# Patient Record
Sex: MALE | Race: WHITE | NOT HISPANIC OR LATINO | Employment: OTHER | URBAN - METROPOLITAN AREA
[De-identification: names, ages, dates, MRNs, and addresses within clinical notes are randomized per-mention and may not be internally consistent; named-entity substitution may affect disease eponyms.]

---

## 2017-08-01 ENCOUNTER — TRANSCRIBE ORDERS (OUTPATIENT)
Dept: ADMINISTRATIVE | Facility: HOSPITAL | Age: 62
End: 2017-08-01

## 2017-08-01 DIAGNOSIS — R31.9 HEMATURIA: ICD-10-CM

## 2017-08-01 DIAGNOSIS — N50.811 TESTICULAR PAIN, RIGHT: Primary | ICD-10-CM

## 2017-08-03 ENCOUNTER — HOSPITAL ENCOUNTER (OUTPATIENT)
Dept: RADIOLOGY | Facility: HOSPITAL | Age: 62
Discharge: HOME | End: 2017-08-03
Payer: COMMERCIAL

## 2017-08-03 DIAGNOSIS — N50.811 TESTICULAR PAIN, RIGHT: ICD-10-CM

## 2017-08-03 DIAGNOSIS — R31.9 HEMATURIA: ICD-10-CM

## 2017-08-03 PROCEDURE — 76870 US EXAM SCROTUM: CPT

## 2017-08-03 PROCEDURE — 76770 US EXAM ABDO BACK WALL COMP: CPT

## 2018-02-10 ENCOUNTER — HOSPITAL ENCOUNTER (INPATIENT)
Facility: HOSPITAL | Age: 63
LOS: 1 days | Discharge: HOME | DRG: 812 | End: 2018-02-11
Attending: EMERGENCY MEDICINE | Admitting: FAMILY MEDICINE
Payer: COMMERCIAL

## 2018-02-10 ENCOUNTER — APPOINTMENT (EMERGENCY)
Dept: RADIOLOGY | Facility: HOSPITAL | Age: 63
DRG: 812 | End: 2018-02-10
Payer: COMMERCIAL

## 2018-02-10 DIAGNOSIS — F32.A DEPRESSION: ICD-10-CM

## 2018-02-10 DIAGNOSIS — Z79.899 POLYPHARMACY: ICD-10-CM

## 2018-02-10 DIAGNOSIS — D64.9 SYMPTOMATIC ANEMIA: Primary | ICD-10-CM

## 2018-02-10 PROBLEM — J40 BRONCHITIS: Status: ACTIVE | Noted: 2018-02-10

## 2018-02-10 PROBLEM — I10 HYPERTENSION: Status: ACTIVE | Noted: 2018-02-10

## 2018-02-10 LAB
ABO GROUP BLD: NORMAL
ABO GROUP BLD: NORMAL
ALBUMIN SERPL BCP-MCNC: 3.5 G/DL (ref 3.5–5)
ALP SERPL-CCNC: 83 U/L (ref 46–116)
ALT SERPL W P-5'-P-CCNC: 18 U/L (ref 12–78)
ANION GAP SERPL CALCULATED.3IONS-SCNC: 2 MMOL/L (ref 4–13)
AST SERPL W P-5'-P-CCNC: 12 U/L (ref 5–45)
ATRIAL RATE: 73 BPM
BASOPHILS # BLD AUTO: 0 THOUSANDS/ΜL (ref 0–0.1)
BASOPHILS NFR BLD AUTO: 0 % (ref 0–1)
BILIRUB SERPL-MCNC: 0.4 MG/DL (ref 0.2–1)
BLD GP AB SCN SERPL QL: NEGATIVE
BUN SERPL-MCNC: 20 MG/DL (ref 5–25)
CALCIUM SERPL-MCNC: 8.5 MG/DL (ref 8.3–10.1)
CHLORIDE SERPL-SCNC: 102 MMOL/L (ref 100–108)
CO2 SERPL-SCNC: 35 MMOL/L (ref 21–32)
CREAT SERPL-MCNC: 0.92 MG/DL (ref 0.6–1.3)
EOSINOPHIL # BLD AUTO: 0.1 THOUSAND/ΜL (ref 0–0.61)
EOSINOPHIL NFR BLD AUTO: 1 % (ref 0–6)
ERYTHROCYTE [DISTWIDTH] IN BLOOD BY AUTOMATED COUNT: 13.4 % (ref 11.6–15.1)
GFR SERPL CREATININE-BSD FRML MDRD: 88 ML/MIN/1.73SQ M
GLUCOSE SERPL-MCNC: 118 MG/DL (ref 65–140)
HCT VFR BLD AUTO: 15.5 % (ref 42–52)
HGB BLD-MCNC: 5.3 G/DL (ref 14–18)
LYMPHOCYTES # BLD AUTO: 7.5 THOUSANDS/ΜL (ref 0.6–4.47)
LYMPHOCYTES NFR BLD AUTO: 64 % (ref 14–44)
MCH RBC QN AUTO: 30.8 PG (ref 27–31)
MCHC RBC AUTO-ENTMCNC: 34.3 G/DL (ref 31.4–37.4)
MCV RBC AUTO: 90 FL (ref 82–98)
MONOCYTES # BLD AUTO: 0.5 THOUSAND/ΜL (ref 0.17–1.22)
MONOCYTES NFR BLD AUTO: 4 % (ref 4–12)
NEUTROPHILS # BLD AUTO: 3.6 THOUSANDS/ΜL (ref 1.85–7.62)
NEUTS SEG NFR BLD AUTO: 31 % (ref 43–75)
NRBC BLD AUTO-RTO: 0 /100 WBCS
P AXIS: 57 DEGREES
PLATELET # BLD AUTO: 259 THOUSANDS/UL (ref 130–400)
PLATELET BLD QL SMEAR: ADEQUATE
PMV BLD AUTO: 6.7 FL (ref 8.9–12.7)
POLYCHROMASIA BLD QL SMEAR: PRESENT
POTASSIUM SERPL-SCNC: 4.1 MMOL/L (ref 3.5–5.3)
PR INTERVAL: 164 MS
PROT SERPL-MCNC: 6.2 G/DL (ref 6.4–8.2)
QRS AXIS: -6 DEGREES
QRSD INTERVAL: 88 MS
QT INTERVAL: 402 MS
QTC INTERVAL: 442 MS
RBC # BLD AUTO: 1.72 MILLION/UL (ref 4.7–6.1)
RH BLD: POSITIVE
RH BLD: POSITIVE
SODIUM SERPL-SCNC: 139 MMOL/L (ref 136–145)
SPECIMEN EXPIRATION DATE: NORMAL
T WAVE AXIS: 41 DEGREES
VALPROATE SERPL-MCNC: 69 UG/ML (ref 50–100)
VENTRICULAR RATE: 73 BPM
WBC # BLD AUTO: 11.7 THOUSAND/UL (ref 4.8–10.8)

## 2018-02-10 PROCEDURE — 86920 COMPATIBILITY TEST SPIN: CPT

## 2018-02-10 PROCEDURE — 30233N1 TRANSFUSION OF NONAUTOLOGOUS RED BLOOD CELLS INTO PERIPHERAL VEIN, PERCUTANEOUS APPROACH: ICD-10-PCS | Performed by: FAMILY MEDICINE

## 2018-02-10 PROCEDURE — 87081 CULTURE SCREEN ONLY: CPT | Performed by: FAMILY MEDICINE

## 2018-02-10 PROCEDURE — 85025 COMPLETE CBC W/AUTO DIFF WBC: CPT | Performed by: EMERGENCY MEDICINE

## 2018-02-10 PROCEDURE — 36415 COLL VENOUS BLD VENIPUNCTURE: CPT | Performed by: EMERGENCY MEDICINE

## 2018-02-10 PROCEDURE — P9016 RBC LEUKOCYTES REDUCED: HCPCS

## 2018-02-10 PROCEDURE — 93005 ELECTROCARDIOGRAM TRACING: CPT | Performed by: EMERGENCY MEDICINE

## 2018-02-10 PROCEDURE — 96361 HYDRATE IV INFUSION ADD-ON: CPT

## 2018-02-10 PROCEDURE — 80053 COMPREHEN METABOLIC PANEL: CPT | Performed by: EMERGENCY MEDICINE

## 2018-02-10 PROCEDURE — 82728 ASSAY OF FERRITIN: CPT | Performed by: EMERGENCY MEDICINE

## 2018-02-10 PROCEDURE — 99285 EMERGENCY DEPT VISIT HI MDM: CPT

## 2018-02-10 PROCEDURE — 80164 ASSAY DIPROPYLACETIC ACD TOT: CPT | Performed by: EMERGENCY MEDICINE

## 2018-02-10 PROCEDURE — 86900 BLOOD TYPING SEROLOGIC ABO: CPT | Performed by: EMERGENCY MEDICINE

## 2018-02-10 PROCEDURE — 86901 BLOOD TYPING SEROLOGIC RH(D): CPT | Performed by: EMERGENCY MEDICINE

## 2018-02-10 PROCEDURE — 86850 RBC ANTIBODY SCREEN: CPT | Performed by: EMERGENCY MEDICINE

## 2018-02-10 PROCEDURE — 93010 ELECTROCARDIOGRAM REPORT: CPT | Performed by: INTERNAL MEDICINE

## 2018-02-10 PROCEDURE — 83550 IRON BINDING TEST: CPT | Performed by: EMERGENCY MEDICINE

## 2018-02-10 PROCEDURE — C9113 INJ PANTOPRAZOLE SODIUM, VIA: HCPCS | Performed by: EMERGENCY MEDICINE

## 2018-02-10 PROCEDURE — 96372 THER/PROPH/DIAG INJ SC/IM: CPT

## 2018-02-10 PROCEDURE — 82272 OCCULT BLD FECES 1-3 TESTS: CPT | Performed by: FAMILY MEDICINE

## 2018-02-10 PROCEDURE — 96360 HYDRATION IV INFUSION INIT: CPT

## 2018-02-10 PROCEDURE — 83540 ASSAY OF IRON: CPT | Performed by: EMERGENCY MEDICINE

## 2018-02-10 PROCEDURE — 70450 CT HEAD/BRAIN W/O DYE: CPT

## 2018-02-10 PROCEDURE — 71045 X-RAY EXAM CHEST 1 VIEW: CPT

## 2018-02-10 RX ORDER — DIVALPROEX SODIUM 500 MG/1
1000 TABLET, DELAYED RELEASE ORAL EVERY 12 HOURS SCHEDULED
Status: DISCONTINUED | OUTPATIENT
Start: 2018-02-10 | End: 2018-02-11 | Stop reason: HOSPADM

## 2018-02-10 RX ORDER — FLUOXETINE HYDROCHLORIDE 20 MG/1
40 CAPSULE ORAL DAILY
Status: DISCONTINUED | OUTPATIENT
Start: 2018-02-10 | End: 2018-02-10

## 2018-02-10 RX ORDER — THIAMINE HYDROCHLORIDE 100 MG/ML
100 INJECTION, SOLUTION INTRAMUSCULAR; INTRAVENOUS ONCE
Status: COMPLETED | OUTPATIENT
Start: 2018-02-10 | End: 2018-02-10

## 2018-02-10 RX ORDER — RISPERIDONE 1 MG/1
2 TABLET, FILM COATED ORAL 2 TIMES DAILY
Status: DISCONTINUED | OUTPATIENT
Start: 2018-02-10 | End: 2018-02-11 | Stop reason: HOSPADM

## 2018-02-10 RX ORDER — AMOXICILLIN 875 MG/1
875 TABLET, COATED ORAL 2 TIMES DAILY
Status: DISCONTINUED | OUTPATIENT
Start: 2018-02-10 | End: 2018-02-11 | Stop reason: HOSPADM

## 2018-02-10 RX ORDER — PROPRANOLOL HYDROCHLORIDE 20 MG/1
10 TABLET ORAL 3 TIMES DAILY
Status: DISCONTINUED | OUTPATIENT
Start: 2018-02-10 | End: 2018-02-11 | Stop reason: HOSPADM

## 2018-02-10 RX ORDER — MIRTAZAPINE 15 MG/1
15 TABLET, FILM COATED ORAL
COMMUNITY
End: 2022-03-10 | Stop reason: ALTCHOICE

## 2018-02-10 RX ORDER — AMOXICILLIN 875 MG/1
875 TABLET, COATED ORAL 2 TIMES DAILY
COMMUNITY
End: 2018-02-11 | Stop reason: HOSPADM

## 2018-02-10 RX ORDER — RISPERIDONE 2 MG/1
4 TABLET, FILM COATED ORAL 2 TIMES DAILY
COMMUNITY
End: 2019-05-06 | Stop reason: ALTCHOICE

## 2018-02-10 RX ORDER — PROPRANOLOL HYDROCHLORIDE 10 MG/1
30 TABLET ORAL 3 TIMES DAILY
Status: ON HOLD | COMMUNITY
End: 2018-02-22

## 2018-02-10 RX ORDER — LISINOPRIL AND HYDROCHLOROTHIAZIDE 20; 12.5 MG/1; MG/1
1 TABLET ORAL DAILY
COMMUNITY
End: 2018-03-26 | Stop reason: CLARIF

## 2018-02-10 RX ORDER — PANTOPRAZOLE SODIUM 40 MG/1
40 INJECTION, POWDER, FOR SOLUTION INTRAVENOUS ONCE
Status: COMPLETED | OUTPATIENT
Start: 2018-02-10 | End: 2018-02-10

## 2018-02-10 RX ORDER — HYDROXYZINE PAMOATE 50 MG/1
50 CAPSULE ORAL 2 TIMES DAILY
COMMUNITY
End: 2019-05-06 | Stop reason: ALTCHOICE

## 2018-02-10 RX ORDER — BENZTROPINE MESYLATE 1 MG/1
1 TABLET ORAL 2 TIMES DAILY
COMMUNITY
End: 2022-03-10 | Stop reason: ALTCHOICE

## 2018-02-10 RX ORDER — FLUOXETINE HYDROCHLORIDE 20 MG/1
60 CAPSULE ORAL DAILY
COMMUNITY
Start: 2018-07-14 | End: 2022-03-10 | Stop reason: ALTCHOICE

## 2018-02-10 RX ORDER — MIRTAZAPINE 15 MG/1
15 TABLET, FILM COATED ORAL
Status: DISCONTINUED | OUTPATIENT
Start: 2018-02-10 | End: 2018-02-10

## 2018-02-10 RX ORDER — DIVALPROEX SODIUM 500 MG/1
500 TABLET, DELAYED RELEASE ORAL 2 TIMES DAILY
COMMUNITY
End: 2020-07-31

## 2018-02-10 RX ADMIN — DIVALPROEX SODIUM 1000 MG: 500 TABLET, DELAYED RELEASE ORAL at 21:30

## 2018-02-10 RX ADMIN — PANTOPRAZOLE SODIUM 40 MG: 40 INJECTION, POWDER, FOR SOLUTION INTRAVENOUS at 17:51

## 2018-02-10 RX ADMIN — PROPRANOLOL HYDROCHLORIDE: 20 TABLET ORAL at 21:30

## 2018-02-10 RX ADMIN — SODIUM CHLORIDE 1000 ML: 0.9 INJECTION, SOLUTION INTRAVENOUS at 12:53

## 2018-02-10 RX ADMIN — THIAMINE HYDROCHLORIDE 100 MG: 100 INJECTION, SOLUTION INTRAMUSCULAR; INTRAVENOUS at 13:02

## 2018-02-10 RX ADMIN — FLUOXETINE 40 MG: 20 CAPSULE ORAL at 17:41

## 2018-02-10 RX ADMIN — RISPERIDONE 2 MG: 1 TABLET ORAL at 19:21

## 2018-02-10 RX ADMIN — AMOXICILLIN 875 MG: 875 TABLET, FILM COATED ORAL at 17:42

## 2018-02-10 RX ADMIN — PROPRANOLOL HYDROCHLORIDE 10 MG: 20 TABLET ORAL at 17:40

## 2018-02-10 RX ADMIN — LISINOPRIL: 20 TABLET ORAL at 17:42

## 2018-02-10 NOTE — ED NOTES
Patient transported to [address] [place] [address],[facility], RN  02/10/18 8468

## 2018-02-10 NOTE — H&P
H&P Exam - [facility] Opal 60 y o  male MRN: [id number]  Unit/Bed#: TEODORO Encounter: [id number]    60year old male with a PMH of HTN and  Depression presents with   Patient will be admitted to the medical/surgical floor and will be placed on observational status under the service of Dr Barb Kurtz  Patient is expected to stay less than 2 midnights  Assessment/Plan:    Normocytic Anemia:   · Hb on admission: 5 3 / MCV on admission: 90  · Obtain Iron Studies / FOBT / Y26 / Folic Acid / Ferritin / ESR  · Tranfuse 2U PRBC  · Follow up Hb/Hct 2hrs post-transfusion  · Will monitor with daily CBC    Hx of Bronchitis:  · Chest XR on admission: Possible small infrahilar infiltrates versus bronchovascular crowding, otherwise no consolidation  · Currently completing course of Abx: Amoxicillin 875mg PO bid - day and a half left  · Continue Abx course  · Repeat CXR in AM    Leukocytosis:  · Likely 2/2 Bronchitis  · WBC on admission: 11 7  · Will monitor with daily CBC    Hx of Hematuria:  · Obtain UA    Hx of Fall:  · Likely 2/2 Medication Side Effects   · CT Head on admission: No acute intracranial abnormality  · Monitor with Telemetry  · Monitor with Neurochecks q4h    Depression:  · Stable; Continue home medications: Depakote 500mg q12h and Risperdal 2mg PO bid  · Will hold Prozac 40mg qd and Mirtazapine 15mg qd bedtime  · Consult Psychiatry    Hypertension:  · BP on admission: 122/59  · Stable; continue with home medication: Prinizide 20-12 5mg PO qd    Global:  · Regular Diet / Replete Electroylytes as Needed / Lovenox + SCD      Plan Discussed and Agreed upon with Senior Resident and Attending Physician on-call    History of Present Illness   [facility] Opal is a 60year old male with a PMH of HTN and Depression who presents to the ED with a chief complaint of feeling sick  Of note, patient is a poor historian and history was obtained from wife, Lito Queen, over the phone    As per wife, patient has been in [place] for drug and alcohol rehabilitation from November 20th to January 25th  Wife notes a past medical history of hypertension and depression  Social history includes smoking and alcohol abuse with last drink on November 19th/2017  No pertinent surgical history or known allergies or mentioned  Wife mentions that prior to leaving for [place], patient had lost his prescriptions for his psychiatric medications  States that there has been a period of time where patient has been off his medication  Wife states that since returning from [place], patient has had a hard time adjusting to new psychiatric medication  When asked about which medications were new, both patient and wife were unaware  Wife has noticed that patient has been drinking more coffee and Pepsi as of late  Has also noticed that patient has become more fidgety  Notes that patient is constantly doing things with his hands and has been hallucinating  Reports that patient has fallen on multiple occasions due to stumbling over himself  When asked about the nature of the fall , wife states that patient did not lose consciousness or experience any upper/lower extremity weakness  Wife also states that patient has recently been diagnosed with bronchitis and is currently completing a course of amoxicillin  Has a day and a half remaining  Patient does voice ongoing cough that is productive in nature with yellow green sputum production without blood  Wife states that patient's PCP is Dr Guera Clemente in [place], [place]  Patient denies any other symptoms such as headaches, visual disturbances, shortness of breath, chest pain, abdominal pain, nausea, vomiting, constipation, diarrhea, or loss of bowel or bladder function  Review of Systems   Constitutional: Negative for chills, diaphoresis and fever  Eyes: Negative for photophobia and visual disturbance  Respiratory: Positive for cough and shortness of breath  Negative for wheezing      Cardiovascular: Negative for chest pain and palpitations  Gastrointestinal: Negative for abdominal pain, constipation, diarrhea, nausea and vomiting  Genitourinary: Positive for hematuria  Negative for decreased urine volume, difficulty urinating, dysuria, flank pain and frequency  Neurological: Negative for dizziness, weakness, light-headedness and headaches  Psychiatric/Behavioral: Positive for agitation, behavioral problems, dysphoric mood, hallucinations and sleep disturbance  Negative for confusion, decreased concentration, self-injury and suicidal ideas  The patient is nervous/anxious and is hyperactive  Historical Information   Past Medical History:   Diagnosis Date    Hypertension     Psychiatric disorder      History reviewed  No pertinent surgical history  Social History   History   Alcohol Use No     Comment: last drink nov 19 2017     History   Drug Use No     History   Smoking Status    Current Every Day Smoker    Packs/day: 1 00   Smokeless Tobacco    Never Used     Family History: History reviewed  No pertinent family history  Meds/Allergies   PTA meds:   Prior to Admission Medications   Prescriptions Last Dose Informant Patient Reported? Taking?    FLUoxetine (PROzac) 20 mg capsule   Yes Yes   Sig: Take 40 mg by mouth daily   amoxicillin (AMOXIL) 875 mg tablet   Yes Yes   Sig: Take 875 mg by mouth 2 (two) times a day   benztropine (COGENTIN) 1 mg tablet   Yes Yes   Sig: Take 1 mg by mouth 2 (two) times a day   divalproex sodium (DEPAKOTE) 500 mg EC tablet   Yes Yes   Sig: Take 1,000 mg by mouth every 12 (twelve) hours   hydrOXYzine pamoate (VISTARIL) 50 mg capsule   Yes Yes   Sig: Take 50 mg by mouth 2 (two) times a day   lisinopril-hydrochlorothiazide (PRINZIDE,ZESTORETIC) 20-12 5 MG per tablet   Yes Yes   Sig: Take 1 tablet by mouth daily   mirtazapine (REMERON) 15 mg tablet   Yes Yes   Sig: Take 15 mg by mouth daily at bedtime   propranolol (INDERAL) 10 mg tablet   Yes Yes   Sig: Take 10 mg by mouth 3 (three) times a day   risperiDONE (RisperDAL) 2 mg tablet   Yes Yes   Sig: Take 2 mg by mouth 2 (two) times a day      Facility-Administered Medications: None     No Known Allergies    Objective   First Vitals:   Blood Pressure: 122/59 (02/10/18 1141)  Pulse: 77 (02/10/18 1141)  Temperature: 99 °F (37 2 °C) (02/10/18 1141)  Temp Source: Tympanic (02/10/18 1141)  Respirations: 20 (02/10/18 1141)  Weight - Scale: 113 kg (250 lb) (02/10/18 1141)  SpO2: 95 % (02/10/18 1141)    Current Vitals:   Blood Pressure: 122/59 (02/10/18 1141)  Pulse: 77 (02/10/18 1141)  Temperature: 99 °F (37 2 °C) (02/10/18 1141)  Temp Source: Tympanic (02/10/18 1141)  Respirations: 20 (02/10/18 1141)  Weight - Scale: 113 kg (250 lb) (02/10/18 1141)  SpO2: 95 % (02/10/18 1141)    No intake or output data in the 24 hours ending 02/10/18 1458    Invasive Devices     Peripheral Intravenous Line            Peripheral IV 02/10/18 Left Antecubital less than 1 day                Physical Exam   Constitutional: He is oriented to person, place, and time  He appears well-developed and well-nourished  No distress  HENT:   Head: Normocephalic and atraumatic  Eyes: Conjunctivae and EOM are normal  Pupils are equal, round, and reactive to light  Scleral icterus is present  Neck: Normal range of motion  Neck supple  No JVD present  No tracheal deviation present  No thyromegaly present  Cardiovascular: Normal rate, regular rhythm, normal heart sounds and intact distal pulses  No murmur heard  Pulmonary/Chest: Effort normal and breath sounds normal  No respiratory distress  He has no wheezes  He has no rales  He exhibits no tenderness  Abdominal: Soft  Bowel sounds are normal  He exhibits no distension and no mass  There is no tenderness  There is no rebound and no guarding  Musculoskeletal: Normal range of motion  He exhibits no edema, tenderness or deformity  Left Knee Abrasion   Lymphadenopathy:     He has no cervical adenopathy     Neurological: He is alert and oriented to person, place, and time  No cranial nerve deficit  Skin: He is not diaphoretic  Psychiatric: He has a normal mood and affect   His behavior is normal        Lab Results:  Results for orders placed or performed during the hospital encounter of 02/10/18   CBC and differential   Result Value Ref Range    WBC 11 70 (H) 4 80 - 10 80 Thousand/uL    RBC 1 72 (L) 4 70 - 6 10 Million/uL    Hemoglobin 5 3 (LL) 14 0 - 18 0 g/dL    Hematocrit 15 5 (LL) 42 0 - 52 0 %    MCV 90 82 - 98 fL    MCH 30 8 27 0 - 31 0 pg    MCHC 34 3 31 4 - 37 4 g/dL    RDW 13 4 11 6 - 15 1 %    MPV 6 7 (L) 8 9 - 12 7 fL    Platelets 806 848 - 544 Thousands/uL    nRBC 0 /100 WBCs    Neutrophils Relative 31 (L) 43 - 75 %    Lymphocytes Relative 64 (H) 14 - 44 %    Monocytes Relative 4 4 - 12 %    Eosinophils Relative 1 0 - 6 %    Basophils Relative 0 0 - 1 %    Neutrophils Absolute 3 60 1 85 - 7 62 Thousands/µL    Lymphocytes Absolute 7 50 (H) 0 60 - 4 47 Thousands/µL    Monocytes Absolute 0 50 0 17 - 1 22 Thousand/µL    Eosinophils Absolute 0 10 0 00 - 0 61 Thousand/µL    Basophils Absolute 0 00 0 00 - 0 10 Thousands/µL   Comprehensive metabolic panel   Result Value Ref Range    Sodium 139 136 - 145 mmol/L    Potassium 4 1 3 5 - 5 3 mmol/L    Chloride 102 100 - 108 mmol/L    CO2 35 (H) 21 - 32 mmol/L    Anion Gap 2 (L) 4 - 13 mmol/L    BUN 20 5 - 25 mg/dL    Creatinine 0 92 0 60 - 1 30 mg/dL    Glucose 118 65 - 140 mg/dL    Calcium 8 5 8 3 - 10 1 mg/dL    AST 12 5 - 45 U/L    ALT 18 12 - 78 U/L    Alkaline Phosphatase 83 46 - 116 U/L    Total Protein 6 2 (L) 6 4 - 8 2 g/dL    Albumin 3 5 3 5 - 5 0 g/dL    Total Bilirubin 0 40 0 20 - 1 00 mg/dL    eGFR 88 ml/min/1 73sq m   Valproic acid level, total   Result Value Ref Range    Valproic Acid, Total 69 50 - 100 ug/mL   ECG 12 lead   Result Value Ref Range    Ventricular Rate 73 BPM    Atrial Rate 73 BPM    NH Interval 164 ms    QRSD Interval 88 ms    QT Interval 402 ms    QTC Interval 442 ms    P [place] 57 degrees    QRS Axis -6 degrees    T Wave Axis 41 degrees   Type and screen   Result Value Ref Range    ABO Grouping B     Rh Factor Positive     Antibody Screen Negative     Specimen Expiration Date [id number]    Prepare RBC:Has consent been obtained? Yes, 2 Units   Result Value Ref Range    Unit Product Code D2841R60     Unit Number [id number]     Unit ABO B     Unit RH NEG     Crossmatch Compatible     Unit Dispense Status Crossmatched     Unit Product Code C1265A49     Unit Number [id number]     Unit ABO B     Unit RH NEG     Crossmatch Compatible     Unit Dispense Status Crossmatched    ABO/Rh   Result Value Ref Range    ABO Grouping B     Rh Factor Positive    Smear Review(Phlebs Do Not Order)   Result Value Ref Range    Polychromasia Present     Platelet Estimate Adequate Adequate       Imaging:  XR chest 1 view portable   Final Result   Possible small infrahilar infiltrates versus bronchovascular crowding  Otherwise no consolidation  Workstation performed: [id number]         CT head without contrast   Final Result      No acute intracranial abnormality  Workstation performed: [id number]             Code Status: Full    EKG: NSR    Counseling / Coordination of Care: Total floor / unit time spent today 30 minutes           Raman Jett MD

## 2018-02-10 NOTE — ED PROVIDER NOTES
History  Chief Complaint   Patient presents with    Multiple Falls     out of alcohol and drug rehab Jan 25th in [facility] [place], after got out did not get normal meds and he was hallucinating,  states feeling weak and shakey, L knee gives out and he falls     Patient presents for evaluation of multiple complaints  Dyspnea with exertion at times  But main complaint is tremors and falls  States he was in drug and alcohol rehab on the 25th  Sent home on new medications  Lost them on the bus home  Had new prescriptions called in but was out for 1 week  Then missed appointment with psych and PMD does not prescribe those medications  Also reports from friend that she thinks he is hallucination at times, reaching for things that arent there and staring off  History provided by:  Patient and friend   used: No        Prior to Admission Medications   Prescriptions Last Dose Informant Patient Reported? Taking?    FLUoxetine (PROzac) 20 mg capsule   Yes Yes   Sig: Take 40 mg by mouth daily   amoxicillin (AMOXIL) 875 mg tablet   Yes Yes   Sig: Take 875 mg by mouth 2 (two) times a day   benztropine (COGENTIN) 1 mg tablet   Yes Yes   Sig: Take 1 mg by mouth 2 (two) times a day   divalproex sodium (DEPAKOTE) 500 mg EC tablet   Yes Yes   Sig: Take 1,000 mg by mouth every 12 (twelve) hours   hydrOXYzine pamoate (VISTARIL) 50 mg capsule   Yes Yes   Sig: Take 50 mg by mouth 2 (two) times a day   lisinopril-hydrochlorothiazide (PRINZIDE,ZESTORETIC) 20-12 5 MG per tablet   Yes Yes   Sig: Take 1 tablet by mouth daily   mirtazapine (REMERON) 15 mg tablet   Yes Yes   Sig: Take 15 mg by mouth daily at bedtime   propranolol (INDERAL) 10 mg tablet   Yes Yes   Sig: Take 10 mg by mouth 3 (three) times a day   risperiDONE (RisperDAL) 2 mg tablet   Yes Yes   Sig: Take 2 mg by mouth 2 (two) times a day      Facility-Administered Medications: None       Past Medical History:   Diagnosis Date    Hypertension     Psychiatric disorder        History reviewed  No pertinent surgical history  History reviewed  No pertinent family history  I have reviewed and agree with the history as documented  Social History   Substance Use Topics    Smoking status: Current Every Day Smoker     Packs/day: 1 00    Smokeless tobacco: Never Used    Alcohol use No      Comment: last drink nov 19 2017        Review of Systems   Respiratory: Positive for shortness of breath  Cardiovascular: Negative for chest pain  Gastrointestinal: Negative for abdominal pain, blood in stool, nausea and vomiting  Neurological: Positive for tremors  Psychiatric/Behavioral: Positive for hallucinations  All other systems reviewed and are negative  Physical Exam  ED Triage Vitals [02/10/18 1141]   Temperature Pulse Respirations Blood Pressure SpO2   99 °F (37 2 °C) 77 20 122/59 95 %      Temp Source Heart Rate Source Patient Position - Orthostatic VS BP Location FiO2 (%)   Tympanic Monitor -- Left arm --      Pain Score       No Pain           Orthostatic Vital Signs  Vitals:    02/10/18 1141 02/10/18 1507 02/10/18 1542 02/10/18 1555   BP: 122/59 131/66 128/67 136/66   Pulse: 77 73 72 76       Physical Exam   Constitutional: He is oriented to person, place, and time  No distress  HENT:   Mouth/Throat: Oropharynx is clear and moist    Eyes: Pupils are equal, round, and reactive to light  Cardiovascular: Normal rate, regular rhythm and intact distal pulses  Pulmonary/Chest: Effort normal and breath sounds normal  No respiratory distress  Abdominal: Soft  There is no tenderness  Genitourinary: Rectal exam shows guaiac negative stool  Musculoskeletal: Normal range of motion  Neurological: He is alert and oriented to person, place, and time  He exhibits normal muscle tone  Finger to nose test within normal limits mild tremor on exam   Skin: Capillary refill takes less than 2 seconds  He is not diaphoretic     Nursing note and vitals reviewed  ED Medications  Medications   pantoprazole (PROTONIX) injection 40 mg (not administered)   amoxicillin (AMOXIL) tablet 875 mg (not administered)   FLUoxetine (PROzac) capsule 40 mg (not administered)   lisinopril-hydrochlorothiazide (PRINZIDE 20/12  5) combo dose (not administered)   mirtazapine (REMERON) tablet 15 mg (not administered)   propranolol (INDERAL) tablet 10 mg (not administered)   thiamine (VITAMIN B1) injection 100 mg (100 mg Intramuscular Given 2/10/18 1302)   sodium chloride 0 9 % bolus 1,000 mL (1,000 mL Intravenous New Bag 2/10/18 1253)       Diagnostic Studies  Results Reviewed     Procedure Component Value Units Date/Time    Iron Saturation % [[id number]] Collected:  02/10/18 1253    Lab Status: In process Specimen:  Blood from Arm, Left Updated:  02/10/18 1422    Ferritin [[id number]] Collected:  02/10/18 1253    Lab Status:   In process Specimen:  Blood from Arm, Left Updated:  02/10/18 1422    CBC and differential [[id number]]  (Abnormal) Collected:  02/10/18 1253    Lab Status:  Final result Specimen:  Blood from Arm, Left Updated:  02/10/18 1324     WBC 11 70 (H) Thousand/uL      RBC 1 72 (L) Million/uL      Hemoglobin 5 3 (LL) g/dL      Hematocrit 15 5 (LL) %      MCV 90 fL      MCH 30 8 pg      MCHC 34 3 g/dL      RDW 13 4 %      MPV 6 7 (L) fL      Platelets 360 Thousands/uL      nRBC 0 /100 WBCs      Neutrophils Relative 31 (L) %      Lymphocytes Relative 64 (H) %      Monocytes Relative 4 %      Eosinophils Relative 1 %      Basophils Relative 0 %      Neutrophils Absolute 3 60 Thousands/µL      Lymphocytes Absolute 7 50 (H) Thousands/µL      Monocytes Absolute 0 50 Thousand/µL      Eosinophils Absolute 0 10 Thousand/µL      Basophils Absolute 0 00 Thousands/µL     Comprehensive metabolic panel [[id number]]  (Abnormal) Collected:  02/10/18 1253    Lab Status:  Final result Specimen:  Blood from Arm, Left Updated:  02/10/18 1321     Sodium 139 mmol/L      Potassium 4 1 mmol/L Chloride 102 mmol/L      CO2 35 (H) mmol/L      Anion Gap 2 (L) mmol/L      BUN 20 mg/dL      Creatinine 0 92 mg/dL      Glucose 118 mg/dL      Calcium 8 5 mg/dL      AST 12 U/L      ALT 18 U/L      Alkaline Phosphatase 83 U/L      Total Protein 6 2 (L) g/dL      Albumin 3 5 g/dL      Total Bilirubin 0 40 mg/dL      eGFR 88 ml/min/1 73sq m     Narrative:         National Kidney Disease Education Program recommendations are as follows:  GFR calculation is accurate only with a steady state creatinine  Chronic Kidney disease less than 60 ml/min/1 73 sq  meters  Kidney failure less than 15 ml/min/1 73 sq  meters  Valproic acid level, total [[id number]]  (Normal) Collected:  02/10/18 1253    Lab Status:  Final result Specimen:  Blood from Arm, Left Updated:  02/10/18 1321     Valproic Acid, Total 69 ug/mL                  XR chest 1 view portable   Final Result by Moni Red MD (02/10 1341)   Possible small infrahilar infiltrates versus bronchovascular crowding  Otherwise no consolidation  Workstation performed: [id number]         CT head without contrast   Final Result by Moni Red MD (02/10 1340)      No acute intracranial abnormality           Workstation performed: [id number]                    Procedures  Procedures       Phone Contacts  ED Phone Contact    ED Course  ED Course                                MDM  Number of Diagnoses or Management Options  Symptomatic anemia:   Diagnosis management comments: Pulse ox 96% on RA indicating adequate oxygenation  CXR: NAD as read by me       Amount and/or Complexity of Data Reviewed  Clinical lab tests: ordered and reviewed  Tests in the radiology section of CPT®: ordered and reviewed  Obtain history from someone other than the patient: yes  Independent visualization of images, tracings, or specimens: yes    Patient Progress  Patient progress: stable    CritCare Time    Disposition  Final diagnoses:   Symptomatic anemia     Time reflects when diagnosis was documented in both MDM as applicable and the Disposition within this note     Time User Action Codes Description Comment    2/10/2018  2:24 PM Shawn Gil Add [D64 9] Symptomatic anemia       ED Disposition     ED Disposition Condition Comment    Admit  Case was discussed with Dr Chastity Washburn and the patient's admission status was agreed to be admission to the service of Dr Chastity Washburn  Follow-up Information    None       Current Discharge Medication List      CONTINUE these medications which have NOT CHANGED    Details   amoxicillin (AMOXIL) 875 mg tablet Take 875 mg by mouth 2 (two) times a day      benztropine (COGENTIN) 1 mg tablet Take 1 mg by mouth 2 (two) times a day      divalproex sodium (DEPAKOTE) 500 mg EC tablet Take 1,000 mg by mouth every 12 (twelve) hours      FLUoxetine (PROzac) 20 mg capsule Take 40 mg by mouth daily      hydrOXYzine pamoate (VISTARIL) 50 mg capsule Take 50 mg by mouth 2 (two) times a day      lisinopril-hydrochlorothiazide (PRINZIDE,ZESTORETIC) 20-12 5 MG per tablet Take 1 tablet by mouth daily      mirtazapine (REMERON) 15 mg tablet Take 15 mg by mouth daily at bedtime      propranolol (INDERAL) 10 mg tablet Take 10 mg by mouth 3 (three) times a day      risperiDONE (RisperDAL) 2 mg tablet Take 2 mg by mouth 2 (two) times a day           No discharge procedures on file      ED Provider  Electronically Signed by           Med Foote DO  02/10/18 1197

## 2018-02-10 NOTE — ED PROCEDURE NOTE
PROCEDURE  ECG 12 Lead Documentation  Date/Time: 2/10/2018 12:49 PM  Performed by: Mineralist  Authorized by: Mineralist     ECG reviewed by me, the ED Provider: yes    Patient location:  ED  Interpretation:     Interpretation: normal    Rate:     ECG rate:  73    ECG rate assessment: normal    Rhythm:     Rhythm: sinus rhythm    Ectopy:     Ectopy: none    QRS:     QRS axis:  Normal    QRS intervals:  Normal  Conduction:     Conduction: normal    ST segments:     ST segments:  Normal  T waves:     T waves: normal           Vicky Thurston DO  02/10/18 1249

## 2018-02-11 VITALS
RESPIRATION RATE: 18 BRPM | WEIGHT: 229.28 LBS | BODY MASS INDEX: 32.82 KG/M2 | TEMPERATURE: 98.1 F | SYSTOLIC BLOOD PRESSURE: 121 MMHG | HEIGHT: 70 IN | DIASTOLIC BLOOD PRESSURE: 59 MMHG | OXYGEN SATURATION: 97 % | HEART RATE: 60 BPM

## 2018-02-11 LAB
ABO GROUP BLD BPU: NORMAL
ABO GROUP BLD BPU: NORMAL
ANION GAP SERPL CALCULATED.3IONS-SCNC: 7 MMOL/L (ref 4–13)
BASOPHILS # BLD AUTO: 0 THOUSANDS/ΜL (ref 0–0.1)
BASOPHILS NFR BLD AUTO: 0 % (ref 0–1)
BILIRUB UR QL STRIP: NEGATIVE
BPU ID: NORMAL
BPU ID: NORMAL
BUN SERPL-MCNC: 14 MG/DL (ref 5–25)
CALCIUM SERPL-MCNC: 8.2 MG/DL (ref 8.3–10.1)
CHLORIDE SERPL-SCNC: 106 MMOL/L (ref 100–108)
CLARITY UR: CLEAR
CO2 SERPL-SCNC: 32 MMOL/L (ref 21–32)
COLOR UR: NORMAL
CREAT SERPL-MCNC: 0.69 MG/DL (ref 0.6–1.3)
CROSSMATCH: NORMAL
CROSSMATCH: NORMAL
EOSINOPHIL # BLD AUTO: 0.1 THOUSAND/ΜL (ref 0–0.61)
EOSINOPHIL NFR BLD AUTO: 1 % (ref 0–6)
ERYTHROCYTE [DISTWIDTH] IN BLOOD BY AUTOMATED COUNT: 14.8 % (ref 11.6–15.1)
ERYTHROCYTE [DISTWIDTH] IN BLOOD BY AUTOMATED COUNT: 15.1 % (ref 11.6–15.1)
ERYTHROCYTE [SEDIMENTATION RATE] IN BLOOD: 38 MM/HOUR (ref 2–10)
FERRITIN SERPL-MCNC: 917 NG/ML (ref 8–388)
FERRITIN SERPL-MCNC: 968 NG/ML (ref 8–388)
FOLATE SERPL-MCNC: 8.9 NG/ML (ref 3.1–17.5)
GFR SERPL CREATININE-BSD FRML MDRD: 101 ML/MIN/1.73SQ M
GLUCOSE SERPL-MCNC: 89 MG/DL (ref 65–140)
GLUCOSE UR STRIP-MCNC: NEGATIVE MG/DL
HCT VFR BLD AUTO: 20 % (ref 42–52)
HCT VFR BLD AUTO: 20.1 % (ref 42–52)
HCT VFR BLD AUTO: 21.9 % (ref 42–52)
HGB BLD-MCNC: 6.8 G/DL (ref 14–18)
HGB BLD-MCNC: 7.1 G/DL (ref 14–18)
HGB BLD-MCNC: 7.4 G/DL (ref 14–18)
HGB UR QL STRIP.AUTO: NEGATIVE
IRON SATN MFR SERPL: 93 %
IRON SATN MFR SERPL: 93 %
IRON SERPL-MCNC: 256 UG/DL (ref 65–175)
IRON SERPL-MCNC: 290 UG/DL (ref 65–175)
KETONES UR STRIP-MCNC: NEGATIVE MG/DL
LEUKOCYTE ESTERASE UR QL STRIP: NEGATIVE
LYMPHOCYTES # BLD AUTO: 11 THOUSANDS/ΜL (ref 0.6–4.47)
LYMPHOCYTES NFR BLD AUTO: 74 % (ref 14–44)
MAGNESIUM SERPL-MCNC: 2.2 MG/DL (ref 1.6–2.6)
MCH RBC QN AUTO: 29.3 PG (ref 27–31)
MCH RBC QN AUTO: 29.5 PG (ref 27–31)
MCHC RBC AUTO-ENTMCNC: 33.8 G/DL (ref 31.4–37.4)
MCHC RBC AUTO-ENTMCNC: 33.8 G/DL (ref 31.4–37.4)
MCV RBC AUTO: 87 FL (ref 82–98)
MCV RBC AUTO: 87 FL (ref 82–98)
MONOCYTES # BLD AUTO: 0.6 THOUSAND/ΜL (ref 0.17–1.22)
MONOCYTES NFR BLD AUTO: 4 % (ref 4–12)
NEUTROPHILS # BLD AUTO: 3.1 THOUSANDS/ΜL (ref 1.85–7.62)
NEUTS SEG NFR BLD AUTO: 21 % (ref 43–75)
NITRITE UR QL STRIP: NEGATIVE
NRBC BLD AUTO-RTO: 0 /100 WBCS
PH UR STRIP.AUTO: 7 [PH] (ref 5–9)
PHOSPHATE SERPL-MCNC: 4.5 MG/DL (ref 2.3–4.1)
PLATELET # BLD AUTO: 202 THOUSANDS/UL (ref 130–400)
PLATELET # BLD AUTO: 230 THOUSANDS/UL (ref 130–400)
PLATELET BLD QL SMEAR: ADEQUATE
PMV BLD AUTO: 6.8 FL (ref 8.9–12.7)
PMV BLD AUTO: 7.3 FL (ref 8.9–12.7)
POTASSIUM SERPL-SCNC: 4.4 MMOL/L (ref 3.5–5.3)
PROT UR STRIP-MCNC: NEGATIVE MG/DL
RBC # BLD AUTO: 2.29 MILLION/UL (ref 4.7–6.1)
RBC # BLD AUTO: 2.53 MILLION/UL (ref 4.7–6.1)
SODIUM SERPL-SCNC: 145 MMOL/L (ref 136–145)
SP GR UR STRIP.AUTO: 1.01 (ref 1–1.03)
TIBC SERPL-MCNC: 276 UG/DL (ref 250–450)
TIBC SERPL-MCNC: 312 UG/DL (ref 250–450)
UNIT DISPENSE STATUS: NORMAL
UNIT DISPENSE STATUS: NORMAL
UNIT PRODUCT CODE: NORMAL
UNIT PRODUCT CODE: NORMAL
UNIT RH: NORMAL
UNIT RH: NORMAL
UROBILINOGEN UR QL STRIP.AUTO: 1 E.U./DL
VIT B12 SERPL-MCNC: 952 PG/ML (ref 100–900)
WBC # BLD AUTO: 14.4 THOUSAND/UL (ref 4.8–10.8)
WBC # BLD AUTO: 14.9 THOUSAND/UL (ref 4.8–10.8)

## 2018-02-11 PROCEDURE — 81003 URINALYSIS AUTO W/O SCOPE: CPT | Performed by: STUDENT IN AN ORGANIZED HEALTH CARE EDUCATION/TRAINING PROGRAM

## 2018-02-11 PROCEDURE — 83735 ASSAY OF MAGNESIUM: CPT | Performed by: STUDENT IN AN ORGANIZED HEALTH CARE EDUCATION/TRAINING PROGRAM

## 2018-02-11 PROCEDURE — 83540 ASSAY OF IRON: CPT | Performed by: STUDENT IN AN ORGANIZED HEALTH CARE EDUCATION/TRAINING PROGRAM

## 2018-02-11 PROCEDURE — 82607 VITAMIN B-12: CPT | Performed by: STUDENT IN AN ORGANIZED HEALTH CARE EDUCATION/TRAINING PROGRAM

## 2018-02-11 PROCEDURE — 85027 COMPLETE CBC AUTOMATED: CPT | Performed by: STUDENT IN AN ORGANIZED HEALTH CARE EDUCATION/TRAINING PROGRAM

## 2018-02-11 PROCEDURE — 85014 HEMATOCRIT: CPT | Performed by: STUDENT IN AN ORGANIZED HEALTH CARE EDUCATION/TRAINING PROGRAM

## 2018-02-11 PROCEDURE — 82728 ASSAY OF FERRITIN: CPT | Performed by: STUDENT IN AN ORGANIZED HEALTH CARE EDUCATION/TRAINING PROGRAM

## 2018-02-11 PROCEDURE — 85025 COMPLETE CBC W/AUTO DIFF WBC: CPT | Performed by: FAMILY MEDICINE

## 2018-02-11 PROCEDURE — 85018 HEMOGLOBIN: CPT | Performed by: STUDENT IN AN ORGANIZED HEALTH CARE EDUCATION/TRAINING PROGRAM

## 2018-02-11 PROCEDURE — 85652 RBC SED RATE AUTOMATED: CPT | Performed by: STUDENT IN AN ORGANIZED HEALTH CARE EDUCATION/TRAINING PROGRAM

## 2018-02-11 PROCEDURE — 84100 ASSAY OF PHOSPHORUS: CPT | Performed by: STUDENT IN AN ORGANIZED HEALTH CARE EDUCATION/TRAINING PROGRAM

## 2018-02-11 PROCEDURE — 83550 IRON BINDING TEST: CPT | Performed by: STUDENT IN AN ORGANIZED HEALTH CARE EDUCATION/TRAINING PROGRAM

## 2018-02-11 PROCEDURE — 80048 BASIC METABOLIC PNL TOTAL CA: CPT | Performed by: STUDENT IN AN ORGANIZED HEALTH CARE EDUCATION/TRAINING PROGRAM

## 2018-02-11 PROCEDURE — 82746 ASSAY OF FOLIC ACID SERUM: CPT | Performed by: STUDENT IN AN ORGANIZED HEALTH CARE EDUCATION/TRAINING PROGRAM

## 2018-02-11 RX ORDER — FLUOXETINE HYDROCHLORIDE 20 MG/1
20 CAPSULE ORAL DAILY
Status: DISCONTINUED | OUTPATIENT
Start: 2018-02-11 | End: 2018-02-11 | Stop reason: HOSPADM

## 2018-02-11 RX ADMIN — RISPERIDONE 2 MG: 1 TABLET ORAL at 08:54

## 2018-02-11 RX ADMIN — PROPRANOLOL HYDROCHLORIDE 10 MG: 20 TABLET ORAL at 17:23

## 2018-02-11 RX ADMIN — PROPRANOLOL HYDROCHLORIDE 10 MG: 20 TABLET ORAL at 08:53

## 2018-02-11 RX ADMIN — RISPERIDONE 2 MG: 1 TABLET ORAL at 17:23

## 2018-02-11 RX ADMIN — LISINOPRIL: 20 TABLET ORAL at 08:54

## 2018-02-11 RX ADMIN — DIVALPROEX SODIUM 1000 MG: 500 TABLET, DELAYED RELEASE ORAL at 08:53

## 2018-02-11 RX ADMIN — AMOXICILLIN 875 MG: 875 TABLET, FILM COATED ORAL at 18:27

## 2018-02-11 RX ADMIN — AMOXICILLIN 875 MG: 875 TABLET, FILM COATED ORAL at 08:55

## 2018-02-11 RX ADMIN — FLUOXETINE 20 MG: 20 CAPSULE ORAL at 17:23

## 2018-02-11 NOTE — CASE MANAGEMENT
Thank you,  [address]  [facility] in the [facility] by Shiv Gibson for 2017  Network Utilization Review Department  Phone: [phone]; Fax [phone]  ATTENTION: The Network Utilization Review Department is now centralized for our 7 Facilities  Make a note that we have a new phone and fax numbers for our Department  Please call with any questions or concerns to [phone] and carefully follow the prompts so that you are directed to the right person  All voicemails are confidential  Fax any determinations, approvals, denials, and requests for initial or continue stay review clinical to [phone]  Due to HIGH CALL volume, it would be easier if you could please send faxed requests to expedite your requests and in part, help us provide discharge notifications faster  Continued Stay Review    Date: 2/11/18 Sunday ACUTE MED SURG LEVEL OF CARE    Vital Signs: /59 (BP Location: Right arm)   Pulse 60   Temp 98 1 °F (36 7 °C) (Oral)   Resp 18   Ht 5' 10" (1 778 m)   Wt 104 kg (229 lb 4 5 oz)   SpO2 97%   BMI 32 90 kg/m²       02/10 0701  02/11 0700 02/11 0701  02/11 1604  Most Recent    Temperature (°F) 97 499 7 98 1  98 1 (36 7)    Pulse 6078 60  60    Respirations 1822 18  18    Blood Pressure 105/55154/70 112/68121/59  121/59    SpO2 (%) 9598 97  97        TRANSFUSE 1 UNIT PRBCS 2/11/18  REPEAT H/H POST TX      Regular HOUSE Diet    IV ACCESS    Medications:   Scheduled Meds:   Current Facility-Administered Medications:  amoxicillin 875 mg Oral BID Chely Tran MD   divalproex sodium 1,000 mg Oral Q12H [address] Chely Tran MD   FLUoxetine 20 mg Oral Daily Mel Hernandez MD   lisinopril-hydrochlorothiazide (PRINZIDE 20/12  5) combo dose  Oral Daily Chely Tran MD   propranolol 10 mg Oral TID Chely Tran MD   risperiDONE 2 mg Oral BID Chely Tran MD       PRN Meds:     Abnormal Labs/Diagnostic Results:   Hemoglobin and hematocrit, blood [[id number]] (Abnormal) Collected: 02/11/18 0216   Lab Status: Final result Specimen: Blood from Arm, Right Updated: 02/11/18 0250    Hemoglobin 7 1 (L) 14 0 - 18 0 g/dL     Hematocrit 20 1 (L) 42 0 - 52 0 %      CBC (With Platelets) [[id number]] (Abnormal) Collected: 02/11/18 0450   Lab Status: Final result Specimen: Blood from Arm, Right Updated: 02/11/18 0720    WBC 14 40 (H) 4 80 - 10 80 Thousand/uL     RBC 2 29 (L) 4 70 - 6 10 Million/uL     Hemoglobin 6 8 (LL) 14 0 - 18 0 g/dL     Comment: Specimen was rerun for result verification        Hematocrit 20 0 (L) 42 0 - 52 0 %     MCV 87 82 - 98 fL     MCH 29 5 27 0 - 31 0 pg     MCHC 33 8 31 4 - 37 4 g/dL     RDW 14 8 11 6 - 15 1 %     Platelets 034 724 - 735 Thousands/uL     MPV 7 3 (L) 8 9 - 12 7 fL    Sedimentation rate, automated [[id number]] (Abnormal) Collected: 02/11/18 0450   Lab Status: Final result Specimen: Blood from Arm, Right Updated: 02/11/18 0725    Sed Rate 38 (H) 2 - 10 mm/hour      Occult blood 1-3, stool [[id number]] Collected: 02/10/18 2222   Lab Status: Preliminary result Specimen: Stool from Rectum Updated: 02/11/18 0134    Fecal Occult Blood Diagnostic Negative    Fecal Occult Blood Diagnostic 2 --    Fecal Occult Blood Diagnostic 3 --         Age/Sex: 60 y o  male     Assessment/Plan (per recent MD note):    Normocytic Anemia:   · Hb on admission: 5 3 / MCV on admission: 90  · Obtain Iron Studies / FOBT / K16 / Folic Acid / Ferritin / ESR  · Tranfuse 2U PRBC  ? Follow up Hb/Hct 2hrs post-transfusion  · Will monitor with daily CBC     Hx of Bronchitis:  · Chest XR on admission: Possible small infrahilar infiltrates versus bronchovascular crowding, otherwise no consolidation  · Currently completing course of Abx: Amoxicillin 875mg PO bid - day and a half left  ?  Continue Abx course  · Repeat CXR in AM     Leukocytosis:  · Likely 2/2 Bronchitis  · WBC on admission: 11 7  · Will monitor with daily CBC     Hx of Hematuria:  · Obtain UA     Hx of Fall:  · Likely 2/2 Medication Side Effects   · CT Head on admission: No acute intracranial abnormality  · Monitor with Telemetry  · Monitor with Neurochecks q4h     Depression:  · Stable; Continue home medications: Depakote 500mg q12h and Risperdal 2mg PO bid  ? Will hold Prozac 40mg qd and Mirtazapine 15mg qd bedtime  · Consult Psychiatry     Hypertension:  · BP on admission: 122/59  · Stable; continue with home medication: Prinizide 20-12 5mg PO qd     Global:  · Regular Diet / Replete Electroylytes as Needed / Lovenox + SCD           Behavioral Health  Consults Date of Service: 2/11/2018  3:20 PM     Diagnosis:  Bipolar disorder mixed type  Anxiety  History of alcohol abuse patient is sober since 3 months      Plan:  Continue Depakote 1000 mg b i d  Continue Risperdal 2 mg p  o  b i d   Prozac 20 mg daily  I will not order Cogentin Vistaril and Remeron at this time  Psychiatric follow-up with his psychiatrist after the discharge at scheduled appointment    Patient is recommended AA meetings and alcohol counseling after the discharge      Discharge Plan:   [address] F/U  WHEN MEDICALLY CLEARED    CASE MANAGEMENT FOLLOWING CLOSELY FOR ALL DISCHARGE NEEDS

## 2018-02-11 NOTE — CONSULTS
Consultation - Cecilia Breath 60 y o  male MRN: [id number]    Unit/Bed#: [address] [place] [address] Encounter: [id number]      Identifying Data:  60years old white male is admitted at [facility] on February 10, 2018 with complaining of feeling sick psychiatric consultation is asked for depression and polypharmacy patient examined spoke with the nurse history physical medications labs reviewed and noted patient is a poor historian but he was able to give out the basic information  Patient is suffering from bipolar depression hypertension history of alcohol abuse and he is a poor historian  Patient has history of fall history of hematuria leukocytosis bronchitis anemia the patient lives with the wife he has 2 daughters patient smokes cigarette and he has been sober since 3 months after he came out from the rehab in [place] he was there from November 20th 2017 to January 25, 2018 and he has been sober since then otherwise patient has an extensive history of alcohol abuse with for DUIs and 2 rehabs in the past patient has remote history of abusing heroin for short period of time but no other drugs and no IV drug abuse currently patient denies abusing drugs  Patient has 9th grade education and he worked as a  since 10 years at the same company patient has a 's license and he drives  Chief Complaints:  Feeling sick    Family History: History reviewed  No pertinent family history  Sister has depression    Legal History:  Patient denies  Mental Status Exam:  60years old white male is alert awake cooperative oriented to the place and person he is a poor historian but he was able to tell me about the basic background information  Memory intact  Affect flat withdrawn psychomotor retardation pressured speech confabulates with the many questions tangential circumstantial   Currently patient denies auditory or visual hallucinations  Patient denies suicidal or homicidal ideations  Guarded paranoid no delusion elucidated  Poor concentration  Insight and judgments are adequate  Patient complains of having a chronic anxiety insomnia his appetite is okay  History of Present Illness     HPI: Talia Diallo is a 60y o  year old male who presents with feeling sick    Consults      Historical Information   Past Psychiatric History:  Patient has a long history of bipolar disorder he has been seen a psychiatrist since long time but patient denies psychiatric admissions patient denies suicide attempts in the past patient has an extensive history of alcohol abuse until recently with a history of 4 DUIs and 2 rehabs in the past he is sober since 3 months  Patient is under psychiatric care and he goes to see a psychiatrist but could not give me the name of the psychiatrist patient is on multiple psychotropic medications he is taking Depakote 1000 mg twice a day Risperdal 2 mg p  o  b i d  Remeron 15 mg HS Prozac 40 mg daily Cogentin 1 mg p o  b i d  and Vistaril 50 mg twice a day obviously patient is on a lots of medications I will continue his Depakote Risperdal and order just the Prozac and not the other medications at this time to avoid the polypharmacy and patient agreed to follow up with his psychiatrist with this changes  Past Medical History:   Diagnosis Date    Hypertension     Psychiatric disorder      History reviewed  No pertinent surgical history  Social History   History   Alcohol Use No     Comment: last drink nov 19 2017     History   Drug Use No     History   Smoking Status    Current Every Day Smoker    Packs/day: 1 00   Smokeless Tobacco    Never Used       Meds/Allergies   current meds:   Current Facility-Administered Medications   Medication Dose Route Frequency    amoxicillin (AMOXIL) tablet 875 mg  875 mg Oral BID    divalproex sodium (DEPAKOTE) EC tablet 1,000 mg  1,000 mg Oral Q12H [address]    lisinopril-hydrochlorothiazide (PRINZIDE 20/12  5) combo dose   Oral Daily  propranolol (INDERAL) tablet 10 mg  10 mg Oral TID    risperiDONE (RisperDAL) tablet 2 mg  2 mg Oral BID    and PTA meds:    Prescriptions Prior to Admission   Medication    amoxicillin (AMOXIL) 875 mg tablet    benztropine (COGENTIN) 1 mg tablet    divalproex sodium (DEPAKOTE) 500 mg EC tablet    FLUoxetine (PROzac) 20 mg capsule    hydrOXYzine pamoate (VISTARIL) 50 mg capsule    lisinopril-hydrochlorothiazide (PRINZIDE,ZESTORETIC) 20-12 5 MG per tablet    mirtazapine (REMERON) 15 mg tablet    propranolol (INDERAL) 10 mg tablet    risperiDONE (RisperDAL) 2 mg tablet     No Known Allergies    Objective   Vitals: Blood pressure 121/59, pulse 60, temperature 98 1 °F (36 7 °C), temperature source Oral, resp  rate 18, height 5' 10" (1 778 m), weight 104 kg (229 lb 4 5 oz), SpO2 97 %        Routine Lab Results:   Admission on 02/10/2018   Component Date Value Ref Range Status    WBC 02/10/2018 11 70* 4 80 - 10 80 Thousand/uL Final    RBC 02/10/2018 1 72* 4 70 - 6 10 Million/uL Final    Hemoglobin 02/10/2018 5 3* 14 0 - 18 0 g/dL Final    Hematocrit 02/10/2018 15 5* 42 0 - 52 0 % Final    MCV 02/10/2018 90  82 - 98 fL Final    MCH 02/10/2018 30 8  27 0 - 31 0 pg Final    MCHC 02/10/2018 34 3  31 4 - 37 4 g/dL Final    RDW 02/10/2018 13 4  11 6 - 15 1 % Final    MPV 02/10/2018 6 7* 8 9 - 12 7 fL Final    Platelets 22/05/2867 259  130 - 400 Thousands/uL Final    nRBC 02/10/2018 0  /100 WBCs Final    Neutrophils Relative 02/10/2018 31* 43 - 75 % Final    Lymphocytes Relative 02/10/2018 64* 14 - 44 % Final    Monocytes Relative 02/10/2018 4  4 - 12 % Final    Eosinophils Relative 02/10/2018 1  0 - 6 % Final    Basophils Relative 02/10/2018 0  0 - 1 % Final    Neutrophils Absolute 02/10/2018 3 60  1 85 - 7 62 Thousands/µL Final    Lymphocytes Absolute 02/10/2018 7 50* 0 60 - 4 47 Thousands/µL Final    Monocytes Absolute 02/10/2018 0 50  0 17 - 1 22 Thousand/µL Final    Eosinophils Absolute 02/10/2018 0 10  0 00 - 0 61 Thousand/µL Final    Basophils Absolute 02/10/2018 0 00  0 00 - 0 10 Thousands/µL Final    Sodium 02/10/2018 139  136 - 145 mmol/L Final    Potassium 02/10/2018 4 1  3 5 - 5 3 mmol/L Final    Chloride 02/10/2018 102  100 - 108 mmol/L Final    CO2 02/10/2018 35* 21 - 32 mmol/L Final    Anion Gap 02/10/2018 2* 4 - 13 mmol/L Final    BUN 02/10/2018 20  5 - 25 mg/dL Final    Creatinine 02/10/2018 0 92  0 60 - 1 30 mg/dL Final    Glucose 02/10/2018 118  65 - 140 mg/dL Final    Calcium 02/10/2018 8 5  8 3 - 10 1 mg/dL Final    AST 02/10/2018 12  5 - 45 U/L Final    ALT 02/10/2018 18  12 - 78 U/L Final    Alkaline Phosphatase 02/10/2018 83  46 - 116 U/L Final    Total Protein 02/10/2018 6 2* 6 4 - 8 2 g/dL Final    Albumin 02/10/2018 3 5  3 5 - 5 0 g/dL Final    Total Bilirubin 02/10/2018 0 40  0 20 - 1 00 mg/dL Final    eGFR 02/10/2018 88  ml/min/1 73sq m Final    Ventricular Rate 02/10/2018 73  BPM Final    Atrial Rate 02/10/2018 73  BPM Final    AL Interval 02/10/2018 164  ms Final    QRSD Interval 02/10/2018 88  ms Final    QT Interval 02/10/2018 402  ms Final    QTC Interval 02/10/2018 442  ms Final    P Axis 02/10/2018 57  degrees Final    QRS Axis 02/10/2018 -6  degrees Final    T Wave Axis 02/10/2018 41  degrees Final    Valproic Acid, Total 02/10/2018 69  50 - 100 ug/mL Final    Polychromasia 02/10/2018 Present   Final    Platelet Estimate 34/53/8629 Adequate  Adequate Final    ABO Grouping 02/10/2018 B   Final    Rh Factor 02/10/2018 Positive   Final    Antibody Screen 02/10/2018 Negative   Final    Specimen Expiration Date 02/10/2018 36911119   Final    Unit Product Code 02/11/2018 [id number]   Final-Edited    Unit Number 02/11/2018 [id number]   Final-Edited    Unit ABO 02/11/2018 B   Final-Edited    Unit [facility] 02/11/2018 NEG   Final-Edited    Crossmatch 02/11/2018 Compatible   Final    Unit Dispense Status 02/11/2018 Presumed Trans Final-Edited    Unit Product Code 02/11/2018 [id number]   Final-Edited    Unit Number 02/11/2018 [id number]   Final-Edited    Unit ABO 02/11/2018 B   Final-Edited    Unit RH 02/11/2018 NEG   Final-Edited    Crossmatch 02/11/2018 Compatible   Final    Unit Dispense Status 02/11/2018 Presumed Trans   Final-Edited    ABO Grouping 02/10/2018 B   Final    Rh Factor 02/10/2018 Positive   Final    Iron Saturation 02/10/2018 93  % Final    TIBC 02/10/2018 312  250 - 450 ug/dL Final    Iron 02/10/2018 290* 65 - 175 ug/dL Final    Ferritin 02/10/2018 968* 8 - 388 ng/mL Final    Color, UA 02/11/2018 Light Yellow   Final    Clarity, UA 02/11/2018 Clear   Final    Specific Gravity, UA 02/11/2018 1 015  1 000 - 1 030 Final    pH, UA 02/11/2018 7 0  5 0 - 9 0 Final    Leukocytes, UA 02/11/2018 Negative  Negative Final    Nitrite, UA 02/11/2018 Negative  Negative Final    Protein, UA 02/11/2018 Negative  Negative mg/dl Final    Glucose, UA 02/11/2018 Negative  Negative mg/dl Final    Ketones, UA 02/11/2018 Negative  Negative mg/dl Final    Urobilinogen, UA 02/11/2018 1 0  0 2, 1 0 E U /dl E U /dl Final    Bilirubin, UA 02/11/2018 Negative  Negative Final    Blood, UA 02/11/2018 Negative  Negative Final    Hemoglobin 02/11/2018 7 1* 14 0 - 18 0 g/dL Final    Hematocrit 02/11/2018 20 1* 42 0 - 52 0 % Final    Fecal Occult Blood Diagnostic 02/10/2018 Negative  Negative Preliminary    Sodium 02/11/2018 145  136 - 145 mmol/L Final    Potassium 02/11/2018 4 4  3 5 - 5 3 mmol/L Final    Chloride 02/11/2018 106  100 - 108 mmol/L Final    CO2 02/11/2018 32  21 - 32 mmol/L Final    Anion Gap 02/11/2018 7  4 - 13 mmol/L Final    BUN 02/11/2018 14  5 - 25 mg/dL Final    Creatinine 02/11/2018 0 69  0 60 - 1 30 mg/dL Final    Glucose 02/11/2018 89  65 - 140 mg/dL Final    Calcium 02/11/2018 8 2* 8 3 - 10 1 mg/dL Final    eGFR 02/11/2018 101  ml/min/1 73sq m Final    Magnesium 02/11/2018 2 2  1 6 - 2 6 mg/dL Final    Phosphorus 02/11/2018 4 5* 2 3 - 4 1 mg/dL Final    WBC 02/11/2018 14 40* 4 80 - 10 80 Thousand/uL Final    RBC 02/11/2018 2 29* 4 70 - 6 10 Million/uL Final    Hemoglobin 02/11/2018 6 8* 14 0 - 18 0 g/dL Final    Hematocrit 02/11/2018 20 0* 42 0 - 52 0 % Final    MCV 02/11/2018 87  82 - 98 fL Final    MCH 02/11/2018 29 5  27 0 - 31 0 pg Final    MCHC 02/11/2018 33 8  31 4 - 37 4 g/dL Final    RDW 02/11/2018 14 8  11 6 - 15 1 % Final    Platelets 85/35/5400 202  130 - 400 Thousands/uL Final    MPV 02/11/2018 7 3* 8 9 - 12 7 fL Final    Sed Rate 02/11/2018 38* 2 - 10 mm/hour Final         Diagnosis:  Bipolar disorder mixed type  Anxiety  History of alcohol abuse patient is sober since 3 months  Plan:  Continue Depakote 1000 mg b i d  Continue Risperdal 2 mg p  o  b i d   Prozac 20 mg daily  I will not order Cogentin Vistaril and Remeron at this time  Psychiatric follow-up with his psychiatrist after the discharge at scheduled appointment  Patient is recommended AA meetings and alcohol counseling after the discharge  I will follow up        Marc Polk MD

## 2018-02-11 NOTE — SOCIAL WORK
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

## 2018-02-11 NOTE — DISCHARGE INSTRUCTIONS
Anemia   AMBULATORY CARE:   Anemia  is a low number of red blood cells or a low amount of hemoglobin in your red blood cells  Hemoglobin is a protein that helps carry oxygen throughout your body  Red blood cells use iron to create hemoglobin  Anemia may develop if your body does not have enough iron  It may also develop if your body does not make enough red blood cells or they die faster than your body can make them  Common symptoms include the following:   · Chest pain or a fast heartbeat    · Lightheadedness, dizziness, or shortness of breath    · Cold or pale skin    · Tiredness, weakness, or confusion  Call 911 or have someone call 911 for any of the following:   · You lose consciousness  · You have severe chest pain  Seek care immediately if:   · You have dark or bloody bowel movements  Contact your healthcare provider if:   · Your symptoms are worse, even after treatment  · You have questions or concerns about your condition or care  Treatment for anemia  may include any of the following:  · Iron or folic acid supplements  help increase your red blood cell and hemoglobin levels  · Vitamin B12 injections  may help boost your red blood cell level and decrease your symptoms  Ask your healthcare provider how to inject B12  Prevent anemia:  Eat healthy foods rich in iron and vitamin C  Nuts, meat, dark leafy green vegetables, and beans are high in iron and protein  Vitamin C helps your body absorb iron  Foods rich in vitamin C include oranges and other citrus fruits  Ask your healthcare provider for a list of other foods that are high in iron or vitamin C  Ask if you need to be on a special diet  Follow up with your healthcare provider as directed:  Write down your questions so you remember to ask them during your visits  © 2017 [facility] Information is for End User's use only and may not be sold, redistributed or otherwise used for commercial purposes   All illustrations and images included in CareNotes® are the copyrighted property of [employer] M [employer]  or Shiv Gibson  The above information is an  only  It is not intended as medical advice for individual conditions or treatments  Talk to your doctor, nurse or pharmacist before following any medical regimen to see if it is safe and effective for you

## 2018-02-11 NOTE — CASE MANAGEMENT
Thank you,  [address] [facility] in the [facility] by [address] for 2017  Network Utilization Review Department  Phone: [phone]; Fax [phone]  ATTENTION: The Network Utilization Review Department is now centralized for our 7 Facilities  Make a note that we have a new phone and fax numbers for our Department  Please call with any questions or concerns to [phone] and carefully follow the prompts so that you are directed to the right person  All voicemails are confidential  Fax any determinations, approvals, denials, and requests for initial or continue stay review clinical to [phone]  Due to HIGH CALL volume, it would be easier if you could please send faxed requests to expedite your requests and in part, help us provide discharge notifications faster  Initial Clinical Review FOR 2/10/18 Saturday - ACUTE MED BLADE LEVEL OF CARE    Admission: Date/Time/Statement: 2/10/18 AT 1425     Orders Placed This Encounter   Procedures    Inpatient Admission (expected length of stay for this patient is greater than two midnights)     Standing Status:   Standing     Number of Occurrences:   1     Order Specific Question:   Admitting Physician     Answer:   Edwardo Stewart     Order Specific Question:   Level of Care     Answer:   Med Surg [16]     Order Specific Question:   Estimated length of stay     Answer:   More than 2 Midnights     Order Specific Question:   Certification     Answer:   I certify that inpatient services are medically necessary for this patient for a duration of greater than two midnights  See H&P and MD Progress Notes for additional information about the patient's course of treatment       ED: Date/Time/Mode of Arrival:   ED Arrival Information     Expected Arrival Acuity Means of Arrival Escorted By Service Admission Type    - 2/10/2018 11:03 Urgent Walk-In Spouse General Medicine Urgent    Arrival Complaint    FALLING/DROPPING THINGS/SHAKY        Chief Complaint:   Chief Complaint   Patient presents with    Multiple Falls     out of alcohol and drug rehab Jan 25th in [place], after got out did not get normal meds and he was hallucinating,  states feeling weak and shakey, L knee gives out and he falls     History of Illness:  Melani Wilson is a 60year old male with a PMH of HTN and Depression who presents to the ED with a chief complaint of feeling sick  Of note, patient is a poor historian and history was obtained from wife, Katya Erickson, over the phone  As per wife, patient has been in [place] for drug and alcohol rehabilitation from November 20th to January 25th  Wife notes a past medical history of hypertension and depression  Social history includes smoking and alcohol abuse with last drink on November 19th/2017  No pertinent surgical history or known allergies or mentioned  Wife mentions that prior to leaving for [place], patient had lost his prescriptions for his psychiatric medications  States that there has been a period of time where patient has been off his medication  Wife states that since returning from [place], patient has had a hard time adjusting to new psychiatric medication  When asked about which medications were new, both patient and wife were unaware  Wife has noticed that patient has been drinking more coffee and Pepsi as of late  Has also noticed that patient has become more fidgety  Notes that patient is constantly doing things with his hands and has been hallucinating  Reports that patient has fallen on multiple occasions due to stumbling over himself  When asked about the nature of the fall , wife states that patient did not lose consciousness or experience any upper/lower extremity weakness  Wife also states that patient has recently been diagnosed with bronchitis and is currently completing a course of amoxicillin  Has a day and a half remaining    Patient does voice ongoing cough that is productive in nature with yellow green sputum production without blood  Wife states that patient's PCP is Dr Cheri Rocha in [place], [place]  Patient denies any other symptoms such as headaches, visual disturbances, shortness of breath, chest pain, abdominal pain, nausea, vomiting, constipation, diarrhea, or loss of bowel or bladder function      Review of Systems   Constitutional: Negative for chills, diaphoresis and fever  Respiratory: Positive for cough and shortness of breath  Negative for wheezing  Cardiovascular: Negative for chest pain and palpitations  Gastrointestinal: Negative for abdominal pain, constipation, diarrhea, nausea and vomiting  Genitourinary: Positive for hematuria  Negative for decreased urine volume, difficulty urinating, dysuria, flank pain and frequency  Neurological: Negative for dizziness, weakness, light-headedness and headaches  Psychiatric/Behavioral: Positive for agitation, behavioral problems, dysphoric mood, hallucinations and sleep disturbance  Negative for confusion, decreased concentration, self-injury and suicidal ideas  The patient is nervous/anxious and is hyperactive  Physical Exam:  Eyes: Conjunctivae and EOM are normal  Pupils are equal, round, and reactive to light  Scleral icterus is present  Cardiovascular: Normal rate, regular rhythm, normal heart sounds and intact distal pulses  No murmur heard  Pulmonary/Chest: Effort normal and breath sounds normal  No respiratory distress  He has no wheezes  He has no rales  He exhibits no tenderness  Abdominal: Soft  Bowel sounds are normal  He exhibits no distension and no mass  There is no tenderness  There is no rebound and no guarding  Musculoskeletal: Normal range of motion  He exhibits no edema, tenderness or deformity     Left Knee Abrasion         ED Vital Signs:   ED Triage Vitals   Temperature Pulse Respirations Blood Pressure SpO2   02/10/18 1141 02/10/18 1141 02/10/18 1141 02/10/18 1141 02/10/18 1141   99 °F (37 2 °C) 77 20 122/59 95 %      Temp Source Heart Rate Source Patient Position - Orthostatic VS BP Location FiO2 (%)   02/10/18 1141 02/10/18 1141 02/11/18 0700 02/10/18 1141 --   Tympanic Monitor Lying Left arm       Pain Score       02/10/18 1141       No Pain        Wt Readings from Last 1 Encounters:   02/10/18 104 kg (229 lb 4 5 oz)      02/10 0701  02/11 0700 02/11 0701 02/11 1556  Most Recent    Temperature (°F) 97 499 7 98 1  98 1 (36 7)    Pulse 6078 60  60    Respirations 1822 18  18    Blood Pressure 105/55154/70 112/68121/59  121/59    SpO2 (%) 9598 97  97          LABS/Diagnostic Test Results:   CBC and differential   Result Value Ref Range     WBC 11 70 (H) 4 80 - 10 80 Thousand/uL     RBC 1 72 (L) 4 70 - 6 10 Million/uL     Hemoglobin 5 3 (LL) 14 0 - 18 0 g/dL     Hematocrit 15 5 (LL) 42 0 - 52 0 %     MCV 90 82 - 98 fL     MCH 30 8 27 0 - 31 0 pg     MCHC 34 3 31 4 - 37 4 g/dL     RDW 13 4 11 6 - 15 1 %     MPV 6 7 (L) 8 9 - 12 7 fL     Platelets 729 894 - 743 Thousands/uL     nRBC 0 /100 WBCs     Neutrophils Relative 31 (L) 43 - 75 %     Lymphocytes Relative 64 (H) 14 - 44 %     Monocytes Relative 4 4 - 12 %     Eosinophils Relative 1 0 - 6 %     Basophils Relative 0 0 - 1 %     Neutrophils Absolute 3 60 1 85 - 7 62 Thousands/µL     Lymphocytes Absolute 7 50 (H) 0 60 - 4 47 Thousands/µL     Monocytes Absolute 0 50 0 17 - 1 22 Thousand/µL     Eosinophils Absolute 0 10 0 00 - 0 61 Thousand/µL     Basophils Absolute 0 00 0 00 - 0 10 Thousands/µL   Comprehensive metabolic panel   Result Value Ref Range     Sodium 139 136 - 145 mmol/L     Potassium 4 1 3 5 - 5 3 mmol/L     Chloride 102 100 - 108 mmol/L     CO2 35 (H) 21 - 32 mmol/L     Anion Gap 2 (L) 4 - 13 mmol/L     BUN 20 5 - 25 mg/dL     Creatinine 0 92 0 60 - 1 30 mg/dL     Glucose 118 65 - 140 mg/dL     Calcium 8 5 8 3 - 10 1 mg/dL     AST 12 5 - 45 U/L     ALT 18 12 - 78 U/L     Alkaline Phosphatase 83 46 - 116 U/L     Total Protein 6 2 (L) 6 4 - 8 2 g/dL     Albumin 3 5 3 5 - 5 0 g/dL     Total Bilirubin 0 40 0 20 - 1 00 mg/dL     eGFR 88 ml/min/1 73sq m   Valproic acid level, total   Result Value Ref Range     Valproic Acid, Total 69 50 - 100 ug/mL     ECG 12 lead   Result Value Ref Range     Ventricular Rate 73 BPM     Atrial Rate 73 BPM     RI Interval 164 ms     QRSD Interval 88 ms     QT Interval 402 ms     QTC Interval 442 ms     P [place] 57 degrees     QRS Axis -6 degrees     T Wave Axis 41 degrees       CT HEAD -  No acute intracranial abnormality            CHEST X RAY -  Possible small infrahilar infiltrates versus bronchovascular crowding  Otherwise no consolidation      ED Treatment:   Medication Administration from 02/10/2018 1103 to 02/10/2018 4189       Date/Time Order Dose Route Action Action by Comments     02/10/2018 1302 thiamine (VITAMIN B1) injection 100 mg 100 mg Intramuscular Given Emmy Zabala RN      02/10/2018 1253 sodium chloride 0 9 % bolus 1,000 mL 1,000 mL Intravenous New Bag Emmy Zabala RN           Past Medical/Surgical History:   Past Medical History:   Diagnosis Date    Hypertension     Psychiatric disorder        Admitting Diagnosis: Shakiness [R25 1]  Symptomatic anemia [D64 9]    Age/Sex: 60 y o  male    Assessment/Plan:  Normocytic Anemia:   · Hb on admission: 5 3 / MCV on admission: 90  · Obtain Iron Studies / FOBT / D05 / Folic Acid / Ferritin / ESR  · Tranfuse 2U PRBC  ? Follow up Hb/Hct 2hrs post-transfusion  · Will monitor with daily CBC     Hx of Bronchitis:  · Chest XR on admission: Possible small infrahilar infiltrates versus bronchovascular crowding, otherwise no consolidation  · Currently completing course of Abx: Amoxicillin 875mg PO bid - day and a half left  ?  Continue Abx course  · Repeat CXR in AM     Leukocytosis:  · Likely 2/2 Bronchitis  · WBC on admission: 11 7  · Will monitor with daily CBC     Hx of Hematuria:  · Obtain UA     Hx of Fall:  · Likely 2/2 Medication Side Effects · CT Head on admission: No acute intracranial abnormality  · Monitor with Telemetry  · Monitor with Neurochecks q4h     Depression:  · Stable; Continue home medications: Depakote 500mg q12h and Risperdal 2mg PO bid  ? Will hold Prozac 40mg qd and Mirtazapine 15mg qd bedtime  · Consult Psychiatry     Hypertension:  · BP on admission: 122/59  · Stable; continue with home medication: Prinizide 20-12 5mg PO qd     Global:  · Regular Diet /   · Replete Electroylytes as Needed / Lovenox + SCD        Admission Orders:  2/10/18 AT 1425   ADMIT INPATIENT  TELEMETRY  VS Q3HRS    Up + OOB as Tolerated  SCD    TRANSFUSE 2 UNITS PRBCS  REPEAT H/H POST TX + CBC IN AM    Regular HOUSE Diet    IV ACCESS    Scheduled Meds:   Current Facility-Administered Medications:  amoxicillin 875 mg Oral BID [place] Ortega MD   divalproex sodium 1,000 mg Oral Q12H [address] [place] Ortega MD   FLUoxetine 20 mg Oral Daily Ken Barrera MD   lisinopril-hydrochlorothiazide (PRINZIDE 20/12  5) combo dose  Oral Daily [place] Ortega MD   propranolol 10 mg Oral TID [place] Ortega MD   risperiDONE 2 mg Oral BID [place] Ortega MD       PRN Meds:     CONSULT PSYCH

## 2018-02-11 NOTE — DISCHARGE INSTR - AVS FIRST PAGE
Patient was admitted to [facility] on 2/10/18 to 2/11/18  Given patient's current state of health, please excuse him from work until cleared by Dr Silvestre Lino or prior oncologist     Thank you  Please direct questions to patient and his providers
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
## 2018-02-11 NOTE — PLAN OF CARE
Problem: DISCHARGE PLANNING - CARE MANAGEMENT  Goal: Discharge to post-acute care or home with appropriate resources  INTERVENTIONS:  - Conduct assessment to determine patient/family and health care team treatment goals, and need for post-acute services based on payer coverage, community resources, and patient preferences, and barriers to discharge  - Address psychosocial, clinical, and financial barriers to discharge as identified in assessment in conjunction with the patient/family and health care team  - Arrange appropriate level of post-acute services according to patient's   needs and preference and payer coverage in collaboration with the physician and health care team  - Communicate with and update the patient/family, physician, and health care team regarding progress on the discharge plan  - Arrange appropriate transportation to post-acute venues  [place]     Outcome: Progressing

## 2018-02-12 LAB
HEMOCCULT STL QL: NEGATIVE
MRSA NOSE QL CULT: NORMAL

## 2018-02-12 NOTE — NURSING NOTE
Pt  Given and explained follow up care  IV removed  Pt  Left in wheelchair, with wife and belongings at side

## 2018-02-13 NOTE — CASE MANAGEMENT
Sherif Barajas RN Registered Nurse Addendum Case Management Date of Service: 2/11/2018  3:49 PM      Initial Clinical Review FOR 2/10/18 Saturday - ACUTE MED BLADE LEVEL OF CARE     Admission: Date/Time/Statement: 2/10/18 AT 1425            Orders Placed This Encounter   Procedures    Inpatient Admission (expected length of stay for this patient is greater than two midnights)       Standing Status:   Standing       Number of Occurrences:   1       Order Specific Question:   Admitting Physician       Answer:   Stefanie Covert       Order Specific Question:   Level of Care       Answer:   Med Surg [16]       Order Specific Question:   Estimated length of stay       Answer:   More than 2 Midnights       Order Specific Question:   Certification       Answer:   I certify that inpatient services are medically necessary for this patient for a duration of greater than two midnights  See H&P and MD Progress Notes for additional information about the patient's course of treatment       ED: Date/Time/Mode of Arrival:             ED Arrival Information      Expected Arrival Acuity Means of Arrival Escorted By Service Admission Type     - 2/10/2018 11:03 Urgent Walk-In Spouse General Medicine Urgent     Arrival Complaint     Diamond Santos          Chief Complaint:        Chief Complaint   Patient presents with    Multiple Falls       out of alcohol and drug rehab Jan 25th in [place], after got out did not get normal meds and he was hallucinating,  states feeling weak and shakey, L knee gives out and he falls      History of Illness:  Curtis Pablo is a 60year old male with a PMH of HTN and Depression who presents to the ED with a chief complaint of feeling sick   Of note, patient is a poor historian and history was obtained from wife, [facility], over the phone  Mirna Castillo per wife, patient has been in [place] for drug and alcohol rehabilitation from November 20th to January 25th   Wife notes a past medical history of hypertension and depression   Social history includes smoking and alcohol abuse with last drink on November 19th/2017   No pertinent surgical history or known allergies or mentioned  Wife mentions that prior to leaving for [place], patient had lost his prescriptions for his psychiatric medications   States that there has been a period of time where patient has been off his medication  Wife states that since returning from [place], patient has had a hard time adjusting to new psychiatric medication   When asked about which medications were new, both patient and wife were unaware   Wife has noticed that patient has been drinking more coffee and Pepsi as of late  Janusz Dina also noticed that patient has become more fidgety   Notes that patient is constantly doing things with his hands and has been hallucinating   Reports that patient has fallen on multiple occasions due to stumbling over himself   When asked about the nature of the fall , wife states that patient did not lose consciousness or experience any upper/lower extremity weakness   Wife also states that patient has recently been diagnosed with bronchitis and is currently completing a course of amoxicillin  Has a day and a half remaining   Patient does voice ongoing cough that is productive in nature with yellow green sputum production without blood   Wife states that patient's PCP is Dr Cyndy Hayes in [place], [place]  Patient denies any other symptoms such as headaches, visual disturbances, shortness of breath, chest pain, abdominal pain, nausea, vomiting, constipation, diarrhea, or loss of bowel or bladder function      Review of Systems   Constitutional: Negative for chills, diaphoresis and fever  Respiratory: Positive for cough and shortness of breath  Negative for wheezing     Cardiovascular: Negative for chest pain and palpitations  Gastrointestinal: Negative for abdominal pain, constipation, diarrhea, nausea and vomiting     Genitourinary: Positive for hematuria  Negative for decreased urine volume, difficulty urinating, dysuria, flank pain and frequency  Neurological: Negative for dizziness, weakness, light-headedness and headaches  Psychiatric/Behavioral: Positive for agitation, behavioral problems, dysphoric mood, hallucinations and sleep disturbance  Negative for confusion, decreased concentration, self-injury and suicidal ideas  The patient is nervous/anxious and is hyperactive      Physical Exam:  Eyes: Conjunctivae and EOM are normal  Pupils are equal, round, and reactive to light  Scleral icterus is present  Cardiovascular: Normal rate, regular rhythm, normal heart sounds and intact distal pulses     No murmur heard  Pulmonary/Chest: Effort normal and breath sounds normal  No respiratory distress  He has no wheezes  He has no rales  He exhibits no tenderness  Abdominal: Soft  Bowel sounds are normal  He exhibits no distension and no mass  There is no tenderness  There is no rebound and no guarding  Musculoskeletal: Normal range of motion  He exhibits no edema, tenderness or deformity     Left Knee Abrasion          ED Vital Signs:            ED Triage Vitals   Temperature Pulse Respirations Blood Pressure SpO2   02/10/18 1141 02/10/18 1141 02/10/18 1141 02/10/18 1141 02/10/18 1141   99 °F (37 2 °C) 77 20 122/59 95 %       Temp Source Heart Rate Source Patient Position - Orthostatic VS BP Location FiO2 (%)   02/10/18 1141 02/10/18 1141 02/11/18 0700 02/10/18 1141 --   Tympanic Monitor Lying Left arm         Pain Score           02/10/18 1141           No Pain                Wt Readings from Last 1 Encounters:   02/10/18 104 kg (229 lb 4 5 oz)        02/10 0701  02/11 0700 02/11 0701  02/11 1556   Most Recent     Temperature (°F) 97 499 7 98 1   98 1 (36 7)     Pulse 6078 60   60     Respirations 1822 18   18     Blood Pressure 105/55154/70 112/68121/59   121/59     SpO2 (%) 9598 97   97              LABS/Diagnostic Test Results:         CBC and differential   Result Value Ref Range     WBC 11 70 (H) 4 80 - 10 80 Thousand/uL     RBC 1 72 (L) 4 70 - 6 10 Million/uL     Hemoglobin 5 3 (LL) 14 0 - 18 0 g/dL     Hematocrit 15 5 (LL) 42 0 - 52 0 %     MCV 90 82 - 98 fL     MCH 30 8 27 0 - 31 0 pg     MCHC 34 3 31 4 - 37 4 g/dL     RDW 13 4 11 6 - 15 1 %     MPV 6 7 (L) 8 9 - 12 7 fL     Platelets 355 197 - 705 Thousands/uL     nRBC 0 /100 WBCs     Neutrophils Relative 31 (L) 43 - 75 %     Lymphocytes Relative 64 (H) 14 - 44 %     Monocytes Relative 4 4 - 12 %     Eosinophils Relative 1 0 - 6 %     Basophils Relative 0 0 - 1 %     Neutrophils Absolute 3 60 1 85 - 7 62 Thousands/µL     Lymphocytes Absolute 7 50 (H) 0 60 - 4 47 Thousands/µL     Monocytes Absolute 0 50 0 17 - 1 22 Thousand/µL     Eosinophils Absolute 0 10 0 00 - 0 61 Thousand/µL     Basophils Absolute 0 00 0 00 - 0 10 Thousands/µL   Comprehensive metabolic panel   Result Value Ref Range     Sodium 139 136 - 145 mmol/L     Potassium 4 1 3 5 - 5 3 mmol/L     Chloride 102 100 - 108 mmol/L     CO2 35 (H) 21 - 32 mmol/L     Anion Gap 2 (L) 4 - 13 mmol/L     BUN 20 5 - 25 mg/dL     Creatinine 0 92 0 60 - 1 30 mg/dL     Glucose 118 65 - 140 mg/dL     Calcium 8 5 8 3 - 10 1 mg/dL     AST 12 5 - 45 U/L     ALT 18 12 - 78 U/L     Alkaline Phosphatase 83 46 - 116 U/L     Total Protein 6 2 (L) 6 4 - 8 2 g/dL     Albumin 3 5 3 5 - 5 0 g/dL     Total Bilirubin 0 40 0 20 - 1 00 mg/dL     eGFR 88 ml/min/1 73sq m   Valproic acid level, total   Result Value Ref Range     Valproic Acid, Total 69 50 - 100 ug/mL            ECG 12 lead   Result Value Ref Range     Ventricular Rate 73 BPM     Atrial Rate 73 BPM     OR Interval 164 ms     QRSD Interval 88 ms     QT Interval 402 ms     QTC Interval 442 ms     P [place] 57 degrees     QRS Axis -6 degrees     T Wave Axis 41 degrees         CT HEAD -  No acute intracranial abnormality              CHEST X RAY -  Possible small infrahilar infiltrates versus bronchovascular crowding   Otherwise no consolidation        ED Treatment:              Medication Administration from 02/10/2018 1103 to 02/10/2018 1452        Date/Time Order Dose Route Action Action by Comments       02/10/2018 1302 thiamine (VITAMIN B1) injection 100 mg 100 mg Intramuscular Given Anayeli Packer RN         02/10/2018 1253 sodium chloride 0 9 % bolus 1,000 mL 1,000 mL Intravenous New Bag Anayeli Packer RN               Past Medical/Surgical History:        Past Medical History:   Diagnosis Date    Hypertension      Psychiatric disorder           Admitting Diagnosis: Shakiness [R25 1]  Symptomatic anemia [D64 9]     Age/Sex: 60 y o  male     Assessment/Plan:  Normocytic Anemia:   · Hb on admission: 5 3 / MCV on admission: 90  · Obtain Iron Studies / FOBT / T31 / Folic Acid / Ferritin / ESR  · Tranfuse 2U PRBC  ? Follow up Hb/Hct 2hrs post-transfusion  · Will monitor with daily CBC     Hx of Bronchitis:  · Chest XR on admission: Possible small infrahilar infiltrates versus bronchovascular crowding, otherwise no consolidation  · Currently completing course of Abx: Amoxicillin 875mg PO bid - day and a half left  ? Continue Abx course  · Repeat CXR in AM     Leukocytosis:  · Likely 2/2 Bronchitis  · WBC on admission: 11 7  · Will monitor with daily CBC     Hx of Hematuria:  · Obtain UA     Hx of Fall:  · Likely 2/2 Medication Side Effects   · CT Head on admission: No acute intracranial abnormality  · Monitor with Telemetry  · Monitor with Neurochecks q4h     Depression:  · Stable; Continue home medications: Depakote 500mg q12h and Risperdal 2mg PO bid  ?  Will hold Prozac 40mg qd and Mirtazapine 15mg qd bedtime  · Consult Psychiatry     Hypertension:  · BP on admission: 122/59  · Stable; continue with home medication: Prinizide 20-12 5mg PO qd     Global:  · Regular Diet /   · Replete Electroylytes as Needed / Lovenox + SCD           Admission Orders:  2/10/18 AT 1425   ADMIT INPATIENT  TELEMETRY  VS Q3HRS     Up + OOB as Tolerated  SCD     TRANSFUSE 2 UNITS PRBCS  REPEAT H/H POST TX + CBC IN AM     Regular HOUSE Diet     IV ACCESS     Scheduled Meds:   Current Facility-Administered Medications:  amoxicillin 875 mg Oral BID Catrachito Kumar MD   divalproex sodium 1,000 mg Oral Q12H [facility] [facility], MD   FLUoxetine 20 mg Oral Daily Sky Mullins MD   lisinopril-hydrochlorothiazide (PRINZIDE 20/12  5) combo dose   Oral Daily Catrachito Kumar MD   propranolol 10 mg Oral TID Catrachito Kumar MD   risperiDONE 2 mg Oral BID Catrachito Kumar MD         PRN Meds:    CONSULT PSYCH        Natalie Lewis RN Registered Nurse Signed   Case Management Date of Service: 2/11/2018  4:00 PM      Continued Stay Review     Date: 2/11/18 Sunday ACUTE MED SURG LEVEL OF CARE     Vital Signs: /59 (BP Location: Right arm)   Pulse 60   Temp 98 1 °F (36 7 °C) (Oral)   Resp 18   Ht 5' 10" (1 778 m)   Wt 104 kg (229 lb 4 5 oz)   SpO2 97%   BMI 32 90 kg/m²         02/10 0701  02/11 0700 02/11 0701  02/11 1604   Most Recent     Temperature (°F) 97 499 7 98 1   98 1 (36 7)     Pulse 6078 60   60     Respirations 1822 18   18     Blood Pressure 105/55154/70 112/68121/59   121/59     SpO2 (%) 9598 97   97           TRANSFUSE 1 UNIT PRBCS 2/11/18  REPEAT H/H POST TX        Regular HOUSE Diet     IV ACCESS     Medications:   Scheduled Meds:   Current Facility-Administered Medications:  amoxicillin 875 mg Oral BID Catrachito Kumar MD   divalproex sodium 1,000 mg Oral Q12H [facility] Catrachito Kumar MD   FLUoxetine 20 mg Oral Daily Sky Mullins MD   lisinopril-hydrochlorothiazide (PRINZIDE 20/12  5) combo dose   Oral Daily Catrachito Kumar MD   propranolol 10 mg Oral TID Catrachito Kumar MD   risperiDONE 2 mg Oral BID Catrachito Kumar MD         PRN Meds:      Abnormal Labs/Diagnostic Results:          Hemoglobin and hematocrit, blood [[id number]] (Abnormal) Collected: 02/11/18 0216   Lab Status: Final result Specimen: Blood from Arm, Right Updated: 02/11/18 0250     Hemoglobin 7 1 (L) 14 0 - 18 0 g/dL       Hematocrit 20 1 (L) 42 0 - 52 0 %               CBC (With Platelets) [[id number]] (Abnormal) Collected: 02/11/18 0450   Lab Status: Final result Specimen: Blood from Arm, Right Updated: 02/11/18 0720     WBC 14 40 (H) 4 80 - 10 80 Thousand/uL       RBC 2 29 (L) 4 70 - 6 10 Million/uL       Hemoglobin 6 8 (LL) 14 0 - 18 0 g/dL       Comment: Specimen was rerun for result verification         Hematocrit 20 0 (L) 42 0 - 52 0 %       MCV 87 82 - 98 fL       MCH 29 5 27 0 - 31 0 pg       MCHC 33 8 31 4 - 37 4 g/dL       RDW 14 8 11 6 - 15 1 %       Platelets 520 286 - 324 Thousands/uL       MPV 7 3 (L) 8 9 - 12 7 fL     Sedimentation rate, automated [[id number]] (Abnormal) Collected: 02/11/18 0450   Lab Status: Final result Specimen: Blood from Arm, Right Updated: 02/11/18 0725     Sed Rate 38 (H) 2 - 10 mm/hour              Occult blood 1-3, stool [[id number]] Collected: 02/10/18 2222   Lab Status: Preliminary result Specimen: Stool from Rectum Updated: 02/11/18 0134     Fecal Occult Blood Diagnostic Negative     Fecal Occult Blood Diagnostic 2 --     Fecal Occult Blood Diagnostic 3 --            Age/Sex: 60 y o  male      Assessment/Plan (per recent MD note):    Normocytic Anemia:   · Hb on admission: 5 3 / MCV on admission: 90  · Obtain Iron Studies / FOBT / V88 / Folic Acid / Ferritin / ESR  · Tranfuse 2U PRBC  ? Follow up Hb/Hct 2hrs post-transfusion  · Will monitor with daily CBC     Hx of Bronchitis:  · Chest XR on admission: Possible small infrahilar infiltrates versus bronchovascular crowding, otherwise no consolidation  · Currently completing course of Abx: Amoxicillin 875mg PO bid - day and a half left  ?  Continue Abx course  · Repeat CXR in AM     Leukocytosis:  · Likely 2/2 Bronchitis  · WBC on admission: 11 7  · Will monitor with daily CBC     Hx of Hematuria:  · Obtain UA     Hx of Fall:  · Likely 2/2 Medication Side Effects   · CT Head on admission: No acute intracranial abnormality  · Monitor with Telemetry  · Monitor with Neurochecks q4h     Depression:  · Stable; Continue home medications: Depakote 500mg q12h and Risperdal 2mg PO bid  ? Will hold Prozac 40mg qd and Mirtazapine 15mg qd bedtime  · Consult Psychiatry     Hypertension:  · BP on admission: 122/59  · Stable; continue with home medication: Prinizide 20-12 5mg PO qd     Global:  · Regular Diet / Replete Electroylytes as Needed / Lovenox + SCD             Behavioral Health   Consults Date of Service: 2/11/2018  3:20 PM      Diagnosis:  Bipolar disorder mixed type  Anxiety  History of alcohol abuse patient is sober since 3 months      Plan:  Continue Depakote 1000 mg b i d  Continue Risperdal 2 mg p  o  b i d   Prozac 20 mg daily  I will not order Cogentin Vistaril and Remeron at this time  Psychiatric follow-up with his psychiatrist after the discharge at scheduled appointment  Patient is recommended AA meetings and alcohol counseling after the discharge        Discharge Plan:   [address] F/U  WHEN MEDICALLY CLEARED     CASE MANAGEMENT FOLLOWING CLOSELY FOR ALL DISCHARGE NEEDS      Notification of Discharge  This is a Notification of Discharge from our facility [address]  Please be advised that this patient has been discharge from our facility  Below you will find the admission and discharge date and time including the patients disposition  PRESENTATION DATE: 2/10/2018 12:14 PM  IP ADMISSION DATE: 2/10/18 1425  DISCHARGE DATE: 2/11/2018  7:30 PM  DISPOSITION: Home/Self Care    [facility] in the [employer] by Sh[facility] for 2017  Network Utilization Review Department  Phone: [phone]; Fax [phone]  ATTENTION: The Network Utilization Review Department is now centralized for our 7 Facilities  Make a note that we have a new phone and fax numbers for our Department   Please call with any questions or concerns to [phone] and carefully follow the prompts so that you are directed to the right person  All voicemails are confidential  Fax any determinations, approvals, denials, and requests for initial or continue stay review clinical to [phone]  Due to HIGH CALL volume, it would be easier if you could please send faxed requests to expedite your requests and in part, help us provide discharge notifications faster

## 2018-02-14 ENCOUNTER — OFFICE VISIT (OUTPATIENT)
Dept: HEMATOLOGY ONCOLOGY | Facility: CLINIC | Age: 63
End: 2018-02-14
Payer: COMMERCIAL

## 2018-02-14 ENCOUNTER — HOSPITAL ENCOUNTER (OUTPATIENT)
Dept: INFUSION CENTER | Facility: HOSPITAL | Age: 63
Discharge: HOME | End: 2018-02-14
Payer: COMMERCIAL

## 2018-02-14 VITALS
RESPIRATION RATE: 18 BRPM | TEMPERATURE: 98.8 F | SYSTOLIC BLOOD PRESSURE: 117 MMHG | DIASTOLIC BLOOD PRESSURE: 58 MMHG | HEART RATE: 81 BPM

## 2018-02-14 VITALS
HEART RATE: 99 BPM | BODY MASS INDEX: 30.94 KG/M2 | OXYGEN SATURATION: 98 % | TEMPERATURE: 99 F | HEIGHT: 71 IN | WEIGHT: 221 LBS | SYSTOLIC BLOOD PRESSURE: 124 MMHG | RESPIRATION RATE: 17 BRPM | DIASTOLIC BLOOD PRESSURE: 66 MMHG

## 2018-02-14 DIAGNOSIS — D72.820 LYMPHOCYTOSIS: Primary | ICD-10-CM

## 2018-02-14 DIAGNOSIS — E83.19 IRON OVERLOAD: ICD-10-CM

## 2018-02-14 DIAGNOSIS — D64.9 NORMOCYTIC ANEMIA, NOT DUE TO BLOOD LOSS: ICD-10-CM

## 2018-02-14 LAB
ABO GROUP BLD: NORMAL
BLD GP AB SCN SERPL QL: NEGATIVE
RH BLD: POSITIVE
SPECIMEN EXPIRATION DATE: NORMAL

## 2018-02-14 PROCEDURE — 86900 BLOOD TYPING SEROLOGIC ABO: CPT | Performed by: SPECIALIST

## 2018-02-14 PROCEDURE — 99205 OFFICE O/P NEW HI 60 MIN: CPT | Performed by: PHYSICIAN ASSISTANT

## 2018-02-14 PROCEDURE — 36430 TRANSFUSION BLD/BLD COMPNT: CPT

## 2018-02-14 PROCEDURE — 86901 BLOOD TYPING SEROLOGIC RH(D): CPT | Performed by: SPECIALIST

## 2018-02-14 PROCEDURE — 86920 COMPATIBILITY TEST SPIN: CPT

## 2018-02-14 PROCEDURE — 86850 RBC ANTIBODY SCREEN: CPT | Performed by: SPECIALIST

## 2018-02-14 PROCEDURE — P9016 RBC LEUKOCYTES REDUCED: HCPCS

## 2018-02-14 RX ORDER — DIPHENHYDRAMINE HCL 25 MG
25 TABLET ORAL ONCE
Status: DISCONTINUED | OUTPATIENT
Start: 2018-02-14 | End: 2018-02-17 | Stop reason: HOSPADM

## 2018-02-14 RX ORDER — DIPHENHYDRAMINE HCL 25 MG
25 TABLET ORAL ONCE
Status: COMPLETED | OUTPATIENT
Start: 2018-02-14 | End: 2018-02-14

## 2018-02-14 RX ORDER — SODIUM CHLORIDE 9 MG/ML
20 INJECTION, SOLUTION INTRAVENOUS ONCE
Status: COMPLETED | OUTPATIENT
Start: 2018-02-14 | End: 2018-02-14

## 2018-02-14 RX ORDER — ACETAMINOPHEN 325 MG/1
650 TABLET ORAL ONCE
Status: COMPLETED | OUTPATIENT
Start: 2018-02-14 | End: 2018-02-14

## 2018-02-14 RX ADMIN — DIPHENHYDRAMINE HYDROCHLORIDE 25 MG: 25 TABLET ORAL at 13:47

## 2018-02-14 RX ADMIN — SODIUM CHLORIDE 20 ML/HR: 0.9 INJECTION, SOLUTION INTRAVENOUS at 14:10

## 2018-02-14 RX ADMIN — ACETAMINOPHEN 650 MG: 325 TABLET, FILM COATED ORAL at 14:02

## 2018-02-14 RX ADMIN — DIPHENHYDRAMINE HYDROCHLORIDE 25 MG: 25 TABLET ORAL at 14:05

## 2018-02-14 NOTE — LETTER
February 14, 2018     [address] [facility] [place]/ [address] [address]    Patient: Norma Pradhan   YOB: 1955   Date of Visit: 2/14/2018       Dear Dr Jacque Johnson: Thank you for referring Norma Pradhan to me for evaluation  Below are my notes for this consultation  If you have questions, please do not hesitate to call me  I look forward to following your patient along with you  Sincerely,        Juan Agee PA-C        CC: No Recipients  Juan Agee PA-C  2/14/2018 12:24 PM  Sign at close encounter  [address] [address]  Hematology Ambulatory Consult  Norma Pradhan, 1955, [id number]  2/14/2018    Assessment/Plan: This is a 53-year-old male who is a poor historian  Issues:    1  Symptomatic anemia,   Hemoglobin of 7 4 grams/deciliter -   Anemia may be related to lymphocytosis, see below  Patient had a recent stool sample that was negative for blood as well as a urinalysis that was negative for hematuria  Anemia can be a side effect of psychiatric medications however,  I am more suspicious of a primary bone marrow disorder, anemia related to chronic Leukemia condition  As the patient is symptomatic, I will have him transfused either today with 1 unit of packed red blood cells  2   Lymphocytosis-    Per chart review, patient likely has been diagnosed with CLL  Patient had blood work completed in rehab in [place]  Records will need to be obtained  I will have Hematology office and [place] follow up with record retrieval     3   Iron overload,  with family history of liver disease  The patient denies history of   Blood transfusions,  However on additional chart review patient was transfused less than 1 week ago with 2 units of packed red blood cells  It is unclear if the patient was transfused an outside institution prior to iron studies on admission     I am checking the patient has HFE mutation,  Until further historical documents were available for review  Labs and imaging for follow up:  Orders Placed This Encounter   Procedures    Leukemia/Lymphoma flow cytometry     Standing Status:   Future     Standing Expiration Date:   2/14/2019    Hemochromatosis mutation     Note  This lab test requires pre-authorization by some Insurance carriers  Please check before ordering this test      Standing Status:   Future     Standing Expiration Date:   2/14/2019    Type and screen     Order Specific Question:   Has the patient been transfused in the last 3 months? Answer:   No     Order Specific Question:   Medical or Pre-op     Answer:   Medical     The patient is scheduled for follow-up in approximately one weeks with Dr Christine Klein  Blood transfusion will be arranged for either today or tomorrow  At [address] [place] [address]  I reviewed with the patient that he is to follow up with [facility] at [place] [address]  Outpatient lab  Patient voiced agreement and understanding to the above  Patient knows to call the Hematology/Oncology office with any questions and concerns regarding the above  Carefully review your medication list in verify the list is accurate and up-to-date  Please call the hematologic/oncology office if there medications missing from the less, medications on the list that your not currently taking or if there is a dosage or instruction that is different from higher taking medication  Total time of being counter was 60 minutes  The patient's current treatment goals are   Repletion of packed red blood cells, diagnosis of lymphocytosis and anemia  Barriers to care include the following: Patient is a very poor historian, slow response time to questioning      The patient is able to self care     -------------------------------------------------------------------------------------------------------  Chief complaint:   Chief Complaint Patient presents with    Consult     symptomatic anemia     Referring provider:  Jaun Crawford    History of present illness: This is a 59 yo male  With psychiatric condition,  Depression and hypertension who presents to the outpatient Hematology Clinic for evaluation of symptomatic anemia  The patient was a poor historian  Patient states CBC was completed on routine blood work secondary to psychiatric medication issues  Patient states he has been feeling short of breath with walking and moving  Patient states this is relatively new and is not occur before  Patient also states that he has never had a blood transfusion and his past     Patient notes needing vitamin for anemia in his past but is unclear on details  Patient also reported a colonoscopy approximately 1 5 years ago  Patient states he had this completed in [place]  Patient does not recall follow-up schedules or if colonoscopy  Found any significant polyps, ECT  Additionally the patient was noted to have gross hematuria a earlier this year  Patient followed up with in the urologist who reportedly did a cystoscopy that did not demonstrate any significant findings  Patient notes he 1 on antibiotic and this seemed to clear up the gross hematuria  On chart review, the patient was admitted at [address] [place] [address] from 2/10/18 through 2/11/18  Patient had significant anemia and received 2 blood transfusions  Patient did not want to stay and was discharged   With hematologic follow-up  Chart review also demonstrates that the patient's wife reported the patient had leukemia diagnosed while in [place] during rehab for  Alcohol and drug abuse  (Patient denied drug abuse in his past )          Patient did present me with a business card of a urologist- Law Jordan MD office number [phone] fax [phone] [address]       Review of Systems   Constitutional: Positive for fever ( patient had several fevers approximately 2 years ago but states these are all related to acute illnesses  Patient denies current fever  )  Negative for activity change, appetite change, fatigue and unexpected weight change  Respiratory: Negative for cough and choking  Cardiovascular: Negative for chest pain, palpitations and leg swelling  Gastrointestinal: Negative for abdominal distention and blood in stool  Genitourinary: Negative for difficulty urinating and hematuria  Musculoskeletal: Negative for arthralgias and myalgias  Skin: Negative for pallor and rash  Hematological: Negative for adenopathy  Does not bruise/bleed easily  Psychiatric/Behavioral:        Patient reports recent  Psychiatric medication changes     Patient Active Problem List   Diagnosis    Symptomatic anemia    Hypertension    Depression    Polypharmacy    Bronchitis     Past Medical History:   Diagnosis Date    Anemia     History of alcohol abuse     Hypertension     Hypertension     Psychiatric disorder      History reviewed  No pertinent surgical history      Family History   Problem Relation Age of Onset    Coronary artery disease Mother     No Known Problems Father     Hepatitis Sister     Cancer Brother     Prostate cancer Brother     Early death Brother      Social History     Social History    Marital status: /Civil Union     Spouse name: N/A    Number of children: N/A    Years of education: N/A     Social History Main Topics    Smoking status: Current Every Day Smoker     Packs/day: 1 00     Years: 40 00    Smokeless tobacco: Current User    Alcohol use No      Comment: last drink nov 19 2017    Drug use: No    Sexual activity: Not Currently     Other Topics Concern    None     Social History Narrative    None       Current Outpatient Prescriptions:     benztropine (COGENTIN) 1 mg tablet, Take 1 mg by mouth 2 (two) times a day, Disp: , Rfl:     divalproex sodium (DEPAKOTE) 500 mg EC tablet, Take 1,000 mg by mouth every 12 (twelve) hours, Disp: , Rfl:     FLUoxetine (PROzac) 20 mg capsule, Take 40 mg by mouth daily, Disp: , Rfl:     hydrOXYzine pamoate (VISTARIL) 50 mg capsule, Take 50 mg by mouth 2 (two) times a day, Disp: , Rfl:     lisinopril-hydrochlorothiazide (PRINZIDE,ZESTORETIC) 20-12 5 MG per tablet, Take 1 tablet by mouth daily, Disp: , Rfl:     mirtazapine (REMERON) 15 mg tablet, Take 15 mg by mouth daily at bedtime, Disp: , Rfl:     propranolol (INDERAL) 10 mg tablet, Take 10 mg by mouth 3 (three) times a day, Disp: , Rfl:     risperiDONE (RisperDAL) 2 mg tablet, Take 2 mg by mouth 2 (two) times a day, Disp: , Rfl:     No Known Allergies    Objective:  /66 (BP Location: Left arm, Cuff Size: Large)   Pulse 99   Temp 99 °F (37 2 °C) (Tympanic)   Resp 17   Ht 5' 10 5" (1 791 m)   Wt 100 kg (221 lb)   SpO2 98%   BMI 31 26 kg/m²    Physical Exam   Constitutional: He appears well-developed and well-nourished  No distress  HENT:   Head: Normocephalic and atraumatic  Mouth/Throat: No oropharyngeal exudate  Eyes: Pupils are equal, round, and reactive to light  Neck: Normal range of motion  Cardiovascular: Normal rate and regular rhythm  No murmur heard  Pulmonary/Chest: Effort normal and breath sounds normal    Abdominal: He exhibits no distension  There is no tenderness  No hepatosplenomegaly   Musculoskeletal: He exhibits no edema  Lymphadenopathy:     He has no cervical adenopathy  Neurological: He is alert  Skin: No rash noted  No pallor  Psychiatric: His speech is delayed  He is withdrawn  Cognition and memory are impaired  He exhibits abnormal recent memory and abnormal remote memory       Result Review  Labs:  Component      Latest Ref Rng & Units 2/10/2018 2/11/2018 2/11/2018 2/11/2018            2:16 AM  4:50 AM  5:03 PM   WBC      4 80 - 10 80 Thousand/uL 11 70 (H)  14 40 (H) 14 90 (H)   RBC      4 70 - 6 10 Million/uL 1 72 (L)  2 29 (L) 2 53 (L)   Hemoglobin 14 0 - 18 0 g/dL 5 3 (LL) 7 1 (L) 6 8 (LL) 7 4 (L)   Hematocrit      42 0 - 52 0 % 15 5 (LL) 20 1 (L) 20 0 (L) 21 9 (L)   MCV      82 - 98 fL 90  87 87   MCH      27 0 - 31 0 pg 30 8  29 5 29 3   MCHC      31 4 - 37 4 g/dL 34 3  33 8 33 8   RDW      11 6 - 15 1 % 13 4  14 8 15 1   MPV      8 9 - 12 7 fL 6 7 (L)  7 3 (L) 6 8 (L)   Platelets      201 - 400 Thousands/uL 259  202 230   nRBC      /100 WBCs 0   0   Neutrophils Relative      43 - 75 % 31 (L)   21 (L)   Lymphocytes Relative      14 - 44 % 64 (H)   74 (H)   Monocytes Relative      4 - 12 % 4   4   Eosinophils Relative      0 - 6 % 1   1   Basophils Relative      0 - 1 % 0   0   Neutrophils Absolute      1 85 - 7 62 Thousands/µL 3 60   3 10   Lymphocytes Absolute      0 60 - 4 47 Thousands/µL 7 50 (H)   11 00 (H)   Monocytes Absolute      0 17 - 1 22 Thousand/µL 0 50   0 60   Eosinophils Absolute      0 00 - 0 61 Thousand/µL 0 10   0 10   Basophils Absolute      0 00 - 0 10 Thousands/µL 0 00   0 00     Imaging:     CT of brain completed on 2/10/18 demonstrates No acute intracranial abnormality      Ultrasound of kidney and bladder completed on 8/3/17 demonstrates single small cortical cyst in each kidney  Otherwise normal renal sonogram     Please note: This report has been generated by a voice recognition software system  Therefore there may be syntax, spelling, and/or grammatical errors  Please call if you have any questions

## 2018-02-14 NOTE — PROGRESS NOTES
[address] [facility] [address]  Hematology Ambulatory Consult  Kenji Gonzalez, 1955, [id number]  2/14/2018    Assessment/Plan: This is a 67-year-old male who is a poor historian  Issues:    1  Symptomatic anemia,   Hemoglobin of 7 4 grams/deciliter -   Anemia may be related to lymphocytosis, see below  Patient had a recent stool sample that was negative for blood as well as a urinalysis that was negative for hematuria  Anemia can be a side effect of psychiatric medications however,  I am more suspicious of a primary bone marrow disorder, anemia related to chronic Leukemia condition  As the patient is symptomatic, I will have him transfused either today with 1 unit of packed red blood cells  2   Lymphocytosis-    Per chart review, patient likely has been diagnosed with CLL  Patient had blood work completed in rehab in [place]  Records will need to be obtained  I will have Hematology office and [place] follow up with record retrieval     3   Iron overload,  with family history of liver disease  The patient denies history of   Blood transfusions,  However on additional chart review patient was transfused less than 1 week ago with 2 units of packed red blood cells  It is unclear if the patient was transfused an outside institution prior to iron studies on admission  I am checking the patient has HFE mutation,  Until further historical documents were available for review  Labs and imaging for follow up:  Orders Placed This Encounter   Procedures    Leukemia/Lymphoma flow cytometry     Standing Status:   Future     Standing Expiration Date:   2/14/2019    Hemochromatosis mutation     Note - This lab test requires pre-authorization by some Insurance carriers   Please check before ordering this test      Standing Status:   Future     Standing Expiration Date:   2/14/2019    Type and screen Order Specific Question:   Has the patient been transfused in the last 3 months? Answer:   No     Order Specific Question:   Medical or Pre-op     Answer:   Medical     The patient is scheduled for follow-up in approximately one weeks with Dr Jaylyn Abebe  Blood transfusion will be arranged for either today or tomorrow  At [address] [place] [address]  I reviewed with the patient that he is to follow up with [facility] at [address] [facility] lab  Patient voiced agreement and understanding to the above  Patient knows to call the Hematology/Oncology office with any questions and concerns regarding the above  Carefully review your medication list in verify the list is accurate and up-to-date  Please call the hematologic/oncology office if there medications missing from the less, medications on the list that your not currently taking or if there is a dosage or instruction that is different from higher taking medication  Total time of being counter was 60 minutes  The patient's current treatment goals are   Repletion of packed red blood cells, diagnosis of lymphocytosis and anemia  Barriers to care include the following: Patient is a very poor historian, slow response time to questioning  The patient is able to self care     -------------------------------------------------------------------------------------------------------  Chief complaint:   Chief Complaint   Patient presents with    Consult     symptomatic anemia     Referring provider:  Rey    History of present illness: This is a 59 yo male  With psychiatric condition,  Depression and hypertension who presents to the outpatient Hematology Clinic for evaluation of symptomatic anemia  The patient was a poor historian  Patient states CBC was completed on routine blood work secondary to psychiatric medication issues  Patient states he has been feeling short of breath with walking and moving     Patient states this is relatively new and is not occur before  Patient also states that he has never had a blood transfusion and his past     Patient notes needing vitamin for anemia in his past but is unclear on details  Patient also reported a colonoscopy approximately 1 5 years ago  Patient states he had this completed in [place]  Patient does not recall follow-up schedules or if colonoscopy  Found any significant polyps, ECT  Additionally the patient was noted to have gross hematuria a earlier this year  Patient followed up with in the urologist who reportedly did a cystoscopy that did not demonstrate any significant findings  Patient notes he 1 on antibiotic and this seemed to clear up the gross hematuria  On chart review, the patient was admitted at [address] [place] [address] from 2/10/18 through 2/11/18  Patient had significant anemia and received 2 blood transfusions  Patient did not want to stay and was discharged   With hematologic follow-up  Chart review also demonstrates that the patient's wife reported the patient had leukemia diagnosed while in [place] during rehab for  Alcohol and drug abuse  (Patient denied drug abuse in his past )          Patient did present me with a business card of a urologist- Octavio Gupta MD office number [phone] fax [phone] [address]  Review of Systems   Constitutional: Positive for fever ( patient had several fevers approximately 2 years ago but states these are all related to acute illnesses  Patient denies current fever  )  Negative for activity change, appetite change, fatigue and unexpected weight change  Respiratory: Negative for cough and choking  Cardiovascular: Negative for chest pain, palpitations and leg swelling  Gastrointestinal: Negative for abdominal distention and blood in stool  Genitourinary: Negative for difficulty urinating and hematuria  Musculoskeletal: Negative for arthralgias and myalgias     Skin: Negative for pallor and rash  Hematological: Negative for adenopathy  Does not bruise/bleed easily  Psychiatric/Behavioral:        Patient reports recent  Psychiatric medication changes     Patient Active Problem List   Diagnosis    Symptomatic anemia    Hypertension    Depression    Polypharmacy    Bronchitis     Past Medical History:   Diagnosis Date    Anemia     History of alcohol abuse     Hypertension     Hypertension     Psychiatric disorder      History reviewed  No pertinent surgical history      Family History   Problem Relation Age of Onset    Coronary artery disease Mother     No Known Problems Father     Hepatitis Sister     Cancer Brother     Prostate cancer Brother     Early death Brother      Social History     Social History    Marital status: /Civil Union     Spouse name: N/A    Number of children: N/A    Years of education: N/A     Social History Main Topics    Smoking status: Current Every Day Smoker     Packs/day: 1 00     Years: 40 00    Smokeless tobacco: Current User    Alcohol use No      Comment: last drink nov 19 2017    Drug use: No    Sexual activity: Not Currently     Other Topics Concern    None     Social History Narrative    None       Current Outpatient Prescriptions:     benztropine (COGENTIN) 1 mg tablet, Take 1 mg by mouth 2 (two) times a day, Disp: , Rfl:     divalproex sodium (DEPAKOTE) 500 mg EC tablet, Take 1,000 mg by mouth every 12 (twelve) hours, Disp: , Rfl:     FLUoxetine (PROzac) 20 mg capsule, Take 40 mg by mouth daily, Disp: , Rfl:     hydrOXYzine pamoate (VISTARIL) 50 mg capsule, Take 50 mg by mouth 2 (two) times a day, Disp: , Rfl:     lisinopril-hydrochlorothiazide (PRINZIDE,ZESTORETIC) 20-12 5 MG per tablet, Take 1 tablet by mouth daily, Disp: , Rfl:     mirtazapine (REMERON) 15 mg tablet, Take 15 mg by mouth daily at bedtime, Disp: , Rfl:     propranolol (INDERAL) 10 mg tablet, Take 10 mg by mouth 3 (three) times a day, Disp: , Rfl:    risperiDONE (RisperDAL) 2 mg tablet, Take 2 mg by mouth 2 (two) times a day, Disp: , Rfl:     No Known Allergies    Objective:  /66 (BP Location: Left arm, Cuff Size: Large)   Pulse 99   Temp 99 °F (37 2 °C) (Tympanic)   Resp 17   Ht 5' 10 5" (1 791 m)   Wt 100 kg (221 lb)   SpO2 98%   BMI 31 26 kg/m²   Physical Exam   Constitutional: He appears well-developed and well-nourished  No distress  HENT:   Head: Normocephalic and atraumatic  Mouth/Throat: No oropharyngeal exudate  Eyes: Pupils are equal, round, and reactive to light  Neck: Normal range of motion  Cardiovascular: Normal rate and regular rhythm  No murmur heard  Pulmonary/Chest: Effort normal and breath sounds normal    Abdominal: He exhibits no distension  There is no tenderness  No hepatosplenomegaly   Musculoskeletal: He exhibits no edema  Lymphadenopathy:     He has no cervical adenopathy  Neurological: He is alert  Skin: No rash noted  No pallor  Psychiatric: His speech is delayed  He is withdrawn  Cognition and memory are impaired  He exhibits abnormal recent memory and abnormal remote memory       Result Review  Labs:  Component      Latest Ref Rng & Units 2/10/2018 2/11/2018 2/11/2018 2/11/2018            2:16 AM  4:50 AM  5:03 PM   WBC      4 80 - 10 80 Thousand/uL 11 70 (H)  14 40 (H) 14 90 (H)   RBC      4 70 - 6 10 Million/uL 1 72 (L)  2 29 (L) 2 53 (L)   Hemoglobin      14 0 - 18 0 g/dL 5 3 (LL) 7 1 (L) 6 8 (LL) 7 4 (L)   Hematocrit      42 0 - 52 0 % 15 5 (LL) 20 1 (L) 20 0 (L) 21 9 (L)   MCV      82 - 98 fL 90  87 87   MCH      27 0 - 31 0 pg 30 8  29 5 29 3   MCHC      31 4 - 37 4 g/dL 34 3  33 8 33 8   RDW      11 6 - 15 1 % 13 4  14 8 15 1   MPV      8 9 - 12 7 fL 6 7 (L)  7 3 (L) 6 8 (L)   Platelets      327 - 400 Thousands/uL 259  202 230   nRBC      /100 WBCs 0   0   Neutrophils Relative      43 - 75 % 31 (L)   21 (L)   Lymphocytes Relative      14 - 44 % 64 (H)   74 (H)   Monocytes Relative      4 - 12 % 4   4   Eosinophils Relative      0 - 6 % 1   1   Basophils Relative      0 - 1 % 0   0   Neutrophils Absolute      1 85 - 7 62 Thousands/µL 3 60   3 10   Lymphocytes Absolute      0 60 - 4 47 Thousands/µL 7 50 (H)   11 00 (H)   Monocytes Absolute      0 17 - 1 22 Thousand/µL 0 50   0 60   Eosinophils Absolute      0 00 - 0 61 Thousand/µL 0 10   0 10   Basophils Absolute      0 00 - 0 10 Thousands/µL 0 00   0 00     Imaging:     CT of brain completed on 2/10/18 demonstrates No acute intracranial abnormality      Ultrasound of kidney and bladder completed on 8/3/17 demonstrates single small cortical cyst in each kidney  Otherwise normal renal sonogram     Please note: This report has been generated by a voice recognition software system  Therefore there may be syntax, spelling, and/or grammatical errors  Please call if you have any questions

## 2018-02-15 LAB
ABO GROUP BLD BPU: NORMAL
BPU ID: NORMAL
CROSSMATCH: NORMAL
UNIT DISPENSE STATUS: NORMAL
UNIT PRODUCT CODE: NORMAL
UNIT RH: NORMAL

## 2018-02-19 ENCOUNTER — HOSPITAL ENCOUNTER (INPATIENT)
Facility: HOSPITAL | Age: 63
LOS: 3 days | Discharge: HOME | DRG: 841 | End: 2018-02-22
Attending: EMERGENCY MEDICINE | Admitting: FAMILY MEDICINE
Payer: COMMERCIAL

## 2018-02-19 DIAGNOSIS — R53.1 WEAKNESS: ICD-10-CM

## 2018-02-19 DIAGNOSIS — F32.A DEPRESSION, UNSPECIFIED DEPRESSION TYPE: ICD-10-CM

## 2018-02-19 DIAGNOSIS — D64.9 SYMPTOMATIC ANEMIA: Primary | ICD-10-CM

## 2018-02-19 DIAGNOSIS — I10 HYPERTENSION, UNSPECIFIED TYPE: ICD-10-CM

## 2018-02-19 DIAGNOSIS — Z79.899 POLYPHARMACY: ICD-10-CM

## 2018-02-19 LAB
ABO GROUP BLD: NORMAL
ALBUMIN SERPL BCP-MCNC: 2.8 G/DL (ref 3.5–5)
ALP SERPL-CCNC: 74 U/L (ref 46–116)
ALT SERPL W P-5'-P-CCNC: 18 U/L (ref 12–78)
AMPHETAMINES SERPL QL SCN: NEGATIVE
ANION GAP SERPL CALCULATED.3IONS-SCNC: -1 MMOL/L (ref 4–13)
AST SERPL W P-5'-P-CCNC: 13 U/L (ref 5–45)
BARBITURATES UR QL: NEGATIVE
BASOPHILS # BLD AUTO: 0.3 THOUSANDS/ΜL (ref 0–0.1)
BASOPHILS NFR BLD AUTO: 3 % (ref 0–1)
BENZODIAZ UR QL: NEGATIVE
BILIRUB SERPL-MCNC: 0.2 MG/DL (ref 0.2–1)
BILIRUB UR QL STRIP: NEGATIVE
BLD GP AB SCN SERPL QL: NEGATIVE
BUN SERPL-MCNC: 19 MG/DL (ref 5–25)
CALCIUM SERPL-MCNC: 8.2 MG/DL (ref 8.3–10.1)
CHLORIDE SERPL-SCNC: 102 MMOL/L (ref 100–108)
CLARITY UR: CLEAR
CO2 SERPL-SCNC: 36 MMOL/L (ref 21–32)
COCAINE UR QL: NEGATIVE
COLOR UR: NORMAL
CREAT SERPL-MCNC: 0.64 MG/DL (ref 0.6–1.3)
EOSINOPHIL # BLD AUTO: 0.1 THOUSAND/ΜL (ref 0–0.61)
EOSINOPHIL NFR BLD AUTO: 1 % (ref 0–6)
ERYTHROCYTE [DISTWIDTH] IN BLOOD BY AUTOMATED COUNT: 13.3 % (ref 11.6–15.1)
ETHANOL SERPL-MCNC: <3 MG/DL (ref 0–3)
GFR SERPL CREATININE-BSD FRML MDRD: 104 ML/MIN/1.73SQ M
GLUCOSE SERPL-MCNC: 106 MG/DL (ref 65–140)
GLUCOSE UR STRIP-MCNC: NEGATIVE MG/DL
HCT VFR BLD AUTO: 17.9 % (ref 42–52)
HGB BLD-MCNC: 6 G/DL (ref 14–18)
HGB UR QL STRIP.AUTO: NEGATIVE
KETONES UR STRIP-MCNC: NEGATIVE MG/DL
LEUKOCYTE ESTERASE UR QL STRIP: NEGATIVE
LYMPHOCYTES # BLD AUTO: 7.8 THOUSANDS/ΜL (ref 0.6–4.47)
LYMPHOCYTES NFR BLD AUTO: 63 % (ref 14–44)
MCH RBC QN AUTO: 28.6 PG (ref 27–31)
MCHC RBC AUTO-ENTMCNC: 33.6 G/DL (ref 31.4–37.4)
MCV RBC AUTO: 85 FL (ref 82–98)
METHADONE UR QL: NEGATIVE
MONOCYTES # BLD AUTO: 0.9 THOUSAND/ΜL (ref 0.17–1.22)
MONOCYTES NFR BLD AUTO: 7 % (ref 4–12)
NEUTROPHILS # BLD AUTO: 3.2 THOUSANDS/ΜL (ref 1.85–7.62)
NEUTS SEG NFR BLD AUTO: 26 % (ref 43–75)
NITRITE UR QL STRIP: NEGATIVE
OPIATES UR QL SCN: NEGATIVE
PCP UR QL: NEGATIVE
PH UR STRIP.AUTO: 6.5 [PH] (ref 5–9)
PLATELET # BLD AUTO: 196 THOUSANDS/UL (ref 130–400)
PLATELET BLD QL SMEAR: ADEQUATE
PMV BLD AUTO: 7.6 FL (ref 8.9–12.7)
POTASSIUM SERPL-SCNC: 3.8 MMOL/L (ref 3.5–5.3)
PROT SERPL-MCNC: 5.1 G/DL (ref 6.4–8.2)
PROT UR STRIP-MCNC: NEGATIVE MG/DL
RBC # BLD AUTO: 2.1 MILLION/UL (ref 4.7–6.1)
RH BLD: POSITIVE
ROULEAUX BLD QL SMEAR: PRESENT
SODIUM SERPL-SCNC: 137 MMOL/L (ref 136–145)
SP GR UR STRIP.AUTO: <=1.005 (ref 1–1.03)
SPECIMEN EXPIRATION DATE: NORMAL
THC UR QL: NEGATIVE
UROBILINOGEN UR QL STRIP.AUTO: 0.2 E.U./DL
VALPROATE SERPL-MCNC: 9 UG/ML (ref 50–100)
WBC # BLD AUTO: 12.3 THOUSAND/UL (ref 4.8–10.8)

## 2018-02-19 PROCEDURE — 81003 URINALYSIS AUTO W/O SCOPE: CPT | Performed by: STUDENT IN AN ORGANIZED HEALTH CARE EDUCATION/TRAINING PROGRAM

## 2018-02-19 PROCEDURE — 80307 DRUG TEST PRSMV CHEM ANLYZR: CPT | Performed by: EMERGENCY MEDICINE

## 2018-02-19 PROCEDURE — 99285 EMERGENCY DEPT VISIT HI MDM: CPT

## 2018-02-19 PROCEDURE — 80053 COMPREHEN METABOLIC PANEL: CPT | Performed by: EMERGENCY MEDICINE

## 2018-02-19 PROCEDURE — 87086 URINE CULTURE/COLONY COUNT: CPT | Performed by: STUDENT IN AN ORGANIZED HEALTH CARE EDUCATION/TRAINING PROGRAM

## 2018-02-19 PROCEDURE — 30233N1 TRANSFUSION OF NONAUTOLOGOUS RED BLOOD CELLS INTO PERIPHERAL VEIN, PERCUTANEOUS APPROACH: ICD-10-PCS | Performed by: FAMILY MEDICINE

## 2018-02-19 PROCEDURE — P9016 RBC LEUKOCYTES REDUCED: HCPCS

## 2018-02-19 PROCEDURE — 80320 DRUG SCREEN QUANTALCOHOLS: CPT | Performed by: EMERGENCY MEDICINE

## 2018-02-19 PROCEDURE — 86920 COMPATIBILITY TEST SPIN: CPT

## 2018-02-19 PROCEDURE — 93005 ELECTROCARDIOGRAM TRACING: CPT | Performed by: EMERGENCY MEDICINE

## 2018-02-19 PROCEDURE — 85025 COMPLETE CBC W/AUTO DIFF WBC: CPT | Performed by: EMERGENCY MEDICINE

## 2018-02-19 PROCEDURE — 86901 BLOOD TYPING SEROLOGIC RH(D): CPT | Performed by: EMERGENCY MEDICINE

## 2018-02-19 PROCEDURE — 36415 COLL VENOUS BLD VENIPUNCTURE: CPT | Performed by: EMERGENCY MEDICINE

## 2018-02-19 PROCEDURE — 86850 RBC ANTIBODY SCREEN: CPT | Performed by: EMERGENCY MEDICINE

## 2018-02-19 PROCEDURE — 86900 BLOOD TYPING SEROLOGIC ABO: CPT | Performed by: EMERGENCY MEDICINE

## 2018-02-19 PROCEDURE — 80164 ASSAY DIPROPYLACETIC ACD TOT: CPT | Performed by: EMERGENCY MEDICINE

## 2018-02-19 RX ORDER — MIRTAZAPINE 15 MG/1
15 TABLET, FILM COATED ORAL
Status: DISCONTINUED | OUTPATIENT
Start: 2018-02-19 | End: 2018-02-22 | Stop reason: HOSPADM

## 2018-02-19 RX ORDER — BENZTROPINE MESYLATE 1 MG/1
1 TABLET ORAL 2 TIMES DAILY
Status: DISCONTINUED | OUTPATIENT
Start: 2018-02-19 | End: 2018-02-22 | Stop reason: HOSPADM

## 2018-02-19 RX ORDER — RISPERIDONE 1 MG/1
2 TABLET, FILM COATED ORAL 2 TIMES DAILY
Status: DISCONTINUED | OUTPATIENT
Start: 2018-02-19 | End: 2018-02-22 | Stop reason: HOSPADM

## 2018-02-19 RX ORDER — NICOTINE 21 MG/24HR
1 PATCH, TRANSDERMAL 24 HOURS TRANSDERMAL DAILY
Status: DISCONTINUED | OUTPATIENT
Start: 2018-02-20 | End: 2018-02-22 | Stop reason: HOSPADM

## 2018-02-19 RX ORDER — PROPRANOLOL HYDROCHLORIDE 20 MG/1
30 TABLET ORAL 3 TIMES DAILY
Status: DISCONTINUED | OUTPATIENT
Start: 2018-02-19 | End: 2018-02-22 | Stop reason: HOSPADM

## 2018-02-19 RX ORDER — DIVALPROEX SODIUM 500 MG/1
1000 TABLET, DELAYED RELEASE ORAL EVERY 12 HOURS SCHEDULED
Status: DISCONTINUED | OUTPATIENT
Start: 2018-02-19 | End: 2018-02-22 | Stop reason: HOSPADM

## 2018-02-19 RX ORDER — HYDROXYZINE HYDROCHLORIDE 25 MG/1
50 TABLET, FILM COATED ORAL 2 TIMES DAILY
Status: DISCONTINUED | OUTPATIENT
Start: 2018-02-19 | End: 2018-02-22 | Stop reason: HOSPADM

## 2018-02-19 RX ORDER — ACETAMINOPHEN 325 MG/1
650 TABLET ORAL EVERY 6 HOURS PRN
Status: DISCONTINUED | OUTPATIENT
Start: 2018-02-19 | End: 2018-02-22 | Stop reason: HOSPADM

## 2018-02-19 RX ORDER — FLUOXETINE HYDROCHLORIDE 20 MG/1
40 CAPSULE ORAL DAILY
Status: DISCONTINUED | OUTPATIENT
Start: 2018-02-20 | End: 2018-02-22 | Stop reason: HOSPADM

## 2018-02-19 RX ADMIN — MIRTAZAPINE 15 MG: 15 TABLET, FILM COATED ORAL at 21:18

## 2018-02-19 RX ADMIN — HYDROXYZINE HYDROCHLORIDE 50 MG: 25 TABLET, FILM COATED ORAL at 21:21

## 2018-02-19 RX ADMIN — DIVALPROEX SODIUM 1000 MG: 500 TABLET, DELAYED RELEASE ORAL at 21:18

## 2018-02-19 RX ADMIN — BENZTROPINE MESYLATE 1 MG: 1 TABLET ORAL at 21:21

## 2018-02-19 RX ADMIN — PROPRANOLOL HYDROCHLORIDE 30 MG: 20 TABLET ORAL at 21:17

## 2018-02-19 RX ADMIN — RISPERIDONE 2 MG: 1 TABLET ORAL at 20:08

## 2018-02-19 NOTE — H&P
H&P Exam - Paty Vasquez 60 y o  male MRN: [id number]    Unit/Bed#: [address] [place] [address] Encounter: [id number]    Assessment/Plan:    59 y/o male with PMH HTN, Depression, Hx of alcohol abuse and anemia is admitted for to the hospital for symptomatic anemia under the supervision of Dr Climmie Lombard and is expected to say >2 midnights  Pt is currently full code  Pt is expected to go home after discharge  Plan discussed and agreed upon with Dr Climmie Lombard  Symptomatic anemia likely 2/2 lymphocytosis  - Pt had an appointment with Heme/Onc on 2/14:  recent stool sample and UA were negative for blood   Anemia can be a side effect of psychiatric medications  However, there is high suspicious of a primary bone marrow disorder, anemia related to chronic Leukemia condition  Patient likely has been diagnosed with CLL at rehab in [place]  Will need to obtain records     - Pt received a transfusion of 1 Unit RBCs on 2/14 after previous discharge   - Labs from 2/11: Vit B-12 952  Folate 8 9   Iron Sat 93%   TIBC 276   Iron 256   Ferritin 917  - Vitals: 98 4F     84 HR   20 RR    118/56    97%   - Hg 6 0   Hct 17 9   MCV 84  - Valproic Acid level 9  - Ethanol lvl negative   - Type and Screen ordered   - Consent obtains for 2 Unit RBCs transfusion    - Repeat H/H 4 hours after transfusion   - FOBT ordered   - Ua/ Urine cx ordered   - Will monitor daily CBC   - Consult to Dr Julio C Campos       Leukocytosis on presentation   - WBC 12 30 POA   - On recent admission 2/10 Chest XR: Possible small infrahilar infiltrates vs bronchovascular crowding    - Will order repeat Chest XR today    - Will monitor daily CBC    Hx of Hematuria  - Obtain UA/Urine cx    Hypertension  - /56 on presentation  - Continue with Prinzidine 20-12 5mg PO qd    Depression/Psychotic Disorder   - Will continue all home medication for now   - Psych consult placed     Tobacco Abuse  - Nicotine patch 21mg/24h ordered  - Smoking cessation information provided     Global  - Regular diet   - Replete electrolytes as needed  - SCDs       History of Present Illness   61 y/o male with PMH HTN, Depression, Hx of alcohol abuse and anemia presents with an altered mental status  Pt's wife was at bedside, stated that he was acting differently and not being himself  Pt has been feeling weakness and fatigue for a few weeks now  He was admitted on 2/10 with a hemoglobin of 5 0, treated with blood transfusions and discharged  Pt followed up at heme/onc office where he received another transfusion of blood and had a full workup done  Pt states that he is not losing any blood at this time  Denies any blood in stool or urine  States that his stool is regular in color  Pt denies any nausea, vomiting, abdominal pain, or urinary changes  Review of Systems   Constitutional: Positive for fatigue  Negative for chills, diaphoresis and fever  HENT: Negative for congestion, ear discharge, ear pain, facial swelling, hearing loss, nosebleeds, postnasal drip, rhinorrhea, sneezing, sore throat and tinnitus  Respiratory: Negative for apnea, cough, choking, chest tightness, shortness of breath and wheezing  Cardiovascular: Negative for chest pain, palpitations and leg swelling  Gastrointestinal: Negative for abdominal pain, blood in stool, constipation, diarrhea, nausea and vomiting  Endocrine: Negative for polydipsia, polyphagia and polyuria  Genitourinary: Negative for dysuria, enuresis, frequency, hematuria and urgency  Musculoskeletal: Negative for arthralgias, back pain, gait problem, joint swelling and neck stiffness  Skin: Positive for pallor  Negative for color change, rash and wound  Neurological: Positive for light-headedness  Negative for dizziness, tremors, seizures, weakness, numbness and headaches  Psychiatric/Behavioral: Negative for agitation, behavioral problems, confusion, decreased concentration and self-injury  The patient is not nervous/anxious          Historical Information   Past Medical History:   Diagnosis Date    Anemia     History of alcohol abuse     Hypertension     Hypertension     Psychiatric disorder      History reviewed  No pertinent surgical history  Social History   History   Alcohol Use No     Comment: last drink nov 19 2017     History   Drug Use No     History   Smoking Status    Current Every Day Smoker    Packs/day: 1 00    Years: 40 00   Smokeless Tobacco    Never Used     Family History: non-contributory    Meds/Allergies   all medications and allergies reviewed  No Known Allergies    Objective   First Vitals:   Blood Pressure: 118/56 (02/19/18 1411)  Pulse: 84 (02/19/18 1411)  Temperature: 98 4 °F (36 9 °C) (02/19/18 1411)  Temp Source: Tympanic (02/19/18 1411)  Respirations: 20 (02/19/18 1411)  Height: 5' 11" (180 3 cm) (02/19/18 1739)  Weight - Scale: 102 kg (225 lb) (02/19/18 1411)  SpO2: 97 % (02/19/18 1411)    Current Vitals:   Blood Pressure: 119/65 (02/19/18 1739)  Pulse: 71 (02/19/18 1739)  Temperature: 98 3 °F (36 8 °C) (02/19/18 1739)  Temp Source: Oral (02/19/18 1739)  Respirations: 21 (02/19/18 1739)  Height: 5' 11" (180 3 cm) (02/19/18 1739)  Weight - Scale: 107 kg (236 lb 15 9 oz) (02/19/18 1739)  SpO2: 96 % (02/19/18 1739)    No intake or output data in the 24 hours ending 02/19/18 2027    Invasive Devices     Peripheral Intravenous Line            Peripheral IV 02/19/18 Left Antecubital less than 1 day                Physical Exam   Constitutional: He appears well-developed  No distress  HENT:   Head: Normocephalic and atraumatic  Right Ear: External ear normal    Left Ear: External ear normal    Nose: Nose normal    Mouth/Throat: Oropharynx is clear and moist  No oropharyngeal exudate  Eyes: Conjunctivae and EOM are normal  Pupils are equal, round, and reactive to light  Right eye exhibits no discharge  Left eye exhibits no discharge  No scleral icterus  Neck: Normal range of motion  Neck supple  No JVD present   No tracheal deviation present  No thyromegaly present  Pulmonary/Chest: No stridor  Lymphadenopathy:     He has no cervical adenopathy  Skin: He is not diaphoretic  Nursing note and vitals reviewed        Lab Results:   Results for orders placed or performed during the hospital encounter of 02/19/18   Valproic acid level, total   Result Value Ref Range    Valproic Acid, Total 9 (L) 50 - 100 ug/mL   Ethanol   Result Value Ref Range    Ethanol Lvl <3 0 - 3 mg/dL   CBC and differential   Result Value Ref Range    WBC 12 30 (H) 4 80 - 10 80 Thousand/uL    RBC 2 10 (L) 4 70 - 6 10 Million/uL    Hemoglobin 6 0 (LL) 14 0 - 18 0 g/dL    Hematocrit 17 9 (L) 42 0 - 52 0 %    MCV 85 82 - 98 fL    MCH 28 6 27 0 - 31 0 pg    MCHC 33 6 31 4 - 37 4 g/dL    RDW 13 3 11 6 - 15 1 %    MPV 7 6 (L) 8 9 - 12 7 fL    Platelets 910 090 - 715 Thousands/uL    Neutrophils Relative 26 (L) 43 - 75 %    Lymphocytes Relative 63 (H) 14 - 44 %    Monocytes Relative 7 4 - 12 %    Eosinophils Relative 1 0 - 6 %    Basophils Relative 3 (H) 0 - 1 %    Neutrophils Absolute 3 20 1 85 - 7 62 Thousands/µL    Lymphocytes Absolute 7 80 (H) 0 60 - 4 47 Thousands/µL    Monocytes Absolute 0 90 0 17 - 1 22 Thousand/µL    Eosinophils Absolute 0 10 0 00 - 0 61 Thousand/µL    Basophils Absolute 0 30 (H) 0 00 - 0 10 Thousands/µL   Comprehensive metabolic panel   Result Value Ref Range    Sodium 137 136 - 145 mmol/L    Potassium 3 8 3 5 - 5 3 mmol/L    Chloride 102 100 - 108 mmol/L    CO2 36 (H) 21 - 32 mmol/L    Anion Gap -1 (L) 4 - 13 mmol/L    BUN 19 5 - 25 mg/dL    Creatinine 0 64 0 60 - 1 30 mg/dL    Glucose 106 65 - 140 mg/dL    Calcium 8 2 (L) 8 3 - 10 1 mg/dL    AST 13 5 - 45 U/L    ALT 18 12 - 78 U/L    Alkaline Phosphatase 74 46 - 116 U/L    Total Protein 5 1 (L) 6 4 - 8 2 g/dL    Albumin 2 8 (L) 3 5 - 5 0 g/dL    Total Bilirubin 0 20 0 20 - 1 00 mg/dL    eGFR 104 ml/min/1 73sq m   Type and screen   Result Value Ref Range    ABO Grouping B     Rh Factor Positive     Antibody Screen Negative     Specimen Expiration Date [id number]    Smear Review(Phlebs Do Not Order)   Result Value Ref Range    Rouleaux Present     Platelet Estimate Adequate Adequate     Imaging:   XR chest pa & lateral    (Results Pending)     EKG, Pathology, and Other Studies:    Code Status: FULL Code     Counseling / Coordination of Care: Total floor / unit time spent today 40 minutes  and Greater than 50% of total time was spent with the patient and / or family counseling and / or coordination of care  A description of the counseling / coordination of care: Nelson Finn

## 2018-02-19 NOTE — ED PROVIDER NOTES
History  Chief Complaint   Patient presents with    Altered Mental Status     states feeling strange from his meds  feels like he is in another demension  wife states when they talk he is talking about something completely different  here recently for similar prob and was very anemic     Hx DEPRESSION, ADDICTION, ETOH, RECENT ADMISSION FOR ANEMIA  PT CAME FROM REHAB IN [place] 3 WKS AGO, C/O WEAK, ALTERED,   PT ON MANY PSYCH MEDS  EXAM: SLEEPY, NO FOCAL NEURO DEFICIT          Altered Mental Status   Presenting symptoms: confusion and lethargy    Severity:  Moderate  Duration:  3 weeks  Timing:  Constant  Progression:  Worsening  Chronicity:  New      Prior to Admission Medications   Prescriptions Last Dose Informant Patient Reported? Taking? FLUoxetine (PROzac) 20 mg capsule  Self Yes No   Sig: Take 40 mg by mouth daily   benztropine (COGENTIN) 1 mg tablet  Self Yes No   Sig: Take 1 mg by mouth 2 (two) times a day   divalproex sodium (DEPAKOTE) 500 mg EC tablet  Self Yes No   Sig: Take 1,000 mg by mouth every 12 (twelve) hours   hydrOXYzine pamoate (VISTARIL) 50 mg capsule  Self Yes No   Sig: Take 50 mg by mouth 2 (two) times a day   lisinopril-hydrochlorothiazide (PRINZIDE,ZESTORETIC) 20-12 5 MG per tablet  Self Yes No   Sig: Take 1 tablet by mouth daily   mirtazapine (REMERON) 15 mg tablet  Self Yes No   Sig: Take 15 mg by mouth daily at bedtime   propranolol (INDERAL) 10 mg tablet  Self Yes No   Sig: Take 30 mg by mouth 3 (three) times a day     risperiDONE (RisperDAL) 2 mg tablet  Self Yes No   Sig: Take 4 mg by mouth 2 (two) times a day        Facility-Administered Medications: None       Past Medical History:   Diagnosis Date    Anemia     History of alcohol abuse     Hypertension     Hypertension     Psychiatric disorder        History reviewed  No pertinent surgical history      Family History   Problem Relation Age of Onset    Coronary artery disease Mother     No Known Problems Father    Natalya Araujo Hepatitis Sister     Cancer Brother     Prostate cancer Brother     Early death Brother      I have reviewed and agree with the history as documented  Social History   Substance Use Topics    Smoking status: Current Every Day Smoker     Packs/day: 1 00     Years: 40 00    Smokeless tobacco: Never Used    Alcohol use No      Comment: last drink nov 19 2017        Review of Systems   Constitutional: Positive for activity change and fatigue  Psychiatric/Behavioral: Positive for confusion  All other systems reviewed and are negative  Physical Exam  ED Triage Vitals [02/19/18 1411]   Temperature Pulse Respirations Blood Pressure SpO2   98 4 °F (36 9 °C) 84 20 118/56 97 %      Temp Source Heart Rate Source Patient Position - Orthostatic VS BP Location FiO2 (%)   Tympanic Monitor Sitting Right arm --      Pain Score       No Pain           Orthostatic Vital Signs  Vitals:    02/19/18 1411   BP: 118/56   Pulse: 84   Patient Position - Orthostatic VS: Sitting       Physical Exam   Constitutional: He is oriented to person, place, and time  He appears well-developed and well-nourished  No distress  HENT:   Head: Normocephalic and atraumatic  Eyes: Conjunctivae and EOM are normal  Pupils are equal, round, and reactive to light  Neck: Normal range of motion  Cardiovascular: Normal rate and regular rhythm  Pulmonary/Chest: Effort normal and breath sounds normal    Abdominal: Soft  Musculoskeletal: Normal range of motion  Neurological: He is oriented to person, place, and time  No cranial nerve deficit  He exhibits normal muscle tone  Coordination normal    SLEEPY   Skin: Skin is warm and dry  No rash noted  He is not diaphoretic  Nursing note and vitals reviewed        ED Medications  Medications - No data to display    Diagnostic Studies  Results Reviewed     Procedure Component Value Units Date/Time    Ethanol [[id number]]  (Normal) Collected:  02/19/18 1532    Lab Status:  Final result Specimen: Blood from Arm, Left Updated:  02/19/18 1616     Ethanol Lvl <3 mg/dL     Valproic acid level, total [[id number]]  (Abnormal) Collected:  02/19/18 1532    Lab Status:  Final result Specimen:  Blood from Arm, Left Updated:  02/19/18 1613     Valproic Acid, Total 9 (L) ug/mL     Comprehensive metabolic panel [[id number]]  (Abnormal) Collected:  02/19/18 1532    Lab Status:  Final result Specimen:  Blood from Arm, Left Updated:  02/19/18 1613     Sodium 137 mmol/L      Potassium 3 8 mmol/L      Chloride 102 mmol/L      CO2 36 (H) mmol/L      Anion Gap -1 (L) mmol/L      BUN 19 mg/dL      Creatinine 0 64 mg/dL      Glucose 106 mg/dL      Calcium 8 2 (L) mg/dL      AST 13 U/L      ALT 18 U/L      Alkaline Phosphatase 74 U/L      Total Protein 5 1 (L) g/dL      Albumin 2 8 (L) g/dL      Total Bilirubin 0 20 mg/dL      eGFR 104 ml/min/1 73sq m     Narrative:         National Kidney Disease Education Program recommendations are as follows:  GFR calculation is accurate only with a steady state creatinine  Chronic Kidney disease less than 60 ml/min/1 73 sq  meters  Kidney failure less than 15 ml/min/1 73 sq  meters      CBC and differential [[id number]]  (Abnormal) Collected:  02/19/18 1532    Lab Status:  Final result Specimen:  Blood from Arm, Left Updated:  02/19/18 1603     WBC 12 30 (H) Thousand/uL      RBC 2 10 (L) Million/uL      Hemoglobin 6 0 (LL) g/dL      Hematocrit 17 9 (L) %      MCV 85 fL      MCH 28 6 pg      MCHC 33 6 g/dL      RDW 13 3 %      MPV 7 6 (L) fL      Platelets 160 Thousands/uL      Neutrophils Relative 26 (L) %      Lymphocytes Relative 63 (H) %      Monocytes Relative 7 %      Eosinophils Relative 1 %      Basophils Relative 3 (H) %      Neutrophils Absolute 3 20 Thousands/µL      Lymphocytes Absolute 7 80 (H) Thousands/µL      Monocytes Absolute 0 90 Thousand/µL      Eosinophils Absolute 0 10 Thousand/µL      Basophils Absolute 0 30 (H) Thousands/µL     Rapid drug screen, urine [[id number]]     Lab Status:  No result Specimen:  Urine                  No orders to display              Procedures  Procedures       Phone Contacts  ED Phone Contact    ED Course  ED Course                                MDM  CritCare Time    Disposition  Final diagnoses:   Symptomatic anemia   Polypharmacy   Weakness     Time reflects when diagnosis was documented in both MDM as applicable and the Disposition within this note     Time User Action Codes Description Comment    2/19/2018  4:21 PM Barsoom, Jeremy Add [D64 9] Symptomatic anemia     2/19/2018  4:21 PM Barsoom, Jeremy Add [Z79 [id number]] Polypharmacy     2/19/2018  4:21 PM Barsoom, Jeremy Add [R53 1] Weakness       ED Disposition     ED Disposition Condition Comment    Admit  Case was discussed with FP and the patient's admission status was agreed to be Admission Status: inpatient status to the service of Dr Jing Forrest   Follow-up Information    None       Patient's Medications   Discharge Prescriptions    No medications on file     No discharge procedures on file      ED Provider  Electronically Signed by           Дмитрий Patel MD  02/19/18 4027

## 2018-02-20 ENCOUNTER — APPOINTMENT (INPATIENT)
Dept: RADIOLOGY | Facility: HOSPITAL | Age: 63
DRG: 841 | End: 2018-02-20
Payer: COMMERCIAL

## 2018-02-20 ENCOUNTER — TELEPHONE (OUTPATIENT)
Dept: FAMILY MEDICINE CLINIC | Facility: CLINIC | Age: 63
End: 2018-02-20

## 2018-02-20 LAB
ABO GROUP BLD BPU: NORMAL
ABO GROUP BLD BPU: NORMAL
ANION GAP SERPL CALCULATED.3IONS-SCNC: 4 MMOL/L (ref 4–13)
BPU ID: NORMAL
BPU ID: NORMAL
BUN SERPL-MCNC: 15 MG/DL (ref 5–25)
CALCIUM SERPL-MCNC: 8.2 MG/DL (ref 8.3–10.1)
CHLORIDE SERPL-SCNC: 107 MMOL/L (ref 100–108)
CO2 SERPL-SCNC: 33 MMOL/L (ref 21–32)
CREAT SERPL-MCNC: 0.61 MG/DL (ref 0.6–1.3)
CROSSMATCH: NORMAL
CROSSMATCH: NORMAL
ERYTHROCYTE [DISTWIDTH] IN BLOOD BY AUTOMATED COUNT: 14.3 % (ref 11.6–15.1)
GFR SERPL CREATININE-BSD FRML MDRD: 106 ML/MIN/1.73SQ M
GLUCOSE SERPL-MCNC: 88 MG/DL (ref 65–140)
HCT VFR BLD AUTO: 24.3 % (ref 42–52)
HCT VFR BLD AUTO: 24.7 % (ref 42–52)
HGB BLD-MCNC: 8.1 G/DL (ref 14–18)
HGB BLD-MCNC: 8.3 G/DL (ref 14–18)
MAGNESIUM SERPL-MCNC: 1.9 MG/DL (ref 1.6–2.6)
MCH RBC QN AUTO: 29.1 PG (ref 27–31)
MCHC RBC AUTO-ENTMCNC: 33.6 G/DL (ref 31.4–37.4)
MCV RBC AUTO: 87 FL (ref 82–98)
PHOSPHATE SERPL-MCNC: 4.4 MG/DL (ref 2.3–4.1)
PLATELET # BLD AUTO: 161 THOUSANDS/UL (ref 130–400)
PMV BLD AUTO: 7.6 FL (ref 8.9–12.7)
POTASSIUM SERPL-SCNC: 4.4 MMOL/L (ref 3.5–5.3)
RBC # BLD AUTO: 2.8 MILLION/UL (ref 4.7–6.1)
SODIUM SERPL-SCNC: 144 MMOL/L (ref 136–145)
UNIT DISPENSE STATUS: NORMAL
UNIT DISPENSE STATUS: NORMAL
UNIT PRODUCT CODE: NORMAL
UNIT PRODUCT CODE: NORMAL
UNIT RH: NORMAL
UNIT RH: NORMAL
WBC # BLD AUTO: 15.1 THOUSAND/UL (ref 4.8–10.8)

## 2018-02-20 PROCEDURE — 85018 HEMOGLOBIN: CPT | Performed by: FAMILY MEDICINE

## 2018-02-20 PROCEDURE — 85014 HEMATOCRIT: CPT | Performed by: FAMILY MEDICINE

## 2018-02-20 PROCEDURE — 85027 COMPLETE CBC AUTOMATED: CPT | Performed by: STUDENT IN AN ORGANIZED HEALTH CARE EDUCATION/TRAINING PROGRAM

## 2018-02-20 PROCEDURE — 80048 BASIC METABOLIC PNL TOTAL CA: CPT | Performed by: STUDENT IN AN ORGANIZED HEALTH CARE EDUCATION/TRAINING PROGRAM

## 2018-02-20 PROCEDURE — 83735 ASSAY OF MAGNESIUM: CPT | Performed by: STUDENT IN AN ORGANIZED HEALTH CARE EDUCATION/TRAINING PROGRAM

## 2018-02-20 PROCEDURE — 71046 X-RAY EXAM CHEST 2 VIEWS: CPT

## 2018-02-20 PROCEDURE — 84100 ASSAY OF PHOSPHORUS: CPT | Performed by: STUDENT IN AN ORGANIZED HEALTH CARE EDUCATION/TRAINING PROGRAM

## 2018-02-20 PROCEDURE — 99255 IP/OBS CONSLTJ NEW/EST HI 80: CPT | Performed by: INTERNAL MEDICINE

## 2018-02-20 PROCEDURE — P9016 RBC LEUKOCYTES REDUCED: HCPCS

## 2018-02-20 RX ORDER — LORAZEPAM 0.5 MG/1
0.5 TABLET ORAL EVERY 8 HOURS PRN
Status: DISCONTINUED | OUTPATIENT
Start: 2018-02-20 | End: 2018-02-22 | Stop reason: HOSPADM

## 2018-02-20 RX ADMIN — DIVALPROEX SODIUM 1000 MG: 500 TABLET, DELAYED RELEASE ORAL at 08:07

## 2018-02-20 RX ADMIN — PROPRANOLOL HYDROCHLORIDE 30 MG: 20 TABLET ORAL at 21:45

## 2018-02-20 RX ADMIN — LORAZEPAM 0.5 MG: 0.5 TABLET ORAL at 17:43

## 2018-02-20 RX ADMIN — NICOTINE 1 PATCH: 21 PATCH, EXTENDED RELEASE TRANSDERMAL at 08:13

## 2018-02-20 RX ADMIN — DIVALPROEX SODIUM 1000 MG: 500 TABLET, DELAYED RELEASE ORAL at 21:45

## 2018-02-20 RX ADMIN — HYDROXYZINE HYDROCHLORIDE 50 MG: 25 TABLET, FILM COATED ORAL at 17:43

## 2018-02-20 RX ADMIN — FLUOXETINE 40 MG: 20 CAPSULE ORAL at 08:13

## 2018-02-20 RX ADMIN — BENZTROPINE MESYLATE 1 MG: 1 TABLET ORAL at 17:43

## 2018-02-20 RX ADMIN — BENZTROPINE MESYLATE 1 MG: 1 TABLET ORAL at 08:08

## 2018-02-20 RX ADMIN — PROPRANOLOL HYDROCHLORIDE 30 MG: 20 TABLET ORAL at 15:32

## 2018-02-20 RX ADMIN — MIRTAZAPINE 15 MG: 15 TABLET, FILM COATED ORAL at 21:45

## 2018-02-20 RX ADMIN — HYDROXYZINE HYDROCHLORIDE 50 MG: 25 TABLET, FILM COATED ORAL at 08:08

## 2018-02-20 RX ADMIN — RISPERIDONE 2 MG: 1 TABLET ORAL at 08:08

## 2018-02-20 RX ADMIN — RISPERIDONE 2 MG: 1 TABLET ORAL at 17:43

## 2018-02-20 NOTE — CONSULTS
Consultation - [place]ung[place] Deanna 60 y o  male MRN: [id number]    Unit/Bed#: [address] [place] [address] Encounter: [id number]      Identifying Data:  60years old white male is admitted at [facility] on February 19, 2018 with complaining of change of mental status  Psychiatric consultation is asked for bipolar disorder  Patient examined spoke with the nurse history physical medications labs reviewed and noted discussed with the resident physician's  Patient's alcohol level was less than 3 and drug screen was negative patient denies abusing alcohol or drugs  Patient is a poor historian but he was able to give out basic information patient lives with the wife he has 2 daughters patient smokes cigarette denies abusing alcohol or drugs he reports that he used to drink alcohol but he stopped drinking 6 months ago he has a history of 1 dui and 1 rehab in the past he also had tried pot many years ago but no other drugs no IV drug abuse in the past   Patient has 9th grade education and he has been working as a   Patient is suffering from bipolar disorder hypertension tobacco abuse anemia history of alcohol abuse hypertension patient is on psychotropic medications at home and he reports that he takes his medications regularly  Patient lives with the wife and she is supportive to him  Chief Complaints:  Change of mental status  Family History: Mother and sister have a depression  Family History   Problem Relation Age of Onset    Coronary artery disease Mother     No Known Problems Father     Hepatitis Sister     Cancer Brother     Prostate cancer Brother     Early death Brother        Legal History:  Patient denies      Mental Status Exam:  60years old white male is alert awake oriented x3 cooperative communicates slowly poor historian but he was able to tell me that he is taking depression medications from a psychiatrist could not tell me the name of the psychiatrist forgetful poor sleep appetite okay  Guarded paranoid but no delusion elucidated  Currently patient denies auditory or visual hallucinations  Patient denies suicidal or homicidal ideations  Patient is not lethargic patient is not confuse patient is not agitated  Insight and judgments are adequate  History of Present Illness     HPI: Andrea Joseph is a 60y o  year old male who presents with change of mental status    Consults      Historical Information   Past Psychiatric History:  Patient has a long psych history and he has been under psychiatric care currently patient goes to see a psychiatrist and taking his medications reportedly patient is taking Depakote 1000 mg twice a day Remeron 15 mg HS Risperdal 2 mg b i d  and Cogentin 1 mg b i d  patient denies psychiatric admissions in the past patient denies suicide attempts in the past patient has history of alcohol abuse with 1 DUIs and 1 rehab in the past but he reports that he stop drinking alcohol 6 months ago  Patient has remote history of trying pot many years ago but no other drugs no IV drug abuse and currently patient denies abusing any drugs  Patient is under psychiatric care with his psychiatrist at this time  Past Medical History:   Diagnosis Date    Anemia     History of alcohol abuse     Hypertension     Hypertension     Psychiatric disorder      History reviewed  No pertinent surgical history    Social History   History   Alcohol Use No     Comment: last drink nov 19 2017     History   Drug Use No     History   Smoking Status    Current Every Day Smoker    Packs/day: 1 00    Years: 40 00   Smokeless Tobacco    Never Used       Meds/Allergies   current meds:   Current Facility-Administered Medications   Medication Dose Route Frequency    acetaminophen (TYLENOL) tablet 650 mg  650 mg Oral Q6H PRN    benztropine (COGENTIN) tablet 1 mg  1 mg Oral BID    divalproex sodium (DEPAKOTE) EC tablet 1,000 mg  1,000 mg Oral Q12H [address]    FLUoxetine (PROzac) capsule 40 mg  40 mg Oral Daily    hydrOXYzine HCL (ATARAX) tablet 50 mg  50 mg Oral BID    lisinopril-hydrochlorothiazide (PRINZIDE 20/12  5) combo dose   Oral Daily    mirtazapine (REMERON) tablet 15 mg  15 mg Oral HS    nicotine (NICODERM CQ) 21 mg/24 hr TD 24 hr patch 1 patch  1 patch Transdermal Daily    propranolol (INDERAL) tablet 30 mg  30 mg Oral TID    risperiDONE (RisperDAL) tablet 2 mg  2 mg Oral BID    and PTA meds:    Prescriptions Prior to Admission   Medication    benztropine (COGENTIN) 1 mg tablet    divalproex sodium (DEPAKOTE) 500 mg EC tablet    FLUoxetine (PROzac) 20 mg capsule    hydrOXYzine pamoate (VISTARIL) 50 mg capsule    lisinopril-hydrochlorothiazide (PRINZIDE,ZESTORETIC) 20-12 5 MG per tablet    mirtazapine (REMERON) 15 mg tablet    propranolol (INDERAL) 10 mg tablet    risperiDONE (RisperDAL) 2 mg tablet     No Known Allergies    Objective   Vitals: Blood pressure 109/58, pulse 57, temperature 97 5 °F (36 4 °C), temperature source Oral, resp  rate 18, height 5' 11" (1 803 m), weight 107 kg (236 lb 15 9 oz), SpO2 97 %        Routine Lab Results:   Admission on 02/19/2018   Component Date Value Ref Range Status    Valproic Acid, Total 02/19/2018 9* 50 - 100 ug/mL Final    Ethanol Lvl 02/19/2018 <3  0 - 3 mg/dL Final    WBC 02/19/2018 12 30* 4 80 - 10 80 Thousand/uL Final    RBC 02/19/2018 2 10* 4 70 - 6 10 Million/uL Final    Hemoglobin 02/19/2018 6 0* 14 0 - 18 0 g/dL Final    Hematocrit 02/19/2018 17 9* 42 0 - 52 0 % Final    MCV 02/19/2018 85  82 - 98 fL Final    MCH 02/19/2018 28 6  27 0 - 31 0 pg Final    MCHC 02/19/2018 33 6  31 4 - 37 4 g/dL Final    RDW 02/19/2018 13 3  11 6 - 15 1 % Final    MPV 02/19/2018 7 6* 8 9 - 12 7 fL Final    Platelets 24/42/5787 196  130 - 400 Thousands/uL Final    Neutrophils Relative 02/19/2018 26* 43 - 75 % Final    Lymphocytes Relative 02/19/2018 63* 14 - 44 % Final    Monocytes Relative 02/19/2018 7  4 - 12 % Final    Eosinophils Relative 02/19/2018 1  0 - 6 % Final    Basophils Relative 02/19/2018 3* 0 - 1 % Final    Neutrophils Absolute 02/19/2018 3 20  1 85 - 7 62 Thousands/µL Final    Lymphocytes Absolute 02/19/2018 7 80* 0 60 - 4 47 Thousands/µL Final    Monocytes Absolute 02/19/2018 0 90  0 17 - 1 22 Thousand/µL Final    Eosinophils Absolute 02/19/2018 0 10  0 00 - 0 61 Thousand/µL Final    Basophils Absolute 02/19/2018 0 30* 0 00 - 0 10 Thousands/µL Final    Sodium 02/19/2018 137  136 - 145 mmol/L Final    Potassium 02/19/2018 3 8  3 5 - 5 3 mmol/L Final    Chloride 02/19/2018 102  100 - 108 mmol/L Final    CO2 02/19/2018 36* 21 - 32 mmol/L Final    Anion Gap 02/19/2018 -1* 4 - 13 mmol/L Final    BUN 02/19/2018 19  5 - 25 mg/dL Final    Creatinine 02/19/2018 0 64  0 60 - 1 30 mg/dL Final    Glucose 02/19/2018 106  65 - 140 mg/dL Final    Calcium 02/19/2018 8 2* 8 3 - 10 1 mg/dL Final    AST 02/19/2018 13  5 - 45 U/L Final    ALT 02/19/2018 18  12 - 78 U/L Final    Alkaline Phosphatase 02/19/2018 74  46 - 116 U/L Final    Total Protein 02/19/2018 5 1* 6 4 - 8 2 g/dL Final    Albumin 02/19/2018 2 8* 3 5 - 5 0 g/dL Final    Total Bilirubin 02/19/2018 0 20  0 20 - 1 00 mg/dL Final    eGFR 02/19/2018 104  ml/min/1 73sq m Final    Amph/Meth UR 02/19/2018 Negative  Negative Final    Barbiturate Ur 02/19/2018 Negative  Negative Final    Benzodiazepine Urine 02/19/2018 Negative  Negative Final    Cocaine Urine 02/19/2018 Negative  Negative Final    Methadone Urine 02/19/2018 Negative  Negative Final    Opiate Urine 02/19/2018 Negative  Negative Final    PCP Ur 02/19/2018 Negative  Negative Final    THC Urine 02/19/2018 Negative  Negative Final    Rouleaux 02/19/2018 Present   Final    Platelet Estimate 08/02/9331 Adequate  Adequate Final    ABO Grouping 02/19/2018 B   Final    Rh Factor 02/19/2018 Positive   Final    Antibody Screen 02/19/2018 Negative   Final    Specimen Expiration Date 02/19/2018 [id number]   Final    Color, UA 02/19/2018 Light Yellow   Final    Clarity, UA 02/19/2018 Clear   Final    Specific Gravity, UA 02/19/2018 <=1 005  1 000 - 1 030 Final    pH, UA 02/19/2018 6 5  5 0 - 9 0 Final    Leukocytes, UA 02/19/2018 Negative  Negative Final    Nitrite, UA 02/19/2018 Negative  Negative Final    Protein, UA 02/19/2018 Negative  Negative mg/dl Final    Glucose, UA 02/19/2018 Negative  Negative mg/dl Final    Ketones, UA 02/19/2018 Negative  Negative mg/dl Final    Urobilinogen, UA 02/19/2018 0 2  0 2, 1 0 E U /dl E U /dl Final    Bilirubin, UA 02/19/2018 Negative  Negative Final    Blood, UA 02/19/2018 Negative  Negative Final    Unit Product Code 02/20/2018 [id number]   Final-Edited    Unit Number 02/20/2018 [id number]   Final-Edited    Unit ABO 02/20/2018 B   Final-Edited    Unit [facility] 02/20/2018 NEG   Final-Edited    Crossmatch 02/20/2018 Compatible   Final    Unit Dispense Status 02/20/2018 Presumed Trans   Final-Edited    Unit Product Code 02/20/2018 [id number]   Final-Edited    Unit Number 02/20/2018 [id number]   Final-Edited    Unit ABO 02/20/2018 B   Final-Edited    Unit RH 02/20/2018 NEG   Final-Edited    Crossmatch 02/20/2018 Compatible   Final    Unit Dispense Status 02/20/2018 Presumed Trans   Final-Edited    Phosphorus 02/20/2018 4 4* 2 3 - 4 1 mg/dL Final    WBC 02/20/2018 15 10* 4 80 - 10 80 Thousand/uL Final    RBC 02/20/2018 2 80* 4 70 - 6 10 Million/uL Final    Hemoglobin 02/20/2018 8 1* 14 0 - 18 0 g/dL Final    Hematocrit 02/20/2018 24 3* 42 0 - 52 0 % Final    MCV 02/20/2018 87  82 - 98 fL Final    MCH 02/20/2018 29 1  27 0 - 31 0 pg Final    MCHC 02/20/2018 33 6  31 4 - 37 4 g/dL Final    RDW 02/20/2018 14 3  11 6 - 15 1 % Final    Platelets 28/76/7227 161  130 - 400 Thousands/uL Final    MPV 02/20/2018 7 6* 8 9 - 12 7 fL Final    Magnesium 02/20/2018 1 9  1 6 - 2 6 mg/dL Final    Sodium 02/20/2018 144  136 - 145 mmol/L Final  Potassium 02/20/2018 4 4  3 5 - 5 3 mmol/L Final    Chloride 02/20/2018 107  100 - 108 mmol/L Final    CO2 02/20/2018 33* 21 - 32 mmol/L Final    Anion Gap 02/20/2018 4  4 - 13 mmol/L Final    BUN 02/20/2018 15  5 - 25 mg/dL Final    Creatinine 02/20/2018 0 61  0 60 - 1 30 mg/dL Final    Glucose 02/20/2018 88  65 - 140 mg/dL Final    Calcium 02/20/2018 8 2* 8 3 - 10 1 mg/dL Final    eGFR 02/20/2018 106  ml/min/1 73sq m Final    Hemoglobin 02/20/2018 8 3* 14 0 - 18 0 g/dL Final    Hematocrit 02/20/2018 24 7* 42 0 - 52 0 % Final         Diagnosis:  Bipolar disorder mixed type  Anxiety  Insomnia  History of alcohol abuse  Plan:  Continue Risperdal 2 mg p o  b i d   Continue Depakote 1000 mg p o  b i d  [place] Continue Cogentin 1 mg p o  b i d  Continue Remeron 15 mg HS  Ativan 0 5 mg p o  q 8 hours p r n  for anxiety  Psychotherapy  Psychiatric follow-up with his psychiatrist after the discharge  I will follow up during the hospital stay      Libby Reyes MD

## 2018-02-20 NOTE — CASE MANAGEMENT
Continued Stay Review    Date: 2/20/18    Vital Signs: /58   Pulse 57   Temp 97 5 °F (36 4 °C) (Oral)   Resp 18   Ht 5' 11" (1 803 m)   Wt 107 kg (236 lb 15 9 oz)   SpO2 97%   BMI 33 05 kg/m²     Medications:   Scheduled Meds:   Current Facility-Administered Medications:  acetaminophen 650 mg Oral Q6H PRN Sydney Valadez, DO   benztropine 1 mg Oral BID Sydney Valadez, DO   divalproex sodium 1,000 mg Oral Q12H [facility] Sydneyangela Valadez, DO   FLUoxetine 40 mg Oral Daily Sydney Valadez, DO   hydrOXYzine HCL 50 mg Oral BID Sydney Valadez, DO   lisinopril-hydrochlorothiazide (PRINZIDE 20/12  5) combo dose  Oral Daily Sydney Valadez, DO   LORazepam 0 5 mg Oral Q8H PRN Sandy Easley MD   mirtazapine 15 mg Oral HS Sydney Valadez, DO   nicotine 1 patch Transdermal Daily Sydney Valadez, DO   propranolol 30 mg Oral TID Sydney Valadez, DO   risperiDONE 2 mg Oral BID Sydney Valadez, DO     Continuous Infusions:    PRN Meds:   acetaminophen    LORazepam    Abnormal Labs/Diagnostic Results:   PHOS 4 4 WBC 15 10 HGB 8 1 Co2 33 CA 8 2   CXR=Unchanged nonspecific right base opacity which may represent atelectasis or pneumonia in the appropriate clinical setting  Follow-up chest radiograph is recommended in approximately 2 months to ensure complete resolution  Age/Sex: 60 y o  male     Assessment/Plan:   Symptomatic anemia likely 2/2 lymphocytosis  - Pt had an appointment with Heme/Onc on 2/14:  recent stool sample and UA were negative for blood   Anemia can be a side effect of psychiatric medications  However, there is high suspicious of a primary bone marrow disorder, anemia related to chronic Leukemia condition  Patient likely has been diagnosed with CLL at rehab in [place]  Will need to obtain records     - Pt received a transfusion of 1 Unit RBCs on 2/14 after previous discharge   - s/p 2 Unit RBCs transfusion  - Labs from 2/11: Vit B-12 952  Folate 8 9   Iron Sat 93%   TIBC 276   Iron 256   Ferritin 917  - Vitals: 97 5F     29 HR   18 RR    109/58    97%   - Hg 8 3   Hct 24 7   MCV 87  - Valproic Acid level 9  - Ethanol lvl negative   - Type and Screen completed     - FOBT negative   - UA negative;   - Will monitor daily CBC   - Consult to Dr Debra Shah    Leukocytosis on presentation   - WBC 15 10 POA   - On recent admission 2/10 Chest XR: Possible small infrahilar infiltrates vs bronchovascular crowding    - Repeat Chest XR 2/20: Unchanged nonspecific right base opacity which may represent atelectasis or pneumonia in the appropriate clinical setting     - Will monitor daily CBC  Hx of Hematuria  - Obtain UA negative   - Urine cx pending   Hypertension  - /58 on presentation  - Continue with Prinzidine 20-12 5mg PO qd  Depression/Psychotic Disorder   - Will continue all home medication for now   - Psych consult placed   Tobacco Abuse  - Nicotine patch 21mg/24h ordered  - Smoking cessation information provided   Global  - Regular diet   - Replete electrolytes as needed  - SCDs     Discharge Plan: TBD  Thank you,  [facility] in the [facility] by Shiv Gibson for 2017  Network Utilization Review Department  Phone: [phone]; Fax [phone]  ATTENTION: The Network Utilization Review Department is now centralized for our 7 Facilities  Make a note that we have a new phone and fax numbers for our Department  Please call with any questions or concerns to [phone] and carefully follow the prompts so that you are directed to the right person  All voicemails are confidential  Fax any determinations, approvals, denials, and requests for initial or continue stay review clinical to [phone]  Due to HIGH CALL volume, it would be easier if you could please send faxed requests to expedite your requests and in part, help us provide discharge notifications faster

## 2018-02-20 NOTE — CASE MANAGEMENT
Initial Clinical Review    Admission: Date/Time/Statement: 2/19/18 @ 1622     Orders Placed This Encounter   Procedures    Inpatient Admission (expected length of stay for this patient is greater than two midnights)     Standing Status:   Standing     Number of Occurrences:   1     Order Specific Question:   Admitting Physician     Answer:   Zev Barlow     Order Specific Question:   Level of Care     Answer:   Med Surg [16]     Order Specific Question:   Estimated length of stay     Answer:   Not Applicable   ED: Date/Time/Mode of Arrival:   ED Arrival Information     Expected Arrival Acuity Means of Arrival Escorted By Service Admission Type    - 2/19/2018 14:01 [facility]gent [facility] Medicine Emergency    Arrival Complaint    Altered Mental Status      Chief Complaint:   Chief Complaint   Patient presents with    Altered Mental Status     states feeling strange from his meds  feels like he is in another demension  wife states when they talk he is talking about something completely different  here recently for similar prob and was very anemic   History of Illness:   Hx DEPRESSION, ADDICTION, ETOH, RECENT ADMISSION FOR ANEMIA  PT CAME FROM REHAB IN [place] 3 WKS AGO, C/O WEAK, ALTERED,   [facility]  ED Vital Signs:   ED Triage Vitals [02/19/18 1411]   Temperature Pulse Respirations Blood Pressure SpO2   98 4 °F (36 9 °C) 84 20 118/56 97 %      Temp Source Heart Rate Source Patient Position - Orthostatic VS BP Location FiO2 (%)   Tympanic Monitor Sitting Right arm --      Pain Score       No Pain        Wt Readings from Last 1 Encounters:   02/19/18 107 kg (236 lb 15 9 oz)   Vital Signs (abnormal):    T 97  HR 57, 57, 57  Abnormal Labs/Diagnostic Test Results:   VALPROIC ACID 9  WBC 12 30 HGB 6 Co2 36 ANION GAP -1 CA 8 2 TPROT 5 1 ALB 2 8  ED Treatment:   Medication Administration from 02/19/2018 1401 to 02/19/2018 1743     None      Past Medical/Surgical History: Active Ambulatory Problems     Diagnosis Date Noted    Symptomatic anemia 02/10/2018    Hypertension 02/10/2018    Depression 02/10/2018    Polypharmacy 02/10/2018    Bronchitis 02/10/2018     Resolved Ambulatory Problems     Diagnosis Date Noted    No Resolved Ambulatory Problems     Past Medical History:   Diagnosis Date    Anemia     History of alcohol abuse     Hypertension     Hypertension     Psychiatric disorder    Admitting Diagnosis: Altered mental status [R41 82]  Weakness [R53 1]  Polypharmacy [Z79 899]  Symptomatic anemia [D64 9]  Depression, unspecified depression type [F32 9]  Age/Sex: 60 y o  male  Assessment/Plan:   Symptomatic anemia likely 2/2 lymphocytosis  - Pt had an appointment with Heme/Onc on 2/14:  recent stool sample and UA were negative for blood   Anemia can be a side effect of psychiatric medications  However, there is high suspicious of a primary bone marrow disorder, anemia related to chronic Leukemia condition  Patient likely has been diagnosed with CLL at rehab in [place]  Will need to obtain records     - Pt received a transfusion of 1 Unit RBCs on 2/14 after previous discharge   - Labs from 2/11: Vit B-12 952  Folate 8 9   Iron Sat 93%   TIBC 276   Iron 256   Ferritin 917  - Vitals: 98 4F     84 HR   20 RR    118/56    97%   - Hg 6 0   Hct 17 9   MCV 84  - Valproic Acid level 9  - Ethanol lvl negative   - Type and Screen ordered   - Consent obtains for 2 Unit RBCs transfusion    - Repeat H/H 4 hours after transfusion   - FOBT ordered   - Ua/ Urine cx ordered   - Will monitor daily CBC   - Consult to Dr Sukhi Shannon    Leukocytosis on presentation   - WBC 12 30 POA   - On recent admission 2/10 Chest XR: Possible small infrahilar infiltrates vs bronchovascular crowding    - Will order repeat Chest XR today    - Will monitor daily CBC  Hx of Hematuria  - Obtain UA/Urine cx  Hypertension  - /56 on presentation  - Continue with Prinzidine 20-12 5mg PO qd  Depression/Psychotic Disorder   - Will continue all home medication for now   - Psych consult placed   Tobacco Abuse  - Nicotine patch 21mg/24h ordered  - Smoking cessation information provided   Global  - Regular diet   - Replete electrolytes as needed  - SCDs   Admission Orders:  MED SURG  TRANSFUSE 2UPRBC'S  CONSULT PSYCHE  CONSULT ONCOLOGY  VENODYNES  Scheduled Meds:   Current Facility-Administered Medications:  acetaminophen 650 mg Oral Q6H PRN Sydney Dekhovich, DO   benztropine 1 mg Oral BID Sydney Adelineh, DO   divalproex sodium 1,000 mg Oral Q12H [address] Sydney Demalovich, DO   FLUoxetine 40 mg Oral Daily Sydney Adelineh, DO   hydrOXYzine HCL 50 mg Oral BID Sydney Adelineh, DO   lisinopril-hydrochlorothiazide (PRINZIDE 20/12  5) combo dose  Oral Daily Sydney Adelineh, DO   mirtazapine 15 mg Oral HS Sydney Allyson, DO   nicotine 1 patch Transdermal Daily Sydneyangela Valadez, DO   propranolol 30 mg Oral TID Sydney Adelineh, DO   risperiDONE 2 mg Oral BID Sydney Adelineh, DO     Continuous Infusions:    PRN Meds:   acetaminophen  Thank you,  [address] [facility] in the [facility] by Shiv Gibson for 2017  Network Utilization Review Department  Phone: [phone]; Fax [phone]  ATTENTION: The Network Utilization Review Department is now centralized for our 7 Facilities  Make a note that we have a new phone and fax numbers for our Department  Please call with any questions or concerns to [phone] and carefully follow the prompts so that you are directed to the right person  All voicemails are confidential  Fax any determinations, approvals, denials, and requests for initial or continue stay review clinical to [phone]  Due to HIGH CALL volume, it would be easier if you could please send faxed requests to expedite your requests and in part, help us provide discharge notifications faster

## 2018-02-20 NOTE — PROGRESS NOTES
[facility] Progress Note - Roland Brady 60 y o  male MRN: [id number]    Unit/Bed#: 3 [place] 328-01 Encounter: [id number]      Assessment/Plan:    Symptomatic anemia likely 2/2 lymphocytosis  - Pt had an appointment with Heme/Onc on 2/14:  recent stool sample and UA were negative for blood   Anemia can be a side effect of psychiatric medications  However, there is high suspicious of a primary bone marrow disorder, anemia related to chronic Leukemia condition  Patient likely has been diagnosed with CLL at rehab in [place]  Will need to obtain records  - Pt received a transfusion of 1 Unit RBCs on 2/14 after previous discharge   - s/p 2 Unit RBCs transfusion  - Labs from 2/11: Vit B-12 952  Folate 8 9   Iron Sat 93%   TIBC 276   Iron 256   Ferritin 917  - Vitals: 97 5F     57 HR   18 RR    109/58    97%   - Hg 8 3   Hct 24 7   MCV 87  - Valproic Acid level 9  - Ethanol lvl negative   - Type and Screen completed     - FOBT negative   - UA negative;   - Will monitor daily CBC   - Consult to Dr Maine Vazquez       Leukocytosis on presentation   - WBC 15 10 POA   - On recent admission 2/10 Chest XR: Possible small infrahilar infiltrates vs bronchovascular crowding    - Repeat Chest XR 2/20: Unchanged nonspecific right base opacity which may represent atelectasis or pneumonia in the appropriate clinical setting     - Will monitor daily CBC     Hx of Hematuria  - Obtain UA negative   - Urine cx pending      Hypertension  - /58 on presentation  - Continue with Prinzidine 20-12 5mg PO qd     Depression/Psychotic Disorder   - Will continue all home medication for now   - Psych consult placed      Tobacco Abuse  - Nicotine patch 21mg/24h ordered  - Smoking cessation information provided      Global  - Regular diet   - Replete electrolytes as needed  - SCDs       Subjective:   Pt seen and examined at bedside  Pt had no events overnight  Pt is feeling this morning after 2 units blood transfusion overnight   Pt is more alert this morning  Denies fever, chills, chest pain, shortness of breath, abdominal pain or urinary changes  Objective:     Vitals: Blood pressure 110/65, pulse 57, temperature (!) 97 °F (36 1 °C), temperature source Tympanic, resp  rate 16, height 5' 11" (1 803 m), weight 107 kg (236 lb 15 9 oz), SpO2 96 %  ,Body mass index is 33 05 kg/m²    Wt Readings from Last 3 Encounters:   02/19/18 107 kg (236 lb 15 9 oz)   02/14/18 100 kg (221 lb)   02/10/18 104 kg (229 lb 4 5 oz)       Intake/Output Summary (Last 24 hours) at 02/20/18 0615  Last data filed at 02/20/18 0513   Gross per 24 hour   Intake              741 ml   Output              700 ml   Net               41 ml       Physical Exam: General appearance: alert and in no acute distress  Lungs: clear to auscultation bilaterally  Heart: regular rate and rhythm, S1, S2 normal, no murmur, click, rub or gallop  Abdomen: soft, non-tender; bowel sounds normal; no masses,  no organomegaly  Extremities: extremities normal, warm and well-perfused; no cyanosis, clubbing, or edema     Recent Results (from the past 24 hour(s))   Valproic acid level, total    Collection Time: 02/19/18  3:32 PM   Result Value Ref Range    Valproic Acid, Total 9 (L) 50 - 100 ug/mL   Ethanol    Collection Time: 02/19/18  3:32 PM   Result Value Ref Range    Ethanol Lvl <3 0 - 3 mg/dL   CBC and differential    Collection Time: 02/19/18  3:32 PM   Result Value Ref Range    WBC 12 30 (H) 4 80 - 10 80 Thousand/uL    RBC 2 10 (L) 4 70 - 6 10 Million/uL    Hemoglobin 6 0 (LL) 14 0 - 18 0 g/dL    Hematocrit 17 9 (L) 42 0 - 52 0 %    MCV 85 82 - 98 fL    MCH 28 6 27 0 - 31 0 pg    MCHC 33 6 31 4 - 37 4 g/dL    RDW 13 3 11 6 - 15 1 %    MPV 7 6 (L) 8 9 - 12 7 fL    Platelets 542 301 - 255 Thousands/uL    Neutrophils Relative 26 (L) 43 - 75 %    Lymphocytes Relative 63 (H) 14 - 44 %    Monocytes Relative 7 4 - 12 %    Eosinophils Relative 1 0 - 6 %    Basophils Relative 3 (H) 0 - 1 %    Neutrophils Absolute 3 20 1 85 - 7 62 Thousands/µL    Lymphocytes Absolute 7 80 (H) 0 60 - 4 47 Thousands/µL    Monocytes Absolute 0 90 0 17 - 1 22 Thousand/µL    Eosinophils Absolute 0 10 0 00 - 0 61 Thousand/µL    Basophils Absolute 0 30 (H) 0 00 - 0 10 Thousands/µL   Comprehensive metabolic panel    Collection Time: 02/19/18  3:32 PM   Result Value Ref Range    Sodium 137 136 - 145 mmol/L    Potassium 3 8 3 5 - 5 3 mmol/L    Chloride 102 100 - 108 mmol/L    CO2 36 (H) 21 - 32 mmol/L    Anion Gap -1 (L) 4 - 13 mmol/L    BUN 19 5 - 25 mg/dL    Creatinine 0 64 0 60 - 1 30 mg/dL    Glucose 106 65 - 140 mg/dL    Calcium 8 2 (L) 8 3 - 10 1 mg/dL    AST 13 5 - 45 U/L    ALT 18 12 - 78 U/L    Alkaline Phosphatase 74 46 - 116 U/L    Total Protein 5 1 (L) 6 4 - 8 2 g/dL    Albumin 2 8 (L) 3 5 - 5 0 g/dL    Total Bilirubin 0 20 0 20 - 1 00 mg/dL    eGFR 104 ml/min/1 73sq m   Smear Review(Phlebs Do Not Order)    Collection Time: 02/19/18  3:32 PM   Result Value Ref Range    Rouleaux Present     Platelet Estimate Adequate Adequate   Type and screen    Collection Time: 02/19/18  4:40 PM   Result Value Ref Range    ABO Grouping B     Rh Factor Positive     Antibody Screen Negative     Specimen Expiration Date [id number]    Rapid drug screen, urine    Collection Time: 02/19/18  8:53 PM   Result Value Ref Range    Amph/Meth UR Negative Negative    Barbiturate Ur Negative Negative    Benzodiazepine Urine Negative Negative    Cocaine Urine Negative Negative    Methadone Urine Negative Negative    Opiate Urine Negative Negative    PCP Ur Negative Negative    THC Urine Negative Negative   UA w Reflex to Microscopic w Reflex to Culture    Collection Time: 02/19/18  8:53 PM   Result Value Ref Range    Color, UA Light Yellow     Clarity, UA Clear     Specific Gravity, UA <=1 005 1 000 - 1 030    pH, UA 6 5 5 0 - 9 0    Leukocytes, UA Negative Negative    Nitrite, UA Negative Negative    Protein, UA Negative Negative mg/dl    Glucose, UA Negative Negative mg/dl    Ketones, UA Negative Negative mg/dl    Urobilinogen, UA 0 2 0 2, 1 0 E U /dl E U /dl    Bilirubin, UA Negative Negative    Blood, UA Negative Negative   Prepare RBC:Has consent been obtained? Yes, 2 Units    Collection Time: 02/20/18  6:15 AM   Result Value Ref Range    Unit Product Code X4225Q02     Unit Number [id number]     Unit ABO B     Unit [facility] NEG     Crossmatch Compatible     Unit Dispense Status Presumed Trans     Unit Product Code A5205H22     Unit Number [id number]     Unit ABO B     Unit RH NEG     Crossmatch Compatible     Unit Dispense Status Presumed Trans        Current Facility-Administered Medications   Medication Dose Route Frequency Provider Last Rate Last Dose    acetaminophen (TYLENOL) tablet 650 mg  650 mg Oral Q6H PRN Sydney Valadez DO        benztropine (COGENTIN) tablet 1 mg  1 mg Oral BID Sydney Valadez DO   1 mg at 02/19/18 2121    divalproex sodium (DEPAKOTE) EC tablet 1,000 mg  1,000 mg Oral Q12H [address] Sydney Valadez DO   1,000 mg at 02/19/18 2118    FLUoxetine (PROzac) capsule 40 mg  40 mg Oral Daily Sydney Valadez DO        hydrOXYzine HCL (ATARAX) tablet 50 mg  50 mg Oral BID Sydney Valadez DO   50 mg at 02/19/18 2121    lisinopril-hydrochlorothiazide (PRINZIDE 20/12  5) combo dose   Oral Daily Sdyney Valadez DO        mirtazapine (REMERON) tablet 15 mg  15 mg Oral HS Sydney Valadez DO   15 mg at 02/19/18 2118    nicotine (NICODERM CQ) 21 mg/24 hr TD 24 hr patch 1 patch  1 patch Transdermal Daily Sydney Valadez DO        propranolol (INDERAL) tablet 30 mg  30 mg Oral TID Sydney Valadez DO   30 mg at 02/19/18 2117    risperiDONE (RisperDAL) tablet 2 mg  2 mg Oral BID Sydney Valadez DO   2 mg at 02/19/18 2008       Invasive Devices     Peripheral Intravenous Line            Peripheral IV 02/19/18 Left Antecubital less than 1 day                Lab, Imaging and other studies: I have personally reviewed pertinent reports      VTE Pharmacologic Prophylaxis: Sequential compression device (Venodyne)   VTE Mechanical Prophylaxis: sequential compression device    Sydney Valadez DO

## 2018-02-20 NOTE — CONSULTS
Hematology Oncology Consult Report    Shruti Belcher, 1955, [id number]  3 [place] 328/3 [place] 328-*    Impression and plan:    3 69-year-old male with anemia  Etiology at this time is not clear  The significant drops in the hemoglobin levels are more commonly seen in patients with bleeding issues  That being said, patient gives no history of bleeding and workup did not demonstrate any source (for the bleeding)  It is difficult to believe but possible that a primary bone marrow disorder is the sole cause for the anemia  The white count is not terribly high; the platelet count is within normal limits  Continue to monitor the hemoglobin level for the time being  2  Lymphocytosis; WBC is slightly elevated  Reportedly, patient has been diagnosed with a primary bone marrow disorder previously (? [place]?)  More information is needed - if not, the hematology workup will have to be repeated here  Once again, the patient with CLL with a slightly elevated white count and a normal platelet count would not have such severe anemia without other signs or symptoms  Clinically there is no signs for hemolysis  To be sure, haptoglobin and LDH levels have been requested  3  Depression  Patient has been seen by psychiatry  4  History of hematuria - presently there is no clear signs of hematuria  Patient may need  evaluation possibly as an outpatient  The above was discussed with the patient; all questions were answered  Will follow with you  Please do not hesitate to contact the Hematology service if you have any questions or concerns  Thank you for this consult   ___________________________________________________________________________________________________________________________________________________________________________________    Chief complaint/reason for admission: Altered mental status    History of present illness: This is a 69-year-old male admitted with the above   Patient has a history of alcohol abuse, hypertension and depression  Patient had apparently been acting differently at home, complaining of progressive weakness and fatigue for a few weeks  Patient was recently admitted to the hospital with a hemoglobin of 5 0 g/deciliter, received blood transfusion was discharged (etiology/source for the anemia was not clear)  Patient was seen once at the hematology office in [facility] - the plan was for the patient to continue his follow-ups in [place]  Mr Everton Minaya was referred to the [place] [address] hematology office but had not yet been seen by me  Prior workup also demonstrated an elevated white count and there was concern for primary bone marrow disorder  Presently Mr Everton Minaya states feeling +/-, still with fatigue but better than before  Patient denies any GI,  or bleeding  Appetite is okay, weight has been stable  Activities have been decreased because of the fatigue  No pain control issues  No fevers, chills or sweats  Patient denies prior issues with anemia or other CBC abnormalities  Past medical history:   Past Medical History:   Diagnosis Date    Anemia     History of alcohol abuse     Hypertension     Hypertension     Psychiatric disorder      Past surgical history: History reviewed  No pertinent surgical history      Allergies: No Known Allergies    Home medications:   Prescriptions Prior to Admission   Medication    benztropine (COGENTIN) 1 mg tablet    divalproex sodium (DEPAKOTE) 500 mg EC tablet    FLUoxetine (PROzac) 20 mg capsule    hydrOXYzine pamoate (VISTARIL) 50 mg capsule    lisinopril-hydrochlorothiazide (PRINZIDE,ZESTORETIC) 20-12 5 MG per tablet    mirtazapine (REMERON) 15 mg tablet    propranolol (INDERAL) 10 mg tablet    risperiDONE (RisperDAL) 2 mg tablet     Hospital medications:   Current Facility-Administered Medications:     acetaminophen (TYLENOL) tablet 650 mg, 650 mg, Oral, Q6H PRN, Sydney Valadez DO    benztropine (COGENTIN) tablet 1 mg, 1 mg, Oral, BID, Sydney Valadez, DO, 1 mg at 02/20/18 0808    divalproex sodium (DEPAKOTE) EC tablet 1,000 mg, 1,000 mg, Oral, Q12H [address], Sydney Valadez, DO, 1,000 mg at 02/20/18 0807    FLUoxetine (PROzac) capsule 40 mg, 40 mg, Oral, Daily, Sydney Valadez DO, 40 mg at 02/20/18 0813    hydrOXYzine HCL (ATARAX) tablet 50 mg, 50 mg, Oral, BID, Sydney Valadez DO, 50 mg at 02/20/18 0808    lisinopril-hydrochlorothiazide (PRINZIDE 20/12 5) combo dose, , Oral, Daily, Sydney Valadez DO, Stopped at 02/20/18 0813    mirtazapine (REMERON) tablet 15 mg, 15 mg, Oral, HS, Sydney Valadez DO, 15 mg at 02/19/18 2118    nicotine (NICODERM CQ) 21 mg/24 hr TD 24 hr patch 1 patch, 1 patch, Transdermal, Daily, Sydney Valadez DO, 1 patch at 02/20/18 0813    propranolol (INDERAL) tablet 30 mg, 30 mg, Oral, TID, Sydney Valadez DO, Stopped at 02/20/18 0807    risperiDONE (RisperDAL) tablet 2 mg, 2 mg, Oral, BID, Sydney Valadez DO, 2 mg at 02/20/18 0808    Social history: no alcohol or drug abuse, one pack a day for a proximally 40 years, disabled,     Family history:   Family History   Problem Relation Age of Onset    Coronary artery disease Mother     No Known Problems Father     Hepatitis Sister     Cancer Brother     Prostate cancer Brother     Early death Brother      Review of systems: deferred    Physical exam  Vitals:    02/20/18 0807   BP: 109/58   Pulse:    Resp:    Temp:    SpO2:    Constitutional:    Eyes:  PERRL, conjunctiva pale pink, anicteric   HENT:  Atraumatic, external ears normal, nose normal,   Oropharynx: moist, no pharyngeal exudates, no thrush, pink  Neck: No adenopathy, good range of motion  Respiratory: distant breath sounds bilaterally, scattered bilateral rhonchi  Cardiovascular:  Normal rate, normal rhythm, no murmurs, no gallops, no rubs   GI:  Soft, obese, nontender, no rigidity or rebound, cannot palpate liver or spleen  :  No costovertebral angle tenderness, normal reproductive organs, no discharge  Musculoskeletal: no pain or tenderness with palpation of joints, muscles or bones  Integument: slightly pale, warm, relatively good turgor, scattered ecchymoses, no petechiae  Lymphatic:  No lymphadenopathy in the neck, supraclavicular region, infraclavicular region, axilla and groin bilaterally  Extremities:  1+ bilateral lower extremity edema, no cords, pulses are 1+  Neurologic:  Alert & oriented x 3, CN 2-12 normal, normal motor function, normal sensory function, no focal deficits noted  Psychiatric:  Speech and behavior appropriate, response time appropriate  Rectal: Deferred    Laboratory test results    Results for Abdullahi Maharaj (MRN [id number]) as of 2/20/2018 13:04   Ref   Range 2/11/2018 04:50 2/11/2018 17:03 2/19/2018 15:32 2/20/2018 06:39 2/20/2018 09:34   WBC Latest Ref Range: 4 80 - 10 80 Thousand/uL 14 40 (H) 14 90 (H) 12 30 (H) 15 10 (H)    RBC Latest Ref Range: 4 70 - 6 10 Million/uL 2 29 (L) 2 53 (L) 2 10 (L) 2 80 (L)    Hemoglobin Latest Ref Range: 14 0 - 18 0 g/dL 6 8 (LL) 7 4 (L) 6 0 (LL) 8 1 (L) 8 3 (L)   Hematocrit Latest Ref Range: 42 0 - 52 0 % 20 0 (L) 21 9 (L) 17 9 (L) 24 3 (L) 24 7 (L)   MCV Latest Ref Range: 82 - 98 fL 87 87 85 87    MCH Latest Ref Range: 27 0 - 31 0 pg 29 5 29 3 28 6 29 1    MCHC Latest Ref Range: 31 4 - 37 4 g/dL 33 8 33 8 33 6 33 6    RDW Latest Ref Range: 11 6 - 15 1 % 14 8 15 1 13 3 14 3    Platelets Latest Ref Range: 130 - 400 Thousands/uL 202 230 196 161    MPV Latest Ref Range: 8 9 - 12 7 fL 7 3 (L) 6 8 (L) 7 6 (L) 7 6 (L)    Platelet Estimate Latest Ref Range: Adequate   Adequate Adequate     nRBC Latest Units: /100 WBCs  0      Neutrophils Relative Latest Ref Range: 43 - 75 %  21 (L) 26 (L)     Lymphocytes Relative Latest Ref Range: 14 - 44 %  74 (H) 63 (H)     Monocytes Relative Latest Ref Range: 4 - 12 %  4 7     Eosinophils Relative Latest Ref Range: 0 - 6 %  1 1     Basophils Relative Latest Ref Range: 0 - 1 %  0 3 (H)     Neutrophils Absolute Latest Ref Range: 1 85 - 7 62 Thousands/µL  3 10 3 20       Results for Nicole Schulte (MRN [id number]) as of 2/20/2018 16:41   Ref  Range 2/19/2018 15:32   Sodium Latest Ref Range: 136 - 145 mmol/L 137   Potassium Latest Ref Range: 3 5 - 5 3 mmol/L 3 8   Chloride Latest Ref Range: 100 - 108 mmol/L 102   CO2 Latest Ref Range: 21 - 32 mmol/L 36 (H)   Anion Gap Latest Ref Range: 4 - 13 mmol/L -1 (L)   BUN Latest Ref Range: 5 - 25 mg/dL 19   Creatinine Latest Ref Range: 0 60 - 1 30 mg/dL 0 64   Glucose Latest Ref Range: 65 - 140 mg/dL 106   Calcium Latest Ref Range: 8 3 - 10 1 mg/dL 8 2 (L)   AST Latest Ref Range: 5 - 45 U/L 13   ALT Latest Ref Range: 12 - 78 U/L 18   Alkaline Phosphatase Latest Ref Range: 46 - 116 U/L 74   Total Protein Latest Ref Range: 6 4 - 8 2 g/dL 5 1 (L)   Albumin Latest Ref Range: 3 5 - 5 0 g/dL 2 8 (L)   Total Bilirubin Latest Ref Range: 0 20 - 1 00 mg/dL 0 20   eGFR Latest Units: ml/min/1 73sq m 104     Results for Nicole Schulte (MRN [id number]) as of 2/20/2018 16:41   Ref  Range 2/11/2018 04:50   Iron Latest Ref Range: 65 - 175 ug/dL 256 (H)   Ferritin Latest Ref Range: 8 - 388 ng/mL 917 (H)   Iron Saturation Latest Units: % 93   TIBC Latest Ref Range: 250 - 450 ug/dL 276   Folate Latest Ref Range: 3 1 - 17 5 ng/mL 8 9   Vitamin B-12 Latest Ref Range: 100 - 900 pg/mL 952 (H)     Radiology reports    2/10/18 CAT scan of the head without contrast    No acute intracranial abnormality

## 2018-02-21 LAB
ANION GAP SERPL CALCULATED.3IONS-SCNC: 5 MMOL/L (ref 4–13)
ATRIAL RATE: 71 BPM
BACTERIA UR CULT: NORMAL
BASOPHILS # BLD AUTO: 0 THOUSANDS/ΜL (ref 0–0.1)
BASOPHILS NFR BLD AUTO: 0 % (ref 0–1)
BUN SERPL-MCNC: 12 MG/DL (ref 5–25)
CALCIUM SERPL-MCNC: 8.6 MG/DL (ref 8.3–10.1)
CHLORIDE SERPL-SCNC: 104 MMOL/L (ref 100–108)
CO2 SERPL-SCNC: 32 MMOL/L (ref 21–32)
CREAT SERPL-MCNC: 0.75 MG/DL (ref 0.6–1.3)
EOSINOPHIL # BLD AUTO: 0.2 THOUSAND/ΜL (ref 0–0.61)
EOSINOPHIL NFR BLD AUTO: 1 % (ref 0–6)
ERYTHROCYTE [DISTWIDTH] IN BLOOD BY AUTOMATED COUNT: 14.4 % (ref 11.6–15.1)
GFR SERPL CREATININE-BSD FRML MDRD: 98 ML/MIN/1.73SQ M
GLUCOSE SERPL-MCNC: 93 MG/DL (ref 65–140)
HCT VFR BLD AUTO: 25 % (ref 42–52)
HGB BLD-MCNC: 8.5 G/DL (ref 14–18)
LDH SERPL-CCNC: 147 U/L (ref 81–234)
LYMPHOCYTES # BLD AUTO: 10.1 THOUSANDS/ΜL (ref 0.6–4.47)
LYMPHOCYTES NFR BLD AUTO: 71 % (ref 14–44)
MAGNESIUM SERPL-MCNC: 2.2 MG/DL (ref 1.6–2.6)
MCH RBC QN AUTO: 29.3 PG (ref 27–31)
MCHC RBC AUTO-ENTMCNC: 33.8 G/DL (ref 31.4–37.4)
MCV RBC AUTO: 87 FL (ref 82–98)
MONOCYTES # BLD AUTO: 0.6 THOUSAND/ΜL (ref 0.17–1.22)
MONOCYTES NFR BLD AUTO: 4 % (ref 4–12)
NEUTROPHILS # BLD AUTO: 3.4 THOUSANDS/ΜL (ref 1.85–7.62)
NEUTS SEG NFR BLD AUTO: 24 % (ref 43–75)
NRBC BLD AUTO-RTO: 0 /100 WBCS
P AXIS: 48 DEGREES
PHOSPHATE SERPL-MCNC: 4.7 MG/DL (ref 2.3–4.1)
PLATELET # BLD AUTO: 172 THOUSANDS/UL (ref 130–400)
PLATELET BLD QL SMEAR: ADEQUATE
PMV BLD AUTO: 7.4 FL (ref 8.9–12.7)
POTASSIUM SERPL-SCNC: 4.7 MMOL/L (ref 3.5–5.3)
PR INTERVAL: 170 MS
QRS AXIS: -13 DEGREES
QRSD INTERVAL: 84 MS
QT INTERVAL: 408 MS
QTC INTERVAL: 443 MS
RBC # BLD AUTO: 2.89 MILLION/UL (ref 4.7–6.1)
SODIUM SERPL-SCNC: 141 MMOL/L (ref 136–145)
T WAVE AXIS: 24 DEGREES
VENTRICULAR RATE: 71 BPM
WBC # BLD AUTO: 14.2 THOUSAND/UL (ref 4.8–10.8)

## 2018-02-21 PROCEDURE — 85025 COMPLETE CBC W/AUTO DIFF WBC: CPT | Performed by: STUDENT IN AN ORGANIZED HEALTH CARE EDUCATION/TRAINING PROGRAM

## 2018-02-21 PROCEDURE — 93010 ELECTROCARDIOGRAM REPORT: CPT | Performed by: INTERNAL MEDICINE

## 2018-02-21 PROCEDURE — 80048 BASIC METABOLIC PNL TOTAL CA: CPT | Performed by: STUDENT IN AN ORGANIZED HEALTH CARE EDUCATION/TRAINING PROGRAM

## 2018-02-21 PROCEDURE — 83735 ASSAY OF MAGNESIUM: CPT | Performed by: STUDENT IN AN ORGANIZED HEALTH CARE EDUCATION/TRAINING PROGRAM

## 2018-02-21 PROCEDURE — 83615 LACTATE (LD) (LDH) ENZYME: CPT | Performed by: STUDENT IN AN ORGANIZED HEALTH CARE EDUCATION/TRAINING PROGRAM

## 2018-02-21 PROCEDURE — 84100 ASSAY OF PHOSPHORUS: CPT | Performed by: STUDENT IN AN ORGANIZED HEALTH CARE EDUCATION/TRAINING PROGRAM

## 2018-02-21 PROCEDURE — 99233 SBSQ HOSP IP/OBS HIGH 50: CPT | Performed by: INTERNAL MEDICINE

## 2018-02-21 PROCEDURE — 83010 ASSAY OF HAPTOGLOBIN QUANT: CPT | Performed by: STUDENT IN AN ORGANIZED HEALTH CARE EDUCATION/TRAINING PROGRAM

## 2018-02-21 RX ADMIN — FLUOXETINE 40 MG: 20 CAPSULE ORAL at 08:08

## 2018-02-21 RX ADMIN — LISINOPRIL: 20 TABLET ORAL at 08:08

## 2018-02-21 RX ADMIN — BENZTROPINE MESYLATE 1 MG: 1 TABLET ORAL at 18:07

## 2018-02-21 RX ADMIN — DIVALPROEX SODIUM 1000 MG: 500 TABLET, DELAYED RELEASE ORAL at 22:19

## 2018-02-21 RX ADMIN — HYDROXYZINE HYDROCHLORIDE 50 MG: 25 TABLET, FILM COATED ORAL at 08:10

## 2018-02-21 RX ADMIN — PROPRANOLOL HYDROCHLORIDE 30 MG: 20 TABLET ORAL at 18:07

## 2018-02-21 RX ADMIN — PROPRANOLOL HYDROCHLORIDE 30 MG: 20 TABLET ORAL at 08:08

## 2018-02-21 RX ADMIN — HYDROXYZINE HYDROCHLORIDE 50 MG: 25 TABLET, FILM COATED ORAL at 18:07

## 2018-02-21 RX ADMIN — RISPERIDONE 2 MG: 1 TABLET ORAL at 18:06

## 2018-02-21 RX ADMIN — PROPRANOLOL HYDROCHLORIDE 30 MG: 20 TABLET ORAL at 22:19

## 2018-02-21 RX ADMIN — RISPERIDONE 2 MG: 1 TABLET ORAL at 08:08

## 2018-02-21 RX ADMIN — BENZTROPINE MESYLATE 1 MG: 1 TABLET ORAL at 08:14

## 2018-02-21 RX ADMIN — MIRTAZAPINE 15 MG: 15 TABLET, FILM COATED ORAL at 22:19

## 2018-02-21 RX ADMIN — NICOTINE 1 PATCH: 21 PATCH, EXTENDED RELEASE TRANSDERMAL at 08:11

## 2018-02-21 RX ADMIN — DIVALPROEX SODIUM 1000 MG: 500 TABLET, DELAYED RELEASE ORAL at 08:08

## 2018-02-21 NOTE — PROGRESS NOTES
Hematology - Oncology Progress Note    Eyad Yee, 1955, [id number]  3 [place] [address] [facility]      Impression and plan:    3 23-year-old male with anemia  Etiology at this time is not clear  The significant drops in the hemoglobin levels are more commonly seen in patients with bleeding issues  That being said, patient gives no history of bleeding and workup did not demonstrate any source (for the bleeding)  It is difficult to believe but possible that a primary bone marrow disorder is the sole cause for the anemia  The white count is not terribly high; the platelet count is within normal limits  Continue to monitor the hemoglobin level for the time being      2  Lymphocytosis; WBC is slightly elevated but about the same as before  Reportedly, patient has been diagnosed with a primary bone marrow disorder previously (? [place]?)  More information is needed  If the information could not be obtained from the prior institution, a repeat hematology workup will have to be repeated here  Once again, the patient with CLL with a slightly elevated white count and a normal platelet count would not have such severe anemia without other signs or symptoms  Clinically there is no signs for hemolysis  To be sure, haptoglobin and LDH levels have been requested - results are pending      3  Depression  Patient has been seen by psychiatry      4  History of hematuria - presently there is no clear signs of hematuria  Patient may need  evaluation possibly as an outpatient     ___________________________________________________________________________________________________________________________________________________________________________________     Chief complaint/reason for admission: Altered mental status     History of present illness: This is a 23-year-old male admitted with the above  Patient has a history of alcohol abuse, hypertension and depression   Patient had apparently been acting differently at home, complaining of progressive weakness and fatigue for a few weeks  Patient was recently admitted to the hospital with a hemoglobin of 5 0 g/deciliter, received blood transfusion was discharged (etiology/source for the anemia was not clear)  Patient was seen once at the hematology office in [address] [place] [address] - the plan was for the patient to continue his follow-ups in [place]  Mr Armida Quiñones was referred to the [facility] hematology office but had not yet been seen by me  Prior workup also demonstrated an elevated white count and there was concern for primary bone marrow disorder      Presently Mr Armida Quiñones states feeling okay, about the same as yesterday  Patient denies any GI or  issues  Fatigue is minimal  Appetite is good  Activities are limited but no different than before  Patient wishes to be discharged  Hospital medications list:    Current Facility-Administered Medications:  acetaminophen 650 mg Oral Q6H PRN Sydney Valadez, DO   benztropine 1 mg Oral BID Sydney Valadez, DO   divalproex sodium 1,000 mg Oral Q12H [facility] Sydney Valadez, DO   FLUoxetine 40 mg Oral Daily Sydney Valadez, DO   hydrOXYzine HCL 50 mg Oral BID Sydney Valadez, DO   lisinopril-hydrochlorothiazide (PRINZIDE 20/12  5) combo dose  Oral Daily Sydney Valadez, DO   LORazepam 0 5 mg Oral Q8H PRN Meche Rahman MD   mirtazapine 15 mg Oral HS Sydney Valadez, DO   nicotine 1 patch Transdermal Daily Sydney Valadez, DO   propranolol 30 mg Oral TID Sydney Valadez, DO   risperiDONE 2 mg Oral BID Sydney Valadez, DO     Physical exam  /68 (BP Location: Left arm)   Pulse 57   Temp 97 6 °F (36 4 °C) (Tympanic)   Resp 18   Ht 5' 11" (1 803 m)   Wt 107 kg (236 lb 15 9 oz)   SpO2 96%   BMI 33 05 kg/m²   Constitutional: Well formed, well-nourished in no apparent distress  Eyes: PERRL, conjunctiva pale pink, anicteric    HENT: Atraumatic, external ears normal, nose normal,    oropharynx moist, no pharyngeal exudates, no thrush, pink  Neck: Good range of motion, no adenopathy  Respiratory: scattered bilateral rhonchi  Cardiovascular: Normal rate, normal rhythm, no murmurs, no gallops, no rubs    GI: Soft, nondistended, normal bowel sounds, nontender, no organomegaly, no mass, no rebound, no guarding    : No costovertebral angle tenderness, normal reproductive organs, no discharge  Musculoskeletal: No pain or tenderness with palpation of joints, muscles or bones  Integument: slightly pale, warm, scattered ecchymoses, no active bleeding  Lymphatic: No adenopathy in the neck, supra-clavicular region, axilla and groin bilaterally  Extremities: + bilateral lower extremity edema, no cords, pulses are 1+  Neurologic: Alert & oriented x 3, CN 2-12 normal, normal motor function, normal sensory function, + tremors  Psychiatric: awake, responsive, pleasant, response time to normal limits  Rectal: Deferred    Laboratory test results    Results for Jose Brewer (MRN [id number]) as of 2/21/2018 12:56   Ref   Range 2/20/2018 06:39 2/20/2018 09:34 2/21/2018 06:08   WBC Latest Ref Range: 4 80 - 10 80 Thousand/uL 15 10 (H)  14 20 (H)   RBC Latest Ref Range: 4 70 - 6 10 Million/uL 2 80 (L)  2 89 (L)   Hemoglobin Latest Ref Range: 14 0 - 18 0 g/dL 8 1 (L) 8 3 (L) 8 5 (L)   Hematocrit Latest Ref Range: 42 0 - 52 0 % 24 3 (L) 24 7 (L) 25 0 (L)   MCV Latest Ref Range: 82 - 98 fL 87  87   MCH Latest Ref Range: 27 0 - 31 0 pg 29 1  29 3   MCHC Latest Ref Range: 31 4 - 37 4 g/dL 33 6  33 8   RDW Latest Ref Range: 11 6 - 15 1 % 14 3  14 4   Platelets Latest Ref Range: 130 - 400 Thousands/uL 161  172   MPV Latest Ref Range: 8 9 - 12 7 fL 7 6 (L)  7 4 (L)   Platelet Estimate Latest Ref Range: Adequate    Adequate   nRBC Latest Units: /100 WBCs   0   Neutrophils Relative Latest Ref Range: 43 - 75 %   24 (L)   Lymphocytes Relative Latest Ref Range: 14 - 44 %   71 (H)   Monocytes Relative Latest Ref Range: 4 - 12 %   4   Eosinophils Relative Latest Ref Range: 0 - 6 %   1   Basophils Relative Latest Ref Range: 0 - 1 %   0     Results for Teagan Curran (MRN [id number]) as of 2/21/2018 12:56   Ref   Range 2/11/2018 04:50   Iron Latest Ref Range: 65 - 175 ug/dL 256 (H)   Ferritin Latest Ref Range: 8 - 388 ng/mL 917 (H)   Iron Saturation Latest Units: % 93   TIBC Latest Ref Range: 250 - 450 ug/dL 276   Folate Latest Ref Range: 3 1 - 17 5 ng/mL 8 9   Vitamin B-12 Latest Ref Range: 100 - 900 pg/mL 952 (H)

## 2018-02-21 NOTE — SOCIAL WORK
DASH discussion completed  Discussed goals of making sure pt's  needs are met upon discharge, pt's preferences are taken into account, pt understands health condition, medications and symptoms to watch for after returning home and pt is aware of any follow up appointments recommended by hospital physician  PT LIVES INDEPENDENTLY WITH [facility], NO DME OR HHC NEEDS, PT USES [facility] FOR RX NEEDS, DCP IS TO HOME WHEN MEDICALLY STABLE

## 2018-02-21 NOTE — CASE MANAGEMENT
Continued Stay Review    Date: 2/21/18    Vital Signs: /61 (BP Location: Left arm)   Pulse 58   Temp 98 9 °F (37 2 °C) (Tympanic)   Resp 18   Ht 5' 11" (1 803 m)   Wt 107 kg (236 lb 15 9 oz)   SpO2 97%   BMI 33 05 kg/m²       OBTAIN MEDICAL RECORDS  LACTATE DEHYDROGENASE HAPTOGLOBIN  BMP CBC MG PHOS   Medications:   Scheduled Meds:   Current Facility-Administered Medications:  acetaminophen 650 mg Oral Q6H PRN Sydney Valadez, DO   benztropine 1 mg Oral BID Sydneyangela Valadez, DO   divalproex sodium 1,000 mg Oral Q12H [address] Sydney Adelineh, DO   FLUoxetine 40 mg Oral Daily Sydney Valadez, DO   hydrOXYzine HCL 50 mg Oral BID Sydney Valadez, DO   lisinopril-hydrochlorothiazide (PRINZIDE 20/12  5) combo dose  Oral Daily Sydney Valadez, DO   LORazepam 0 5 mg Oral Q8H PRN Saba Villar MD   mirtazapine 15 mg Oral HS Sydney Valadez, DO   nicotine 1 patch Transdermal Daily Sydney Valadez, DO   propranolol 30 mg Oral TID Sydney Valadez, DO   risperiDONE 2 mg Oral BID Sydney Valadez, DO     Continuous Infusions:    PRN Meds:   acetaminophen    LORazepam    Abnormal Labs/Diagnostic Results: PHOS 4 7 WBC 14 20 H/H 8 5/25     Age/Sex: 60 y o  male     ATTENDING  Impression and plan:  3 27-year-old male with anemia  Etiology at this time is not clear  The significant drops in the hemoglobin levels are more commonly seen in patients with bleeding issues  That being said, patient gives no history of bleeding and workup did not demonstrate any source (for the bleeding)  It is difficult to believe but possible that a primary bone marrow disorder is the sole cause for the anemia  The white count is not terribly high; the platelet count is within normal limits  Continue to monitor the hemoglobin level for the time being  2  Lymphocytosis; WBC is slightly elevated but about the same as before   Reportedly, patient has been diagnosed with a primary bone marrow disorder previously (? [place]?)  More information is needed  If the information could not be obtained from the prior institution, a repeat hematology workup will have to be repeated here  Once again, the patient with CLL with a slightly elevated white count and a normal platelet count would not have such severe anemia without other signs or symptoms  Clinically there is no signs for hemolysis  To be sure, haptoglobin and LDH levels have been requested - results are pending  3  Depression  Patient has been seen by psychiatry  4  History of hematuria - presently there is no clear signs of hematuria  Patient may need  evaluation possibly as an outpatient      HEMATOLOGY  Impression and plan:  3 76-year-old male with anemia  Etiology at this time is not clear  The significant drops in the hemoglobin levels are more commonly seen in patients with bleeding issues  That being said, patient gives no history of bleeding and workup did not demonstrate any source (for the bleeding)  It is difficult to believe but possible that a primary bone marrow disorder is the sole cause for the anemia  The white count is not terribly high; the platelet count is within normal limits  Continue to monitor the hemoglobin level for the time being  2  Lymphocytosis; WBC is slightly elevated but about the same as before  Reportedly, patient has been diagnosed with a primary bone marrow disorder previously (? [place]?)  More information is needed  If the information could not be obtained from the prior institution, a repeat hematology workup will have to be repeated here  Once again, the patient with CLL with a slightly elevated white count and a normal platelet count would not have such severe anemia without other signs or symptoms  Clinically there is no signs for hemolysis  To be sure, haptoglobin and LDH levels have been requested - results are pending  3  Depression  Patient has been seen by psychiatry    4  History of hematuria - presently there is no clear signs of hematuria  Patient may need  evaluation possibly as an outpatient      PSYCHIATRY  Patient examined spoke with the nurse  Patient is alert awake cooperative he looks better today he is not confused but he is poor historian patient reports that he used to see a lady psychiatrist for couple of years he does not remember her name and recently he saw the new psychiatrist who took over on the practice he does not know his name also patient is also could not tell me the names and the dosage of his psychotropic medications but he admits being depressed patient is taking his medications Depakote Risperdal Remeron Prozac and Ativan p r n  Roxanna Vargas Patient is suffering from bipolar disorder  Nurse reports no behavior problems she reports that he is taking his medications patient offers no new complaints he is not in distress he is able to tell me his age and he knows that he is at the hospital   No evidence of hallucinations not agitated not suicidal   Taking his medications  Therapy done with good response  Patient will follow up with his psychiatrist after the discharge  Continue same treatment at this time and follow up

## 2018-02-21 NOTE — PROGRESS NOTES
Patient examined spoke with the nurse  Patient is alert awake cooperative he looks better today he is not confused but he is poor historian patient reports that he used to see a lady psychiatrist for couple of years he does not remember her name and recently he saw the new psychiatrist who took over on the practice he does not know his name also patient is also could not tell me the names and the dosage of his psychotropic medications but he admits being depressed patient is taking his medications Depakote Risperdal Remeron Prozac and Ativan p r n  Grenola Organ Patient is suffering from bipolar disorder  Nurse reports no behavior problems she reports that he is taking his medications patient offers no new complaints he is not in distress he is able to tell me his age and he knows that he is at the hospital   No evidence of hallucinations not agitated not suicidal   Taking his medications  Therapy done with good response  Patient will follow up with his psychiatrist after the discharge  Continue same treatment at this time and follow up

## 2018-02-21 NOTE — PROGRESS NOTES
[facility] [address] [facility] Practice Progress Note - Josy Torres 60 y o  male MRN: [id number]    Unit/Bed#: [address] [place] [address] Encounter: [id number]      Assessment/Plan:    Symptomatic anemia likely 2/2 lymphocytosis  - Pt had an appointment with Heme/Onc on 2/14:  recent stool sample and UA were negative for blood   Anemia can be a side effect of psychiatric medications  However, there is high suspicious of a primary bone marrow disorder, anemia related to chronic Leukemia condition  Patient likely has been diagnosed with CLL at rehab in [place]  Will need to obtain records     - Pt received a transfusion of 1 Unit RBCs on 2/14 after previous discharge   - s/p 2 Unit RBCs transfusion  - Labs from 2/11: Vit B-12 952  Folate 8 9   Iron Sat 93%   TIBC 276   Iron 256   Ferritin 917  - Vitals: 98 9F     58 HR   18 RR    104/61    97%   - Hg 8 5   Hct 25 0   MCV 87  - Valproic Acid level 9  - Ethanol lvl negative   - Type and Screen completed     - FOBT negative   - UA negative;  - Will monitor daily CBC   - Haptoglobin and LDH ordered   - Consult to Dr Rafael Morrison placed, recommendations greatly appreciated        Leukocytosis on presentation   - WBC 14 20 this AM    - On recent admission 2/10 Chest XR: Possible small infrahilar infiltrates vs bronchovascular crowding    - Repeat Chest XR 2/20: Unchanged nonspecific right base opacity which may represent atelectasis or pneumonia in the appropriate clinical setting     - Will monitor daily CBC     Hx of Hematuria  - UA negative   - Urine cx negative       Hypertension  - /61 on presentation  - Continue with Prinzidine 20-12 5mg PO qd     Depression/Psychotic Disorder   - Will continue Depakote, Risperdal, Remeron, Prozac and Ativan PRN     - Psych consult placed- recommendations greatly appreciated      Tobacco Abuse  - Continue with Nicotine patch 21mg/24h   - Smoking cessation information provided      Global  - Regular diet   - Replete electrolytes as needed  - SCDs Subjective:   Pt seen and examined at bedside  No acute events overnight  Pt denies fever, chills, chest pain, shortness of breath, and abdominal pain  Objective:     Vitals: Blood pressure 105/54, pulse 56, temperature (!) 97 °F (36 1 °C), temperature source Tympanic, resp  rate 18, height 5' 11" (1 803 m), weight 107 kg (236 lb 15 9 oz), SpO2 96 %  ,Body mass index is 33 05 kg/m²    Wt Readings from Last 3 Encounters:   02/19/18 107 kg (236 lb 15 9 oz)   02/14/18 100 kg (221 lb)   02/10/18 104 kg (229 lb 4 5 oz)       Intake/Output Summary (Last 24 hours) at 02/21/18 4715  Last data filed at 02/20/18 1001   Gross per 24 hour   Intake              360 ml   Output              400 ml   Net              -40 ml       Physical Exam: General appearance: alert and oriented, in no acute distress  Lungs: clear to auscultation bilaterally  Heart: regular rate and rhythm, S1, S2 normal, no murmur, click, rub or gallop  Abdomen: soft, non-tender; bowel sounds normal; no masses,  no organomegaly  Extremities: extremities normal, warm and well-perfused; no cyanosis, clubbing, or edema     Recent Results (from the past 24 hour(s))   Phosphorus    Collection Time: 02/20/18  6:39 AM   Result Value Ref Range    Phosphorus 4 4 (H) 2 3 - 4 1 mg/dL   CBC (With Platelets)    Collection Time: 02/20/18  6:39 AM   Result Value Ref Range    WBC 15 10 (H) 4 80 - 10 80 Thousand/uL    RBC 2 80 (L) 4 70 - 6 10 Million/uL    Hemoglobin 8 1 (L) 14 0 - 18 0 g/dL    Hematocrit 24 3 (L) 42 0 - 52 0 %    MCV 87 82 - 98 fL    MCH 29 1 27 0 - 31 0 pg    MCHC 33 6 31 4 - 37 4 g/dL    RDW 14 3 11 6 - 15 1 %    Platelets 312 613 - 596 Thousands/uL    MPV 7 6 (L) 8 9 - 12 7 fL   Magnesium    Collection Time: 02/20/18  6:39 AM   Result Value Ref Range    Magnesium 1 9 1 6 - 2 6 mg/dL   Basic metabolic panel    Collection Time: 02/20/18  6:39 AM   Result Value Ref Range    Sodium 144 136 - 145 mmol/L    Potassium 4 4 3 5 - 5 3 mmol/L    Chloride 107 100 - 108 mmol/L    CO2 33 (H) 21 - 32 mmol/L    Anion Gap 4 4 - 13 mmol/L    BUN 15 5 - 25 mg/dL    Creatinine 0 61 0 60 - 1 30 mg/dL    Glucose 88 65 - 140 mg/dL    Calcium 8 2 (L) 8 3 - 10 1 mg/dL    eGFR 106 ml/min/1 73sq m   Hemoglobin and hematocrit, blood    Collection Time: 02/20/18  9:34 AM   Result Value Ref Range    Hemoglobin 8 3 (L) 14 0 - 18 0 g/dL    Hematocrit 24 7 (L) 42 0 - 52 0 %       Current Facility-Administered Medications   Medication Dose Route Frequency Provider Last Rate Last Dose    acetaminophen (TYLENOL) tablet 650 mg  650 mg Oral Q6H PRN Sydney Valadez DO        benztropine (COGENTIN) tablet 1 mg  1 mg Oral BID Sydney Valadez DO   1 mg at 02/20/18 1743    divalproex sodium (DEPAKOTE) EC tablet 1,000 mg  1,000 mg Oral Q12H [facility] Sydney Valadez DO   1,000 mg at 02/20/18 2145    FLUoxetine (PROzac) capsule 40 mg  40 mg Oral Daily Sydney Valadez DO   40 mg at 02/20/18 0813    hydrOXYzine HCL (ATARAX) tablet 50 mg  50 mg Oral BID Sydney Valadez DO   50 mg at 02/20/18 1743    lisinopril-hydrochlorothiazide (PRINZIDE 20/12  5) combo dose   Oral Daily Sydney Valadez DO   Stopped at 02/20/18 0813    LORazepam (ATIVAN) tablet 0 5 mg  0 5 mg Oral Q8H PRN Sandy Easley MD   0 5 mg at 02/20/18 1743    mirtazapine (REMERON) tablet 15 mg  15 mg Oral HS Sydney Valadez DO   15 mg at 02/20/18 2145    nicotine (NICODERM CQ) 21 mg/24 hr TD 24 hr patch 1 patch  1 patch Transdermal Daily Sydney Valadez DO   1 patch at 02/20/18 0813    propranolol (INDERAL) tablet 30 mg  30 mg Oral TID Sydney Valadez DO   30 mg at 02/20/18 2145    risperiDONE (RisperDAL) tablet 2 mg  2 mg Oral BID Sydney Valadez DO   2 mg at 02/20/18 1740       Invasive Devices     Peripheral Intravenous Line            Peripheral IV 02/19/18 Left Antecubital 1 day                Lab, Imaging and other studies: I have personally reviewed pertinent reports      VTE Pharmacologic Prophylaxis: Sequential compression device (Venodyne)   VTE Mechanical Prophylaxis: sequential compression device    Sydney Valadez DO

## 2018-02-22 VITALS
RESPIRATION RATE: 18 BRPM | HEART RATE: 55 BPM | WEIGHT: 236.99 LBS | BODY MASS INDEX: 33.18 KG/M2 | OXYGEN SATURATION: 96 % | DIASTOLIC BLOOD PRESSURE: 64 MMHG | SYSTOLIC BLOOD PRESSURE: 119 MMHG | HEIGHT: 71 IN | TEMPERATURE: 98.3 F

## 2018-02-22 LAB
ANION GAP SERPL CALCULATED.3IONS-SCNC: 6 MMOL/L (ref 4–13)
BUN SERPL-MCNC: 10 MG/DL (ref 5–25)
CALCIUM SERPL-MCNC: 8.4 MG/DL (ref 8.3–10.1)
CHLORIDE SERPL-SCNC: 108 MMOL/L (ref 100–108)
CO2 SERPL-SCNC: 32 MMOL/L (ref 21–32)
CREAT SERPL-MCNC: 0.75 MG/DL (ref 0.6–1.3)
ERYTHROCYTE [DISTWIDTH] IN BLOOD BY AUTOMATED COUNT: 14.2 % (ref 11.6–15.1)
GFR SERPL CREATININE-BSD FRML MDRD: 98 ML/MIN/1.73SQ M
GLUCOSE SERPL-MCNC: 93 MG/DL (ref 65–140)
HCT VFR BLD AUTO: 24.6 % (ref 42–52)
HGB BLD-MCNC: 8.3 G/DL (ref 14–18)
MAGNESIUM SERPL-MCNC: 2.2 MG/DL (ref 1.6–2.6)
MCH RBC QN AUTO: 29.2 PG (ref 27–31)
MCHC RBC AUTO-ENTMCNC: 33.8 G/DL (ref 31.4–37.4)
MCV RBC AUTO: 86 FL (ref 82–98)
PHOSPHATE SERPL-MCNC: 4.6 MG/DL (ref 2.3–4.1)
PLATELET # BLD AUTO: 160 THOUSANDS/UL (ref 130–400)
PMV BLD AUTO: 7.3 FL (ref 8.9–12.7)
POTASSIUM SERPL-SCNC: 4.4 MMOL/L (ref 3.5–5.3)
RBC # BLD AUTO: 2.85 MILLION/UL (ref 4.7–6.1)
SODIUM SERPL-SCNC: 146 MMOL/L (ref 136–145)
WBC # BLD AUTO: 12.1 THOUSAND/UL (ref 4.8–10.8)

## 2018-02-22 PROCEDURE — 99233 SBSQ HOSP IP/OBS HIGH 50: CPT | Performed by: INTERNAL MEDICINE

## 2018-02-22 PROCEDURE — 85027 COMPLETE CBC AUTOMATED: CPT | Performed by: STUDENT IN AN ORGANIZED HEALTH CARE EDUCATION/TRAINING PROGRAM

## 2018-02-22 PROCEDURE — 84100 ASSAY OF PHOSPHORUS: CPT | Performed by: STUDENT IN AN ORGANIZED HEALTH CARE EDUCATION/TRAINING PROGRAM

## 2018-02-22 PROCEDURE — 80048 BASIC METABOLIC PNL TOTAL CA: CPT | Performed by: STUDENT IN AN ORGANIZED HEALTH CARE EDUCATION/TRAINING PROGRAM

## 2018-02-22 PROCEDURE — 83735 ASSAY OF MAGNESIUM: CPT | Performed by: STUDENT IN AN ORGANIZED HEALTH CARE EDUCATION/TRAINING PROGRAM

## 2018-02-22 RX ORDER — PROPRANOLOL HYDROCHLORIDE 10 MG/1
10 TABLET ORAL 3 TIMES DAILY
Qty: 30 TABLET | Refills: 0
Start: 2018-02-22 | End: 2018-03-26

## 2018-02-22 RX ORDER — MIRTAZAPINE 15 MG/1
15 TABLET, FILM COATED ORAL
Qty: 3 TABLET | Refills: 0
Start: 2018-02-22 | End: 2018-03-26

## 2018-02-22 RX ADMIN — PROPRANOLOL HYDROCHLORIDE 30 MG: 20 TABLET ORAL at 09:11

## 2018-02-22 RX ADMIN — LISINOPRIL: 20 TABLET ORAL at 09:12

## 2018-02-22 RX ADMIN — HYDROXYZINE HYDROCHLORIDE 50 MG: 25 TABLET, FILM COATED ORAL at 09:12

## 2018-02-22 RX ADMIN — NICOTINE 1 PATCH: 21 PATCH, EXTENDED RELEASE TRANSDERMAL at 09:13

## 2018-02-22 RX ADMIN — RISPERIDONE 2 MG: 1 TABLET ORAL at 09:12

## 2018-02-22 RX ADMIN — DIVALPROEX SODIUM 1000 MG: 500 TABLET, DELAYED RELEASE ORAL at 09:12

## 2018-02-22 RX ADMIN — BENZTROPINE MESYLATE 1 MG: 1 TABLET ORAL at 09:12

## 2018-02-22 RX ADMIN — FLUOXETINE 40 MG: 20 CAPSULE ORAL at 09:12

## 2018-02-22 NOTE — PROGRESS NOTES
[facility] Practice Progress Note - Bertha Adams 60 y o  male MRN: [id number]    Unit/Bed#: [address] [place] [address] Encounter: [id number]      Assessment/Plan:      Symptomatic anemia likely 2/2 lymphocytosis  - Pt had an appointment with Heme/Onc on 2/14:  recent stool sample and UA were negative for blood   Anemia can be a side effect of psychiatric medications  However, there is high suspicious of a primary bone marrow disorder, anemia related to chronic Leukemia condition  Patient likely has been diagnosed with CLL at rehab in [place]  Will need to obtain records     - Pt received a transfusion of 1 Unit RBCs on 2/14 after previous discharge   - s/p 2 Unit RBCs transfusion  - Labs from 2/11: Vit B-12 952  Folate 8 9   Iron Sat 93%   TIBC 276   Iron 256   Ferritin 917   - Vitals: 98 3F     55 HR   18 RR    119/64    96%   - Hg 8 3   Hct 24 6  MCV 86  - Valproic Acid level 9  - Ethanol lvl negative   - Type and Screen completed     - FOBT negative   - UA negative  - Will monitor daily CBC   - Haptoglobin pending   -   - Consult to Dr Ángel Rapp placed, recommendations greatly appreciated        Leukocytosis on presentation   - WBC 12 10 this AM    - On recent admission 2/10 Chest XR: Possible small infrahilar infiltrates vs bronchovascular crowding    - Repeat Chest XR 2/20: Unchanged nonspecific right base opacity which may represent atelectasis or pneumonia in the appropriate clinical setting     - Will monitor daily CBC     Hx of Hematuria  - UA negative   - Urine cx negative       Hypertension  - /64 on presentation  - Continue with Prinzidine 20-12 5mg PO qd     Depression/Psychotic Disorder   - Will continue Depakote 1g Q12H, Risperdal 2mg BID, Remeron 15 PO Daily, [id number] mg daily, Atarax 20mg BID scheduled;  Ativan 0 5mg Q8H PRN   - Continue Congentin 1mg PO BID     - Psych consult placed- recommendations greatly appreciated      Tobacco Abuse  - Continue with Nicotine patch 21mg/24h   - Smoking cessation information provided      Global  - Regular diet   - Replete electrolytes as needed  - SCDs       Subjective:   Patient seen and examined at bedside  No overnight  Patient states that he is feeling better today and denies any fatigue or weakness  Patient denies fever, chills, chest pain, short of breath, abdominal pain or urinary changes  Objective:     Vitals: Blood pressure 119/64, pulse 55, temperature 98 3 °F (36 8 °C), temperature source Oral, resp  rate 18, height 5' 11" (1 803 m), weight 107 kg (236 lb 15 9 oz), SpO2 96 %  ,Body mass index is 33 05 kg/m²    Wt Readings from Last 3 Encounters:   02/19/18 107 kg (236 lb 15 9 oz)   02/14/18 100 kg (221 lb)   02/10/18 104 kg (229 lb 4 5 oz)       Intake/Output Summary (Last 24 hours) at 02/22/18 1228  Last data filed at 02/22/18 0900   Gross per 24 hour   Intake              720 ml   Output              400 ml   Net              320 ml       Physical Exam: General appearance: alert and oriented, in no acute distress  Lungs: clear to auscultation bilaterally  Heart: regular rate and rhythm, S1, S2 normal, no murmur, click, rub or gallop  Abdomen: soft, non-tender; bowel sounds normal; no masses,  no organomegaly  Extremities: extremities normal, warm and well-perfused; no cyanosis, clubbing, or edema     Recent Results (from the past 24 hour(s))   Lactate dehydrogenase    Collection Time: 02/21/18  7:17 PM   Result Value Ref Range     81 - 234 U/L   Basic metabolic panel    Collection Time: 02/22/18  6:32 AM   Result Value Ref Range    Sodium 146 (H) 136 - 145 mmol/L    Potassium 4 4 3 5 - 5 3 mmol/L    Chloride 108 100 - 108 mmol/L    CO2 32 21 - 32 mmol/L    Anion Gap 6 4 - 13 mmol/L    BUN 10 5 - 25 mg/dL    Creatinine 0 75 0 60 - 1 30 mg/dL    Glucose 93 65 - 140 mg/dL    Calcium 8 4 8 3 - 10 1 mg/dL    eGFR 98 ml/min/1 73sq m   CBC    Collection Time: 02/22/18  6:32 AM   Result Value Ref Range    WBC 12 10 (H) 4 80 - 10 80 Thousand/uL RBC 2 85 (L) 4 70 - 6 10 Million/uL    Hemoglobin 8 3 (L) 14 0 - 18 0 g/dL    Hematocrit 24 6 (L) 42 0 - 52 0 %    MCV 86 82 - 98 fL    MCH 29 2 27 0 - 31 0 pg    MCHC 33 8 31 4 - 37 4 g/dL    RDW 14 2 11 6 - 15 1 %    Platelets 757 007 - 348 Thousands/uL    MPV 7 3 (L) 8 9 - 12 7 fL   Magnesium    Collection Time: 02/22/18  6:32 AM   Result Value Ref Range    Magnesium 2 2 1 6 - 2 6 mg/dL   Phosphorus    Collection Time: 02/22/18  6:32 AM   Result Value Ref Range    Phosphorus 4 6 (H) 2 3 - 4 1 mg/dL       Current Facility-Administered Medications   Medication Dose Route Frequency Provider Last Rate Last Dose    acetaminophen (TYLENOL) tablet 650 mg  650 mg Oral Q6H PRN Sydney Valadez DO        benztropine (COGENTIN) tablet 1 mg  1 mg Oral BID Sydney Valadez DO   1 mg at 02/22/18 0912    divalproex sodium (DEPAKOTE) EC tablet 1,000 mg  1,000 mg Oral Q12H [address] Sydney Valadez, DO   1,000 mg at 02/22/18 0912    FLUoxetine (PROzac) capsule 40 mg  40 mg Oral Daily Sydney Valadez DO   40 mg at 02/22/18 0912    hydrOXYzine HCL (ATARAX) tablet 50 mg  50 mg Oral BID Sydney Valadez DO   50 mg at 02/22/18 0912    lisinopril-hydrochlorothiazide (PRINZIDE 20/12  5) combo dose   Oral Daily Sydney Valadez DO        LORazepam (ATIVAN) tablet 0 5 mg  0 5 mg Oral Q8H PRN Angie Gatica MD   0 5 mg at 02/20/18 1743    mirtazapine (REMERON) tablet 15 mg  15 mg Oral HS Sydney Valadez DO   15 mg at 02/21/18 2219    nicotine (NICODERM CQ) 21 mg/24 hr TD 24 hr patch 1 patch  1 patch Transdermal Daily Sydney Valadez DO   1 patch at 02/22/18 0913    propranolol (INDERAL) tablet 30 mg  30 mg Oral TID Sydney Valadez, DO   30 mg at 02/22/18 4801    risperiDONE (RisperDAL) tablet 2 mg  2 mg Oral BID Sydney Valadez, DO   2 mg at 02/22/18 0912       Invasive Devices     Peripheral Intravenous Line            Peripheral IV 02/19/18 Left Antecubital 2 days                Lab, Imaging and other studies: I have personally reviewed pertinent reports      VTE Pharmacologic Prophylaxis: Sequential compression device (Venodyne)   VTE Mechanical Prophylaxis: sequential compression device    Sydney Valadez DO

## 2018-02-22 NOTE — PROGRESS NOTES
Patient examined spoke with the wife  Patient looks better and he feels better patient is able to smile and communicate his feelings well wife reports that patient drinks lot of coffee and soda at home and then he gets sick he does not listen to her  Patient is advised not to help too much caffeine with his medications because it does not help him and make him feel sick  Patient understood and I strongly advised him to limit his caffeine intake  Wife is concerned about it stating that she tells him but he does not listen  Patient is stable with bipolar disorder he goes to see his psychiatrist and taking his psychotropic medications  Currently patient is pleasant offers no new complaints  Patient denies auditory or visual hallucinations  Patient denies suicidal or homicidal ideations  No side effects of his medications noted  Nurse reports no behavior problems  I will continue his medications the same and patient will follow up with his psychiatrist after the discharge at scheduled appointment

## 2018-02-22 NOTE — PLAN OF CARE
DISCHARGE PLANNING     Discharge to home or other facility with appropriate resources Completed        DISCHARGE PLANNING - CARE MANAGEMENT     Discharge to post-acute care or home with appropriate resources Completed        INFECTION - ADULT     Absence or prevention of progression during hospitalization Completed        Knowledge Deficit     Patient/family/caregiver demonstrates understanding of disease process, treatment plan, medications, and discharge instructions Completed        PAIN - ADULT     Verbalizes/displays adequate comfort level or baseline comfort level Completed        Potential for Falls     Patient will remain free of falls Completed        SAFETY ADULT     Patient will remain free of falls Completed     Maintain or return to baseline ADL function Completed     Maintain or return mobility status to optimal level Completed

## 2018-02-22 NOTE — CASE MANAGEMENT
Notification of Discharge  This is a Notification of Discharge from our facility [address]  Please be advised that this patient has been discharge from our facility  Below you will find the admission and discharge date and time including the patients disposition  PRESENTATION DATE: 2/19/2018  2:09 PM  IP ADMISSION DATE: 2/19/18 1622  DISCHARGE DATE: No discharge date for patient encounter  DISPOSITION: Home/Self Care    [address] [facility] in the [facility] Sh[facility] for 2017  Network Utilization Review Department  Phone: [phone]; Fax [phone]  ATTENTION: The Network Utilization Review Department is now centralized for our 7 Facilities  Make a note that we have a new phone and fax numbers for our Department  Please call with any questions or concerns to [phone] and carefully follow the prompts so that you are directed to the right person  All voicemails are confidential  Fax any determinations, approvals, denials, and requests for initial or continue stay review clinical to [phone]  Due to HIGH CALL volume, it would be easier if you could please send faxed requests to expedite your requests and in part, help us provide discharge notifications faster

## 2018-02-22 NOTE — PROGRESS NOTES
Hematology - Oncology Progress Note    Bertha Adams, 1955, [id number]  [address] [place] [address]/3 [place] [address]-*      Impression and plan:    3 65-year-old male with anemia  Etiology at this time is not clear  The significant drops in the hemoglobin levels are more commonly seen in patients with bleeding issues  That being said, patient gives no history of bleeding and workup did not demonstrate any source (for the bleeding)  It is difficult to believe but possible that a primary bone marrow disorder is the sole cause for the anemia  The white count is not terribly high; the platelet count is within normal limits  Continue to monitor the hemoglobin level for the time being  Transfusion is not indicated with a hemoglobin level of 8 3 g/deciliter in a patient without significant pulmonary or cardiac issues       2  Lymphocytosis; WBC is slightly elevated but about the same as before  Reportedly, patient has been diagnosed with a primary bone marrow disorder previously  Respectfully, it does not seem possible that prior information will be, available  It may be best to reorder the hematology workup here/[facility]  This will include workup for the lymphocytosis and anemia  This can be done as an inpatient/outpatient      3  Depression  Patient has been seen by psychiatry      4  History of hematuria - presently there is no clear signs of hematuria  Patient may need  evaluation possibly as an outpatient  5  Discharge disposition  From a hematology standpoint, patient can be discharged  CBC will be closely monitored and the workup as discussed above will be ordered  ___________________________________________________________________________________________________________________________________________________________________________________     Chief complaint/reason for admission: Altered mental status     History of present illness: This is a 65-year-old male admitted with the above   Patient has a history of alcohol abuse, hypertension and depression  Patient had apparently been acting differently at home, complaining of progressive weakness and fatigue for a few weeks  Patient was recently admitted to the hospital with a hemoglobin of 5 0 g/deciliter, received blood transfusion was discharged (etiology/source for the anemia was not clear)  Patient was seen once at the hematology office in [facility] - the plan was for the patient to continue his follow-ups in [place]  Mr Tracey Gomez was referred to the [address] [place] [address] hematology office but had not yet been seen by me  Prior workup also demonstrated an elevated white count and there was concern for primary bone marrow disorder      Presently Mr Tracey Gomez states feeling okay, about the same as before  No GI or  bleeding  Appetite is good, no nausea vomiting  No pain control issues  Fatigue is the same as before  Activities are limited but no different than before  Hospital medications list:    Current Facility-Administered Medications:  acetaminophen 650 mg Oral Q6H PRN Sydney Valadez, DO   benztropine 1 mg Oral BID Sydney Valadez, DO   divalproex sodium 1,000 mg Oral Q12H [address] Sydneyangela Valadez, DO   FLUoxetine 40 mg Oral Daily Sydney Valadez, DO   hydrOXYzine HCL 50 mg Oral BID Sydney Valadez, DO   lisinopril-hydrochlorothiazide (PRINZIDE 20/12  5) combo dose  Oral Daily Sydney Valadez, DO   LORazepam 0 5 mg Oral Q8H PRN Alex Siu MD   mirtazapine 15 mg Oral HS Sydney Valadez, DO   nicotine 1 patch Transdermal Daily Sydney Valadez, DO   propranolol 30 mg Oral TID Sydney Valadez, DO   risperiDONE 2 mg Oral BID Gokul Aviles DO     Physical exam  /64 (BP Location: Right arm)   Pulse 55   Temp 98 3 °F (36 8 °C) (Oral)   Resp 18   Ht 5' 11" (1 803 m)   Wt 107 kg (236 lb 15 9 oz)   SpO2 96%   BMI 33 05 kg/m²   Constitutional: Well formed, well-nourished in no apparent distress  Eyes: PERRL, conjunctiva pale pink, anicteric    HENT: Atraumatic, external ears normal, nose normal,    oropharynx moist, no pharyngeal exudates, no thrush, pink  Neck: Good range of motion, no adenopathy  Respiratory: scattered bilateral rhonchi  Cardiovascular: Normal rate, normal rhythm, no murmurs, no gallops, no rubs    GI: Soft, nondistended, normal bowel sounds, nontender, no organomegaly, no mass, no rebound, no guarding    : No costovertebral angle tenderness, normal reproductive organs, no discharge  Musculoskeletal: No pain or tenderness with palpation of joints, muscles or bones  Integument: slightly pale, warm, scattered ecchymoses, no active bleeding  Lymphatic: No adenopathy in the neck, supra-clavicular region, axilla and groin bilaterally  Extremities: + bilateral lower extremity edema, no cords, pulses are 1+  Neurologic: Alert & oriented x 3, CN 2-12 normal, normal motor function, normal sensory function, + tremors  Psychiatric: awake, responsive, pleasant, response time to normal limits  Rectal: Deferred    Laboratory test results    Results for Teagan Curran (MRN [id number]) as of 2/22/2018 08:28   Ref   Range 2/20/2018 09:34 2/21/2018 06:08 2/22/2018 06:32   WBC Latest Ref Range: 4 80 - 10 80 Thousand/uL  14 20 (H) 12 10 (H)   RBC Latest Ref Range: 4 70 - 6 10 Million/uL  2 89 (L) 2 85 (L)   Hemoglobin Latest Ref Range: 14 0 - 18 0 g/dL 8 3 (L) 8 5 (L) 8 3 (L)   Hematocrit Latest Ref Range: 42 0 - 52 0 % 24 7 (L) 25 0 (L) 24 6 (L)   MCV Latest Ref Range: 82 - 98 fL  87 86   MCH Latest Ref Range: 27 0 - 31 0 pg  29 3 29 2   MCHC Latest Ref Range: 31 4 - 37 4 g/dL  33 8 33 8   RDW Latest Ref Range: 11 6 - 15 1 %  14 4 14 2   Platelets Latest Ref Range: 130 - 400 Thousands/uL  172 160   MPV Latest Ref Range: 8 9 - 12 7 fL  7 4 (L) 7 3 (L)   Platelet Estimate Latest Ref Range: Adequate   Adequate    nRBC Latest Units: /100 WBCs  0    Neutrophils Relative Latest Ref Range: 43 - 75 %  24 (L)    Lymphocytes Relative Latest Ref Range: 14 - 44 %  71 (H)    Monocytes Relative Latest Ref Range: 4 - 12 %  4    Eosinophils Relative Latest Ref Range: 0 - 6 %  1    Basophils Relative Latest Ref Range: 0 - 1 %  0        Results for Windy Marin (MRN [id number]) as of 2/21/2018 12:56   Ref   Range 2/11/2018 04:50   Iron Latest Ref Range: 65 - 175 ug/dL 256 (H)   Ferritin Latest Ref Range: 8 - 388 ng/mL 917 (H)   Iron Saturation Latest Units: % 93   TIBC Latest Ref Range: 250 - 450 ug/dL 276   Folate Latest Ref Range: 3 1 - 17 5 ng/mL 8 9   Vitamin B-12 Latest Ref Range: 100 - 900 pg/mL 952 (H)

## 2018-02-22 NOTE — DISCHARGE SUMMARY
[facility] Discharge Summary - Medical Talia Diallo 60 y o  male MRN: [id number]    [place] [address]  Room / Bed: [address] [place] [address]/3 [place] Encounter: [id number]    BRIEF OVERVIEW  Admitting Provider: Bennie Guerrero MD  Discharge Provider: Bennie Guerrero MD    Discharge To: Home    Outpatient Follow-Up: Follow up at [facility] within next 2 weeks  Will Follow up outpatient with Dr Bryce Lemos  Things to address at first follow up visit: Anemia   Labs and results pending at discharge: Haptoglobin  Follow up CBC  Admission Date: 2/19/2018     Discharge Date: 2/22/2018  3:21 PM    Primary Discharge Diagnosis  Principal Problem:    Symptomatic anemia  Active Problems:    Hypertension    Depression    Polypharmacy  Resolved Problems:    * No resolved hospital problems   *        Consulting Providers   Heme/Onc- Dr Bryce Lemos  Psychiatry- Dr Karon Gr    Procedures Performed/Pertinent Test results  Results for orders placed or performed during the hospital encounter of 02/19/18   Urine culture   Result Value Ref Range    Urine Culture No Growth <1000 cfu/mL    Valproic acid level, total   Result Value Ref Range    Valproic Acid, Total 9 (L) 50 - 100 ug/mL   Ethanol   Result Value Ref Range    Ethanol Lvl <3 0 - 3 mg/dL   CBC and differential   Result Value Ref Range    WBC 12 30 (H) 4 80 - 10 80 Thousand/uL    RBC 2 10 (L) 4 70 - 6 10 Million/uL    Hemoglobin 6 0 (LL) 14 0 - 18 0 g/dL    Hematocrit 17 9 (L) 42 0 - 52 0 %    MCV 85 82 - 98 fL    MCH 28 6 27 0 - 31 0 pg    MCHC 33 6 31 4 - 37 4 g/dL    RDW 13 3 11 6 - 15 1 %    MPV 7 6 (L) 8 9 - 12 7 fL    Platelets 724 142 - 461 Thousands/uL    Neutrophils Relative 26 (L) 43 - 75 %    Lymphocytes Relative 63 (H) 14 - 44 %    Monocytes Relative 7 4 - 12 %    Eosinophils Relative 1 0 - 6 %    Basophils Relative 3 (H) 0 - 1 %    Neutrophils Absolute 3 20 1 85 - 7 62 Thousands/µL    Lymphocytes Absolute 7 80 (H) 0 60 - 4 47 Thousands/µL    Monocytes Absolute 0 90 0 17 - 1 22 Thousand/µL    Eosinophils Absolute 0 10 0 00 - 0 61 Thousand/µL    Basophils Absolute 0 30 (H) 0 00 - 0 10 Thousands/µL   Comprehensive metabolic panel   Result Value Ref Range    Sodium 137 136 - 145 mmol/L    Potassium 3 8 3 5 - 5 3 mmol/L    Chloride 102 100 - 108 mmol/L    CO2 36 (H) 21 - 32 mmol/L    Anion Gap -1 (L) 4 - 13 mmol/L    BUN 19 5 - 25 mg/dL    Creatinine 0 64 0 60 - 1 30 mg/dL    Glucose 106 65 - 140 mg/dL    Calcium 8 2 (L) 8 3 - 10 1 mg/dL    AST 13 5 - 45 U/L    ALT 18 12 - 78 U/L    Alkaline Phosphatase 74 46 - 116 U/L    Total Protein 5 1 (L) 6 4 - 8 2 g/dL    Albumin 2 8 (L) 3 5 - 5 0 g/dL    Total Bilirubin 0 20 0 20 - 1 00 mg/dL    eGFR 104 ml/min/1 73sq m   Rapid drug screen, urine   Result Value Ref Range    Amph/Meth UR Negative Negative    Barbiturate Ur Negative Negative    Benzodiazepine Urine Negative Negative    Cocaine Urine Negative Negative    Methadone Urine Negative Negative    Opiate Urine Negative Negative    PCP Ur Negative Negative    THC Urine Negative Negative   UA w Reflex to Microscopic w Reflex to Culture   Result Value Ref Range    Color, UA Light Yellow     Clarity, UA Clear     Specific Gravity, UA <=1 005 1 000 - 1 030    pH, UA 6 5 5 0 - 9 0    Leukocytes, UA Negative Negative    Nitrite, UA Negative Negative    Protein, UA Negative Negative mg/dl    Glucose, UA Negative Negative mg/dl    Ketones, UA Negative Negative mg/dl    Urobilinogen, UA 0 2 0 2, 1 0 E U /dl E U /dl    Bilirubin, UA Negative Negative    Blood, UA Negative Negative   Phosphorus   Result Value Ref Range    Phosphorus 4 4 (H) 2 3 - 4 1 mg/dL   CBC (With Platelets)   Result Value Ref Range    WBC 15 10 (H) 4 80 - 10 80 Thousand/uL    RBC 2 80 (L) 4 70 - 6 10 Million/uL    Hemoglobin 8 1 (L) 14 0 - 18 0 g/dL    Hematocrit 24 3 (L) 42 0 - 52 0 %    MCV 87 82 - 98 fL    MCH 29 1 27 0 - 31 0 pg    MCHC 33 6 31 4 - 37 4 g/dL    RDW 14 3 11 6 - 15 1 %    Platelets 439 827 - 766 Thousands/uL    MPV 7 6 (L) 8 9 - 12 7 fL   Magnesium   Result Value Ref Range    Magnesium 1 9 1 6 - 2 6 mg/dL   Basic metabolic panel   Result Value Ref Range    Sodium 144 136 - 145 mmol/L    Potassium 4 4 3 5 - 5 3 mmol/L    Chloride 107 100 - 108 mmol/L    CO2 33 (H) 21 - 32 mmol/L    Anion Gap 4 4 - 13 mmol/L    BUN 15 5 - 25 mg/dL    Creatinine 0 61 0 60 - 1 30 mg/dL    Glucose 88 65 - 140 mg/dL    Calcium 8 2 (L) 8 3 - 10 1 mg/dL    eGFR 106 ml/min/1 73sq m   Hemoglobin and hematocrit, blood   Result Value Ref Range    Hemoglobin 8 3 (L) 14 0 - 18 0 g/dL    Hematocrit 24 7 (L) 42 0 - 52 0 %   Basic metabolic panel   Result Value Ref Range    Sodium 141 136 - 145 mmol/L    Potassium 4 7 3 5 - 5 3 mmol/L    Chloride 104 100 - 108 mmol/L    CO2 32 21 - 32 mmol/L    Anion Gap 5 4 - 13 mmol/L    BUN 12 5 - 25 mg/dL    Creatinine 0 75 0 60 - 1 30 mg/dL    Glucose 93 65 - 140 mg/dL    Calcium 8 6 8 3 - 10 1 mg/dL    eGFR 98 ml/min/1 73sq m   CBC and differential   Result Value Ref Range    WBC 14 20 (H) 4 80 - 10 80 Thousand/uL    RBC 2 89 (L) 4 70 - 6 10 Million/uL    Hemoglobin 8 5 (L) 14 0 - 18 0 g/dL    Hematocrit 25 0 (L) 42 0 - 52 0 %    MCV 87 82 - 98 fL    MCH 29 3 27 0 - 31 0 pg    MCHC 33 8 31 4 - 37 4 g/dL    RDW 14 4 11 6 - 15 1 %    MPV 7 4 (L) 8 9 - 12 7 fL    Platelets 489 072 - 450 Thousands/uL    nRBC 0 /100 WBCs    Neutrophils Relative 24 (L) 43 - 75 %    Lymphocytes Relative 71 (H) 14 - 44 %    Monocytes Relative 4 4 - 12 %    Eosinophils Relative 1 0 - 6 %    Basophils Relative 0 0 - 1 %    Neutrophils Absolute 3 40 1 85 - 7 62 Thousands/µL    Lymphocytes Absolute 10 10 (H) 0 60 - 4 47 Thousands/µL    Monocytes Absolute 0 60 0 17 - 1 22 Thousand/µL    Eosinophils Absolute 0 20 0 00 - 0 61 Thousand/µL    Basophils Absolute 0 00 0 00 - 0 10 Thousands/µL   Phosphorus   Result Value Ref Range    Phosphorus 4 7 (H) 2 3 - 4 1 mg/dL   Magnesium   Result Value Ref Range    Magnesium 2 2 1 6 - 2 6 mg/dL   Lactate dehydrogenase   Result Value Ref Range     81 - 234 U/L   Basic metabolic panel   Result Value Ref Range    Sodium 146 (H) 136 - 145 mmol/L    Potassium 4 4 3 5 - 5 3 mmol/L    Chloride 108 100 - 108 mmol/L    CO2 32 21 - 32 mmol/L    Anion Gap 6 4 - 13 mmol/L    BUN 10 5 - 25 mg/dL    Creatinine 0 75 0 60 - 1 30 mg/dL    Glucose 93 65 - 140 mg/dL    Calcium 8 4 8 3 - 10 1 mg/dL    eGFR 98 ml/min/1 73sq m   CBC   Result Value Ref Range    WBC 12 10 (H) 4 80 - 10 80 Thousand/uL    RBC 2 85 (L) 4 70 - 6 10 Million/uL    Hemoglobin 8 3 (L) 14 0 - 18 0 g/dL    Hematocrit 24 6 (L) 42 0 - 52 0 %    MCV 86 82 - 98 fL    MCH 29 2 27 0 - 31 0 pg    MCHC 33 8 31 4 - 37 4 g/dL    RDW 14 2 11 6 - 15 1 %    Platelets 228 108 - 233 Thousands/uL    MPV 7 3 (L) 8 9 - 12 7 fL   Magnesium   Result Value Ref Range    Magnesium 2 2 1 6 - 2 6 mg/dL   Phosphorus   Result Value Ref Range    Phosphorus 4 6 (H) 2 3 - 4 1 mg/dL   ECG 12 lead   Result Value Ref Range    Ventricular Rate 71 BPM    Atrial Rate 71 BPM    LA Interval 170 ms    QRSD Interval 84 ms    QT Interval 408 ms    QTC Interval 443 ms    P [place] 48 degrees    QRS Axis -13 degrees    T Wave Axis 24 degrees   Type and screen   Result Value Ref Range    ABO Grouping B     Rh Factor Positive     Antibody Screen Negative     Specimen Expiration Date [id number]    Prepare RBC:Has consent been obtained?  Yes, 2 Units   Result Value Ref Range    Unit Product Code P0185Z31     Unit Number [id number]     Unit ABO B     Unit [facility] NEG     Crossmatch Compatible     Unit Dispense Status Presumed Trans     Unit Product Code K5101Y08     Unit Number [id number]     Unit ABO B     Unit RH NEG     Crossmatch Compatible     Unit Dispense Status Presumed Trans    Smear Review(Phlebs Do Not Order)   Result Value Ref Range    [place] Present     Platelet Estimate Adequate Adequate   Smear Review(Phlebs Do Not Order)   Result Value Ref Range    Platelet Estimate Adequate Adequate       XR chest pa & lateral   Final Result      Unchanged nonspecific right base opacity which may represent atelectasis or pneumonia in the appropriate clinical setting  Follow-up chest radiograph is recommended in approximately 2 months to ensure complete resolution  Workstation performed: [id number]               HPI  As per H&P,   59 y/o male with PMH HTN, Depression, Hx of alcohol abuse and anemia presents with an altered mental status  Pt's wife was at bedside, stated that he was acting differently and not being himself  Pt has been feeling weakness and fatigue for a few weeks now  He was admitted on 2/10 with a hemoglobin of 5 0, treated with blood transfusions and discharged  Pt followed up at heme/onc office where he received another transfusion of blood and had a full workup done  Pt states that he is not losing any blood at this time  Denies any blood in stool or urine  States that his stool is regular in color  Pt denies any nausea, vomiting, abdominal pain, or urinary changes  Hospital Course    Symptomatic anemia likely 2/2 lymphocytosis  - Pt had an appointment with Heme/Onc on 2/14:  recent stool sample and UA were negative for blood   Anemia can be a side effect of psychiatric medications  However, there is high suspicious of a primary bone marrow disorder, anemia related to chronic Leukemia condition  Patient likely has been diagnosed with CLL at rehab in [place]  Will need to obtain records  Pt received a transfusion of 1 Unit RBCs on 2/14 after previous discharge  s/p 2 Unit RBCs transfusion  Labs from 2/11: Vit B-12 952  Folate 8 9   Iron Sat 93%   TIBC 276   Iron 256   Ferritin 917  Pt afebrile during hospitalization  On day of discharge,  Hg 8 3   Hct 24 6  MCV 86 remained stable during hospitalization  Valproic Acid level 9  Ethanol lvl negative  Type and Screen completed  FOBT negative  UA negative  Daily CBC monitored    Dr Jayme Childers recommendations greatly appreciated  Haptoglobin pending at discharge  Patient will follow up with Dr Luis Enrique Nunez as outpatient to review blood work and discuss further management      Leukocytosis on presentation: WBC 12 10 this Am, decreased since admission  On recent admission 2/10 Chest XR: Possible small infrahilar infiltrates vs bronchovascular crowding  Repeat Chest XR 2/20: Unchanged nonspecific right base opacity which may represent atelectasis or pneumonia in the appropriate clinical setting  Daily CBC monitored     Hx of Hematuria: UA negative  Urine cx negative       Hypertension: /64 morning of discharge  Patient given Prinzidine 20-12 5mg PO qd during hospitalization      Depression/Psychotic Disorder: Given Depakote 1g Q12H, Risperdal 2mg BID, Remeron 15 PO Daily, Lruntd09 mg daily, Atarax 20mg BID scheduled;  Ativan 0 5mg Q8H PRN  Given Congentin 1mg PO BID  Dr Isadora Gaucher recommendations greatly appreciated      Tobacco Abuse:  Given Nicotine patch 21mg/24h   Smoking cessation information provided       Global:  Placed on regular diet, which was repleted as needed, SCDs were used for DVT prophylaxis        Physical Exam at Discharge  General appearance: alert and oriented, in no acute distress  Lungs: clear to auscultation bilaterally  Heart: regular rate and rhythm, S1, S2 normal, no murmur, click, rub or gallop  Abdomen: soft, non-tender; bowel sounds normal; no masses,  no organomegaly  Extremities: extremities normal, warm and well-perfused; no cyanosis, clubbing, or edema  Skin: Skin color, texture, turgor normal  No rashes or lesions    Medications    benztropine 1 mg tablet   Commonly known as: COGENTIN   Take 1 mg by mouth 2 (two) times a day   Refills: 0           divalproex sodium 500 mg EC tablet   Commonly known as: DEPAKOTE   Take 1,000 mg by mouth every 12 (twelve) hours   Refills: 0           FLUoxetine 20 mg capsule   Commonly known as: PROzac   Take 40 mg by mouth daily Refills: 0           hydrOXYzine pamoate 50 mg capsule   Commonly known as: VISTARIL   Take 50 mg by mouth 2 (two) times a day   Refills: 0           lisinopril-hydrochlorothiazide 20-12 5 MG per tablet   Commonly known as: PRINZIDE,ZESTORETIC   Take 1 tablet by mouth daily   Refills: 0           * mirtazapine 15 mg tablet   Commonly known as: REMERON   Take 15 mg by mouth daily at bedtime   Refills: 0   What changed: Another medication with the same name was added  Make sure you understand how and when to take each  * mirtazapine 15 mg tablet   Commonly known as: REMERON   Take 1 tablet (15 mg total) by mouth daily at bedtime   Refills: 0   What changed: You were already taking a medication with the same name, and this prescription was added  Make sure you understand how and when to take each           propranolol 10 mg tablet   Commonly known as: INDERAL   Take 1 tablet (10 mg total) by mouth 3 (three) times a day   Refills: 0   What changed: how much to take          risperiDONE 2 mg tablet   Commonly known as: RisperDAL   Take 4 mg by mouth 2 (two) times a day   Refills: 0               Allergies  No Known Allergies    Diet restrictions: none  Activity restrictions: as tolerated   Code Status: FULL CODE     Discharge Condition: stable      Discharge  Statement   I spent 30 minutes discharging the patient  This time was spent on the day of discharge  I had direct contact with the patient on the day of discharge  Additional documentation is required if more than 30 minutes were spent on discharge

## 2018-02-23 ENCOUNTER — DOCUMENTATION (OUTPATIENT)
Dept: HEMATOLOGY ONCOLOGY | Facility: MEDICAL CENTER | Age: 63
End: 2018-02-23

## 2018-02-23 ENCOUNTER — OFFICE VISIT (OUTPATIENT)
Dept: HEMATOLOGY ONCOLOGY | Facility: MEDICAL CENTER | Age: 63
End: 2018-02-23
Payer: COMMERCIAL

## 2018-02-23 VITALS
BODY MASS INDEX: 31.5 KG/M2 | TEMPERATURE: 97.2 F | WEIGHT: 225 LBS | SYSTOLIC BLOOD PRESSURE: 108 MMHG | DIASTOLIC BLOOD PRESSURE: 82 MMHG | OXYGEN SATURATION: 98 % | HEIGHT: 71 IN | HEART RATE: 60 BPM | RESPIRATION RATE: 20 BRPM

## 2018-02-23 DIAGNOSIS — D64.9 ANEMIA, UNSPECIFIED TYPE: Primary | ICD-10-CM

## 2018-02-23 LAB — HAPTOGLOB SERPL-MCNC: 253 MG/DL (ref 34–200)

## 2018-02-23 PROCEDURE — 99214 OFFICE O/P EST MOD 30 MIN: CPT | Performed by: INTERNAL MEDICINE

## 2018-02-23 NOTE — PROGRESS NOTES
Talia Diallo  1955  [facility]  [address] [place] [id number]    DISCUSSION  SUMMARY:    41-year-old male with a number of medical problems including lymphocytosis and anemia recently admitted to the hospital because of the anemia  Mr Lucila Bee was discharged yesterday  Issues:    1  Anemia  Etiology at this time is not clear  The significant drops in the hemoglobin levels are more commonly seen in patients with bleeding issues  That being said, patient gives no history of bleeding and workup did not demonstrate any source (for the bleeding)  It is difficult to believe but possible that a primary bone marrow disorder is the sole cause for the anemia  The platelet count is within normal limits  The plan is to continue to monitor the red blood cell parameters      2  History of leukocytosis with lymphocytosis  Reportedly, patient has been diagnosed with a primary bone marrow disorder previously  Respectfully, it does not seem possible that prior information will ever be available (from [place])  CBC parameters are being monitored  If/when there is evidence of CBC abnormality trends and the possibility of a primary bone marrow disorder, additional workup will be ordered      3  Depression  Patient has been seen by psychiatry - follow-ups ongoing      4  History of hematuria - presently there is no clear signs of hematuria  Patient may need  evaluation possibly as an outpatient  Patient is to call if he sees any blood in his urine      Patient is to return in 5 weeks  Patient knows to call the hematology/oncology office if there are any other questions or concerns  Carefully review your medication list and verify that the list is accurate and up-to-date   Please call the hematology/oncology office if there are medications missing from the list, medications on the list that you are not currently taking or if there is a dosage or instruction that is different from how you're taking that medication  Patient goals and areas of care: monitor CBC parameters  Patient is able to self-care   _____________________________________________________________________________________    Chief Complaint   Patient presents with    Follow-up     anemia, lymphocytosis, recent hospital patient here for follow-up     History of Present Illness:    76-year-old male recently admitted to [facility] with the above  Patient has a history of alcohol abuse, hypertension and depression  Patient had apparently been acting differently at home, complaining of progressive weakness and fatigue for a few weeks  Patient was recently admitted to the hospital with a hemoglobin of 5 0 g/deciliter, received blood transfusion was discharged (etiology/source for the anemia was not clear)  Patient was seen once at the hematology office in [facility] - the plan was for the patient to continue his follow-ups in [facility]      Mr Pilar Bailon states feeling okay, baseline  Appetite is okay, no GI or  issues  No hematuria  Pain is relatively controlled  Activities are extremely limited  Patient continues to smoke; chronic cough is the same as before  No hemoptysis  No fevers, chills or sweats  Patient denies any shortness of breath, mild dyspnea on exertion  Review of Systems   Constitutional: Positive for fatigue  HENT: Negative  Eyes: Negative  Respiratory: Negative  Cardiovascular: Negative  Gastrointestinal: Negative  Endocrine: Negative  Genitourinary: Negative  Musculoskeletal: Positive for arthralgias  Skin: Negative  Allergic/Immunologic: Negative  Neurological: Negative  Hematological: Negative  Psychiatric/Behavioral: Positive for behavioral problems  All other systems reviewed and are negative       Patient Active Problem List   Diagnosis    Symptomatic anemia    Hypertension    Depression    Polypharmacy    Bronchitis     Past Medical History:   Diagnosis Date    Anemia     History of alcohol abuse     Hypertension     Hypertension     Psychiatric disorder      No past surgical history on file  Family History   Problem Relation Age of Onset    Coronary artery disease Mother     No Known Problems Father     Hepatitis Sister     Cancer Brother     Prostate cancer Brother     Early death Brother      Social History     Social History    Marital status: /Civil Union     Spouse name: N/A    Number of children: N/A    Years of education: N/A     Occupational History    Not on file       Social History Main Topics    Smoking status: Current Every Day Smoker     Packs/day: 1 00     Years: 40 00    Smokeless tobacco: Never Used    Alcohol use No      Comment: last drink nov 19 2017    Drug use: No    Sexual activity: Not Currently     Other Topics Concern    Not on file     Social History Narrative    No narrative on file       Current Outpatient Prescriptions:     benztropine (COGENTIN) 1 mg tablet, Take 1 mg by mouth 2 (two) times a day, Disp: , Rfl:     divalproex sodium (DEPAKOTE) 500 mg EC tablet, Take 1,000 mg by mouth every 12 (twelve) hours, Disp: , Rfl:     FLUoxetine (PROzac) 20 mg capsule, Take 40 mg by mouth daily, Disp: , Rfl:     hydrOXYzine pamoate (VISTARIL) 50 mg capsule, Take 50 mg by mouth 2 (two) times a day, Disp: , Rfl:     lisinopril-hydrochlorothiazide (PRINZIDE,ZESTORETIC) 20-12 5 MG per tablet, Take 1 tablet by mouth daily, Disp: , Rfl:     mirtazapine (REMERON) 15 mg tablet, Take 15 mg by mouth daily at bedtime, Disp: , Rfl:     mirtazapine (REMERON) 15 mg tablet, Take 1 tablet (15 mg total) by mouth daily at bedtime, Disp: 3 tablet, Rfl: 0    propranolol (INDERAL) 10 mg tablet, Take 1 tablet (10 mg total) by mouth 3 (three) times a day, Disp: 30 tablet, Rfl: 0    risperiDONE (RisperDAL) 2 mg tablet, Take 4 mg by mouth 2 (two) times a day  , Disp: , Rfl:   No current facility-administered medications for this visit  No Known Allergies    Vitals:    02/23/18 1220   BP: 108/82   Pulse: 60   Resp: 20   Temp: (!) 97 2 °F (36 2 °C)   SpO2: 98%     Physical Exam   Constitutional: He is oriented to person, place, and time  He appears well-developed and well-nourished  Middle-aged male, no respiratory distress, slightly tremulous   HENT:   Head: Normocephalic and atraumatic  Right Ear: External ear normal    Left Ear: External ear normal    Nose: Nose normal    Mouth/Throat: Oropharynx is clear and moist    Eyes: Conjunctivae and EOM are normal  Pupils are equal, round, and reactive to light  Neck: Normal range of motion  Neck supple  Cardiovascular: Normal rate, regular rhythm, normal heart sounds and intact distal pulses  Pulmonary/Chest:   Lungs with fair air entry bilaterally, distant breath sounds, bilateral rhonchi, no rales   Abdominal: Soft  Bowel sounds are normal    Abdomen is soft, obese, cannot palpate liver or spleen, no rigidity or rebound   Musculoskeletal: Normal range of motion  Neurological: He is alert and oriented to person, place, and time  He has normal reflexes  Skin: Skin is warm  Psychiatric: He has a normal mood and affect   His behavior is normal  Judgment and thought content normal    Extremities: 0-1 + bilateral lower extremity edema, no cords, pulses are 1+  Lymphatics: no adenopathy in the neck, supraclavicular region, axilla and groin bilaterally    Labs:    2/22/18 WBC = 12 1 hemoglobin = 8 3 hematocrit = 24 6 MCV = 86 platelet = 795  73/69/6580 WBC = 15 1 hemoglobin = 8 1 hematocrit = 24 3 platelet = 473  29/23/7378 WBC = 14 4 hemoglobin = 6 8 hematocrit = 20 platelet = 823

## 2018-02-23 NOTE — LETTER
February 23, 2018     Patient: Norma Pradhan   YOB: 1955   Date of Visit: 2/23/2018       To Whom it May Concern:    Norma Pradhan is under my professional care  He was seen in my office on 2/23/2018  He is able to return to work on 3/23/18  If you have any questions or concerns, please don't hesitate to call           Sincerely,          Sarah Esteban MD        CC: No Recipients

## 2018-02-26 ENCOUNTER — APPOINTMENT (EMERGENCY)
Dept: RADIOLOGY | Facility: HOSPITAL | Age: 63
DRG: 917 | End: 2018-02-26
Payer: COMMERCIAL

## 2018-02-26 ENCOUNTER — HOSPITAL ENCOUNTER (INPATIENT)
Facility: HOSPITAL | Age: 63
LOS: 1 days | Discharge: HOME | DRG: 917 | End: 2018-02-27
Attending: EMERGENCY MEDICINE | Admitting: FAMILY MEDICINE
Payer: COMMERCIAL

## 2018-02-26 DIAGNOSIS — T45.0X1A DIPHENHYDRAMINE OVERDOSE: Primary | ICD-10-CM

## 2018-02-26 DIAGNOSIS — F32.A DEPRESSION, UNSPECIFIED DEPRESSION TYPE: Chronic | ICD-10-CM

## 2018-02-26 DIAGNOSIS — G93.40 ENCEPHALOPATHY: ICD-10-CM

## 2018-02-26 DIAGNOSIS — T50.904A DRUG OVERDOSE, UNDETERMINED INTENT, INITIAL ENCOUNTER: ICD-10-CM

## 2018-02-26 PROBLEM — Z79.899 POLYPHARMACY: Chronic | Status: ACTIVE | Noted: 2018-02-10

## 2018-02-26 PROBLEM — D64.9 SYMPTOMATIC ANEMIA: Chronic | Status: ACTIVE | Noted: 2018-02-10

## 2018-02-26 PROBLEM — I10 HYPERTENSION: Chronic | Status: ACTIVE | Noted: 2018-02-10

## 2018-02-26 LAB
ALBUMIN SERPL BCP-MCNC: 3.4 G/DL (ref 3.5–5)
ALP SERPL-CCNC: 72 U/L (ref 46–116)
ALT SERPL W P-5'-P-CCNC: 24 U/L (ref 12–78)
AMMONIA PLAS-SCNC: 43 UMOL/L (ref 11–35)
AMPHETAMINES SERPL QL SCN: NEGATIVE
ANION GAP SERPL CALCULATED.3IONS-SCNC: 6 MMOL/L (ref 4–13)
APAP SERPL-MCNC: <2 UG/ML (ref 10–30)
APTT PPP: 24 SECONDS (ref 24–33)
AST SERPL W P-5'-P-CCNC: 18 U/L (ref 5–45)
ATRIAL RATE: 82 BPM
BARBITURATES UR QL: NEGATIVE
BENZODIAZ UR QL: POSITIVE
BILIRUB SERPL-MCNC: 0.4 MG/DL (ref 0.2–1)
BILIRUB UR QL STRIP: NEGATIVE
BUN SERPL-MCNC: 20 MG/DL (ref 5–25)
CALCIUM SERPL-MCNC: 8.8 MG/DL (ref 8.3–10.1)
CHLORIDE SERPL-SCNC: 105 MMOL/L (ref 100–108)
CLARITY UR: CLEAR
CO2 SERPL-SCNC: 32 MMOL/L (ref 21–32)
COCAINE UR QL: NEGATIVE
COLOR UR: YELLOW
CREAT SERPL-MCNC: 0.97 MG/DL (ref 0.6–1.3)
ERYTHROCYTE [DISTWIDTH] IN BLOOD BY AUTOMATED COUNT: 13.6 % (ref 11.6–15.1)
ETHANOL SERPL-MCNC: 3 MG/DL (ref 0–3)
GFR SERPL CREATININE-BSD FRML MDRD: 83 ML/MIN/1.73SQ M
GLUCOSE SERPL-MCNC: 119 MG/DL (ref 65–140)
GLUCOSE UR STRIP-MCNC: NEGATIVE MG/DL
HCT VFR BLD AUTO: 24.1 % (ref 42–52)
HGB BLD-MCNC: 8.3 G/DL (ref 14–18)
HGB UR QL STRIP.AUTO: NEGATIVE
INR PPP: 1.19 (ref 0.86–1.16)
KETONES UR STRIP-MCNC: NEGATIVE MG/DL
LEUKOCYTE ESTERASE UR QL STRIP: NEGATIVE
LYMPHOCYTES # BLD AUTO: 0.82 THOUSAND/UL (ref 0.6–4.47)
LYMPHOCYTES # BLD AUTO: 5 %
MAGNESIUM SERPL-MCNC: 1.9 MG/DL (ref 1.6–2.6)
MCH RBC QN AUTO: 29.8 PG (ref 27–31)
MCHC RBC AUTO-ENTMCNC: 34.4 G/DL (ref 31.4–37.4)
MCV RBC AUTO: 86 FL (ref 82–98)
METHADONE UR QL: NEGATIVE
NEUTS BAND NFR BLD MANUAL: 82 % (ref 0–8)
NEUTS SEG # BLD: 15.38 THOUSAND/UL (ref 1.81–6.82)
NEUTS SEG NFR BLD AUTO: 13 %
NITRITE UR QL STRIP: NEGATIVE
OPIATES UR QL SCN: NEGATIVE
P AXIS: 53 DEGREES
PCP UR QL: NEGATIVE
PH UR STRIP.AUTO: 6.5 [PH] (ref 5–9)
PLATELET # BLD AUTO: 199 THOUSANDS/UL (ref 130–400)
PLATELET BLD QL SMEAR: ADEQUATE
PMV BLD AUTO: 7.7 FL (ref 8.9–12.7)
POTASSIUM SERPL-SCNC: 4.1 MMOL/L (ref 3.5–5.3)
PR INTERVAL: 160 MS
PROT SERPL-MCNC: 5.9 G/DL (ref 6.4–8.2)
PROT UR STRIP-MCNC: NEGATIVE MG/DL
PROTHROMBIN TIME: 12.5 SECONDS (ref 9.4–11.7)
QRS AXIS: -14 DEGREES
QRSD INTERVAL: 88 MS
QT INTERVAL: 392 MS
QTC INTERVAL: 457 MS
RBC # BLD AUTO: 2.79 MILLION/UL (ref 4.7–6.1)
SALICYLATES SERPL-MCNC: <3 MG/DL (ref 3–20)
SODIUM SERPL-SCNC: 143 MMOL/L (ref 136–145)
SP GR UR STRIP.AUTO: 1.01 (ref 1–1.03)
T WAVE AXIS: 59 DEGREES
THC UR QL: NEGATIVE
TOTAL CELLS COUNTED SPEC: 99
UROBILINOGEN UR QL STRIP.AUTO: 0.2 E.U./DL
VALPROATE SERPL-MCNC: 43 UG/ML (ref 50–100)
VENTRICULAR RATE: 82 BPM
WBC # BLD AUTO: 16.2 THOUSAND/UL (ref 4.8–10.8)

## 2018-02-26 PROCEDURE — 80320 DRUG SCREEN QUANTALCOHOLS: CPT | Performed by: EMERGENCY MEDICINE

## 2018-02-26 PROCEDURE — 93005 ELECTROCARDIOGRAM TRACING: CPT

## 2018-02-26 PROCEDURE — 85610 PROTHROMBIN TIME: CPT | Performed by: EMERGENCY MEDICINE

## 2018-02-26 PROCEDURE — 82140 ASSAY OF AMMONIA: CPT | Performed by: EMERGENCY MEDICINE

## 2018-02-26 PROCEDURE — 80053 COMPREHEN METABOLIC PANEL: CPT | Performed by: EMERGENCY MEDICINE

## 2018-02-26 PROCEDURE — 83735 ASSAY OF MAGNESIUM: CPT | Performed by: EMERGENCY MEDICINE

## 2018-02-26 PROCEDURE — 96360 HYDRATION IV INFUSION INIT: CPT

## 2018-02-26 PROCEDURE — 99223 1ST HOSP IP/OBS HIGH 75: CPT | Performed by: FAMILY MEDICINE

## 2018-02-26 PROCEDURE — 85730 THROMBOPLASTIN TIME PARTIAL: CPT | Performed by: EMERGENCY MEDICINE

## 2018-02-26 PROCEDURE — 81003 URINALYSIS AUTO W/O SCOPE: CPT | Performed by: EMERGENCY MEDICINE

## 2018-02-26 PROCEDURE — 99285 EMERGENCY DEPT VISIT HI MDM: CPT

## 2018-02-26 PROCEDURE — 80164 ASSAY DIPROPYLACETIC ACD TOT: CPT | Performed by: EMERGENCY MEDICINE

## 2018-02-26 PROCEDURE — 36415 COLL VENOUS BLD VENIPUNCTURE: CPT | Performed by: EMERGENCY MEDICINE

## 2018-02-26 PROCEDURE — 80329 ANALGESICS NON-OPIOID 1 OR 2: CPT | Performed by: EMERGENCY MEDICINE

## 2018-02-26 PROCEDURE — 71045 X-RAY EXAM CHEST 1 VIEW: CPT

## 2018-02-26 PROCEDURE — 93010 ELECTROCARDIOGRAM REPORT: CPT | Performed by: INTERNAL MEDICINE

## 2018-02-26 PROCEDURE — 96361 HYDRATE IV INFUSION ADD-ON: CPT

## 2018-02-26 PROCEDURE — 85027 COMPLETE CBC AUTOMATED: CPT | Performed by: EMERGENCY MEDICINE

## 2018-02-26 PROCEDURE — 85007 BL SMEAR W/DIFF WBC COUNT: CPT | Performed by: EMERGENCY MEDICINE

## 2018-02-26 PROCEDURE — 92610 EVALUATE SWALLOWING FUNCTION: CPT

## 2018-02-26 PROCEDURE — 80307 DRUG TEST PRSMV CHEM ANLYZR: CPT | Performed by: EMERGENCY MEDICINE

## 2018-02-26 RX ORDER — ACETAMINOPHEN 325 MG/1
650 TABLET ORAL EVERY 6 HOURS PRN
Status: DISCONTINUED | OUTPATIENT
Start: 2018-02-26 | End: 2018-02-26

## 2018-02-26 RX ORDER — LORAZEPAM 2 MG/ML
1 INJECTION INTRAMUSCULAR AS NEEDED
Status: DISCONTINUED | OUTPATIENT
Start: 2018-02-26 | End: 2018-02-27 | Stop reason: HOSPADM

## 2018-02-26 RX ORDER — DOCUSATE SODIUM 100 MG/1
100 CAPSULE, LIQUID FILLED ORAL 2 TIMES DAILY
Status: DISCONTINUED | OUTPATIENT
Start: 2018-02-26 | End: 2018-02-26

## 2018-02-26 RX ORDER — NICOTINE 21 MG/24HR
1 PATCH, TRANSDERMAL 24 HOURS TRANSDERMAL DAILY
Status: DISCONTINUED | OUTPATIENT
Start: 2018-02-26 | End: 2018-02-27 | Stop reason: HOSPADM

## 2018-02-26 RX ORDER — ONDANSETRON 2 MG/ML
4 INJECTION INTRAMUSCULAR; INTRAVENOUS EVERY 6 HOURS PRN
Status: DISCONTINUED | OUTPATIENT
Start: 2018-02-26 | End: 2018-02-27 | Stop reason: HOSPADM

## 2018-02-26 RX ORDER — CALCIUM CARBONATE 200(500)MG
1000 TABLET,CHEWABLE ORAL DAILY PRN
Status: DISCONTINUED | OUTPATIENT
Start: 2018-02-26 | End: 2018-02-26

## 2018-02-26 RX ORDER — SODIUM CHLORIDE 9 MG/ML
125 INJECTION, SOLUTION INTRAVENOUS CONTINUOUS
Status: DISCONTINUED | OUTPATIENT
Start: 2018-02-26 | End: 2018-02-27 | Stop reason: HOSPADM

## 2018-02-26 RX ADMIN — ENOXAPARIN SODIUM 40 MG: 40 INJECTION SUBCUTANEOUS at 08:25

## 2018-02-26 RX ADMIN — SODIUM CHLORIDE 1000 ML: 0.9 INJECTION, SOLUTION INTRAVENOUS at 02:11

## 2018-02-26 RX ADMIN — SODIUM CHLORIDE 125 ML/HR: 0.9 INJECTION, SOLUTION INTRAVENOUS at 08:25

## 2018-02-26 RX ADMIN — SODIUM CHLORIDE 125 ML/HR: 0.9 INJECTION, SOLUTION INTRAVENOUS at 17:40

## 2018-02-26 NOTE — ED PROVIDER NOTES
History  Chief Complaint   Patient presents with    Overdose - Accidental     drank a bottle of zyquill and some unysom because he had bronchitis and could not sleep, reports he took at 1400     Patient arrives lethargic and sleepy after taking an entire bottle of Z-quil brand of diphenhydramine and also a bottle of Unisom brand of diphenhydramine about 12 hours prior to arrival   Patient states he was not trying to commit suicide, but was unable to sleep because of bronchitis  Patient states he did not drink any alcohol as he has recently at a rehab  Patient states he did not know that this amount of medication could harm him  Patient denies any  Co-ingestion Tylenol aspirin ethanol or other substances of abuse            Prior to Admission Medications   Prescriptions Last Dose Informant Patient Reported? Taking?    FLUoxetine (PROzac) 20 mg capsule  Self Yes No   Sig: Take 40 mg by mouth daily   benztropine (COGENTIN) 1 mg tablet  Self Yes No   Sig: Take 1 mg by mouth 2 (two) times a day   divalproex sodium (DEPAKOTE) 500 mg EC tablet  Self Yes No   Sig: Take 1,000 mg by mouth every 12 (twelve) hours   hydrOXYzine pamoate (VISTARIL) 50 mg capsule  Self Yes No   Sig: Take 50 mg by mouth 2 (two) times a day   lisinopril-hydrochlorothiazide (PRINZIDE,ZESTORETIC) 20-12 5 MG per tablet  Self Yes No   Sig: Take 1 tablet by mouth daily   mirtazapine (REMERON) 15 mg tablet  Self Yes No   Sig: Take 15 mg by mouth daily at bedtime   mirtazapine (REMERON) 15 mg tablet   No No   Sig: Take 1 tablet (15 mg total) by mouth daily at bedtime   propranolol (INDERAL) 10 mg tablet   No No   Sig: Take 1 tablet (10 mg total) by mouth 3 (three) times a day   risperiDONE (RisperDAL) 2 mg tablet  Self Yes No   Sig: Take 4 mg by mouth 2 (two) times a day        Facility-Administered Medications: None       Past Medical History:   Diagnosis Date    Anemia     History of alcohol abuse     Hypertension     Hypertension     Psychiatric disorder        History reviewed  No pertinent surgical history  Family History   Problem Relation Age of Onset    Coronary artery disease Mother     No Known Problems Father     Hepatitis Sister     Cancer Brother     Prostate cancer Brother     Early death Brother      I have reviewed and agree with the history as documented  Social History   Substance Use Topics    Smoking status: Current Every Day Smoker     Packs/day: 1 00     Years: 40 00    Smokeless tobacco: Never Used    Alcohol use No      Comment: last drink nov 19 2017        Review of Systems   Constitutional: Negative for fever  Respiratory: Positive for cough  Negative for shortness of breath  Cardiovascular: Positive for leg swelling  Negative for chest pain  Gastrointestinal: Negative for abdominal pain and vomiting  Musculoskeletal: Positive for arthralgias  Neurological: Positive for weakness  Psychiatric/Behavioral: Positive for sleep disturbance  Negative for suicidal ideas  All other systems reviewed and are negative  Physical Exam  ED Triage Vitals   Temperature Pulse Respirations Blood Pressure SpO2   02/26/18 0415 02/26/18 0157 02/26/18 0157 02/26/18 0157 02/26/18 0157   98 °F (36 7 °C) 83 18 142/68 97 %      Temp Source Heart Rate Source Patient Position - Orthostatic VS BP Location FiO2 (%)   02/26/18 0415 02/26/18 0157 02/26/18 0157 02/26/18 0157 --   Tympanic Monitor Lying Left arm       Pain Score       02/26/18 0157       No Pain           Orthostatic Vital Signs  Vitals:    02/26/18 0330 02/26/18 0345 02/26/18 0400 02/26/18 0415   BP:    110/56   Pulse: 66 68 68 67   Patient Position - Orthostatic VS:    Lying       Physical Exam   Constitutional: He appears well-developed and well-nourished  HENT:   Head: Normocephalic and atraumatic  Mouth/Throat: Oropharynx is clear and moist    Eyes: Conjunctivae and EOM are normal    No nystagmus   Neck: Normal range of motion  Neck supple   No JVD present  Cardiovascular: Normal rate, regular rhythm, normal heart sounds and intact distal pulses  Pulmonary/Chest: Effort normal and breath sounds normal    Abdominal: Soft  Bowel sounds are normal    Musculoskeletal: Normal range of motion  He exhibits edema  He exhibits no tenderness  Neurological: No cranial nerve deficit or sensory deficit  He exhibits normal muscle tone  Patient is drowsy with some slurring of speech but is arousable to alertness and answers questions appropriately   Skin: Skin is warm and dry  Psychiatric: He has a normal mood and affect  His behavior is normal    Nursing note and vitals reviewed  ED Medications  Medications   sodium chloride 0 9 % bolus 1,000 mL (1,000 mL Intravenous New Bag 2/26/18 0211)       Diagnostic Studies  Results Reviewed     Procedure Component Value Units Date/Time    Ammonia [[id number]]  (Abnormal) Collected:  02/26/18 0250    Lab Status:  Final result Specimen:  Blood from Arm, Left Updated:  02/26/18 0309     Ammonia 43 (H) umol/L     CBC and differential [[id number]]  (Abnormal) Collected:  02/26/18 0211    Lab Status:  Final result Specimen:  Blood from Arm, Left Updated:  02/26/18 0254     WBC 16 20 (H) Thousand/uL      RBC 2 79 (L) Million/uL      Hemoglobin 8 3 (L) g/dL      Hematocrit 24 1 (L) %      MCV 86 fL      MCH 29 8 pg      MCHC 34 4 g/dL      RDW 13 6 %      MPV 7 7 (L) fL      Platelets 373 Thousands/uL     Protime-INR [[id number]]  (Abnormal) Collected:  02/26/18 0211    Lab Status:  Final result Specimen:  Blood from Arm, Left Updated:  02/26/18 0246     Protime 12 5 (H) seconds      INR 1 19 (H)    APTT [[id number]]  (Normal) Collected:  02/26/18 0211    Lab Status:  Final result Specimen:  Blood from Arm, Left Updated:  02/26/18 0246     PTT 24 seconds     Narrative:          Therapeutic Heparin Range = 60-90 seconds    Comprehensive metabolic panel [[id number]]  (Abnormal) Collected:  02/26/18 0211    Lab Status:  Final result Specimen:  Blood from Arm, Left Updated:  02/26/18 0246     Sodium 143 mmol/L      Potassium 4 1 mmol/L      Chloride 105 mmol/L      CO2 32 mmol/L      Anion Gap 6 mmol/L      BUN 20 mg/dL      Creatinine 0 97 mg/dL      Glucose 119 mg/dL      Calcium 8 8 mg/dL      AST 18 U/L      ALT 24 U/L      Alkaline Phosphatase 72 U/L      Total Protein 5 9 (L) g/dL      Albumin 3 4 (L) g/dL      Total Bilirubin 0 40 mg/dL      eGFR 83 ml/min/1 73sq m     Narrative:         National Kidney Disease Education Program recommendations are as follows:  GFR calculation is accurate only with a steady state creatinine  Chronic Kidney disease less than 60 ml/min/1 73 sq  meters  Kidney failure less than 15 ml/min/1 73 sq  meters      Magnesium [[id number]]  (Normal) Collected:  02/26/18 0211    Lab Status:  Final result Specimen:  Blood from Arm, Left Updated:  02/26/18 0246     Magnesium 1 9 mg/dL     Salicylate level [[id number]]  (Abnormal) Collected:  02/26/18 0211    Lab Status:  Final result Specimen:  Blood from Arm, Left Updated:  28/52/06 5662     Salicylate Lvl <3 (L) mg/dL     Acetaminophen level [[id number]]  (Abnormal) Collected:  02/26/18 0211    Lab Status:  Final result Specimen:  Blood from Arm, Left Updated:  02/26/18 0244     Acetaminophen Level <2 (L) ug/mL     Valproic acid level, total [[id number]]  (Abnormal) Collected:  02/26/18 0211    Lab Status:  Final result Specimen:  Blood from Arm, Left Updated:  02/26/18 0239     Valproic Acid, Total 43 (L) ug/mL     Ethanol [[id number]]  (Normal) Collected:  02/26/18 0211    Lab Status:  Final result Specimen:  Blood from Arm, Left Updated:  02/26/18 0234     Ethanol Lvl 3 mg/dL     Rapid drug screen, urine [[id number]]     Lab Status:  No result Specimen:  Urine     UA w Reflex to Microscopic [[id number]]     Lab Status:  No result Specimen:  Urine                  XR chest 1 view portable    (Results Pending)              Procedures  ECG 12 Lead Documentation  Date/Time: 2/26/2018 1:59 AM  Performed by: Katlin Ye  Authorized by: Katlin Ye     Indications / Diagnosis:  Overdose  ECG reviewed by me, the ED Provider: yes    Patient location:  ED  Interpretation:     Interpretation: abnormal    Rate:     ECG rate:  82    ECG rate assessment: normal    Rhythm:     Rhythm: sinus rhythm    Ectopy:     Ectopy: none    QRS:     QRS axis:  Normal    QRS intervals:  Normal  Conduction:     Conduction: normal    ST segments:     ST segments:  Normal  T waves:     T waves: normal    Other findings:     Other findings: poor R wave progression               Phone Contacts  ED Phone Contact    ED Course  ED Course                                MDM  Number of Diagnoses or Management Options  Diagnosis management comments: Wife states the patient had similar episodes in recent days, where she caught him drinking bottles of Benadryl, which she threw out  Patient spent [facility] days the court ordered alcohol rehab program     CritCare Time    Disposition  Final diagnoses:   Diphenhydramine overdose   Encephalopathy     Time reflects when diagnosis was documented in both MDM as applicable and the Disposition within this note     Time User Action Codes Description Comment    2/26/2018  4:42 AM Vasile MORRIS Add [T45 0X1A] Diphenhydramine overdose     2/26/2018  4:42 AM Vasile MORRIS Add [G93 40] Encephalopathy       ED Disposition     ED Disposition Condition Comment    Admit  Case was discussed with Dr Eller and the patient's admission status was agreed to be Admission Status: inpatient status to the service of Dr Nadeem Carmona   Follow-up Information    None       Patient's Medications   Discharge Prescriptions    No medications on file     No discharge procedures on file      ED Provider  Electronically Signed by           Sean Kumar MD  02/26/18 1346

## 2018-02-26 NOTE — SOCIAL WORK
DASH discussion completed  Discussed goals of making sure pt's needs are met upon discharge, pt's preferences are taken into account, pt understands her health condition, medications and symptoms to watch for after returning home and pt is aware of any follow up appointments recommended by hospital physician  SPOKE WITH PT AT THE BEDSIDE  PT NOTED THAT HE LIVES WITH HIS WIFE, HE HAS NO DME OR HHC  HE DOES HAVE A CPAP MACHINE AT HOME BUT CAN NOT REMEMBER WHAT COMPANY PROVIDES THE SERVICES FOR THAT    HE DOES DRIVE AND USES THE [facility] IN [place], [place]

## 2018-02-26 NOTE — ED NOTES
Patient's wife states that "he did the same thing on Friday"  States he took about 3/4 of a bottle of cold medicine and she threw it out  States that he tried to sneak another bottle in today  Wife is going to go home to get some sleep  Is aware that she will be contacted if anything pertinent happens       Ishmael Aceves RN  02/26/18 9757

## 2018-02-26 NOTE — H&P
H&P Exam - Marcel Huerta 60 y o  male MRN: [id number]    Unit/Bed#: ED 07 Encounter: [id number]      Assessment/Plan         Plan:  Encephalopathy 2/2 accidental diphenhydramine overdose  acetaminophen and salicylate levels wnl  -NPO for now  -neuro checks q2hr  -Ativan 1mg PRN seizures  -cont on telemetry  -hold all home medications for now  -elevate head of bed to 30 degrees  -suction at bedside for vomiting    Normocytic anemia  -Hg 8 3 POA, appears baseline  -FOBT  -bleeding workup from previous admission wnl  -no melena noted  -unclear diagnosis of CLL in [place], follow up previous records  -monitor CBC    Leukocytosis 2/2 infectious etiology vs primary bone marrow cancer ( CLL)  -WBC 16 6 POA, platelets 656, bandemia 82%, neutrophil absolute 15 38  -PT/INR slightly elevated  -follow up final CXR read  -Blood cx, urine cx pending  -procalcitonin level pending    History of alcohol abuse  -last stated drink "75 days ago"  -Ammonia 43 POA, Ethanol level 3  -IV thiamine and folic acid  -start multivitamin daily once atable  -cont to monitor    HTN  -/68 POA  -hold home lisinopril-hctz for now  -resume once stable    Bipolar disorder  -hold all home meds for now  -once stable, restart    Tobacco dependence  -smoking cessation provided  -nicotine 21mg patch     FEN  -IV NS @ 125cc/hr  -replete electrolytes as needed  -lovenox and SCDs for DVT prophylaxis      History of Present Illness     HPI:  Marcel Huerta is a 60 y o  male with a past medical history of hypertension, depression, alcohol use and anemia who presents with altered mental status  Unable to obtain history from patient as he was very drowsy and did not remember what happened  Per ED, 12 hours prior to admission patient drink a bottle the window and a bottle of Unisom  Patient states he was not trying to commit suicide but has a really bad cough and wanted some relief  Patient did not know that this would harm him in any way    Patient was brought in by the wife as he was saying weird things and not now is going on  In the ED, patient was given 1 L of normal saline bolus, chest x-ray was ordered  Review of Systems   Constitutional: Positive for fatigue  HENT: Negative  Eyes: Negative  Respiratory: Positive for cough  Negative for shortness of breath  Cardiovascular: Negative for chest pain and leg swelling  Gastrointestinal: Negative  Genitourinary: Negative  Musculoskeletal: Negative  Neurological: Negative  Psychiatric/Behavioral: Negative  Historical Information   Past Medical History:   Diagnosis Date    Anemia     History of alcohol abuse     Hypertension     Hypertension     Psychiatric disorder      History reviewed  No pertinent surgical history  Social History   History   Alcohol Use No     Comment: last drink nov 19 2017     History   Drug Use No     History   Smoking Status    Current Every Day Smoker    Packs/day: 1 00    Years: 40 00   Smokeless Tobacco    Never Used     Family History:   Family History   Problem Relation Age of Onset    Coronary artery disease Mother     No Known Problems Father     Hepatitis Sister     Cancer Brother     Prostate cancer Brother     Early death Brother        Meds/Allergies   PTA meds:   Prior to Admission Medications   Prescriptions Last Dose Informant Patient Reported? Taking?    FLUoxetine (PROzac) 20 mg capsule  Self Yes No   Sig: Take 40 mg by mouth daily   benztropine (COGENTIN) 1 mg tablet  Self Yes No   Sig: Take 1 mg by mouth 2 (two) times a day   divalproex sodium (DEPAKOTE) 500 mg EC tablet  Self Yes No   Sig: Take 1,000 mg by mouth every 12 (twelve) hours   hydrOXYzine pamoate (VISTARIL) 50 mg capsule  Self Yes No   Sig: Take 50 mg by mouth 2 (two) times a day   lisinopril-hydrochlorothiazide (PRINZIDE,ZESTORETIC) 20-12 5 MG per tablet  Self Yes No   Sig: Take 1 tablet by mouth daily   mirtazapine (REMERON) 15 mg tablet  Self Yes No   Sig: Take 15 mg by mouth daily at bedtime   mirtazapine (REMERON) 15 mg tablet   No No   Sig: Take 1 tablet (15 mg total) by mouth daily at bedtime   propranolol (INDERAL) 10 mg tablet   No No   Sig: Take 1 tablet (10 mg total) by mouth 3 (three) times a day   risperiDONE (RisperDAL) 2 mg tablet  Self Yes No   Sig: Take 4 mg by mouth 2 (two) times a day        Facility-Administered Medications: None     No Known Allergies    Objective   Vitals: Blood pressure 110/56, pulse 67, temperature 98 °F (36 7 °C), temperature source Tympanic, resp  rate 12, height 5' 10 5" (1 791 m), weight 113 kg (250 lb), SpO2 95 %  No intake or output data in the 24 hours ending 02/26/18 0451    Invasive Devices     Peripheral Intravenous Line            Peripheral IV 02/26/18 Left Wrist less than 1 day                Physical Exam   Constitutional: He appears well-developed and well-nourished  HENT:   Head: Normocephalic and atraumatic  Eyes: Pupils are equal, round, and reactive to light  Neck: Normal range of motion  Cardiovascular: Normal rate and regular rhythm  Pulmonary/Chest: Effort normal and breath sounds normal  He has no wheezes  Abdominal: Soft  Bowel sounds are normal  There is no tenderness  Musculoskeletal: Normal range of motion  Neurological:   Patient drowsy but arousable and answers questions appropriately  AAOx2-3   Skin: Skin is warm and dry  He is not diaphoretic  Lab Results: I have personally reviewed pertinent reports       Results for orders placed or performed during the hospital encounter of 02/26/18   CBC and differential   Result Value Ref Range    WBC 16 20 (H) 4 80 - 10 80 Thousand/uL    RBC 2 79 (L) 4 70 - 6 10 Million/uL    Hemoglobin 8 3 (L) 14 0 - 18 0 g/dL    Hematocrit 24 1 (L) 42 0 - 52 0 %    MCV 86 82 - 98 fL    MCH 29 8 27 0 - 31 0 pg    MCHC 34 4 31 4 - 37 4 g/dL    RDW 13 6 11 6 - 15 1 %    MPV 7 7 (L) 8 9 - 12 7 fL    Platelets 889 119 - 602 Thousands/uL   Protime-INR Result Value Ref Range    Protime 12 5 (H) 9 4 - 11 7 seconds    INR 1 19 (H) 0 86 - 1 16   APTT   Result Value Ref Range    PTT 24 24 - 33 seconds   Comprehensive metabolic panel   Result Value Ref Range    Sodium 143 136 - 145 mmol/L    Potassium 4 1 3 5 - 5 3 mmol/L    Chloride 105 100 - 108 mmol/L    CO2 32 21 - 32 mmol/L    Anion Gap 6 4 - 13 mmol/L    BUN 20 5 - 25 mg/dL    Creatinine 0 97 0 60 - 1 30 mg/dL    Glucose 119 65 - 140 mg/dL    Calcium 8 8 8 3 - 10 1 mg/dL    AST 18 5 - 45 U/L    ALT 24 12 - 78 U/L    Alkaline Phosphatase 72 46 - 116 U/L    Total Protein 5 9 (L) 6 4 - 8 2 g/dL    Albumin 3 4 (L) 3 5 - 5 0 g/dL    Total Bilirubin 0 40 0 20 - 1 00 mg/dL    eGFR 83 ml/min/1 73sq m   Ethanol   Result Value Ref Range    Ethanol Lvl 3 0 - 3 mg/dL   Magnesium   Result Value Ref Range    Magnesium 1 9 1 6 - 2 6 mg/dL   Acetaminophen level   Result Value Ref Range    Acetaminophen Level <2 (L) 10 - 30 ug/mL   Salicylate level   Result Value Ref Range    Salicylate Lvl <3 (L) 3 - 20 mg/dL   Valproic acid level, total   Result Value Ref Range    Valproic Acid, Total 43 (L) 50 - 100 ug/mL   Ammonia   Result Value Ref Range    Ammonia 43 (H) 11 - 35 umol/L   Manual Differential (Non Wam)   Result Value Ref Range    Segmented % 13 %    Bands % 82 (H) 0 - 8 %    Lymphocytes % 5 %    Neutrophils Absolute 15 38 (H) 1 81 - 6 82 Thousand/uL    Lymphocytes Absolute 0 82 0 60 - 4 47 Thousand/uL    Total Counted 99     Platelet Estimate Adequate Adequate     Imaging: I have personally reviewed pertinent reports  XR chest 1 view portable    (Results Pending)     EKG, Pathology, and Other Studies: I have personally reviewed pertinent reports  Code Status: Prior  Advance Directive and Living Will:      Power of :    POLST:      Counseling / Coordination of Care  Total floor / unit time spent today 30 minutes    Greater than 50% of total time was spent with the patient and / or family counseling and / or coordination of care    A description of the counseling / coordination of care:

## 2018-02-27 ENCOUNTER — APPOINTMENT (INPATIENT)
Dept: RADIOLOGY | Facility: HOSPITAL | Age: 63
DRG: 917 | End: 2018-02-27
Payer: COMMERCIAL

## 2018-02-27 VITALS
RESPIRATION RATE: 18 BRPM | SYSTOLIC BLOOD PRESSURE: 146 MMHG | OXYGEN SATURATION: 97 % | HEIGHT: 71 IN | TEMPERATURE: 98.9 F | BODY MASS INDEX: 35 KG/M2 | HEART RATE: 71 BPM | WEIGHT: 250 LBS | DIASTOLIC BLOOD PRESSURE: 72 MMHG

## 2018-02-27 LAB
AMMONIA PLAS-SCNC: 39 UMOL/L (ref 11–35)
ANION GAP SERPL CALCULATED.3IONS-SCNC: 7 MMOL/L (ref 4–13)
BUN SERPL-MCNC: 13 MG/DL (ref 5–25)
CALCIUM SERPL-MCNC: 8.1 MG/DL (ref 8.3–10.1)
CHLORIDE SERPL-SCNC: 108 MMOL/L (ref 100–108)
CO2 SERPL-SCNC: 28 MMOL/L (ref 21–32)
CREAT SERPL-MCNC: 0.75 MG/DL (ref 0.6–1.3)
ERYTHROCYTE [DISTWIDTH] IN BLOOD BY AUTOMATED COUNT: 14.2 % (ref 11.6–15.1)
GFR SERPL CREATININE-BSD FRML MDRD: 98 ML/MIN/1.73SQ M
GLUCOSE SERPL-MCNC: 239 MG/DL (ref 65–140)
GLUCOSE SERPL-MCNC: 89 MG/DL (ref 65–140)
HCT VFR BLD AUTO: 22.4 % (ref 42–52)
HGB BLD-MCNC: 7.5 G/DL (ref 14–18)
MAGNESIUM SERPL-MCNC: 2.1 MG/DL (ref 1.6–2.6)
MCH RBC QN AUTO: 29 PG (ref 27–31)
MCHC RBC AUTO-ENTMCNC: 33.6 G/DL (ref 31.4–37.4)
MCV RBC AUTO: 86 FL (ref 82–98)
PHOSPHATE SERPL-MCNC: 4.9 MG/DL (ref 2.3–4.1)
PLATELET # BLD AUTO: 145 THOUSANDS/UL (ref 130–400)
PMV BLD AUTO: 7 FL (ref 8.9–12.7)
POTASSIUM SERPL-SCNC: 4.1 MMOL/L (ref 3.5–5.3)
RBC # BLD AUTO: 2.59 MILLION/UL (ref 4.7–6.1)
SODIUM SERPL-SCNC: 143 MMOL/L (ref 136–145)
WBC # BLD AUTO: 11.4 THOUSAND/UL (ref 4.8–10.8)

## 2018-02-27 PROCEDURE — 99238 HOSP IP/OBS DSCHRG MGMT 30/<: CPT | Performed by: FAMILY MEDICINE

## 2018-02-27 PROCEDURE — 71046 X-RAY EXAM CHEST 2 VIEWS: CPT

## 2018-02-27 PROCEDURE — G8998 SWALLOW D/C STATUS: HCPCS

## 2018-02-27 PROCEDURE — 82948 REAGENT STRIP/BLOOD GLUCOSE: CPT

## 2018-02-27 PROCEDURE — 84100 ASSAY OF PHOSPHORUS: CPT | Performed by: STUDENT IN AN ORGANIZED HEALTH CARE EDUCATION/TRAINING PROGRAM

## 2018-02-27 PROCEDURE — 82140 ASSAY OF AMMONIA: CPT | Performed by: STUDENT IN AN ORGANIZED HEALTH CARE EDUCATION/TRAINING PROGRAM

## 2018-02-27 PROCEDURE — 85027 COMPLETE CBC AUTOMATED: CPT | Performed by: STUDENT IN AN ORGANIZED HEALTH CARE EDUCATION/TRAINING PROGRAM

## 2018-02-27 PROCEDURE — 92526 ORAL FUNCTION THERAPY: CPT

## 2018-02-27 PROCEDURE — 87081 CULTURE SCREEN ONLY: CPT | Performed by: STUDENT IN AN ORGANIZED HEALTH CARE EDUCATION/TRAINING PROGRAM

## 2018-02-27 PROCEDURE — 83735 ASSAY OF MAGNESIUM: CPT | Performed by: STUDENT IN AN ORGANIZED HEALTH CARE EDUCATION/TRAINING PROGRAM

## 2018-02-27 PROCEDURE — 80048 BASIC METABOLIC PNL TOTAL CA: CPT | Performed by: STUDENT IN AN ORGANIZED HEALTH CARE EDUCATION/TRAINING PROGRAM

## 2018-02-27 RX ORDER — BENZTROPINE MESYLATE 1 MG/1
1 TABLET ORAL 2 TIMES DAILY
Status: DISCONTINUED | OUTPATIENT
Start: 2018-02-27 | End: 2018-02-27 | Stop reason: HOSPADM

## 2018-02-27 RX ORDER — RISPERIDONE 1 MG/1
2 TABLET, FILM COATED ORAL 2 TIMES DAILY
Status: DISCONTINUED | OUTPATIENT
Start: 2018-02-27 | End: 2018-02-27 | Stop reason: HOSPADM

## 2018-02-27 RX ORDER — MIRTAZAPINE 15 MG/1
15 TABLET, FILM COATED ORAL
Status: DISCONTINUED | OUTPATIENT
Start: 2018-02-27 | End: 2018-02-27 | Stop reason: HOSPADM

## 2018-02-27 RX ORDER — FLUOXETINE HYDROCHLORIDE 20 MG/1
40 CAPSULE ORAL DAILY
Status: DISCONTINUED | OUTPATIENT
Start: 2018-02-27 | End: 2018-02-27 | Stop reason: HOSPADM

## 2018-02-27 RX ORDER — DIVALPROEX SODIUM 500 MG/1
1000 TABLET, DELAYED RELEASE ORAL EVERY 12 HOURS SCHEDULED
Status: DISCONTINUED | OUTPATIENT
Start: 2018-02-27 | End: 2018-02-27 | Stop reason: HOSPADM

## 2018-02-27 RX ORDER — PROPRANOLOL HYDROCHLORIDE 20 MG/1
10 TABLET ORAL 3 TIMES DAILY
Status: DISCONTINUED | OUTPATIENT
Start: 2018-02-27 | End: 2018-02-27 | Stop reason: HOSPADM

## 2018-02-27 RX ADMIN — LISINOPRIL: 20 TABLET ORAL at 12:22

## 2018-02-27 RX ADMIN — SODIUM CHLORIDE 125 ML/HR: 0.9 INJECTION, SOLUTION INTRAVENOUS at 10:33

## 2018-02-27 RX ADMIN — RISPERIDONE 2 MG: 1 TABLET ORAL at 12:22

## 2018-02-27 RX ADMIN — SODIUM CHLORIDE 125 ML/HR: 0.9 INJECTION, SOLUTION INTRAVENOUS at 01:37

## 2018-02-27 RX ADMIN — BENZTROPINE MESYLATE 1 MG: 1 TABLET ORAL at 12:23

## 2018-02-27 RX ADMIN — FLUOXETINE 40 MG: 20 CAPSULE ORAL at 12:22

## 2018-02-27 RX ADMIN — ENOXAPARIN SODIUM 40 MG: 40 INJECTION SUBCUTANEOUS at 09:30

## 2018-02-27 RX ADMIN — PROPRANOLOL HYDROCHLORIDE 10 MG: 20 TABLET ORAL at 12:23

## 2018-02-27 RX ADMIN — DIVALPROEX SODIUM 1000 MG: 500 TABLET, DELAYED RELEASE ORAL at 12:23

## 2018-02-27 NOTE — PLAN OF CARE
Problem: SLP ADULT - SWALLOWING, IMPAIRED  Goal: Initial SLP swallow eval performed  Outcome: Completed Date Met: 02/26/18

## 2018-02-27 NOTE — CONSULTS
Consultation - Norma Lorne 60 y o  male MRN: [id number]    Unit/Bed#: [address] [place] [address] Encounter: [id number]      Identifying Data:  60years old white male is admitted at [facility] on February 26, 2018 after wife brought him to the hospital patient was found drowsy because patient reported that he was having a cough and he was trying to relieve his cough so he drank a bottle of the cough sit up and units some but it was not a suicide attempt  Psychiatric consultation is asked for the evaluation  This patient is known to me from seen week ago at this hospital for the depression  Discussed with the resident physician and medical attending they reported that patient is medically stable and in their impression it was not a suicide attempt  I reviewed my consultation and his psychiatric history patient has no history of suicide attempt and patient clearly reports that he was drowsy and wife got mad at him and they were arguing she brought him to the hospital and he was drowsy but he is not suicidal and he wants to go home  This is my clinical judgment that patient has a history of depression but no history of suicide attempts in the past and he clearly denies feeling suicidal   Patient lives with the wife he has 2 daughters patient smokes cigarette but he denies abusing alcohol or drugs he does have a history of alcohol abuse but he stop drinking alcohol 6 months ago he has a history of 1 dui and 1 rehab in the past he has tried smoking pot years ago but no other drugs no history of IV drug abuse and he denies legal history in the past   Patient has 9th grade education and he has a job he works as a   Patient is suffering from bipolar disorder and he has been taking his medications patient is also suffering from hypertension tobacco abuse anemia history of alcohol abuse hypertension and currently he is on medications      Chief Complaints:  Drank the cough syrup bottle and Unisom to help with the cough patient became drowsy and he was brought to the hospital     Family History: Mother and sister have a depression  Family History   Problem Relation Age of Onset    Coronary artery disease Mother     No Known Problems Father     Hepatitis Sister     Cancer Brother     Prostate cancer Brother     Early death Brother        Legal History:  Patient denies  Mental Status Exam:  60years old white male is alert awake oriented x3 cooperative he is not lethargic communicates his feelings well and he is stable with the depression  Memory intact  Poor historian but he is able to express his feelings  Trouble in sleeping  Patient denies auditory or visual hallucinations  Patient denies suicidal or homicidal ideations  Guarded persaud labile mood swings he can be impulsive and angry  Not agitated  Poor concentration  Nurse reported no behavior problems  Insight and judgments are adequate  History of Present Illness     HPI: Robbin Blas is a 60y o  year old male who presents with drowsiness because he drank bottle of cough syrup with Unisom  Consults      Historical Information   Past Psychiatric History:  Patient has long history of bipolar disorder and he has been on psychotropic medications he is under the psychiatric care with the psychiatrist patient takes Depakote 1000 mg twice a day Remeron 15 mg HS Risperdal 2 mg p o  b i d  and Cogentin 1 mg p o  b i d   Patient had denies psychiatric admissions in the past patient denies suicide attempts in the past he has a remote history of smoking pot years ago but no history of other drug abuse no IV drug abuse he gives a history of 1 dui and 1 rehab in the past and he stop drinking alcohol 6 months ago  Patient has no legal history  He has been under psychiatric care and he has a full-time job    Patient does have a history of alcohol abuse and 1 dui 1 rehab in the past remote history of smoking pot but no drugs at present time  Patient's alcohol level was less than 3 and drug screen was positive only with benzo  Past Medical History:   Diagnosis Date    Anemia     Anxiety     Bipolar disorder ([address] )     History of alcohol abuse     Hypertension     Hypertension     Insomnia     Psychiatric disorder      History reviewed  No pertinent surgical history  Social History   History   Alcohol Use No     Comment: last drink nov 19 2017     History   Drug Use No     History   Smoking Status    Current Every Day Smoker    Packs/day: 1 00    Years: 40 00   Smokeless Tobacco    Never Used       Meds/Allergies   current meds:   Current Facility-Administered Medications   Medication Dose Route Frequency    benztropine (COGENTIN) tablet 1 mg  1 mg Oral BID    divalproex sodium (DEPAKOTE) EC tablet 1,000 mg  1,000 mg Oral Q12H [address]    enoxaparin (LOVENOX) subcutaneous injection 40 mg  40 mg Subcutaneous Daily    FLUoxetine (PROzac) capsule 40 mg  40 mg Oral Daily    lisinopril-hydrochlorothiazide (PRINZIDE 20/12  5) combo dose   Oral Daily    LORazepam (ATIVAN) 2 mg/mL injection 1 mg  1 mg Intravenous PRN    mirtazapine (REMERON) tablet 15 mg  15 mg Oral HS    nicotine (NICODERM CQ) 21 mg/24 hr TD 24 hr patch 1 patch  1 patch Transdermal Daily    ondansetron (ZOFRAN) injection 4 mg  4 mg Intravenous Q6H PRN    propranolol (INDERAL) tablet 10 mg  10 mg Oral TID    risperiDONE (RisperDAL) tablet 2 mg  2 mg Oral BID    sodium chloride 0 9 % infusion  125 mL/hr Intravenous Continuous    and PTA meds:    Prescriptions Prior to Admission   Medication    benztropine (COGENTIN) 1 mg tablet    divalproex sodium (DEPAKOTE) 500 mg EC tablet    FLUoxetine (PROzac) 20 mg capsule    hydrOXYzine pamoate (VISTARIL) 50 mg capsule    lisinopril-hydrochlorothiazide (PRINZIDE,ZESTORETIC) 20-12 5 MG per tablet    mirtazapine (REMERON) 15 mg tablet    mirtazapine (REMERON) 15 mg tablet    propranolol (INDERAL) 10 mg tablet    risperiDONE (RisperDAL) 2 mg tablet     No Known Allergies    Objective   Vitals: Blood pressure 149/74, pulse 74, temperature 98 5 °F (36 9 °C), temperature source Tympanic, resp  rate 18, height 5' 11" (1 803 m), weight 113 kg (250 lb), SpO2 97 %        Routine Lab Results:   Admission on 02/26/2018   Component Date Value Ref Range Status    WBC 02/26/2018 16 20* 4 80 - 10 80 Thousand/uL Final    RBC 02/26/2018 2 79* 4 70 - 6 10 Million/uL Final    Hemoglobin 02/26/2018 8 3* 14 0 - 18 0 g/dL Final    Hematocrit 02/26/2018 24 1* 42 0 - 52 0 % Final    MCV 02/26/2018 86  82 - 98 fL Final    MCH 02/26/2018 29 8  27 0 - 31 0 pg Final    MCHC 02/26/2018 34 4  31 4 - 37 4 g/dL Final    RDW 02/26/2018 13 6  11 6 - 15 1 % Final    MPV 02/26/2018 7 7* 8 9 - 12 7 fL Final    Platelets 72/31/6043 199  130 - 400 Thousands/uL Final    Protime 02/26/2018 12 5* 9 4 - 11 7 seconds Final    INR 02/26/2018 1 19* 0 86 - 1 16 Final    PTT 02/26/2018 24  24 - 33 seconds Final    Sodium 02/26/2018 143  136 - 145 mmol/L Final    Potassium 02/26/2018 4 1  3 5 - 5 3 mmol/L Final    Chloride 02/26/2018 105  100 - 108 mmol/L Final    CO2 02/26/2018 32  21 - 32 mmol/L Final    Anion Gap 02/26/2018 6  4 - 13 mmol/L Final    BUN 02/26/2018 20  5 - 25 mg/dL Final    Creatinine 02/26/2018 0 97  0 60 - 1 30 mg/dL Final    Glucose 02/26/2018 119  65 - 140 mg/dL Final    Calcium 02/26/2018 8 8  8 3 - 10 1 mg/dL Final    AST 02/26/2018 18  5 - 45 U/L Final    ALT 02/26/2018 24  12 - 78 U/L Final    Alkaline Phosphatase 02/26/2018 72  46 - 116 U/L Final    Total Protein 02/26/2018 5 9* 6 4 - 8 2 g/dL Final    Albumin 02/26/2018 3 4* 3 5 - 5 0 g/dL Final    Total Bilirubin 02/26/2018 0 40  0 20 - 1 00 mg/dL Final    eGFR 02/26/2018 83  ml/min/1 73sq m Final    Ethanol Lvl 02/26/2018 3  0 - 3 mg/dL Final    Amph/Meth UR 02/26/2018 Negative  Negative Final    Barbiturate Ur 02/26/2018 Negative  Negative Final    Benzodiazepine Urine 02/26/2018 Positive* Negative Final    Cocaine Urine 02/26/2018 Negative  Negative Final    Methadone Urine 02/26/2018 Negative  Negative Final    Opiate Urine 02/26/2018 Negative  Negative Final    PCP Ur 02/26/2018 Negative  Negative Final    THC Urine 02/26/2018 Negative  Negative Final    Color, UA 02/26/2018 Yellow   Final    Clarity, UA 02/26/2018 Clear   Final    Specific Gravity, UA 02/26/2018 1 010  1 000 - 1 030 Final    pH, UA 02/26/2018 6 5  5 0 - 9 0 Final    Leukocytes, UA 02/26/2018 Negative  Negative Final    Nitrite, UA 02/26/2018 Negative  Negative Final    Protein, UA 02/26/2018 Negative  Negative mg/dl Final    Glucose, UA 02/26/2018 Negative  Negative mg/dl Final    Ketones, UA 02/26/2018 Negative  Negative mg/dl Final    Urobilinogen, UA 02/26/2018 0 2  0 2, 1 0 E U /dl E U /dl Final    Bilirubin, UA 02/26/2018 Negative  Negative Final    Blood, UA 02/26/2018 Negative  Negative Final    Magnesium 02/26/2018 1 9  1 6 - 2 6 mg/dL Final    Acetaminophen Level 02/26/2018 <2* 10 - 30 ug/mL Final    Salicylate Lvl 21/19/5606 <3* 3 - 20 mg/dL Final    Valproic Acid, Total 02/26/2018 43* 50 - 100 ug/mL Final    Ammonia 02/26/2018 43* 11 - 35 umol/L Final    Segmented % 02/26/2018 13  % Final    Bands % 02/26/2018 82* 0 - 8 % Final    Lymphocytes % 02/26/2018 5  % Final    Neutrophils Absolute 02/26/2018 15 38* 1 81 - 6 82 Thousand/uL Final    Lymphocytes Absolute 02/26/2018 0 82  0 60 - 4 47 Thousand/uL Final    Total Counted 02/26/2018 99   Final    Platelet Estimate 94/61/2633 Adequate  Adequate Final    Ventricular Rate 02/26/2018 82  BPM Final    Atrial Rate 02/26/2018 82  BPM Final    WA Interval 02/26/2018 160  ms Final    QRSD Interval 02/26/2018 88  ms Final    QT Interval 02/26/2018 392  ms Final    QTC Interval 02/26/2018 457  ms Final    P Axis 02/26/2018 53  degrees Final    QRS Axis 02/26/2018 -14  degrees Final    T Wave Kimberly 02/26/2018 59  degrees Final    Magnesium 02/27/2018 2 1  1 6 - 2 6 mg/dL Final    Phosphorus 02/27/2018 4 9* 2 3 - 4 1 mg/dL Final    WBC 02/27/2018 11 40* 4 80 - 10 80 Thousand/uL Final    RBC 02/27/2018 2 59* 4 70 - 6 10 Million/uL Final    Hemoglobin 02/27/2018 7 5* 14 0 - 18 0 g/dL Final    Hematocrit 02/27/2018 22 4* 42 0 - 52 0 % Final    MCV 02/27/2018 86  82 - 98 fL Final    MCH 02/27/2018 29 0  27 0 - 31 0 pg Final    MCHC 02/27/2018 33 6  31 4 - 37 4 g/dL Final    RDW 02/27/2018 14 2  11 6 - 15 1 % Final    Platelets 14/03/6635 145  130 - 400 Thousands/uL Final    MPV 02/27/2018 7 0* 8 9 - 12 7 fL Final    Sodium 02/27/2018 143  136 - 145 mmol/L Final    Potassium 02/27/2018 4 1  3 5 - 5 3 mmol/L Final    Chloride 02/27/2018 108  100 - 108 mmol/L Final    CO2 02/27/2018 28  21 - 32 mmol/L Final    Anion Gap 02/27/2018 7  4 - 13 mmol/L Final    BUN 02/27/2018 13  5 - 25 mg/dL Final    Creatinine 02/27/2018 0 75  0 60 - 1 30 mg/dL Final    Glucose 02/27/2018 89  65 - 140 mg/dL Final    Calcium 02/27/2018 8 1* 8 3 - 10 1 mg/dL Final    eGFR 02/27/2018 98  ml/min/1 73sq m Final    Ammonia 02/27/2018 39* 11 - 35 umol/L Final    POC Glucose 02/27/2018 239* 65 - 140 mg/dl Final         Diagnosis:  But  Anxiety  Insomnia  History of alcohol abuse  History of pot abuse  Rule out marital conflicts  Plan:  No need for inpatient psychiatric care since it was not a suicide attempt  Continue his current psychotropic medications as described id  No need for suicidal precaution  Psychotherapy  Patient will follow up with his psychiatrist at scheduled appointment after the discharge  Discussed with the resident physician and medical attendings  Patient may discharge after medical clearance  Thank you very much        Sky Mullins MD

## 2018-02-27 NOTE — PLAN OF CARE
Problem: SLP ADULT - SWALLOWING, IMPAIRED  Goal: Advance to least restrictive diet without signs or symptoms of aspiration for planned discharge setting  See evaluation for individualized goals        Outcome: Progressing

## 2018-02-27 NOTE — CASE MANAGEMENT
Initial Clinical Review    Admission: Date/Time/Statement: 2/26/18 @ 0443     Orders Placed This Encounter   Procedures    Inpatient Admission (expected length of stay for this patient is greater than two midnights)     Standing Status:   Standing     Number of Occurrences:   1     Order Specific Question:   Admitting Physician     Answer:   Jignesh Kidney     Order Specific Question:   Level of Care     Answer:   Med Surg [16]     Order Specific Question:   Estimated length of stay     Answer:   More than 2 Midnights     Order Specific Question:   Certification     Answer:   I certify that inpatient services are medically necessary for this patient for a duration of greater than two midnights  See H&P and MD Progress Notes for additional information about the patient's course of treatment  ED: Date/Time/Mode of Arrival:   ED Arrival Information     Expected Arrival Acuity Means of Arrival Escorted By Service Admission Type    - 2/26/2018 01:53 [facility]  [address] Emergency    Arrival Complaint    medication overdose          Chief Complaint:   Chief Complaint   Patient presents with    Overdose - Accidental     drank a bottle of zyquill and some unysom because he had bronchitis and could not sleep, reports he took at 1400       History of Illness: Elizabeth Knight is a 60 y o  male with a past medical history of hypertension, depression, alcohol use and anemia who presents with altered mental status  Unable to obtain history from patient as he was very drowsy and did not remember what happened  Per ED, 12 hours prior to admission patient drink a bottle Z-QUIL BRAND OF DIPHENTHDRAMINE  and a bottle of Unisom  Patient states he was not trying to commit suicide but has a really bad cough and wanted some relief  Patient did not know that this would harm him in any way    Patient was brought in by the wife as he was saying weird things and not now is going on     ED Vital Signs:   ED Triage Vitals   Temperature Pulse Respirations Blood Pressure SpO2   02/26/18 0415 02/26/18 0157 02/26/18 0157 02/26/18 0157 02/26/18 0157   98 °F (36 7 °C) 83 18 142/68 97 %      Temp Source Heart Rate Source Patient Position - Orthostatic VS BP Location FiO2 (%)   02/26/18 0415 02/26/18 0157 02/26/18 0157 02/26/18 0157 --   Tympanic Monitor Lying Left arm       Pain Score       02/26/18 0157       No Pain        Wt Readings from Last 1 Encounters:   02/26/18 113 kg (250 lb)       Vital Signs (abnormal): Abnormal Labs/Diagnostic Test Results:   Ammonia 43 (H) umol/L      WBC 16 20 (H) Thousand/uL          RBC 2 79 (L) Million/uL         Hemoglobin 8 3 (L) g/dL         Hematocrit 24 1 (L) %      Protime 12 5 (H) seconds           INR 3 88 (H)     Salicylate Lvl <3 (L)     Valproic Acid, Total 43 (L) ug/mL     Acetaminophen Level <2 (L)     EKG Other findings: poor R wave progression        ED Treatment:   Medication Administration from 02/26/2018 0153 to 02/26/2018 0546       Date/Time Order Dose Route Action Action by Comments     02/26/2018 0543 sodium chloride 0 9 % bolus 1,000 mL 0 mL Intravenous Stopped Ishmael Aceves RN      02/26/2018 0211 sodium chloride 0 9 % bolus 1,000 mL 1,000 mL Intravenous Cl Garcia RN           Past Medical/Surgical History:    Active Ambulatory Problems     Diagnosis Date Noted    Symptomatic anemia 02/10/2018    Hypertension 02/10/2018    Depression 02/10/2018    Polypharmacy 02/10/2018    Bronchitis 02/10/2018     Resolved Ambulatory Problems     Diagnosis Date Noted    No Resolved Ambulatory Problems     Past Medical History:   Diagnosis Date    Anemia     Anxiety     Bipolar disorder ([address] )     History of alcohol abuse     Hypertension     Hypertension     Insomnia     Psychiatric disorder        Admitting Diagnosis: Diphenhydramine overdose [T45 0X1A]  Encephalopathy [G93 40]  Accidental overdose [T50 291A]    Age/Sex: 60 y o  male    Assessment/Plan:   The pt has an elevated WBC count with increased bands, and a source of infection is unclear right now  Blood and urine c/s has been ordered  Etiology to be determined  Hg is 8 3 and will be monitored  Wbc count and low Hg could be related to CLL diagnosis from time in [place]  The pt also has a history of alcohol abuse with an elevated ammonia level now  Aspiration pneumonia should be in the differential diagnoses      Plan:  Encephalopathy 2/2 accidental diphenhydramine overdose  acetaminophen and salicylate levels wnl  -NPO for now  -neuro checks q2hr  -Ativan 1mg PRN seizures  -cont on telemetry  -hold all home medications for now  -elevate head of bed to 30 degrees  -suction at bedside for vomiting  Normocytic anemia  -Hg 8 3 POA, appears baseline  -FOBT  -bleeding workup from previous admission wnl  -no melena noted  -unclear diagnosis of CLL in [place], follow up previous records  -monitor CBC  Leukocytosis 2/2 infectious etiology vs primary bone marrow cancer ( CLL)  -WBC 16 6 POA, platelets 115, bandemia 82%, neutrophil absolute 15 38  -PT/INR slightly elevated  -follow up final CXR read  -Blood cx, urine cx pending  -procalcitonin level pending  History of alcohol abuse  -last stated drink "75 days ago"  -Ammonia 43 POA, Ethanol level 3  -IV thiamine and folic acid  -start multivitamin daily once atable  -cont to monitor   HTN  -/68 POA  -hold home lisinopril-hctz for now  -resume once stable   Bipolar disorder  -hold all home meds for now  -once stable, restart  Tobacco dependence  -smoking cessation provided  -nicotine 21mg patch   FEN  -IV NS @ 125cc/hr  -replete electrolytes as needed  -lovenox and SCDs for DVT prophylaxis           Admission Orders:  TELE MON  NEURO CHECKS  SUCTION PRN  ELEVATE HOB  SPEECH SWALLOW EVALUTATION TX  STOOL OCCULT BLOOD   BLD CX   CXR PA LAT  Scheduled Meds:   Current Facility-Administered Medications:  enoxaparin 40 mg Subcutaneous Daily Lore Hickey MD    LORazepam 1 mg Intravenous PRN Lore Hickey MD    nicotine 1 patch Transdermal Daily Lore Hickey MD    ondansetron 4 mg Intravenous Q6H PRN Lore Hickey MD    sodium chloride 125 mL/hr Intravenous Continuous Lore Hickey MD Last Rate: 125 mL/hr (02/27/18 0137)     Continuous Infusions:   sodium chloride 125 mL/hr Last Rate: 125 mL/hr (02/27/18 0137)     PRN Meds: LORazepam    ondansetron

## 2018-02-27 NOTE — DISCHARGE SUMMARY
[facility] Discharge Summary - Medical Anne Perez 60 y o  male MRN: [id number]    [address] [facility] [address] / Bed: E[address] 53-* Encounter: [id number]    BRIEF OVERVIEW  Admitting Provider: Dayne Romo DO  Discharge Provider: Ally Ho DO     Discharge To: Home     Outpatient Follow-Up: Pt will follow up with his primary care physician in next 2 weeks  Things to address at first follow up visit:   Encephalopathy   Labs and results pending at discharge: Blood cultures, Procalcitonin     Admission Date: 2/26/2018     Discharge Date: 2/27/2018    Primary Discharge Diagnosis  Principal Problem:    Encephalopathy  Active Problems:    Symptomatic anemia    Hypertension    Depression    Polypharmacy  Resolved Problems:    * No resolved hospital problems   *      Consulting Providers   Psychiatry- Dr Rik Alexander    Procedures Performed/Pertinent Test results  Results for orders placed or performed during the hospital encounter of 02/26/18   CBC and differential   Result Value Ref Range    WBC 16 20 (H) 4 80 - 10 80 Thousand/uL    RBC 2 79 (L) 4 70 - 6 10 Million/uL    Hemoglobin 8 3 (L) 14 0 - 18 0 g/dL    Hematocrit 24 1 (L) 42 0 - 52 0 %    MCV 86 82 - 98 fL    MCH 29 8 27 0 - 31 0 pg    MCHC 34 4 31 4 - 37 4 g/dL    RDW 13 6 11 6 - 15 1 %    MPV 7 7 (L) 8 9 - 12 7 fL    Platelets 207 551 - 410 Thousands/uL   Protime-INR   Result Value Ref Range    Protime 12 5 (H) 9 4 - 11 7 seconds    INR 1 19 (H) 0 86 - 1 16   APTT   Result Value Ref Range    PTT 24 24 - 33 seconds   Comprehensive metabolic panel   Result Value Ref Range    Sodium 143 136 - 145 mmol/L    Potassium 4 1 3 5 - 5 3 mmol/L    Chloride 105 100 - 108 mmol/L    CO2 32 21 - 32 mmol/L    Anion Gap 6 4 - 13 mmol/L    BUN 20 5 - 25 mg/dL    Creatinine 0 97 0 60 - 1 30 mg/dL    Glucose 119 65 - 140 mg/dL    Calcium 8 8 8 3 - 10 1 mg/dL    AST 18 5 - 45 U/L    ALT 24 12 - 78 U/L    Alkaline Phosphatase 72 46 - 116 U/L    Total Protein 5 9 (L) 6 4 - 8 2 g/dL    Albumin 3 4 (L) 3 5 - 5 0 g/dL    Total Bilirubin 0 40 0 20 - 1 00 mg/dL    eGFR 83 ml/min/1 73sq m   Ethanol   Result Value Ref Range    Ethanol Lvl 3 0 - 3 mg/dL   Rapid drug screen, urine   Result Value Ref Range    Amph/Meth UR Negative Negative    Barbiturate Ur Negative Negative    Benzodiazepine Urine Positive (A) Negative    Cocaine Urine Negative Negative    Methadone Urine Negative Negative    Opiate Urine Negative Negative    PCP Ur Negative Negative    THC Urine Negative Negative   UA w Reflex to Microscopic   Result Value Ref Range    Color, UA Yellow     Clarity, UA Clear     Specific [place], UA 1 010 1 000 - 1 030    pH, UA 6 5 5 0 - 9 0    Leukocytes, UA Negative Negative    Nitrite, UA Negative Negative    Protein, UA Negative Negative mg/dl    Glucose, UA Negative Negative mg/dl    Ketones, UA Negative Negative mg/dl    Urobilinogen, UA 0 2 0 2, 1 0 E U /dl E U /dl    Bilirubin, UA Negative Negative    Blood, UA Negative Negative   Magnesium   Result Value Ref Range    Magnesium 1 9 1 6 - 2 6 mg/dL   Acetaminophen level   Result Value Ref Range    Acetaminophen Level <2 (L) 10 - 30 ug/mL   Salicylate level   Result Value Ref Range    Salicylate Lvl <3 (L) 3 - 20 mg/dL   Valproic acid level, total   Result Value Ref Range    Valproic Acid, Total 43 (L) 50 - 100 ug/mL   Ammonia   Result Value Ref Range    Ammonia 43 (H) 11 - 35 umol/L   Magnesium   Result Value Ref Range    Magnesium 2 1 1 6 - 2 6 mg/dL   Phosphorus   Result Value Ref Range    Phosphorus 4 9 (H) 2 3 - 4 1 mg/dL   CBC   Result Value Ref Range    WBC 11 40 (H) 4 80 - 10 80 Thousand/uL    RBC 2 59 (L) 4 70 - 6 10 Million/uL    Hemoglobin 7 5 (L) 14 0 - 18 0 g/dL    Hematocrit 22 4 (L) 42 0 - 52 0 %    MCV 86 82 - 98 fL    MCH 29 0 27 0 - 31 0 pg    MCHC 33 6 31 4 - 37 4 g/dL    RDW 14 2 11 6 - 15 1 %    Platelets 521 360 - 923 Thousands/uL MPV 7 0 (L) 8 9 - 12 7 fL   Basic metabolic panel   Result Value Ref Range    Sodium 143 136 - 145 mmol/L    Potassium 4 1 3 5 - 5 3 mmol/L    Chloride 108 100 - 108 mmol/L    CO2 28 21 - 32 mmol/L    Anion Gap 7 4 - 13 mmol/L    BUN 13 5 - 25 mg/dL    Creatinine 0 75 0 60 - 1 30 mg/dL    Glucose 89 65 - 140 mg/dL    Calcium 8 1 (L) 8 3 - 10 1 mg/dL    eGFR 98 ml/min/1 73sq m   Ammonia   Result Value Ref Range    Ammonia 39 (H) 11 - 35 umol/L   ECG 12 lead   Result Value Ref Range    Ventricular Rate 82 BPM    Atrial Rate 82 BPM    MT Interval 160 ms    QRSD Interval 88 ms    QT Interval 392 ms    QTC Interval 457 ms    P Axis 53 degrees    QRS Axis -14 degrees    T Wave Axis 59 degrees   Fingerstick Glucose (POCT)   Result Value Ref Range    POC Glucose 239 (H) 65 - 140 mg/dl   Manual Differential (Non Wam)   Result Value Ref Range    Segmented % 13 %    Bands % 82 (H) 0 - 8 %    Lymphocytes % 5 %    Neutrophils Absolute 15 38 (H) 1 81 - 6 82 Thousand/uL    Lymphocytes Absolute 0 82 0 60 - 4 47 Thousand/uL    Total Counted 99     Platelet Estimate Adequate Adequate       XR chest pa & lateral   Final Result      No acute cardiopulmonary disease  Workstation performed: [id number]         XR chest 1 view portable   Final Result      No acute cardiopulmonary disease  Workstation performed: [id number]                HPI  AS per H&P,   Norma Pradhan is a 60 y o  male with a past medical history of hypertension, depression, alcohol use and anemia who presents with altered mental status  Unable to obtain history from patient as he was very drowsy and did not remember what happened  Per ED, 12 hours prior to admission patient drink a bottle the window and a bottle of Unisom  Patient states he was not trying to commit suicide but has a really bad cough and wanted some relief  Patient did not know that this would harm him in any way    Patient was brought in by the wife as he was saying weird things and not now is going on  Hospital Course    Encephalopathy 2/2 accidental diphenhydramine overdose: Resolved prior to discharge  Pt is more alert and oriented on day of discharge  As per Dr Mirza Kwon consultation, episode was not a suicide attempt  Consult greatly recommended  Aetaminophen and salicylate levels wnl  Neuro checks performed regularly  Given Ativan 1mg PRN for seizures  Monitored on telemetry  Restarted all home medicatio  Tolerated regular diet  Head of bed was elevated to 30 degrees during the entire hospitalization       Normocytic anemia: Hg 7 5 AM on day of discharge  FOBT negative  Bleeding workup from previous admission  No melena noted  Unclear diagnosis of CLL in [place], follow up previous records  CBC monitored daily       Leukocytosis 2/2 infectious etiology vs primary bone marrow cancer ( CLL):  WBC 11 40 Plts 145 morning of discharge  CXR: No acute cardiopulmonary disease  Blood cx pending  Urine cx negative  Procalcitonin level pending at discharge       History of alcohol abuse: Last stated drink "75 days ago"  Ammonia 39, 43 POA  Ethanol level 3  Given IV thiamine and folic acid  Multivitamin to be restarted on discharge  - Continue to monitor      HTN: Latest /72 prior to discharge  Restarted on home medication lisinopril-hctz  Vitals monitored regularly  Qshift      Bipolar disorder: All home medication restarted once patient was able to swallow properly  Consult to Dr Ashley Morton greatly appreciated        Tobacco dependence: Given nicotine 21 mg/24hr patch  Information on smoking cessation provided prior to discharge       FEN: Placed on regular diet  Hydrated with IV NS @ 125cc/hr  Electrolytes repleted as needed  Lovenox and SCDs given for DVT prophylaxis     Pt will follow up with Primary care physician in next 2 weeks       Physical Exam at Discharge  General appearance: alert and oriented, in no acute distress  Lungs: clear to auscultation bilaterally  Heart: regular rate and rhythm, S1, S2 normal, no murmur, click, rub or gallop  Abdomen: soft, non-tender; bowel sounds normal; no masses,  no organomegaly  Extremities: extremities normal, warm and well-perfused; no cyanosis, clubbing, or edema    Medications   benztropine 1 mg tablet   Commonly known as: COGENTIN   Take 1 mg by mouth 2 (two) times a day   Refills: 0   Next Dose Due: 2/27/2018           divalproex sodium 500 mg EC tablet   Commonly known as: DEPAKOTE   Take 1,000 mg by mouth every 12 (twelve) hours   Refills: 0   Next Dose Due: 2/27/2018           FLUoxetine 20 mg capsule   Commonly known as: PROzac   Take 40 mg by mouth daily   Refills: 0   Next Dose Due: 2/28/2018          hydrOXYzine pamoate 50 mg capsule   Commonly known as: VISTARIL   Take 50 mg by mouth 2 (two) times a day   Refills: 0   Next Dose Due: 2/27/2018           lisinopril-hydrochlorothiazide 20-12 5 MG per tablet   Commonly known as: PRINZIDE,ZESTORETIC   Take 1 tablet by mouth daily   Refills: 0   Next Dose Due: 2/28/2018          * mirtazapine 15 mg tablet   Commonly known as: REMERON   Take 15 mg by mouth daily at bedtime   Refills: 0   Next Dose Due: 2/27/2018          * mirtazapine 15 mg tablet   Commonly known as: REMERON   Take 1 tablet (15 mg total) by mouth daily at bedtime   Refills: 0   Next Dose Due: 2/27/2018          propranolol 10 mg tablet   Commonly known as: INDERAL   Take 1 tablet (10 mg total) by mouth 3 (three) times a day   Refills: 0   Next Dose Due: 2/27/2018            risperiDONE 2 mg tablet   Commonly known as: RisperDAL   Take 4 mg by mouth 2 (two) times a day   Refills: 0   Next Dose Due: 2/27/2018               Allergies  No Known Allergies    Diet restrictions: heart healthy diet   Activity restrictions: as tolerated   Code Status: Level 1 - Full Code    Discharge Condition: stable      Discharge  Statement   I spent 30 minutes discharging the patient  This time was spent on the day of discharge   I had direct contact with the patient on the day of discharge  Additional documentation is required if more than 30 minutes were spent on discharge

## 2018-02-27 NOTE — SPEECH THERAPY NOTE
Speech Language/Pathology  Treatment of Dysphagia    S:  Patient awake and alert lying in bed  O:  Patient continues on a regular diet with thin liquid  Patient denies any difficulty swallowing current diet  Patient swallowed thin liquid with successive sips from a straw and regular solid  Oral and pharyngeal stages of the swallow WNL  No delay in the initiation of the swallow, adequate laryngeal elevation, no coughing or choking during or following the swallow, no oral or pharyngeal residual       A:  Swallow skills are safe/WNL for regular consistency solids and thin liquids  P:  1)  Continue regular diet with thin liquids  2)  Medications administered whole with thin liquid  3)  Patient may be discharged from Speech Therapy services

## 2018-02-27 NOTE — CASE MANAGEMENT
Continued Stay Review    Date: 2/27/18    Vital Signs: /74 (BP Location: Right arm)   Pulse 82   Temp 98 3 °F (36 8 °C) (Oral)   Resp 18   Ht 5' 11" (1 803 m)   Wt 113 kg (250 lb)   SpO2 97%   BMI 34 87 kg/m²       GMF  AMMONIA LEVEL  CONSULT PSYCHIATRY   START HOME MEDS  Medications:   Scheduled Meds:  Current Facility-Administered Medications:  benztropine 1 mg Oral BID Sydney Valadez, DO    divalproex sodium 1,000 mg Oral Q12H [address] Sydney Valadez, DO    enoxaparin 40 mg Subcutaneous Daily Isaias Ayala MD    FLUoxetine 40 mg Oral Daily Sydney Valadez, DO    lisinopril-hydrochlorothiazide (PRINZIDE 20/12  5) combo dose  Oral Daily Sydney Valadez, DO    LORazepam 1 mg Intravenous PRN Isaias Ayala MD    mirtazapine 15 mg Oral HS Sydney Valadez, DO    nicotine 1 patch Transdermal Daily Isaais Ayala MD    ondansetron 4 mg Intravenous Q6H PRN Isaias Ayala MD    propranolol 10 mg Oral TID Sydney Valadez, DO    risperiDONE 2 mg Oral BID Sydney Valadez, DO    sodium chloride 125 mL/hr Intravenous Continuous Isaias Ayala MD Last Rate: 125 mL/hr (02/27/18 1033)     Continuous Infusions:  sodium chloride 125 mL/hr Last Rate: 125 mL/hr (02/27/18 1033)     PRN Meds:  LORazepam    ondansetron    Abnormal Labs/Diagnostic Results: CA 8 1 PHOS 4 9 AMMONIA 39 WBC 11 40   H/H 7 5/22 4 GLUCOSE 239    Age/Sex: 60 y o  male     ATTENDING  Encephalopathy 2/2 accidental diphenhydramine overdose  - Resolving   Pt is more alert and oriented today   - Dr Ashley Morton consulted with Psychiatry   - acetaminophen and salicylate levels wnl  - Continue neuro checks q2hr  - Continue Ativan 1mg PRN seizures  - Continue to monitor on telemetry  - Restarted all home medication    - Pt tolerating regular diet   - Continue to elevate head of bed to 30 degrees  Normocytic anemia  - Hg 7 5 this AM POA, appears baseline  - FOBT pending   - Bleeding workup from previous admission wnl  - no melena noted  - unclear diagnosis of CLL in [place], follow up previous records  - monitor CBC  Leukocytosis 2/2 infectious etiology vs primary bone marrow cancer ( CLL)  - WBC 11 40 Plts 145 this Am, WBC 16 20 POA    - PT/INR slightly elevated  - CXR: No acute cardiopulmonary disease   - Blood cx pending   - Urine cx negative   - procalcitonin level pending  History of alcohol abuse  - Last stated drink "75 days ago"  - Ammonia 39, 43 POA  - Ethanol level 3  - Continue IV thiamine and folic acid  - Will restart multivitamin daily on discharge   - Continue to monitor   HTN  - /74 POA  - Restarted home medication lisinopril-hctz   - Will continue to monitor q routine shift   Bipolar disorder  - Restarted all home medication   - Dr Mindy Laws consulted   Tobacco dependence  - smoking cessation provided  - nicotine 21mg patch   FEN  - IV NS @ 125cc/hr  - replete electrolytes as needed  - lovenox and SCDs for DVT prophylaxis

## 2018-02-27 NOTE — PROGRESS NOTES
Former[facility] Progress Note - Tip Ely 60 y o  male MRN: [id number]    Unit/Bed#: [address] [place] [address] 329-02 Encounter: [id number]      Assessment/Plan:    Encephalopathy 2/2 accidental diphenhydramine overdose  - Resolving  Pt is more alert and oriented today   - Dr Charletta Schilder consulted with Psychiatry   - acetaminophen and salicylate levels wnl  - Continue neuro checks q2hr  - Continue Ativan 1mg PRN seizures  - Continue to monitor on telemetry  - Restarted all home medication    - Pt tolerating regular diet   - Continue to elevate head of bed to 30 degrees     Normocytic anemia  - Hg 7 5 this AM POA, appears baseline  - FOBT pending   - Bleeding workup from previous admission wnl  - no melena noted  - unclear diagnosis of CLL in [place], follow up previous records  - monitor CBC     Leukocytosis 2/2 infectious etiology vs primary bone marrow cancer ( CLL)  - WBC 11 40 Plts 145 this Am, WBC 16 20 POA    - PT/INR slightly elevated  - CXR: No acute cardiopulmonary disease   - Blood cx pending   - Urine cx negative   - procalcitonin level pending     History of alcohol abuse  - Last stated drink "75 days ago"  - Ammonia 39, 43 POA  - Ethanol level 3  - Continue IV thiamine and folic acid  - Will restart multivitamin daily on discharge   - Continue to monitor      HTN  - /74 POA  - Restarted home medication lisinopril-hctz   - Will continue to monitor q routine shift      Bipolar disorder  - Restarted all home medication   - Dr Charletta Schilder consulted      Tobacco dependence  - smoking cessation provided  - nicotine 21mg patch      FEN  - IV NS @ 125cc/hr  - replete electrolytes as needed  - lovenox and SCDs for DVT prophylaxis      Subjective:   Pt seen and examined at bedside  No acute events overnight  Pt is more alert and awake today  Denies fever, chills, shortness of breath, chest pain, abdominal pain or urinary changes       Objective:     Vitals: Blood pressure 149/74, pulse 74, temperature 98 5 °F (36 9 °C), temperature source Tympanic, resp  rate 18, height 5' 11" (1 803 m), weight 113 kg (250 lb), SpO2 97 %  ,Body mass index is 34 87 kg/m²    Wt Readings from Last 3 Encounters:   02/26/18 113 kg (250 lb)   02/23/18 102 kg (225 lb)   02/19/18 107 kg (236 lb 15 9 oz)       Intake/Output Summary (Last 24 hours) at 02/27/18 1243  Last data filed at 02/27/18 1033   Gross per 24 hour   Intake             3500 ml   Output                0 ml   Net             3500 ml       Physical Exam: General appearance: alert and oriented, in no acute distress  Lungs: clear to auscultation bilaterally  Heart: regular rate and rhythm, S1, S2 normal, no murmur, click, rub or gallop  Abdomen: soft, non-tender; bowel sounds normal; no masses,  no organomegaly  Extremities: extremities normal, warm and well-perfused; no cyanosis, clubbing, or edema     Recent Results (from the past 24 hour(s))   Ammonia    Collection Time: 02/27/18  7:03 AM   Result Value Ref Range    Ammonia 39 (H) 11 - 35 umol/L   Magnesium    Collection Time: 02/27/18  7:33 AM   Result Value Ref Range    Magnesium 2 1 1 6 - 2 6 mg/dL   Phosphorus    Collection Time: 02/27/18  7:33 AM   Result Value Ref Range    Phosphorus 4 9 (H) 2 3 - 4 1 mg/dL   CBC    Collection Time: 02/27/18  7:33 AM   Result Value Ref Range    WBC 11 40 (H) 4 80 - 10 80 Thousand/uL    RBC 2 59 (L) 4 70 - 6 10 Million/uL    Hemoglobin 7 5 (L) 14 0 - 18 0 g/dL    Hematocrit 22 4 (L) 42 0 - 52 0 %    MCV 86 82 - 98 fL    MCH 29 0 27 0 - 31 0 pg    MCHC 33 6 31 4 - 37 4 g/dL    RDW 14 2 11 6 - 15 1 %    Platelets 054 306 - 066 Thousands/uL    MPV 7 0 (L) 8 9 - 12 7 fL   Basic metabolic panel    Collection Time: 02/27/18  7:33 AM   Result Value Ref Range    Sodium 143 136 - 145 mmol/L    Potassium 4 1 3 5 - 5 3 mmol/L    Chloride 108 100 - 108 mmol/L    CO2 28 21 - 32 mmol/L    Anion Gap 7 4 - 13 mmol/L    BUN 13 5 - 25 mg/dL    Creatinine 0 75 0 60 - 1 30 mg/dL    Glucose 89 65 - 140 mg/dL    Calcium 8 1 (L) 8 3 - 10 1 mg/dL    eGFR 98 ml/min/1 73sq m   Fingerstick Glucose (POCT)    Collection Time: 02/27/18 10:05 AM   Result Value Ref Range    POC Glucose 239 (H) 65 - 140 mg/dl       Current Facility-Administered Medications   Medication Dose Route Frequency Provider Last Rate Last Dose    benztropine (COGENTIN) tablet 1 mg  1 mg Oral BID Sydney Valadez, DO   1 mg at 02/27/18 1223    divalproex sodium (DEPAKOTE) EC tablet 1,000 mg  1,000 mg Oral Q12H [facility] Sydney Valadez DO   1,000 mg at 02/27/18 1223    enoxaparin (LOVENOX) subcutaneous injection 40 mg  40 mg Subcutaneous Daily Valdez Hanna MD   40 mg at 02/27/18 0930    FLUoxetine (PROzac) capsule 40 mg  40 mg Oral Daily Sydney Valadez DO   40 mg at 02/27/18 1222    lisinopril-hydrochlorothiazide (PRINZIDE 20/12  5) combo dose   Oral Daily Sydney Valadez DO        LORazepam (ATIVAN) 2 mg/mL injection 1 mg  1 mg Intravenous PRN Valdez Hanna MD        mirtazapine (REMERON) tablet 15 mg  15 mg Oral HS Sydney Valadez DO        nicotine (NICODERM CQ) 21 mg/24 hr TD 24 hr patch 1 patch  1 patch Transdermal Daily Valdez Hanna MD        ondansetron [facility] injection 4 mg  4 mg Intravenous Q6H PRN Valdez Hanna MD        propranolol (INDERAL) tablet 10 mg  10 mg Oral TID Sydney Valadez DO   10 mg at 02/27/18 1223    risperiDONE (RisperDAL) tablet 2 mg  2 mg Oral BID Sydney Valadez, DO   2 mg at 02/27/18 1222    sodium chloride 0 9 % infusion  125 mL/hr Intravenous Continuous Valdez Hanna  mL/hr at 02/27/18 1033 125 mL/hr at 02/27/18 1033       Invasive Devices     Peripheral Intravenous Line            Peripheral IV 02/26/18 Left Wrist 1 day                Lab, Imaging and other studies: I have personally reviewed pertinent reports      VTE Pharmacologic Prophylaxis: Enoxaparin (Lovenox)  VTE Mechanical Prophylaxis: sequential compression device    Sydney Valadez DO

## 2018-02-27 NOTE — SPEECH THERAPY NOTE
Speech-Language Pathology Bedside Swallow Evaluation        Patient Name: Victoria MOE Date: 2/26/2018     Problem List  Patient Active Problem List   Diagnosis    Symptomatic anemia    Hypertension    Depression    Polypharmacy    Bronchitis    Encephalopathy       Past Medical History  Past Medical History:   Diagnosis Date    Anemia     Anxiety     Bipolar disorder ([address] )     History of alcohol abuse     Hypertension     Hypertension     Insomnia     Psychiatric disorder        Past Surgical History  History reviewed  No pertinent surgical history  Current Medical Status  Pt is a 60 y o  male who presented to [address] [place] [address] with a past medical history of hypertension, depression, alcohol use and anemia who presents with altered mental status  Unable to obtain history from patient as he was very drowsy and did not remember what happened  Per ED, 12 hours prior to admission patient drink a bottle the window and a bottle of [employer]isom  Patient states he was not trying to commit suicide but has a really bad cough and wanted some relief  Patient did not know that this would harm him in any way  Patient was brought in by the wife as he was saying weird things and not now is going on  Upon my arrival patient denies any dysphagia and was requesting to be discharged home  The above information was taken from MD note on 2/26  Past medical history:   Please see H&P for details      Special Studies:  CXR 2/26: No acute cardiopulmonary disease      Social/Education/Vocational Hx:  Pt lives with family      Swallow Information   Current Risks for Dysphagia & Aspiration: AMS     Current Symptoms/Concerns: none    Current Diet: NPO      Baseline Diet: regular diet and thin liquids      Baseline Assessment   Behavior/Cognition: alert and slightly lethargic    Speech/Language Status: able to participate in conversation and able to follow commands    Patient Positioning: upright in bed      Swallow Mechanism Exam   Facial: symmetrical  Labial: WFL  Lingual: WFL  Velum: symmetrical  Mandible: adequate ROM  Dentition: adequate  Vocal quality:hoarse   Volitional Cough: strong/productive   Respiratory: RA       Consistencies Assessed and Performance   Consistencies Administered: thin liquids, nectar thick, honey thick, puree, mechanical soft solids, soft solids, hard solids and mixed consistency  Specific materials administered included: applesauce, peaches ( with and without juice), adonis cracker, turkey sandwich, and rosmery doone cookie  Oral Stage:   Mastication was slightly prolonged yet adequate with the materials administered today  Bolus formation and transfer were slightly prolonged yet functional with no significant oral residue noted  No overt s/s reduced oral control  Pharyngeal Stage:   Swallowing initiation appeared prompt  Laryngeal rise was palpated and judged to be within functional limits  No coughing, throat clearing, change in vocal quality or respiratory status noted today  Esophageal Concerns: none reported      Summary   Pts oropharyngeal swallow function appears generally WFL at this time with the materials administered today      Risk for Aspiration: none    Recommendations: regular diet and thin liquids     Recommended Form of Meds: as per patient/MD     Results Reviewed with: patient and RN     Treatment Recommendations: ST to follow up 1-3x should patient remain in hospital to follow up and ensure diet tolerance     Dysphagia Goals: pt will tolerate regular textures with thin lqiuids without s/s of aspiration x3

## 2018-02-28 LAB — MRSA NOSE QL CULT: NORMAL

## 2018-02-28 NOTE — CASE MANAGEMENT
Notification of Discharge  This is a Notification of Discharge from our facility [address]  Please be advised that this patient has been discharge from our facility  Below you will find the admission and discharge date and time including the patients disposition  PRESENTATION DATE: 2/26/2018  1:55 AM  IP ADMISSION DATE: 2/26/18 0443  DISCHARGE DATE: 2/27/2018  4:28 PM  DISPOSITION: [facility] in the [facility] by [address] for 2017  Network Utilization Review Department  Phone: [phone]; Fax [phone]  ATTENTION: The Network Utilization Review Department is now centralized for our 7 Facilities  Make a note that we have a new phone and fax numbers for our Department  Please call with any questions or concerns to [phone] and carefully follow the prompts so that you are directed to the right person  All voicemails are confidential  Fax any determinations, approvals, denials, and requests for initial or continue stay review clinical to [phone]  Due to HIGH CALL volume, it would be easier if you could please send faxed requests to expedite your requests and in part, help us provide discharge notifications faster

## 2018-03-20 ENCOUNTER — TELEPHONE (OUTPATIENT)
Dept: HEMATOLOGY ONCOLOGY | Facility: MEDICAL CENTER | Age: 63
End: 2018-03-20

## 2018-03-26 ENCOUNTER — APPOINTMENT (EMERGENCY)
Dept: RADIOLOGY | Facility: HOSPITAL | Age: 63
DRG: 840 | End: 2018-03-26
Payer: COMMERCIAL

## 2018-03-26 ENCOUNTER — HOSPITAL ENCOUNTER (INPATIENT)
Facility: HOSPITAL | Age: 63
LOS: 3 days | Discharge: HOME | DRG: 840 | End: 2018-03-30
Attending: EMERGENCY MEDICINE | Admitting: FAMILY MEDICINE
Payer: COMMERCIAL

## 2018-03-26 DIAGNOSIS — I21.4 NSTEMI (NON-ST ELEVATED MYOCARDIAL INFARCTION) (HCC): ICD-10-CM

## 2018-03-26 DIAGNOSIS — C95.90 LEUKEMIA (HCC): ICD-10-CM

## 2018-03-26 DIAGNOSIS — D64.9 ANEMIA, UNSPECIFIED TYPE: ICD-10-CM

## 2018-03-26 DIAGNOSIS — K29.90 GASTRITIS AND DUODENITIS: ICD-10-CM

## 2018-03-26 DIAGNOSIS — D64.9 SYMPTOMATIC ANEMIA: Chronic | ICD-10-CM

## 2018-03-26 DIAGNOSIS — I10 ESSENTIAL HYPERTENSION: Chronic | ICD-10-CM

## 2018-03-26 DIAGNOSIS — I25.10 CAD (CORONARY ARTERY DISEASE): ICD-10-CM

## 2018-03-26 DIAGNOSIS — D64.9 ANEMIA: Primary | ICD-10-CM

## 2018-03-26 DIAGNOSIS — R77.8 ELEVATED TROPONIN: ICD-10-CM

## 2018-03-26 PROBLEM — E87.2 LACTIC ACIDOSIS: Status: ACTIVE | Noted: 2018-03-26

## 2018-03-26 PROBLEM — F99 PSYCHIATRIC DISORDER: Status: ACTIVE | Noted: 2018-03-26

## 2018-03-26 PROBLEM — R53.1 WEAKNESS: Status: ACTIVE | Noted: 2018-03-26

## 2018-03-26 PROBLEM — D72.829 LEUKOCYTOSIS: Status: ACTIVE | Noted: 2018-03-26

## 2018-03-26 PROBLEM — E87.20 LACTIC ACIDOSIS: Status: ACTIVE | Noted: 2018-03-26

## 2018-03-26 PROBLEM — R79.89 ELEVATED TROPONIN: Status: ACTIVE | Noted: 2018-03-26

## 2018-03-26 PROBLEM — F31.9 BIPOLAR 1 DISORDER (HCC): Status: ACTIVE | Noted: 2018-03-26

## 2018-03-26 LAB
ABO GROUP BLD: NORMAL
ALBUMIN SERPL BCP-MCNC: 3.4 G/DL (ref 3.5–5)
ALP SERPL-CCNC: 90 U/L (ref 46–116)
ALT SERPL W P-5'-P-CCNC: 26 U/L (ref 12–78)
AMMONIA PLAS-SCNC: 37 UMOL/L (ref 11–35)
AMPHETAMINES SERPL QL SCN: NEGATIVE
ANION GAP SERPL CALCULATED.3IONS-SCNC: 10 MMOL/L (ref 4–13)
APTT PPP: 22 SECONDS (ref 23–35)
AST SERPL W P-5'-P-CCNC: 13 U/L (ref 5–45)
ATRIAL RATE: 96 BPM
BACTERIA UR QL AUTO: ABNORMAL /HPF
BARBITURATES UR QL: NEGATIVE
BASOPHILS # BLD AUTO: 0 THOUSANDS/ΜL (ref 0–0.1)
BASOPHILS NFR BLD AUTO: 0 % (ref 0–1)
BENZODIAZ UR QL: POSITIVE
BILIRUB DIRECT SERPL-MCNC: 0.1 MG/DL (ref 0–0.2)
BILIRUB SERPL-MCNC: 0.4 MG/DL (ref 0.2–1)
BILIRUB UR QL STRIP: NEGATIVE
BLD GP AB SCN SERPL QL: NEGATIVE
BUN SERPL-MCNC: 26 MG/DL (ref 5–25)
CALCIUM SERPL-MCNC: 8.8 MG/DL (ref 8.3–10.1)
CHLORIDE SERPL-SCNC: 101 MMOL/L (ref 100–108)
CK SERPL-CCNC: 81 U/L (ref 39–308)
CLARITY UR: ABNORMAL
CO2 SERPL-SCNC: 29 MMOL/L (ref 21–32)
COCAINE UR QL: NEGATIVE
COLOR UR: ABNORMAL
CREAT SERPL-MCNC: 1.25 MG/DL (ref 0.6–1.3)
EOSINOPHIL # BLD AUTO: 0.1 THOUSAND/ΜL (ref 0–0.61)
EOSINOPHIL NFR BLD AUTO: 0 % (ref 0–6)
ERYTHROCYTE [DISTWIDTH] IN BLOOD BY AUTOMATED COUNT: 15 % (ref 11.6–15.1)
ETHANOL SERPL-MCNC: <3 MG/DL (ref 0–3)
GFR SERPL CREATININE-BSD FRML MDRD: 61 ML/MIN/1.73SQ M
GLUCOSE SERPL-MCNC: 131 MG/DL (ref 65–140)
GLUCOSE UR STRIP-MCNC: NEGATIVE MG/DL
HCT VFR BLD AUTO: 13.3 % (ref 42–52)
HCT VFR BLD AUTO: 18.8 % (ref 42–52)
HGB BLD-MCNC: 4.5 G/DL (ref 14–18)
HGB BLD-MCNC: 6.2 G/DL (ref 14–18)
HGB UR QL STRIP.AUTO: NEGATIVE
HYALINE CASTS #/AREA URNS LPF: ABNORMAL /LPF
KETONES UR STRIP-MCNC: NEGATIVE MG/DL
LACTATE SERPL-SCNC: 0.9 MMOL/L (ref 0.5–2)
LACTATE SERPL-SCNC: 3.5 MMOL/L (ref 0.5–2)
LEUKOCYTE ESTERASE UR QL STRIP: ABNORMAL
LIPASE SERPL-CCNC: 92 U/L (ref 73–393)
LYMPHOCYTES # BLD AUTO: 14.6 THOUSANDS/ΜL (ref 0.6–4.47)
LYMPHOCYTES NFR BLD AUTO: 67 % (ref 14–44)
MCH RBC QN AUTO: 28.8 PG (ref 27–31)
MCHC RBC AUTO-ENTMCNC: 34.1 G/DL (ref 31.4–37.4)
MCV RBC AUTO: 84 FL (ref 82–98)
METHADONE UR QL: NEGATIVE
MONOCYTES # BLD AUTO: 1.1 THOUSAND/ΜL (ref 0.17–1.22)
MONOCYTES NFR BLD AUTO: 5 % (ref 4–12)
NEUTROPHILS # BLD AUTO: 5.9 THOUSANDS/ΜL (ref 1.85–7.62)
NEUTS SEG NFR BLD AUTO: 27 % (ref 43–75)
NITRITE UR QL STRIP: NEGATIVE
NON-SQ EPI CELLS URNS QL MICRO: ABNORMAL /HPF
NRBC BLD AUTO-RTO: 0 /100 WBCS
NT-PROBNP SERPL-MCNC: 318 PG/ML
OPIATES UR QL SCN: NEGATIVE
P AXIS: 67 DEGREES
PCP UR QL: NEGATIVE
PH UR STRIP.AUTO: 6.5 [PH] (ref 5–9)
PLATELET # BLD AUTO: 192 THOUSANDS/UL (ref 130–400)
PLATELET BLD QL SMEAR: ADEQUATE
PMV BLD AUTO: 7.2 FL (ref 8.9–12.7)
POTASSIUM SERPL-SCNC: 4.2 MMOL/L (ref 3.5–5.3)
PR INTERVAL: 156 MS
PROT SERPL-MCNC: 6.3 G/DL (ref 6.4–8.2)
PROT UR STRIP-MCNC: NEGATIVE MG/DL
QRS AXIS: -8 DEGREES
QRSD INTERVAL: 86 MS
QT INTERVAL: 356 MS
QTC INTERVAL: 449 MS
RBC # BLD AUTO: 1.57 MILLION/UL (ref 4.7–6.1)
RBC #/AREA URNS AUTO: ABNORMAL /HPF
RH BLD: POSITIVE
SODIUM SERPL-SCNC: 140 MMOL/L (ref 136–145)
SP GR UR STRIP.AUTO: 1.01 (ref 1–1.03)
SPECIMEN EXPIRATION DATE: NORMAL
T WAVE AXIS: 91 DEGREES
THC UR QL: NEGATIVE
TROPONIN I SERPL-MCNC: 0.03 NG/ML
TROPONIN I SERPL-MCNC: 0.08 NG/ML
TROPONIN I SERPL-MCNC: 0.12 NG/ML
TSH SERPL DL<=0.05 MIU/L-ACNC: 1.67 UIU/ML (ref 0.36–3.74)
UROBILINOGEN UR QL STRIP.AUTO: 0.2 E.U./DL
VENTRICULAR RATE: 96 BPM
WBC # BLD AUTO: 21.7 THOUSAND/UL (ref 4.8–10.8)
WBC #/AREA URNS AUTO: ABNORMAL /HPF

## 2018-03-26 PROCEDURE — 87081 CULTURE SCREEN ONLY: CPT | Performed by: FAMILY MEDICINE

## 2018-03-26 PROCEDURE — 82550 ASSAY OF CK (CPK): CPT | Performed by: EMERGENCY MEDICINE

## 2018-03-26 PROCEDURE — 86901 BLOOD TYPING SEROLOGIC RH(D): CPT | Performed by: EMERGENCY MEDICINE

## 2018-03-26 PROCEDURE — 81001 URINALYSIS AUTO W/SCOPE: CPT | Performed by: EMERGENCY MEDICINE

## 2018-03-26 PROCEDURE — 82140 ASSAY OF AMMONIA: CPT | Performed by: EMERGENCY MEDICINE

## 2018-03-26 PROCEDURE — 85018 HEMOGLOBIN: CPT | Performed by: FAMILY MEDICINE

## 2018-03-26 PROCEDURE — 87086 URINE CULTURE/COLONY COUNT: CPT | Performed by: EMERGENCY MEDICINE

## 2018-03-26 PROCEDURE — 93010 ELECTROCARDIOGRAM REPORT: CPT | Performed by: INTERNAL MEDICINE

## 2018-03-26 PROCEDURE — 87186 SC STD MICRODIL/AGAR DIL: CPT | Performed by: EMERGENCY MEDICINE

## 2018-03-26 PROCEDURE — P9021 RED BLOOD CELLS UNIT: HCPCS

## 2018-03-26 PROCEDURE — 85730 THROMBOPLASTIN TIME PARTIAL: CPT | Performed by: FAMILY MEDICINE

## 2018-03-26 PROCEDURE — 96360 HYDRATION IV INFUSION INIT: CPT

## 2018-03-26 PROCEDURE — 87077 CULTURE AEROBIC IDENTIFY: CPT | Performed by: EMERGENCY MEDICINE

## 2018-03-26 PROCEDURE — 99285 EMERGENCY DEPT VISIT HI MDM: CPT

## 2018-03-26 PROCEDURE — 80307 DRUG TEST PRSMV CHEM ANLYZR: CPT | Performed by: EMERGENCY MEDICINE

## 2018-03-26 PROCEDURE — 86850 RBC ANTIBODY SCREEN: CPT | Performed by: EMERGENCY MEDICINE

## 2018-03-26 PROCEDURE — 80320 DRUG SCREEN QUANTALCOHOLS: CPT | Performed by: EMERGENCY MEDICINE

## 2018-03-26 PROCEDURE — 36415 COLL VENOUS BLD VENIPUNCTURE: CPT | Performed by: EMERGENCY MEDICINE

## 2018-03-26 PROCEDURE — P9016 RBC LEUKOCYTES REDUCED: HCPCS

## 2018-03-26 PROCEDURE — 85025 COMPLETE CBC W/AUTO DIFF WBC: CPT | Performed by: EMERGENCY MEDICINE

## 2018-03-26 PROCEDURE — 99222 1ST HOSP IP/OBS MODERATE 55: CPT | Performed by: FAMILY MEDICINE

## 2018-03-26 PROCEDURE — 83690 ASSAY OF LIPASE: CPT | Performed by: EMERGENCY MEDICINE

## 2018-03-26 PROCEDURE — 83605 ASSAY OF LACTIC ACID: CPT | Performed by: EMERGENCY MEDICINE

## 2018-03-26 PROCEDURE — 83880 ASSAY OF NATRIURETIC PEPTIDE: CPT | Performed by: EMERGENCY MEDICINE

## 2018-03-26 PROCEDURE — 93005 ELECTROCARDIOGRAM TRACING: CPT

## 2018-03-26 PROCEDURE — 86900 BLOOD TYPING SEROLOGIC ABO: CPT | Performed by: EMERGENCY MEDICINE

## 2018-03-26 PROCEDURE — 85014 HEMATOCRIT: CPT | Performed by: FAMILY MEDICINE

## 2018-03-26 PROCEDURE — 84484 ASSAY OF TROPONIN QUANT: CPT | Performed by: FAMILY MEDICINE

## 2018-03-26 PROCEDURE — 84443 ASSAY THYROID STIM HORMONE: CPT | Performed by: FAMILY MEDICINE

## 2018-03-26 PROCEDURE — 85610 PROTHROMBIN TIME: CPT | Performed by: FAMILY MEDICINE

## 2018-03-26 PROCEDURE — 86920 COMPATIBILITY TEST SPIN: CPT

## 2018-03-26 PROCEDURE — 80076 HEPATIC FUNCTION PANEL: CPT | Performed by: EMERGENCY MEDICINE

## 2018-03-26 PROCEDURE — 83605 ASSAY OF LACTIC ACID: CPT | Performed by: FAMILY MEDICINE

## 2018-03-26 PROCEDURE — 85730 THROMBOPLASTIN TIME PARTIAL: CPT | Performed by: EMERGENCY MEDICINE

## 2018-03-26 PROCEDURE — 80048 BASIC METABOLIC PNL TOTAL CA: CPT | Performed by: EMERGENCY MEDICINE

## 2018-03-26 PROCEDURE — 71045 X-RAY EXAM CHEST 1 VIEW: CPT

## 2018-03-26 PROCEDURE — 84484 ASSAY OF TROPONIN QUANT: CPT | Performed by: EMERGENCY MEDICINE

## 2018-03-26 PROCEDURE — 30233N1 TRANSFUSION OF NONAUTOLOGOUS RED BLOOD CELLS INTO PERIPHERAL VEIN, PERCUTANEOUS APPROACH: ICD-10-PCS | Performed by: EMERGENCY MEDICINE

## 2018-03-26 RX ORDER — FLUOXETINE HYDROCHLORIDE 20 MG/1
40 CAPSULE ORAL DAILY
Status: DISCONTINUED | OUTPATIENT
Start: 2018-03-27 | End: 2018-03-30 | Stop reason: HOSPADM

## 2018-03-26 RX ORDER — HYDROCHLOROTHIAZIDE 12.5 MG/1
12.5 TABLET ORAL DAILY
Status: DISCONTINUED | OUTPATIENT
Start: 2018-03-27 | End: 2018-03-30 | Stop reason: HOSPADM

## 2018-03-26 RX ORDER — LISINOPRIL 40 MG/1
20 TABLET ORAL DAILY
COMMUNITY
End: 2019-01-28 | Stop reason: SDUPTHER

## 2018-03-26 RX ORDER — HYDROCHLOROTHIAZIDE 12.5 MG/1
12.5 CAPSULE, GELATIN COATED ORAL DAILY
COMMUNITY
End: 2018-06-07 | Stop reason: SDUPTHER

## 2018-03-26 RX ORDER — HYDROXYZINE HYDROCHLORIDE 25 MG/1
50 TABLET, FILM COATED ORAL
Status: DISCONTINUED | OUTPATIENT
Start: 2018-03-26 | End: 2018-03-30 | Stop reason: HOSPADM

## 2018-03-26 RX ORDER — DIVALPROEX SODIUM 500 MG/1
500 TABLET, DELAYED RELEASE ORAL EVERY 12 HOURS SCHEDULED
Status: DISCONTINUED | OUTPATIENT
Start: 2018-03-26 | End: 2018-03-30 | Stop reason: HOSPADM

## 2018-03-26 RX ORDER — ASPIRIN 81 MG/1
324 TABLET, CHEWABLE ORAL ONCE
Status: COMPLETED | OUTPATIENT
Start: 2018-03-26 | End: 2018-03-26

## 2018-03-26 RX ORDER — NICOTINE 21 MG/24HR
1 PATCH, TRANSDERMAL 24 HOURS TRANSDERMAL DAILY
Status: DISCONTINUED | OUTPATIENT
Start: 2018-03-26 | End: 2018-03-30 | Stop reason: HOSPADM

## 2018-03-26 RX ORDER — DIPHENHYDRAMINE HYDROCHLORIDE 50 MG/ML
25 INJECTION INTRAMUSCULAR; INTRAVENOUS EVERY 6 HOURS PRN
Status: DISCONTINUED | OUTPATIENT
Start: 2018-03-26 | End: 2018-03-30 | Stop reason: HOSPADM

## 2018-03-26 RX ORDER — ASPIRIN 81 MG/1
324 TABLET, CHEWABLE ORAL DAILY
Status: DISCONTINUED | OUTPATIENT
Start: 2018-03-26 | End: 2018-03-26

## 2018-03-26 RX ORDER — ONDANSETRON 2 MG/ML
4 INJECTION INTRAMUSCULAR; INTRAVENOUS EVERY 6 HOURS PRN
Status: DISCONTINUED | OUTPATIENT
Start: 2018-03-26 | End: 2018-03-30 | Stop reason: HOSPADM

## 2018-03-26 RX ORDER — LISINOPRIL 20 MG/1
40 TABLET ORAL DAILY
Status: DISCONTINUED | OUTPATIENT
Start: 2018-03-27 | End: 2018-03-30 | Stop reason: HOSPADM

## 2018-03-26 RX ORDER — ACETAMINOPHEN 325 MG/1
650 TABLET ORAL EVERY 6 HOURS PRN
Status: DISCONTINUED | OUTPATIENT
Start: 2018-03-26 | End: 2018-03-30 | Stop reason: HOSPADM

## 2018-03-26 RX ORDER — MIRTAZAPINE 15 MG/1
15 TABLET, FILM COATED ORAL
Status: DISCONTINUED | OUTPATIENT
Start: 2018-03-26 | End: 2018-03-30 | Stop reason: HOSPADM

## 2018-03-26 RX ORDER — RISPERIDONE 1 MG/1
2 TABLET, FILM COATED ORAL
Status: DISCONTINUED | OUTPATIENT
Start: 2018-03-26 | End: 2018-03-30 | Stop reason: HOSPADM

## 2018-03-26 RX ORDER — ASPIRIN 81 MG/1
81 TABLET, CHEWABLE ORAL DAILY
Status: DISCONTINUED | OUTPATIENT
Start: 2018-03-27 | End: 2018-03-30 | Stop reason: HOSPADM

## 2018-03-26 RX ORDER — BENZTROPINE MESYLATE 1 MG/1
1 TABLET ORAL
Status: DISCONTINUED | OUTPATIENT
Start: 2018-03-26 | End: 2018-03-30 | Stop reason: HOSPADM

## 2018-03-26 RX ADMIN — ASPIRIN 81 MG 324 MG: 81 TABLET ORAL at 23:26

## 2018-03-26 RX ADMIN — SODIUM CHLORIDE 1000 ML: 0.9 INJECTION, SOLUTION INTRAVENOUS at 11:48

## 2018-03-26 RX ADMIN — RISPERIDONE 2 MG: 1 TABLET ORAL at 23:23

## 2018-03-26 RX ADMIN — BENZTROPINE MESYLATE 1 MG: 1 TABLET ORAL at 23:22

## 2018-03-26 RX ADMIN — METOPROLOL TARTRATE 12.5 MG: 25 TABLET ORAL at 23:25

## 2018-03-26 RX ADMIN — MIRTAZAPINE 15 MG: 15 TABLET, FILM COATED ORAL at 23:23

## 2018-03-26 RX ADMIN — DIVALPROEX SODIUM 500 MG: 500 TABLET, DELAYED RELEASE ORAL at 23:22

## 2018-03-26 RX ADMIN — HYDROXYZINE HYDROCHLORIDE 50 MG: 25 TABLET, FILM COATED ORAL at 23:23

## 2018-03-26 NOTE — PROGRESS NOTES
Pt  Arrived to unit, blood infusing at 175ml/hr through an IV in the right AC #20  Blood consent verified  Patient has no telemetry monitor on, none available for this unit at this time  Nursing supervisor [facility] and charge nurse Tyron Nair made aware of no tele monitor available at this time  MD for a different patient paged to see if another patient's telemetry can be discontinued and utilized for Mr Jonna Garcia  Admission assessment initiated

## 2018-03-26 NOTE — ED PROVIDER NOTES
History  Chief Complaint   Patient presents with    Weakness - Generalized     Patient very pale  Wife angry and gives hx  States patient has a hx of low blood count  States he is very weak , LOPES and frequent falls and near falls  Patient is seen by Dr Crisostomo Felt  Patient has been ill for several weeks and refusing to come to ED  Was seen recently by his psych, patient had recent admission to rehab for subutex and vodka   Shortness of Breath    Fall     HPI  60 y  o  male with a past medical history of hypertension, depression, alcohol use and anemia, polysubstance use, anemia due to chronic leulemia, transfusions in past, followed by hem/onc, psych, depression, hematuria,    Pt bib ems, per wife forrest has hx of anemia, and that he is weak, having shorntess of breath and freuquent falls   Per wife, pt had a transfusion in February  Pt fell today, mechanical falls, no trauma, no LOC, no seizure aciivyt, no bowel, or bladder in contincence  No blodd in stool  No hematuria  No chest pain, no dizziness precipitating the fall  pwer patient he has LOPES  Wife brought pt in "becaus his color is off"'   Per wife, pt is "messed up in the head from all his drugs"   Wife is angry that "he hasn't been worked up for his anemia", pt has been followed by heme/onc dr Marquita Ryan:    03/26/18 1145   BP: 112/56   Pulse: 88   Resp: 14   Temp:    SpO2: 97%       Vitals:    03/26/18 1134   BP: 112/56   Pulse: 93   Resp: 18   Temp: 99 3 °F (37 4 °C)   SpO2: 99%       Prior to Admission Medications   Prescriptions Last Dose Informant Patient Reported? Taking?    FLUoxetine (PROzac) 20 mg capsule  Self Yes No   Sig: Take 40 mg by mouth daily   benztropine (COGENTIN) 1 mg tablet  Self Yes No   Sig: Take 1 mg by mouth 2 (two) times a day   divalproex sodium (DEPAKOTE) 500 mg EC tablet  Self Yes No   Sig: Take 1,000 mg by mouth every 12 (twelve) hours   hydrOXYzine pamoate (VISTARIL) 50 mg capsule  Self Yes No   Sig: Take 50 mg by mouth 2 (two) times a day   lisinopril-hydrochlorothiazide (PRINZIDE,ZESTORETIC) 20-12 5 MG per tablet  Self Yes No   Sig: Take 1 tablet by mouth daily   mirtazapine (REMERON) 15 mg tablet  Self Yes No   Sig: Take 15 mg by mouth daily at bedtime   mirtazapine (REMERON) 15 mg tablet   No No   Sig: Take 1 tablet (15 mg total) by mouth daily at bedtime   propranolol (INDERAL) 10 mg tablet   No No   Sig: Take 1 tablet (10 mg total) by mouth 3 (three) times a day   risperiDONE (RisperDAL) 2 mg tablet  Self Yes No   Sig: Take 4 mg by mouth 2 (two) times a day        Facility-Administered Medications: None       Past Medical History:   Diagnosis Date    Anemia     Anxiety     Bipolar disorder ([facility] [address] )     History of alcohol abuse     Hypertension     Hypertension     Insomnia     Psychiatric disorder        No past surgical history on file  Family History   Problem Relation Age of Onset    Coronary artery disease Mother     No Known Problems Father     Hepatitis Sister     Cancer Brother     Prostate cancer Brother     Early death Brother      I have reviewed and agree with the history as documented  Social History   Substance Use Topics    Smoking status: Current Every Day Smoker     Packs/day: 1 00     Years: 40 00    Smokeless tobacco: Never Used    Alcohol use No      Comment: last drink nov 19 2017        Review of Systems   Constitutional: Negative  HENT: Negative  Eyes: Negative  Respiratory: Positive for shortness of breath  Cardiovascular: Negative  Gastrointestinal: Negative  Endocrine: Negative  Genitourinary: Negative  Musculoskeletal: Negative  Skin: Negative  Allergic/Immunologic: Negative  Neurological: Negative  Hematological: Negative  Psychiatric/Behavioral: Negative  All other systems reviewed and are negative      Rectal-trace positive brown stool   Physical Exam  ED Triage Vitals   Temp Pulse Resp BP SpO2   -- -- -- -- --      Temp src Heart Rate Source Patient Position - Orthostatic VS BP Location FiO2 (%)   -- -- -- -- --      Pain Score       --           Orthostatic Vital Signs  There were no vitals filed for this visit  Physical Exam   Constitutional: He is oriented to person, place, and time  He appears well-developed and well-nourished  Pale skin    HENT:   Head: Normocephalic and atraumatic  Right Ear: External ear normal    Left Ear: External ear normal    Nose: Nose normal    Mouth/Throat: No oropharyngeal exudate  Eyes: Conjunctivae and EOM are normal  Pupils are equal, round, and reactive to light  Neck: Normal range of motion  Neck supple  Cardiovascular: Normal rate, regular rhythm, normal heart sounds and intact distal pulses  Pulmonary/Chest: Effort normal and breath sounds normal    Abdominal: Soft  Bowel sounds are normal    Musculoskeletal: Normal range of motion  Neurological: He is alert and oriented to person, place, and time  He has normal reflexes  Skin: Skin is warm and dry  Psychiatric: He has a normal mood and affect  His behavior is normal  Judgment and thought content normal    Nursing note and vitals reviewed    no evidence trauma     ED Medications  Medications - No data to display    Diagnostic Studies  Results Reviewed     None                 No orders to display              Procedures  Procedures       Phone Contacts  ED Phone Contact    ED Course  ED Course         A/P 59 yo male with hx of anemia, CLL , psych , htn, etoh use - r/o anemia, r/o demand ischemia  -labs  -ekg   -cxr  -type and screen   -ammonia   -cardiac monitor              EKG no evidence of ST elevation MI   12:07 PM  Notified by lab hgb 4 5, Hct 13  Transfusion 2 UPRBC   MICU notified for admission   12:11 PM  MICU aware of admission   12:29 PM  Pt being evaluated by MICU team               SARAH Hameed Time    Disposition  Final diagnoses:   None     ED Disposition     None      Follow-up Information    None       Patient's Medications   Discharge Prescriptions    No medications on file     No discharge procedures on file      ED Provider  Electronically Signed by           Shae Hare MD  03/26/18 1201       Shae Hare MD  03/26/18 1203       Shae Hare MD  03/26/18 7315       Shae Hare MD  03/26/18 8957       Shae Hare MD  03/26/18 1223       Shae Hare MD  03/26/18 9595

## 2018-03-26 NOTE — H&P
H&P Exam - Josue Fletcher 60 y o  male MRN: [id number]    Unit/Bed#: [address] [place] [address]02 Encounter: [id number]      Assessment/Plan     Assessment:  62-y/o male, PMHx leukemia (patient reported), anemia, hypertension, hyperlipidemia, bipolar disorder, h/o polysubstance abuse s/p hospitalization in February with blood transfusion  Has h/o chronic anemia with leukocytosis  Presents to ED with hemoglobin of 4 5 and hematocrit of 13 3  Leukocytosis of 21 7 with 0% bandemia  Chest x-ray shows no acute cardiopulmonary disease  UA negative for infection and blood  Ammonia 37  Patient is hemodynamically stable, awake alert and oriented x3  Patient will be admitted to the [facility] under the service of Dr Deni Haji for observation status <2MN  Patient is full code     Plan:  Acute on Chronic symptomatic normocytic anemia:  No acute blood loss, hemoglobin 4 5 on presentation  Likely 2/2 to blood/bone marrow dysplasia  - will transfuse 2 units of PRBC with follow-up H/H   2 units PRBC on hold  - Benadryl 25 mg IV q 6 hours for allergic reaction  - FOBT x3, H/H starting 2 hours post transfusion then q 12 hours   - continue telemetry   - consider iron studies when patient is stable s/p transfusion  - hematology-oncology consult    H/o leukemia:  Patient presents with leukocytosis and severe anemia  - hematology oncology consulted recommendations appreciated    Elevated troponin:  0 03 on admission  CK 81, pro-  Likely NSTEMI type 2 in light of acute on chronic blood loss  - will trend troponin x3 to be drawn s/p 2 units PRBC  - repeat EKG in the a m   - echocardiogram    Hyperglycemia:  239 on admission  Patient does not have PMHx of diabetes mellitus  - consider hemoglobin A1c when stable  - continue daily glucose checks with CMP/BMP    RA: creatine 1 25 on admission, BUN 26   will trend CMP daily    Lactic acidosis: 3 5 on admission  SIR 1/4, with leukocytosis 21 7   Less likely infectious at this time likely secondary to acute on chronic anemia with decreased preload/cardiac output  - will repeat in evening s/p blood transfusion    - continue to trend white blood cell count and temperature curve    Hypertension:   -antihypertensive therapy held tonight as blood pressure was within normal limits and being actively transfused 2 units PRBC  - will restart lisinopril 40 mg and hydrochlorothiazide 12 5 mg tomorrow if blood pressure begins to rise    Hyperlipidemia:    - not currently on medication  will order lipid panel when patient stable  Depression/Bipolar disorder/unspecified psychiatric disorder:    - Will restart Risperdal 2 mg q h s , mirtazapine 15 mg q h s , hydroxyzine 50 mg q h s , Depakote 500 mg q 12 hours, benztropine 1 mg q h s   - consider blood levels when stable s/p transfusion     Tobacco abuse:  Smokes 1-2 packs per day  -smoking cessation counseling  -negative CQ 21 mg patch    Obesity  - nutrition counseling     FEN & DVT prophylaxis  - cardiac diet  - 2 units PRBC being transferred, 2 units on hold  - replete electrolytes as necessary with goal K > 4, phos > 3, Mg > 2  - pharmacologic DVT prophylaxis held 2/2 to acute anemia, SCDs ordered  - patient is full code        History of Present Illness    Patient and Wife are poor historians due to psychiatric disorder  HPI:  Roxanna Lowe is a 60 y o  male, PMHx leukemia (patient report), anemia, hypertension, hyperlipidemia, bipolar disorder, h/o polysubstance abuse, s/p hospitalization in February with blood transfusion  who presents with acute weakness and shortness of breath  Patient and patient's wife at bedside report that over the last week the patient was having frequent falls from standing position  He states that if he going up a couple of steps or walks a short distance he would be acutely short of breath  He states that his knees will go weak and he will fall  Patient reports that he fell today from a standing position    Patient and wife report no trauma or loss of consciousness as a result of the fall  They report no seizure activity,  Bowel,  or bladder incontinence  Patient follows up with Dr Nicolette Jauregui Hematology-Oncology  Patient has a reported h/o leukemia diagnosed by a doctor in [place] per medical records  Patient and wife today say that this diagnosis was made by Dr Celestina Patel in [place], [place] in May 2017  The patient and his wife state that the doctor told them that he was stage 0 requiring no workup and no medical treatment  This conflicts with earlier reports of being diagnosed by a physician in [place]  Patient reports that he smokes 1-2 packs per day of tobacco   He denies alcohol or drug abuse at this time  He has been in rehab as recently as February per his wife  Patient and wife deny hematuria, melena, blood per rectum hematemesis, easy bruising, adenopathy  Patient reports family h/o prostate cancer in his brother  He has no family h/o blood borne cancers    Review of Systems   Constitutional: Positive for fatigue  Negative for chills, diaphoresis and fever  HENT: Negative  Eyes: Negative  Respiratory: Positive for shortness of breath  Negative for apnea, cough, choking, chest tightness, wheezing and stridor  Cardiovascular: Negative for chest pain, palpitations and leg swelling  Gastrointestinal: Negative for abdominal distention, abdominal pain, anal bleeding, blood in stool, constipation, diarrhea, nausea, rectal pain and vomiting  Endocrine: Negative  Genitourinary: Negative  Musculoskeletal: Negative  Allergic/Immunologic: Negative  Neurological: Positive for dizziness, syncope, weakness and light-headedness  Hematological: Negative  Psychiatric/Behavioral: Negative          Historical Information   Past Medical History:   Diagnosis Date    Anemia     Anxiety     [facility]ipolar disorder ([facility] )     Cancer ([facility] )     History of alcohol abuse     Hypertension     Hypertension     Insomnia     [facility]     Opiate abuse, episodic     Psychiatric disorder      History reviewed  No pertinent surgical history  Social History   History   Alcohol Use No     Comment: last drink nov 19 2017     History   Drug Use No     Comment: hx of heroin and subutex abuse     History   Smoking Status    Current Every Day Smoker    Packs/day: 1 50    Years: 40 00   Smokeless Tobacco    Never Used     Family History:   Family History   Problem Relation Age of Onset    Coronary artery disease Mother     No Known Problems Father     Hepatitis Sister     Cancer Brother     Prostate cancer Brother     Early death Brother        Meds/Allergies   PTA meds:   Prior to Admission Medications   Prescriptions Last Dose Informant Patient Reported? Taking? FLUoxetine (PROzac) 20 mg capsule 3/26/2018 at Unknown time Self Yes Yes   Sig: Take 40 mg by mouth daily   benztropine (COGENTIN) 1 mg tablet 3/25/2018 at Unknown time Spouse/Significant Other Yes Yes   Sig: Take 1 mg by mouth Medrol Dose Pack scheduling ONLY     divalproex sodium (DEPAKOTE) 500 mg EC tablet 3/25/2018 at Unknown time Self Yes Yes   Sig: Take 500 mg by mouth every 12 (twelve) hours     hydrOXYzine pamoate (VISTARIL) 50 mg capsule 3/26/2018 at Unknown time Self Yes Yes   Sig: Take 50 mg by mouth 2 (two) times a day   hydrochlorothiazide (MICROZIDE) 12 5 mg capsule   Yes Yes   Sig: Take 12 5 mg by mouth daily   lisinopril (ZESTRIL) 40 mg tablet   Yes Yes   Sig: Take 40 mg by mouth daily   mirtazapine (REMERON) 15 mg tablet 3/25/2018 at Unknown time Self Yes Yes   Sig: Take 15 mg by mouth daily at bedtime   risperiDONE (RisperDAL) 2 mg tablet 3/25/2018 at Unknown time Self Yes Yes   Sig: Take 4 mg by mouth 2 (two) times a day        Facility-Administered Medications: None     No Known Allergies    Objective   Vitals: Blood pressure 130/60, pulse 84, temperature 99 1 °F (37 3 °C), temperature source Oral, resp   rate 18, height 5' 11" (1 803 m), weight 106 kg (234 lb 5 6 oz), SpO2 95 %  Intake/Output Summary (Last 24 hours) at 03/26/18 1710  Last data filed at 03/26/18 1545   Gross per 24 hour   Intake             2350 ml   Output              700 ml   Net             1650 ml       Invasive Devices     Peripheral Intravenous Line            Peripheral IV 03/26/18 Right Antecubital less than 1 day                Physical Exam   Constitutional: He is oriented to person, place, and time  He appears well-developed and well-nourished  He is active  No distress  HENT:   Head: Normocephalic and atraumatic  Right Ear: External ear normal    Left Ear: External ear normal    Nose: Nose normal    Mouth/Throat: Oropharynx is clear and moist  No oropharyngeal exudate  Eyes: Conjunctivae are normal  Pupils are equal, round, and reactive to light  No scleral icterus  Neck: Normal range of motion  Neck supple  No tracheal deviation present  No thyromegaly present  Cardiovascular: Normal rate, regular rhythm and intact distal pulses  Exam reveals no gallop and no friction rub  Murmur (Pan- Systolic) heard  Pulmonary/Chest: Effort normal and breath sounds normal  No stridor  No respiratory distress  He has no wheezes  He has no rales  Abdominal: Soft  Bowel sounds are normal  He exhibits no distension  There is no tenderness  There is no rebound and no guarding  Obese   Musculoskeletal: Normal range of motion  He exhibits no edema, tenderness or deformity  Lymphadenopathy:     He has no cervical adenopathy  Neurological: He is alert and oriented to person, place, and time  No cranial nerve deficit  Skin: Skin is warm and dry  No rash noted  No erythema  There is pallor  Lab Results: I have personally reviewed pertinent reports       Results for orders placed or performed during the hospital encounter of 03/26/18   CBC and differential   Result Value Ref Range    WBC 21 70 (H) 4 80 - 10 80 Thousand/uL    RBC 1 57 (L) 4 70 - 6 10 Million/uL    Hemoglobin 4 5 (LL) 14 0 - 18 0 g/dL    Hematocrit 13 3 (LL) 42 0 - 52 0 %    MCV 84 82 - 98 fL    MCH 28 8 27 0 - 31 0 pg    MCHC 34 1 31 4 - 37 4 g/dL    RDW 15 0 11 6 - 15 1 %    MPV 7 2 (L) 8 9 - 12 7 fL    Platelets 246 197 - 906 Thousands/uL    nRBC 0 /100 WBCs    Neutrophils Relative 27 (L) 43 - 75 %    Lymphocytes Relative 67 (H) 14 - 44 %    Monocytes Relative 5 4 - 12 %    Eosinophils Relative 0 0 - 6 %    Basophils Relative 0 0 - 1 %    Neutrophils Absolute 5 90 1 85 - 7 62 Thousands/µL    Lymphocytes Absolute 14 60 (H) 0 60 - 4 47 Thousands/µL    Monocytes Absolute 1 10 0 17 - 1 22 Thousand/µL    Eosinophils Absolute 0 10 0 00 - 0 61 Thousand/µL    Basophils Absolute 0 00 0 00 - 0 10 Thousands/µL   Basic metabolic panel   Result Value Ref Range    Sodium 140 136 - 145 mmol/L    Potassium 4 2 3 5 - 5 3 mmol/L    Chloride 101 100 - 108 mmol/L    CO2 29 21 - 32 mmol/L    Anion Gap 10 4 - 13 mmol/L    BUN 26 (H) 5 - 25 mg/dL    Creatinine 1 25 0 60 - 1 30 mg/dL    Glucose 131 65 - 140 mg/dL    Calcium 8 8 8 3 - 10 1 mg/dL    eGFR 61 ml/min/1 73sq m   UA w Reflex to Microscopic w Reflex to Culture   Result Value Ref Range    Color, UA Light Yellow     Clarity, UA Slightly Cloudy     Specific [place], UA 1 010 1 000 - 1 030    pH, UA 6 5 5 0 - 9 0    Leukocytes, UA Small (A) Negative    Nitrite, UA Negative Negative    Protein, UA Negative Negative mg/dl    Glucose, UA Negative Negative mg/dl    Ketones, UA Negative Negative mg/dl    Urobilinogen, UA 0 2 0 2, 1 0 E U /dl E U /dl    Bilirubin, UA Negative Negative    Blood, UA Negative Negative   APTT   Result Value Ref Range    PTT 22 (L) 23 - 35 seconds   CK (with reflex to MB)   Result Value Ref Range    Total CK 81 39 - 308 U/L   Troponin I   Result Value Ref Range    Troponin I 0 03 <=0 04 ng/mL   Hepatic function panel   Result Value Ref Range    Total Bilirubin 0 40 0 20 - 1 00 mg/dL    Bilirubin, Direct 0 10 0 00 - 0 20 mg/dL Alkaline Phosphatase 90 46 - 116 U/L    AST 13 5 - 45 U/L    ALT 26 12 - 78 U/L    Total Protein 6 3 (L) 6 4 - 8 2 g/dL    Albumin 3 4 (L) 3 5 - 5 0 g/dL   Lipase   Result Value Ref Range    Lipase 92 73 - 393 u/L   BNP   Result Value Ref Range    NT-proBNP 318 (H) <125 pg/mL   Lactic acid, plasma   Result Value Ref Range    LACTIC ACID 3 5 (HH) 0 5 - 2 0 mmol/L   Ethanol   Result Value Ref Range    Ethanol Lvl <3 0 - 3 mg/dL   Ammonia   Result Value Ref Range    Ammonia 37 (H) 11 - 35 umol/L   Urine Microscopic   Result Value Ref Range    RBC, UA 0-1 (A) None Seen, 0-5 /hpf    WBC, UA 10-20 (A) None Seen, 0-5, 5-55, 5-65 /hpf    Epithelial Cells Occasional None Seen, Occasional /hpf    Bacteria, UA Innumerable (A) None Seen, Occasional /hpf    Hyaline Casts, UA 1-2 (A) (none) /lpf   Rapid drug screen, urine   Result Value Ref Range    Amph/Meth UR Negative Negative    Barbiturate Ur Negative Negative    Benzodiazepine Urine Positive (A) Negative    Cocaine Urine Negative Negative    Methadone Urine Negative Negative    Opiate Urine Negative Negative    PCP Ur Negative Negative    THC Urine Negative Negative   Type and screen   Result Value Ref Range    ABO Grouping B     Rh Factor Positive     Antibody Screen Negative     Specimen Expiration Date [id number]    Prepare RBC:Has consent been obtained? Yes; Where is the Surgery Scheduled?  [facility], 3 Units   Result Value Ref Range    Unit Product Code T6844V48     Unit Number [id number]     Unit ABO O     Unit [facility] POS     Crossmatch Compatible     Unit Dispense Status Issued     Unit Product Code Q7247Y10     Unit Number [id number]     Unit ABO O     Unit [facility] POS     Crossmatch Compatible     Unit Dispense Status Crossmatched     Unit Product Code X0815E66     Unit Number [id number]     Unit ABO B     Unit RH NEG     Crossmatch Compatible     Unit Dispense Status Issued    Smear Review(Phlebs Do Not Order)   Result Value Ref Range    Platelet Estimate Adequate Adequate       Imaging: I have personally reviewed pertinent reports  Xr Chest Portable    Result Date: 3/26/2018  Impression: No acute cardiopulmonary disease  Workstation performed: [id number]     EKG, Pathology, and Other Studies: I have personally reviewed pertinent reports  Code Status: Level 1 - Full Code  Advance Directive and Living Will:      Power of :    POLST:      Counseling / Coordination of Care  Total floor / unit time spent today 30 minutes  Greater than 50% of total time was spent with the patient and / or family counseling and / or coordination of care  A description of the counseling / coordination of care:  See above assessment/plan an attending attestation

## 2018-03-27 ENCOUNTER — APPOINTMENT (INPATIENT)
Dept: RADIOLOGY | Facility: HOSPITAL | Age: 63
DRG: 840 | End: 2018-03-27
Payer: COMMERCIAL

## 2018-03-27 ENCOUNTER — APPOINTMENT (OUTPATIENT)
Dept: NON INVASIVE DIAGNOSTICS | Facility: HOSPITAL | Age: 63
DRG: 840 | End: 2018-03-27
Payer: COMMERCIAL

## 2018-03-27 LAB
ABO GROUP BLD BPU: NORMAL
ALBUMIN SERPL BCP-MCNC: 3.2 G/DL (ref 3.5–5)
ALP SERPL-CCNC: 66 U/L (ref 46–116)
ALT SERPL W P-5'-P-CCNC: 14 U/L (ref 12–78)
ANION GAP SERPL CALCULATED.3IONS-SCNC: 7 MMOL/L (ref 4–13)
ANISOCYTOSIS BLD QL SMEAR: PRESENT
APTT PPP: 24 SECONDS (ref 23–35)
AST SERPL W P-5'-P-CCNC: 12 U/L (ref 5–45)
BILIRUB SERPL-MCNC: 0.6 MG/DL (ref 0.2–1)
BPU ID: NORMAL
BUN SERPL-MCNC: 22 MG/DL (ref 5–25)
CALCIUM SERPL-MCNC: 8.6 MG/DL (ref 8.3–10.1)
CHLORIDE SERPL-SCNC: 105 MMOL/L (ref 100–108)
CHOLEST SERPL-MCNC: 114 MG/DL (ref 50–200)
CO2 SERPL-SCNC: 29 MMOL/L (ref 21–32)
CREAT SERPL-MCNC: 0.81 MG/DL (ref 0.6–1.3)
CROSSMATCH: NORMAL
ERYTHROCYTE [DISTWIDTH] IN BLOOD BY AUTOMATED COUNT: 14.8 % (ref 11.6–15.1)
EST. AVERAGE GLUCOSE BLD GHB EST-MCNC: 114 MG/DL
FERRITIN SERPL-MCNC: 823 NG/ML (ref 8–388)
GFR SERPL CREATININE-BSD FRML MDRD: 95 ML/MIN/1.73SQ M
GLUCOSE SERPL-MCNC: 97 MG/DL (ref 65–140)
HBA1C MFR BLD: 5.6 % (ref 4.2–6.3)
HCT VFR BLD AUTO: 20.7 % (ref 42–52)
HCT VFR BLD AUTO: 20.7 % (ref 42–52)
HCT VFR BLD AUTO: 21.7 % (ref 42–52)
HCT VFR BLD AUTO: 22.5 % (ref 42–52)
HDLC SERPL-MCNC: 36 MG/DL (ref 40–60)
HGB BLD-MCNC: 6.9 G/DL (ref 14–18)
HGB BLD-MCNC: 7 G/DL (ref 14–18)
HGB BLD-MCNC: 7.2 G/DL (ref 14–18)
HGB BLD-MCNC: 7.6 G/DL (ref 14–18)
INR PPP: 1.18 (ref 0.86–1.16)
IRON SATN MFR SERPL: 93 %
IRON SERPL-MCNC: 294 UG/DL (ref 65–175)
LDLC SERPL CALC-MCNC: 53 MG/DL (ref 0–100)
MAGNESIUM SERPL-MCNC: 2.1 MG/DL (ref 1.6–2.6)
MCH RBC QN AUTO: 29.2 PG (ref 27–31)
MCHC RBC AUTO-ENTMCNC: 33.2 G/DL (ref 31.4–37.4)
MCV RBC AUTO: 88 FL (ref 82–98)
PHOSPHATE SERPL-MCNC: 4.5 MG/DL (ref 2.3–4.1)
PLATELET # BLD AUTO: 155 THOUSANDS/UL (ref 130–400)
PLATELET BLD QL SMEAR: ADEQUATE
PMV BLD AUTO: 7.1 FL (ref 8.9–12.7)
POTASSIUM SERPL-SCNC: 4.4 MMOL/L (ref 3.5–5.3)
PROT SERPL-MCNC: 5.9 G/DL (ref 6.4–8.2)
PROTHROMBIN TIME: 12.4 SECONDS (ref 9.4–11.7)
RBC # BLD AUTO: 2.47 MILLION/UL (ref 4.7–6.1)
RETICS # AUTO: 1000 10*3/UL (ref 14356–105094)
RETICS # CALC: <0.18 % (ref 0.37–1.87)
SODIUM SERPL-SCNC: 141 MMOL/L (ref 136–145)
TIBC SERPL-MCNC: 316 UG/DL (ref 250–450)
TRIGL SERPL-MCNC: 124 MG/DL
TROPONIN I SERPL-MCNC: 0.05 NG/ML
TROPONIN I SERPL-MCNC: 0.1 NG/ML
UNIT DISPENSE STATUS: NORMAL
UNIT PRODUCT CODE: NORMAL
UNIT RH: NORMAL
WBC # BLD AUTO: 20.6 THOUSAND/UL (ref 4.8–10.8)

## 2018-03-27 PROCEDURE — 99222 1ST HOSP IP/OBS MODERATE 55: CPT | Performed by: INTERNAL MEDICINE

## 2018-03-27 PROCEDURE — G8978 MOBILITY CURRENT STATUS: HCPCS

## 2018-03-27 PROCEDURE — 83550 IRON BINDING TEST: CPT | Performed by: FAMILY MEDICINE

## 2018-03-27 PROCEDURE — 85045 AUTOMATED RETICULOCYTE COUNT: CPT | Performed by: FAMILY MEDICINE

## 2018-03-27 PROCEDURE — 85014 HEMATOCRIT: CPT | Performed by: FAMILY MEDICINE

## 2018-03-27 PROCEDURE — G8979 MOBILITY GOAL STATUS: HCPCS

## 2018-03-27 PROCEDURE — 85018 HEMOGLOBIN: CPT | Performed by: FAMILY MEDICINE

## 2018-03-27 PROCEDURE — 93005 ELECTROCARDIOGRAM TRACING: CPT | Performed by: FAMILY MEDICINE

## 2018-03-27 PROCEDURE — 84484 ASSAY OF TROPONIN QUANT: CPT | Performed by: FAMILY MEDICINE

## 2018-03-27 PROCEDURE — 93306 TTE W/DOPPLER COMPLETE: CPT | Performed by: INTERNAL MEDICINE

## 2018-03-27 PROCEDURE — 71260 CT THORAX DX C+: CPT

## 2018-03-27 PROCEDURE — 85027 COMPLETE CBC AUTOMATED: CPT | Performed by: FAMILY MEDICINE

## 2018-03-27 PROCEDURE — 83735 ASSAY OF MAGNESIUM: CPT | Performed by: FAMILY MEDICINE

## 2018-03-27 PROCEDURE — 80053 COMPREHEN METABOLIC PANEL: CPT | Performed by: FAMILY MEDICINE

## 2018-03-27 PROCEDURE — 99223 1ST HOSP IP/OBS HIGH 75: CPT | Performed by: INTERNAL MEDICINE

## 2018-03-27 PROCEDURE — 83036 HEMOGLOBIN GLYCOSYLATED A1C: CPT | Performed by: FAMILY MEDICINE

## 2018-03-27 PROCEDURE — 84100 ASSAY OF PHOSPHORUS: CPT | Performed by: FAMILY MEDICINE

## 2018-03-27 PROCEDURE — 97162 PT EVAL MOD COMPLEX 30 MIN: CPT

## 2018-03-27 PROCEDURE — 83540 ASSAY OF IRON: CPT | Performed by: FAMILY MEDICINE

## 2018-03-27 PROCEDURE — 93306 TTE W/DOPPLER COMPLETE: CPT

## 2018-03-27 PROCEDURE — 82728 ASSAY OF FERRITIN: CPT | Performed by: FAMILY MEDICINE

## 2018-03-27 PROCEDURE — 99232 SBSQ HOSP IP/OBS MODERATE 35: CPT | Performed by: FAMILY MEDICINE

## 2018-03-27 PROCEDURE — 74177 CT ABD & PELVIS W/CONTRAST: CPT

## 2018-03-27 PROCEDURE — 80061 LIPID PANEL: CPT | Performed by: FAMILY MEDICINE

## 2018-03-27 RX ADMIN — FLUOXETINE 40 MG: 20 CAPSULE ORAL at 10:15

## 2018-03-27 RX ADMIN — METOPROLOL TARTRATE 12.5 MG: 25 TABLET ORAL at 10:16

## 2018-03-27 RX ADMIN — BENZTROPINE MESYLATE 1 MG: 1 TABLET ORAL at 21:11

## 2018-03-27 RX ADMIN — DIVALPROEX SODIUM 500 MG: 500 TABLET, DELAYED RELEASE ORAL at 10:15

## 2018-03-27 RX ADMIN — HYDROXYZINE HYDROCHLORIDE 50 MG: 25 TABLET, FILM COATED ORAL at 21:11

## 2018-03-27 RX ADMIN — IOHEXOL 100 ML: 350 INJECTION, SOLUTION INTRAVENOUS at 18:07

## 2018-03-27 RX ADMIN — LISINOPRIL 40 MG: 20 TABLET ORAL at 10:15

## 2018-03-27 RX ADMIN — HYDROCHLOROTHIAZIDE 12.5 MG: 12.5 TABLET ORAL at 10:15

## 2018-03-27 RX ADMIN — ASPIRIN 81 MG 81 MG: 81 TABLET ORAL at 10:16

## 2018-03-27 RX ADMIN — DIVALPROEX SODIUM 500 MG: 500 TABLET, DELAYED RELEASE ORAL at 21:11

## 2018-03-27 RX ADMIN — RISPERIDONE 2 MG: 1 TABLET ORAL at 21:11

## 2018-03-27 RX ADMIN — MIRTAZAPINE 15 MG: 15 TABLET, FILM COATED ORAL at 21:11

## 2018-03-27 RX ADMIN — IOHEXOL 50 ML: 240 INJECTION, SOLUTION INTRATHECAL; INTRAVASCULAR; INTRAVENOUS; ORAL at 16:30

## 2018-03-27 RX ADMIN — METOPROLOL TARTRATE 12.5 MG: 25 TABLET ORAL at 21:11

## 2018-03-27 NOTE — CONSULTS
Consultation - Cardiology   Georgi Lewis 60 y o  male MRN: [id number]  : 1955  Unit/Bed#: [address] [place] [address] Encounter: [id number]      Assessment & Plan   1  Severe symptomatic anemia with hemoglobin dropping to 4 5  2  Non ST elevation MI most likely type 2 due to severe anemia certainly need to rule out underlying obstructive coronary artery disease as patient has multiple cardiac risk factors  Will be scheduled for Lexiscan stress test   Keep hemoglobin above 7 5  3  Hyperglycemia  Check HbA1c  If patient diagnosed to have diabetes mellitus need to be on statin therapy  4  Hypertension seems to be well controlled with lisinopril and hydrochlorothiazide  5  Questionable history of leukemia follow-up Hematology  6  History of dyslipidemia check fasting lipids and consider statin therapy  7  Tobacco abuse  Advised to quit smoking  8  History of psych problems management as per them      Summary of Recommendations:        Monitor on tele  Monitor hemoglobin closely keep hemoglobin above 7 5  Not a good candidate for antithrombotic therapy and patient denies any symptoms of chest pain  Continue aspirin therapy if okay with Hematology  Check fasting lipids and consider statin therapy  Echo Doppler reviewed patient has normal LV systolic function  Lexiscan stress test in the morning  Discussed with medical team    Physician Requesting Consult: Leonel Russo MD    Reason for Consult / Principal Problem:  Mildly elevated troponin    Inpatient consult to Cardiology  Consult performed by: Tonie Skaggs ordered by: Bertha Man          HPI: Georgi Lewis is a 60y o  year old male who presents with very low hemoglobin and has indeterminate troponin    Patient has past medical history significant for anemia, hypertension, hyperlipidemia, bipolar disorder, history of polysubstance abuse, recent hospitalization in February for anemia and requiring blood transfusion who came with weakness and shortness of breath  Patient was found to have hemoglobin of 4 5 and required blood transfusion  Patient is very poor historian but he is telling me he had a GI workup done which shows he has no significant Gastroenterology issues  He has seen Hematology Oncology in [facility] and has reported to have leukemia diagnosed in [facility]   He smokes about 1/2 pack and has been smoking for 40 years  He denies any current drug abuse but he still smokes  Denies any history of hematuria, hematemesis or black colored stools  No fever no chills no nausea no vomiting  No history of premature coronary artery disease  He does not know his father his mother  of noncardiac related problems  Review of Systems   Constitutional: Positive for fatigue  Poor historian   Respiratory: Positive for shortness of breath  Neurological: Positive for weakness  Psychiatric/Behavioral: The patient is nervous/anxious  All other systems reviewed and are negative  Historical Information   Past Medical History:   Diagnosis Date    Anemia     Anxiety     Bipolar disorder ([facility]     Cancer ([facility] )     History of alcohol abuse     Hypertension     Hypertension     Insomnia     Leukemia ([facility] )     Opiate abuse, episodic     Psychiatric disorder      History reviewed  No pertinent surgical history    History   Alcohol Use No     Comment: last drink 2017     History   Drug Use No     Comment: hx of heroin and subutex abuse     History   Smoking Status    Current Every Day Smoker    Packs/day: 2 00    Years: 40 00   Smokeless Tobacco    Never Used     Family History:   Family History   Problem Relation Age of Onset    Coronary artery disease Mother     No Known Problems Father     Hepatitis Sister     Cancer Brother     Prostate cancer Brother     Early death Brother        Meds/Allergies    PTA meds:    Prescriptions Prior to Admission   Medication    benztropine (COGENTIN) 1 mg tablet    divalproex sodium (DEPAKOTE) 500 mg EC tablet    FLUoxetine (PROzac) 20 mg capsule    hydrochlorothiazide (MICROZIDE) 12 5 mg capsule    hydrOXYzine pamoate (VISTARIL) 50 mg capsule    lisinopril (ZESTRIL) 40 mg tablet    mirtazapine (REMERON) 15 mg tablet    risperiDONE (RisperDAL) 2 mg tablet      No Known Allergies    Current Facility-Administered Medications:     acetaminophen (TYLENOL) tablet 650 mg, 650 mg, Oral, Q6H PRN, Warden Kyle MD    aspirin chewable tablet 81 mg, 81 mg, Oral, Daily, Kishor Juarez MD, 81 mg at 03/27/18 1016    benztropine (COGENTIN) tablet 1 mg, 1 mg, Oral, Once HS, Warden Kyle MD, 1 mg at 03/26/18 2322    diphenhydrAMINE (BENADRYL) injection 25 mg, 25 mg, Intravenous, Q6H PRN, Warden Kyle MD    divalproex sodium (DEPAKOTE) EC tablet 500 mg, 500 mg, Oral, Q12H [address], Warden Kyle MD, 500 mg at 03/27/18 1015    FLUoxetine (PROzac) capsule 40 mg, 40 mg, Oral, Daily, Warden Kyle MD, 40 mg at 03/27/18 1015    hydrochlorothiazide (HYDRODIURIL) tablet 12 5 mg, 12 5 mg, Oral, Daily, Warden Kyle MD, 12 5 mg at 03/27/18 1015    hydrOXYzine HCL (ATARAX) tablet 50 mg, 50 mg, Oral, HS, Warden Kyle MD, 50 mg at 03/26/18 2323    lisinopril (ZESTRIL) tablet 40 mg, 40 mg, Oral, Daily, Warden Kyle MD, 40 mg at 03/27/18 1015    metoprolol tartrate (LOPRESSOR) partial tablet 12 5 mg, 12 5 mg, Oral, Q12H [address], Kishor Juarez MD, 12 5 mg at 03/27/18 1016    mirtazapine (REMERON) tablet 15 mg, 15 mg, Oral, HS, Warden Kyle MD, 15 mg at 03/26/18 2323    nicotine (NICODERM CQ) 21 mg/24 hr TD 24 hr patch 1 patch, 1 patch, Transdermal, Daily, Warden Kyle MD    ondansetron [facility]) injection 4 mg, 4 mg, Intravenous, Q6H PRN, Warden Kyle MD    risperiDONE (RisperDAL) tablet 2 mg, 2 mg, Oral, HS, Warden Kyle MD, 2 mg at 03/26/18 2323      Objective:   Vitals: Blood pressure 111/58, pulse 60, temperature 98 9 °F (37 2 °C), temperature source Oral, resp  rate 19, height 5' 11" (1 803 m), weight 106 kg (232 lb 12 9 oz), SpO2 98 %  Body mass index is 32 47 kg/m²  BP Readings from Last 3 Encounters:   03/27/18 111/58   02/27/18 146/72   02/23/18 108/82     Orthostatic Blood Pressures    Flowsheet Row Most Recent Value   Blood Pressure  111/58 filed at 03/27/2018 1337   Patient Position - Orthostatic VS  Lying filed at 03/27/2018 1337          Intake/Output Summary (Last 24 hours) at 03/27/18 1528  Last data filed at 03/27/18 1001   Gross per 24 hour   Intake             2250 ml   Output              400 ml   Net             1850 ml       Invasive Devices     Peripheral Intravenous Line            Peripheral IV 03/26/18 Right Antecubital 1 day                  Physical Exam:   Physical Exam    Neurologic:  Alert & oriented x 3, no new focal deficits, Not in any acute distress,  Constitutional:  Well developed, well nourished, non-toxic appearance, poor oral dental hygiene   Eyes:  Pupil equal and reacting to light, conjunctiva normal   HENT:  Atraumatic, oropharynx moist, Neck- normal range of motion, no tenderness, supple   Respiratory:  Bilateral air entry, mostly clear to auscultation  Cardiovascular: S1-S2 regular with a 2/6 ejection systolic murmur and S4 is present  GI:  Soft, nondistended, normal bowel sounds, nontender, no hepatosplenomegaly appreciated  Musculoskeletal:  No edema, no tenderness, no deformities  Skin:  Well hydrated, no rash   Lymphatic:  No lymphadenopathy noted   Extremities:  no edema and distal pulses are present    Labs:   Troponins:   Results from last 7 days  Lab Units 03/27/18  0643 03/26/18  2341 03/26/18 2045 03/26/18  1145   CK TOTAL U/L  --   --   --   --  81   TROPONIN I ng/mL 0 05* 0 10* 0 12*  < > 0 03   < > = values in this interval not displayed      CBC with diff:     Results from last 7 days  Lab Units 03/27/18  0643 03/27/18  0455 03/26/18 2045 03/26/18  1145   WBC Thousand/uL  --  20 60* --  21 70*   HEMOGLOBIN g/dL 7 6* 7 2* 6 2* 4 5*   HEMATOCRIT % 22 5* 21 7* 18 8* 13 3*   MCV fL  --  88  --  84   PLATELETS Thousands/uL  --  155  --  192   MCH pg  --  29 2  --  28 8   MCHC g/dL  --  33 2  --  34 1   RDW %  --  14 8  --  15 0   MPV fL  --  7 1*  --  7 2*   NRBC AUTO /100 WBCs  --   --   --  0       CMP:     Results from last 7 days  Lab Units 03/27/18  0455 03/26/18  1145   SODIUM mmol/L 141 140   POTASSIUM mmol/L 4 4 4 2   CHLORIDE mmol/L 105 101   CO2 mmol/L 29 29   ANION GAP mmol/L 7 10   BUN mg/dL 22 26*   CREATININE mg/dL 0 81 1 25   GLUCOSE RANDOM mg/dL 97 131   CALCIUM mg/dL 8 6 8 8   AST U/L 12 13   ALT U/L 14 26   ALK PHOS U/L 66 90   TOTAL PROTEIN g/dL 5 9* 6 3*   BILIRUBIN TOTAL mg/dL 0 60 0 40   EGFR ml/min/1 73sq m 95 61       Magnesium:     Results from last 7 days  Lab Units 03/27/18  0455   MAGNESIUM mg/dL 2 1     Coags:     Results from last 7 days  Lab Units 03/26/18  2341 03/26/18  1145   PTT seconds 24 22*   INR  1 18*  --      TSH:      Results from last 7 days  Lab Units 03/26/18  1715   TSH 3RD GENERATON uIU/mL 1 666     NT-proBNP:   Recent Labs      03/26/18   1145   NTBNP  318*        Imaging & Testing   Cardiac testing:     Echo Doppler: LEFT VENTRICLE:  Study done on 3 /58/3326  Systolic function was normal  Ejection fraction was estimated in the range of 55 % to 60 % to be 55 %  There were no regional wall motion abnormalities  Wall thickness was mildly increased  There was mild concentric hypertrophy  Doppler parameters were consistent with abnormal left ventricular relaxation (grade 1 diastolic dysfunction)      MITRAL VALVE:  There was mild regurgitation      AORTIC VALVE:  There was mild regurgitation      TRICUSPID VALVE:  There was mild to moderate regurgitation  Estimated peak PA pressure was 45 mmHg      IVC, HEPATIC VEINS:  The respirophasic change in diameter was less than 50%  Imaging: I have personally reviewed pertinent reports      Xr Chest Portable    Result Date: 3/26/2018  Narrative: CHEST INDICATION:   weakness  Shortness of breath  COMPARISON:  02/27/2018 EXAM PERFORMED/VIEWS:  XR CHEST PORTABLE FINDINGS: Cardiomediastinal silhouette appears unremarkable  The lungs are clear  No pneumothorax or pleural effusion  Osseous structures appear within normal limits for patient age  Impression: No acute cardiopulmonary disease  Workstation performed: [id number]     Xr Chest 1 View Portable    Result Date: 2/26/2018  Narrative: CHEST INDICATION: overdose COMPARISON:  02/20/2018 EXAM PERFORMED/VIEWS:  XR CHEST PORTABLE FINDINGS: Cardiomediastinal silhouette appears unremarkable  The lungs are clear  No pneumothorax or pleural effusion  Osseous structures appear within normal limits for patient age  Impression: No acute cardiopulmonary disease  Workstation performed: [id number]     Xr Chest Pa & Lateral    Result Date: 2/27/2018  Narrative: CHEST INDICATION: concern for aspiration pneumonia COMPARISON:  2/26/2018  EXAM PERFORMED/VIEWS:  XR CHEST PA & LATERAL FINDINGS: Cardiomediastinal silhouette appears unremarkable  The lungs are clear  No pneumothorax or pleural effusion  Osseous structures appear within normal limits for patient age  Impression: No acute cardiopulmonary disease  Workstation performed: [id number]     EKG/ Monitor: Personally reviewed  Twelve lead EKG shows normal sinus rhythm heart rate 62 beats per minute  No significant ST changes  Code Status: Level 1 - Full Code  Advance Directive and Living Will:      POLST:      Dr Rosibel Davis MD [facility]      "This note has been constructed using a voice recognition system  Therefore there may be syntax, spelling, and/or grammatical errors   Please call if you have any questions  "

## 2018-03-27 NOTE — PROGRESS NOTES
[facility] Practice Progress Note - Ciro Lopez 60 y o  male MRN: [id number]    Unit/Bed#: [address] [place] [address] Encounter: [id number]      Assessment/Plan:  Acute on Chronic symptomatic normocytic anemia:   s/p 3 units packed red blood cell transfusion  Hemoglobin/hematocrit rebounded to 7 2/21 7 1 unit of PRBC on hold  Patient is asymptomatic at this time   - H&H q 12 hours  - Benadryl 25 mg IV q 6 hours for allergic reaction  - continue telemetry   - iron studies ordered, retic count ordered  - hematology-oncology consult pending     H/o leukemia:  Patient presents with leukocytosis and severe anemia  - hematology oncology consulted recommendations appreciated     Elevated troponin:  0 03 on admission  CK 81, pro-  Likely NSTEMI type 2 in light of acute on chronic blood loss  Troponin trended up to 0 12 and trended down to 0 10 patient reports no chest pain  - echocardiogram report pending     Hyperglycemia:  239 on admission  Patient does not have PMHx of diabetes mellitus  - hemoglobin A1c ordered  - continue daily glucose checks with CMP/BMP     RA: creatine 1 25 on admission,  Resolved BUN 26, creatinine 0 81, BUN 22   will trend CMP/bmp daily     Lactic acidosis:  Resolved     Hypertension:   - continue lisinopril 40 mg and hydrochlorothiazide 12 5 mg     Hyperlipidemia:    - not currently on medication    will order lipid panel when patient stable      Depression/Bipolar disorder/unspecified psychiatric disorder:    - Will restart Risperdal 2 mg q h s , mirtazapine 15 mg q h s , hydroxyzine 50 mg q h s , Depakote 500 mg q 12 hours, benztropine 1 mg q h s   - consider blood levels when stable s/p transfusion      Tobacco abuse:  Smokes 1-2 packs per day  -smoking cessation counseling  -negative CQ 21 mg patch     Obesity  - nutrition counseling      FEN & DVT prophylaxis  - cardiac diet  - s/p 3 units PRBC, 1 unit PRBC on hold  - replete electrolytes as necessary with goal K > 4, phos > 3, Mg > 2  - pharmacologic DVT prophylaxis held 2/2 to acute anemia, SCDs ordered  - patient is full code    Subjective:   Patient is doing well this a m  [place]angela  Patient has no complaints  Patient denies lightheadedness, dizziness, headache, chest pain, palpitations, shortness of breath, cough, abdominal pain, dysuria, bowel bowel changes, pain in extremities  Patient states that he wants to know when he can go home  He states that he is doing better  Objective:     Vitals: Blood pressure 142/81, pulse 68, temperature 97 9 °F (36 6 °C), temperature source Oral, resp  rate 18, height 5' 11" (1 803 m), weight 106 kg (232 lb 12 9 oz), SpO2 98 %  ,Body mass index is 32 47 kg/m²  Wt Readings from Last 3 Encounters:   03/27/18 106 kg (232 lb 12 9 oz)   02/26/18 113 kg (250 lb)   02/23/18 102 kg (225 lb)       Intake/Output Summary (Last 24 hours) at 03/27/18 0748  Last data filed at 03/27/18 0427   Gross per 24 hour   Intake             3890 ml   Output             1100 ml   Net             2790 ml       Physical Exam:   General: Pt is AAO x 3, not in any acute distress  Cardio: RRR, S1 and S2 +, no murmurs/rubs/gallops/clicks  Resp: CTA b/l, no wheezes/rales/rhonchi  Abdomen: obese, soft, NT/ND, BS+  Extremities: no cyanosis or edema  PP 2+ b/l         Recent Results (from the past 24 hour(s))   ECG 12 lead    Collection Time: 03/26/18 11:32 AM   Result Value Ref Range    Ventricular Rate 96 BPM    Atrial Rate 96 BPM    MA Interval 156 ms    QRSD Interval 86 ms    QT Interval 356 ms    QTC Interval 449 ms    P [place] 67 degrees    QRS Axis -8 degrees    T Wave Axis 91 degrees   CBC and differential    Collection Time: 03/26/18 11:45 AM   Result Value Ref Range    WBC 21 70 (H) 4 80 - 10 80 Thousand/uL    RBC 1 57 (L) 4 70 - 6 10 Million/uL    Hemoglobin 4 5 (LL) 14 0 - 18 0 g/dL    Hematocrit 13 3 (LL) 42 0 - 52 0 %    MCV 84 82 - 98 fL    MCH 28 8 27 0 - 31 0 pg    MCHC 34 1 31 4 - 37 4 g/dL    RDW 15 0 11 6 - 15 1 %    MPV 7 2 (L) 8 9 - 12 7 fL    Platelets 281 365 - 462 Thousands/uL    nRBC 0 /100 WBCs    Neutrophils Relative 27 (L) 43 - 75 %    Lymphocytes Relative 67 (H) 14 - 44 %    Monocytes Relative 5 4 - 12 %    Eosinophils Relative 0 0 - 6 %    Basophils Relative 0 0 - 1 %    Neutrophils Absolute 5 90 1 85 - 7 62 Thousands/µL    Lymphocytes Absolute 14 60 (H) 0 60 - 4 47 Thousands/µL    Monocytes Absolute 1 10 0 17 - 1 22 Thousand/µL    Eosinophils Absolute 0 10 0 00 - 0 61 Thousand/µL    Basophils Absolute 0 00 0 00 - 0 10 Thousands/µL   Basic metabolic panel    Collection Time: 03/26/18 11:45 AM   Result Value Ref Range    Sodium 140 136 - 145 mmol/L    Potassium 4 2 3 5 - 5 3 mmol/L    Chloride 101 100 - 108 mmol/L    CO2 29 21 - 32 mmol/L    Anion Gap 10 4 - 13 mmol/L    BUN 26 (H) 5 - 25 mg/dL    Creatinine 1 25 0 60 - 1 30 mg/dL    Glucose 131 65 - 140 mg/dL    Calcium 8 8 8 3 - 10 1 mg/dL    eGFR 61 ml/min/1 73sq m   APTT    Collection Time: 03/26/18 11:45 AM   Result Value Ref Range    PTT 22 (L) 23 - 35 seconds   CK (with reflex to MB)    Collection Time: 03/26/18 11:45 AM   Result Value Ref Range    Total CK 81 39 - 308 U/L   Troponin I    Collection Time: 03/26/18 11:45 AM   Result Value Ref Range    Troponin I 0 03 <=0 04 ng/mL   Hepatic function panel    Collection Time: 03/26/18 11:45 AM   Result Value Ref Range    Total Bilirubin 0 40 0 20 - 1 00 mg/dL    Bilirubin, Direct 0 10 0 00 - 0 20 mg/dL    Alkaline Phosphatase 90 46 - 116 U/L    AST 13 5 - 45 U/L    ALT 26 12 - 78 U/L    Total Protein 6 3 (L) 6 4 - 8 2 g/dL    Albumin 3 4 (L) 3 5 - 5 0 g/dL   Lipase    Collection Time: 03/26/18 11:45 AM   Result Value Ref Range    Lipase 92 73 - 393 u/L   BNP    Collection Time: 03/26/18 11:45 AM   Result Value Ref Range    NT-proBNP 318 (H) <125 pg/mL   Lactic acid, plasma    Collection Time: 03/26/18 11:45 AM   Result Value Ref Range    LACTIC ACID 3 5 (HH) 0 5 - 2 0 mmol/L   Type and screen    Collection Time: 03/26/18 11:45 AM   Result Value Ref Range    ABO Grouping B     Rh Factor Positive     Antibody Screen Negative     Specimen Expiration Date [id number]    Smear Review(Phlebs Do Not Order)    Collection Time: 03/26/18 11:45 AM   Result Value Ref Range    Platelet Estimate Adequate Adequate   Ethanol    Collection Time: 03/26/18 12:13 PM   Result Value Ref Range    Ethanol Lvl <3 0 - 3 mg/dL   Ammonia    Collection Time: 03/26/18 12:13 PM   Result Value Ref Range    Ammonia 37 (H) 11 - 35 umol/L   UA w Reflex to Microscopic w Reflex to Culture    Collection Time: 03/26/18  1:40 PM   Result Value Ref Range    Color, UA Light Yellow     Clarity, UA Slightly Cloudy     Specific [place], UA 1 010 1 000 - 1 030    pH, UA 6 5 5 0 - 9 0    Leukocytes, UA Small (A) Negative    Nitrite, UA Negative Negative    Protein, UA Negative Negative mg/dl    Glucose, UA Negative Negative mg/dl    Ketones, UA Negative Negative mg/dl    Urobilinogen, UA 0 2 0 2, 1 0 E U /dl E U /dl    Bilirubin, UA Negative Negative    Blood, UA Negative Negative   Urine Microscopic    Collection Time: 03/26/18  1:40 PM   Result Value Ref Range    RBC, UA 0-1 (A) None Seen, 0-5 /hpf    WBC, UA 10-20 (A) None Seen, 0-5, 5-55, 5-65 /hpf    Epithelial Cells Occasional None Seen, Occasional /hpf    Bacteria, UA Innumerable (A) None Seen, Occasional /hpf    Hyaline Casts, UA 1-2 (A) (none) /lpf   Rapid drug screen, urine    Collection Time: 03/26/18  1:40 PM   Result Value Ref Range    Amph/Meth UR Negative Negative    Barbiturate Ur Negative Negative    Benzodiazepine Urine Positive (A) Negative    Cocaine Urine Negative Negative    Methadone Urine Negative Negative    Opiate Urine Negative Negative    PCP Ur Negative Negative    THC Urine Negative Negative   TSH, 3rd generation    Collection Time: 03/26/18  5:15 PM   Result Value Ref Range    TSH 3RD GENERATON 1 666 0 358 - 3 740 uIU/mL   Troponin I    Collection Time: 03/26/18  5:27 PM   Result Value Ref Range Troponin I 0 08 (H) <=0 04 ng/mL   Hemoglobin and hematocrit, blood    Collection Time: 03/26/18  8:45 PM   Result Value Ref Range    Hemoglobin 6 2 (LL) 14 0 - 18 0 g/dL    Hematocrit 18 8 (L) 42 0 - 52 0 %   Troponin I    Collection Time: 03/26/18  8:45 PM   Result Value Ref Range    Troponin I 0 12 (H) <=0 04 ng/mL   Lactic acid, plasma    Collection Time: 03/26/18  8:45 PM   Result Value Ref Range    LACTIC ACID 0 9 0 5 - 2 0 mmol/L   Protime-INR    Collection Time: 03/26/18 11:41 PM   Result Value Ref Range    Protime 12 4 (H) 9 4 - 11 7 seconds    INR 1 18 (H) 0 86 - 1 16   APTT    Collection Time: 03/26/18 11:41 PM   Result Value Ref Range    PTT 24 23 - 35 seconds   Troponin I    Collection Time: 03/26/18 11:41 PM   Result Value Ref Range    Troponin I 0 10 (H) <=0 04 ng/mL   Comprehensive metabolic panel    Collection Time: 03/27/18  4:55 AM   Result Value Ref Range    Sodium 141 136 - 145 mmol/L    Potassium 4 4 3 5 - 5 3 mmol/L    Chloride 105 100 - 108 mmol/L    CO2 29 21 - 32 mmol/L    Anion Gap 7 4 - 13 mmol/L    BUN 22 5 - 25 mg/dL    Creatinine 0 81 0 60 - 1 30 mg/dL    Glucose 97 65 - 140 mg/dL    Calcium 8 6 8 3 - 10 1 mg/dL    AST 12 5 - 45 U/L    ALT 14 12 - 78 U/L    Alkaline Phosphatase 66 46 - 116 U/L    Total Protein 5 9 (L) 6 4 - 8 2 g/dL    Albumin 3 2 (L) 3 5 - 5 0 g/dL    Total Bilirubin 0 60 0 20 - 1 00 mg/dL    eGFR 95 ml/min/1 73sq m   Magnesium    Collection Time: 03/27/18  4:55 AM   Result Value Ref Range    Magnesium 2 1 1 6 - 2 6 mg/dL   Phosphorus    Collection Time: 03/27/18  4:55 AM   Result Value Ref Range    Phosphorus 4 5 (H) 2 3 - 4 1 mg/dL   CBC (With Platelets)    Collection Time: 03/27/18  4:55 AM   Result Value Ref Range    WBC 20 60 (H) 4 80 - 10 80 Thousand/uL    RBC 2 47 (L) 4 70 - 6 10 Million/uL    Hemoglobin 7 2 (L) 14 0 - 18 0 g/dL    Hematocrit 21 7 (L) 42 0 - 52 0 %    MCV 88 82 - 98 fL    MCH 29 2 27 0 - 31 0 pg    MCHC 33 2 31 4 - 37 4 g/dL    RDW 14 8 11 6 - 15 1 %    Platelets 762 563 - 560 Thousands/uL    MPV 7 1 (L) 8 9 - 12 7 fL   Smear Review(Phlebs Do Not Order)    Collection Time: 03/27/18  4:55 AM   Result Value Ref Range    Anisocytosis Present     Platelet Estimate Adequate Adequate   Prepare RBC:Has consent been obtained? Yes; Where is the Surgery Scheduled?  [employer], 3 Units    Collection Time: 03/27/18  6:15 AM   Result Value Ref Range    Unit Product Code H5824W94     Unit Number [id number]     Unit ABO O     Unit [facility] POS     Crossmatch Compatible     Unit Dispense Status Presumed Trans     Unit Product Code A5784L46     Unit Number [id number]     Unit ABO O     Unit [facility] POS     Crossmatch Compatible     Unit Dispense Status Presumed Trans     Unit Product Code R6513N68     Unit Number [id number]     Unit ABO B     Unit RH NEG     Crossmatch Compatible     Unit Dispense Status Presumed Trans    Troponin I    Collection Time: 03/27/18  6:43 AM   Result Value Ref Range    Troponin I 0 05 (H) <=0 04 ng/mL   Hemoglobin and hematocrit, blood    Collection Time: 03/27/18  6:43 AM   Result Value Ref Range    Hemoglobin 7 6 (L) 14 0 - 18 0 g/dL    Hematocrit 22 5 (L) 42 0 - 52 0 %       Current Facility-Administered Medications   Medication Dose Route Frequency Provider Last Rate Last Dose    acetaminophen (TYLENOL) tablet 650 mg  650 mg Oral Q6H PRN Priscila Stanton MD        aspirin chewable tablet 81 mg  81 mg Oral Daily Tacos Romo MD        benztropine (COGENTIN) tablet 1 mg  1 mg Oral Once HS Priscila Stanton MD   1 mg at 03/26/18 2322    diphenhydrAMINE (BENADRYL) injection 25 mg  25 mg Intravenous Q6H PRN Priscila Stanton MD        divalproex sodium (DEPAKOTE) EC tablet 500 mg  500 mg Oral Q12H [facility] Priscila Stanton MD   500 mg at 03/26/18 2322    FLUoxetine (PROzac) capsule 40 mg  40 mg Oral Daily Priscila Stanton MD        hydrochlorothiazide (HYDRODIURIL) tablet 12 5 mg  12 5 mg Oral Daily MD Kiran Pinzon hydrOXYzine HCL (ATARAX) tablet 50 mg  50 mg Oral HS Mara Jackson MD   50 mg at 03/26/18 2323    lisinopril (ZESTRIL) tablet 40 mg  40 mg Oral Daily Mara Jackson MD        metoprolol tartrate (LOPRESSOR) partial tablet 12 5 mg  12 5 mg Oral Q12H [facility] Kishor Juarez MD   12 5 mg at 03/26/18 2325    mirtazapine (REMERON) tablet 15 mg  15 mg Oral HS Mara Jackson MD   15 mg at 03/26/18 2323    nicotine (NICODERM CQ) 21 mg/24 hr TD 24 hr patch 1 patch  1 patch Transdermal Daily Mara Jackson MD        ondansetron [facility]) injection 4 mg  4 mg Intravenous Q6H PRN Mara Jackson MD        risperiDONE (RisperDAL) tablet 2 mg  2 mg Oral HS Mara Jackson MD   2 mg at 03/26/18 2323       Invasive Devices     Peripheral Intravenous Line            Peripheral IV 03/26/18 Right Antecubital less than 1 day                Lab, Imaging and other studies: I have personally reviewed pertinent reports      VTE Pharmacologic Prophylaxis: Reason for no pharmacologic prophylaxis acute anemia   VTE Mechanical Prophylaxis: sequential compression device    Mara Jackson MD

## 2018-03-27 NOTE — PHYSICAL THERAPY NOTE
PT EVALUATION     03/27/18 1042   Pain Assessment   Pain Assessment No/denies pain   Home Living   Type of Home House  (2 PERLITA)   Home Layout Two level;Bed/bath upstairs;1/2 bath on main level   Home Equipment (No DME per pt)   Prior Function   Level of [place] Independent with ADLs and functional mobility  (Pt amb w/out AD PTA)   Lives With Spouse   Receives Help From Family   ADL Assistance Independent   IADLs Independent   Falls in the last 6 months 1 to 4   Comments Pt drives;works   Restrictions/Precautions   Other Precautions Fall Risk   General   Additional Pertinent History Pt adm with weakness and SOB, recent history of falls and near falls, and fall on day of admission  Family/Caregiver Present No   Cognition   Overall Cognitive Status WFL   Arousal/Participation Cooperative  (flat affect)   Orientation Level Oriented X4   Following Commands Follows all commands and directions without difficulty   RLE Assessment   RLE Assessment WFL  (4/5)   LLE Assessment   LLE Assessment WFL  (4/5)   Bed Mobility   Supine to Sit 7  Independent   Sit to Supine 7  Independent   Transfers   Sit to Stand 7  Independent   Stand to Sit 7  Independent   Toilet transfer 7  Independent   Additional items (pt independent with hygiene after amb)   Ambulation/Elevation   Gait pattern Short stride;R Foot drag;L Foot drag  (generalized unsteadiness;increases with increased distance)   Gait Assistance (S/stand by assist)   Additional items Verbal cues; Tactile cues   Assistive Device None   Distance 200 feet   Balance   Static Sitting Good   Static Standing Good   Dynamic Standing Fair +   Ambulatory Fair +   Activity Tolerance   Activity Tolerance Patient limited by fatigue   Assessment   Prognosis Good   Problem List Decreased strength;Decreased range of motion;Decreased endurance; Impaired balance;Decreased mobility; Decreased coordination;Decreased safety awareness   Assessment Patient seen for Physical Therapy evaluation  Patient admitted with Symptomatic anemia  Comorbidities affecting patient's physical performance include: HTN, HLD, polypharmacy, encephalopathy, weakness, bipolar, leukemia, leukocytosis, anemia  Personal factors affecting patient at time of initial evaluation include: lives in two story house, stairs to enter home and depression  Prior to admission, patient was independent with functional mobility without assistive device, independent with ADLS, independent with IADLS, ambulating household distance and ambulating community distances  Please find objective findings from Physical Therapy assessment regarding body systems outlined above with impairments and limitations including weakness, decreased ROM, impaired balance, decreased endurance, impaired coordination, gait deviations, decreased activity tolerance, decreased functional mobility tolerance, decreased safety awareness and fall risk  The Barthel Index was used as a functional outcome tool presenting with a score of 75 today indicating moderate limitations of functional mobility and ADLS  Patient's clinical presentation is currently evolving as seen in patient's presentation of vital sign response, increased fall risk, new onset of impairment of functional mobility, decreased endurance and new onset of weakness  Pt would benefit from continued Physical Therapy treatment to address deficits as defined above and maximize level of functional mobility  As demonstrated by objective findings, the assigned level of complexity for this evaluation is moderate     Goals   Patient Goals Home   STG Expiration Date (1-7 days)   Short Term Goal #1 amb without AD x 200 feet - S/I; balance p F+/G   Short Term Goal #2 up/down steps - S   LTG Expiration Date (8-14 days)   Long Term Goal #1 Independent all functional mobility, home/community, levels/unlevels, balance - G, strength LEs - 4+/5   Plan   Treatment/Interventions ADL retraining;Functional transfer training;LE strengthening/ROM; Elevations; Therapeutic exercise; Endurance training;Patient/family training;Gait training   PT Frequency (3-5x/wk)   Recommendation   Recommendation Home with family support  (F/U PT pending progress;[facility]?)   Additional Comments Pt is independent with ADLs, no skilled OT needs at this time  Pt will cont to benefit from skilled PT services     Barthel Index   Feeding 10   Bathing 0   Grooming Score 5   Dressing Score 5   Bladder Score 10   Bowels Score 10   Toilet Use Score 10   Transfers (Bed/Chair) Score 10   Mobility (Level Surface) Score 10   Stairs Score 5   Barthel Index Score [address] [place], [place] [id number]

## 2018-03-27 NOTE — CONSULTS
Hematology Oncology Consult Report    Betzy Kirby, 1955, [id number]  [address] [place] [address]/[address] [place] [address]-*    Impression and plan:    1  Anemia, leukocytosis, lymphocytosis  Etiology at this time is not clear  The significant drops in the hemoglobin levels are more commonly seen in patients with bleeding issues  That being said, patient gives no history of bleeding and workup did not demonstrate any source (for the bleeding)  It is difficult to believe but possible that a primary bone marrow disorder is the sole cause for the anemia  The platelet count is within normal limits  The white count is elevated with lymphocytosis - possibly patient has CLL (or another primary lymphoproliferative disorder)  At this moment, with all of the patient's other medical issues, I would forego the preliminary peripheral blood for flow cytometry and have the patient undergo a bone marrow biopsy  It is difficult to believe that this patient has such a packed/malignant marrow with a normal platelet count and the relatively mildly increased white count  I will set up the bone marrow biopsy with IR while patient is here after speaking with the patient tomorrow  The most recent hemoglobin level is okay/acceptable  Transfuse blood if the hemoglobin level approaches 7 0 g/dL      2  Abdominal tenderness in the face of severe recurrent anemia without clear etiology  Besides the workup above, I agree with the CT scan of the chest, abdomen and pelvis (looking for adenopathy, enlarged spleen etc)      3  Depression, bipolar disorder  Patient has been seen by Psychiatry in the past       4  History of hematuria - presently there is no clear signs of hematuria  Patient may need  evaluation possibly as an outpatient  The above was discussed with Mr Valentino Payment; patient had no questions  Please do not hesitate to contact the Hematology service if you have any questions or concerns    Thank you for this consult     _________________________________________________________________________________________________________________________________________    Chief complaint/reason for admission:  Weakness, shortness of breath, falls at home    History of present illness: This is a 57-year-old male admitted with the above  Mr Leyla Brown has been admitted to the hospital past with anemia  Etiology is not clear  Patient reportedly was diagnosed with leukemia in [place] - this information has never been available  Patient states feeling poorly, still with fatigue (but better with transfusions)  Patient complains of vague abdominal pains but no nausea vomiting  No recent fevers, chills or sweats  Activities are extremely limited  With all the issues and all of the unanswered questions, patient wishes to be discharged back home  Past medical history:   Past Medical History:   Diagnosis Date    Anemia     Anxiety     [facility]     Cancer ([facility]     History of alcohol abuse     Hypertension     Hypertension     Insomnia     Leukemia ([facility] )     Opiate abuse, episodic     Psychiatric disorder      Past surgical history: History reviewed  No pertinent surgical history      Allergies: No Known Allergies    Home medications:   Prescriptions Prior to Admission   Medication    benztropine (COGENTIN) 1 mg tablet    divalproex sodium (DEPAKOTE) 500 mg EC tablet    FLUoxetine (PROzac) 20 mg capsule    hydrochlorothiazide (MICROZIDE) 12 5 mg capsule    hydrOXYzine pamoate (VISTARIL) 50 mg capsule    lisinopril (ZESTRIL) 40 mg tablet    mirtazapine (REMERON) 15 mg tablet    risperiDONE (RisperDAL) 2 mg tablet     Hospital medications:   Current Facility-Administered Medications:     acetaminophen (TYLENOL) tablet 650 mg, 650 mg, Oral, Q6H PRN, Rocky Samayoa MD    aspirin chewable tablet 81 mg, 81 mg, Oral, Daily, Kishor Juarez MD, 81 mg at 03/27/18 1016    benztropine (COGENTIN) tablet 1 mg, 1 mg, Oral, Once HS, Shikha Hay MD, 1 mg at 03/26/18 2322    diphenhydrAMINE (BENADRYL) injection 25 mg, 25 mg, Intravenous, Q6H PRN, Shikha Hay MD    divalproex sodium (DEPAKOTE) EC tablet 500 mg, 500 mg, Oral, Q12H [address], Shikha Hay MD, 500 mg at 03/27/18 1015    FLUoxetine (PROzac) capsule 40 mg, 40 mg, Oral, Daily, Shikha Hay MD, 40 mg at 03/27/18 1015    hydrochlorothiazide (HYDRODIURIL) tablet 12 5 mg, 12 5 mg, Oral, Daily, Shikha Hay MD, 12 5 mg at 03/27/18 1015    hydrOXYzine HCL (ATARAX) tablet 50 mg, 50 mg, Oral, HS, Shikha Hay MD, 50 mg at 03/26/18 2323    lisinopril (ZESTRIL) tablet 40 mg, 40 mg, Oral, Daily, Shikha Hay MD, 40 mg at 03/27/18 1015    metoprolol tartrate (LOPRESSOR) partial tablet 12 5 mg, 12 5 mg, Oral, Q12H [address], Tita Sepulveda MD, 12 5 mg at 03/27/18 1016    mirtazapine (REMERON) tablet 15 mg, 15 mg, Oral, HS, Shikha Hay MD, 15 mg at 03/26/18 2323    nicotine (NICODERM CQ) 21 mg/24 hr TD 24 hr patch 1 patch, 1 patch, Transdermal, Daily, Shikha Hay MD    ondansetron [facility]) injection 4 mg, 4 mg, Intravenous, Q6H PRN, Shikha Hay MD    risperiDONE (RisperDAL) tablet 2 mg, 2 mg, Oral, HS, Shikha Hay MD, 2 mg at 03/26/18 2323    Social history: 1 5 packs per day, discontinued alcohol abuse in November 2017, prior history of heroin use no known toxic exposure, , disabled    Family history:   Family History   Problem Relation Age of Onset    Coronary artery disease Mother     No Known Problems Father     Hepatitis Sister     Cancer Brother     Prostate cancer Brother     Early death Brother      Review of systems: Deferred    Physical exam  Vitals:    03/27/18 1337   BP: 111/58   Pulse: 60   Resp: 19   Temp: 98 9 °F (37 2 °C)   SpO2: 98%   Constitutional:  Middle-aged male, no respiratory distress  Eyes:  PERRL, conjunctiva pale, anicteric   HENT:  Atraumatic, external ears normal, nose normal, Oropharynx: moist, no pharyngeal exudates, no thrush, pink  Neck: No adenopathy, good range of motion  Respiratory: scattered bilateral rhonchi  Cardiovascular:  Normal rate, normal rhythm, no murmurs, no gallops, no rubs   GI:  Soft, obese, slightly distended, mildly diffuse in all quadrants, no rigidity rebound, cannot palpate liver or spleen  :  No costovertebral angle tenderness, normal reproductive organs, no discharge  Musculoskeletal:  No pain or tenderness with palpation of joints, muscles or bones  Integument:  Pale, sour color, scattered ecchymoses, no petechiae, no active bleeding  Lymphatic:  No lymphadenopathy in the neck, supraclavicular region, infraclavicular region, axilla and groin bilaterally  Extremities:  1+ bilateral lower extremity edema, no cords, pulses are decreased  Neurologic:  Patient is responsive to simple questions, motor and sensory grossly equal bilaterally  Psychiatric:  Speech is clear, response time is slightly delayed, some answers are not appropriate/do not match the question  Rectal: Deferred    Laboratory test results    Results for Tye Lucas (MRN [id number]) as of 3/27/2018 18:14   Ref   Range 2/27/2018 07:33 3/26/2018 11:45 3/26/2018 20:45 3/27/2018 04:55 3/27/2018 06:43   WBC Latest Ref Range: 4 80 - 10 80 Thousand/uL 11 40 (H) 21 70 (H)  20 60 (H)    RBC Latest Ref Range: 4 70 - 6 10 Million/uL 2 59 (L) 1 57 (L)  2 47 (L)    Hemoglobin Latest Ref Range: 14 0 - 18 0 g/dL 7 5 (L) 4 5 (LL) 6 2 (LL) 7 2 (L) 7 6 (L)   Hematocrit Latest Ref Range: 42 0 - 52 0 % 22 4 (L) 13 3 (LL) 18 8 (L) 21 7 (L) 22 5 (L)   MCV Latest Ref Range: 82 - 98 fL 86 84  88    MCH Latest Ref Range: 27 0 - 31 0 pg 29 0 28 8  29 2    MCHC Latest Ref Range: 31 4 - 37 4 g/dL 33 6 34 1  33 2    RDW Latest Ref Range: 11 6 - 15 1 % 14 2 15 0  14 8    Platelets Latest Ref Range: 130 - 400 Thousands/uL 145 192  155    MPV Latest Ref Range: 8 9 - 12 7 fL 7 0 (L) 7 2 (L)  7 1 (L)    Platelet Estimate Latest Ref Range: Adequate   Adequate  Adequate    nRBC Latest Units: /100 WBCs  0      Neutrophils Relative Latest Ref Range: 43 - 75 %  27 (L)      Lymphocytes Relative Latest Ref Range: 14 - 44 %  67 (H)      Monocytes Relative Latest Ref Range: 4 - 12 %  5      Eosinophils Relative Latest Ref Range: 0 - 6 %  0      Basophils Relative Latest Ref Range: 0 - 1 %  0      Neutrophils Absolute Latest Ref Range: 1 85 - 7 62 Thousands/µL  5 90      Lymphocytes Absolute Latest Ref Range: 0 60 - 4 47 Thousands/µL  14 60 (H)      Monocytes Absolute Latest Ref Range: 0 17 - 1 22 Thousand/µL  1 10      Eosinophils Absolute Latest Ref Range: 0 00 - 0 61 Thousand/µL  0 10      Basophils Absolute Latest Ref Range: 0 00 - 0 10 Thousands/µL  0 00

## 2018-03-27 NOTE — SOCIAL WORK
Met with pt  Resides with wife Christiano Alexandra and their dogs  Uses no dme  No hhc  Resides in multi-level home  Has been independent, drives, and employed as a   Explained role of cm, no d/c needs  CM reviewed d/c planning process including the following: identifying help at home, patient preference for d/c planning needs, and availability of the treatment team to discuss questions or concerns patient and/or family may have regarding understanding of medications and recognizing signs and symptoms once discharged  CM also encouraged patient to follow up with all recommended appointments after discharge  Patient advised of importance for patient and family to participate in managing patient's medical well being

## 2018-03-27 NOTE — CASE MANAGEMENT
Initial Clinical Review    PATIENT WAS OBSERVATION STATUS 3/26/17 AND CONVERTED TO INPATIENT ADMISSION 3/27/18 FOR CONTINUE TREATMENT OF SYMPTOMATIC ANEMIA WITH NSTEMI AND NEEDING HEMATOLOGY WORKUP        Inpatient Admission (Order [id number])   Admission   Date: 3/27/2018 Department: [place] [address] Released By/Authorizing: May Sadler MD (auto-released)   Order Information     Order Name   INPATIENT ADMISSION [263]     The link below is only for use if the above order is AMB REFERRAL TO [address]   Face to Face Certification Statement (for AMB REFERRAL TO [address] Only)   Order History   Inpatient   Date/Time Action Taken User Additional Information   03/27/18 1544 Release May Sadler MD (auto-released) From Order: [id number]   03/27/18 1544 Complete May Sadler MD    Order Details     Frequency Duration Priority Order Class   Once 1  occurrence Routine Hospital Performed   Order Questions     Question Answer Comment   Admitting Physician Darren White    Level of Care Med Surg    Estimated length of stay More than 2 Midnights    Certification I certify that inpatient services are medically necessary for this patient for a duration of greater than two midnights  See H&P and MD Progress Notes for additional information about the patient's course of treatment  ED: Date/Time/Mode of Arrival:   ED Arrival Information     Expected Arrival Acuity Means of Arrival Escorted By Service Admission Type    - 3/26/2018 11:10 Emergent Wheelchair Spouse General Medicine Emergency    Arrival Complaint    FALL          Chief Complaint:   Chief Complaint   Patient presents with    Weakness - Generalized     Patient very pale  Wife angry and gives hx  States patient has a hx of low blood count  States he is very weak , LOPES and frequent falls and near falls  Patient is seen by Dr Andrade Corral  Patient has been ill for several weeks and refusing to come to ED   Was seen recently by his psych, patient had recent admission to rehab for subutex and vodka   Shortness of Breath    Fall       History of Illness: Patient and Wife are poor historians due to psychiatric disorder  HPI:  Jason Gutiérrez is a 60 y o  male, PMHx leukemia (patient report), anemia, hypertension, hyperlipidemia, bipolar disorder, h/o polysubstance abuse, s/p hospitalization in February with blood transfusion  who presents with acute weakness and shortness of breath  Patient and patient's wife at bedside report that over the last week the patient was having frequent falls from standing position  He states that if he going up a couple of steps or walks a short distance he would be acutely short of breath  He states that his knees will go weak and he will fall  Patient reports that he fell today from a standing position  Patient and wife report no trauma or loss of consciousness as a result of the fall  They report no seizure activity,  Bowel,  or bladder incontinence  Patient follows up with Dr Vale Donald Hematology-Oncology  Patient has a reported h/o leukemia diagnosed by a doctor in [place] per medical records  Patient and wife today say that this diagnosis was made by Dr Tim Oates in [place], [place] in May 2017  The patient and his wife state that the doctor told them that he was stage 0 requiring no workup and no medical treatment  This conflicts with earlier reports of being diagnosed by a physician in [place]  Patient reports that he smokes 1-2 packs per day of tobacco   He denies alcohol or drug abuse at this time  He has been in rehab as recently as February per his wife  Patient and wife deny hematuria, melena, blood per rectum hematemesis, easy bruising, adenopathy  Patient reports family h/o prostate cancer in his brother    He has no family h/o blood borne cancers    ED Vital Signs:   ED Triage Vitals [03/26/18 1134]   Temperature Pulse Respirations Blood Pressure SpO2   99 3 °F (37 4 °C) 93 18 112/56 99 %      Temp Source Heart Rate Source Patient Position - Orthostatic VS BP Location FiO2 (%)   Tympanic Monitor Lying Left arm --      Pain Score       No Pain        Wt Readings from Last 1 Encounters:   03/27/18 106 kg (232 lb 12 9 oz)         Abnormal Labs/Diagnostic Test Results: TOTAL PROTEIN 5 9 ALB 3 2 PHOS 4 5 TROPONIN 0 08 0 12 0 10 0 05 WBC 20 60 , H/H 4 5>6 2>7 6/22 5  UA SMALL LEUK WBC 10-20 INUME BACTERIA POSITIVE THC  CXR  No acute cardiopulmonary disease  ED Treatment:   Medication Administration from 03/26/2018 1110 to 03/26/2018 1448       Date/Time Order Dose Route Action Action by Comments     03/26/2018 1255 sodium chloride 0 9 % bolus 1,000 mL 0 mL Intravenous Stopped Shama Oliver RN      03/26/2018 1148 sodium chloride 0 9 % bolus 1,000 mL 1,000 mL Intravenous New Bag Fatemeh Connolly RN           Past Medical/Surgical History: Active Ambulatory Problems     Diagnosis Date Noted    Symptomatic anemia 02/10/2018    Hypertension 02/10/2018    Depression 02/10/2018    Polypharmacy 02/10/2018    Bronchitis 02/10/2018    Encephalopathy 02/26/2018     Resolved Ambulatory Problems     Diagnosis Date Noted    No Resolved Ambulatory Problems     Past Medical History:   Diagnosis Date    Anemia     Anxiety     [facility] disorder ([facility] )     Cancer ([place] [address] )     History of alcohol abuse     Hypertension     Hypertension     Insomnia     Leukemia ([facility] )     Opiate abuse, episodic     Psychiatric disorder        Admitting Diagnosis: Leukemia ([facility] ) [C95 90]  Weakness [R53 1]  Anemia [D64 9]  Symptomatic anemia [D64 9]    Age/Sex: 60 y o  male    Assessment/Plan:   Assessment:  62-y/o male, PMHx leukemia (patient reported), anemia, hypertension, hyperlipidemia, bipolar disorder, h/o polysubstance abuse s/p hospitalization in February with blood transfusion  Has h/o chronic anemia with leukocytosis  Presents to ED with hemoglobin of 4 5 and hematocrit of 13 3  Leukocytosis of 21 7 with 0% bandemia  Chest x-ray shows no acute cardiopulmonary disease  UA negative for infection and blood  Ammonia 37  Patient is hemodynamically stable, awake alert and oriented x3  Patient [facility] to the [facility] under the service of Dr Sonia Cervantes for observation status <2MN  Patient is full code   Plan:  Acute on Chronic symptomatic normocytic anemia:  No acute blood loss, hemoglobin 4 5 on presentation  Likely 2/2 to blood/bone marrow dysplasia  - will transfuse 2 units of PRBC with follow-up H/H   2 units PRBC on hold  - Benadryl 25 mg IV q 6 hours for allergic reaction  - FOBT x3, H/H starting 2 hours post transfusion then q 12 hours   - continue telemetry   - consider iron studies when patient is stable s/p transfusion  - hematology-oncology consult  H/o leukemia:  Patient presents with leukocytosis and severe anemia  - hematology oncology consulted recommendations appreciated  Elevated troponin:  0 03 on admission  CK 81, pro-  Likely NSTEMI type 2 in light of acute on chronic blood loss  - will trend troponin x3 to be drawn s/p 2 units PRBC  - repeat EKG in the a m   - echocardiogram  Hyperglycemia:  239 on admission  Patient does not have PMHx of diabetes mellitus  - consider hemoglobin A1c when stable  - continue daily glucose checks with CMP/BMP  RA: creatine 1 25 on admission, BUN 26   will trend CMP daily  Lactic acidosis: 3 5 on admission  SIR 1/4, with leukocytosis 21 7  Less likely infectious at this time likely secondary to acute on chronic anemia with decreased preload/cardiac output     - will repeat in evening s/p blood transfusion    - continue to trend white blood cell count and temperature curve  Hypertension:   -antihypertensive therapy held tonight as blood pressure was within normal limits and being actively transfused 2 units PRBC  - will restart lisinopril 40 mg and hydrochlorothiazide 12 5 mg tomorrow if blood pressure begins to rise  Hyperlipidemia:    - not currently on medication  will order lipid panel when patient stable  Depression/Bipolar disorder/unspecified psychiatric disorder:    - Will restart Risperdal 2 mg q h s , mirtazapine 15 mg q h s , hydroxyzine 50 mg q h s , Depakote 500 mg q 12 hours, benztropine 1 mg q h s   - consider blood levels when stable s/p transfusion   Tobacco abuse:  Smokes 1-2 packs per day  -smoking cessation counseling  -negative CQ 21 mg patch  Obesity  - nutrition counseling   FEN & DVT prophylaxis  - cardiac diet  - 2 units PRBC being transferred, 2 units on hold  - replete electrolytes as necessary with goal K > 4, phos > 3, Mg > 2  - pharmacologic DVT prophylaxis held 2/2 to acute anemia, SCDs ordered  - patient is full code       3/37/18    LABS:  WC 20 60 HGB 7 6>6 9 /20 7 IRON 294 FERRITIN 823 ,RETIC CT <0 18 RETIC CT ABS 1,000  ECHO EF 55%  CT ABDOMEN PELVIS  Mild mediastinal adenopathy   Mild retroperitoneal celiac axis and mesenteric adenopathy with a dominant left retroperitoneal lymph node measuring 25 x 19 mm    Splenomegaly with length of 17 cm      CARDIOLOGY CONSULT     Assessment & Plan   1  Severe symptomatic anemia with hemoglobin dropping to 4 5  2  Non ST elevation MI most likely type 2 due to severe anemia certainly need to rule out underlying obstructive coronary artery disease as patient has multiple cardiac risk factors  Will be scheduled for Lexiscan stress test   Keep hemoglobin above 7 5  3  Hyperglycemia  Check HbA1c  If patient diagnosed to have diabetes mellitus need to be on statin therapy  4  Hypertension seems to be well controlled with lisinopril and hydrochlorothiazide  5  Questionable history of leukemia follow-up Hematology  6  History of dyslipidemia check fasting lipids and consider statin therapy  7  Tobacco abuse  Advised to quit smoking  8    History of psych problems management as per them  Summary of Recommendations:        Monitor on tele  Monitor hemoglobin closely keep hemoglobin above 7 5  Not a good candidate for antithrombotic therapy and patient denies any symptoms of chest pain  Continue aspirin therapy if okay with Hematology  Check fasting lipids and consider statin therapy  Echo Doppler reviewed patient has normal LV systolic function  Lexiscan stress test in the morning  Discussed with medical team     HEMATOLOGY CONSULT  Impression and plan:     1  Anemia, leukocytosis, lymphocytosis  Etiology at this time is not clear  The significant drops in the hemoglobin levels are more commonly seen in patients with bleeding issues  That being said, patient gives no history of bleeding and workup did not demonstrate any source (for the bleeding)  It is difficult to believe but possible that a primary bone marrow disorder is the sole cause for the anemia  The platelet count is within normal limits  The white count is elevated with lymphocytosis - possibly patient has CLL (or another primary lymphoproliferative disorder)  At this moment, with all of the patient's other medical issues, I would forego the preliminary peripheral blood for flow cytometry and have the patient undergo a bone marrow biopsy  It is difficult to believe that this patient has such a packed/malignant marrow with a normal platelet count and the relatively mildly increased white count  I will set up the bone marrow biopsy with IR while patient is here after speaking with the patient tomorrow  The most recent hemoglobin level is okay/acceptable  Transfuse blood if the hemoglobin level approaches 7 0 g/dL  2  Abdominal tenderness in the face of severe recurrent anemia without clear etiology  Besides the workup above, I agree with the CT scan of the chest, abdomen and pelvis (looking for adenopathy, enlarged spleen etc)  3  Depression, bipolar disorder  Patient has been seen by Psychiatry in the past    4  History of hematuria - presently there is no clear signs of hematuria   Patient may need  evaluation possibly as an outpatient       Admission Orders:  INPATIENT ADMISSION  TELE MON  CONSULT CARDIO  CONSULT ONCOLOGY  PTOT  TYPE AND STREEN   TRANSFUSED 2U PRBC YESTERDAY  TRANSFUSE U PRBC 3/27/18  STOOL OCCULT BLOOD X 3  H/H Q12H  ECHO  DAILY WEIGHT I/O  RETIC CT IRON PANEL LIPID PANEL  HGBA1C IRON SAT  CT ABDOMEN PELVIS  CMP MG CBC DIFF PHOS   Scheduled Meds:   Current Facility-Administered Medications:  acetaminophen 650 mg Oral Q6H PRN Ash Monreal MD   aspirin 81 mg Oral Daily Zhane Walters MD   benztropine 1 mg Oral Once HS Ash Monreal MD   diphenhydrAMINE 25 mg Intravenous Q6H PRN Ash Monreal MD   divalproex sodium 500 mg Oral Q12H [facility] Ash Monreal MD   FLUoxetine 40 mg Oral Daily Ash Monreal MD   hydrochlorothiazide 12 5 mg Oral Daily sAh Monreal MD   hydrOXYzine HCL 50 mg Oral HS Ash Monreal MD   lisinopril 40 mg Oral Daily Ash Monreal MD   metoprolol tartrate 12 5 mg Oral Q12H [facility] Zhane Walters MD   mirtazapine 15 mg Oral HS Ash Monreal MD   nicotine 1 patch Transdermal Daily Ash Monreal MD   ondansetron 4 mg Intravenous Q6H PRN Ash Monreal MD   risperiDONE 2 mg Oral HS Ash Monreal MD     Continuous Infusions:    PRN Meds:   acetaminophen    diphenhydrAMINE    ondansetron

## 2018-03-27 NOTE — PLAN OF CARE
CARDIOVASCULAR - ADULT     Maintains optimal cardiac output and hemodynamic stability Progressing     Absence of cardiac dysrhythmias or at baseline rhythm Progressing        GASTROINTESTINAL - ADULT     Maintains or returns to baseline bowel function Progressing        HEMATOLOGIC - ADULT     Maintains hematologic stability Progressing        MUSCULOSKELETAL - ADULT     Maintain or return mobility to safest level of function Progressing        Potential for Falls     Patient will remain free of falls Progressing        SKIN/TISSUE INTEGRITY - ADULT     Skin integrity remains intact Progressing

## 2018-03-28 ENCOUNTER — APPOINTMENT (INPATIENT)
Dept: NON INVASIVE DIAGNOSTICS | Facility: HOSPITAL | Age: 63
DRG: 840 | End: 2018-03-28
Payer: COMMERCIAL

## 2018-03-28 ENCOUNTER — APPOINTMENT (INPATIENT)
Dept: RADIOLOGY | Facility: HOSPITAL | Age: 63
DRG: 840 | End: 2018-03-28
Payer: COMMERCIAL

## 2018-03-28 LAB
ALBUMIN SERPL BCP-MCNC: 3.2 G/DL (ref 3.5–5)
ALP SERPL-CCNC: 65 U/L (ref 46–116)
ALT SERPL W P-5'-P-CCNC: 18 U/L (ref 12–78)
ANION GAP SERPL CALCULATED.3IONS-SCNC: 7 MMOL/L (ref 4–13)
ANISOCYTOSIS BLD QL SMEAR: PRESENT
AST SERPL W P-5'-P-CCNC: 12 U/L (ref 5–45)
BACTERIA UR CULT: ABNORMAL
BASOPHILS # BLD AUTO: 0 THOUSANDS/ΜL (ref 0–0.1)
BASOPHILS NFR BLD AUTO: 0 % (ref 0–1)
BILIRUB SERPL-MCNC: 0.6 MG/DL (ref 0.2–1)
BUN SERPL-MCNC: 16 MG/DL (ref 5–25)
CALCIUM SERPL-MCNC: 8.8 MG/DL (ref 8.3–10.1)
CHLORIDE SERPL-SCNC: 105 MMOL/L (ref 100–108)
CO2 SERPL-SCNC: 29 MMOL/L (ref 21–32)
CREAT SERPL-MCNC: 0.82 MG/DL (ref 0.6–1.3)
EOSINOPHIL # BLD AUTO: 0.1 THOUSAND/ΜL (ref 0–0.61)
EOSINOPHIL NFR BLD AUTO: 1 % (ref 0–6)
ERYTHROCYTE [DISTWIDTH] IN BLOOD BY AUTOMATED COUNT: 15.5 % (ref 11.6–15.1)
GFR SERPL CREATININE-BSD FRML MDRD: 94 ML/MIN/1.73SQ M
GLUCOSE SERPL-MCNC: 94 MG/DL (ref 65–140)
HCT VFR BLD AUTO: 21.5 % (ref 42–52)
HCT VFR BLD AUTO: 25.5 % (ref 42–52)
HGB BLD-MCNC: 7.1 G/DL (ref 14–18)
HGB BLD-MCNC: 8.5 G/DL (ref 14–18)
LYMPHOCYTES # BLD AUTO: 10.1 THOUSANDS/ΜL (ref 0.6–4.47)
LYMPHOCYTES NFR BLD AUTO: 74 % (ref 14–44)
MAGNESIUM SERPL-MCNC: 2.2 MG/DL (ref 1.6–2.6)
MCH RBC QN AUTO: 28.9 PG (ref 27–31)
MCHC RBC AUTO-ENTMCNC: 33.2 G/DL (ref 31.4–37.4)
MCV RBC AUTO: 87 FL (ref 82–98)
MONOCYTES # BLD AUTO: 0.7 THOUSAND/ΜL (ref 0.17–1.22)
MONOCYTES NFR BLD AUTO: 5 % (ref 4–12)
MRSA NOSE QL CULT: NORMAL
NEUTROPHILS # BLD AUTO: 2.7 THOUSANDS/ΜL (ref 1.85–7.62)
NEUTS SEG NFR BLD AUTO: 20 % (ref 43–75)
NRBC BLD AUTO-RTO: 0 /100 WBCS
PHOSPHATE SERPL-MCNC: 4.5 MG/DL (ref 2.3–4.1)
PLATELET # BLD AUTO: 148 THOUSANDS/UL (ref 130–400)
PLATELET BLD QL SMEAR: ADEQUATE
PMV BLD AUTO: 7 FL (ref 8.9–12.7)
POIKILOCYTOSIS BLD QL SMEAR: PRESENT
POTASSIUM SERPL-SCNC: 4.3 MMOL/L (ref 3.5–5.3)
PROT SERPL-MCNC: 5.9 G/DL (ref 6.4–8.2)
RBC # BLD AUTO: 2.47 MILLION/UL (ref 4.7–6.1)
SODIUM SERPL-SCNC: 141 MMOL/L (ref 136–145)
WBC # BLD AUTO: 13.6 THOUSAND/UL (ref 4.8–10.8)

## 2018-03-28 PROCEDURE — 88333 PATH CONSLTJ SURG CYTO XM 1: CPT | Performed by: PATHOLOGY

## 2018-03-28 PROCEDURE — 88342 IMHCHEM/IMCYTCHM 1ST ANTB: CPT | Performed by: PATHOLOGY

## 2018-03-28 PROCEDURE — 82272 OCCULT BLD FECES 1-3 TESTS: CPT | Performed by: FAMILY MEDICINE

## 2018-03-28 PROCEDURE — 99233 SBSQ HOSP IP/OBS HIGH 50: CPT | Performed by: INTERNAL MEDICINE

## 2018-03-28 PROCEDURE — 80053 COMPREHEN METABOLIC PANEL: CPT | Performed by: FAMILY MEDICINE

## 2018-03-28 PROCEDURE — 88360 TUMOR IMMUNOHISTOCHEM/MANUAL: CPT | Performed by: PATHOLOGY

## 2018-03-28 PROCEDURE — 85097 BONE MARROW INTERPRETATION: CPT | Performed by: PATHOLOGY

## 2018-03-28 PROCEDURE — 38222 DX BONE MARROW BX & ASPIR: CPT

## 2018-03-28 PROCEDURE — 88341 IMHCHEM/IMCYTCHM EA ADD ANTB: CPT | Performed by: PATHOLOGY

## 2018-03-28 PROCEDURE — 78452 HT MUSCLE IMAGE SPECT MULT: CPT

## 2018-03-28 PROCEDURE — 88184 FLOWCYTOMETRY/ TC 1 MARKER: CPT | Performed by: RADIOLOGY

## 2018-03-28 PROCEDURE — 88365 INSITU HYBRIDIZATION (FISH): CPT | Performed by: PATHOLOGY

## 2018-03-28 PROCEDURE — 77012 CT SCAN FOR NEEDLE BIOPSY: CPT

## 2018-03-28 PROCEDURE — 88313 SPECIAL STAINS GROUP 2: CPT | Performed by: PATHOLOGY

## 2018-03-28 PROCEDURE — 99153 MOD SED SAME PHYS/QHP EA: CPT

## 2018-03-28 PROCEDURE — 88305 TISSUE EXAM BY PATHOLOGIST: CPT | Performed by: PATHOLOGY

## 2018-03-28 PROCEDURE — 85018 HEMOGLOBIN: CPT | Performed by: STUDENT IN AN ORGANIZED HEALTH CARE EDUCATION/TRAINING PROGRAM

## 2018-03-28 PROCEDURE — 84100 ASSAY OF PHOSPHORUS: CPT | Performed by: FAMILY MEDICINE

## 2018-03-28 PROCEDURE — 07DR3ZX EXTRACTION OF ILIAC BONE MARROW, PERCUTANEOUS APPROACH, DIAGNOSTIC: ICD-10-PCS | Performed by: RADIOLOGY

## 2018-03-28 PROCEDURE — 80074 ACUTE HEPATITIS PANEL: CPT | Performed by: FAMILY MEDICINE

## 2018-03-28 PROCEDURE — P9021 RED BLOOD CELLS UNIT: HCPCS

## 2018-03-28 PROCEDURE — 88311 DECALCIFY TISSUE: CPT | Performed by: PATHOLOGY

## 2018-03-28 PROCEDURE — 88237 TISSUE CULTURE BONE MARROW: CPT | Performed by: RADIOLOGY

## 2018-03-28 PROCEDURE — 88185 FLOWCYTOMETRY/TC ADD-ON: CPT

## 2018-03-28 PROCEDURE — 88262 CHROMOSOME ANALYSIS 15-20: CPT | Performed by: RADIOLOGY

## 2018-03-28 PROCEDURE — 85025 COMPLETE CBC W/AUTO DIFF WBC: CPT | Performed by: FAMILY MEDICINE

## 2018-03-28 PROCEDURE — 99232 SBSQ HOSP IP/OBS MODERATE 35: CPT | Performed by: FAMILY MEDICINE

## 2018-03-28 PROCEDURE — 88189 FLOWCYTOMETRY/READ 16 & >: CPT | Performed by: PATHOLOGY

## 2018-03-28 PROCEDURE — 99152 MOD SED SAME PHYS/QHP 5/>YRS: CPT

## 2018-03-28 PROCEDURE — A9502 TC99M TETROFOSMIN: HCPCS

## 2018-03-28 PROCEDURE — 83735 ASSAY OF MAGNESIUM: CPT | Performed by: FAMILY MEDICINE

## 2018-03-28 PROCEDURE — 85014 HEMATOCRIT: CPT | Performed by: STUDENT IN AN ORGANIZED HEALTH CARE EDUCATION/TRAINING PROGRAM

## 2018-03-28 PROCEDURE — 93017 CV STRESS TEST TRACING ONLY: CPT

## 2018-03-28 RX ORDER — MIDAZOLAM HYDROCHLORIDE 1 MG/ML
INJECTION INTRAMUSCULAR; INTRAVENOUS CODE/TRAUMA/SEDATION MEDICATION
Status: COMPLETED | OUTPATIENT
Start: 2018-03-28 | End: 2018-03-28

## 2018-03-28 RX ORDER — LIDOCAINE HYDROCHLORIDE 10 MG/ML
INJECTION, SOLUTION INFILTRATION; PERINEURAL CODE/TRAUMA/SEDATION MEDICATION
Status: COMPLETED | OUTPATIENT
Start: 2018-03-28 | End: 2018-03-28

## 2018-03-28 RX ORDER — FENTANYL CITRATE 50 UG/ML
INJECTION, SOLUTION INTRAMUSCULAR; INTRAVENOUS CODE/TRAUMA/SEDATION MEDICATION
Status: COMPLETED | OUTPATIENT
Start: 2018-03-28 | End: 2018-03-28

## 2018-03-28 RX ADMIN — DIVALPROEX SODIUM 500 MG: 500 TABLET, DELAYED RELEASE ORAL at 08:29

## 2018-03-28 RX ADMIN — FENTANYL CITRATE 50 MCG: 50 INJECTION, SOLUTION INTRAMUSCULAR; INTRAVENOUS at 13:16

## 2018-03-28 RX ADMIN — LIDOCAINE HYDROCHLORIDE 10 ML: 10 INJECTION, SOLUTION INFILTRATION; PERINEURAL at 13:12

## 2018-03-28 RX ADMIN — BENZTROPINE MESYLATE 1 MG: 1 TABLET ORAL at 21:27

## 2018-03-28 RX ADMIN — MIDAZOLAM HYDROCHLORIDE 1 MG: 1 INJECTION, SOLUTION INTRAMUSCULAR; INTRAVENOUS at 13:08

## 2018-03-28 RX ADMIN — LISINOPRIL 40 MG: 20 TABLET ORAL at 08:29

## 2018-03-28 RX ADMIN — DIVALPROEX SODIUM 500 MG: 500 TABLET, DELAYED RELEASE ORAL at 21:28

## 2018-03-28 RX ADMIN — HYDROCHLOROTHIAZIDE 12.5 MG: 12.5 TABLET ORAL at 08:28

## 2018-03-28 RX ADMIN — METOPROLOL TARTRATE 12.5 MG: 25 TABLET ORAL at 21:27

## 2018-03-28 RX ADMIN — MIRTAZAPINE 15 MG: 15 TABLET, FILM COATED ORAL at 21:28

## 2018-03-28 RX ADMIN — ASPIRIN 81 MG 81 MG: 81 TABLET ORAL at 08:28

## 2018-03-28 RX ADMIN — RISPERIDONE 2 MG: 1 TABLET ORAL at 21:28

## 2018-03-28 RX ADMIN — METOPROLOL TARTRATE 12.5 MG: 25 TABLET ORAL at 08:28

## 2018-03-28 RX ADMIN — HYDROXYZINE HYDROCHLORIDE 50 MG: 25 TABLET, FILM COATED ORAL at 21:28

## 2018-03-28 RX ADMIN — FENTANYL CITRATE 50 MCG: 50 INJECTION, SOLUTION INTRAMUSCULAR; INTRAVENOUS at 13:08

## 2018-03-28 RX ADMIN — REGADENOSON 0.4 MG: 0.08 INJECTION, SOLUTION INTRAVENOUS at 10:01

## 2018-03-28 RX ADMIN — FLUOXETINE 40 MG: 20 CAPSULE ORAL at 08:28

## 2018-03-28 NOTE — PROGRESS NOTES
60 M, anemia, leucocytosis and lymphocytosis  H&P reviewed, labs checked  Stable for bone marrow biopsy

## 2018-03-28 NOTE — PROGRESS NOTES
[address] [place] [facility] 64 [address] 81 [facility] Practice Progress Note - Roxanna Flavors 60 y o  male MRN: [id number]    Unit/Bed#: 3 [place] 308-02 Encounter: [id number]      Assessment/Plan:    Acute on chronic symptomatic normocytic anemia: s/p 3U PRBC, Hb 7 1 this am, Retic 1k, ferritin 823, Iron 294, Iron Sat 93%  Give 1 unit PRBCs, with H&H 2 hours after  Hematology/oncology consulted - recommend above 1 unit, and a bone marrow biopsy  Monitor vitals, continue telemetry    H/O leukemia:  WBC 13 6, Hb 7 1, HCT 21 5,   Hematology/oncology consultation - bone marrow biopsy today to be performed by IR    Elevated troponin:  POA 0 03-> 0 12-> 0 05, , CK 81, likely NSTEMI type 2, no CP  Stress test to be done today  Cardiology following    Hyperglycemia:  POA Glu 239, A1c 5 6, this a m  94 - resolved    RA:  Resolved BUN 16, Cr 0 82    Lactic acidosis:  Resolved    HTN:  1 hypertensive episode overnight 176/64, currently 112/64  Continue lisinopril 40 mg and HCTZ 12 5 mg    HLD:  TChol 114, , HDL 36, LDL 53 - no medication indicated    Depression, bipolar disorder, & unspecified psychiatric disorder:  Continue risperidone oral, mirtazapine, hydroxyzine, Depakote, & benztropine    Nicotine dependence:  Continue Nicoderm 21 mg patch,  on smoking cessation    Obesity:  Nutrition counseling    FEN & DVT PPX:  NPO for stress test, resume diet after, replete electrolytes as indicated, no IVF  SCDs, holding anticoagulation due to severe anemia    Subjective:   Patient seen and examined at bedside this morning  Patient has no complaints and denies any source of pain  Patient denies chest pain, shortness of breath, exhaustion, nausea, vomiting, diarrhea, or constipation  Objective:     Vitals: Blood pressure 108/66, pulse (!) 54, temperature 97 8 °F (36 6 °C), temperature source Tympanic, resp  rate 18, height 5' 11" (1 803 m), weight 106 kg (234 lb 2 1 oz), SpO2 97 %  ,Body mass index is 32 65 kg/m²    Wt Readings from Last 3 Encounters:   03/28/18 106 kg (234 lb 2 1 oz)   02/26/18 113 kg (250 lb)   02/23/18 102 kg (225 lb)       Intake/Output Summary (Last 24 hours) at 03/28/18 0799  Last data filed at 03/28/18 0556   Gross per 24 hour   Intake              360 ml   Output             2835 ml   Net            -2475 ml     Physical Exam: General appearance: cooperative and no distress  Head: Normocephalic, without obvious abnormality, atraumatic  Lungs: clear to auscultation bilaterally and Without wheezing or crackles  Heart: regular rate and rhythm, S1, S2 normal, no murmur, click, rub or gallop  Abdomen: soft, non-tender; bowel sounds normal; no masses,  no organomegaly  Extremities: extremities normal, warm and well-perfused; no cyanosis, clubbing, or edema  Skin: Skin color, texture, turgor normal  No rashes or lesions     Recent Results (from the past 24 hour(s))   Lipid panel    Collection Time: 03/27/18  3:26 PM   Result Value Ref Range    Cholesterol 114 50 - 200 mg/dL    Triglycerides 124 <=150 mg/dL    HDL, Direct 36 (L) 40 - 60 mg/dL    LDL Calculated 53 0 - 100 mg/dL   Retic Count    Collection Time: 03/27/18  3:26 PM   Result Value Ref Range    Retic Ct Abs 1,000 (L) 14,356 - 105,094    Retic Ct Pct <0 18 (L) 0 37 - 1 87 %   Hemoglobin A1C w/ EAG Estimation    Collection Time: 03/27/18  3:26 PM   Result Value Ref Range    Hemoglobin A1C 5 6 4 2 - 6 3 %     mg/dl   Iron Saturation %    Collection Time: 03/27/18  3:26 PM   Result Value Ref Range    Iron Saturation 93 %    TIBC 316 250 - 450 ug/dL    Iron 294 (H) 65 - 175 ug/dL   Ferritin    Collection Time: 03/27/18  3:26 PM   Result Value Ref Range    Ferritin 823 (H) 8 - 388 ng/mL   Hemoglobin and hematocrit, blood    Collection Time: 03/27/18  7:33 PM   Result Value Ref Range    Hemoglobin 6 9 (LL) 14 0 - 18 0 g/dL    Hematocrit 20 7 (L) 42 0 - 52 0 %   Hemoglobin and hematocrit, blood    Collection Time: 03/27/18  9:40 PM   Result Value Ref Range    Hemoglobin 7 0 (L) 14 0 - 18 0 g/dL    Hematocrit 20 7 (L) 42 0 - 52 0 %   CBC and differential    Collection Time: 03/28/18  4:45 AM   Result Value Ref Range    WBC 13 60 (H) 4 80 - 10 80 Thousand/uL    RBC 2 47 (L) 4 70 - 6 10 Million/uL    Hemoglobin 7 1 (L) 14 0 - 18 0 g/dL    Hematocrit 21 5 (L) 42 0 - 52 0 %    MCV 87 82 - 98 fL    MCH 28 9 27 0 - 31 0 pg    MCHC 33 2 31 4 - 37 4 g/dL    RDW 15 5 (H) 11 6 - 15 1 %    MPV 7 0 (L) 8 9 - 12 7 fL    Platelets 637 141 - 061 Thousands/uL    nRBC 0 /100 WBCs    Neutrophils Relative 20 (L) 43 - 75 %    Lymphocytes Relative 74 (H) 14 - 44 %    Monocytes Relative 5 4 - 12 %    Eosinophils Relative 1 0 - 6 %    Basophils Relative 0 0 - 1 %    Neutrophils Absolute 2 70 1 85 - 7 62 Thousands/µL    Lymphocytes Absolute 10 10 (H) 0 60 - 4 47 Thousands/µL    Monocytes Absolute 0 70 0 17 - 1 22 Thousand/µL    Eosinophils Absolute 0 10 0 00 - 0 61 Thousand/µL    Basophils Absolute 0 00 0 00 - 0 10 Thousands/µL   Magnesium    Collection Time: 03/28/18  4:45 AM   Result Value Ref Range    Magnesium 2 2 1 6 - 2 6 mg/dL   Comprehensive metabolic panel    Collection Time: 03/28/18  4:45 AM   Result Value Ref Range    Sodium 141 136 - 145 mmol/L    Potassium 4 3 3 5 - 5 3 mmol/L    Chloride 105 100 - 108 mmol/L    CO2 29 21 - 32 mmol/L    Anion Gap 7 4 - 13 mmol/L    BUN 16 5 - 25 mg/dL    Creatinine 0 82 0 60 - 1 30 mg/dL    Glucose 94 65 - 140 mg/dL    Calcium 8 8 8 3 - 10 1 mg/dL    AST 12 5 - 45 U/L    ALT 18 12 - 78 U/L    Alkaline Phosphatase 65 46 - 116 U/L    Total Protein 5 9 (L) 6 4 - 8 2 g/dL    Albumin 3 2 (L) 3 5 - 5 0 g/dL    Total Bilirubin 0 60 0 20 - 1 00 mg/dL    eGFR 94 ml/min/1 73sq m   Phosphorus    Collection Time: 03/28/18  4:45 AM   Result Value Ref Range    Phosphorus 4 5 (H) 2 3 - 4 1 mg/dL   Smear Review(Phlebs Do Not Order)    Collection Time: 03/28/18  4:45 AM   Result Value Ref Range    Anisocytosis Present     Poikilocytes Present     Platelet Estimate Adequate Adequate       Current Facility-Administered Medications   Medication Dose Route Frequency Provider Last Rate Last Dose    acetaminophen (TYLENOL) tablet 650 mg  650 mg Oral Q6H PRN Damaris Cheatham MD        aspirin chewable tablet 81 mg  81 mg Oral Daily Kishor Juarez MD   81 mg at 03/27/18 1016    benztropine (COGENTIN) tablet 1 mg  1 mg Oral Once HS Damaris Cheatham MD   1 mg at 03/27/18 2111    diphenhydrAMINE (BENADRYL) injection 25 mg  25 mg Intravenous Q6H PRN Damaris Cheatham MD        divalproex sodium (DEPAKOTE) EC tablet 500 mg  500 mg Oral Q12H Marcos Garcia MD   500 mg at 03/27/18 2111    FLUoxetine (PROzac) capsule 40 mg  40 mg Oral Daily Damaris Cheatham MD   40 mg at 03/27/18 1015    hydrochlorothiazide (HYDRODIURIL) tablet 12 5 mg  12 5 mg Oral Daily Damaris Cheatham MD   12 5 mg at 03/27/18 1015    hydrOXYzine HCL (ATARAX) tablet 50 mg  50 mg Oral HS Damaris Cheatham MD   50 mg at 03/27/18 2111    lisinopril (ZESTRIL) tablet 40 mg  40 mg Oral Daily Damaris Cheatham MD   40 mg at 03/27/18 1015    metoprolol tartrate (LOPRESSOR) partial tablet 12 5 mg  12 5 mg Oral Q12H [address] Kishor Juarez MD   12 5 mg at 03/27/18 2111    mirtazapine (REMERON) tablet 15 mg  15 mg Oral HS Damaris Cheatham MD   15 mg at 03/27/18 2111    nicotine (NICODERM CQ) 21 mg/24 hr TD 24 hr patch 1 patch  1 patch Transdermal Daily Damaris Cheatham MD        [facility]ansetron [facility]) injection 4 mg  4 mg Intravenous Q6H PRN Damaris Cheatham MD        risperiDONE (RisperDAL) tablet 2 mg  2 mg Oral HS Damaris Cheatham MD   2 mg at 03/27/18 2111       Invasive Devices     Peripheral Intravenous Line            Peripheral IV 03/26/18 Right Antecubital 1 day              Lab, Imaging and other studies: I have personally reviewed pertinent reports      VTE Pharmacologic Prophylaxis: None due to acute anemia  VTE Mechanical Prophylaxis: sequential compression device    Katerina Lynn DO

## 2018-03-28 NOTE — CASE MANAGEMENT
Continued Stay Review    Date: 3/28/18    Vital Signs: /64 (BP Location: Left arm)   Pulse 55   Temp 98 7 °F (37 1 °C) (Tympanic)   Resp 18   Ht 5' 11" (1 803 m)   Wt 106 kg (234 lb 2 1 oz)   SpO2 98%   BMI 32 65 kg/m²       TELE MON  NPO  STRESS TEST  CT BONE MARROW BIOPSY AND ASPIRATION  Medications:   Scheduled Meds:   Current Facility-Administered Medications:  acetaminophen 650 mg Oral Q6H PRN Rocky Samayoa MD   aspirin 81 mg Oral Daily Darci aBig MD   benztropine 1 mg Oral Once HS Rocky Samayoa MD   diphenhydrAMINE 25 mg Intravenous Q6H PRN Rocky Samayoa MD   divalproex sodium 500 mg Oral Q12H [address] Rocky Samayoa MD   FLUoxetine 40 mg Oral Daily Rocky Samayoa MD   hydrochlorothiazide 12 5 mg Oral Daily Rocky Samayoa MD   hydrOXYzine HCL 50 mg Oral HS Rocky Samayoa MD   lisinopril 40 mg Oral Daily Rocky Samayoa MD   metoprolol tartrate 12 5 mg Oral Q12H [address] Darci Baig MD   mirtazapine 15 mg Oral HS Rocky Samayoa MD   nicotine 1 patch Transdermal Daily Rocky Samayoa MD   ondansetron 4 mg Intravenous Q6H PRN Rocky Samayoa MD   regadenoson 0 4 mg Intravenous Once Lamin Man MD   risperiDONE 2 mg Oral HS Rocky Samayoa MD     Continuous Infusions:    PRN Meds:   acetaminophen    diphenhydrAMINE    ondansetron    Abnormal Labs/Diagnostic Results: ALB 3 2 , WBC 13 60 H/H 7 0>7 1/21 5      Age/Sex: 60 y o  male     ATTENDING  Assessment/Plan:  Acute on chronic symptomatic normocytic anemia: s/p 3U PRBC, Hb 7 1 this am, Retic 1k, ferritin 823, Iron 294, Iron Sat 93%  Give 1 unit PRBCs, with H&H 2 hours after  Hematology/oncology consulted - recommend above 1 unit, and a bone marrow biopsy  Monitor vitals, continue telemetry  H/O leukemia:  WBC 13 6, Hb 7 1, HCT 21 5,   Hematology/oncology consultation - bone marrow biopsy today to be performed by IR  Elevated troponin:  POA 0 03-> 0 12-> 0 05, , CK 81, likely NSTEMI type 2, no CP  Stress test to be done today  Cardiology following  Hyperglycemia:  POA Glu 239, A1c 5 6, this a m  94 - resolved  RA:  Resolved BUN 16, Cr 0 82  Lactic acidosis:  Resolved  HTN:  1 hypertensive episode overnight 176/64, currently 112/64  Continue lisinopril 40 mg and HCTZ 12 5 mg  HLD:  TChol 114, , HDL 36, LDL 53 - no medication indicated  Depression, bipolar disorder, & unspecified psychiatric disorder:  Continue risperidone oral, mirtazapine, hydroxyzine, Depakote, & benztropine  Nicotine dependence:  Continue Nicoderm 21 mg patch,  on smoking cessation  Obesity:  Nutrition counseling  FEN & DVT PPX:  NPO for stress test, resume diet after, replete electrolytes as indicated, no IVF  SCDs, holding anticoagulation due to severe anemia    ONCOLOGY  1  Anemia, leukocytosis, lymphocytosis  Etiology at this time is not clear  The significant drops in the hemoglobin levels are more commonly seen in patients with bleeding issues  That being said, patient gives no history of bleeding and workup did not demonstrate any source (for the bleeding)  It is difficult to believe but possible that a primary bone marrow disorder is the sole cause for the anemia  The platelet count is within normal limits  The white count is elevated with lymphocytosis - possibly patient has CLL (or another primary lymphoproliferative disorder)  I rediscussed this with the patient this morning  Getting information from [place] has become next to impossible  We discussed options  Mr Gudelia Guzman agrees to the bone marrow biopsy  This is being scheduled for today  Results will dictate treatment options - full results could take up to 2 weeks (but this does not mean patient needs to stay as an inpatient waiting for the results)  2  Anemia  Hemoglobin level has dropped again  Patient is to receive 1 unit today  Patient can be set up for routine outpatient hemoglobin checks with transfusions as needed    Respectfully, the problem in the past has been patient's noncompliance with follow-up  BONE MARROW BIOPSY:  Procedure Note  Post-op Diagnosis:   1  Anemia    2  Symptomatic anemia    3  Leukemia (HCC)    4  Elevated troponin    5  NSTEMI (non-ST elevated myocardial infarction) ([facility] [address] )    6  Anemia, unspecified type    Findings: Left posterior superior iliac crest amenable     Specimens: 7 mL bone marrow; 2 cm bone core  CARDIOLOGY  Patient seen and evaluated  Complaining about mild abdominal pain  Had a bone marrow biopsy done today  Hemoglobin staying stable around 7 1  Echo shows normal LV systolic function  Nuclear stress test prelim shows no ischemia has fixed apical defect with low normal EF around 50%     Assessment and Plan:   1   Severe symptomatic anemia with hemoglobin dropping to 4 5  Hemoglobin 7 1 after blood transfusion  2   Non ST elevation MI most likely type 2 due to severe anemia  Nuclear stress test shows no evidence of any significant ischemia low normal LV systolic function around 31%  Full report to follow  3   Hyperglycemia   Check HbA1c   If patient diagnosed to have diabetes mellitus need to be on statin therapy  4   Hypertension seems to be well controlled with lisinopril and hydrochlorothiazide  Monitor electrolytes  5   Questionable history of leukemia follow-up Hematology    Had bone marrow biopsy done today  6   History of dyslipidemia check fasting lipids and consider statin therapy  7   Tobacco abuse   Advised to quit smoking  8   History of psych problems management as per medical team  Plan as above

## 2018-03-28 NOTE — PROGRESS NOTES
Pt off unit since 0900 on 3/28/18  Blood ordered for patient  Spoke to IR about blood to be hung, was asked to not hang blood product before patient scheduled for a bone marrow biopsy  MD Odalis Peters notified  She advised it was ok to not hang the blood if the patient is back before going to biopsy because it is not emergent  I will hang the blood when patient returns from biopsy

## 2018-03-28 NOTE — PLAN OF CARE
CARDIOVASCULAR - ADULT     Maintains optimal cardiac output and hemodynamic stability Progressing     Absence of cardiac dysrhythmias or at baseline rhythm Progressing        DISCHARGE PLANNING - CARE MANAGEMENT     Discharge to post-acute care or home with appropriate resources Progressing        GASTROINTESTINAL - ADULT     Maintains or returns to baseline bowel function Progressing        HEMATOLOGIC - ADULT     Maintains hematologic stability Progressing        MUSCULOSKELETAL - ADULT     Maintain or return mobility to safest level of function Progressing        Potential for Falls     Patient will remain free of falls Progressing        SKIN/TISSUE INTEGRITY - ADULT     Skin integrity remains intact Progressing

## 2018-03-28 NOTE — SOCIAL WORK
CM RECEIVED CALL FROM PT WIFE, PT IS LOOSING HIS INSURANCE AT THE END OF THE MONTH D/T LOSS OF EMPLOYMENT, WIFE CONCERNED OVER COVERAGE  REFERRED HER TO PT FINANCIAL SERVICES TO DISCUSS MEDICAID AND/OR THE [id number] QUALIFICATIONS  PROVIDED NAME AND CONTACT INFORMATION, WIFE MAY STOP IN TO THE OFFICE THIS AFTERNOON

## 2018-03-28 NOTE — PROGRESS NOTES
Progress Note - Cardiology   Evelin Granados 60 y o  male MRN: [id number]  : 1955  Unit/Bed#: [address] [place] [address] Encounter: [id number]    Assessment and Plan:   1  Severe symptomatic anemia with hemoglobin dropping to 4 5  Hemoglobin 7 1 after blood transfusion  2  Non ST elevation MI most likely type 2 due to severe anemia  Nuclear stress test shows no evidence of any significant ischemia low normal LV systolic function around 17%  Full report to follow  3  Hyperglycemia  Check HbA1c  If patient diagnosed to have diabetes mellitus need to be on statin therapy  4  Hypertension seems to be well controlled with lisinopril and hydrochlorothiazide  Monitor electrolytes  5  Questionable history of leukemia follow-up Hematology  Had bone marrow biopsy done today  6  History of dyslipidemia check fasting lipids and consider statin therapy  7  Tobacco abuse  Advised to quit smoking  8  History of psych problems management as per medical team  Plan as above    Subjective / Objective:   Patient seen and evaluated  Complaining about mild abdominal pain  Had a bone marrow biopsy done today  Hemoglobin staying stable around 7 1  Echo shows normal LV systolic function  Nuclear stress test prelim shows no ischemia has fixed apical defect with low normal EF around 50%  Vitals: Blood pressure 116/60, pulse 61, temperature 97 7 °F (36 5 °C), temperature source Tympanic, resp  rate 18, height 5' 11" (1 803 m), weight 106 kg (234 lb 2 1 oz), SpO2 98 %  Vitals:    18 0600 18 0600   Weight: 106 kg (232 lb 12 9 oz) 106 kg (234 lb 2 1 oz)     Body mass index is 32 65 kg/m²    BP Readings from Last 3 Encounters:   18 116/60   18 146/72   18 108/82     Orthostatic Blood Pressures    Flowsheet Row Most Recent Value   Blood Pressure  116/60 filed at 2018 1420   Patient Position - Orthostatic VS  Lying filed at 2018 0715          Invasive Devices     Peripheral Intravenous Line            Peripheral IV 03/26/18 Right Antecubital 2 days                  Intake/Output Summary (Last 24 hours) at 03/28/18 1504  Last data filed at 03/28/18 0556   Gross per 24 hour   Intake                0 ml   Output             2835 ml   Net            -2835 ml         Physical Exam:   Physical Exam    Neurologic:  Alert & oriented x 3,  no focal deficits noted   Constitutional:  Well developed, well nourished,  With no acute distress  Eyes:  PERRL, conjunctiva normal   HENT:  Atraumatic, external ears normal, nose normal,   NECK: Normal range of motion, no tenderness, neck is supple , No JVP  Respiratory:  Bilateral air entry mostly clear to auscultation  Cardiovascular: S1-S2 regular with a 2/6 ejection systolic murmur  S4 is heard  GI:  Soft, nondistended, normal bowel sounds, nontender, no hepatosplenomegaly appreciated  Musculoskeletal:  No tenderness, no deformities      Extremities:  No edema and distal pulses are present      Medications/ Allergies:     Current Facility-Administered Medications:  acetaminophen 650 mg Oral Q6H PRN Jolene Matos MD   aspirin 81 mg Oral Daily Elbert Rodriguez MD   benztropine 1 mg Oral Once HS Jolene Matos MD   diphenhydrAMINE 25 mg Intravenous Q6H PRN Jolene Matos MD   divalproex sodium 500 mg Oral Q12H [facility] Jolene Matos MD   FLUoxetine 40 mg Oral Daily Jolene [place], MD   hydrochlorothiazide 12 5 mg Oral Daily Jolene Matos MD   hydrOXYzine HCL 50 mg Oral HS Jolene Matos MD   lisinopril 40 mg Oral Daily Jolene Matos MD   metoprolol tartrate 12 5 mg Oral Q12H [facility] Elbert Rodriguez MD   mirtazapine 15 mg Oral HS Jolene Matos MD   nicotine 1 patch Transdermal Daily Jolene Matos MD   ondansetron 4 mg Intravenous Q6H PRN Jolene Matos MD   risperiDONE 2 mg Oral HS Jolene Matos MD       acetaminophen 650 mg Q6H PRN   diphenhydrAMINE 25 mg Q6H PRN   ondansetron 4 mg Q6H PRN     No Known Allergies      Labs: Troponins:  Results from last 7 days  Lab Units 03/27/18  0643 03/26/18  2341 03/26/18 2045 03/26/18  1145   CK TOTAL U/L  --   --   --   --  81   TROPONIN I ng/mL 0 05* 0 10* 0 12*  < > 0 03   < > = values in this interval not displayed      CBC with diff:  Results from last 7 days  Lab Units 03/28/18 0445 03/27/18  2140 03/27/18  1933 03/27/18  0643 03/27/18  0455 03/26/18 2045 03/26/18  1145   WBC Thousand/uL 13 60*  --   --   --  20 60*  --  21 70*   HEMOGLOBIN g/dL 7 1* 7 0* 6 9* 7 6* 7 2* 6 2* 4 5*   HEMATOCRIT % 21 5* 20 7* 20 7* 22 5* 21 7* 18 8* 13 3*   MCV fL 87  --   --   --  88  --  84   PLATELETS Thousands/uL 148  --   --   --  155  --  192   MCH pg 28 9  --   --   --  29 2  --  28 8   MCHC g/dL 33 2  --   --   --  33 2  --  34 1   RDW % 15 5*  --   --   --  14 8  --  15 0   MPV fL 7 0*  --   --   --  7 1*  --  7 2*   NRBC AUTO /100 WBCs 0  --   --   --   --   --  0       CMP:  Results from last 7 days  Lab Units 03/28/18 0445 03/27/18 0455 03/26/18  1145   SODIUM mmol/L 141 141 140   POTASSIUM mmol/L 4 3 4 4 4 2   CHLORIDE mmol/L 105 105 101   CO2 mmol/L 29 29 29   ANION GAP mmol/L 7 7 10   BUN mg/dL 16 22 26*   CREATININE mg/dL 0 82 0 81 1 25   GLUCOSE RANDOM mg/dL 94 97 131   CALCIUM mg/dL 8 8 8 6 8 8   AST U/L 12 12 13   ALT U/L 18 14 26   ALK PHOS U/L 65 66 90   TOTAL PROTEIN g/dL 5 9* 5 9* 6 3*   BILIRUBIN TOTAL mg/dL 0 60 0 60 0 40   EGFR ml/min/1 73sq m 94 95 61       Magnesium:  Results from last 7 days  Lab Units 03/28/18 0445 03/27/18  0455   MAGNESIUM mg/dL 2 2 2 1     Coags:  Results from last 7 days  Lab Units 03/26/18  2341 03/26/18  1145   PTT seconds 24 22*   INR  1 18*  --      TSH:  Results from last 7 days  Lab Units 03/26/18  1715   TSH 3RD GENERATON uIU/mL 1 666     Lipid Profile:  Results from last 7 days  Lab Units 03/27/18  1526   CHOLESTEROL mg/dL 114   TRIGLYCERIDES mg/dL 124   HDL mg/dL 36*   LDL CALC mg/dL 53     Hgb A1c:  Results from last 7 days  Lab Units 03/27/18  1526   HEMOGLOBIN A1C % 5 6     NT-proBNP:   Recent Labs      03/26/18   1145   NTBNP  318*        Imaging & Testing   I have personally reviewed pertinent reports  Xr Chest Portable    Result Date: 3/26/2018  Narrative: CHEST INDICATION:   weakness  Shortness of breath  COMPARISON:  02/27/2018 EXAM PERFORMED/VIEWS:  XR CHEST PORTABLE FINDINGS: Cardiomediastinal silhouette appears unremarkable  The lungs are clear  No pneumothorax or pleural effusion  Osseous structures appear within normal limits for patient age  Impression: No acute cardiopulmonary disease  Workstation performed: [id number]     Xr Chest Pa & Lateral    Result Date: 2/27/2018  Narrative: CHEST INDICATION: concern for aspiration pneumonia COMPARISON:  2/26/2018  EXAM PERFORMED/VIEWS:  XR CHEST PA & LATERAL FINDINGS: Cardiomediastinal silhouette appears unremarkable  The lungs are clear  No pneumothorax or pleural effusion  Osseous structures appear within normal limits for patient age  Impression: No acute cardiopulmonary disease  Workstation performed: [id number]     Ct Chest Abdomen Pelvis W Contrast    Result Date: 3/27/2018  Narrative: CT CHEST, ABDOMEN AND PELVIS WITH IV CONTRAST INDICATION:   symptomatic anemia,  evaluate for malignancy  "Severe symptomatic anemia with hemoglobin dropping to 4 5""Questionable history of leukemia follow-up Hematology" COMPARISON: None  TECHNIQUE: CT examination of the chest, abdomen and pelvis was performed  Axial, sagittal, and coronal 2D reformatted images were created from the source data and submitted for interpretation  Radiation dose length product (DLP) for this visit:  1177 13 mGy-cm   This examination, like all CT scans performed in the [facility], was performed utilizing techniques to minimize radiation dose exposure, including the use of iterative reconstruction and automated exposure control   IV Contrast:  100 mL of iohexol (OMNIPAQUE)   350 Multi-dose Enteric Contrast: Enteric contrast was administered  FINDINGS: CHEST LUNGS:  There is a mild bibasilar pulmonary scarring and hypoventilatory change  No pulmonary mass  No endotracheal or endobronchial lesion  PLEURA:  Unremarkable  HEART/GREAT VESSELS:  Unremarkable for patient's age  MEDIASTINUM AND JOSE DANIEL:  Numerous small mediastinal lymph nodes may prominent measuring 1 cm in size but are increased in quantity in keeping with a mild mediastinal adenopathy  CHEST WALL AND LOWER NECK:   Unremarkable  ABDOMEN LIVER/BILIARY TREE:  Unremarkable  GALLBLADDER:  No calcified gallstones  No pericholecystic inflammatory change  SPLEEN:  Splenomegaly with length of approximately 17 cm  PANCREAS:  Unremarkable  ADRENAL GLANDS:  Unremarkable  KIDNEYS/URETERS:  Unremarkable  No hydronephrosis  STOMACH AND BOWEL:  Prominent colonic stool without bowel obstruction or other acute bowel findings  APPENDIX:  Noninflamed  ABDOMINOPELVIC CAVITY:  No ascites or free air  Mild diffuse mesenteric, celiac axis and retroperitoneal adenopathy  A dominant left retroperitoneal lymph node measures 25 x 19 mm  Most of the lymph nodes are predominantly subcentimeter in size  VESSELS:  Unremarkable for patient's age  PELVIS REPRODUCTIVE ORGANS:  Unremarkable for patient's age  URINARY BLADDER:  Unremarkable  ABDOMINAL WALL/INGUINAL REGIONS:  Unremarkable  OSSEOUS STRUCTURES:  No acute fracture or destructive osseous lesion  Impression: Mild mediastinal adenopathy  Mild retroperitoneal celiac axis and mesenteric adenopathy with a dominant left retroperitoneal lymph node measuring 25 x 19 mm  Splenomegaly with length of 17 cm  The study was marked in [facility] for immediate notification  Workstation performed: [id number]        EKG / Monitor: Personally reviewed  Admission EKG shows normal sinus rhythm no significant ST changes  Heart rate was around 60 beats per minute      Cardiac testing:   Results for orders placed during the hospital encounter of 18   Echo complete with contrast if indicated    Narrative [address]  [address] [facility] [phone]    Transthoracic Echocardiogram  2D, M-mode, Doppler, and Color Doppler    Study date:  27-Mar-2018    Patient: Fracisco Christine  MR number: [id number]  Account number: [de-identified]  : 1955  Age: 60 years  Gender: Male  Status: Routine  Location: Bedside  Height: 71 in  Weight: 231 4 lb  BP: 142/ 81 mmHg    Indications: Syncope    Diagnoses: R55  - Syncope and collapse    Sonographer:  Matthew Bolanos, MORGAN, RVS  Primary Physician:  Dorian Rodriguez:  [facility]  Interpreting Physician:  Ty Beard MD    SUMMARY    LEFT VENTRICLE:  Systolic function was normal  Ejection fraction was estimated in the range of 55 % to 60 % to be 55 %  There were no regional wall motion abnormalities  Wall thickness was mildly increased  There was mild concentric hypertrophy  Doppler parameters were consistent with abnormal left ventricular relaxation (grade 1 diastolic dysfunction)  MITRAL VALVE:  There was mild regurgitation  AORTIC VALVE:  There was mild regurgitation  TRICUSPID VALVE:  There was mild to moderate regurgitation  Estimated peak PA pressure was 45 mmHg  IVC, HEPATIC VEINS:  The respirophasic change in diameter was less than 50%  HISTORY: PRIOR HISTORY: HTN, Current Smoker, Alcohol Abuse, Cancer    PROCEDURE: The procedure was performed at the bedside  This was a routine study  The transthoracic approach was used  The study included complete 2D imaging, M-mode, complete spectral Doppler, and color Doppler  The heart rate was 61 bpm,  at the start of the study  Images were obtained from the parasternal, apical, subcostal, and suprasternal notch acoustic windows  Image quality was adequate      LEFT VENTRICLE: Size was normal  Systolic function was normal  Ejection fraction was estimated in the range of 55 % to 60 % to be 55 %  There were no regional wall motion abnormalities  Wall thickness was mildly increased  There was mild  concentric hypertrophy  DOPPLER: Doppler parameters were consistent with abnormal left ventricular relaxation (grade 1 diastolic dysfunction)  RIGHT VENTRICLE: The size was normal  Systolic function was normal     LEFT ATRIUM: Size was at the upper limits of normal     RIGHT ATRIUM: Size was at the upper limits of normal     MITRAL VALVE: Valve structure was normal  There was normal leaflet separation  DOPPLER: The transmitral velocity was within the normal range  There was no evidence for stenosis  There was mild regurgitation  AORTIC VALVE: The valve was trileaflet  Leaflets exhibited mildly increased thickness and mild calcification  DOPPLER: There was no evidence for stenosis  There was mild regurgitation  TRICUSPID VALVE: DOPPLER: There was mild to moderate regurgitation  Estimated peak PA pressure was 45 mmHg  PULMONIC VALVE: DOPPLER: There was no significant regurgitation  PERICARDIUM: There was no thickening or calcification  There was no pericardial effusion  AORTA: The root exhibited normal size  SYSTEMIC VEINS: IVC: The inferior vena cava was normal in size  The respirophasic change in diameter was less than 50%  SYSTEM MEASUREMENT TABLES    2D mode  AoR Diam 2D: 3 4 cm  LA Diam (2D): 3 8 cm  LA/Ao (2D): 1 12  FS (2D Teich): 30 5 %  IVSd (2D): 1 27 cm  LVDEV: 118 cm³  LVESV: 49 8 cm³  LVIDd(2D): 4 99 cm  LVISd (2D): 3 47 cm  LVPWd (2D): 1 21 cm  SV (Teich): 68 2 cm³    Apical four chamber  LVEF A4C: 56 %    Unspecified Scan Mode  LVOT Vmax: 1140 mm/s  LVOT Vmax; Mean: 1140 mm/s  Peak Grad ; Mean: 5 mm[Hg]  MV Peak A Arnulfo: 944 mm/s  MV Peak E Arnulfo   Mean: 975 mm/s  RA Area: 18 9 cm squared  RA Volume: 53 5 cm³  TAPSE: 2 8 cm    [employer] Accredited Echocardiography Laboratory    Prepared and electronically signed by    Bella Rosenthal MD  Signed 27-Mar-2018 13:21:17         Dr Ines Davis MD [facility]      "This note has been constructed using a voice recognition system  Therefore there may be syntax, spelling, and/or grammatical errors   Please call if you have any questions  "

## 2018-03-28 NOTE — BRIEF OP NOTE (RAD/CATH)
BONE MARROW BIOPSY:  Procedure Note    PATIENT NAME: Ev Subramanian  : 1955  MRN: [id number]     Pre-op Diagnosis:   1  Anemia    2  Symptomatic anemia    3  Leukemia (HCC)    4  Elevated troponin    5  NSTEMI (non-ST elevated myocardial infarction) ([facility] )    6  Anemia, unspecified type      Post-op Diagnosis:   1  Anemia    2  Symptomatic anemia    3  Leukemia (HCC)    4  Elevated troponin    5  NSTEMI (non-ST elevated myocardial infarction) ([facility] )    6  Anemia, unspecified type        Surgeon:   Segun Spence MD  Assistants:     No qualified resident was available, Resident is only observing    Estimated Blood Loss: None  Findings: Left posterior superior iliac crest amenable  Specimens: 7 mL bone marrow; 2 cm bone core  Complications:  None      Anesthesia: Conscious sedation and Andrew Worrell MD     Date: 3/28/2018  Time: 2:00 PM

## 2018-03-28 NOTE — PROGRESS NOTES
Hematology Oncology Progress Note    Naida Amin, 1955, [id number]  [address] [address]/[address] [address]-*    Impression and plan:    1  Anemia, leukocytosis, lymphocytosis  Etiology at this time is not clear  The significant drops in the hemoglobin levels are more commonly seen in patients with bleeding issues  That being said, patient gives no history of bleeding and workup did not demonstrate any source (for the bleeding)  It is difficult to believe but possible that a primary bone marrow disorder is the sole cause for the anemia  The platelet count is within normal limits  The white count is elevated with lymphocytosis - possibly patient has CLL (or another primary lymphoproliferative disorder)  I rediscussed this with the patient this morning  Getting information from [place] has become next to impossible  We discussed options  Mr Corby Cortez agrees to the bone marrow biopsy  This is being scheduled for today  Results will dictate treatment options - full results could take up to 2 weeks (but this does not mean patient needs to stay as an inpatient waiting for the results)  2  Anemia  Hemoglobin level has dropped again  Patient is to receive 1 unit today  Patient can be set up for routine outpatient hemoglobin checks with transfusions as needed  Respectfully, the problem in the past has been patient's noncompliance with follow-up      3  Abdominal tenderness in the face of severe recurrent anemia without clear etiology  Besides the workup above, I agree with the CT scan of the chest, abdomen and pelvis (looking for adenopathy, enlarged spleen etc)      4  Depression, bipolar disorder  Patient has been seen by Psychiatry in the past       5  History of hematuria - presently there is no clear signs of hematuria  Patient may need  evaluation possibly as an outpatient  _________________________________________________________________________________________________________________________________________    Chief complaint/reason for admission:  Weakness, shortness of breath, falls at home    History of present illness:   65-year-old male admitted with the above  Mr Jose F Bell has been admitted to the hospital past with anemia  Etiology is not clear  Patient reportedly was diagnosed with leukemia in [place] - this information has never been available  Patient states feeling poorly +/-, slightly better than yesterday  Patient states that the abdominal pain is less/better  Appetite is okay  No nausea vomiting  As before, patient denies any GI or  bleeding  Activities are extremely limited      Hospital medications:   Current Facility-Administered Medications:     acetaminophen (TYLENOL) tablet 650 mg, 650 mg, Oral, Q6H PRN, Damaris Cheatham MD    aspirin chewable tablet 81 mg, 81 mg, Oral, Daily, Kishor Juarez MD, 81 mg at 03/28/18 7604    benztropine (COGENTIN) tablet 1 mg, 1 mg, Oral, Once HS, Damaris Cheatham MD, 1 mg at 03/27/18 2111    diphenhydrAMINE (BENADRYL) injection 25 mg, 25 mg, Intravenous, Q6H PRN, Damaris Cheatham MD    divalproex sodium (DEPAKOTE) EC tablet 500 mg, 500 mg, Oral, Q12H [address], Damaris Cheatham MD, 500 mg at 03/28/18 2909    FLUoxetine (PROzac) capsule 40 mg, 40 mg, Oral, Daily, Damaris Cheatham MD, 40 mg at 03/28/18 1585    hydrochlorothiazide (HYDRODIURIL) tablet 12 5 mg, 12 5 mg, Oral, Daily, Damaris Cheatham MD, 12 5 mg at 03/28/18 0781    hydrOXYzine HCL (ATARAX) tablet 50 mg, 50 mg, Oral, HS, Damaris Cheatham MD, 50 mg at 03/27/18 2111    lisinopril (ZESTRIL) tablet 40 mg, 40 mg, Oral, Daily, Damaris Cheatham MD, 40 mg at 03/28/18 0829    metoprolol tartrate (LOPRESSOR) partial tablet 12 5 mg, 12 5 mg, Oral, Q12H [address], Kishor Juarez MD, 12 5 mg at 03/28/18 0828    mirtazapine (REMERON) tablet 15 mg, 15 mg, Oral, HS, Warden Kyle MD, 15 mg at 03/27/18 2111    nicotine (NICODERM CQ) 21 mg/24 hr TD 24 hr patch 1 patch, 1 patch, Transdermal, Daily, Warden Kyle MD    ondansetron [facility] injection 4 mg, 4 mg, Intravenous, Q6H PRN, Warden Kyle MD    risperiDONE (RisperDAL) tablet 2 mg, 2 mg, Oral, HS, Warden Kyle MD, 2 mg at 03/27/18 2111    Physical exam  Vitals:    03/28/18 0715   BP: 112/64   Pulse: 55   Resp: 18   Temp: 98 7 °F (37 1 °C)   SpO2: 98%   Constitutional:  Middle-aged male, no respiratory distress  Eyes:  PERRL, conjunctiva pale, anicteric   HENT:  Atraumatic, external ears normal, nose normal,   Oropharynx: moist, no pharyngeal exudates, no thrush, pink  Neck: No adenopathy, good range of motion  Respiratory: scattered bilateral rhonchi  Cardiovascular:  Normal rate, normal rhythm, no murmurs, no gallops, no rubs   GI:  Soft, obese, slightly distended, mildly diffuse tenderness in all quadrants, no rigidity rebound, cannot palpate liver or spleen  :  No costovertebral angle tenderness, normal reproductive organs, no discharge  Musculoskeletal:  No pain or tenderness with palpation of joints, muscles or bones  Integument:  Pale, sour color, scattered ecchymoses, no petechiae, no active bleeding  Lymphatic:  No lymphadenopathy in the neck, supraclavicular region, infraclavicular region, axilla and groin bilaterally  Extremities:  1+ bilateral lower extremity edema, no cords, pulses are decreased  Neurologic:  Patient is responsive to simple questions, motor and sensory grossly equal bilaterally  Psychiatric:  Speech is clear, response time is slightly delayed, some answers are not appropriate/do not match the question  Rectal: Deferred    Laboratory test results    Results for Lesli Wilson (MRN [id number]) as of 3/28/2018 09:35   Ref   Range 3/27/2018 19:33 3/27/2018 21:40 3/28/2018 04:45   WBC Latest Ref Range: 4 80 - 10 80 Thousand/uL   13 60 (H)   RBC Latest Ref Range: 4 70 - 6 10 Million/uL   2 47 (L)   Hemoglobin Latest Ref Range: 14 0 - 18 0 g/dL 6 9 (LL) 7 0 (L) 7 1 (L)   Hematocrit Latest Ref Range: 42 0 - 52 0 % 20 7 (L) 20 7 (L) 21 5 (L)   MCV Latest Ref Range: 82 - 98 fL   87   MCH Latest Ref Range: 27 0 - 31 0 pg   28 9   MCHC Latest Ref Range: 31 4 - 37 4 g/dL   33 2   RDW Latest Ref Range: 11 6 - 15 1 %   15 5 (H)   Platelets Latest Ref Range: 130 - 400 Thousands/uL   148   MPV Latest Ref Range: 8 9 - 12 7 fL   7 0 (L)   Platelet Estimate Latest Ref Range: Adequate    Adequate   nRBC Latest Units: /100 WBCs   0   Neutrophils Relative Latest Ref Range: 43 - 75 %   20 (L)   Lymphocytes Relative Latest Ref Range: 14 - 44 %   74 (H)   Monocytes Relative Latest Ref Range: 4 - 12 %   5   Eosinophils Relative Latest Ref Range: 0 - 6 %   1   Basophils Relative Latest Ref Range: 0 - 1 %   0

## 2018-03-29 PROBLEM — D64.9 ANEMIA: Status: ACTIVE | Noted: 2018-03-26

## 2018-03-29 LAB
ABO GROUP BLD BPU: NORMAL
ANION GAP SERPL CALCULATED.3IONS-SCNC: 9 MMOL/L (ref 4–13)
ATRIAL RATE: 62 BPM
BASOPHILS # BLD AUTO: 0 THOUSANDS/ΜL (ref 0–0.1)
BASOPHILS NFR BLD AUTO: 0 % (ref 0–1)
BPU ID: NORMAL
BUN SERPL-MCNC: 22 MG/DL (ref 5–25)
CALCIUM SERPL-MCNC: 8.5 MG/DL (ref 8.3–10.1)
CHLORIDE SERPL-SCNC: 105 MMOL/L (ref 100–108)
CO2 SERPL-SCNC: 25 MMOL/L (ref 21–32)
CREAT SERPL-MCNC: 0.8 MG/DL (ref 0.6–1.3)
CROSSMATCH: NORMAL
EOSINOPHIL # BLD AUTO: 0.1 THOUSAND/ΜL (ref 0–0.61)
EOSINOPHIL NFR BLD AUTO: 1 % (ref 0–6)
ERYTHROCYTE [DISTWIDTH] IN BLOOD BY AUTOMATED COUNT: 15 % (ref 11.6–15.1)
GFR SERPL CREATININE-BSD FRML MDRD: 95 ML/MIN/1.73SQ M
GLUCOSE SERPL-MCNC: 102 MG/DL (ref 65–140)
HAV IGM SER QL: NORMAL
HBV CORE IGM SER QL: NORMAL
HBV SURFACE AG SER QL: NORMAL
HCT VFR BLD AUTO: 25.2 % (ref 42–52)
HCV AB SER QL: NORMAL
HGB BLD-MCNC: 8.2 G/DL (ref 14–18)
LYMPHOCYTES # BLD AUTO: 12.3 THOUSANDS/ΜL (ref 0.6–4.47)
LYMPHOCYTES NFR BLD AUTO: 75 % (ref 14–44)
MAGNESIUM SERPL-MCNC: 2.2 MG/DL (ref 1.6–2.6)
MCH RBC QN AUTO: 28.6 PG (ref 27–31)
MCHC RBC AUTO-ENTMCNC: 32.7 G/DL (ref 31.4–37.4)
MCV RBC AUTO: 87 FL (ref 82–98)
MONOCYTES # BLD AUTO: 0.9 THOUSAND/ΜL (ref 0.17–1.22)
MONOCYTES NFR BLD AUTO: 5 % (ref 4–12)
NEUTROPHILS # BLD AUTO: 3.1 THOUSANDS/ΜL (ref 1.85–7.62)
NEUTS SEG NFR BLD AUTO: 19 % (ref 43–75)
NRBC BLD AUTO-RTO: 0 /100 WBCS
P AXIS: 36 DEGREES
PHOSPHATE SERPL-MCNC: 4.8 MG/DL (ref 2.3–4.1)
PLATELET # BLD AUTO: 137 THOUSANDS/UL (ref 130–400)
PLATELET BLD QL SMEAR: ADEQUATE
PMV BLD AUTO: 6.9 FL (ref 8.9–12.7)
POTASSIUM SERPL-SCNC: 4.2 MMOL/L (ref 3.5–5.3)
PR INTERVAL: 158 MS
QRS AXIS: -13 DEGREES
QRSD INTERVAL: 82 MS
QT INTERVAL: 430 MS
QTC INTERVAL: 436 MS
RBC # BLD AUTO: 2.88 MILLION/UL (ref 4.7–6.1)
SODIUM SERPL-SCNC: 139 MMOL/L (ref 136–145)
T WAVE AXIS: 36 DEGREES
UNIT DISPENSE STATUS: NORMAL
UNIT PRODUCT CODE: NORMAL
UNIT RH: NORMAL
VENTRICULAR RATE: 62 BPM
WBC # BLD AUTO: 16.4 THOUSAND/UL (ref 4.8–10.8)

## 2018-03-29 PROCEDURE — 80048 BASIC METABOLIC PNL TOTAL CA: CPT | Performed by: STUDENT IN AN ORGANIZED HEALTH CARE EDUCATION/TRAINING PROGRAM

## 2018-03-29 PROCEDURE — 97110 THERAPEUTIC EXERCISES: CPT

## 2018-03-29 PROCEDURE — 83735 ASSAY OF MAGNESIUM: CPT | Performed by: STUDENT IN AN ORGANIZED HEALTH CARE EDUCATION/TRAINING PROGRAM

## 2018-03-29 PROCEDURE — 84100 ASSAY OF PHOSPHORUS: CPT | Performed by: STUDENT IN AN ORGANIZED HEALTH CARE EDUCATION/TRAINING PROGRAM

## 2018-03-29 PROCEDURE — 85025 COMPLETE CBC W/AUTO DIFF WBC: CPT | Performed by: STUDENT IN AN ORGANIZED HEALTH CARE EDUCATION/TRAINING PROGRAM

## 2018-03-29 PROCEDURE — 99232 SBSQ HOSP IP/OBS MODERATE 35: CPT | Performed by: INTERNAL MEDICINE

## 2018-03-29 PROCEDURE — 93010 ELECTROCARDIOGRAM REPORT: CPT | Performed by: INTERNAL MEDICINE

## 2018-03-29 PROCEDURE — 99254 IP/OBS CNSLTJ NEW/EST MOD 60: CPT | Performed by: PHYSICIAN ASSISTANT

## 2018-03-29 PROCEDURE — 99233 SBSQ HOSP IP/OBS HIGH 50: CPT | Performed by: INTERNAL MEDICINE

## 2018-03-29 PROCEDURE — 99232 SBSQ HOSP IP/OBS MODERATE 35: CPT | Performed by: FAMILY MEDICINE

## 2018-03-29 RX ORDER — POLYETHYLENE GLYCOL 3350 17 G/17G
238 POWDER, FOR SOLUTION ORAL ONCE
Status: COMPLETED | OUTPATIENT
Start: 2018-03-29 | End: 2018-03-29

## 2018-03-29 RX ORDER — MAGNESIUM CARB/ALUMINUM HYDROX 105-160MG
296 TABLET,CHEWABLE ORAL ONCE
Status: COMPLETED | OUTPATIENT
Start: 2018-03-29 | End: 2018-03-29

## 2018-03-29 RX ADMIN — HYDROXYZINE HYDROCHLORIDE 50 MG: 25 TABLET, FILM COATED ORAL at 21:10

## 2018-03-29 RX ADMIN — METOPROLOL TARTRATE 12.5 MG: 25 TABLET ORAL at 21:10

## 2018-03-29 RX ADMIN — BENZTROPINE MESYLATE 1 MG: 1 TABLET ORAL at 21:10

## 2018-03-29 RX ADMIN — BISACODYL 10 MG: 5 TABLET, COATED ORAL at 13:50

## 2018-03-29 RX ADMIN — MAGESIUM CITRATE 296 ML: 1.75 LIQUID ORAL at 18:29

## 2018-03-29 RX ADMIN — POLYETHYLENE GLYCOL 3350 238 G: 17 POWDER, FOR SOLUTION ORAL at 13:55

## 2018-03-29 RX ADMIN — DIVALPROEX SODIUM 500 MG: 500 TABLET, DELAYED RELEASE ORAL at 10:15

## 2018-03-29 RX ADMIN — METOPROLOL TARTRATE 12.5 MG: 25 TABLET ORAL at 10:15

## 2018-03-29 RX ADMIN — RISPERIDONE 2 MG: 1 TABLET ORAL at 21:29

## 2018-03-29 RX ADMIN — MIRTAZAPINE 15 MG: 15 TABLET, FILM COATED ORAL at 21:10

## 2018-03-29 RX ADMIN — LISINOPRIL 40 MG: 20 TABLET ORAL at 10:14

## 2018-03-29 RX ADMIN — HYDROCHLOROTHIAZIDE 12.5 MG: 12.5 TABLET ORAL at 10:15

## 2018-03-29 RX ADMIN — ASPIRIN 81 MG 81 MG: 81 TABLET ORAL at 10:14

## 2018-03-29 RX ADMIN — DIVALPROEX SODIUM 500 MG: 500 TABLET, DELAYED RELEASE ORAL at 21:10

## 2018-03-29 RX ADMIN — FLUOXETINE 40 MG: 20 CAPSULE ORAL at 10:15

## 2018-03-29 NOTE — CONSULTS
Consultation - [address] [facility]  Patricia Barksdale 60 y o  male MRN: [id number]  Unit/Bed#: 3 [place] [address]02 Encounter: [id number]        Inpatient consult to gastroenterology  Consult performed by: Radha Knight ordered by: Biju Morton          Reason for Consult / Principal Problem: severe anemia    ASSESSMENT and PLAN:    Principal Problem:    Symptomatic anemia  Active Problems:    Hypertension    Depression    Polypharmacy    Weakness    Leukemia (HCC)    Lactic acidosis    Leukocytosis    Elevated troponin    Bipolar 1 disorder ([facility] [address] )    Psychiatric disorder    #1  Severe anemia of unclear etiology: patient noted to have hgb of 5 3 in February and now 4 5 this admission  Currently 8 2 after 4 units since admission  Hem onc believes it is difficult to believe that extreme drop in Hgb is unlikely secondary to just a hematological disorder and is concerned for possible blood loss  CT scan negative for any intra-abdominal bleeding  Possible secondary to a chronic GI blood loss  Must also rule out GI malignancy as patient has celiac, retroperitoneal, and mesenteric lymph node enlargement with no hx of EGD or colonoscopy in the past  Patient known to have poor outpatient follow up    -Continue to monitor hgb and transfuse as necessary  -hematological work up ongoing s/p bone marrow bx  -Plan for EGD and colonoscopy tomorrow to rule out chronic GI blood loss and GI malignancy    -Clear liquids, NPO after midnight  -Dulcolax 10 mg  -Miralax prep  -Mag citrate tonight as patient had solid foods for breakfast    -------------------------------------------------------------------------------------------------------------------    HPI: This is a 60year old male with a hx of opioid abuse, questionable leukemia dx at a hospital in [place], HTN, hx alcohol abuse, bipolar disease who presented to the ED 3 days ago due to weakness and shortness of breath  He was noted to have a hgb of 4 5 at that time  He was admitted in February with a similar issue of anemia  He is reported to have been dx with leukemia from a doctor in [place] and currently follows with Dr Javed Thomas  Now it is reported that he was dx in [address] [place] [address] which conflicts earlier reports  Anyhow, the patient has never had a GI work up for his anemia  He denies any nausea, vomiting, melena, bright red blood in the stool, abdominal pain or diarrhea  He does report occasional constipation at home  He denies heart burn or dysphagia    Since admission he has undergone 4 blood transfusions, a bone marrow biopsy, and a stress test  We are awaiting results of bone marrow biopsy  Despite transfusions, he continues to drop his hgb  He does not have any intra-abdominal bleeding per his CT scan  He does have splenomegaly, mediastinal/retroperitoneal/celiac/mesenteric lymphadenopathy on CT scan  He denies any significant weight loss  He denies any family or personal history of stomach or colon cancers  REVIEW OF SYSTEMS:    CONSTITUTIONAL: Denies any fever, chills, or rigors  Good appetite, and no recent weight loss  HEENT: No earache or tinnitus  Denies hearing loss or visual disturbances  CARDIOVASCULAR: No chest pain or palpitations  RESPIRATORY: Denies any cough, hemoptysis, shortness of breath or dyspnea on exertion  GASTROINTESTINAL: As noted in the History of Present Illness  GENITOURINARY: No problems with urination  Denies any hematuria or dysuria  NEUROLOGIC: No dizziness or vertigo, denies headaches  MUSCULOSKELETAL: Denies any muscle or joint pain  SKIN: Denies skin rashes or itching  ENDOCRINE: Denies excessive thirst  Denies intolerance to heat or cold  PSYCHOSOCIAL: Denies depression or anxiety  Denies any recent memory loss         Historical Information   Past Medical History:   Diagnosis Date    Anemia     Anxiety     [facility] disorder ([facility] )     Cancer ([facility] )     History of alcohol abuse     Hypertension     Hypertension     Insomnia     Leukemia (HCC)     Opiate abuse, episodic     Psychiatric disorder      History reviewed  No pertinent surgical history    Social History   History   Alcohol Use No     Comment: last drink nov 19 2017     History   Drug Use No     Comment: hx of heroin and subutex abuse     History   Smoking Status    Current Every Day Smoker    Packs/day: 2 00    Years: 40 00   Smokeless Tobacco    Never Used     Family History   Problem Relation Age of Onset    Coronary artery disease Mother     No Known Problems Father     Hepatitis Sister     Cancer Brother     Prostate cancer Brother     Early death Brother        Meds/Allergies     Prescriptions Prior to Admission   Medication    benztropine (COGENTIN) 1 mg tablet    divalproex sodium (DEPAKOTE) 500 mg EC tablet    FLUoxetine (PROzac) 20 mg capsule    hydrochlorothiazide (MICROZIDE) 12 5 mg capsule    hydrOXYzine pamoate (VISTARIL) 50 mg capsule    lisinopril (ZESTRIL) 40 mg tablet    mirtazapine (REMERON) 15 mg tablet    risperiDONE (RisperDAL) 2 mg tablet     Current Facility-Administered Medications   Medication Dose Route Frequency    acetaminophen (TYLENOL) tablet 650 mg  650 mg Oral Q6H PRN    aspirin chewable tablet 81 mg  81 mg Oral Daily    benztropine (COGENTIN) tablet 1 mg  1 mg Oral Once HS    bisacodyl (DULCOLAX) EC tablet 10 mg  10 mg Oral Once    diphenhydrAMINE (BENADRYL) injection 25 mg  25 mg Intravenous Q6H PRN    divalproex sodium (DEPAKOTE) EC tablet 500 mg  500 mg Oral Q12H GLENIS    FLUoxetine (PROzac) capsule 40 mg  40 mg Oral Daily    hydrochlorothiazide (HYDRODIURIL) tablet 12 5 mg  12 5 mg Oral Daily    hydrOXYzine HCL (ATARAX) tablet 50 mg  50 mg Oral HS    lisinopril (ZESTRIL) tablet 40 mg  40 mg Oral Daily    magnesium citrate (CITROMA) oral solution 296 mL  296 mL Oral Once    metoprolol tartrate (LOPRESSOR) partial tablet 12 5 mg  12 5 mg Oral Q12H [facility]    mirtazapine (REMERON) tablet 15 mg  15 mg Oral HS    nicotine (NICODERM CQ) 21 mg/24 hr TD 24 hr patch 1 patch  1 patch Transdermal Daily    ondansetron (ZOFRAN) injection 4 mg  4 mg Intravenous Q6H PRN    polyethylene glycol (GLYCOLAX) bowel prep 238 g  238 g Oral Once    risperiDONE (RisperDAL) tablet 2 mg  2 mg Oral HS       No Known Allergies        Objective     Blood pressure 115/72, pulse 58, temperature 98 4 °F (36 9 °C), temperature source Tympanic, resp  rate 18, height 5' 11" (1 803 m), weight 107 kg (236 lb 4 8 oz), SpO2 96 %  Intake/Output Summary (Last 24 hours) at 03/29/18 1244  Last data filed at 03/28/18 2000   Gross per 24 hour   Intake           354 17 ml   Output              200 ml   Net           154 17 ml         PHYSICAL EXAM:      General Appearance:   Alert, cooperative, no distress, appears stated age    HEENT:   Normocephalic, atraumatic, anicteric, no oropharyngeal thrush present      Neck:  Supple, symmetrical, trachea midline, no adenopathy;    thyroid: no enlargement/tenderness/nodules; no carotid  bruit or JVD    Lungs:   Expiratory wheezing bilaterally; no rales, rhonchi; respirations unlabored    Heart[de-identified]   S1 and S2 normal; regular rate and rhythm; no murmur, rub, or gallop     Abdomen:   Soft, non-tender, non-distended; normal bowel sounds; no masses, no organomegaly    Genitalia:   Deferred    Rectal:   Deferred    Extremities:  No cyanosis, clubbing or edema    Pulses:  2+ and symmetric all extremities    Skin:  Skin color, texture, turgor normal, no rashes or lesions    Lymph nodes:  No palpable cervical, axillary or inguinal lymphadenopathy        Lab Results:     Results from last 7 days  Lab Units 03/29/18  0531   WBC Thousand/uL 16 40*   HEMOGLOBIN g/dL 8 2*   HEMATOCRIT % 25 2*   PLATELETS Thousands/uL 137   NEUTROS PCT % 19*   LYMPHS PCT % 75*   MONOS PCT % 5   EOS PCT % 1       Results from last 7 days  Lab Units 03/29/18  0531 03/28/18  0445   SODIUM mmol/L 139 141   POTASSIUM mmol/L 4 2 4 3   CHLORIDE mmol/L 105 105   CO2 mmol/L 25 29   BUN mg/dL 22 16   CREATININE mg/dL 0 80 0 82   CALCIUM mg/dL 8 5 8 8   TOTAL PROTEIN g/dL  --  5 9*   BILIRUBIN TOTAL mg/dL  --  0 60   ALK PHOS U/L  --  65   ALT U/L  --  18   AST U/L  --  12   GLUCOSE RANDOM mg/dL 102 94       Results from last 7 days  Lab Units 03/26/18  2341   INR  1 18*       Results from last 7 days  Lab Units 03/26/18  1145   LIPASE u/L 92       Imaging Studies: I have personally reviewed pertinent imaging studies  Xr Chest Portable    Result Date: 3/26/2018  Impression: No acute cardiopulmonary disease  Workstation performed: [id number]     Ct Chest Abdomen Pelvis W Contrast    Result Date: 3/27/2018  Impression: Mild mediastinal adenopathy  Mild retroperitoneal celiac axis and mesenteric adenopathy with a dominant left retroperitoneal lymph node measuring 25 x 19 mm  Splenomegaly with length of 17 cm  The study was marked in [facility] for immediate notification  Workstation performed: [id number]     Ct Bone Marrow Biopsy And Aspiration    Result Date: 3/28/2018  Impression: Impression: Computed tomography guided bone marrow aspiration and bone marrow biopsy of the left posterior superior iliac crest was performed with no immediate postprocedure complication  Workstation performed: [id number]           Patient was seen and examined by Dr George Felt  All ponce medical decisions were made by Dr George Felt  Thank you for allowing us to participate in the care of this present patient  We will follow-up with you closely

## 2018-03-29 NOTE — PROGRESS NOTES
[address] [facility] [address] [facility] Progress Note - Vern Santos 60 y o  male MRN: [id number]    Unit/Bed#: 3 [place]-02 Encounter: [id number]    Assessment/Plan:  Acute on chronic symptomatic normocytic anemia: s/p 4U PRBCs, Hb 8 2 this am  Hematology/oncology - Still no source of acute blood loss  Bone marrow results pending  Consult GI to evaluate for possible blood loss source     H/O leukemia:  WBC  16 4, Hb 8 2, Plt 137  Bone marrow results pending  Hematology/oncology following     Elevated troponin:  Stress test yesterday normal,  Will follow up as an outpatient     Hyperglycemia:  Resolved, POA Glu 239, A1c 5 6, this a m  102     RA:  Resolved BUN 22, Cr 0 80     Lactic acidosis:  Resolved     HTN:  115/72  On lisinopril 40 mg and HCTZ 12 5 mg     HLD:  TChol 114, , HDL 36, LDL 53 - no medication needed     Depression, bipolar disorder, & unspecified psychiatric disorder:  Continue risperidone, mirtazapine, hydroxyzine, Depakote, & benztropine     Nicotine dependence: Continue Nicoderm 21 mg patch,  on smoking cessation     Obesity: Dietary counseling     FEN & DVT PPX:  Cardiac diet, replete electrolytes as indicated, no IVF  SCDs, holding anticoagulation due to thrombocytopenia    Subjective:   Patient seen and examined at bedside this morning  Patient was resting comfortably in bed  Patient tolerated stress test, bone marrow biopsy, and 1 unit of PRBCs well yesterday  Denies having any pain at this time  Wants to know when he will go home  Objective:     Vitals: Blood pressure 115/72, pulse 58, temperature 98 4 °F (36 9 °C), temperature source Tympanic, resp  rate 18, height 5' 11" (1 803 m), weight 107 kg (236 lb 4 8 oz), SpO2 96 %  ,Body mass index is 32 96 kg/m²    Wt Readings from Last 3 Encounters:   03/29/18 107 kg (236 lb 4 8 oz)   02/26/18 113 kg (250 lb)   02/23/18 102 kg (225 lb)       Intake/Output Summary (Last 24 hours) at 03/29/18 1028  Last data filed at 03/28/18 2000   Gross per 24 hour   Intake           354 17 ml   Output              200 ml   Net           154 17 ml       Physical Exam: General appearance: alert and oriented, in no acute distress and cooperative  Head: Normocephalic, without obvious abnormality, atraumatic  Lungs: clear to auscultation bilaterally  Heart: regular rate and rhythm, S1, S2 normal, no murmur, click, rub or gallop  Abdomen: soft, non-tender; bowel sounds normal; no masses,  no organomegaly  Extremities: extremities normal, warm and well-perfused; no cyanosis, clubbing, or edema     Recent Results (from the past 24 hour(s))   Occult blood 1-3, stool    Collection Time: 03/28/18 12:52 PM   Result Value Ref Range    Fecal Occult Blood Diagnostic Negative Negative    Fecal Occult Blood Diagnostic 2  Negative    Fecal Occult Blood Diagnostic 3  Negative   Hemoglobin and hematocrit, blood    Collection Time: 03/28/18  8:42 PM   Result Value Ref Range    Hemoglobin 8 5 (L) 14 0 - 18 0 g/dL    Hematocrit 25 5 (L) 42 0 - 52 0 %   CBC and differential    Collection Time: 03/29/18  5:31 AM   Result Value Ref Range    WBC 16 40 (H) 4 80 - 10 80 Thousand/uL    RBC 2 88 (L) 4 70 - 6 10 Million/uL    Hemoglobin 8 2 (L) 14 0 - 18 0 g/dL    Hematocrit 25 2 (L) 42 0 - 52 0 %    MCV 87 82 - 98 fL    MCH 28 6 27 0 - 31 0 pg    MCHC 32 7 31 4 - 37 4 g/dL    RDW 15 0 11 6 - 15 1 %    MPV 6 9 (L) 8 9 - 12 7 fL    Platelets 635 305 - 232 Thousands/uL    nRBC 0 /100 WBCs    Neutrophils Relative 19 (L) 43 - 75 %    Lymphocytes Relative 75 (H) 14 - 44 %    Monocytes Relative 5 4 - 12 %    Eosinophils Relative 1 0 - 6 %    Basophils Relative 0 0 - 1 %    Neutrophils Absolute 3 10 1 85 - 7 62 Thousands/µL    Lymphocytes Absolute 12 30 (H) 0 60 - 4 47 Thousands/µL    Monocytes Absolute 0 90 0 17 - 1 22 Thousand/µL    Eosinophils Absolute 0 10 0 00 - 0 61 Thousand/µL    Basophils Absolute 0 00 0 00 - 0 10 Thousands/µL   Basic metabolic panel    Collection Time: 03/29/18  5:31 AM   Result Value Ref Range    Sodium 139 136 - 145 mmol/L    Potassium 4 2 3 5 - 5 3 mmol/L    Chloride 105 100 - 108 mmol/L    CO2 25 21 - 32 mmol/L    Anion Gap 9 4 - 13 mmol/L    BUN 22 5 - 25 mg/dL    Creatinine 0 80 0 60 - 1 30 mg/dL    Glucose 102 65 - 140 mg/dL    Calcium 8 5 8 3 - 10 1 mg/dL    eGFR 95 ml/min/1 73sq m   Phosphorus    Collection Time: 03/29/18  5:31 AM   Result Value Ref Range    Phosphorus 4 8 (H) 2 3 - 4 1 mg/dL   Magnesium    Collection Time: 03/29/18  5:31 AM   Result Value Ref Range    Magnesium 2 2 1 6 - 2 6 mg/dL   Smear Review(Phlebs Do Not Order)    Collection Time: 03/29/18  5:31 AM   Result Value Ref Range    Platelet Estimate Adequate Adequate   Prepare RBC:Has consent been obtained?  Yes, 1 Units    Collection Time: 03/29/18  6:15 AM   Result Value Ref Range    Unit Product Code A6098O36     Unit Number [id number]     Unit ABO B     Unit RH NEG     Crossmatch Compatible     Unit Dispense Status Presumed Trans        Current Facility-Administered Medications   Medication Dose Route Frequency Provider Last Rate Last Dose    acetaminophen (TYLENOL) tablet 650 mg  650 mg Oral Q6H PRN Catalina Lundborg, MD        aspirin chewable tablet 81 mg  81 mg Oral Daily Kishor Juarez MD   81 mg at 03/29/18 1014    benztropine (COGENTIN) tablet 1 mg  1 mg Oral Once HS Catalina Lundborg, MD   1 mg at 03/28/18 2127    diphenhydrAMINE (BENADRYL) injection 25 mg  25 mg Intravenous Q6H PRN Catalina Lundborg, MD        divalproex sodium (DEPAKOTE) EC tablet 500 mg  500 mg Oral Q12H [address] Catalina Lundborg, MD   500 mg at 03/29/18 1015    FLUoxetine (PROzac) capsule 40 mg  40 mg Oral Daily Catalina Lundborg, MD   40 mg at 03/29/18 1015    hydrochlorothiazide (HYDRODIURIL) tablet 12 5 mg  12 5 mg Oral Daily Catalina Lundborg, MD   12 5 mg at 03/29/18 1015    hydrOXYzine HCL (ATARAX) tablet 50 mg  50 mg Oral HS Catalina Lundborg, MD   50 mg at 03/28/18 2128    lisinopril (ZESTRIL) tablet 40 mg  40 mg Oral Daily Theo Pacheco MD   40 mg at 03/29/18 1014    metoprolol tartrate (LOPRESSOR) partial tablet 12 5 mg  12 5 mg Oral Q12H [address] Kishor Juarez MD   12 5 mg at 03/29/18 1015    mirtazapine (REMERON) tablet 15 mg  15 mg Oral HS Theo Pacheco MD   15 mg at 03/28/18 2128    nicotine (NICODERM CQ) 21 mg/24 hr TD 24 hr patch 1 patch  1 patch Transdermal Daily Theo Pacheco MD        ondansetron [facility]) injection 4 mg  4 mg Intravenous Q6H PRN Theo Pacheco MD        risperiDONE (RisperDAL) tablet 2 mg  2 mg Oral HS Theo Pacheco MD   2 mg at 03/28/18 2128       Invasive Devices     Peripheral Intravenous Line            Peripheral IV 03/26/18 Right Antecubital 2 days              Lab, Imaging and other studies: I have personally reviewed pertinent reports      VTE Pharmacologic Prophylaxis: None due to anemia  VTE Mechanical Prophylaxis: sequential compression device    Domitila Jordan DO

## 2018-03-29 NOTE — PROGRESS NOTES
Progress Note - Cardiology   Cherelle Sessions 60 y o  male MRN: [id number]  : 1955  Unit/Bed#: [place] [address] Encounter: [id number]    Assessment and Plan:   1  Severe symptomatic anemia with hemoglobin dropping to 4 5  Workup in progress  2   Non ST elevation MI most likely type 2 due to severe anemia  Nuclear stress test shows no ischemia  Echo shows normal LV systolic function  3    hyperglycemia  But HbA1c 5 6   4   Hypertension seems to be well controlled with lisinopril and hydrochlorothiazide  Monitor electrolytes  5  Questionable history of leukemia follow-up Hematology  Had bone marrow biopsy done today  6  History of dyslipidemia check fasting lipids and consider statin therapy with cholesterol 114 and HDL 36   7   Tobacco abuse  Advised to quit smoking  8  History of psych problems management as per medical team  Continue current Rx  Will follow up p r n  as needed  Patient can follow up in our office in few weeks  Subjective / Objective:   Patient's hemoglobin staying stable  It has no scheduled to have EGD and colonoscopy  Denies any chest pain  Patient's echo and stress test reviewed with him  Wife was present bedside no other significant complaint  Vitals: Blood pressure 116/66, pulse 57, temperature (!) 96 9 °F (36 1 °C), temperature source Tympanic, resp  rate 18, height 5' 11" (1 803 m), weight 107 kg (236 lb 4 8 oz), SpO2 96 %  Vitals:    18 0600 18 0531   Weight: 106 kg (234 lb 2 1 oz) 107 kg (236 lb 4 8 oz)     Body mass index is 32 96 kg/m²    BP Readings from Last 3 Encounters:   18 116/66   18 146/72   18 108/82     Orthostatic Blood Pressures    Flowsheet Row Most Recent Value   Blood Pressure  116/66 filed at 2018 1349   Patient Position - Orthostatic VS  Lying filed at 2018 1349          Invasive Devices     Peripheral Intravenous Line            Peripheral IV 18 Right Antecubital 3 days Intake/Output Summary (Last 24 hours) at 03/29/18 1642  Last data filed at 03/29/18 1000   Gross per 24 hour   Intake           354 17 ml   Output              400 ml   Net           -45 83 ml         Physical Exam:   Physical Exam   Constitutional: He is oriented to person, place, and time  He appears well-developed and well-nourished  No distress  HENT:   Head: Normocephalic and atraumatic  Eyes: Pupils are equal, round, and reactive to light  Neck: Neck supple  No JVD present  No tracheal deviation present  No thyromegaly present  Cardiovascular: Normal rate, regular rhythm, S1 normal, S2 normal and intact distal pulses  Exam reveals no gallop, no S3, no S4, no distant heart sounds and no friction rub  Murmur heard  Systolic (ejection) murmur is present with a grade of 2/6   Pulmonary/Chest: Effort normal and breath sounds normal  No respiratory distress  He has no wheezes  He has no rales  He exhibits no tenderness  Abdominal: Soft  Bowel sounds are normal  He exhibits no distension  There is no tenderness  Musculoskeletal: He exhibits no edema or deformity  Neurological: He is alert and oriented to person, place, and time  Skin: Skin is warm and dry  No rash noted  He is not diaphoretic  No pallor  Psychiatric: He has a normal mood and affect   His behavior is normal            Medications/ Allergies:       Current Facility-Administered Medications:  acetaminophen 650 mg Oral Q6H PRN Greg Goins MD   aspirin 81 mg Oral Daily Elicia Shannon MD   benztropine 1 mg Oral Once HS Greg Goins MD   diphenhydrAMINE 25 mg Intravenous Q6H PRN Greg Goins MD   divalproex sodium 500 mg Oral Q12H [address] Greg Goins MD   FLUoxetine 40 mg Oral Daily Greg Goins MD   hydrochlorothiazide 12 5 mg Oral Daily Greg Goins MD   hydrOXYzine HCL 50 mg Oral HS Greg Goins MD   lisinopril 40 mg Oral Daily Greg Goins MD   magnesium citrate 296 mL Oral Once Manuel Mathew PA-C   metoprolol tartrate 12 5 mg Oral Q12H [facility] Migdalia Lopez MD   mirtazapine 15 mg Oral HS Mara Jackson MD   nicotine 1 patch Transdermal Daily Mara Jackson MD   ondansetron 4 mg Intravenous Q6H PRN Mara Jackson MD   risperiDONE 2 mg Oral HS Mara Jackson MD       acetaminophen 650 mg Q6H PRN   diphenhydrAMINE 25 mg Q6H PRN   ondansetron 4 mg Q6H PRN     No Known Allergies      Labs:   Troponins:  Results from last 7 days  Lab Units 03/27/18  0643 03/26/18  2341 03/26/18  2045  03/26/18  1145   CK TOTAL U/L  --   --   --   --  81   TROPONIN I ng/mL 0 05* 0 10* 0 12*  < > 0 03   < > = values in this interval not displayed  CBC with diff:  Results from last 7 days  Lab Units 03/29/18  0531 03/28/18  2042 03/28/18  0445 03/27/18  2140 03/27/18  1933 03/27/18  0643 03/27/18  0455  03/26/18  1145   WBC Thousand/uL 16 40*  --  13 60*  --   --   --  20 60*  --  21 70*   HEMOGLOBIN g/dL 8 2* 8 5* 7 1* 7 0* 6 9* 7 6* 7 2*  < > 4 5*   HEMATOCRIT % 25 2* 25 5* 21 5* 20 7* 20 7* 22 5* 21 7*  < > 13 3*   MCV fL 87  --  87  --   --   --  88  --  84   PLATELETS Thousands/uL 137  --  148  --   --   --  155  --  192   MCH pg 28 6  --  28 9  --   --   --  29 2  --  28 8   MCHC g/dL 32 7  --  33 2  --   --   --  33 2  --  34 1   RDW % 15 0  --  15 5*  --   --   --  14 8  --  15 0   MPV fL 6 9*  --  7 0*  --   --   --  7 1*  --  7 2*   NRBC AUTO /100 WBCs 0  --  0  --   --   --   --   --  0   < > = values in this interval not displayed      CMP:    Results from last 7 days  Lab Units 03/29/18  0531 03/28/18  0445 03/27/18  0455 03/26/18  1145   SODIUM mmol/L 139 141 141 140   POTASSIUM mmol/L 4 2 4 3 4 4 4 2   CHLORIDE mmol/L 105 105 105 101   CO2 mmol/L 25 29 29 29   ANION GAP mmol/L 9 7 7 10   BUN mg/dL 22 16 22 26*   CREATININE mg/dL 0 80 0 82 0 81 1 25   GLUCOSE RANDOM mg/dL 102 94 97 131   CALCIUM mg/dL 8 5 8 8 8 6 8 8   AST U/L  --  12 12 13   ALT U/L  --  18 14 26   ALK PHOS U/L  --  65 66 90   TOTAL PROTEIN g/dL  --  5 9* 5 9* 6 3*   BILIRUBIN TOTAL mg/dL  --  0 60 0 60 0 40   EGFR ml/min/1 73sq m 95 94 95 61       Magnesium:    Results from last 7 days  Lab Units 03/29/18  0531 03/28/18  0445 03/27/18  0455   MAGNESIUM mg/dL 2 2 2 2 2 1     Coags:    Results from last 7 days  Lab Units 03/26/18  2341 03/26/18  1145   PTT seconds 24 22*   INR  1 18*  --      TSH:    Results from last 7 days  Lab Units 03/26/18  1715   TSH 3RD GENERATON uIU/mL 1 666     Lipid Profile:    Results from last 7 days  Lab Units 03/27/18  1526   CHOLESTEROL mg/dL 114   TRIGLYCERIDES mg/dL 124   HDL mg/dL 36*   LDL CALC mg/dL 53     Hgb A1c:    Results from last 7 days  Lab Units 03/27/18  1526   HEMOGLOBIN A1C % 5 6     NT-proBNP:   No results for input(s): NTBNP in the last 72 hours  Imaging & Testing   I have personally reviewed pertinent reports  Xr Chest Portable    Result Date: 3/26/2018  Narrative: CHEST INDICATION:   weakness  Shortness of breath  COMPARISON:  02/27/2018 EXAM PERFORMED/VIEWS:  XR CHEST PORTABLE FINDINGS: Cardiomediastinal silhouette appears unremarkable  The lungs are clear  No pneumothorax or pleural effusion  Osseous structures appear within normal limits for patient age  Impression: No acute cardiopulmonary disease  Workstation performed: [id number]     Xr Chest Pa & Lateral    Result Date: 2/27/2018  Narrative: CHEST INDICATION: concern for aspiration pneumonia COMPARISON:  2/26/2018  EXAM PERFORMED/VIEWS:  XR CHEST PA & LATERAL FINDINGS: Cardiomediastinal silhouette appears unremarkable  The lungs are clear  No pneumothorax or pleural effusion  Osseous structures appear within normal limits for patient age  Impression: No acute cardiopulmonary disease  Workstation performed: [id number]     Ct Chest Abdomen Pelvis W Contrast    Result Date: 3/27/2018  Narrative: CT CHEST, ABDOMEN AND PELVIS WITH IV CONTRAST INDICATION:   symptomatic anemia,  evaluate for malignancy    "Severe symptomatic anemia with hemoglobin dropping to 4 5""Questionable history of leukemia follow-up Hematology" COMPARISON: None  TECHNIQUE: CT examination of the chest, abdomen and pelvis was performed  Axial, sagittal, and coronal 2D reformatted images were created from the source data and submitted for interpretation  Radiation dose length product (DLP) for this visit:  1177 13 mGy-cm   This examination, like all CT scans performed in the [facility], was performed utilizing techniques to minimize radiation dose exposure, including the use of iterative reconstruction and automated exposure control  IV Contrast:  100 mL of iohexol (OMNIPAQUE)   350 Multi-dose Enteric Contrast: Enteric contrast was administered  FINDINGS: CHEST LUNGS:  There is a mild bibasilar pulmonary scarring and hypoventilatory change  No pulmonary mass  No endotracheal or endobronchial lesion  PLEURA:  Unremarkable  HEART/GREAT VESSELS:  Unremarkable for patient's age  MEDIASTINUM AND JOSE DANIEL:  Numerous small mediastinal lymph nodes may prominent measuring 1 cm in size but are increased in quantity in keeping with a mild mediastinal adenopathy  CHEST WALL AND LOWER NECK:   Unremarkable  ABDOMEN LIVER/BILIARY TREE:  Unremarkable  GALLBLADDER:  No calcified gallstones  No pericholecystic inflammatory change  SPLEEN:  Splenomegaly with length of approximately 17 cm  PANCREAS:  Unremarkable  ADRENAL GLANDS:  Unremarkable  KIDNEYS/URETERS:  Unremarkable  No hydronephrosis  STOMACH AND BOWEL:  Prominent colonic stool without bowel obstruction or other acute bowel findings  APPENDIX:  Noninflamed  ABDOMINOPELVIC CAVITY:  No ascites or free air  Mild diffuse mesenteric, celiac axis and retroperitoneal adenopathy  A dominant left retroperitoneal lymph node measures 25 x 19 mm  Most of the lymph nodes are predominantly subcentimeter in size  VESSELS:  Unremarkable for patient's age   PELVIS REPRODUCTIVE ORGANS:  Unremarkable for patient's age  URINARY BLADDER:  Unremarkable  ABDOMINAL WALL/INGUINAL REGIONS:  Unremarkable  OSSEOUS STRUCTURES:  No acute fracture or destructive osseous lesion  Impression: Mild mediastinal adenopathy  Mild retroperitoneal celiac axis and mesenteric adenopathy with a dominant left retroperitoneal lymph node measuring 25 x 19 mm  Splenomegaly with length of 17 cm  The study was marked in [facility] for immediate notification  Workstation performed: [id number]        EKG / Monitor: Personally reviewed  Admission EKG shows normal sinus rhythm no significant ST changes  Heart rate was around 60 beats per minute  Cardiac testing:   Results for orders placed during the hospital encounter of 18   Echo complete with contrast if indicated    Narrative Kristy [address]  [address] [facility] [address]  [phone]    Transthoracic Echocardiogram  2D, M-mode, Doppler, and Color Doppler    Study date:  27-Mar-2018    Patient: Cody Copeland  MR number: [id number]  Account number: [de-identified]  : 1955  Age: 60 years  Gender: Male  Status: Routine  Location: Bedside  Height: 71 in  Weight: 231 4 lb  BP: 142/ 81 mmHg    Indications: Syncope    Diagnoses: R55  - Syncope and collapse    Sonographer:  MORGAN Pepe, RVS  Primary Physician:  Huyen Keen:  Radha Conde [facility]  Interpreting Physician:  Che Jain MD    SUMMARY    LEFT VENTRICLE:  Systolic function was normal  Ejection fraction was estimated in the range of 55 % to 60 % to be 55 %  There were no regional wall motion abnormalities  Wall thickness was mildly increased  There was mild concentric hypertrophy  Doppler parameters were consistent with abnormal left ventricular relaxation (grade 1 diastolic dysfunction)  MITRAL VALVE:  There was mild regurgitation  AORTIC VALVE:  There was mild regurgitation  TRICUSPID VALVE:  There was mild to moderate regurgitation    Estimated peak PA pressure was 45 mmHg  IVC, HEPATIC VEINS:  The respirophasic change in diameter was less than 50%  HISTORY: PRIOR HISTORY: HTN, Current Smoker, Alcohol Abuse, Cancer    PROCEDURE: The procedure was performed at the bedside  This was a routine study  The transthoracic approach was used  The study included complete 2D imaging, M-mode, complete spectral Doppler, and color Doppler  The heart rate was 61 bpm,  at the start of the study  Images were obtained from the parasternal, apical, subcostal, and suprasternal notch acoustic windows  Image quality was adequate  LEFT VENTRICLE: Size was normal  Systolic function was normal  Ejection fraction was estimated in the range of 55 % to 60 % to be 55 %  There were no regional wall motion abnormalities  Wall thickness was mildly increased  There was mild  concentric hypertrophy  DOPPLER: Doppler parameters were consistent with abnormal left ventricular relaxation (grade 1 diastolic dysfunction)  RIGHT VENTRICLE: The size was normal  Systolic function was normal     LEFT ATRIUM: Size was at the upper limits of normal     RIGHT ATRIUM: Size was at the upper limits of normal     MITRAL VALVE: Valve structure was normal  There was normal leaflet separation  DOPPLER: The transmitral velocity was within the normal range  There was no evidence for stenosis  There was mild regurgitation  AORTIC VALVE: The valve was trileaflet  Leaflets exhibited mildly increased thickness and mild calcification  DOPPLER: There was no evidence for stenosis  There was mild regurgitation  TRICUSPID VALVE: DOPPLER: There was mild to moderate regurgitation  Estimated peak PA pressure was 45 mmHg  PULMONIC VALVE: DOPPLER: There was no significant regurgitation  PERICARDIUM: There was no thickening or calcification  There was no pericardial effusion  AORTA: The root exhibited normal size  SYSTEMIC VEINS: IVC: The inferior vena cava was normal in size   The respirophasic change in diameter was less than 50%  SYSTEM MEASUREMENT TABLES    2D mode  AoR Diam 2D: 3 4 cm  LA Diam (2D): 3 8 cm  LA/Ao (2D): 1 12  FS (2D Teich): 30 5 %  IVSd (2D): 1 27 cm  LVDEV: 118 cm³  LVESV: 49 8 cm³  LVIDd(2D): 4 99 cm  LVISd (2D): 3 47 cm  LVPWd (2D): 1 21 cm  SV (Teich): 68 2 cm³    Apical four chamber  LVEF A4C: 56 %    Unspecified Scan Mode  LVOT Vmax: 1140 mm/s  LVOT Vmax; Mean: 1140 mm/s  Peak Grad ; Mean: 5 mm[Hg]  MV Peak A Arnulfo: 944 mm/s  MV Peak E Arnulfo  Mean: 975 mm/s  RA Area: 18 9 cm squared  RA Volume: 53 5 cm³  TAPSE: 2 8 cm    [employer] Accredited Echocardiography Laboratory    Prepared and electronically signed by    Ajit Carey MD  Signed 27-Mar-2018 13:21:17         Dr Ajit Carey MD [facility]      "This note has been constructed using a voice recognition system  Therefore there may be syntax, spelling, and/or grammatical errors   Please call if you have any questions  "

## 2018-03-29 NOTE — CASE MANAGEMENT
Continued Stay Review    Date: 3/29/18    Vital Signs: /66 (BP Location: Left arm)   Pulse 57   Temp (!) 96 9 °F (36 1 °C) (Tympanic)   Resp 18   Ht 5' 11" (1 803 m)   Wt 107 kg (236 lb 4 8 oz)   SpO2 96%   BMI 32 96 kg/m²     CONSULT GI   CBC DIFF  BMP PHOS MG   DULCOLAX  GLYCOLAX 238 G  Medications:   Scheduled Meds:   Current Facility-Administered Medications:  acetaminophen 650 mg Oral Q6H PRN Naomi Sneed MD   aspirin 81 mg Oral Daily Tammy Mustafa MD   benztropine 1 mg Oral Once HS Naomi Sneed MD   diphenhydrAMINE 25 mg Intravenous Q6H PRN Naomi Sneed MD   divalproex sodium 500 mg Oral Q12H Didi Hatfield MD   FLUoxetine 40 mg Oral Daily Naomi Sneed MD   hydrochlorothiazide 12 5 mg Oral Daily Naomi Sneed MD   hydrOXYzine HCL 50 mg Oral HS Naomi Sneed MD   lisinopril 40 mg Oral Daily Naomi Sneed MD   magnesium citrate 296 mL Oral Once Marck Tiwari PA-C   metoprolol tartrate 12 5 mg Oral Q12H [facility] Tammy Mustafa MD   mirtazapine 15 mg Oral HS Naomi Sneed MD   nicotine 1 patch Transdermal Daily Naomi Sneed MD   ondansetron 4 mg Intravenous Q6H PRN Naomi Sneed MD   risperiDONE 2 mg Oral HS Naomi Sneed MD     Continuous Infusions:    PRN Meds:   acetaminophen    diphenhydrAMINE    ondansetron    Abnormal Labs/Diagnostic Results: PHOS 4 8, WBC 16 40 H/H 8 5/25 5>8 2/25 2      Age/Sex: 60 y o  male     ATTENDING   Assessment/Plan:  Acute on chronic symptomatic normocytic anemia: s/p 4U PRBCs, Hb 8 2 this am  Hematology/oncology - Still no source of acute blood loss  Bone marrow results pending  Consult GI to evaluate for possible blood loss source  H/O leukemia:  WBC  16 4, Hb 8 2, Plt 137  Bone marrow results pending  Hematology/oncology following   Elevated troponin:  Stress test yesterday normal,  Will follow up as an outpatient  Hyperglycemia:  Resolved, POA Glu 239, A1c 5 6, this a m   102   HTN:  115/72  On lisinopril 40 mg and HCTZ 12 5 mg  Depression, bipolar disorder, & unspecified psychiatric disorder:  Continue risperidone, mirtazapine, hydroxyzine, Depakote, & benztropine  Nicotine dependence: Continue Nicoderm 21 mg patch,  on smoking cessation   FEN & DVT PPX:  Cardiac diet, replete electrolytes as indicated, no IVF  SCDs, holding anticoagulation due to thrombocytopenia    GI CONSULT  #1  Severe anemia of unclear etiology: patient noted to have hgb of 5 3 in February and now 4 5 this admission  Currently 8 2 after 4 units since admission  Hem onc believes it is difficult to believe that extreme drop in Hgb is unlikely secondary to just a hematological disorder and is concerned for possible blood loss  CT scan negative for any intra-abdominal bleeding  Possible secondary to a chronic GI blood loss   Must also rule out GI malignancy as patient has celiac, retroperitoneal, and mesenteric lymph node enlargement with no hx of EGD or colonoscopy in the past  Patient known to have poor outpatient follow up    -Continue to monitor hgb and transfuse as necessary  -hematological work up ongoing s/p bone marrow bx  -Plan for EGD and colonoscopy tomorrow to rule out chronic GI blood loss and GI malignancy    -Clear liquids, NPO after midnight  -Dulcolax 10 mg  -Miralax prep  -Mag citrate tonight as patient had solid foods for breakfast

## 2018-03-29 NOTE — PLAN OF CARE
Problem: PHYSICAL THERAPY ADULT  Goal: Performs mobility at highest level of function for planned discharge setting  See evaluation for individualized goals  Outcome: Progressing  Prognosis: Good  Problem List: Decreased strength, Decreased range of motion, Decreased endurance, Impaired balance, Decreased mobility, Decreased coordination, Decreased safety awareness  Assessment: Pt is independent with all functional mobility and amb on levels and unlevels  Pt is no longer need of skilled PT services as pt is independent  Pt is safe for dc home with family support  Recommendation: Home with family support          See flowsheet documentation for full assessment

## 2018-03-29 NOTE — PROGRESS NOTES
Hematology Oncology Progress Note    Betzy Kirby, 1955, [id number]  [address] [address]/[address] [address]-*    Impression and plan:    1  Anemia, leukocytosis, lymphocytosis  Bone marrow biopsy/aspirate was performed yesterday  I received a call from Dr Jameel Kelly, pathology later on today  Morphology and flow cytometry are consistent with CLL; cytogenetics is still pending  The marrow was packed with CLL cells  This explains the anemia without a bleeding source  Patient has very little hematopoietic RBC precursors in his marrow and is not making any replacement red blood cells (because of the overwhelming leukemia cells)  The Hematology office has already started to work on treatment options  This can be treated orally (expensive pills -I believe patient does not have insurance, I am concerned that the patient will not be compliant) and can be treated intravenously (higher potential for side effects and toxicities verses actually seeing that the patient receives the proper treatment)  Best case scenario is that patient will begin treatment (outpatient) next week  Whichever treatment is used, patient will likely require to be seen by Hematology on a weekly basis at least initially  As discussed above, the medications are extremely powerfull and have many potential side effects and toxicities  Patient will not be treated if he is not extremely compliant      3  Abdominal tenderness without clear etiology  CT scan did not demonstrate any specific abnormality other than adenopathy and splenomegaly (all consistent with CLL)       4  Depression, bipolar disorder  Patient has been seen by Psychiatry in the past - also being followed by social service     5  History of hematuria - presently there is no clear signs of hematuria  Patient may need  evaluation possibly as an outpatient  _________________________________________________________________________________________________________________________________________    Chief complaint/reason for admission:  Weakness, shortness of breath, falls at home    History of present illness:   79-year-old male admitted with the above  Mr Karen Hope has been admitted to the hospital past with anemia  Etiology is not clear  Patient reportedly was diagnosed with leukemia in [place] - this information has never been available  Patient states feeling OK, about the same as yesterday  Still with abdominal tenderness but appetite is okay  No bleeding  Fatigue is the same as before, activities are limited      Hospital medications:   Current Facility-Administered Medications:     acetaminophen (TYLENOL) tablet 650 mg, 650 mg, Oral, Q6H PRN, Shikha Hay MD    aspirin chewable tablet 81 mg, 81 mg, Oral, Daily, Kishor Juarez MD, 81 mg at 03/29/18 1014    benztropine (COGENTIN) tablet 1 mg, 1 mg, Oral, Once HS, Shikha Hay MD, 1 mg at 03/28/18 2127    diphenhydrAMINE (BENADRYL) injection 25 mg, 25 mg, Intravenous, Q6H PRN, Shikha Hay MD    divalproex sodium (DEPAKOTE) EC tablet 500 mg, 500 mg, Oral, Q12H [facility], Shikha Hay MD, 500 mg at 03/29/18 1015    FLUoxetine (PROzac) capsule 40 mg, 40 mg, Oral, Daily, Shikha Hay MD, 40 mg at 03/29/18 1015    hydrochlorothiazide (HYDRODIURIL) tablet 12 5 mg, 12 5 mg, Oral, Daily, Shikha Hay MD, 12 5 mg at 03/29/18 1015    hydrOXYzine HCL (ATARAX) tablet 50 mg, 50 mg, Oral, HS, Shikha Hay MD, 50 mg at 03/28/18 2128    lisinopril (ZESTRIL) tablet 40 mg, 40 mg, Oral, Daily, Shikha Hay MD, 40 mg at 03/29/18 1014    metoprolol tartrate (LOPRESSOR) partial tablet 12 5 mg, 12 5 mg, Oral, Q12H [facility], Kishor Juarez MD, 12 5 mg at 03/29/18 1015    mirtazapine (REMERON) tablet 15 mg, 15 mg, Oral, HS, Shikha Hay MD, 15 mg at 03/28/18 2128    nicotine (Bronwyn Sallies CQ) 21 mg/24 hr TD 24 hr patch 1 patch, 1 patch, Transdermal, Daily, Jolene Matos MD    ondansetron [facility]) injection 4 mg, 4 mg, Intravenous, Q6H PRN, Jolene Matos MD    risperiDONE (RisperDAL) tablet 2 mg, 2 mg, Oral, HS, Jolene Matos MD, 2 mg at 03/28/18 2128    Physical exam  Vitals:    03/29/18 0700   BP: 115/72   Pulse: 58   Resp: 18   Temp: 98 4 °F (36 9 °C)   SpO2: 96%   Constitutional:  Middle-aged male, no respiratory distress  Eyes:  PERRL, conjunctiva pale, anicteric   HENT:  Atraumatic, external ears normal, nose normal,   Oropharynx: moist, no pharyngeal exudates, no thrush, pink  Neck: No adenopathy, good range of motion  Respiratory: scattered bilateral rhonchi  Cardiovascular:  Normal rate, normal rhythm, no murmurs, no gallops, no rubs   GI:  Soft, obese, slightly distended, mildly diffuse tenderness in all quadrants, no rigidity rebound, cannot palpate liver or spleen  :  No costovertebral angle tenderness, normal reproductive organs, no discharge  Musculoskeletal:  No pain or tenderness with palpation of joints, muscles or bones  Integument:  Pale, sour color, scattered ecchymoses, no petechiae, no active bleeding  Lymphatic:  No lymphadenopathy in the neck, supraclavicular region, infraclavicular region, axilla and groin bilaterally  Extremities:  1+ bilateral lower extremity edema, no cords, pulses are decreased  Neurologic:  Patient is responsive to simple questions, motor and sensory grossly equal bilaterally  Psychiatric:  Speech is clear, response time is slightly delayed, some answers are not appropriate/do not match the question  Rectal: Deferred    Laboratory test results    Results for Leopold Mclean (MRN [id number]) as of 3/29/2018 11:28   Ref   Range 3/28/2018 04:45 3/28/2018 20:42 3/29/2018 05:31   WBC Latest Ref Range: 4 80 - 10 80 Thousand/uL 13 60 (H)  16 40 (H)   RBC Latest Ref Range: 4 70 - 6 10 Million/uL 2 47 (L)  2 88 (L)   Hemoglobin Latest Ref Range: 14 0 - 18 0 g/dL 7 1 (L) 8 5 (L) 8 2 (L)   Hematocrit Latest Ref Range: 42 0 - 52 0 % 21 5 (L) 25 5 (L) 25 2 (L)   MCV Latest Ref Range: 82 - 98 fL 87  87   MCH Latest Ref Range: 27 0 - 31 0 pg 28 9  28 6   MCHC Latest Ref Range: 31 4 - 37 4 g/dL 33 2  32 7   RDW Latest Ref Range: 11 6 - 15 1 % 15 5 (H)  15 0   Platelets Latest Ref Range: 130 - 400 Thousands/uL 148  137   MPV Latest Ref Range: 8 9 - 12 7 fL 7 0 (L)  6 9 (L)   Platelet Estimate Latest Ref Range: Adequate  Adequate  Adequate   nRBC Latest Units: /100 WBCs 0  0   Neutrophils Relative Latest Ref Range: 43 - 75 % 20 (L)  19 (L)   Lymphocytes Relative Latest Ref Range: 14 - 44 % 74 (H)  75 (H)   Monocytes Relative Latest Ref Range: 4 - 12 % 5  5   Eosinophils Relative Latest Ref Range: 0 - 6 % 1  1       Imaging    03/27/2018 CT scan of the chest and abdomen/pelvis    Mild mediastinal adenopathy  Mild retroperitoneal celiac axis and mesenteric adenopathy with a dominant left retroperitoneal lymph node measuring 25 x 19 mm  Splenomegaly with length of 17 cm

## 2018-03-29 NOTE — PLAN OF CARE
Problem: PHYSICAL THERAPY ADULT  Goal: Performs mobility at highest level of function for planned discharge setting  See evaluation for individualized goals  Outcome: Adequate for Discharge  Prognosis: Good  Problem List: Decreased strength, Decreased range of motion, Decreased endurance, Impaired balance, Decreased mobility, Decreased coordination, Decreased safety awareness  Assessment: Pt is independent with all functional mobility and amb on levels and unlevels  Pt is no longer need of skilled PT services as pt is independent  Pt is safe for dc home with family support  Recommendation: Home with family support          See flowsheet documentation for full assessment

## 2018-03-29 NOTE — PHYSICAL THERAPY NOTE
PT TREATMENT     03/29/18 1545   Pain Assessment   Pain Assessment No/denies pain   General   Chart Reviewed Yes   Family/Caregiver Present No   Subjective   Subjective "good"   Bed Mobility   Supine to Sit 7  Independent   Sit to Supine 7  Independent   Transfers   Sit to Stand 7  Independent   Stand to Sit 7  Independent   Ambulation/Elevation   Gait pattern Short stride   Gait Assistance 7  Independent   Assistive Device None   Distance 500 feet   Stair Management Assistance 7  Independent   Stair Management Technique Alternating pattern  (one to no rails)   Number of Stairs 3  (x 2)   Balance   Static Sitting Good   Static Standing Good   Dynamic Standing Good   Ambulatory Good   Activity Tolerance   Activity Tolerance Patient tolerated treatment well   Assessment   Assessment Pt is independent with all functional mobility and amb on levels and unlevels  Pt is no longer need of skilled PT services as pt is independent  Pt is safe for dc home with family support  Plan   Treatment/Interventions ADL retraining;Functional transfer training;LE strengthening/ROM; Elevations; Therapeutic exercise; Endurance training;Patient/family training;Gait training   PT Frequency (3-5x/wk)   Recommendation   Recommendation Home with family support     David Santoyo [phone]

## 2018-03-30 ENCOUNTER — ANESTHESIA EVENT (INPATIENT)
Dept: GASTROENTEROLOGY | Facility: AMBULARY SURGERY CENTER | Age: 63
DRG: 840 | End: 2018-03-30
Payer: COMMERCIAL

## 2018-03-30 VITALS
TEMPERATURE: 99 F | HEART RATE: 58 BPM | BODY MASS INDEX: 33.18 KG/M2 | DIASTOLIC BLOOD PRESSURE: 61 MMHG | OXYGEN SATURATION: 99 % | WEIGHT: 236.99 LBS | HEIGHT: 71 IN | SYSTOLIC BLOOD PRESSURE: 118 MMHG | RESPIRATION RATE: 18 BRPM

## 2018-03-30 PROBLEM — D64.9 SYMPTOMATIC ANEMIA: Chronic | Status: RESOLVED | Noted: 2018-02-10 | Resolved: 2018-03-30

## 2018-03-30 LAB
ALBUMIN SERPL BCP-MCNC: 3.6 G/DL (ref 3.5–5)
ALP SERPL-CCNC: 80 U/L (ref 46–116)
ALT SERPL W P-5'-P-CCNC: 24 U/L (ref 12–78)
ANION GAP SERPL CALCULATED.3IONS-SCNC: 7 MMOL/L (ref 4–13)
AST SERPL W P-5'-P-CCNC: 15 U/L (ref 5–45)
BASOPHILS # BLD AUTO: 0 THOUSANDS/ΜL (ref 0–0.1)
BASOPHILS NFR BLD AUTO: 0 % (ref 0–1)
BILIRUB SERPL-MCNC: 0.5 MG/DL (ref 0.2–1)
BUN SERPL-MCNC: 16 MG/DL (ref 5–25)
CALCIUM SERPL-MCNC: 8.8 MG/DL (ref 8.3–10.1)
CHEST PAIN STATEMENT: NORMAL
CHLORIDE SERPL-SCNC: 102 MMOL/L (ref 100–108)
CO2 SERPL-SCNC: 32 MMOL/L (ref 21–32)
CREAT SERPL-MCNC: 0.81 MG/DL (ref 0.6–1.3)
EOSINOPHIL # BLD AUTO: 0 THOUSAND/ΜL (ref 0–0.61)
EOSINOPHIL NFR BLD AUTO: 0 % (ref 0–6)
ERYTHROCYTE [DISTWIDTH] IN BLOOD BY AUTOMATED COUNT: 15.1 % (ref 11.6–15.1)
GFR SERPL CREATININE-BSD FRML MDRD: 95 ML/MIN/1.73SQ M
GLUCOSE SERPL-MCNC: 107 MG/DL (ref 65–140)
HCT VFR BLD AUTO: 26 % (ref 42–52)
HGB BLD-MCNC: 8.8 G/DL (ref 14–18)
LYMPHOCYTES # BLD AUTO: 9.8 THOUSANDS/ΜL (ref 0.6–4.47)
LYMPHOCYTES NFR BLD AUTO: 73 % (ref 14–44)
MAGNESIUM SERPL-MCNC: 2.8 MG/DL (ref 1.6–2.6)
MAX DIASTOLIC BP: 61 MMHG
MAX HEART RATE: 67 BPM
MAX PREDICTED HEART RATE: 157 BPM
MAX. SYSTOLIC BP: 114 MMHG
MCH RBC QN AUTO: 29 PG (ref 27–31)
MCHC RBC AUTO-ENTMCNC: 34 G/DL (ref 31.4–37.4)
MCV RBC AUTO: 85 FL (ref 82–98)
MONOCYTES # BLD AUTO: 0.6 THOUSAND/ΜL (ref 0.17–1.22)
MONOCYTES NFR BLD AUTO: 5 % (ref 4–12)
NEUTROPHILS # BLD AUTO: 2.9 THOUSANDS/ΜL (ref 1.85–7.62)
NEUTS SEG NFR BLD AUTO: 22 % (ref 43–75)
NRBC BLD AUTO-RTO: 0 /100 WBCS
PHOSPHATE SERPL-MCNC: 4.3 MG/DL (ref 2.3–4.1)
PLATELET # BLD AUTO: 145 THOUSANDS/UL (ref 130–400)
PLATELET BLD QL SMEAR: ADEQUATE
PMV BLD AUTO: 6.8 FL (ref 8.9–12.7)
POTASSIUM SERPL-SCNC: 4.4 MMOL/L (ref 3.5–5.3)
PROT SERPL-MCNC: 6.4 G/DL (ref 6.4–8.2)
PROTOCOL NAME: NORMAL
RBC # BLD AUTO: 3.04 MILLION/UL (ref 4.7–6.1)
REASON FOR TERMINATION: NORMAL
SODIUM SERPL-SCNC: 141 MMOL/L (ref 136–145)
TARGET HR FORMULA: NORMAL
TEST INDICATION: NORMAL
TIME IN EXERCISE PHASE: NORMAL
WBC # BLD AUTO: 13.5 THOUSAND/UL (ref 4.8–10.8)

## 2018-03-30 PROCEDURE — 84100 ASSAY OF PHOSPHORUS: CPT | Performed by: FAMILY MEDICINE

## 2018-03-30 PROCEDURE — 0DB98ZX EXCISION OF DUODENUM, VIA NATURAL OR ARTIFICIAL OPENING ENDOSCOPIC, DIAGNOSTIC: ICD-10-PCS | Performed by: INTERNAL MEDICINE

## 2018-03-30 PROCEDURE — 83735 ASSAY OF MAGNESIUM: CPT | Performed by: FAMILY MEDICINE

## 2018-03-30 PROCEDURE — 80053 COMPREHEN METABOLIC PANEL: CPT | Performed by: FAMILY MEDICINE

## 2018-03-30 PROCEDURE — 43239 EGD BIOPSY SINGLE/MULTIPLE: CPT | Performed by: INTERNAL MEDICINE

## 2018-03-30 PROCEDURE — 45378 DIAGNOSTIC COLONOSCOPY: CPT | Performed by: INTERNAL MEDICINE

## 2018-03-30 PROCEDURE — 0DB68ZX EXCISION OF STOMACH, VIA NATURAL OR ARTIFICIAL OPENING ENDOSCOPIC, DIAGNOSTIC: ICD-10-PCS | Performed by: INTERNAL MEDICINE

## 2018-03-30 PROCEDURE — 88305 TISSUE EXAM BY PATHOLOGIST: CPT | Performed by: PATHOLOGY

## 2018-03-30 PROCEDURE — 99239 HOSP IP/OBS DSCHRG MGMT >30: CPT | Performed by: FAMILY MEDICINE

## 2018-03-30 PROCEDURE — 85025 COMPLETE CBC W/AUTO DIFF WBC: CPT | Performed by: FAMILY MEDICINE

## 2018-03-30 PROCEDURE — 0DJD8ZZ INSPECTION OF LOWER INTESTINAL TRACT, VIA NATURAL OR ARTIFICIAL OPENING ENDOSCOPIC: ICD-10-PCS | Performed by: INTERNAL MEDICINE

## 2018-03-30 RX ORDER — PROPOFOL 10 MG/ML
INJECTION, EMULSION INTRAVENOUS CONTINUOUS PRN
Status: DISCONTINUED | OUTPATIENT
Start: 2018-03-30 | End: 2018-03-30 | Stop reason: SURG

## 2018-03-30 RX ORDER — PANTOPRAZOLE SODIUM 40 MG/1
40 TABLET, DELAYED RELEASE ORAL DAILY
Qty: 30 TABLET | Refills: 5 | Status: SHIPPED | OUTPATIENT
Start: 2018-03-30 | End: 2018-09-07 | Stop reason: SDUPTHER

## 2018-03-30 RX ORDER — FENTANYL CITRATE 50 UG/ML
INJECTION, SOLUTION INTRAMUSCULAR; INTRAVENOUS AS NEEDED
Status: DISCONTINUED | OUTPATIENT
Start: 2018-03-30 | End: 2018-03-30 | Stop reason: SURG

## 2018-03-30 RX ORDER — EPHEDRINE SULFATE 50 MG/ML
INJECTION, SOLUTION INTRAVENOUS AS NEEDED
Status: DISCONTINUED | OUTPATIENT
Start: 2018-03-30 | End: 2018-03-30 | Stop reason: SURG

## 2018-03-30 RX ORDER — LIDOCAINE HYDROCHLORIDE 10 MG/ML
INJECTION, SOLUTION INFILTRATION; PERINEURAL AS NEEDED
Status: DISCONTINUED | OUTPATIENT
Start: 2018-03-30 | End: 2018-03-30 | Stop reason: SURG

## 2018-03-30 RX ORDER — ASPIRIN 81 MG/1
81 TABLET, CHEWABLE ORAL DAILY
Qty: 30 TABLET | Refills: 11 | Status: SHIPPED | OUTPATIENT
Start: 2018-03-31 | End: 2018-06-04

## 2018-03-30 RX ORDER — PROPOFOL 10 MG/ML
INJECTION, EMULSION INTRAVENOUS AS NEEDED
Status: DISCONTINUED | OUTPATIENT
Start: 2018-03-30 | End: 2018-03-30 | Stop reason: SURG

## 2018-03-30 RX ADMIN — DIVALPROEX SODIUM 500 MG: 500 TABLET, DELAYED RELEASE ORAL at 15:40

## 2018-03-30 RX ADMIN — LIDOCAINE HYDROCHLORIDE 80 MG: 10 INJECTION, SOLUTION INFILTRATION; PERINEURAL at 14:39

## 2018-03-30 RX ADMIN — EPHEDRINE SULFATE 5 MG: 50 INJECTION, SOLUTION INTRAMUSCULAR; INTRAVENOUS; SUBCUTANEOUS at 14:51

## 2018-03-30 RX ADMIN — FLUOXETINE 40 MG: 20 CAPSULE ORAL at 15:40

## 2018-03-30 RX ADMIN — PROPOFOL 30 MG: 10 INJECTION, EMULSION INTRAVENOUS at 14:40

## 2018-03-30 RX ADMIN — ASPIRIN 81 MG 81 MG: 81 TABLET ORAL at 15:39

## 2018-03-30 RX ADMIN — EPHEDRINE SULFATE 5 MG: 50 INJECTION, SOLUTION INTRAMUSCULAR; INTRAVENOUS; SUBCUTANEOUS at 14:55

## 2018-03-30 RX ADMIN — PROPOFOL 50 MG: 10 INJECTION, EMULSION INTRAVENOUS at 14:39

## 2018-03-30 RX ADMIN — PROPOFOL 100 MCG/KG/MIN: 10 INJECTION, EMULSION INTRAVENOUS at 14:40

## 2018-03-30 RX ADMIN — FENTANYL CITRATE 25 MCG: 50 INJECTION, SOLUTION INTRAMUSCULAR; INTRAVENOUS at 14:39

## 2018-03-30 NOTE — OP NOTE
COLONOSCOPY    PROCEDURE: Colonoscopy    INDICATIONS: Anemia, Iron Deficiency    POST-OP DIAGNOSIS: See the impression below    SEDATION: Monitored anesthesia care, check anesthesia records    PHYSICAL EXAM:    /67   Pulse 59   Temp 99 °F (37 2 °C) (Tympanic)   Resp 18   Ht 5' 11" (1 803 m)   Wt 107 kg (236 lb 15 9 oz)   SpO2 99%   BMI 33 05 kg/m²   Body mass index is 33 05 kg/m²  General: NAD  Heart: S1 & S2 normal, RRR  Lungs: CTA, No rales or rhonchi  Abdomen: Soft, nontender, nondistended, good bowel sounds    CONSENT:  Informed consent was obtained for the procedure, including sedation after explaining the risks and benefits of the procedure  Risks including but not limited to bleeding, perforation, infection, aspiration were discussed in detail  Also explained about less than 100%$ sensitivity with the exam and other alternatives  PREPARATION:   EKG tracing, pulse oximetry, blood pressure were monitored throughout the procedure  Patient was identified by myself both verbally and by visual inspection of ID band  DESCRIPTION:   Patient was placed in the left lateral decubitus position and was sedated with the above medication  Digital rectal examination was performed  The colonoscope was introduced in to the anal canal and advanced up to cecum, which was identified by the appendiceal orifice and IC valve  A careful inspection was made as the colonoscope was withdrawn, including a retroflexed view of the rectum; findings and interventions are described below  Appropriate photodocumentation was obtained  The quality of the colonic preparation was poor      FINDINGS:    Normal colonoscopy to the cecum the severely limited visualization of the descending and sigmoid colon due to very poor prep with solid stool  No obvious masses were noted         IMPRESSIONS:      No obvious mass lesions or bleeding, thick solid stool seen in the descending sigmoid colon which could not exclude underlying lesion    RECOMMENDATIONS:    Return to floor  Treat underlying CLL per Heme-Onc  Outpatient colonoscopy with a 2 day bowel prep in the next 3 to 6 months    COMPLICATIONS:  None; patient tolerated the procedure well      DISPOSITION: PACU           CONDITION: Stable

## 2018-03-30 NOTE — DISCHARGE SUMMARY
[facility] Discharge Summary - Medical Edger Hams 60 y o  male MRN: [id number]    [place] [address]  Room / Bed: [address] Encounter: [id number]    BRIEF OVERVIEW  Admitting Provider: Pillo Deras MD  Discharge Provider: Pillo Deras MD     Discharge To: Home/self-care    Outpatient Follow-Up:  [facility], Hematology Oncology, Gastroenterology  Things to address at first follow up visit:   1  Pending lab results  2  CBC with platelets (lab requisition provided to patient upon discharge) 3 times weekly  Need for blood transfusion? 3  Hematology oncology follow-up (ambulatory referral provided to patient upon discharge) for treatment evaluation of CLL/SLL  4  Compliance with medical therapy  5  Gastroenterology for repeat colonoscopy if necessary? Labs and results pending at discharge:   6  Bone marrow biopsy pathology  7  Leukemia/lymphoma flow cytometry  8  Chromosome analysis, leukemia/lymphoma    Admission Date: 3/26/2018     Discharge Date: 3/30/2018  7:55 PM    Primary Discharge Diagnosis  Principal Problem (Resolved): Symptomatic anemia  Active Problems:    Hypertension    Depression    Polypharmacy    Weakness    Leukemia (HCC)    Lactic acidosis    Leukocytosis    Elevated troponin    Bipolar 1 disorder ([facility] [address] )    Psychiatric disorder    Anemia    Consulting Providers   Hematology Oncology:  Dr Payam Lerma  Gastroenterology:  Dr Curtiss Frankel    Procedures Performed/Pertinent Test results    Bone marrow biopsy tissue exam preliminary: (please see full report)  Overall, the findings are consistent with bone marrow involvement by chronic lymphocytic leukemia/small lymphocytic lymphoma  The lymphoma almost completely replaces bone marrow cellularity with suppression of normal hematopoietic elements with secondary neutropenia and anemia   No distinct features of a large cell population or abnormal immunophenotype are identified in the sampled material  Correlation with clinical impression is recommended    EGD:  Gastritis biopsied, duodenitis biopsied, normal esophagus  Colonoscopy:  No obvious mass lesions or bleeding, thick solid stool seen in the descending sigmoid colon which could not exclude underlying lesion  Recommend repeat colonoscopy with 2 day bowel prep in 3-6 months  Nuclear stress test:  Shows no ischemia  LV systolic function around 52%  Impression Equivocal study after pharmacologic vasodilation  There was fixed inferior-apical defect seen in rest and stress images, improves with prone images  No clear ischemia seen, can not rule out apical infract  EF around 50%  Left  ventricular systolic function was Low normal     Echocardiogram:  EF 94-46%, grade 1 diastolic dysfunction  PA pressure 45 mmHg  Hemoglobin on admission:  4 5  Hemoglobin on discharge:  8 2    Xr Chest Portable    Result Date: 3/26/2018  Impression: No acute cardiopulmonary disease  Workstation performed: [id number]     Ct Chest Abdomen Pelvis W Contrast    Result Date: 3/27/2018  Impression: Mild mediastinal adenopathy  Mild retroperitoneal celiac axis and mesenteric adenopathy with a dominant left retroperitoneal lymph node measuring 25 x 19 mm  Splenomegaly with length of 17 cm  The study was marked in [facility] for immediate notification  Workstation performed: [id number]     Ct Bone Marrow Biopsy And Aspiration    Result Date: 3/28/2018  Impression: Impression: Computed tomography guided bone marrow aspiration and bone marrow biopsy of the left posterior superior iliac crest was performed with no immediate postprocedure complication  Workstation performed: [id number]     Iron panel   Ref   Range 3/27/2018 15:26   Iron Latest Ref Range: 65 - 175 ug/dL 294 (H)   Ferritin Latest Ref Range: 8 - 388 ng/mL 823 (H)   Iron Saturation Latest Units: % 93   TIBC Latest Ref Range: 250 - 450 ug/dL 316     Hemoglobin A1c: 5  6    HPI  Per admitting team   60 y o  male, PMHx leukemia (patient report), anemia, hypertension, hyperlipidemia, bipolar disorder, h/o polysubstance abuse, s/p hospitalization in February with blood transfusion  who presents with acute weakness and shortness of breath  Patient and patient's wife at bedside report that over the last week the patient was having frequent falls from standing position  He states that if he going up a couple of steps or walks a short distance he would be acutely short of breath  He states that his knees will go weak and he will fall  Patient reports that he fell today from a standing position  Patient and wife report no trauma or loss of consciousness as a result of the fall  They report no seizure activity,  Bowel,  or bladder incontinence  Patient follows up with Dr Laina Leblanc Hematology-Oncology  Patient has a reported h/o leukemia diagnosed by a doctor in [place] per medical records  Patient and wife today say that this diagnosis was made by Dr Marcial Perez in [place], [place] in May 2017  The patient and his wife state that the doctor told them that he was stage 0 requiring no workup and no medical treatment  This conflicts with earlier reports of being diagnosed by a physician in [place]  Patient reports that he smokes 1-2 packs per day of tobacco   He denies alcohol or drug abuse at this time  He has been in rehab as recently as February per his wife  Patient and wife deny hematuria, melena, blood per rectum hematemesis, easy bruising, adenopathy  Patient reports family h/o prostate cancer in his brother  He has no family h/o blood borne cancers    Hospital Course  · Symptomatic anemia with leukocytosis, lymphocytosis  Patient reported h/o leukemia diagnosis  Patient was symptomatic with generalized weakness and shortness of breath  Hematology oncology was consulted  FOB T x3 was negative  Patient was placed on telemetry   Patient transfused 4 units PRBC  CT chest abdomen pelvis did not show any obvious signs of bleeding (results above)  EGD/colonoscopy did not show any signs of bleeding  Hemoglobin remained stable s/p transfusion On day of discharge patient was asymptomatic and ambulating without assistance  Patient was discharged with lab for requisition for CBC platelet to be performed 3 times weekly  Lab work to be reviewed and determined if patient needs outpatient blood transfusion  · H/o leukemia:  Presented with severe anemia  CT abdomen showed adenopathy and enlarged spleen consistent with CLL  Bone marrow biopsy was performed with tissue exam resulting in preliminary diagnosis of CLL/SLL  Patient to follow up with Hematology Oncology as outpatient for evaluation of treatment options  Cytometry and chromosomal analysis of bone marrow biopsy pending on discharge  · NSTEMI type 2 likely 2/2 to severe anemia  Troponins were trended and flattened  Echocardiogram was ordered  Nuclear stress test showed no ischemia  Patient was discharged on aspirin 81 mg, and metoprolol 25 mg b i d   Patient to follow up with Cardiology for routine evaluation when indicated  · On day of discharge patient has chronic conditions were stable  Patient was ambulating without assistance, tolerating room air, tolerating p o  Intake, lab work was stable, he was without symptoms  Patient acknowledged understanding of the above diagnosis and treatment plan  Patient acknowledged understanding of adequate follow-up with providers listed  Patient was strongly advised to follow up with psychiatrist for adequate support for psychiatric conditions including bipolar disorder, depression, anxiety, insomnia and resulting polypharmacy      Physical Exam at Discharge  See day of discharge progress note    Medications   Your Medications   Your Medications        aspirin 81 mg chewable tablet   Chew 1 tablet (81 mg total) daily   Refills: 11     benztropine 1 mg tablet Commonly known as: COGENTIN   Take 1 mg by mouth Medrol Dose Pack scheduling ONLY   Refills: 0     divalproex sodium 500 mg EC tablet   Commonly known as: DEPAKOTE   Take 500 mg by mouth every 12 (twelve) hours   Refills: 0     FLUoxetine 20 mg capsule   Commonly known as: PROzac   Take 40 mg by mouth daily   Refills: 0     hydrochlorothiazide 12 5 mg capsule   Commonly known as: MICROZIDE   Take 12 5 mg by mouth daily   Refills: 0     hydrOXYzine pamoate 50 mg capsule   Commonly known as: VISTARIL   Take 50 mg by mouth 2 (two) times a day   Refills: 0     lisinopril 40 mg tablet   Commonly known as: ZESTRIL   Take 40 mg by mouth daily   Refills: 0     metoprolol tartrate 25 mg tablet   Commonly known as: LOPRESSOR   Take 0 5 tablets (12 5 mg total) by mouth every 12 (twelve) hours   Refills: 5     mirtazapine 15 mg tablet   Commonly known as: REMERON   Take 15 mg by mouth daily at bedtime   Refills: 0     pantoprazole 40 mg tablet   Commonly known as: PROTONIX   Take 1 tablet (40 mg total) by mouth daily   Refills: 5     risperiDONE 2 mg tablet   Commonly known as: RisperDAL   Take 4 mg by mouth 2 (two) times a day   Refills: 0          Allergies  No Known Allergies    Diet restrictions: Cardiac step 1  Activity restrictions: none  Code Status:  Full code    Discharge Condition: good, able to ambulate per self, tolerating p o  intake  Discharge  Statement   I spent 30 minutes discharging the patient  This time was spent on the day of discharge  I had direct contact with the patient on the day of discharge  Additional documentation is required if more than 30 minutes were spent on discharge

## 2018-03-30 NOTE — ANESTHESIA PREPROCEDURE EVALUATION
Review of Systems/Medical History          Cardiovascular  Hypertension , Past MI 0-3 months, No angina ,   Comment: Recent NSTEMI    Echo 3/2018 SUMMARY     LEFT VENTRICLE:  Systolic function was normal  Ejection fraction was estimated in the range of 55 % to 60 % to be 55 %  There were no regional wall motion abnormalities  Wall thickness was mildly increased  There was mild concentric hypertrophy  Doppler parameters were consistent with abnormal left ventricular relaxation (grade 1 diastolic dysfunction)      MITRAL VALVE:  There was mild regurgitation      AORTIC VALVE:  There was mild regurgitation      TRICUSPID VALVE:  There was mild to moderate regurgitation  Estimated peak PA pressure was 45 mmHg      IVC, HEPATIC VEINS:  The respirophasic change in diameter was less than 50%      HISTORY: PRIOR HISTORY: HTN, Current Smoker, Alcohol Abuse, Cancer   ,  Pulmonary  Smoker cigarette smoker  , Tobacco cessation counseling given Cumulative Pack Years: 80, No shortness of breath, No recent URI ,        GI/Hepatic            Endo/Other     GYN       Hematology  Anemia ,     Musculoskeletal       Neurology   Psychology   Psychiatric history, Anxiety, Depression ,              Physical Exam    Airway    Mallampati score: IV  TM Distance: >3 FB  Neck ROM: full     Dental       Cardiovascular  Murmur,     Pulmonary  Decreased breath sounds,     Other Findings        Anesthesia Plan  ASA Score- 4 Emergent    Anesthesia Type- IV sedation with anesthesia with ASA Monitors  Additional Monitors:   Airway Plan:         Plan Factors-    Induction- intravenous  Postoperative Plan-     Informed Consent- Anesthetic plan and risks discussed with patient          Medical History     History Comments   Hypertension    Psychiatric disorder    Hypertension    Anemia    History of alcohol abuse    Anxiety    Insomnia    Bipolar disorder ([facility] [address] )    Opiate abuse, episodic    Cancer (HCC)    Leukemia ([facility] [address] )    Pertinent Negatives Comments   No pertinent negative medical history recorded   Surgical History     No surgical history recorded

## 2018-03-30 NOTE — NURSING NOTE
Patient discharged via wheelchair accompanied by wife and PCA  IV DC and intact  Discharge instructions given  No further questions at this time  Smoker  Education given  Not willing to quit at this time

## 2018-03-30 NOTE — CASE MANAGEMENT
Continued Stay Review    Date: 3/30/18    Vital Signs: /61 (BP Location: Left arm)   Pulse 55   Temp 97 5 °F (36 4 °C) (Tympanic)   Resp 16   Ht 5' 11" (1 803 m)   Wt 107 kg (236 lb 15 9 oz)   SpO2 98%   BMI 33 05 kg/m²       NPO  MG PHOS CMP CBC DIFF  EGD COLONOSCOPY  Medications:   Scheduled Meds:   Current Facility-Administered Medications:  acetaminophen 650 mg Oral Q6H PRN Lourdes Clark MD   aspirin 81 mg Oral Daily Dewayne Bellamy MD   benztropine 1 mg Oral Once HS Lourdes Clark MD   diphenhydrAMINE 25 mg Intravenous Q6H PRN Lourdes Clark MD   divalproex sodium 500 mg Oral Q12H [facility] Lourdes Clark MD   FLUoxetine 40 mg Oral Daily Lourdes Clark MD   hydrochlorothiazide 12 5 mg Oral Daily Lourdes Clark MD   hydrOXYzine HCL 50 mg Oral HS Lourdes Clark MD   lisinopril 40 mg Oral Daily Lourdes Clark MD   metoprolol tartrate 12 5 mg Oral Q12H [facility] Dewayne Bellamy MD   mirtazapine 15 mg Oral HS Lourdes Clark MD   nicotine 1 patch Transdermal Daily Lourdes Clark MD   ondansetron 4 mg Intravenous Q6H PRN Lourdes Clark MD   risperiDONE 2 mg Oral HS Lourdes Clark MD     Continuous Infusions:    PRN Meds:   acetaminophen    diphenhydrAMINE    ondansetron    Abnormal Labs/Diagnostic Results: PHOS 4 3 MG 2 8 ,WBC 13 50 H/H 8 8/26    Age/Sex: 60 y o  male     ATTENDING  Assessment/Plan:  Acute on chronic normocytic anemia, leukocytosis, lymphocytosis: s/p 4U PRBCs, Hb this am pending  Hematology/oncology - morphologies & flow cytometry consistent with CLL, marrow packed with CLL cells  GI - consulted EGD & colonoscopy today   Pathology consistent with CLL: Marrow packed with CLL cells  Hematology/Oncology following - Dr Payam Lerma recommends outpatient treatment options through his office  Discussion with patient and wife today about outcome of results  Cytogenetics still pending  Elevated troponin:  Stress test- no ischemia, echo- normal LV systolic function, outpatient follow-up  HTN:  106/61 - continue lisinopril 40 mg and HCTZ 12 5 mg  HLD:  Lipid panel this admission normal - no medication indicated  Depression, bipolar disorder, & unspecified psychiatric disorder:  Continue risperidone, mirtazapine, hydroxyzine, Depakote, & benztropine  Nicotine dependence: Continue Nicoderm 21 mg patch,  on smoking cessation  Obesity:  Outpatient dietary counseling  FEN & DVT PPX:  NPO for EGD and colonoscopy, replete electrolytes as indicated, no IVF  SCDs, no anticoagulation given anemia       GI EGD COLONOSCOPY  COLONOSCOPY  Normal colonoscopy to the cecum the severely limited visualization of the descending and sigmoid colon due to very poor prep with solid stool  No obvious masses were noted  No obvious mass lesions or bleeding, thick solid stool seen in the descending sigmoid colon which could not exclude underlying lesion   Return to floor  Treat underlying CLL per Heme-Onc  Outpatient colonoscopy with a 2 day bowel prep in the next 3 to 6 months   EGD  #1  Esophagus and GEJ- normal esophagus and GE junction  #2  Stomach- mild gastritis biopsied for H pylori  Normal retroflexion   #3  Duodenum- mild duodenitis in the bulb  Normal 2nd portion, biopsies taken  Duodenal biopsies taken  Duodenitis in the bulb  Gastritis biopsied  Normal retroflexion  Normal esophagus  Return floor  Follow up biopsies  Resume regular diet    Thank you,  [address] [facility] in the [employer] by Shiv Gibson for 2017  Network Utilization Review Department  Phone: [phone]; Fax [phone]  ATTENTION: The Network Utilization Review Department is now centralized for our 7 Facilities  Make a note that we have a new phone and fax numbers for our Department  Please call with any questions or concerns to [phone] and carefully follow the prompts so that you are directed to the right person   All voicemails are confidential  Fax any determinations, approvals, denials, and requests for initial or continue stay review clinical to [phone]  Due to HIGH CALL volume, it would be easier if you could please send faxed requests to expedite your requests and in part, help us provide discharge notifications faster

## 2018-03-30 NOTE — OP NOTE
ESOPHAGOGASTRODUODENOSCOPY    PROCEDURE: EGD/ Biopsy    INDICATIONS: Iron Deficiency Anaemia    POST-OP DIAGNOSIS: See the impression below    SEDATION: Monitored anesthesia care, check anesthesia records    PHYSICAL EXAM:    Vitals:    03/30/18 1337   BP: 113/67   Pulse: 59   Resp: 18   Temp: 99 °F (37 2 °C)   SpO2: 99%    Body mass index is 33 05 kg/m²  General: NAD  Heart: S1 & S2 normal, RRR  Lungs: CTA, No rales or rhonchi  Abdomen: Soft, nontender, nondistended, good bowel sounds    CONSENT:  Informed consent was obtained for the procedure, including sedation after explaining the risks and benefits of the procedure  Risks including but not limited to bleeding, perforation, infection, aspiration were discussed in detail  Also explained about less than 100% sensitivity with the exam and other alternatives  PREPARATION:   EKG tracing, pulse oximetry, blood pressure were monitored throughout the procedure  Patient was identified by myself both verbally and by visual inspection of ID band  DESCRIPTION:   Patient was placed in the left lateral decubitus position and was sedated with the above medication  The gastroscope was introduced in to the oropharynx and the esophagus was intubated under direct visualization  Scope was passed down the esophagus up to 2nd part of the duodenum  A careful inspection was made as the gastroscope was withdrawn, including a retroflexed view of the stomach; findings and interventions are described below  FINDINGS:    #1  Esophagus and GEJ- normal esophagus and GE junction    #2  Stomach- mild gastritis biopsied for H pylori  Normal retroflexion    #3   Duodenum- mild duodenitis in the bulb  Normal 2nd portion, biopsies taken         IMPRESSIONS:      Duodenal biopsies taken  Duodenitis in the bulb  Gastritis biopsied  Normal retroflexion  Normal esophagus    RECOMMENDATIONS:     Return floor  Follow up biopsies  Resume regular diet    COMPLICATIONS:  None; patient tolerated the procedure well            DISPOSITION: PACU           CONDITION: Stable

## 2018-03-30 NOTE — PROGRESS NOTES
[facility] [address] [facility] Practice Progress Note - Felicia Mooney 60 y o  male MRN: [id number]    Unit/Bed#: 3 [place] 308-02 Encounter: [id number]      Assessment/Plan:    Acute on chronic normocytic anemia, leukocytosis, lymphocytosis: s/p 4U PRBCs, Hb this am pending  Hematology/oncology - morphologies & flow cytometry consistent with CLL, marrow packed with CLL cells  GI - consulted EGD & colonoscopy today      Pathology consistent with CLL: Marrow packed with CLL cells  Hematology/Oncology following - Dr Eileen Sims recommends outpatient treatment options through his office  Discussion with patient and wife today about outcome of results  Cytogenetics still pending     Elevated troponin:  Stress test- no ischemia, echo- normal LV systolic function, outpatient follow-up     Hyperglycemia:  Resolved,  A1c 5 6, AM glucose pending     RA:  Resolved BUN & creatinine normalized     Lactic acidosis:  Resolved     HTN:  106/61 - continue lisinopril 40 mg and HCTZ 12 5 mg     HLD:  Lipid panel this admission normal - no medication indicated     Depression, bipolar disorder, & unspecified psychiatric disorder:  Continue risperidone, mirtazapine, hydroxyzine, Depakote, & benztropine     Nicotine dependence: Continue Nicoderm 21 mg patch,  on smoking cessation     Obesity:  Outpatient dietary counseling     FEN & DVT PPX:  NPO for EGD and colonoscopy, replete electrolytes as indicated, no IVF  SCDs, no anticoagulation given anemia    Subjective:   Patient seen and examined at bedside this morning  Patient asleep when I entered the room  Patient denies any pain, chest pain, shortness of breath, nausea, or vomiting  Patient completed bowel prep and will have EGD and colonoscopy today  Patient denies having any questions  Objective:     Vitals: Blood pressure 103/64, pulse (!) 53, temperature (!) 96 7 °F (35 9 °C), temperature source Tympanic, resp   rate 18, height 5' 11" (1 803 m), weight 107 kg (236 lb 4 8 oz), SpO2 97 % ,Body mass index is 32 96 kg/m²  Wt Readings from Last 3 Encounters:   03/29/18 107 kg (236 lb 4 8 oz)   02/26/18 113 kg (250 lb)   02/23/18 102 kg (225 lb)       Intake/Output Summary (Last 24 hours) at 03/30/18 0706  Last data filed at 03/29/18 2111   Gross per 24 hour   Intake                0 ml   Output              400 ml   Net             -400 ml     Physical Exam: Head: Normocephalic, without obvious abnormality, atraumatic  Lungs: clear to auscultation bilaterally  Heart: regular rate and rhythm, S1, S2 normal, no murmur, click, rub or gallop  Abdomen: soft, non-tender; bowel sounds normal; no masses,  no organomegaly  Extremities: extremities normal, warm and well-perfused; no cyanosis, clubbing, or edema     No results found for this or any previous visit (from the past 24 hour(s))      Current Facility-Administered Medications   Medication Dose Route Frequency Provider Last Rate Last Dose    acetaminophen (TYLENOL) tablet 650 mg  650 mg Oral Q6H PRN Damaris Cheatham MD        aspirin chewable tablet 81 mg  81 mg Oral Daily Kishor Juarez MD   81 mg at 03/29/18 1014    benztropine (COGENTIN) tablet 1 mg  1 mg Oral Once HS Damaris Cheatham MD   1 mg at 03/29/18 2110    diphenhydrAMINE (BENADRYL) injection 25 mg  25 mg Intravenous Q6H PRN Damaris Cheatham MD        divalproex sodium (DEPAKOTE) EC tablet 500 mg  500 mg Oral Q12H Marcos Garcia MD   500 mg at 03/29/18 2110    FLUoxetine (PROzac) capsule 40 mg  40 mg Oral Daily Damaris Cheatham MD   40 mg at 03/29/18 1015    hydrochlorothiazide (HYDRODIURIL) tablet 12 5 mg  12 5 mg Oral Daily Damaris Cheatham MD   12 5 mg at 03/29/18 1015    hydrOXYzine HCL (ATARAX) tablet 50 mg  50 mg Oral HS Damaris Cheatham MD   50 mg at 03/29/18 2110    lisinopril (ZESTRIL) tablet 40 mg  40 mg Oral Daily Damaris Cheatham MD   40 mg at 03/29/18 1014    metoprolol tartrate (LOPRESSOR) partial tablet 12 5 mg  12 5 mg Oral Q12H [address] Joseph Mathew MD 12 5 mg at 03/29/18 2110    mirtazapine (REMERON) tablet 15 mg  15 mg Oral HS Ash Monreal MD   15 mg at 03/29/18 2110    nicotine (NICODERM CQ) 21 mg/24 hr TD 24 hr patch 1 patch  1 patch Transdermal Daily Ash Monreal MD        ondansetron [facility]) injection 4 mg  4 mg Intravenous Q6H PRN Ash Monreal MD        risperiDONE (RisperDAL) tablet 2 mg  2 mg Oral HS Ash Monreal MD   2 mg at 03/29/18 2129       Invasive Devices     Peripheral Intravenous Line            Peripheral IV 03/26/18 Right Antecubital 3 days              Lab, Imaging and other studies: I have personally reviewed pertinent reports  VTE Pharmacologic Prophylaxis:  None due to severe anemia  VTE Mechanical Prophylaxis: sequential compression device    Asmita Thompson DO     SENIOR RESIDENT NOTE: I have personally seen and examined the patient, and agree with the resident's assessment  Morphology flow cytometry of bone marrow aspirate/biopsy consistent with CLL  Cytogenetics still pending  Awaiting hemoglobin/hematocrit this a m  Junie Sole Patient scheduled for colonoscopy/EGD  If hemoglobin remains stable and patient asymptomatic s/p procedure will consider discharge to home this afternoon  Patient has h/o noncompliance  will have discussion that compliance is the utmost importance especially in light of patient's diagnosis and need to follow up with Hematology-oncology for close evaluation and treatment      Ash Monreal MD

## 2018-03-31 LAB — HEMOCCULT STL QL: NEGATIVE

## 2018-04-02 ENCOUNTER — TRANSCRIBE ORDERS (OUTPATIENT)
Dept: ADMINISTRATIVE | Facility: HOSPITAL | Age: 63
End: 2018-04-02

## 2018-04-02 ENCOUNTER — APPOINTMENT (OUTPATIENT)
Dept: LAB | Facility: HOSPITAL | Age: 63
End: 2018-04-02
Payer: COMMERCIAL

## 2018-04-02 DIAGNOSIS — D64.9 SYMPTOMATIC ANEMIA: Chronic | ICD-10-CM

## 2018-04-02 LAB
ERYTHROCYTE [DISTWIDTH] IN BLOOD BY AUTOMATED COUNT: 14.3 % (ref 11.6–15.1)
HCT VFR BLD AUTO: 23.8 % (ref 42–52)
HGB BLD-MCNC: 7.9 G/DL (ref 14–18)
MCH RBC QN AUTO: 29.1 PG (ref 27–31)
MCHC RBC AUTO-ENTMCNC: 33.3 G/DL (ref 31.4–37.4)
MCV RBC AUTO: 88 FL (ref 82–98)
PLATELET # BLD AUTO: 146 THOUSANDS/UL (ref 130–400)
PMV BLD AUTO: 7.4 FL (ref 8.9–12.7)
RBC # BLD AUTO: 2.72 MILLION/UL (ref 4.7–6.1)
WBC # BLD AUTO: 19.5 THOUSAND/UL (ref 4.8–10.8)

## 2018-04-02 PROCEDURE — 85027 COMPLETE CBC AUTOMATED: CPT

## 2018-04-02 PROCEDURE — 36415 COLL VENOUS BLD VENIPUNCTURE: CPT

## 2018-04-03 NOTE — CASE MANAGEMENT
Notification of Discharge  This is a Notification of Discharge from our facility [address]  Please be advised that this patient has been discharge from our facility  Below you will find the admission and discharge date and time including the patients disposition  PRESENTATION DATE: 3/26/2018 11:28 AM  IP ADMISSION DATE: 3/27/18 1544  DISCHARGE DATE: 3/30/2018  7:55 PM  DISPOSITION: [facility] in the [facility] [address] by Shiv Gibson for 2017  Network Utilization Review Department  Phone: [phone]; Fax [phone]  ATTENTION: The Network Utilization Review Department is now centralized for our 7 Facilities  Make a note that we have a new phone and fax numbers for our Department  Please call with any questions or concerns to [phone] and carefully follow the prompts so that you are directed to the right person  All voicemails are confidential  Fax any determinations, approvals, denials, and requests for initial or continue stay review clinical to [phone]  Due to HIGH CALL volume, it would be easier if you could please send faxed requests to expedite your requests and in part, help us provide discharge notifications faster

## 2018-04-04 ENCOUNTER — APPOINTMENT (OUTPATIENT)
Dept: LAB | Facility: HOSPITAL | Age: 63
End: 2018-04-04
Payer: COMMERCIAL

## 2018-04-04 DIAGNOSIS — D64.9 SYMPTOMATIC ANEMIA: ICD-10-CM

## 2018-04-04 LAB
ERYTHROCYTE [DISTWIDTH] IN BLOOD BY AUTOMATED COUNT: 15.1 % (ref 11.6–15.1)
HCT VFR BLD AUTO: 24 % (ref 42–52)
HGB BLD-MCNC: 8 G/DL (ref 14–18)
MCH RBC QN AUTO: 28.8 PG (ref 27–31)
MCHC RBC AUTO-ENTMCNC: 33.2 G/DL (ref 31.4–37.4)
MCV RBC AUTO: 87 FL (ref 82–98)
PLATELET # BLD AUTO: 142 THOUSANDS/UL (ref 130–400)
PLATELET BLD QL SMEAR: ADEQUATE
PMV BLD AUTO: 7.2 FL (ref 8.9–12.7)
RBC # BLD AUTO: 2.77 MILLION/UL (ref 4.7–6.1)
WBC # BLD AUTO: 24.4 THOUSAND/UL (ref 4.8–10.8)

## 2018-04-04 PROCEDURE — 85027 COMPLETE CBC AUTOMATED: CPT

## 2018-04-04 PROCEDURE — 36415 COLL VENOUS BLD VENIPUNCTURE: CPT

## 2018-04-06 ENCOUNTER — APPOINTMENT (OUTPATIENT)
Dept: LAB | Facility: HOSPITAL | Age: 63
End: 2018-04-06
Payer: COMMERCIAL

## 2018-04-06 ENCOUNTER — TELEPHONE (OUTPATIENT)
Dept: FAMILY MEDICINE CLINIC | Facility: CLINIC | Age: 63
End: 2018-04-06

## 2018-04-06 DIAGNOSIS — D64.9 SYMPTOMATIC ANEMIA: ICD-10-CM

## 2018-04-06 LAB
ERYTHROCYTE [DISTWIDTH] IN BLOOD BY AUTOMATED COUNT: 14.9 % (ref 11.6–15.1)
HCT VFR BLD AUTO: 21.7 % (ref 42–52)
HGB BLD-MCNC: 7.2 G/DL (ref 14–18)
MCH RBC QN AUTO: 28.8 PG (ref 27–31)
MCHC RBC AUTO-ENTMCNC: 33 G/DL (ref 31.4–37.4)
MCV RBC AUTO: 87 FL (ref 82–98)
PLATELET # BLD AUTO: 139 THOUSANDS/UL (ref 130–400)
PLATELET BLD QL SMEAR: ADEQUATE
PMV BLD AUTO: 7.3 FL (ref 8.9–12.7)
RBC # BLD AUTO: 2.48 MILLION/UL (ref 4.7–6.1)
WBC # BLD AUTO: 26.3 THOUSAND/UL (ref 4.8–10.8)

## 2018-04-06 PROCEDURE — 36415 COLL VENOUS BLD VENIPUNCTURE: CPT

## 2018-04-06 PROCEDURE — 85027 COMPLETE CBC AUTOMATED: CPT

## 2018-04-06 NOTE — TELEPHONE ENCOUNTER
Called patient today to discuss CBC result  Hemoglobin 7 2 hematocrit 21 7  This is 8/24  Platelets low normal at 139  White blood cell count up to 26 3 this is increased from 20/4 0 4 on 4/4/18  Patient has chronic anemia 2/2 to recent diagnosis of CLL  Patient has been followed by Hematology Oncology  Patient states that at rest he is not symptomatic with lightheadedness, dizziness, shortness of breath, weakness, tachycardia  Patient gets short of breath and lightheaded with movement such as going up a flight of stairs  This is patient's baseline  Advised patient that if he becomes symptomatic above baseline he is immediately to go to the ED for evaluation of anemia and possible transfusion of blood products  Patient will get repeat CBC with platelet on Monday 0/1/20

## 2018-04-09 ENCOUNTER — TRANSCRIBE ORDERS (OUTPATIENT)
Dept: ADMINISTRATIVE | Facility: HOSPITAL | Age: 63
End: 2018-04-09

## 2018-04-09 ENCOUNTER — APPOINTMENT (OUTPATIENT)
Dept: LAB | Facility: HOSPITAL | Age: 63
End: 2018-04-09
Payer: COMMERCIAL

## 2018-04-09 DIAGNOSIS — D64.9 ANEMIA, UNSPECIFIED TYPE: ICD-10-CM

## 2018-04-09 DIAGNOSIS — D64.9 ANEMIA, UNSPECIFIED TYPE: Primary | ICD-10-CM

## 2018-04-09 LAB
ANISOCYTOSIS BLD QL SMEAR: PRESENT
ERYTHROCYTE [DISTWIDTH] IN BLOOD BY AUTOMATED COUNT: 15.2 % (ref 11.6–15.1)
HCT VFR BLD AUTO: 21.1 % (ref 42–52)
HGB BLD-MCNC: 7 G/DL (ref 14–18)
HYPERCHROMIA BLD QL SMEAR: PRESENT
MCH RBC QN AUTO: 28.6 PG (ref 27–31)
MCHC RBC AUTO-ENTMCNC: 32.9 G/DL (ref 31.4–37.4)
MCV RBC AUTO: 87 FL (ref 82–98)
PLATELET # BLD AUTO: 137 THOUSANDS/UL (ref 130–400)
PLATELET BLD QL SMEAR: ABNORMAL
PMV BLD AUTO: 7.3 FL (ref 8.9–12.7)
RBC # BLD AUTO: 2.44 MILLION/UL (ref 4.7–6.1)
WBC # BLD AUTO: 22.7 THOUSAND/UL (ref 4.8–10.8)

## 2018-04-09 PROCEDURE — 36415 COLL VENOUS BLD VENIPUNCTURE: CPT

## 2018-04-09 PROCEDURE — 85027 COMPLETE CBC AUTOMATED: CPT

## 2018-04-10 ENCOUNTER — HOSPITAL ENCOUNTER (OUTPATIENT)
Dept: INFUSION CENTER | Facility: HOSPITAL | Age: 63
Discharge: HOME | End: 2018-04-10
Payer: COMMERCIAL

## 2018-04-10 ENCOUNTER — OFFICE VISIT (OUTPATIENT)
Dept: HEMATOLOGY ONCOLOGY | Facility: MEDICAL CENTER | Age: 63
End: 2018-04-10
Payer: COMMERCIAL

## 2018-04-10 VITALS
HEART RATE: 63 BPM | SYSTOLIC BLOOD PRESSURE: 127 MMHG | OXYGEN SATURATION: 97 % | TEMPERATURE: 97.1 F | DIASTOLIC BLOOD PRESSURE: 71 MMHG | RESPIRATION RATE: 16 BRPM

## 2018-04-10 VITALS
HEART RATE: 79 BPM | DIASTOLIC BLOOD PRESSURE: 74 MMHG | BODY MASS INDEX: 34.16 KG/M2 | TEMPERATURE: 97.6 F | WEIGHT: 244 LBS | HEIGHT: 71 IN | RESPIRATION RATE: 16 BRPM | SYSTOLIC BLOOD PRESSURE: 122 MMHG

## 2018-04-10 DIAGNOSIS — C95.90 LEUKEMIA NOT HAVING ACHIEVED REMISSION, UNSPECIFIED LEUKEMIA TYPE (HCC): Primary | ICD-10-CM

## 2018-04-10 DIAGNOSIS — C95.10 CHRONIC LEUKEMIA, NOT HAVING ACHIEVED REMISSION (HCC): Primary | ICD-10-CM

## 2018-04-10 PROCEDURE — P9016 RBC LEUKOCYTES REDUCED: HCPCS

## 2018-04-10 PROCEDURE — 86901 BLOOD TYPING SEROLOGIC RH(D): CPT | Performed by: INTERNAL MEDICINE

## 2018-04-10 PROCEDURE — 86850 RBC ANTIBODY SCREEN: CPT | Performed by: INTERNAL MEDICINE

## 2018-04-10 PROCEDURE — 86920 COMPATIBILITY TEST SPIN: CPT

## 2018-04-10 PROCEDURE — 86900 BLOOD TYPING SEROLOGIC ABO: CPT | Performed by: INTERNAL MEDICINE

## 2018-04-10 PROCEDURE — 99214 OFFICE O/P EST MOD 30 MIN: CPT | Performed by: INTERNAL MEDICINE

## 2018-04-10 PROCEDURE — 36430 TRANSFUSION BLD/BLD COMPNT: CPT

## 2018-04-10 RX ORDER — SODIUM CHLORIDE 9 MG/ML
20 INJECTION, SOLUTION INTRAVENOUS ONCE
Status: COMPLETED | OUTPATIENT
Start: 2018-04-10 | End: 2018-04-10

## 2018-04-10 RX ORDER — DIPHENHYDRAMINE HCL 25 MG
25 TABLET ORAL ONCE
Status: COMPLETED | OUTPATIENT
Start: 2018-04-10 | End: 2018-04-10

## 2018-04-10 RX ORDER — ACETAMINOPHEN 325 MG/1
650 TABLET ORAL ONCE
Status: COMPLETED | OUTPATIENT
Start: 2018-04-10 | End: 2018-04-10

## 2018-04-10 RX ADMIN — ACETAMINOPHEN 650 MG: 325 TABLET, FILM COATED ORAL at 09:06

## 2018-04-10 RX ADMIN — SODIUM CHLORIDE 20 ML/HR: 0.9 INJECTION, SOLUTION INTRAVENOUS at 09:49

## 2018-04-10 RX ADMIN — DIPHENHYDRAMINE HCL 25 MG: 25 TABLET ORAL at 09:06

## 2018-04-10 NOTE — PROGRESS NOTES
Haydee Ingram  1955  [address] [facility] [id number]    DISCUSSION  SUMMARY:    23-year-old male with a number of medical problems including lymphocytosis and anemia recently admitted to the hospital because of the anemia (2nd time)  Mr Chin Shirley underwent a bone marrow biopsy demonstrating a packed marrow  Pathology was consistent with CLL  Issues:    1  CLL  Patient is symptomatic and needs to be treated  We discussed options  Patient is to begin ibrutinib  Nursing staff went over the specifics  There may be an insurance issue - this is being looked into  2  Anemia  Secondary to a packed marrow  Patient is to be scheduled for 1 unit as an outpatient  Hopefully with time, this will not be an issue  Patient understands that if he undergoes his CBCs as directed, the anemia can be picked up earlier so that patient can be transfused as an outpatient      3  Psychiatric issues - specifics not presently available  My concern is compliance  I stressed to the patient and wife that he needs to take the medication as directed      4  Depression  Patient has been seen by psychiatry - follow-ups ongoing      5  History of hematuria -none recently  Patient is to continue to monitor  6   Questionable alcohol use/abuse  Patient understands that he cannot drink alcohol while on treatment  This may become a more complicated issue in the future      Patient is to return in 2 weeks  Patient knows to call the hematology/oncology office if there are any other questions or concerns  Carefully review your medication list and verify that the list is accurate and up-to-date   Please call the hematology/oncology office if there are medications missing from the list, medications on the list that you are not currently taking or if there is a dosage or instruction that is different from how you're taking that medication  Patient goals and areas of care: monitor CBC parameters, begin CLL treatment  Patient is able to self-care   _____________________________________________________________________________________    Chief Complaint   Patient presents with    Follow-up     Leukocytosis, anemia, recent bone marrow biopsy demonstrating CLL     History of Present Illness:    79-year-old male recently admitted to [facility] with severe anemia  Bone marrow biopsy demonstrated CLL  Patient is back to discuss options  Mr Corby Cortez states feeling +/-  Fatigue is about the same as before  No problems with excessive bruising or bleeding, no hematuria  No fevers, chills or sweats  Appetite is okay, weight is stable  Activities are limited but the same as before  Patient states recently losing his insurance  Review of Systems   Constitutional: Positive for fatigue  HENT: Negative  Eyes: Negative  Respiratory: Negative  Cardiovascular: Negative  Gastrointestinal: Negative  Endocrine: Negative  Genitourinary: Negative  Musculoskeletal: Positive for arthralgias  Skin: Negative  Allergic/Immunologic: Negative  Neurological: Negative  Hematological: Negative  Psychiatric/Behavioral: Positive for behavioral problems  All other systems reviewed and are negative       Patient Active Problem List   Diagnosis    Hypertension    Depression    Polypharmacy    Bronchitis    Encephalopathy    Weakness    Leukemia (HCC)    Lactic acidosis    Leukocytosis    Elevated troponin    Bipolar 1 disorder (HCC)    Psychiatric disorder    Anemia     Past Medical History:   Diagnosis Date    Anemia     Anxiety     Bipolar disorder (HCC)     [facility]     History of alcohol abuse     Hypertension     Hypertension     Insomnia     [facility]     Opiate abuse, episodic     Psychiatric disorder      Past Surgical History:   Procedure Laterality Date    EGD AND COLONOSCOPY N/A 3/30/2018 Procedure: EGD AND COLONOSCOPY;  Surgeon: Keyla Bautista MD;  Location: [facility]; Service: Gastroenterology     Family History   Problem Relation Age of Onset    Coronary artery disease Mother     No Known Problems Father     Hepatitis Sister     Cancer Brother     Prostate cancer Brother     Early death Brother      Social History     Social History    Marital status: /Civil Union     Spouse name: N/A    Number of children: N/A    Years of education: N/A     Occupational History    Not on file       Social History Main Topics    Smoking status: Current Every Day Smoker     Packs/day: 2 00     Years: 40 00    Smokeless tobacco: Never Used    Alcohol use No      Comment: last drink nov 19 2017    Drug use: No      Comment: hx of heroin and subutex abuse    Sexual activity: Not Currently     Other Topics Concern    Not on file     Social History Narrative    No narrative on file       Current Outpatient Prescriptions:     aspirin 81 mg chewable tablet, Chew 1 tablet (81 mg total) daily, Disp: 30 tablet, Rfl: 11    benztropine (COGENTIN) 1 mg tablet, Take 1 mg by mouth Medrol Dose Pack scheduling ONLY  , Disp: , Rfl:     divalproex sodium (DEPAKOTE) 500 mg EC tablet, Take 500 mg by mouth every 12 (twelve) hours  , Disp: , Rfl:     FLUoxetine (PROzac) 20 mg capsule, Take 40 mg by mouth daily, Disp: , Rfl:     hydrochlorothiazide (MICROZIDE) 12 5 mg capsule, Take 12 5 mg by mouth daily, Disp: , Rfl:     hydrOXYzine pamoate (VISTARIL) 50 mg capsule, Take 50 mg by mouth 2 (two) times a day, Disp: , Rfl:     lisinopril (ZESTRIL) 40 mg tablet, Take 40 mg by mouth daily, Disp: , Rfl:     metoprolol tartrate (LOPRESSOR) 25 mg tablet, Take 0 5 tablets (12 5 mg total) by mouth every 12 (twelve) hours, Disp: 60 tablet, Rfl: 5    mirtazapine (REMERON) 15 mg tablet, Take 15 mg by mouth daily at bedtime, Disp: , Rfl:     pantoprazole (PROTONIX) 40 mg tablet, Take 1 tablet (40 mg total) by mouth daily, Disp: 30 tablet, Rfl: 5    risperiDONE (RisperDAL) 2 mg tablet, Take 4 mg by mouth 2 (two) times a day  , Disp: , Rfl:     No Known Allergies    Vitals:    04/10/18 0730   BP: 122/74   Pulse: 79   Resp: 16   Temp: 97 6 °F (36 4 °C)     Physical Exam   Constitutional: He is oriented to person, place, and time  He appears well-developed and well-nourished  Middle-aged male, no respiratory distress, questionable AOB, +tobacco odor   HENT:   Head: Normocephalic and atraumatic  Right Ear: External ear normal    Left Ear: External ear normal    Nose: Nose normal    Mouth/Throat: Oropharynx is clear and moist    Eyes: Conjunctivae and EOM are normal  Pupils are equal, round, and reactive to light  Neck: Normal range of motion  Neck supple  Cardiovascular: Normal rate, regular rhythm, normal heart sounds and intact distal pulses  Pulmonary/Chest:   Lungs with fair air entry bilaterally, distant breath sounds, bilateral rhonchi, no rales   Abdominal: Soft  Bowel sounds are normal    Abdomen is soft, obese, cannot palpate liver or spleen, no rigidity or rebound   Musculoskeletal: Normal range of motion  Neurological: He is alert and oriented to person, place, and time  He has normal reflexes  Skin: Skin is warm  Psychiatric: He has a normal mood and affect   His behavior is normal  Judgment and thought content normal    Extremities: 0-1 + bilateral lower extremity edema, no cords, pulses are 1+  Lymphatics: no adenopathy in the neck, supraclavicular region, axilla and groin bilaterally    Labs:    04/09/2018 WBC = 22 7 hemoglobin = 7 hematocrit = 21 1 platelet = 970 (no differential)  2/22/18 WBC = 12 1 hemoglobin = 8 3 hematocrit = 24 6 MCV = 86 platelet = 264  71/20/7077 WBC = 15 1 hemoglobin = 8 1 hematocrit = 24 3 platelet = 032  22/19/1996 WBC = 14 4 hemoglobin = 6 8 hematocrit = 20 platelet = 927    Pathology    Case Report   Surgical Pathology Report                         Case: [id number]                                    Authorizing Provider: Gurdeep Esteban MD          Collected:           03/28/2018 1318               Ordering Location:     [facility] Received:            03/28/2018 1354                                      [address]                                                                       Pathologist:           Xavi Whitfield MD                                                         Specimens:   A) - Iliac Crest, Left, Bone Marrow   2 Cores                                                        B) - Iliac Crest, Left, Bone Marrow                                                                  C) - Iliac Crest, Left, Bone Marrow  2T  P  Slides, 2 Asp Stained , 8 Asp Unstained          Addendum   - Fluorescence in situ hybridization (FISH) ([facility]#[id number], evaluated by ORLIN Dorman , Ph D ) for genetic abnormalities which may be associated with myelodysplastic syndrome is as follows:        Interpretation  1  No evidence of trisomy 12 (+12)  2  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  Comment: Deletions involving 13q14 are detectable by FISH in approximately 50% of persons with CLL  Among them,  about 78% show biallelic or concomitant monoallelic and biallelic deletion  As a group and as the sole aberration, del(13q)  is associated with a more favorable outcome  3  No evidence of p53 (17p13) deletion or amplification  4  No evidence of RENAE (11q22 3) deletion       - IgVH Mutation analysis ([facility]#[id number], evaluated by Dr Yeimy Lucero, Ph D )  is as follows:   IGVH MUTATION ANALYSIS: UNMUTATED - CLL  RESULTS  Mutation Rate: 0%  IgVH Family: IGHV1-2*04     -  Cytogenetic studies as performed on the bone marrow aspirate @ [facility] (Specimen ID: [id number], evaluated by Ramila Soria, PhD, [facility] ) as follows:  Karyotype:  46,XY,add(18)(p11 3)[6]/46,XY[1], LIMITED ANALYSIS   Cytogenetics Analysis:  : Metaphases Counted          Metaphases Analyzed Metaphases Karyotyped        GTG Band level                       Culture Type(s)               7                                     7                                     4                        300-400                                       [id number]/DSP30   Interpretation: Abnormal male karyotype  This is an incomplete study and only 7 cells (as opposed to 20 cells) were available for analysis  Cytogenetic analysis  shows the presence of an abnormal clone (6 out of 7 cells) characterized by addition of material of unknown origin to  18p11  3  No other aberrations were identified, and one normal cell was found during the course of analysis  These current cytogenetic results are consistent with the presence of abnormal clonal cells  Correlation with  hematopathology and follow-up bone marrow studies are recommended to further evaluate the significance of these  findings  Microarray analysis could be considered to further characterize this rearrangement             Addendum electronically signed by Emperatriz Jefferson MD on 4/6/2018 at  4:12 PM     Addendum electronically signed by Emperatriz Jefferson MD on 4/6/2018 at  4:12 PM   Final Diagnosis   A -C  Bone marrow,  left iliac crest,  biopsy and aspirate:  -  Chronic lymphocytic leukemia/small lymphocytic lymphoma (CLL/SLL) (see note)  -  Significantly decreased trilineage myelopoiesis with normal appearing morphology and maturation without significant features of dysplasia or increased myeloblasts, secondary to the above  -  Anemia, neutropenia, and lymphocytosis, secondary to the above  -  Normal stainable storage iron  -  Mildly increased reticulin fibers without fibrosis, secondary to the above  -  Negative for collagen fibrosis, granulomata, vasculitis, necrosis           Electronically signed by Emperatriz Jefferson MD on 3/29/2018 at  2:51 P

## 2018-04-11 ENCOUNTER — APPOINTMENT (OUTPATIENT)
Dept: LAB | Facility: HOSPITAL | Age: 63
End: 2018-04-11
Payer: COMMERCIAL

## 2018-04-11 DIAGNOSIS — D64.9 ANEMIA, UNSPECIFIED TYPE: ICD-10-CM

## 2018-04-11 DIAGNOSIS — D64.9 SYMPTOMATIC ANEMIA: ICD-10-CM

## 2018-04-11 LAB
ABO GROUP BLD BPU: NORMAL
ANISOCYTOSIS BLD QL SMEAR: PRESENT
BPU ID: NORMAL
CROSSMATCH: NORMAL
ERYTHROCYTE [DISTWIDTH] IN BLOOD BY AUTOMATED COUNT: 15 % (ref 11.6–15.1)
HCT VFR BLD AUTO: 23.3 % (ref 42–52)
HGB BLD-MCNC: 7.7 G/DL (ref 14–18)
HYPERCHROMIA BLD QL SMEAR: PRESENT
MCH RBC QN AUTO: 28.6 PG (ref 27–31)
MCHC RBC AUTO-ENTMCNC: 33 G/DL (ref 31.4–37.4)
MCV RBC AUTO: 87 FL (ref 82–98)
PLATELET # BLD AUTO: 158 THOUSANDS/UL (ref 130–400)
PLATELET BLD QL SMEAR: ADEQUATE
PMV BLD AUTO: 7.5 FL (ref 8.9–12.7)
RBC # BLD AUTO: 2.68 MILLION/UL (ref 4.7–6.1)
UNIT DISPENSE STATUS: NORMAL
UNIT PRODUCT CODE: NORMAL
UNIT RH: NORMAL
WBC # BLD AUTO: 27.5 THOUSAND/UL (ref 4.8–10.8)

## 2018-04-11 PROCEDURE — 85027 COMPLETE CBC AUTOMATED: CPT

## 2018-04-11 PROCEDURE — 36415 COLL VENOUS BLD VENIPUNCTURE: CPT

## 2018-04-13 ENCOUNTER — DOCUMENTATION (OUTPATIENT)
Dept: RADIATION ONCOLOGY | Facility: HOSPITAL | Age: 63
End: 2018-04-13

## 2018-04-13 ENCOUNTER — APPOINTMENT (OUTPATIENT)
Dept: LAB | Facility: HOSPITAL | Age: 63
End: 2018-04-13
Payer: COMMERCIAL

## 2018-04-13 DIAGNOSIS — D64.9 SYMPTOMATIC ANEMIA: ICD-10-CM

## 2018-04-13 LAB
ERYTHROCYTE [DISTWIDTH] IN BLOOD BY AUTOMATED COUNT: 14.6 % (ref 11.6–15.1)
HCT VFR BLD AUTO: 22.6 % (ref 42–52)
HGB BLD-MCNC: 7.6 G/DL (ref 14–18)
MCH RBC QN AUTO: 28.6 PG (ref 27–31)
MCHC RBC AUTO-ENTMCNC: 33.4 G/DL (ref 31.4–37.4)
MCV RBC AUTO: 86 FL (ref 82–98)
PLATELET # BLD AUTO: 188 THOUSANDS/UL (ref 130–400)
PLATELET BLD QL SMEAR: ADEQUATE
PMV BLD AUTO: 7.3 FL (ref 8.9–12.7)
RBC # BLD AUTO: 2.64 MILLION/UL (ref 4.7–6.1)
WBC # BLD AUTO: 30.9 THOUSAND/UL (ref 4.8–10.8)

## 2018-04-13 PROCEDURE — 85027 COMPLETE CBC AUTOMATED: CPT

## 2018-04-13 PROCEDURE — 36415 COLL VENOUS BLD VENIPUNCTURE: CPT

## 2018-04-13 NOTE — PROGRESS NOTES
As per the request of Kia Martínez RN from Dr Ariza [place] office, I placed a pc to the pt's only contact phone number  I was unable to reach the patient so a message was left with my contact information as well as a request from this writer to contact me at their convenience

## 2018-04-16 ENCOUNTER — DOCUMENTATION (OUTPATIENT)
Dept: RADIATION ONCOLOGY | Facility: HOSPITAL | Age: 63
End: 2018-04-16

## 2018-04-16 ENCOUNTER — LAB (OUTPATIENT)
Dept: LAB | Facility: HOSPITAL | Age: 63
End: 2018-04-16
Payer: COMMERCIAL

## 2018-04-16 DIAGNOSIS — D64.9 SYMPTOMATIC ANEMIA: ICD-10-CM

## 2018-04-16 LAB
ERYTHROCYTE [DISTWIDTH] IN BLOOD BY AUTOMATED COUNT: 13.8 % (ref 11.6–15.1)
HCT VFR BLD AUTO: 21.8 % (ref 42–52)
HGB BLD-MCNC: 7.5 G/DL (ref 14–18)
MCH RBC QN AUTO: 29.1 PG (ref 27–31)
MCHC RBC AUTO-ENTMCNC: 34.3 G/DL (ref 31.4–37.4)
MCV RBC AUTO: 85 FL (ref 82–98)
PLATELET # BLD AUTO: 214 THOUSANDS/UL (ref 130–400)
PLATELET BLD QL SMEAR: ADEQUATE
PMV BLD AUTO: 7.4 FL (ref 8.9–12.7)
RBC # BLD AUTO: 2.57 MILLION/UL (ref 4.7–6.1)
SMUDGE CELLS BLD QL SMEAR: PRESENT
WBC # BLD AUTO: 31.3 THOUSAND/UL (ref 4.8–10.8)

## 2018-04-16 PROCEDURE — 85027 COMPLETE CBC AUTOMATED: CPT

## 2018-04-16 PROCEDURE — 36415 COLL VENOUS BLD VENIPUNCTURE: CPT

## 2018-04-16 NOTE — PROGRESS NOTES
Received email from Tiara Blandon RN today stating that Mrs Dwight Baugh had not heard from me  Late Entry-I did speak with her on 4/13/18 in the afternoon via pc during which she requested I deal directly with her due to the pt being "confused"  Reviewed my role with her at that time  Re today's email from [facility], another attempt was made to speak with Mrs Lara without success  A message was left for her requesting she call me back at her convenience re questions she has re the Social Security Disability process  She did not mention any questions or concerns re SSD  to me during our conversation on 4/13/18

## 2018-04-17 ENCOUNTER — DOCUMENTATION (OUTPATIENT)
Dept: INFUSION CENTER | Facility: CLINIC | Age: 63
End: 2018-04-17

## 2018-04-17 NOTE — SOCIAL WORK
MSW received call from pt's wife with regards to needing assistance on completing an application for SSDI  MSW learned from pt's wife that they recently had lost their car to [address] and their home is in [place]  Spouse explained that they have a home in the [place] that they plan on moving into when they can no longer live in their current home  Spouse also explained that they have grave medical expenses with the pt's medications  MSW asked if the pt was affiliated with a financial counselor and she reported that Sheng Julien was assissting them  MSW contacted Sheng Julien, who explained the financial situation and how is was a bit different than what the spouse shared  MSW called the spouse back and inquired further into the situation  The pt has been out of work since November of 2017 and went to a rehab program in [place] for 3 months  Upon his return, the pt was not feeling well and shortly thereafter, they learned his cancer was no longer in remission and the pt was unable to return to work  The pt was eligible for short term disability and the pt and his wife never followed through with completing the necessary paperwork  MSW  directed spouse how to complete the application for disability on the internet and spouse states she is unable to do so due to a lack of experience with the computer,  MSW printed out the application and will plan to mail to spouse and have the Doctor fill out the necessary documentation  Significant emotional support provided

## 2018-04-18 ENCOUNTER — TELEPHONE (OUTPATIENT)
Dept: HEMATOLOGY ONCOLOGY | Facility: MEDICAL CENTER | Age: 63
End: 2018-04-18

## 2018-04-18 ENCOUNTER — APPOINTMENT (OUTPATIENT)
Dept: LAB | Facility: HOSPITAL | Age: 63
End: 2018-04-18
Payer: COMMERCIAL

## 2018-04-18 ENCOUNTER — DOCUMENTATION (OUTPATIENT)
Dept: RADIATION ONCOLOGY | Facility: HOSPITAL | Age: 63
End: 2018-04-18

## 2018-04-18 DIAGNOSIS — D64.9 ANEMIA, UNSPECIFIED TYPE: ICD-10-CM

## 2018-04-18 DIAGNOSIS — D64.9 SYMPTOMATIC ANEMIA: ICD-10-CM

## 2018-04-18 LAB
ERYTHROCYTE [DISTWIDTH] IN BLOOD BY AUTOMATED COUNT: 14.4 % (ref 11.6–15.1)
HCT VFR BLD AUTO: 19.3 % (ref 42–52)
HGB BLD-MCNC: 6.4 G/DL (ref 14–18)
MCH RBC QN AUTO: 28.5 PG (ref 27–31)
MCHC RBC AUTO-ENTMCNC: 33.1 G/DL (ref 31.4–37.4)
MCV RBC AUTO: 86 FL (ref 82–98)
PLATELET # BLD AUTO: 197 THOUSANDS/UL (ref 130–400)
PLATELET BLD QL SMEAR: ADEQUATE
PMV BLD AUTO: 7.2 FL (ref 8.9–12.7)
RBC # BLD AUTO: 2.23 MILLION/UL (ref 4.7–6.1)
WBC # BLD AUTO: 31.6 THOUSAND/UL (ref 4.8–10.8)

## 2018-04-18 PROCEDURE — 85027 COMPLETE CBC AUTOMATED: CPT

## 2018-04-18 PROCEDURE — 36415 COLL VENOUS BLD VENIPUNCTURE: CPT

## 2018-04-18 NOTE — TELEPHONE ENCOUNTER
Patient has been scheduled for 2 units of PRBC tomorrow at 0800 at [facility] infusion  pts spouse is aware

## 2018-04-18 NOTE — PROGRESS NOTES
Had discussions with Dimitry MORA and patient's wife Jass Moseley re disability questions spouse had  Melani Jamie will be mailing info to her re [place] temporary disability program  Jass Moseley and I discussed the possibility of applying for SSD given patient's diagnosis of chronic leukemia  She was unaware that he could qualify for same  Reviewed the process with her and encouraged her to call the [facility] office and request the pertinent paperwork in order to complete a disability application  Answered questions of wife's re this process and offered ongoing assistance to her and the patient  She has my contact number to reach me as needed

## 2018-04-19 ENCOUNTER — HOSPITAL ENCOUNTER (OUTPATIENT)
Dept: INFUSION CENTER | Facility: HOSPITAL | Age: 63
Discharge: HOME | End: 2018-04-19
Payer: COMMERCIAL

## 2018-04-19 VITALS
OXYGEN SATURATION: 98 % | SYSTOLIC BLOOD PRESSURE: 150 MMHG | TEMPERATURE: 97.6 F | HEART RATE: 66 BPM | RESPIRATION RATE: 18 BRPM | DIASTOLIC BLOOD PRESSURE: 75 MMHG

## 2018-04-19 PROCEDURE — P9016 RBC LEUKOCYTES REDUCED: HCPCS

## 2018-04-19 PROCEDURE — 86920 COMPATIBILITY TEST SPIN: CPT

## 2018-04-19 PROCEDURE — 36430 TRANSFUSION BLD/BLD COMPNT: CPT

## 2018-04-19 PROCEDURE — 86901 BLOOD TYPING SEROLOGIC RH(D): CPT | Performed by: INTERNAL MEDICINE

## 2018-04-19 PROCEDURE — 86850 RBC ANTIBODY SCREEN: CPT | Performed by: INTERNAL MEDICINE

## 2018-04-19 PROCEDURE — 86900 BLOOD TYPING SEROLOGIC ABO: CPT | Performed by: INTERNAL MEDICINE

## 2018-04-19 RX ORDER — SODIUM CHLORIDE 9 MG/ML
20 INJECTION, SOLUTION INTRAVENOUS ONCE
Status: COMPLETED | OUTPATIENT
Start: 2018-04-19 | End: 2018-04-19

## 2018-04-19 RX ORDER — DIPHENHYDRAMINE HCL 25 MG
25 TABLET ORAL ONCE
Status: COMPLETED | OUTPATIENT
Start: 2018-04-19 | End: 2018-04-19

## 2018-04-19 RX ORDER — ACETAMINOPHEN 325 MG/1
650 TABLET ORAL ONCE
Status: COMPLETED | OUTPATIENT
Start: 2018-04-19 | End: 2018-04-19

## 2018-04-19 RX ADMIN — ACETAMINOPHEN 650 MG: 325 TABLET ORAL at 09:36

## 2018-04-19 RX ADMIN — SODIUM CHLORIDE 20 ML/HR: 0.9 INJECTION, SOLUTION INTRAVENOUS at 09:36

## 2018-04-19 RX ADMIN — DIPHENHYDRAMINE HCL 25 MG: 25 TABLET ORAL at 09:35

## 2018-04-19 NOTE — PLAN OF CARE
Problem: Potential for Falls  Goal: Patient will remain free of falls  INTERVENTIONS:  - Assess patient frequently for physical needs  -  Identify cognitive and physical deficits and behaviors that affect risk of falls    -  [place] fall precautions as indicated by assessment   - Educate patient/family on patient safety including physical limitations  - Instruct patient to call for assistance with activity based on assessment  - Modify environment to reduce risk of injury  - Consider OT/PT consult to assist with strengthening/mobility   Outcome: Progressing

## 2018-04-20 ENCOUNTER — TRANSCRIBE ORDERS (OUTPATIENT)
Dept: ADMINISTRATIVE | Facility: HOSPITAL | Age: 63
End: 2018-04-20

## 2018-04-20 ENCOUNTER — LAB (OUTPATIENT)
Dept: LAB | Facility: HOSPITAL | Age: 63
End: 2018-04-20
Payer: COMMERCIAL

## 2018-04-20 DIAGNOSIS — D64.9 ANEMIA, UNSPECIFIED TYPE: ICD-10-CM

## 2018-04-20 DIAGNOSIS — D64.9 ANEMIA, UNSPECIFIED TYPE: Primary | ICD-10-CM

## 2018-04-20 LAB
ABO GROUP BLD BPU: NORMAL
ABO GROUP BLD BPU: NORMAL
BPU ID: NORMAL
BPU ID: NORMAL
CROSSMATCH: NORMAL
CROSSMATCH: NORMAL
ERYTHROCYTE [DISTWIDTH] IN BLOOD BY AUTOMATED COUNT: 15.1 % (ref 11.6–15.1)
HCT VFR BLD AUTO: 24.3 % (ref 42–52)
HGB BLD-MCNC: 8 G/DL (ref 14–18)
MCH RBC QN AUTO: 28.5 PG (ref 27–31)
MCHC RBC AUTO-ENTMCNC: 33.1 G/DL (ref 31.4–37.4)
MCV RBC AUTO: 86 FL (ref 82–98)
PLATELET # BLD AUTO: 185 THOUSANDS/UL (ref 130–400)
PMV BLD AUTO: 7.2 FL (ref 8.9–12.7)
RBC # BLD AUTO: 2.82 MILLION/UL (ref 4.7–6.1)
SCAN RESULT: NORMAL
UNIT DISPENSE STATUS: NORMAL
UNIT DISPENSE STATUS: NORMAL
UNIT PRODUCT CODE: NORMAL
UNIT PRODUCT CODE: NORMAL
UNIT RH: NORMAL
UNIT RH: NORMAL
WBC # BLD AUTO: 38.6 THOUSAND/UL (ref 4.8–10.8)

## 2018-04-20 PROCEDURE — 36415 COLL VENOUS BLD VENIPUNCTURE: CPT

## 2018-04-20 PROCEDURE — 85027 COMPLETE CBC AUTOMATED: CPT

## 2018-04-23 ENCOUNTER — APPOINTMENT (OUTPATIENT)
Dept: LAB | Facility: HOSPITAL | Age: 63
End: 2018-04-23
Payer: COMMERCIAL

## 2018-04-23 ENCOUNTER — TELEPHONE (OUTPATIENT)
Dept: GASTROENTEROLOGY | Facility: AMBULARY SURGERY CENTER | Age: 63
End: 2018-04-23

## 2018-04-23 LAB
BASOPHILS # BLD AUTO: 0.1 THOUSANDS/ΜL (ref 0–0.1)
BASOPHILS NFR BLD AUTO: 0 % (ref 0–1)
EOSINOPHIL # BLD AUTO: 0.2 THOUSAND/ΜL (ref 0–0.61)
EOSINOPHIL NFR BLD AUTO: 1 % (ref 0–6)
ERYTHROCYTE [DISTWIDTH] IN BLOOD BY AUTOMATED COUNT: 14.9 % (ref 11.6–15.1)
HCT VFR BLD AUTO: 24.5 % (ref 42–52)
HGB BLD-MCNC: 8.1 G/DL (ref 14–18)
LYMPHOCYTES # BLD AUTO: 25.8 THOUSANDS/ΜL (ref 0.6–4.47)
LYMPHOCYTES NFR BLD AUTO: 83 % (ref 14–44)
MCH RBC QN AUTO: 28.2 PG (ref 27–31)
MCHC RBC AUTO-ENTMCNC: 33 G/DL (ref 31.4–37.4)
MCV RBC AUTO: 86 FL (ref 82–98)
MONOCYTES # BLD AUTO: 1.2 THOUSAND/ΜL (ref 0.17–1.22)
MONOCYTES NFR BLD AUTO: 4 % (ref 4–12)
NEUTROPHILS # BLD AUTO: 3.9 THOUSANDS/ΜL (ref 1.85–7.62)
NEUTS SEG NFR BLD AUTO: 13 % (ref 43–75)
NRBC BLD AUTO-RTO: 0 /100 WBCS
PLATELET # BLD AUTO: 154 THOUSANDS/UL (ref 130–400)
PLATELET BLD QL SMEAR: ADEQUATE
PMV BLD AUTO: 7.3 FL (ref 8.9–12.7)
RBC # BLD AUTO: 2.86 MILLION/UL (ref 4.7–6.1)
WBC # BLD AUTO: 31.2 THOUSAND/UL (ref 4.8–10.8)

## 2018-04-23 PROCEDURE — 36415 COLL VENOUS BLD VENIPUNCTURE: CPT | Performed by: FAMILY MEDICINE

## 2018-04-23 PROCEDURE — 85025 COMPLETE CBC W/AUTO DIFF WBC: CPT | Performed by: FAMILY MEDICINE

## 2018-04-24 ENCOUNTER — OFFICE VISIT (OUTPATIENT)
Dept: HEMATOLOGY ONCOLOGY | Facility: MEDICAL CENTER | Age: 63
End: 2018-04-24
Payer: COMMERCIAL

## 2018-04-24 VITALS
TEMPERATURE: 98 F | SYSTOLIC BLOOD PRESSURE: 118 MMHG | WEIGHT: 242.6 LBS | BODY MASS INDEX: 33.96 KG/M2 | RESPIRATION RATE: 18 BRPM | HEIGHT: 71 IN | HEART RATE: 60 BPM | DIASTOLIC BLOOD PRESSURE: 60 MMHG | OXYGEN SATURATION: 97 %

## 2018-04-24 DIAGNOSIS — C95.10 CHRONIC LEUKEMIA, NOT HAVING ACHIEVED REMISSION (HCC): Primary | ICD-10-CM

## 2018-04-24 PROCEDURE — 99215 OFFICE O/P EST HI 40 MIN: CPT | Performed by: INTERNAL MEDICINE

## 2018-04-24 NOTE — PROGRESS NOTES
Cherelle Sessions  1955  [facility]  [address] [place] [address]    DISCUSSION  SUMMARY:    27-year-old male with a number of medical problems including lymphocytosis and anemia recently admitted to the hospital because of the anemia (2nd time)  Mr Jose F Bell underwent a bone marrow biopsy demonstrating a packed marrow  Pathology was consistent with CLL  Issues:    1  CLL  Patient is symptomatic and needs to be treated  We discussed options  Patient is to begin ibrutinib  Nursing staff has gone over the specifics  There continues to be insurance concerns -this is being worked on  2  Anemia  Secondary to a packed marrow  Patient goes for routine CBCs with transfusions as needed      3  Psychiatric issues/depression  Patient needs to follow up with Psychiatry as directed      4  History of hematuria -none recently  Patient is to continue to monitor  5   Questionable alcohol use/abuse  Patient understands that he cannot drink alcohol while on treatment  This may become a more complicated issue in the future      Patient is to return in 4 weeks but this will change when the ibrutinib is available  Patient knows to call the hematology/oncology office if there are any other questions or concerns  Carefully review your medication list and verify that the list is accurate and up-to-date  Please call the hematology/oncology office if there are medications missing from the list, medications on the list that you are not currently taking or if there is a dosage or instruction that is different from how you're taking that medication      Patient goals and areas of care: monitor CBC parameters, begin CLL treatment  Patient is able to self-care   _____________________________________________________________________________________    Chief Complaint   Patient presents with    Follow-up     CLL     History of Present Illness:    79-year-old male recently admitted to [facility] with severe anemia  Bone marrow biopsy demonstrated CLL  Patient is back to discuss options  Mr Maximilian Godinez states feeling +/-  Fatigue is about the same as before  No problems with excessive bruising or bleeding, no hematuria  No fevers, chills or sweats  Appetite is okay, weight is stable  Activities are limited but the same as before  Review of Systems   Constitutional: Positive for fatigue  HENT: Negative  Eyes: Negative  Respiratory: Negative  Cardiovascular: Negative  Gastrointestinal: Negative  Endocrine: Negative  Genitourinary: Negative  Musculoskeletal: Positive for arthralgias  Skin: Negative  Allergic/Immunologic: Negative  Neurological: Negative  Hematological: Negative  Psychiatric/Behavioral: Positive for behavioral problems  All other systems reviewed and are negative  Patient Active Problem List   Diagnosis    Hypertension    Depression    Polypharmacy    Bronchitis    Encephalopathy    Weakness    Leukemia (HCC)    Lactic acidosis    Leukocytosis    Elevated troponin    Bipolar 1 disorder (HCC)    Psychiatric disorder    Anemia     Past Medical History:   Diagnosis Date    Anemia     Anxiety     Bipolar disorder (HCC)     [facility] [address]     History of alcohol abuse     Hypertension     Hypertension     Insomnia     Leukemia [facility] [address]     Opiate abuse, episodic     Psychiatric disorder      Past Surgical History:   Procedure Laterality Date    EGD AND COLONOSCOPY N/A 3/30/2018    Procedure: EGD AND COLONOSCOPY;  Surgeon: Mavis Carey MD;  Location: [facility];   Service: Gastroenterology     Family History   Problem Relation Age of Onset    Coronary artery disease Mother     No Known Problems Father     Hepatitis Sister     Cancer Brother     Prostate cancer Brother     Early death Brother      Social History     Social History    Marital status: /Civil Union Spouse name: N/A    Number of children: N/A    Years of education: N/A     Occupational History    Not on file       Social History Main Topics    Smoking status: Current Every Day Smoker     Packs/day: 2 00     Years: 40 00    Smokeless tobacco: Never Used    Alcohol use No      Comment: last drink nov 19 2017    Drug use: No      Comment: hx of heroin and subutex abuse    Sexual activity: Not Currently     Other Topics Concern    Not on file     Social History Narrative    No narrative on file       Current Outpatient Prescriptions:     aspirin 81 mg chewable tablet, Chew 1 tablet (81 mg total) daily, Disp: 30 tablet, Rfl: 11    benztropine (COGENTIN) 1 mg tablet, Take 1 mg by mouth Medrol Dose Pack scheduling ONLY  , Disp: , Rfl:     divalproex sodium (DEPAKOTE) 500 mg EC tablet, Take 500 mg by mouth every 12 (twelve) hours  , Disp: , Rfl:     FLUoxetine (PROzac) 20 mg capsule, Take 40 mg by mouth daily, Disp: , Rfl:     hydrochlorothiazide (MICROZIDE) 12 5 mg capsule, Take 12 5 mg by mouth daily, Disp: , Rfl:     hydrOXYzine pamoate (VISTARIL) 50 mg capsule, Take 50 mg by mouth 2 (two) times a day, Disp: , Rfl:     Ibrutinib 420 MG TABS, Take 420 mg by mouth daily, Disp: 30 tablet, Rfl: 3    lisinopril (ZESTRIL) 40 mg tablet, Take 40 mg by mouth daily, Disp: , Rfl:     metoprolol tartrate (LOPRESSOR) 25 mg tablet, Take 0 5 tablets (12 5 mg total) by mouth every 12 (twelve) hours, Disp: 60 tablet, Rfl: 5    mirtazapine (REMERON) 15 mg tablet, Take 15 mg by mouth daily at bedtime, Disp: , Rfl:     pantoprazole (PROTONIX) 40 mg tablet, Take 1 tablet (40 mg total) by mouth daily, Disp: 30 tablet, Rfl: 5    risperiDONE (RisperDAL) 2 mg tablet, Take 4 mg by mouth 2 (two) times a day  , Disp: , Rfl:     No Known Allergies    Vitals:    04/24/18 1014   BP: 118/60   Pulse: 60   Resp: 18   Temp: 98 °F (36 7 °C)   SpO2: 97%     Physical Exam   Constitutional: He is oriented to person, place, and time  He appears well-developed and well-nourished  Middle-aged male, no respiratory distress, questionable AOB, +tobacco odor   HENT:   Head: Normocephalic and atraumatic  Right Ear: External ear normal    Left Ear: External ear normal    Nose: Nose normal    Mouth/Throat: Oropharynx is clear and moist    Eyes: Conjunctivae and EOM are normal  Pupils are equal, round, and reactive to light  Neck: Normal range of motion  Neck supple  Cardiovascular: Normal rate, regular rhythm, normal heart sounds and intact distal pulses  Pulmonary/Chest:   Lungs with fair air entry bilaterally, distant breath sounds, bilateral rhonchi, no rales   Abdominal: Soft  Bowel sounds are normal    Abdomen is soft, obese, cannot palpate liver or spleen, no rigidity or rebound   Musculoskeletal: Normal range of motion  Neurological: He is alert and oriented to person, place, and time  He has normal reflexes  Skin: Skin is warm  Psychiatric: He has a normal mood and affect   His behavior is normal  Judgment and thought content normal    Extremities: 0-1 + bilateral lower extremity edema, no cords, pulses are 1+  Lymphatics: no adenopathy in the neck, supraclavicular region, axilla and groin bilaterally    Labs:    04/23/2018 WBC = 31 2 hemoglobin = 8 1 hematocrit = 24 5 MCV = 86 platelet = 961 lymphocytes = 83%  04/09/2018 WBC = 22 7 hemoglobin = 7 hematocrit = 21 1 platelet = 510 (no differential)  2/22/18 WBC = 12 1 hemoglobin = 8 3 hematocrit = 24 6 MCV = 86 platelet = 362  96/67/7865 WBC = 15 1 hemoglobin = 8 1 hematocrit = 24 3 platelet = 704  70/64/8075 WBC = 14 4 hemoglobin = 6 8 hematocrit = 20 platelet = 866    Pathology    Case Report   Surgical Pathology Report                         Case: [id number]                                    Authorizing Provider: Mabel Campos MD          Collected:           03/28/2018 1318               Ordering Location:     [address] [place] [address] Received:            03/28/2018 1354                                      [address] [place] [address]                                                                       Pathologist:           Yinka Kumari MD                                                         Specimens:   A) - Iliac Crest, Left, Bone Marrow   2 Cores                                                        B) - Iliac Crest, Left, Bone Marrow                                                                  C) - Iliac Crest, Left, Bone Marrow  2T  P  Slides, 2 Asp Stained , 8 Asp Unstained          Addendum   - Fluorescence in situ hybridization (FISH) ([facility]#[id number], evaluated by ORLIN Benoit , Ph D ) for genetic abnormalities which may be associated with myelodysplastic syndrome is as follows:        Interpretation  1  No evidence of trisomy 12 (+12)  2  Bi-allelic deletion of N01P203 (13q14 3) locus is detected  Comment: Deletions involving 13q14 are detectable by FISH in approximately 50% of persons with CLL  Among them,  about 61% show biallelic or concomitant monoallelic and biallelic deletion  As a group and as the sole aberration, del(13q)  is associated with a more favorable outcome  3  No evidence of p53 (17p13) deletion or amplification  4  No evidence of RENAE (11q22 3) deletion       - IgVH Mutation analysis ([facility]#[id number], evaluated by Dr Louise Cobian, Ph D )  is as follows:   IGVH MUTATION ANALYSIS: UNMUTATED - CLL  RESULTS  Mutation Rate: 0%  IgVH Family: IGHV1-2*04     -  Cytogenetic studies as performed on the bone marrow aspirate @ [facility] (Specimen ID: [id number], evaluated by Ariel Issa, PhD, [facility], [facility] ) as follows:  Karyotype:  46,XY,add(18)(p11 3)[6]/46,XY[1], LIMITED ANALYSIS   Cytogenetics Analysis:  : Metaphases Counted          Metaphases Analyzed            Metaphases Karyotyped        GTG Band level                       Culture Type(s)               7                                     7                                     4                        300-400 25qcJX1/DSP30   Interpretation: Abnormal male karyotype  This is an incomplete study and only 7 cells (as opposed to 20 cells) were available for analysis  Cytogenetic analysis  shows the presence of an abnormal clone (6 out of 7 cells) characterized by addition of material of unknown origin to  18p11  3  No other aberrations were identified, and one normal cell was found during the course of analysis  These current cytogenetic results are consistent with the presence of abnormal clonal cells  Correlation with  hematopathology and follow-up bone marrow studies are recommended to further evaluate the significance of these  findings  Microarray analysis could be considered to further characterize this rearrangement             Addendum electronically signed by Reddy Collier MD on 4/6/2018 at  4:12 PM     Addendum electronically signed by Reddy Collier MD on 4/6/2018 at  4:12 PM   Final Diagnosis   A -C  Bone marrow,  left iliac crest,  biopsy and aspirate:  -  Chronic lymphocytic leukemia/small lymphocytic lymphoma (CLL/SLL) (see note)  -  Significantly decreased trilineage myelopoiesis with normal appearing morphology and maturation without significant features of dysplasia or increased myeloblasts, secondary to the above  -  Anemia, neutropenia, and lymphocytosis, secondary to the above  -  Normal stainable storage iron  -  Mildly increased reticulin fibers without fibrosis, secondary to the above  -  Negative for collagen fibrosis, granulomata, vasculitis, necrosis           Electronically signed by Reddy Collier MD on 3/29/2018 at  2:51 P

## 2018-04-25 ENCOUNTER — TRANSCRIBE ORDERS (OUTPATIENT)
Dept: ADMINISTRATIVE | Facility: HOSPITAL | Age: 63
End: 2018-04-25

## 2018-04-25 ENCOUNTER — TELEPHONE (OUTPATIENT)
Dept: HEMATOLOGY ONCOLOGY | Facility: MEDICAL CENTER | Age: 63
End: 2018-04-25

## 2018-04-25 ENCOUNTER — APPOINTMENT (OUTPATIENT)
Dept: LAB | Facility: HOSPITAL | Age: 63
End: 2018-04-25
Payer: COMMERCIAL

## 2018-04-25 DIAGNOSIS — D64.9 ANEMIA, UNSPECIFIED TYPE: Primary | ICD-10-CM

## 2018-04-25 DIAGNOSIS — D64.9 ANEMIA, UNSPECIFIED TYPE: ICD-10-CM

## 2018-04-25 LAB
ERYTHROCYTE [DISTWIDTH] IN BLOOD BY AUTOMATED COUNT: 14.8 % (ref 11.6–15.1)
HCT VFR BLD AUTO: 23.9 % (ref 42–52)
HGB BLD-MCNC: 7.8 G/DL (ref 14–18)
MCH RBC QN AUTO: 28.2 PG (ref 27–31)
MCHC RBC AUTO-ENTMCNC: 32.5 G/DL (ref 31.4–37.4)
MCV RBC AUTO: 87 FL (ref 82–98)
PLATELET # BLD AUTO: 139 THOUSANDS/UL (ref 130–400)
PLATELET BLD QL SMEAR: ADEQUATE
PMV BLD AUTO: 7.5 FL (ref 8.9–12.7)
RBC # BLD AUTO: 2.76 MILLION/UL (ref 4.7–6.1)
SMUDGE CELLS BLD QL SMEAR: PRESENT
WBC # BLD AUTO: 41 THOUSAND/UL (ref 4.8–10.8)

## 2018-04-25 PROCEDURE — 36415 COLL VENOUS BLD VENIPUNCTURE: CPT

## 2018-04-25 PROCEDURE — 85027 COMPLETE CBC AUTOMATED: CPT

## 2018-04-27 ENCOUNTER — APPOINTMENT (OUTPATIENT)
Dept: LAB | Facility: HOSPITAL | Age: 63
End: 2018-04-27
Payer: COMMERCIAL

## 2018-04-27 ENCOUNTER — TELEPHONE (OUTPATIENT)
Dept: HEMATOLOGY ONCOLOGY | Facility: MEDICAL CENTER | Age: 63
End: 2018-04-27

## 2018-04-27 DIAGNOSIS — D64.9 ANEMIA, UNSPECIFIED TYPE: ICD-10-CM

## 2018-04-27 LAB
ERYTHROCYTE [DISTWIDTH] IN BLOOD BY AUTOMATED COUNT: 14.9 % (ref 11.6–15.1)
HCT VFR BLD AUTO: 22.4 % (ref 42–52)
HGB BLD-MCNC: 7.5 G/DL (ref 14–18)
MCH RBC QN AUTO: 28.4 PG (ref 27–31)
MCHC RBC AUTO-ENTMCNC: 33.5 G/DL (ref 31.4–37.4)
MCV RBC AUTO: 85 FL (ref 82–98)
PLATELET # BLD AUTO: 110 THOUSANDS/UL (ref 130–400)
PLATELET BLD QL SMEAR: ABNORMAL
PMV BLD AUTO: 7.4 FL (ref 8.9–12.7)
RBC # BLD AUTO: 2.64 MILLION/UL (ref 4.7–6.1)
WBC # BLD AUTO: 35.5 THOUSAND/UL (ref 4.8–10.8)

## 2018-04-27 PROCEDURE — 36415 COLL VENOUS BLD VENIPUNCTURE: CPT

## 2018-04-27 PROCEDURE — 85027 COMPLETE CBC AUTOMATED: CPT

## 2018-04-29 LAB — SCAN RESULT: NORMAL

## 2018-04-30 ENCOUNTER — APPOINTMENT (OUTPATIENT)
Dept: LAB | Facility: HOSPITAL | Age: 63
End: 2018-04-30
Payer: COMMERCIAL

## 2018-04-30 LAB
BASOPHILS # BLD AUTO: 0.1 THOUSANDS/ΜL (ref 0–0.1)
BASOPHILS NFR BLD AUTO: 0 % (ref 0–1)
EOSINOPHIL # BLD AUTO: 0.2 THOUSAND/ΜL (ref 0–0.61)
EOSINOPHIL NFR BLD AUTO: 1 % (ref 0–6)
ERYTHROCYTE [DISTWIDTH] IN BLOOD BY AUTOMATED COUNT: 14.7 % (ref 11.6–15.1)
HCT VFR BLD AUTO: 19.4 % (ref 42–52)
HGB BLD-MCNC: 6.5 G/DL (ref 14–18)
LYMPHOCYTES # BLD AUTO: 25.7 THOUSANDS/ΜL (ref 0.6–4.47)
LYMPHOCYTES NFR BLD AUTO: 80 % (ref 14–44)
MCH RBC QN AUTO: 28.5 PG (ref 27–31)
MCHC RBC AUTO-ENTMCNC: 33.7 G/DL (ref 31.4–37.4)
MCV RBC AUTO: 85 FL (ref 82–98)
MONOCYTES # BLD AUTO: 1.9 THOUSAND/ΜL (ref 0.17–1.22)
MONOCYTES NFR BLD AUTO: 6 % (ref 4–12)
NEUTROPHILS # BLD AUTO: 4.4 THOUSANDS/ΜL (ref 1.85–7.62)
NEUTS SEG NFR BLD AUTO: 14 % (ref 43–75)
NRBC BLD AUTO-RTO: 0 /100 WBCS
PLATELET # BLD AUTO: 93 THOUSANDS/UL (ref 130–400)
PLATELET BLD QL SMEAR: ABNORMAL
PMV BLD AUTO: 7.4 FL (ref 8.9–12.7)
RBC # BLD AUTO: 2.29 MILLION/UL (ref 4.7–6.1)
WBC # BLD AUTO: 32.3 THOUSAND/UL (ref 4.8–10.8)

## 2018-04-30 PROCEDURE — 36415 COLL VENOUS BLD VENIPUNCTURE: CPT | Performed by: FAMILY MEDICINE

## 2018-04-30 PROCEDURE — 85025 COMPLETE CBC W/AUTO DIFF WBC: CPT | Performed by: FAMILY MEDICINE

## 2018-05-01 ENCOUNTER — HOSPITAL ENCOUNTER (OUTPATIENT)
Dept: INFUSION CENTER | Facility: HOSPITAL | Age: 63
Discharge: HOME | End: 2018-05-01
Payer: COMMERCIAL

## 2018-05-01 VITALS
HEART RATE: 60 BPM | TEMPERATURE: 97.7 F | OXYGEN SATURATION: 99 % | RESPIRATION RATE: 18 BRPM | SYSTOLIC BLOOD PRESSURE: 174 MMHG | DIASTOLIC BLOOD PRESSURE: 77 MMHG

## 2018-05-01 PROCEDURE — 36430 TRANSFUSION BLD/BLD COMPNT: CPT

## 2018-05-01 PROCEDURE — P9021 RED BLOOD CELLS UNIT: HCPCS

## 2018-05-01 PROCEDURE — 86901 BLOOD TYPING SEROLOGIC RH(D): CPT | Performed by: INTERNAL MEDICINE

## 2018-05-01 PROCEDURE — 86920 COMPATIBILITY TEST SPIN: CPT

## 2018-05-01 PROCEDURE — 86850 RBC ANTIBODY SCREEN: CPT | Performed by: INTERNAL MEDICINE

## 2018-05-01 PROCEDURE — 86900 BLOOD TYPING SEROLOGIC ABO: CPT | Performed by: INTERNAL MEDICINE

## 2018-05-01 RX ORDER — SODIUM CHLORIDE 9 MG/ML
20 INJECTION, SOLUTION INTRAVENOUS ONCE
Status: COMPLETED | OUTPATIENT
Start: 2018-05-01 | End: 2018-05-01

## 2018-05-01 RX ORDER — DIPHENHYDRAMINE HCL 25 MG
25 TABLET ORAL ONCE
Status: COMPLETED | OUTPATIENT
Start: 2018-05-01 | End: 2018-05-01

## 2018-05-01 RX ORDER — ACETAMINOPHEN 325 MG/1
650 TABLET ORAL ONCE
Status: COMPLETED | OUTPATIENT
Start: 2018-05-01 | End: 2018-05-01

## 2018-05-01 RX ADMIN — DIPHENHYDRAMINE HCL 25 MG: 25 TABLET ORAL at 08:55

## 2018-05-01 RX ADMIN — SODIUM CHLORIDE 20 ML/HR: 0.9 INJECTION, SOLUTION INTRAVENOUS at 09:30

## 2018-05-01 RX ADMIN — ACETAMINOPHEN 650 MG: 325 TABLET, FILM COATED ORAL at 08:56

## 2018-05-02 ENCOUNTER — TRANSCRIBE ORDERS (OUTPATIENT)
Dept: ADMINISTRATIVE | Facility: HOSPITAL | Age: 63
End: 2018-05-02

## 2018-05-02 ENCOUNTER — LAB (OUTPATIENT)
Dept: LAB | Facility: HOSPITAL | Age: 63
End: 2018-05-02
Payer: COMMERCIAL

## 2018-05-02 DIAGNOSIS — D64.9 ANEMIA, UNSPECIFIED TYPE: ICD-10-CM

## 2018-05-02 DIAGNOSIS — D64.9 ANEMIA, UNSPECIFIED TYPE: Primary | ICD-10-CM

## 2018-05-02 LAB
ABO GROUP BLD BPU: NORMAL
ABO GROUP BLD BPU: NORMAL
BPU ID: NORMAL
BPU ID: NORMAL
CROSSMATCH: NORMAL
CROSSMATCH: NORMAL
ERYTHROCYTE [DISTWIDTH] IN BLOOD BY AUTOMATED COUNT: 15 % (ref 11.6–15.1)
HCT VFR BLD AUTO: 23.4 % (ref 42–52)
HGB BLD-MCNC: 7.8 G/DL (ref 14–18)
MCH RBC QN AUTO: 28.7 PG (ref 27–31)
MCHC RBC AUTO-ENTMCNC: 33.3 G/DL (ref 31.4–37.4)
MCV RBC AUTO: 86 FL (ref 82–98)
PLATELET # BLD AUTO: 89 THOUSANDS/UL (ref 130–400)
PLATELET BLD QL SMEAR: ABNORMAL
PMV BLD AUTO: 7.2 FL (ref 8.9–12.7)
RBC # BLD AUTO: 2.72 MILLION/UL (ref 4.7–6.1)
UNIT DISPENSE STATUS: NORMAL
UNIT DISPENSE STATUS: NORMAL
UNIT PRODUCT CODE: NORMAL
UNIT PRODUCT CODE: NORMAL
UNIT RH: NORMAL
UNIT RH: NORMAL
WBC # BLD AUTO: 26.1 THOUSAND/UL (ref 4.8–10.8)

## 2018-05-02 PROCEDURE — 85027 COMPLETE CBC AUTOMATED: CPT

## 2018-05-02 PROCEDURE — 36415 COLL VENOUS BLD VENIPUNCTURE: CPT

## 2018-05-04 ENCOUNTER — APPOINTMENT (OUTPATIENT)
Dept: LAB | Facility: HOSPITAL | Age: 63
End: 2018-05-04
Payer: COMMERCIAL

## 2018-05-04 DIAGNOSIS — D64.9 ANEMIA, UNSPECIFIED TYPE: ICD-10-CM

## 2018-05-04 LAB
ERYTHROCYTE [DISTWIDTH] IN BLOOD BY AUTOMATED COUNT: 15.1 % (ref 11.6–15.1)
HCT VFR BLD AUTO: 23.8 % (ref 42–52)
HGB BLD-MCNC: 7.9 G/DL (ref 14–18)
MCH RBC QN AUTO: 28.7 PG (ref 27–31)
MCHC RBC AUTO-ENTMCNC: 33.3 G/DL (ref 31.4–37.4)
MCV RBC AUTO: 86 FL (ref 82–98)
PLATELET # BLD AUTO: 99 THOUSANDS/UL (ref 130–400)
PLATELET BLD QL SMEAR: ABNORMAL
PMV BLD AUTO: 7.4 FL (ref 8.9–12.7)
RBC # BLD AUTO: 2.76 MILLION/UL (ref 4.7–6.1)
WBC # BLD AUTO: 47.7 THOUSAND/UL (ref 4.8–10.8)

## 2018-05-04 PROCEDURE — 36415 COLL VENOUS BLD VENIPUNCTURE: CPT

## 2018-05-04 PROCEDURE — 85027 COMPLETE CBC AUTOMATED: CPT

## 2018-05-07 ENCOUNTER — TELEPHONE (OUTPATIENT)
Dept: HEMATOLOGY ONCOLOGY | Facility: MEDICAL CENTER | Age: 63
End: 2018-05-07

## 2018-05-07 ENCOUNTER — APPOINTMENT (OUTPATIENT)
Dept: LAB | Facility: HOSPITAL | Age: 63
End: 2018-05-07
Payer: COMMERCIAL

## 2018-05-07 LAB
ERYTHROCYTE [DISTWIDTH] IN BLOOD BY AUTOMATED COUNT: 15.1 % (ref 11.6–15.1)
HCT VFR BLD AUTO: 23.3 % (ref 42–52)
HGB BLD-MCNC: 7.5 G/DL (ref 14–18)
LYMPHOCYTES # BLD AUTO: 48.77 THOUSAND/UL (ref 0.6–4.47)
LYMPHOCYTES # BLD AUTO: 96 %
MCH RBC QN AUTO: 28.2 PG (ref 27–31)
MCHC RBC AUTO-ENTMCNC: 32.1 G/DL (ref 31.4–37.4)
MCV RBC AUTO: 88 FL (ref 82–98)
NEUTS BAND NFR BLD MANUAL: 0 % (ref 0–8)
NEUTS SEG # BLD: 2.03 THOUSAND/UL (ref 1.81–6.82)
NEUTS SEG NFR BLD AUTO: 4 %
NRBC BLD AUTO-RTO: 0 /100 WBCS
PLATELET # BLD AUTO: 105 THOUSANDS/UL (ref 130–400)
PLATELET BLD QL SMEAR: ABNORMAL
PMV BLD AUTO: 7.9 FL (ref 8.9–12.7)
RBC # BLD AUTO: 2.65 MILLION/UL (ref 4.7–6.1)
TOTAL CELLS COUNTED SPEC: 100
WBC # BLD AUTO: 50.8 THOUSAND/UL (ref 4.8–10.8)

## 2018-05-07 PROCEDURE — 85007 BL SMEAR W/DIFF WBC COUNT: CPT | Performed by: FAMILY MEDICINE

## 2018-05-07 PROCEDURE — 36415 COLL VENOUS BLD VENIPUNCTURE: CPT | Performed by: FAMILY MEDICINE

## 2018-05-07 PROCEDURE — 85027 COMPLETE CBC AUTOMATED: CPT | Performed by: FAMILY MEDICINE

## 2018-05-08 ENCOUNTER — TELEPHONE (OUTPATIENT)
Dept: HEMATOLOGY ONCOLOGY | Facility: MEDICAL CENTER | Age: 63
End: 2018-05-08

## 2018-05-08 NOTE — TELEPHONE ENCOUNTER
Spoke with patients wife, pt started taking Imbruvica on 5/2/18  As per dr Cornelia Coffman pt does not need to stop this medication prior to dental work  Wife expressed verbal understanding  Dentist appt tomorrow morning at 0800

## 2018-05-10 ENCOUNTER — TELEPHONE (OUTPATIENT)
Dept: HEMATOLOGY ONCOLOGY | Facility: MEDICAL CENTER | Age: 63
End: 2018-05-10

## 2018-05-10 ENCOUNTER — APPOINTMENT (OUTPATIENT)
Dept: LAB | Facility: HOSPITAL | Age: 63
End: 2018-05-10
Payer: COMMERCIAL

## 2018-05-10 DIAGNOSIS — D64.9 ANEMIA, UNSPECIFIED TYPE: ICD-10-CM

## 2018-05-10 DIAGNOSIS — D64.9 ANEMIA, UNSPECIFIED TYPE: Primary | ICD-10-CM

## 2018-05-10 LAB
EOSINOPHIL # BLD AUTO: 0.51 THOUSAND/UL (ref 0–0.61)
EOSINOPHIL NFR BLD MANUAL: 1 % (ref 0–6)
ERYTHROCYTE [DISTWIDTH] IN BLOOD BY AUTOMATED COUNT: 14 % (ref 11.6–15.1)
HCT VFR BLD AUTO: 22.3 % (ref 42–52)
HGB BLD-MCNC: 7.7 G/DL (ref 14–18)
LYMPHOCYTES # BLD AUTO: 47.06 THOUSAND/UL (ref 0.6–4.47)
LYMPHOCYTES # BLD AUTO: 93 %
MCH RBC QN AUTO: 29.4 PG (ref 27–31)
MCHC RBC AUTO-ENTMCNC: 34.5 G/DL (ref 31.4–37.4)
MCV RBC AUTO: 85 FL (ref 82–98)
MONOCYTES # BLD AUTO: 1.01 THOUSAND/UL (ref 0–1.22)
MONOCYTES NFR BLD AUTO: 2 % (ref 4–12)
NEUTS BAND NFR BLD MANUAL: 0 % (ref 0–8)
NEUTS SEG # BLD: 2.02 THOUSAND/UL (ref 1.81–6.82)
NEUTS SEG NFR BLD AUTO: 4 %
PLATELET # BLD AUTO: 133 THOUSANDS/UL (ref 130–400)
PLATELET BLD QL SMEAR: ABNORMAL
PMV BLD AUTO: 8 FL (ref 8.9–12.7)
RBC # BLD AUTO: 2.62 MILLION/UL (ref 4.7–6.1)
TOTAL CELLS COUNTED SPEC: 100
WBC # BLD AUTO: 50.6 THOUSAND/UL (ref 4.8–10.8)

## 2018-05-10 PROCEDURE — 85027 COMPLETE CBC AUTOMATED: CPT | Performed by: FAMILY MEDICINE

## 2018-05-10 PROCEDURE — 85007 BL SMEAR W/DIFF WBC COUNT: CPT | Performed by: FAMILY MEDICINE

## 2018-05-10 PROCEDURE — 36415 COLL VENOUS BLD VENIPUNCTURE: CPT | Performed by: FAMILY MEDICINE

## 2018-05-14 ENCOUNTER — APPOINTMENT (OUTPATIENT)
Dept: LAB | Facility: HOSPITAL | Age: 63
End: 2018-05-14
Attending: INTERNAL MEDICINE
Payer: COMMERCIAL

## 2018-05-14 ENCOUNTER — TELEPHONE (OUTPATIENT)
Dept: HEMATOLOGY ONCOLOGY | Facility: MEDICAL CENTER | Age: 63
End: 2018-05-14

## 2018-05-14 ENCOUNTER — TRANSCRIBE ORDERS (OUTPATIENT)
Dept: ADMINISTRATIVE | Facility: HOSPITAL | Age: 63
End: 2018-05-14

## 2018-05-14 DIAGNOSIS — D64.9 ANEMIA, UNSPECIFIED TYPE: ICD-10-CM

## 2018-05-14 LAB
ABO GROUP BLD: NORMAL
ANISOCYTOSIS BLD QL SMEAR: PRESENT
BLD GP AB SCN SERPL QL: POSITIVE
BLOOD GROUP ANTIBODIES SERPL: NORMAL
EOSINOPHIL # BLD AUTO: 0.48 THOUSAND/UL (ref 0–0.61)
EOSINOPHIL NFR BLD MANUAL: 1 % (ref 0–6)
ERYTHROCYTE [DISTWIDTH] IN BLOOD BY AUTOMATED COUNT: 14.8 % (ref 11.6–15.1)
HCT VFR BLD AUTO: 21.1 % (ref 42–52)
HGB BLD-MCNC: 6.9 G/DL (ref 14–18)
HYPERCHROMIA BLD QL SMEAR: PRESENT
LYMPHOCYTES # BLD AUTO: 47.72 THOUSAND/UL (ref 0.6–4.47)
LYMPHOCYTES # BLD AUTO: 99 %
MCH RBC QN AUTO: 28.4 PG (ref 27–31)
MCHC RBC AUTO-ENTMCNC: 32.7 G/DL (ref 31.4–37.4)
MCV RBC AUTO: 87 FL (ref 82–98)
NEUTS BAND NFR BLD MANUAL: 0 % (ref 0–8)
NEUTS SEG # BLD: 0 THOUSAND/UL (ref 1.81–6.82)
NEUTS SEG NFR BLD AUTO: 0 %
NRBC BLD AUTO-RTO: 0 /100 WBCS
PLATELET # BLD AUTO: 140 THOUSANDS/UL (ref 130–400)
PLATELET BLD QL SMEAR: ADEQUATE
PMV BLD AUTO: 7.8 FL (ref 8.9–12.7)
RBC # BLD AUTO: 2.43 MILLION/UL (ref 4.7–6.1)
RH BLD: POSITIVE
SPECIMEN EXPIRATION DATE: NORMAL
TOTAL CELLS COUNTED SPEC: 100
WBC # BLD AUTO: 48.2 THOUSAND/UL (ref 4.8–10.8)

## 2018-05-14 PROCEDURE — 86870 RBC ANTIBODY IDENTIFICATION: CPT

## 2018-05-14 PROCEDURE — 85027 COMPLETE CBC AUTOMATED: CPT

## 2018-05-14 PROCEDURE — 86850 RBC ANTIBODY SCREEN: CPT | Performed by: INTERNAL MEDICINE

## 2018-05-14 PROCEDURE — 86922 COMPATIBILITY TEST ANTIGLOB: CPT

## 2018-05-14 PROCEDURE — 86901 BLOOD TYPING SEROLOGIC RH(D): CPT | Performed by: INTERNAL MEDICINE

## 2018-05-14 PROCEDURE — 86900 BLOOD TYPING SEROLOGIC ABO: CPT | Performed by: INTERNAL MEDICINE

## 2018-05-14 PROCEDURE — 36415 COLL VENOUS BLD VENIPUNCTURE: CPT

## 2018-05-14 PROCEDURE — 85007 BL SMEAR W/DIFF WBC COUNT: CPT

## 2018-05-14 PROCEDURE — 86921 COMPATIBILITY TEST INCUBATE: CPT

## 2018-05-14 NOTE — TELEPHONE ENCOUNTER
Spoke with Dr Baker Rounds  Set patient up for a unit of PRBCs for tomorrow (5/15/18) at 10am  Patient's wife aware

## 2018-05-15 ENCOUNTER — RADIATION ONCOLOGY FOLLOW-UP (OUTPATIENT)
Dept: HEMATOLOGY ONCOLOGY | Facility: MEDICAL CENTER | Age: 63
End: 2018-05-15
Payer: COMMERCIAL

## 2018-05-15 ENCOUNTER — HOSPITAL ENCOUNTER (OUTPATIENT)
Dept: INFUSION CENTER | Facility: HOSPITAL | Age: 63
Discharge: HOME | End: 2018-05-15
Payer: COMMERCIAL

## 2018-05-15 VITALS
RESPIRATION RATE: 16 BRPM | OXYGEN SATURATION: 96 % | SYSTOLIC BLOOD PRESSURE: 112 MMHG | DIASTOLIC BLOOD PRESSURE: 58 MMHG | HEART RATE: 55 BPM | TEMPERATURE: 98.3 F

## 2018-05-15 DIAGNOSIS — C95.10 CHRONIC LEUKEMIA, NOT HAVING ACHIEVED REMISSION (HCC): Primary | ICD-10-CM

## 2018-05-15 PROCEDURE — 36430 TRANSFUSION BLD/BLD COMPNT: CPT

## 2018-05-15 PROCEDURE — 99213 OFFICE O/P EST LOW 20 MIN: CPT | Performed by: INTERNAL MEDICINE

## 2018-05-15 PROCEDURE — P9016 RBC LEUKOCYTES REDUCED: HCPCS

## 2018-05-15 RX ORDER — ACETAMINOPHEN 325 MG/1
650 TABLET ORAL ONCE
Status: COMPLETED | OUTPATIENT
Start: 2018-05-15 | End: 2018-05-15

## 2018-05-15 RX ORDER — DIPHENHYDRAMINE HCL 25 MG
25 TABLET ORAL ONCE
Status: COMPLETED | OUTPATIENT
Start: 2018-05-15 | End: 2018-05-15

## 2018-05-15 RX ADMIN — ACETAMINOPHEN 650 MG: 325 TABLET, FILM COATED ORAL at 10:28

## 2018-05-15 RX ADMIN — DIPHENHYDRAMINE HCL 25 MG: 25 TABLET ORAL at 10:28

## 2018-05-15 NOTE — PROGRESS NOTES
Landy Templeton  1955  [facility]  PATIENT SEEN IN THE [facility] CHEMOTHERAPY INFUSION ROOM  [facility] [address]    DISCUSSION  SUMMARY:    79-year-old male with a number of medical problems including lymphocytosis and anemia recently admitted to the hospital because of the anemia (2nd time)  Mr Rolando Reddy underwent a bone marrow biopsy demonstrating a packed marrow  Pathology was consistent with CLL  Issues:    1  CLL  Patient is symptomatic and needs to be treated  We discussed options  Patient has just begun ibrutinib  Nursing staff went over the specifics previously  So far, patient denies any side effects/issues with the new medication  2  Anemia  Secondary to a packed marrow  Patient is to receive 1 unit now  Hopefully as the ibrutinib works and clears the marrow, the red cell precursors will be able to repopulate the marrow      3  Psychiatric issues/depression  Patient needs to follow up with Psychiatry as directed      4  History of hematuria -none recently  Patient is to continue to monitor      Patient already has a follow-up appointment in the office  Patient knows to call the hematology/oncology office if there are any other questions or concerns  Carefully review your medication list and verify that the list is accurate and up-to-date  Please call the hematology/oncology office if there are medications missing from the list, medications on the list that you are not currently taking or if there is a dosage or instruction that is different from how you're taking that medication      Patient goals and areas of care: monitor CBC parameters, continue with the CLL treatment  Patient is able to self-care   _____________________________________________________________________________________    Chief Complaint   Patient presents with    Follow-up     CLL, anemia     History of Present Illness:    79-year-old male recently admitted to [facility] with severe anemia  Bone marrow biopsy demonstrated CLL  Patient is in the infusion room for blood  Mr Trenia Kehr states feeling +/-  Fatigue has been worse recently  As before, patient denies any problems with excessive bruising or bleeding  Activities are limited because of the fatigue  No fevers, chills or sweats  Appetite is okay, no GI or  issues  No pain control issues  No shortness of breath; + dyspnea on exertion  No chest pain or pressure  Review of Systems   Constitutional: Positive for fatigue  HENT: Negative  Eyes: Negative  Respiratory: Negative          +dyspnea on exertion   Cardiovascular: Negative  Gastrointestinal: Negative  Endocrine: Negative  Genitourinary: Negative  Musculoskeletal: Positive for arthralgias  Skin: Negative  Allergic/Immunologic: Negative  Neurological: Negative  Hematological: Negative  Psychiatric/Behavioral: Positive for behavioral problems  All other systems reviewed and are negative  Patient Active Problem List   Diagnosis    Hypertension    Depression    Polypharmacy    Bronchitis    Encephalopathy    Weakness    Leukemia (HCC)    Lactic acidosis    Leukocytosis    Elevated troponin    Bipolar 1 disorder (HCC)    Psychiatric disorder    Anemia     Past Medical History:   Diagnosis Date    Anemia     Anxiety     Bipolar disorder (HCC)     [facility] [address]     History of alcohol abuse     Hypertension     Hypertension     Insomnia     Leukemia ([facility] [address]     Opiate abuse, episodic     Psychiatric disorder      Past Surgical History:   Procedure Laterality Date    EGD AND COLONOSCOPY N/A 3/30/2018    Procedure: EGD AND COLONOSCOPY;  Surgeon: Eliza Soria MD;  Location: [facility];   Service: Gastroenterology     Family History   Problem Relation Age of Onset    Coronary artery disease Mother     No Known Problems Father     Hepatitis Sister     Cancer Brother     Prostate cancer Brother     Early death Brother      Social History     Social History    Marital status: /Civil Union     Spouse name: N/A    Number of children: N/A    Years of education: N/A     Occupational History    Not on file  Social History Main Topics    Smoking status: Current Every Day Smoker     Packs/day: 2 00     Years: 40 00    Smokeless tobacco: Never Used    Alcohol use No      Comment: last drink nov 19 2017    Drug use: No      Comment: hx of heroin and subutex abuse    Sexual activity: Not Currently     Other Topics Concern    Not on file     Social History Narrative    No narrative on file       Current Outpatient Prescriptions:     aspirin 81 mg chewable tablet, Chew 1 tablet (81 mg total) daily, Disp: 30 tablet, Rfl: 11    benztropine (COGENTIN) 1 mg tablet, Take 1 mg by mouth Medrol Dose Pack scheduling ONLY  , Disp: , Rfl:     divalproex sodium (DEPAKOTE) 500 mg EC tablet, Take 500 mg by mouth every 12 (twelve) hours  , Disp: , Rfl:     FLUoxetine (PROzac) 20 mg capsule, Take 40 mg by mouth daily, Disp: , Rfl:     hydrochlorothiazide (MICROZIDE) 12 5 mg capsule, Take 12 5 mg by mouth daily, Disp: , Rfl:     hydrOXYzine pamoate (VISTARIL) 50 mg capsule, Take 50 mg by mouth 2 (two) times a day, Disp: , Rfl:     Ibrutinib 420 MG TABS, Take 420 mg by mouth daily, Disp: 30 tablet, Rfl: 3    lisinopril (ZESTRIL) 40 mg tablet, Take 40 mg by mouth daily, Disp: , Rfl:     metoprolol tartrate (LOPRESSOR) 25 mg tablet, Take 0 5 tablets (12 5 mg total) by mouth every 12 (twelve) hours, Disp: 60 tablet, Rfl: 5    mirtazapine (REMERON) 15 mg tablet, Take 15 mg by mouth daily at bedtime, Disp: , Rfl:     pantoprazole (PROTONIX) 40 mg tablet, Take 1 tablet (40 mg total) by mouth daily, Disp: 30 tablet, Rfl: 5    risperiDONE (RisperDAL) 2 mg tablet, Take 4 mg by mouth 2 (two) times a day  , Disp: , Rfl:   No current facility-administered medications for this visit       No Known Allergies    Vitals: Temperature = 98 3 pulse = 55 BP = 112/58  Physical Exam   Constitutional: He is oriented to person, place, and time  He appears well-developed and well-nourished  Middle-aged male, no respiratory distress, questionable AOB, +tobacco odor   HENT:   Head: Normocephalic and atraumatic  Right Ear: External ear normal    Left Ear: External ear normal    Nose: Nose normal    Mouth/Throat: Oropharynx is clear and moist    Eyes: Conjunctivae and EOM are normal  Pupils are equal, round, and reactive to light  Neck: Normal range of motion  Neck supple  Cardiovascular: Normal rate, regular rhythm, normal heart sounds and intact distal pulses  Pulmonary/Chest:   Lungs with fair air entry bilaterally, distant breath sounds, bilateral rhonchi, no rales   Abdominal: Soft  Bowel sounds are normal    Abdomen is soft, obese, cannot palpate liver or spleen, no rigidity or rebound   Musculoskeletal: Normal range of motion  Neurological: He is alert and oriented to person, place, and time  He has normal reflexes  Skin: Skin is warm  Skin is pale, warm, slightly dry, scattered ecchymoses, no petechiae, no active bleeding   Psychiatric: He has a normal mood and affect   His behavior is normal  Judgment and thought content normal    Extremities: 01 + bilateral lower extremity edema, no cords, pulses are 1+  Lymphatics: no adenopathy in the neck, supraclavicular region, axilla and groin bilaterally    Labs:    05/14/2018 WBC = 40 2 hemoglobin = 6 9 hematocrit = 21 1 MCV = 87 platelet = 355 lymphocytes = 99%  04/23/2018 WBC = 31 2 hemoglobin = 8 1 hematocrit = 24 5 MCV = 86 platelet = 368 lymphocytes = 83%  04/09/2018 WBC = 22 7 hemoglobin = 7 hematocrit = 21 1 platelet = 319 (no differential)  2/22/18 WBC = 12 1 hemoglobin = 8 3 hematocrit = 24 6 MCV = 86 platelet = 861  25/16/8476 WBC = 15 1 hemoglobin = 8 1 hematocrit = 24 3 platelet = 644  91/82/1816 WBC = 14 4 hemoglobin = 6 8 hematocrit = 20 platelet = 202    Pathology    Case Report   Surgical Pathology Report                         Case: [id number]                                    Authorizing Provider: Zeny Membreno MD          Collected:           03/28/2018 1318               Ordering Location:     [facility] Received:            03/28/2018 1354                                      [address]                                                                       Pathologist:           Jennifer Nguyen MD                                                         Specimens:   A) - Iliac Crest, Left, Bone Marrow   2 Cores                                                        B) - Iliac Crest, Left, Bone Marrow                                                                  C) - Iliac Crest, Left, Bone Marrow  2T  P  Slides, 2 Asp Stained , 8 Asp Unstained          Addendum   - Fluorescence in situ hybridization (FISH) ([facility]#[id number], evaluated by ORLIN Massey , Ph D ) for genetic abnormalities which may be associated with myelodysplastic syndrome is as follows:        Interpretation  1  No evidence of trisomy 12 (+12)  2  Bi-allelic deletion of T25A787 (13q14 3) locus is detected  Comment: Deletions involving 13q14 are detectable by FISH in approximately 50% of persons with CLL  Among them,  about 35% show biallelic or concomitant monoallelic and biallelic deletion  As a group and as the sole aberration, del(13q)  is associated with a more favorable outcome  3  No evidence of p53 (17p13) deletion or amplification  4  No evidence of RENAE (11q22 3) deletion       - IgVH Mutation analysis ([facility]#[id number], evaluated by Dr Seamus Zavala, Ph D )  is as follows:   IGVH MUTATION ANALYSIS: UNMUTATED - CLL  RESULTS  Mutation Rate: 0%  IgVH Family: IGHV1-2*04     -  Cytogenetic studies as performed on the bone marrow aspirate @ [facility] (Specimen ID: [id number], evaluated by Feng Boyd, PhD[facility] ) as follows:  Karyotype: 46,XY,add(18)(p11 3)[6]/46,XY[1], LIMITED ANALYSIS   Cytogenetics Analysis:  : Metaphases Counted          Metaphases Analyzed            Metaphases Karyotyped        GTG Band level                       Culture Type(s)               7                                     7                                     4                        300-400                                       01mvMO2/DSP30   Interpretation: Abnormal male karyotype  This is an incomplete study and only 7 cells (as opposed to 20 cells) were available for analysis  Cytogenetic analysis  shows the presence of an abnormal clone (6 out of 7 cells) characterized by addition of material of unknown origin to  18p11  3  No other aberrations were identified, and one normal cell was found during the course of analysis  These current cytogenetic results are consistent with the presence of abnormal clonal cells  Correlation with  hematopathology and follow-up bone marrow studies are recommended to further evaluate the significance of these  findings  Microarray analysis could be considered to further characterize this rearrangement             Addendum electronically signed by Xavi Whitfield MD on 4/6/2018 at  4:12 PM     Addendum electronically signed by Xavi Whitfield MD on 4/6/2018 at  4:12 PM   Final Diagnosis   A -C  Bone marrow,  left iliac crest,  biopsy and aspirate:  -  Chronic lymphocytic leukemia/small lymphocytic lymphoma (CLL/SLL) (see note)  -  Significantly decreased trilineage myelopoiesis with normal appearing morphology and maturation without significant features of dysplasia or increased myeloblasts, secondary to the above  -  Anemia, neutropenia, and lymphocytosis, secondary to the above  -  Normal stainable storage iron  -  Mildly increased reticulin fibers without fibrosis, secondary to the above  -  Negative for collagen fibrosis, granulomata, vasculitis, necrosis           Electronically signed by Xavi Whitfield MD on 3/29/2018 at  2:51 P

## 2018-05-17 ENCOUNTER — LAB (OUTPATIENT)
Dept: LAB | Facility: HOSPITAL | Age: 63
End: 2018-05-17
Attending: INTERNAL MEDICINE
Payer: COMMERCIAL

## 2018-05-17 ENCOUNTER — TELEPHONE (OUTPATIENT)
Dept: HEMATOLOGY ONCOLOGY | Facility: MEDICAL CENTER | Age: 63
End: 2018-05-17

## 2018-05-17 ENCOUNTER — TRANSCRIBE ORDERS (OUTPATIENT)
Dept: ADMINISTRATIVE | Facility: HOSPITAL | Age: 63
End: 2018-05-17

## 2018-05-17 DIAGNOSIS — D64.9 ANEMIA, UNSPECIFIED TYPE: ICD-10-CM

## 2018-05-17 LAB
BASOPHILS # BLD MANUAL: 0 THOUSAND/UL (ref 0–0.1)
BASOPHILS NFR MAR MANUAL: 0 % (ref 0–1)
EOSINOPHIL # BLD MANUAL: 0 THOUSAND/UL (ref 0–0.4)
EOSINOPHIL NFR BLD MANUAL: 0 % (ref 0–6)
ERYTHROCYTE [DISTWIDTH] IN BLOOD BY AUTOMATED COUNT: 13.8 % (ref 11.6–15.1)
HCT VFR BLD AUTO: 21.2 % (ref 36.5–49.3)
HGB BLD-MCNC: 6.6 G/DL (ref 12–17)
HYPERCHROMIA BLD QL SMEAR: PRESENT
LYMPHOCYTES # BLD AUTO: 54.73 THOUSAND/UL (ref 0.6–4.47)
LYMPHOCYTES # BLD AUTO: 97 % (ref 14–44)
MCH RBC QN AUTO: 27.6 PG (ref 26.8–34.3)
MCHC RBC AUTO-ENTMCNC: 31.1 G/DL (ref 31.4–37.4)
MCV RBC AUTO: 89 FL (ref 82–98)
MONOCYTES # BLD AUTO: 1.13 THOUSAND/UL (ref 0–1.22)
MONOCYTES NFR BLD: 2 % (ref 4–12)
NEUTROPHILS # BLD MANUAL: 0.56 THOUSAND/UL (ref 1.85–7.62)
NEUTS SEG NFR BLD AUTO: 1 % (ref 43–75)
NRBC BLD AUTO-RTO: 0 /100 WBCS
PLATELET # BLD AUTO: 101 THOUSANDS/UL (ref 149–390)
PLATELET BLD QL SMEAR: ABNORMAL
PMV BLD AUTO: 11 FL (ref 8.9–12.7)
RBC # BLD AUTO: 2.39 MILLION/UL (ref 3.88–5.62)
ROULEAUX BLD QL SMEAR: PRESENT
TOTAL CELLS COUNTED SPEC: 100
WBC # BLD AUTO: 56.42 THOUSAND/UL (ref 4.31–10.16)

## 2018-05-17 PROCEDURE — 36415 COLL VENOUS BLD VENIPUNCTURE: CPT

## 2018-05-17 PROCEDURE — 85007 BL SMEAR W/DIFF WBC COUNT: CPT

## 2018-05-17 PROCEDURE — 85027 COMPLETE CBC AUTOMATED: CPT

## 2018-05-18 ENCOUNTER — DOCUMENTATION (OUTPATIENT)
Dept: HEMATOLOGY ONCOLOGY | Facility: MEDICAL CENTER | Age: 63
End: 2018-05-18

## 2018-05-18 ENCOUNTER — HOSPITAL ENCOUNTER (EMERGENCY)
Facility: HOSPITAL | Age: 63
Discharge: HOME | End: 2018-05-18
Attending: EMERGENCY MEDICINE | Admitting: EMERGENCY MEDICINE
Payer: COMMERCIAL

## 2018-05-18 VITALS
SYSTOLIC BLOOD PRESSURE: 171 MMHG | OXYGEN SATURATION: 96 % | RESPIRATION RATE: 18 BRPM | HEART RATE: 51 BPM | DIASTOLIC BLOOD PRESSURE: 78 MMHG | TEMPERATURE: 98 F

## 2018-05-18 DIAGNOSIS — C95.90 LEUKEMIA (HCC): ICD-10-CM

## 2018-05-18 DIAGNOSIS — D64.9 ANEMIA: Primary | ICD-10-CM

## 2018-05-18 LAB
ABO GROUP BLD: NORMAL
ANION GAP SERPL CALCULATED.3IONS-SCNC: 5 MMOL/L (ref 4–13)
APTT PPP: 23 SECONDS (ref 24–36)
ATRIAL RATE: 54 BPM
BASOPHILS # BLD MANUAL: 0 THOUSAND/UL (ref 0–0.1)
BASOPHILS NFR MAR MANUAL: 0 % (ref 0–1)
BLD GP AB SCN SERPL QL: NEGATIVE
BUN SERPL-MCNC: 13 MG/DL (ref 5–25)
CALCIUM SERPL-MCNC: 8.2 MG/DL (ref 8.3–10.1)
CHLORIDE SERPL-SCNC: 104 MMOL/L (ref 100–108)
CO2 SERPL-SCNC: 31 MMOL/L (ref 21–32)
CREAT SERPL-MCNC: 0.78 MG/DL (ref 0.6–1.3)
EOSINOPHIL # BLD MANUAL: 0 THOUSAND/UL (ref 0–0.4)
EOSINOPHIL NFR BLD MANUAL: 0 % (ref 0–6)
ERYTHROCYTE [DISTWIDTH] IN BLOOD BY AUTOMATED COUNT: 14 % (ref 11.6–15.1)
GFR SERPL CREATININE-BSD FRML MDRD: 96 ML/MIN/1.73SQ M
GLUCOSE SERPL-MCNC: 132 MG/DL (ref 65–140)
HCT VFR BLD AUTO: 20.4 % (ref 36.5–49.3)
HGB BLD-MCNC: 6.3 G/DL (ref 12–17)
INR PPP: 1.15 (ref 0.86–1.16)
LYMPHOCYTES # BLD AUTO: 47.37 THOUSAND/UL (ref 0.6–4.47)
LYMPHOCYTES # BLD AUTO: 90 % (ref 14–44)
MCH RBC QN AUTO: 27.5 PG (ref 26.8–34.3)
MCHC RBC AUTO-ENTMCNC: 30.9 G/DL (ref 31.4–37.4)
MCV RBC AUTO: 89 FL (ref 82–98)
MONOCYTES # BLD AUTO: 4.74 THOUSAND/UL (ref 0–1.22)
MONOCYTES NFR BLD: 9 % (ref 4–12)
NEUTROPHILS # BLD MANUAL: 0.53 THOUSAND/UL (ref 1.85–7.62)
NEUTS SEG NFR BLD AUTO: 1 % (ref 43–75)
NRBC BLD AUTO-RTO: 0 /100 WBCS
P AXIS: 34 DEGREES
PLATELET # BLD AUTO: 87 THOUSANDS/UL (ref 149–390)
PLATELET BLD QL SMEAR: ABNORMAL
PMV BLD AUTO: 10.6 FL (ref 8.9–12.7)
POTASSIUM SERPL-SCNC: 4.2 MMOL/L (ref 3.5–5.3)
PR INTERVAL: 160 MS
PROTHROMBIN TIME: 12.1 SECONDS (ref 9.4–11.7)
QRS AXIS: -18 DEGREES
QRSD INTERVAL: 92 MS
QT INTERVAL: 456 MS
QTC INTERVAL: 432 MS
RBC # BLD AUTO: 2.29 MILLION/UL (ref 3.88–5.62)
RH BLD: POSITIVE
SMUDGE CELLS BLD QL SMEAR: PRESENT
SODIUM SERPL-SCNC: 140 MMOL/L (ref 136–145)
SPECIMEN EXPIRATION DATE: NORMAL
T WAVE AXIS: 10 DEGREES
TOTAL CELLS COUNTED SPEC: 100
VENTRICULAR RATE: 54 BPM
WBC # BLD AUTO: 52.63 THOUSAND/UL (ref 4.31–10.16)

## 2018-05-18 PROCEDURE — 85730 THROMBOPLASTIN TIME PARTIAL: CPT | Performed by: EMERGENCY MEDICINE

## 2018-05-18 PROCEDURE — 85027 COMPLETE CBC AUTOMATED: CPT | Performed by: EMERGENCY MEDICINE

## 2018-05-18 PROCEDURE — 36430 TRANSFUSION BLD/BLD COMPNT: CPT

## 2018-05-18 PROCEDURE — 86850 RBC ANTIBODY SCREEN: CPT | Performed by: EMERGENCY MEDICINE

## 2018-05-18 PROCEDURE — 36415 COLL VENOUS BLD VENIPUNCTURE: CPT | Performed by: EMERGENCY MEDICINE

## 2018-05-18 PROCEDURE — 99284 EMERGENCY DEPT VISIT MOD MDM: CPT

## 2018-05-18 PROCEDURE — 86901 BLOOD TYPING SEROLOGIC RH(D): CPT | Performed by: EMERGENCY MEDICINE

## 2018-05-18 PROCEDURE — 86921 COMPATIBILITY TEST INCUBATE: CPT

## 2018-05-18 PROCEDURE — 86922 COMPATIBILITY TEST ANTIGLOB: CPT

## 2018-05-18 PROCEDURE — 93005 ELECTROCARDIOGRAM TRACING: CPT

## 2018-05-18 PROCEDURE — P9021 RED BLOOD CELLS UNIT: HCPCS

## 2018-05-18 PROCEDURE — 85610 PROTHROMBIN TIME: CPT | Performed by: EMERGENCY MEDICINE

## 2018-05-18 PROCEDURE — 93010 ELECTROCARDIOGRAM REPORT: CPT | Performed by: INTERNAL MEDICINE

## 2018-05-18 PROCEDURE — 85007 BL SMEAR W/DIFF WBC COUNT: CPT | Performed by: EMERGENCY MEDICINE

## 2018-05-18 PROCEDURE — 86900 BLOOD TYPING SEROLOGIC ABO: CPT | Performed by: EMERGENCY MEDICINE

## 2018-05-18 PROCEDURE — 80048 BASIC METABOLIC PNL TOTAL CA: CPT | Performed by: EMERGENCY MEDICINE

## 2018-05-18 NOTE — PROGRESS NOTES
hgb from 5/17 is 6 6  Pt will require 2u PRBC  As per Dr Mendez Failing pt is to report to ED for transfusion  ED physician aware  Called pts spouse, she was reluctant to bring pt to ED at this time as she states she was too tired from working last night  I stressed the importance of the blood transfusion, she will bring pt to ED as soon as she is able

## 2018-05-18 NOTE — DISCHARGE INSTRUCTIONS
Anemia   WHAT YOU NEED TO KNOW:   Anemia is a low number of red blood cells or a low amount of hemoglobin in your red blood cells  Hemoglobin is a protein that helps carry oxygen throughout your body  Red blood cells use iron to create hemoglobin  Anemia may develop if your body does not have enough iron  It may also develop if your body does not make enough red blood cells or they die faster than your body can make them  DISCHARGE INSTRUCTIONS:   Call 911 or have someone call 911 for any of the following:   · You lose consciousness  · You have severe chest pain  Return to the emergency department if:   · You have dark or bloody bowel movements  Contact your healthcare provider if:   · Your symptoms are worse, even after treatment  · You have questions or concerns about your condition or care  Medicines:   · Iron or folic acid supplements  help increase your red blood cell and hemoglobin levels  · Vitamin B12 injections  may help boost your red blood cell level and decrease your symptoms  Ask your healthcare provider how to inject B12  · Take your medicine as directed  Contact your healthcare provider if you think your medicine is not helping or if you have side effects  Tell him of her if you are allergic to any medicine  Keep a list of the medicines, vitamins, and herbs you take  Include the amounts, and when and why you take them  Bring the list or the pill bottles to follow-up visits  Carry your medicine list with you in case of an emergency  Prevent anemia:  Eat healthy foods rich in iron and vitamin C  Nuts, meat, dark leafy green vegetables, and beans are high in iron and protein  Vitamin C helps your body absorb iron  Foods rich in vitamin C include oranges and other citrus fruits  Ask your healthcare provider for a list of other foods that are high in iron or vitamin C  Ask if you need to be on a special diet     Follow up with your healthcare provider as directed:  Write down your questions so you remember to ask them during your visits  © 2017 [employer] Information is for End User's use only and may not be sold, redistributed or otherwise used for commercial purposes  All illustrations and images included in CareNotes® are the copyrighted property of A D A M , Inc  or Shiv Gibson  The above information is an  only  It is not intended as medical advice for individual conditions or treatments  Talk to your doctor, nurse or pharmacist before following any medical regimen to see if it is safe and effective for you

## 2018-05-18 NOTE — ED PROVIDER NOTES
History  Chief Complaint   Patient presents with    Fatigue     fatigue and shortness of breath, pt has leukemia , low hgb per Dr Raleigh Olszewski office   recent blood transfusion      Patient has a history of chronic lymphocytic leukemia and has recently been started on chemotherapy  He has known anemia and has received several blood transfusions  Patient was scheduled for blood transfusion this week, was found to have a hemoglobin around 6 6 by his hematologist   Patient a 39 cannot be accommodated at the infusion center and was referred here for blood transfusion  Patient states he often feels weak and is easily winded  He has had no recent fever no chest pain  Prior to Admission Medications   Prescriptions Last Dose Informant Patient Reported? Taking?    FLUoxetine (PROzac) 20 mg capsule  Spouse/Significant Other Yes No   Sig: Take 40 mg by mouth daily   Ibrutinib 420 MG TABS  Spouse/Significant Other No No   Sig: Take 420 mg by mouth daily   aspirin 81 mg chewable tablet  Spouse/Significant Other No No   Sig: Chew 1 tablet (81 mg total) daily   benztropine (COGENTIN) 1 mg tablet  Spouse/Significant Other Yes No   Sig: Take 1 mg by mouth Medrol Dose Pack scheduling ONLY     divalproex sodium (DEPAKOTE) 500 mg EC tablet  Spouse/Significant Other Yes No   Sig: Take 500 mg by mouth every 12 (twelve) hours     hydrOXYzine pamoate (VISTARIL) 50 mg capsule  Spouse/Significant Other Yes No   Sig: Take 50 mg by mouth 2 (two) times a day   hydrochlorothiazide (MICROZIDE) 12 5 mg capsule  Spouse/Significant Other Yes No   Sig: Take 12 5 mg by mouth daily   lisinopril (ZESTRIL) 40 mg tablet  Spouse/Significant Other Yes No   Sig: Take 40 mg by mouth daily   metoprolol tartrate (LOPRESSOR) 25 mg tablet  Spouse/Significant Other No No   Sig: Take 0 5 tablets (12 5 mg total) by mouth every 12 (twelve) hours   mirtazapine (REMERON) 15 mg tablet  Spouse/Significant Other Yes No   Sig: Take 15 mg by mouth daily at bedtime   pantoprazole (PROTONIX) 40 mg tablet  Spouse/Significant Other No No   Sig: Take 1 tablet (40 mg total) by mouth daily   risperiDONE (RisperDAL) 2 mg tablet  Spouse/Significant Other Yes No   Sig: Take 4 mg by mouth 2 (two) times a day        Facility-Administered Medications: None       Past Medical History:   Diagnosis Date    Anemia     Anxiety     [facility] disorder ([facility] )     Cancer ([facility] )     History of alcohol abuse     Hypertension     Hypertension     Insomnia     Leukemia ([facility] )     Opiate abuse, episodic     Psychiatric disorder        Past Surgical History:   Procedure Laterality Date    EGD AND COLONOSCOPY N/A 3/30/2018    Procedure: EGD AND COLONOSCOPY;  Surgeon: Palmira Molina MD;  Location: [facility]; Service: Gastroenterology       Family History   Problem Relation Age of Onset    Coronary artery disease Mother     No Known Problems Father     Hepatitis Sister     Cancer Brother     Prostate cancer Brother     Early death Brother      I have reviewed and agree with the history as documented  Social History   Substance Use Topics    Smoking status: Current Every Day Smoker     Packs/day: 2 00     Years: 40 00    Smokeless tobacco: Never Used    Alcohol use No      Comment: last drink nov 19 2017        Review of Systems   Constitutional: Positive for fatigue  Negative for chills and fever  HENT: Negative for congestion and sore throat  Respiratory: Negative for shortness of breath  Cardiovascular: Negative for chest pain, palpitations and leg swelling  Gastrointestinal: Negative for abdominal pain and vomiting  Genitourinary: Negative for dysuria and hematuria  Musculoskeletal: Negative for back pain  Skin: Negative for rash and wound  Neurological: Positive for weakness  Negative for syncope  Hematological: Bruises/bleeds easily  All other systems reviewed and are negative        Physical Exam  Physical Exam   Constitutional: He is oriented to person, place, and time  He appears well-developed and well-nourished  HENT:   Head: Normocephalic and atraumatic  Mouth/Throat: Oropharynx is clear and moist    Eyes: Conjunctivae and EOM are normal    Neck: Normal range of motion  Neck supple  Cardiovascular: Normal rate, regular rhythm and normal heart sounds  Pulmonary/Chest: Effort normal and breath sounds normal    Abdominal: Soft  Bowel sounds are normal    Musculoskeletal: Normal range of motion  He exhibits edema  Trace nonpitting edema bilateral   Neurological: He is alert and oriented to person, place, and time  Skin: Skin is warm and dry  Psychiatric: He has a normal mood and affect  His behavior is normal    Nursing note and vitals reviewed        Vital Signs  ED Triage Vitals   Temperature Pulse Respirations Blood Pressure SpO2   05/18/18 0945 05/18/18 0945 05/18/18 0945 05/18/18 0945 05/18/18 0945   98 3 °F (36 8 °C) 56 20 128/60 98 %      Temp Source Heart Rate Source Patient Position - Orthostatic VS BP Location FiO2 (%)   05/18/18 0945 05/18/18 1246 05/18/18 0945 05/18/18 0945 --   Oral Monitor Lying Right arm       Pain Score       05/18/18 0945       No Pain           Vitals:    05/18/18 1440 05/18/18 1445 05/18/18 1452 05/18/18 1500   BP: 145/85  150/71 149/76   Pulse: 58 (!) 52 (!) 52 (!) 52   Patient Position - Orthostatic VS:           Visual Acuity      ED Medications  Medications - No data to display    Diagnostic Studies  Results Reviewed     Procedure Component Value Units Date/Time    CBC and differential [[id number]]  (Abnormal) Collected:  05/18/18 1015    Lab Status:  Final result Specimen:  Blood from Arm, Left Updated:  05/18/18 1057     WBC 52 63 (HH) Thousand/uL      RBC 2 29 (L) Million/uL      Hemoglobin 6 3 (LL) g/dL      Hematocrit 20 4 (L) %      MCV 89 fL      MCH 27 5 pg      MCHC 30 9 (L) g/dL      RDW 14 0 %      MPV 10 6 fL      Platelets 87 (L) Thousands/uL      nRBC 0 /100 WBCs     Protime-INR [[id number]] (Abnormal) Collected:  05/18/18 1015    Lab Status:  Final result Specimen:  Blood from Arm, Left Updated:  05/18/18 1052     Protime 12 1 (H) seconds      INR 1 15    APTT [[id number]]  (Abnormal) Collected:  05/18/18 1015    Lab Status:  Final result Specimen:  Blood from Arm, Left Updated:  05/18/18 1052     PTT 23 (L) seconds     Basic metabolic panel [[id number]]  (Abnormal) Collected:  05/18/18 1015    Lab Status:  Final result Specimen:  Blood from Arm, Left Updated:  05/18/18 1036     Sodium 140 mmol/L      Potassium 4 2 mmol/L      Chloride 104 mmol/L      CO2 31 mmol/L      Anion Gap 5 mmol/L      BUN 13 mg/dL      Creatinine 0 78 mg/dL      Glucose 132 mg/dL      Calcium 8 2 (L) mg/dL      eGFR 96 ml/min/1 73sq m     Narrative:         National Kidney Disease Education Program recommendations are as follows:  GFR calculation is accurate only with a steady state creatinine  Chronic Kidney disease less than 60 ml/min/1 73 sq  meters  Kidney failure less than 15 ml/min/1 73 sq  meters  No orders to display              Procedures  ECG 12 Lead Documentation  Date/Time: 5/18/2018 9:51 AM  Performed by: Allen Barlow  Authorized by: Allen Barlow     Indications / Diagnosis:  Anemia  ECG reviewed by me, the ED Provider: yes    Patient location:  ED  Interpretation:     Interpretation: non-specific    Rate:     ECG rate:  54    ECG rate assessment: bradycardic    Rhythm:     Rhythm: sinus bradycardia    Ectopy:     Ectopy: none    QRS:     QRS axis:  Normal    QRS intervals:  Normal  Conduction:     Conduction: normal    ST segments:     ST segments:  Normal  T waves:     T waves: normal             Phone Contacts  ED Phone Contact    ED Course                               MDM  Number of Diagnoses or Management Options  Diagnosis management comments: Patient would rather receive his blood transfusions in an outpatient setting as opposed to being admitted overnight    Will transfuse him in the ED    CritCare Time    Disposition  Final diagnoses:   Anemia   Leukemia ([address] )     Time reflects when diagnosis was documented in both MDM as applicable and the Disposition within this note     Time User Action Codes Description Comment    5/18/2018  4:20 PM Heriberto Claude Add [D64 9] Anemia     5/18/2018  4:20 PM Arnav MORRIS Add [C95 90] Leukemia [facility])       ED Disposition     ED Disposition Condition Comment    Discharge  [place] discharge to home/self care  Condition at discharge: Stable        Follow-up Information     Follow up With Specialties Details Why Contact Info    [facility]  Internal Medicine   [address] [place] [address]      Winston Hinton MD Hematology and Oncology, Hematology, Oncology Schedule an appointment as soon as possible for a visit in 1 week  [address] [place] [address]  [phone]            Patient's Medications   Discharge Prescriptions    No medications on file     No discharge procedures on file      ED Provider  Electronically Signed by           Jane Franco MD  05/18/18 1245

## 2018-05-19 LAB
ABO GROUP BLD BPU: NORMAL
ABO GROUP BLD BPU: NORMAL
BPU ID: NORMAL
BPU ID: NORMAL
CROSSMATCH: NORMAL
CROSSMATCH: NORMAL
UNIT DISPENSE STATUS: NORMAL
UNIT DISPENSE STATUS: NORMAL
UNIT PRODUCT CODE: NORMAL
UNIT PRODUCT CODE: NORMAL
UNIT RH: NORMAL
UNIT RH: NORMAL

## 2018-05-21 ENCOUNTER — DOCUMENTATION (OUTPATIENT)
Dept: HEMATOLOGY ONCOLOGY | Facility: MEDICAL CENTER | Age: 63
End: 2018-05-21

## 2018-05-21 ENCOUNTER — APPOINTMENT (OUTPATIENT)
Dept: LAB | Facility: HOSPITAL | Age: 63
End: 2018-05-21
Attending: INTERNAL MEDICINE
Payer: COMMERCIAL

## 2018-05-21 ENCOUNTER — TELEPHONE (OUTPATIENT)
Dept: HEMATOLOGY ONCOLOGY | Facility: MEDICAL CENTER | Age: 63
End: 2018-05-21

## 2018-05-21 DIAGNOSIS — D64.9 ANEMIA, UNSPECIFIED TYPE: ICD-10-CM

## 2018-05-21 LAB
BASOPHILS # BLD MANUAL: 0 THOUSAND/UL (ref 0–0.1)
BASOPHILS NFR MAR MANUAL: 0 % (ref 0–1)
EOSINOPHIL # BLD MANUAL: 0.54 THOUSAND/UL (ref 0–0.4)
EOSINOPHIL NFR BLD MANUAL: 1 % (ref 0–6)
ERYTHROCYTE [DISTWIDTH] IN BLOOD BY AUTOMATED COUNT: 14.1 % (ref 11.6–15.1)
HCT VFR BLD AUTO: 25 % (ref 36.5–49.3)
HGB BLD-MCNC: 7.8 G/DL (ref 12–17)
LYMPHOCYTES # BLD AUTO: 50.5 THOUSAND/UL (ref 0.6–4.47)
LYMPHOCYTES # BLD AUTO: 94 % (ref 14–44)
MCH RBC QN AUTO: 27.1 PG (ref 26.8–34.3)
MCHC RBC AUTO-ENTMCNC: 31.2 G/DL (ref 31.4–37.4)
MCV RBC AUTO: 87 FL (ref 82–98)
MONOCYTES # BLD AUTO: 1.07 THOUSAND/UL (ref 0–1.22)
MONOCYTES NFR BLD: 2 % (ref 4–12)
NEUTROPHILS # BLD MANUAL: 1.61 THOUSAND/UL (ref 1.85–7.62)
NEUTS SEG NFR BLD AUTO: 3 % (ref 43–75)
NRBC BLD AUTO-RTO: 0 /100 WBCS
PLATELET # BLD AUTO: 92 THOUSANDS/UL (ref 149–390)
PLATELET BLD QL SMEAR: ABNORMAL
PMV BLD AUTO: 11.4 FL (ref 8.9–12.7)
RBC # BLD AUTO: 2.88 MILLION/UL (ref 3.88–5.62)
TOTAL CELLS COUNTED SPEC: 100
WBC # BLD AUTO: 53.72 THOUSAND/UL (ref 4.31–10.16)

## 2018-05-21 PROCEDURE — 85027 COMPLETE CBC AUTOMATED: CPT

## 2018-05-21 PROCEDURE — 36415 COLL VENOUS BLD VENIPUNCTURE: CPT

## 2018-05-21 PROCEDURE — 85007 BL SMEAR W/DIFF WBC COUNT: CPT

## 2018-05-24 ENCOUNTER — APPOINTMENT (OUTPATIENT)
Dept: LAB | Facility: HOSPITAL | Age: 63
End: 2018-05-24
Attending: INTERNAL MEDICINE
Payer: COMMERCIAL

## 2018-05-24 ENCOUNTER — TELEPHONE (OUTPATIENT)
Dept: HEMATOLOGY ONCOLOGY | Facility: MEDICAL CENTER | Age: 63
End: 2018-05-24

## 2018-05-24 DIAGNOSIS — D64.9 ANEMIA, UNSPECIFIED TYPE: ICD-10-CM

## 2018-05-24 LAB
BASOPHILS # BLD MANUAL: 0 THOUSAND/UL (ref 0–0.1)
BASOPHILS NFR MAR MANUAL: 0 % (ref 0–1)
EOSINOPHIL # BLD MANUAL: 0 THOUSAND/UL (ref 0–0.4)
EOSINOPHIL NFR BLD MANUAL: 0 % (ref 0–6)
ERYTHROCYTE [DISTWIDTH] IN BLOOD BY AUTOMATED COUNT: 13.9 % (ref 11.6–15.1)
HCT VFR BLD AUTO: 25.2 % (ref 36.5–49.3)
HGB BLD-MCNC: 7.6 G/DL (ref 12–17)
LYMPHOCYTES # BLD AUTO: 84.32 THOUSAND/UL (ref 0.6–4.47)
LYMPHOCYTES # BLD AUTO: 91 % (ref 14–44)
MCH RBC QN AUTO: 26.4 PG (ref 26.8–34.3)
MCHC RBC AUTO-ENTMCNC: 30.2 G/DL (ref 31.4–37.4)
MCV RBC AUTO: 88 FL (ref 82–98)
MONOCYTES # BLD AUTO: 1.85 THOUSAND/UL (ref 0–1.22)
MONOCYTES NFR BLD: 2 % (ref 4–12)
NEUTROPHILS # BLD MANUAL: 6.49 THOUSAND/UL (ref 1.85–7.62)
NEUTS SEG NFR BLD AUTO: 7 % (ref 43–75)
NRBC BLD AUTO-RTO: 0 /100 WBCS
PLATELET # BLD AUTO: 126 THOUSANDS/UL (ref 149–390)
PLATELET BLD QL SMEAR: ABNORMAL
PMV BLD AUTO: 10.8 FL (ref 8.9–12.7)
RBC # BLD AUTO: 2.88 MILLION/UL (ref 3.88–5.62)
RBC MORPH BLD: NORMAL
TOTAL CELLS COUNTED SPEC: 100
WBC # BLD AUTO: 92.66 THOUSAND/UL (ref 4.31–10.16)

## 2018-05-24 PROCEDURE — 36415 COLL VENOUS BLD VENIPUNCTURE: CPT

## 2018-05-24 PROCEDURE — 85027 COMPLETE CBC AUTOMATED: CPT

## 2018-05-24 PROCEDURE — 85007 BL SMEAR W/DIFF WBC COUNT: CPT

## 2018-05-26 ENCOUNTER — APPOINTMENT (OUTPATIENT)
Dept: LAB | Facility: HOSPITAL | Age: 63
End: 2018-05-26
Attending: INTERNAL MEDICINE
Payer: COMMERCIAL

## 2018-05-26 DIAGNOSIS — D64.9 ANEMIA, UNSPECIFIED TYPE: ICD-10-CM

## 2018-05-26 LAB
BASOPHILS # BLD MANUAL: 0 THOUSAND/UL (ref 0–0.1)
BASOPHILS NFR MAR MANUAL: 0 % (ref 0–1)
EOSINOPHIL # BLD MANUAL: 0 THOUSAND/UL (ref 0–0.4)
EOSINOPHIL NFR BLD MANUAL: 0 % (ref 0–6)
ERYTHROCYTE [DISTWIDTH] IN BLOOD BY AUTOMATED COUNT: 13.9 % (ref 11.6–15.1)
HCT VFR BLD AUTO: 25.1 % (ref 36.5–49.3)
HGB BLD-MCNC: 7.5 G/DL (ref 12–17)
LYMPHOCYTES # BLD AUTO: 90.32 THOUSAND/UL (ref 0.6–4.47)
LYMPHOCYTES # BLD AUTO: 95 % (ref 14–44)
MCH RBC QN AUTO: 26.3 PG (ref 26.8–34.3)
MCHC RBC AUTO-ENTMCNC: 29.9 G/DL (ref 31.4–37.4)
MCV RBC AUTO: 88 FL (ref 82–98)
MONOCYTES # BLD AUTO: 0.95 THOUSAND/UL (ref 0–1.22)
MONOCYTES NFR BLD: 1 % (ref 4–12)
NEUTROPHILS # BLD MANUAL: 3.8 THOUSAND/UL (ref 1.85–7.62)
NEUTS SEG NFR BLD AUTO: 4 % (ref 43–75)
NRBC BLD AUTO-RTO: 0 /100 WBCS
PLATELET # BLD AUTO: 139 THOUSANDS/UL (ref 149–390)
PLATELET BLD QL SMEAR: ABNORMAL
PMV BLD AUTO: 10.7 FL (ref 8.9–12.7)
RBC # BLD AUTO: 2.85 MILLION/UL (ref 3.88–5.62)
RBC MORPH BLD: PRESENT
SMUDGE CELLS BLD QL SMEAR: PRESENT
TOTAL CELLS COUNTED SPEC: 100
WBC # BLD AUTO: 95.07 THOUSAND/UL (ref 4.31–10.16)

## 2018-05-26 PROCEDURE — 85007 BL SMEAR W/DIFF WBC COUNT: CPT

## 2018-05-26 PROCEDURE — 36415 COLL VENOUS BLD VENIPUNCTURE: CPT

## 2018-05-26 PROCEDURE — 85027 COMPLETE CBC AUTOMATED: CPT

## 2018-05-31 ENCOUNTER — TELEPHONE (OUTPATIENT)
Dept: HEMATOLOGY ONCOLOGY | Facility: MEDICAL CENTER | Age: 63
End: 2018-05-31

## 2018-05-31 ENCOUNTER — APPOINTMENT (OUTPATIENT)
Dept: LAB | Facility: HOSPITAL | Age: 63
End: 2018-05-31
Attending: INTERNAL MEDICINE
Payer: COMMERCIAL

## 2018-05-31 ENCOUNTER — TRANSCRIBE ORDERS (OUTPATIENT)
Dept: ADMINISTRATIVE | Facility: HOSPITAL | Age: 63
End: 2018-05-31

## 2018-05-31 DIAGNOSIS — D64.9 ANEMIA, UNSPECIFIED TYPE: Primary | ICD-10-CM

## 2018-05-31 DIAGNOSIS — D64.9 ANEMIA, UNSPECIFIED TYPE: ICD-10-CM

## 2018-05-31 LAB
ABO GROUP BLD: NORMAL
BASOPHILS # BLD MANUAL: 0 THOUSAND/UL (ref 0–0.1)
BASOPHILS NFR MAR MANUAL: 0 % (ref 0–1)
BLD GP AB SCN SERPL QL: NEGATIVE
EOSINOPHIL # BLD MANUAL: 0 THOUSAND/UL (ref 0–0.4)
EOSINOPHIL NFR BLD MANUAL: 0 % (ref 0–6)
ERYTHROCYTE [DISTWIDTH] IN BLOOD BY AUTOMATED COUNT: 13.9 % (ref 11.6–15.1)
HCT VFR BLD AUTO: 20.6 % (ref 36.5–49.3)
HGB BLD-MCNC: 6 G/DL (ref 12–17)
LG PLATELETS BLD QL SMEAR: PRESENT
LYMPHOCYTES # BLD AUTO: 84.28 THOUSAND/UL (ref 0.6–4.47)
LYMPHOCYTES # BLD AUTO: 93 % (ref 14–44)
MCH RBC QN AUTO: 26.2 PG (ref 26.8–34.3)
MCHC RBC AUTO-ENTMCNC: 29.1 G/DL (ref 31.4–37.4)
MCV RBC AUTO: 90 FL (ref 82–98)
MONOCYTES # BLD AUTO: 0 THOUSAND/UL (ref 0–1.22)
MONOCYTES NFR BLD: 0 % (ref 4–12)
NEUTROPHILS # BLD MANUAL: 6.34 THOUSAND/UL (ref 1.85–7.62)
NEUTS SEG NFR BLD AUTO: 7 % (ref 43–75)
NRBC BLD AUTO-RTO: 0 /100 WBCS
PLATELET # BLD AUTO: 137 THOUSANDS/UL (ref 149–390)
PLATELET BLD QL SMEAR: ABNORMAL
PMV BLD AUTO: 10.7 FL (ref 8.9–12.7)
RBC # BLD AUTO: 2.29 MILLION/UL (ref 3.88–5.62)
RBC MORPH BLD: NORMAL
RH BLD: POSITIVE
SMUDGE CELLS BLD QL SMEAR: PRESENT
SPECIMEN EXPIRATION DATE: NORMAL
TOTAL CELLS COUNTED SPEC: 100
WBC # BLD AUTO: 90.62 THOUSAND/UL (ref 4.31–10.16)

## 2018-05-31 PROCEDURE — 86850 RBC ANTIBODY SCREEN: CPT | Performed by: INTERNAL MEDICINE

## 2018-05-31 PROCEDURE — 86901 BLOOD TYPING SEROLOGIC RH(D): CPT | Performed by: INTERNAL MEDICINE

## 2018-05-31 PROCEDURE — 85027 COMPLETE CBC AUTOMATED: CPT

## 2018-05-31 PROCEDURE — 36415 COLL VENOUS BLD VENIPUNCTURE: CPT

## 2018-05-31 PROCEDURE — 86921 COMPATIBILITY TEST INCUBATE: CPT

## 2018-05-31 PROCEDURE — 86922 COMPATIBILITY TEST ANTIGLOB: CPT

## 2018-05-31 PROCEDURE — 85007 BL SMEAR W/DIFF WBC COUNT: CPT

## 2018-05-31 PROCEDURE — 86900 BLOOD TYPING SEROLOGIC ABO: CPT | Performed by: INTERNAL MEDICINE

## 2018-05-31 RX ORDER — SODIUM CHLORIDE 9 MG/ML
20 INJECTION, SOLUTION INTRAVENOUS ONCE
Status: COMPLETED | OUTPATIENT
Start: 2018-06-01 | End: 2018-06-01

## 2018-05-31 RX ORDER — ACETAMINOPHEN 325 MG/1
650 TABLET ORAL ONCE
Status: COMPLETED | OUTPATIENT
Start: 2018-06-01 | End: 2018-06-01

## 2018-05-31 RX ORDER — DIPHENHYDRAMINE HCL 25 MG
25 TABLET ORAL ONCE
Status: COMPLETED | OUTPATIENT
Start: 2018-06-01 | End: 2018-06-01

## 2018-06-01 ENCOUNTER — HOSPITAL ENCOUNTER (OUTPATIENT)
Dept: INFUSION CENTER | Facility: HOSPITAL | Age: 63
Discharge: HOME | End: 2018-06-01
Payer: COMMERCIAL

## 2018-06-01 VITALS
SYSTOLIC BLOOD PRESSURE: 157 MMHG | TEMPERATURE: 98.6 F | HEART RATE: 54 BPM | OXYGEN SATURATION: 96 % | DIASTOLIC BLOOD PRESSURE: 68 MMHG | RESPIRATION RATE: 18 BRPM

## 2018-06-01 PROCEDURE — 36430 TRANSFUSION BLD/BLD COMPNT: CPT

## 2018-06-01 PROCEDURE — P9021 RED BLOOD CELLS UNIT: HCPCS

## 2018-06-01 RX ADMIN — SODIUM CHLORIDE 20 ML/HR: 0.9 INJECTION, SOLUTION INTRAVENOUS at 08:14

## 2018-06-01 RX ADMIN — ACETAMINOPHEN 650 MG: 325 TABLET ORAL at 08:14

## 2018-06-01 RX ADMIN — DIPHENHYDRAMINE HCL 25 MG: 25 TABLET ORAL at 08:13

## 2018-06-02 ENCOUNTER — APPOINTMENT (EMERGENCY)
Dept: RADIOLOGY | Facility: HOSPITAL | Age: 63
End: 2018-06-02
Payer: COMMERCIAL

## 2018-06-02 ENCOUNTER — HOSPITAL ENCOUNTER (EMERGENCY)
Facility: HOSPITAL | Age: 63
Discharge: HOME | End: 2018-06-02
Attending: EMERGENCY MEDICINE | Admitting: EMERGENCY MEDICINE
Payer: COMMERCIAL

## 2018-06-02 VITALS
HEART RATE: 56 BPM | DIASTOLIC BLOOD PRESSURE: 66 MMHG | TEMPERATURE: 99.1 F | SYSTOLIC BLOOD PRESSURE: 142 MMHG | OXYGEN SATURATION: 95 % | RESPIRATION RATE: 18 BRPM | BODY MASS INDEX: 33.75 KG/M2 | WEIGHT: 242 LBS

## 2018-06-02 DIAGNOSIS — D63.8 ANEMIA OF CHRONIC DISEASE: ICD-10-CM

## 2018-06-02 DIAGNOSIS — C91.10 CLL (CHRONIC LYMPHOCYTIC LEUKEMIA) (HCC): ICD-10-CM

## 2018-06-02 DIAGNOSIS — R05.9 COUGH: Primary | ICD-10-CM

## 2018-06-02 LAB
ABO GROUP BLD BPU: NORMAL
ABO GROUP BLD BPU: NORMAL
ALBUMIN SERPL BCP-MCNC: 3.1 G/DL (ref 3.5–5)
ALP SERPL-CCNC: 67 U/L (ref 46–116)
ALT SERPL W P-5'-P-CCNC: 16 U/L (ref 12–78)
ANION GAP SERPL CALCULATED.3IONS-SCNC: 8 MMOL/L (ref 4–13)
ANISOCYTOSIS BLD QL SMEAR: PRESENT
AST SERPL W P-5'-P-CCNC: 9 U/L (ref 5–45)
BASOPHILS # BLD MANUAL: 0 THOUSAND/UL (ref 0–0.1)
BASOPHILS NFR MAR MANUAL: 0 % (ref 0–1)
BILIRUB SERPL-MCNC: 0.8 MG/DL (ref 0.2–1)
BILIRUB UR QL STRIP: NEGATIVE
BPU ID: NORMAL
BPU ID: NORMAL
BUN SERPL-MCNC: 9 MG/DL (ref 5–25)
CALCIUM SERPL-MCNC: 8.1 MG/DL (ref 8.3–10.1)
CHLORIDE SERPL-SCNC: 102 MMOL/L (ref 100–108)
CLARITY UR: CLEAR
CO2 SERPL-SCNC: 29 MMOL/L (ref 21–32)
COLOR UR: YELLOW
CREAT SERPL-MCNC: 0.93 MG/DL (ref 0.6–1.3)
CROSSMATCH: NORMAL
CROSSMATCH: NORMAL
EOSINOPHIL # BLD MANUAL: 0 THOUSAND/UL (ref 0–0.4)
EOSINOPHIL NFR BLD MANUAL: 0 % (ref 0–6)
ERYTHROCYTE [DISTWIDTH] IN BLOOD BY AUTOMATED COUNT: 14.1 % (ref 11.6–15.1)
GFR SERPL CREATININE-BSD FRML MDRD: 87 ML/MIN/1.73SQ M
GLUCOSE SERPL-MCNC: 136 MG/DL (ref 65–140)
GLUCOSE UR STRIP-MCNC: NEGATIVE MG/DL
HCT VFR BLD AUTO: 24.6 % (ref 36.5–49.3)
HGB BLD-MCNC: 7.7 G/DL (ref 12–17)
HGB UR QL STRIP.AUTO: NEGATIVE
HYPERCHROMIA BLD QL SMEAR: PRESENT
KETONES UR STRIP-MCNC: NEGATIVE MG/DL
LACTATE SERPL-SCNC: 1.7 MMOL/L (ref 0.5–2)
LEUKOCYTE ESTERASE UR QL STRIP: NEGATIVE
LYMPHOCYTES # BLD AUTO: 68.81 THOUSAND/UL (ref 0.6–4.47)
LYMPHOCYTES # BLD AUTO: 94 % (ref 14–44)
MCH RBC QN AUTO: 27.6 PG (ref 26.8–34.3)
MCHC RBC AUTO-ENTMCNC: 31.3 G/DL (ref 31.4–37.4)
MCV RBC AUTO: 88 FL (ref 82–98)
MICROCYTES BLD QL AUTO: PRESENT
MONOCYTES # BLD AUTO: 0 THOUSAND/UL (ref 0–1.22)
MONOCYTES NFR BLD: 0 % (ref 4–12)
NEUTROPHILS # BLD MANUAL: 4.39 THOUSAND/UL (ref 1.85–7.62)
NEUTS BAND NFR BLD MANUAL: 1 % (ref 0–8)
NEUTS SEG NFR BLD AUTO: 5 % (ref 43–75)
NITRITE UR QL STRIP: NEGATIVE
NRBC BLD AUTO-RTO: 0 /100 WBCS
PH UR STRIP.AUTO: 6.5 [PH] (ref 5–9)
PLATELET # BLD AUTO: 132 THOUSANDS/UL (ref 149–390)
PLATELET BLD QL SMEAR: ADEQUATE
PMV BLD AUTO: 10.6 FL (ref 8.9–12.7)
POTASSIUM SERPL-SCNC: 3.2 MMOL/L (ref 3.5–5.3)
PROT SERPL-MCNC: 5.9 G/DL (ref 6.4–8.2)
PROT UR STRIP-MCNC: NEGATIVE MG/DL
RBC # BLD AUTO: 2.79 MILLION/UL (ref 3.88–5.62)
RBC MORPH BLD: PRESENT
SODIUM SERPL-SCNC: 139 MMOL/L (ref 136–145)
SP GR UR STRIP.AUTO: <=1.005 (ref 1–1.03)
TOTAL CELLS COUNTED SPEC: 100
UNIT DISPENSE STATUS: NORMAL
UNIT DISPENSE STATUS: NORMAL
UNIT PRODUCT CODE: NORMAL
UNIT PRODUCT CODE: NORMAL
UNIT RH: NORMAL
UNIT RH: NORMAL
UROBILINOGEN UR QL STRIP.AUTO: 1 E.U./DL
WBC # BLD AUTO: 73.2 THOUSAND/UL (ref 4.31–10.16)

## 2018-06-02 PROCEDURE — 80053 COMPREHEN METABOLIC PANEL: CPT | Performed by: PHYSICIAN ASSISTANT

## 2018-06-02 PROCEDURE — 81003 URINALYSIS AUTO W/O SCOPE: CPT | Performed by: PHYSICIAN ASSISTANT

## 2018-06-02 PROCEDURE — 96361 HYDRATE IV INFUSION ADD-ON: CPT

## 2018-06-02 PROCEDURE — 71046 X-RAY EXAM CHEST 2 VIEWS: CPT

## 2018-06-02 PROCEDURE — 87040 BLOOD CULTURE FOR BACTERIA: CPT | Performed by: PHYSICIAN ASSISTANT

## 2018-06-02 PROCEDURE — 36415 COLL VENOUS BLD VENIPUNCTURE: CPT | Performed by: PHYSICIAN ASSISTANT

## 2018-06-02 PROCEDURE — 96360 HYDRATION IV INFUSION INIT: CPT

## 2018-06-02 PROCEDURE — 85027 COMPLETE CBC AUTOMATED: CPT | Performed by: PHYSICIAN ASSISTANT

## 2018-06-02 PROCEDURE — 87076 CULTURE ANAEROBE IDENT EACH: CPT | Performed by: PHYSICIAN ASSISTANT

## 2018-06-02 PROCEDURE — 85007 BL SMEAR W/DIFF WBC COUNT: CPT | Performed by: PHYSICIAN ASSISTANT

## 2018-06-02 PROCEDURE — 99283 EMERGENCY DEPT VISIT LOW MDM: CPT

## 2018-06-02 PROCEDURE — 87147 CULTURE TYPE IMMUNOLOGIC: CPT | Performed by: PHYSICIAN ASSISTANT

## 2018-06-02 PROCEDURE — 83605 ASSAY OF LACTIC ACID: CPT | Performed by: PHYSICIAN ASSISTANT

## 2018-06-02 RX ADMIN — SODIUM CHLORIDE 1000 ML: 0.9 INJECTION, SOLUTION INTRAVENOUS at 14:07

## 2018-06-02 NOTE — ED PROVIDER NOTES
History  Chief Complaint   Patient presents with    Fever - 9 weeks to 74 years     Pt reports fever 100 2 this morning, did take ibuprofen @11am for headache  Patient is a 60-year-old male who presents was wife for evaluation of fever  The patient reports that he was feeling warm and headache this morning  His wife took his temperature and was just a little over 100°  Patient is any history of CLL and recently was started immunotherapy for this two months ago  She was concerned about the development of fever  She reports that she was recent diagnosed bronchitis and finished a Z-Garry  She believes the patient may have same infection  He is complaining of cough however this is however chronic has patient continues to smoke  Patient also recently had a blood transfusion is a day for chronic anemia  He received 2 units without issue  Patient does follow up Dr Gallito Mai Oncology  He is in between PCPs  He plans on following up with [facility] this week  He denies any chest pain  He reports chronic shortness of breath  Denies any abdominal pain, vomiting, diarrhea, urinary complaints  Denies any back pain, neck pain or neck stiffness  Denies any headache at this time  Headache resolved with Motrin  Denies any sore throat or ear pain  He denies any significant sputum production  Denies any open wounds  Prior to Admission Medications   Prescriptions Last Dose Informant Patient Reported? Taking?    FLUoxetine (PROzac) 20 mg capsule  Spouse/Significant Other Yes No   Sig: Take 40 mg by mouth daily   Ibrutinib 420 MG TABS  Spouse/Significant Other No No   Sig: Take 420 mg by mouth daily   aspirin 81 mg chewable tablet  Spouse/Significant Other No No   Sig: Chew 1 tablet (81 mg total) daily   benztropine (COGENTIN) 1 mg tablet  Spouse/Significant Other Yes No   Sig: Take 1 mg by mouth Medrol Dose Pack scheduling ONLY     divalproex sodium (DEPAKOTE) 500 mg EC tablet Spouse/Significant Other Yes No   Sig: Take 500 mg by mouth every 12 (twelve) hours     hydrOXYzine pamoate (VISTARIL) 50 mg capsule  Spouse/Significant Other Yes No   Sig: Take 50 mg by mouth 2 (two) times a day   hydrochlorothiazide (MICROZIDE) 12 5 mg capsule  Spouse/Significant Other Yes No   Sig: Take 12 5 mg by mouth daily   lisinopril (ZESTRIL) 40 mg tablet  Spouse/Significant Other Yes No   Sig: Take 40 mg by mouth daily   metoprolol tartrate (LOPRESSOR) 25 mg tablet  Spouse/Significant Other No No   Sig: Take 0 5 tablets (12 5 mg total) by mouth every 12 (twelve) hours   mirtazapine (REMERON) 15 mg tablet  Spouse/Significant Other Yes No   Sig: Take 15 mg by mouth daily at bedtime   pantoprazole (PROTONIX) 40 mg tablet  Spouse/Significant Other No No   Sig: Take 1 tablet (40 mg total) by mouth daily   risperiDONE (RisperDAL) 2 mg tablet  Spouse/Significant Other Yes No   Sig: Take 4 mg by mouth 2 (two) times a day        Facility-Administered Medications: None       Past Medical History:   Diagnosis Date    Anemia     Anxiety     Bipolar disorder (HCC)     Cancer ([facility] )     History of alcohol abuse     Hypertension     Hypertension     Insomnia     Leukemia ([facility] )     Opiate abuse, episodic     Psychiatric disorder        Past Surgical History:   Procedure Laterality Date    EGD AND COLONOSCOPY N/A 3/30/2018    Procedure: EGD AND COLONOSCOPY;  Surgeon: Inez Mccord MD;  Location: [place] [address] GI LAB; Service: Gastroenterology       Family History   Problem Relation Age of Onset    Coronary artery disease Mother     No Known Problems Father     Hepatitis Sister     Cancer Brother     Prostate cancer Brother     Early death Brother      I have reviewed and agree with the history as documented      Social History   Substance Use Topics    Smoking status: Current Every Day Smoker     Packs/day: 1 50     Years: 40 00     Types: Cigarettes    Smokeless tobacco: Never Used    Alcohol use No Comment: last drink nov 19 2017        Review of Systems   Constitutional: Positive for fever  Negative for activity change, appetite change and fatigue  HENT: Negative for nosebleeds, sneezing, sore throat, trouble swallowing and voice change  Eyes: Negative for photophobia, pain and visual disturbance  Respiratory: Positive for cough and shortness of breath  Negative for apnea, choking and stridor  Cardiovascular: Negative for palpitations and leg swelling  Gastrointestinal: Negative for anal bleeding and constipation  Endocrine: Negative for cold intolerance, heat intolerance, polydipsia and polyphagia  Genitourinary: Negative for decreased urine volume, enuresis, frequency, genital sores and urgency  Musculoskeletal: Negative for joint swelling and myalgias  Allergic/Immunologic: Negative for environmental allergies and food allergies  Neurological: Negative for tremors, seizures, speech difficulty and weakness  Hematological: Negative for adenopathy  Psychiatric/Behavioral: Negative for behavioral problems, decreased concentration, dysphoric mood and hallucinations  Physical Exam  Physical Exam   Constitutional: He is oriented to person, place, and time  He appears well-developed and well-nourished  No distress  HENT:   Head: Normocephalic and atraumatic  Right Ear: External ear normal    Left Ear: External ear normal    Nose: Nose normal    Mouth/Throat: Oropharynx is clear and moist    Eyes: Conjunctivae and EOM are normal  Pupils are equal, round, and reactive to light  Neck: Normal range of motion  Neck supple  Cardiovascular: Normal rate, regular rhythm and normal heart sounds  Exam reveals no gallop and no friction rub  No murmur heard  Pulmonary/Chest: Effort normal and breath sounds normal  No respiratory distress  He has no wheezes  Abdominal: Soft  Bowel sounds are normal  There is no tenderness     Obese abdomen   Musculoskeletal: He exhibits no deformity  Neurological: He is alert and oriented to person, place, and time  Skin: Skin is warm and dry  He is not diaphoretic  There is trace pedal edema noted bilaterally  The patient reports that this is chronic and is supposed to take a "water pill" but has not had a couple days due to having run out of the medication  Psychiatric: He has a normal mood and affect  His behavior is normal    Vitals reviewed        Vital Signs  ED Triage Vitals [06/02/18 1321]   Temperature Pulse Respirations Blood Pressure SpO2   99 2 °F (37 3 °C) 58 18 117/64 96 %      Temp Source Heart Rate Source Patient Position - Orthostatic VS BP Location FiO2 (%)   Tympanic Monitor Sitting Right arm --      Pain Score       No Pain           Vitals:    06/02/18 1321 06/02/18 1545   BP: 117/64 142/66   Pulse: 58 56   Patient Position - Orthostatic VS: Sitting Lying       Visual Acuity      ED Medications  Medications   sodium chloride 0 9 % bolus 1,000 mL (0 mL Intravenous Stopped 6/2/18 1549)       Diagnostic Studies  Results Reviewed     Procedure Component Value Units Date/Time    UA w Reflex to Microscopic w Reflex to Culture [[id number]] Collected:  06/02/18 1525    Lab Status:  Final result Specimen:  Urine from Urine, Clean Catch Updated:  06/02/18 1545     Color, UA Yellow     Clarity, UA Clear     Specific Gravity, UA <=1 005     pH, UA 6 5     Leukocytes, UA Negative     Nitrite, UA Negative     Protein, UA Negative mg/dl      Glucose, UA Negative mg/dl      Ketones, UA Negative mg/dl      Urobilinogen, UA 1 0 E U /dl      Bilirubin, UA Negative     Blood, UA Negative    CBC and differential [[id number]]  (Abnormal) Collected:  06/02/18 1408    Lab Status:  Final result Specimen:  Blood from Arm, Right Updated:  06/02/18 1454     WBC 73 20 (HH) Thousand/uL      RBC 2 79 (L) Million/uL      Hemoglobin 7 7 (L) g/dL      Hematocrit 24 6 (L) %      MCV 88 fL      MCH 27 6 pg      MCHC 31 3 (L) g/dL      RDW 14 1 %      MPV 10 6 fL Platelets 921 (L) Thousands/uL      nRBC 0 /100 WBCs     Blood culture #2 [[id number]] Collected:  06/02/18 1434    Lab Status: In process Specimen:  Blood from Arm, Left Updated:  06/02/18 1437    Lactic acid, plasma [[id number]]  (Normal) Collected:  06/02/18 1408    Lab Status:  Final result Specimen:  Blood from Arm, Right Updated:  06/02/18 1435     LACTIC ACID 1 7 mmol/L     Narrative:         Result may be elevated if tourniquet was used during collection  Comprehensive metabolic panel [[id number]]  (Abnormal) Collected:  06/02/18 1408    Lab Status:  Final result Specimen:  Blood from Arm, Right Updated:  06/02/18 1432     Sodium 139 mmol/L      Potassium 3 2 (L) mmol/L      Chloride 102 mmol/L      CO2 29 mmol/L      Anion Gap 8 mmol/L      BUN 9 mg/dL      Creatinine 0 93 mg/dL      Glucose 136 mg/dL      Calcium 8 1 (L) mg/dL      AST 9 U/L      ALT 16 U/L      Alkaline Phosphatase 67 U/L      Total Protein 5 9 (L) g/dL      Albumin 3 1 (L) g/dL      Total Bilirubin 0 80 mg/dL      eGFR 87 ml/min/1 73sq m     Narrative:         National Kidney Disease Education Program recommendations are as follows:  GFR calculation is accurate only with a steady state creatinine  Chronic Kidney disease less than 60 ml/min/1 73 sq  meters  Kidney failure less than 15 ml/min/1 73 sq  meters  Blood culture #1 [[id number]] Collected:  06/02/18 1408    Lab Status: In process Specimen:  Blood from Arm, Right Updated:  06/02/18 1414                 XR chest 2 views   Final Result by Gracy Butler MD (06/02 1436)      No active pulmonary disease              Workstation performed: [id number]                    Procedures  Procedures       Phone Contacts  ED Phone Contact    ED Course  ED Course as of Jun 02 1557   Sat Jun 02, 2018   1458 Hemoglobin: (!) 7 7                               MDM  Number of Diagnoses or Management Options  Anemia of chronic disease:   CLL (chronic lymphocytic leukemia) ([facility] ):   Cough: Diagnosis management comments: Lab work appears at baseline  UA, CXR clear  Afebrile here in ER  VSS  No acute distress  Wife and patient are adament about being discharged  Encouraged patient to make an appointment cover [address] and the patient's wife acknowledged and is in the process  Will return with any worsening of symptoms  The patient and wife are understanding of plan and and are in agreement  CritCare Time    Disposition  Final diagnoses:   Cough   CLL (chronic lymphocytic leukemia) ([facility])   Anemia of chronic disease     Time reflects when diagnosis was documented in both MDM as applicable and the Disposition within this note     Time User Action Codes Description Comment    6/2/2018  3:43 PM Prasanth Membreno V Add [R05] Cough     6/2/2018  3:43 PM Prasanth Membreno V Add [C91 90] CLL (chronic lymphocytic leukemia) ([facility] )     6/2/2018  3:43 PM Adam Saenz Add [D63 8] Anemia of chronic disease       ED Disposition     ED Disposition Condition Comment    Discharge  [place] discharge to home/self care  Condition at discharge: Stable        Follow-up Information     Follow up With Specialties Details Why [address]   Schedule an appointment as soon as possible for a visit  [phone]          Patient's Medications   Discharge Prescriptions    No medications on file     No discharge procedures on file      ED Provider  Electronically Signed by           Cathlean Snellen, PA-C  06/02/18 [address] [place], [place] [address]PENG  06/02/18 3303

## 2018-06-02 NOTE — DISCHARGE INSTRUCTIONS
Acute Cough   WHAT YOU NEED TO KNOW:   What is an acute cough? An acute cough can last up to 3 weeks  Common causes of an acute cough include a cold, allergies, or a lung infection  How is the cause of an acute cough diagnosed? Your healthcare provider will examine you and listen to your lungs  Tell your healthcare provider if you cough up any mucus, or have a fever or shortness of breath  Also tell your provider what makes the cough better or worse  Depending on your symptoms, you may need a chest x-ray  A sample of mucus may be collected and tested for infection  How is an acute cough treated? An acute cough usually goes away on its own  Ask your healthcare provider about medicines you can take to decrease your cough  You may need medicine to stop the cough, decrease swelling in your airways, or help open your airways  Medicine may also be given to help you cough up mucus  If you have an infection caused by bacteria, you may need antibiotics  What can I do to manage my cough? · Do not smoke and stay away from others who smoke  Nicotine and other chemicals in cigarettes and cigars can cause lung damage and make your cough worse  Ask your healthcare provider for information if you currently smoke and need help to quit  E-cigarettes or smokeless tobacco still contain nicotine  Talk to your healthcare provider before you use these products  · Drink extra liquids as directed  Liquids will help thin and loosen mucus so you can cough it up  Liquids will also help prevent dehydration  Examples of good liquids to drink include water, fruit juice, and broth  Do not drink liquids that contain caffeine  Caffeine can increase your risk for dehydration  Ask your healthcare provider how much liquid to drink each day  · Rest as directed  Do not do activities that make your cough worse, such as exercise  · Use a humidifier or vaporizer    Use a cool mist humidifier or a vaporizer to increase air moisture in your home  This may make it easier for you to breathe and help decrease your cough  · Eat 2 to 5 mL of honey 2 times each day  Honey can help thin mucus and decrease your cough  · Use cough drops or lozenges  These can help decrease throat irritation and your cough  When should I seek immediate care? · You have trouble breathing or feel short of breath  · You cough up blood, or you see blood in your mucus  · You faint or feel weak or dizzy  · You have chest pain when you cough or take a deep breath  · You have new wheezing  When should I contact my healthcare provider? · You have a fever  · Your cough lasts longer than 4 weeks  · Your symptoms do not improve with treatment  · You have questions or concerns about your condition or care  CARE AGREEMENT:   You have the right to help plan your care  Learn about your health condition and how it may be treated  Discuss treatment options with your caregivers to decide what care you want to receive  You always have the right to refuse treatment  The above information is an  only  It is not intended as medical advice for individual conditions or treatments  Talk to your doctor, nurse or pharmacist before following any medical regimen to see if it is safe and effective for you  © 2017 [address] [facility] Information is for End User's use only and may not be sold, redistributed or otherwise used for commercial purposes  All illustrations and images included in CareNotes® are the copyrighted property of [employer]  or [employer]iv Gibson  Anemia   WHAT YOU NEED TO KNOW:   Anemia is a low number of red blood cells or a low amount of hemoglobin in your red blood cells  Hemoglobin is a protein that helps carry oxygen throughout your body  Red blood cells use iron to create hemoglobin  Anemia may develop if your body does not have enough iron   It may also develop if your body does not make enough red blood cells or they die faster than your body can make them  DISCHARGE INSTRUCTIONS:   Call 911 or have someone call 911 for any of the following:   · You lose consciousness  · You have severe chest pain  Return to the emergency department if:   · You have dark or bloody bowel movements  Contact your healthcare provider if:   · Your symptoms are worse, even after treatment  · You have questions or concerns about your condition or care  Medicines:   · Iron or folic acid supplements  help increase your red blood cell and hemoglobin levels  · Vitamin B12 injections  may help boost your red blood cell level and decrease your symptoms  Ask your healthcare provider how to inject B12  · Take your medicine as directed  Contact your healthcare provider if you think your medicine is not helping or if you have side effects  Tell him of her if you are allergic to any medicine  Keep a list of the medicines, vitamins, and herbs you take  Include the amounts, and when and why you take them  Bring the list or the pill bottles to follow-up visits  Carry your medicine list with you in case of an emergency  Prevent anemia:  Eat healthy foods rich in iron and vitamin C  Nuts, meat, dark leafy green vegetables, and beans are high in iron and protein  Vitamin C helps your body absorb iron  Foods rich in vitamin C include oranges and other citrus fruits  Ask your healthcare provider for a list of other foods that are high in iron or vitamin C  Ask if you need to be on a special diet  Follow up with your healthcare provider as directed:  Write down your questions so you remember to ask them during your visits  © 2017 [address] [employer] Information is for End User's use only and may not be sold, redistributed or otherwise used for commercial purposes  All illustrations and images included in CareNotes® are the copyrighted property of [employer]  or Shiv Gibson    The above information is an  only  It is not intended as medical advice for individual conditions or treatments  Talk to your doctor, nurse or pharmacist before following any medical regimen to see if it is safe and effective for you

## 2018-06-04 ENCOUNTER — TELEPHONE (OUTPATIENT)
Dept: EMERGENCY DEPT | Facility: HOSPITAL | Age: 63
End: 2018-06-04

## 2018-06-04 ENCOUNTER — TELEPHONE (OUTPATIENT)
Dept: HEMATOLOGY ONCOLOGY | Facility: MEDICAL CENTER | Age: 63
End: 2018-06-04

## 2018-06-04 ENCOUNTER — APPOINTMENT (OUTPATIENT)
Dept: LAB | Facility: HOSPITAL | Age: 63
End: 2018-06-04
Attending: INTERNAL MEDICINE
Payer: COMMERCIAL

## 2018-06-04 ENCOUNTER — HOSPITAL ENCOUNTER (EMERGENCY)
Facility: HOSPITAL | Age: 63
Discharge: HOME | End: 2018-06-04
Attending: EMERGENCY MEDICINE | Admitting: EMERGENCY MEDICINE
Payer: COMMERCIAL

## 2018-06-04 VITALS
RESPIRATION RATE: 18 BRPM | HEART RATE: 54 BPM | OXYGEN SATURATION: 96 % | TEMPERATURE: 97.1 F | BODY MASS INDEX: 36.68 KG/M2 | SYSTOLIC BLOOD PRESSURE: 124 MMHG | DIASTOLIC BLOOD PRESSURE: 61 MMHG | WEIGHT: 263 LBS

## 2018-06-04 DIAGNOSIS — R89.9 ABNORMAL LABORATORY TEST RESULT: Primary | ICD-10-CM

## 2018-06-04 DIAGNOSIS — D64.9 ANEMIA, UNSPECIFIED TYPE: ICD-10-CM

## 2018-06-04 LAB
BASOPHILS # BLD MANUAL: 0 THOUSAND/UL (ref 0–0.1)
BASOPHILS NFR MAR MANUAL: 0 % (ref 0–1)
EOSINOPHIL # BLD MANUAL: 0 THOUSAND/UL (ref 0–0.4)
EOSINOPHIL NFR BLD MANUAL: 0 % (ref 0–6)
ERYTHROCYTE [DISTWIDTH] IN BLOOD BY AUTOMATED COUNT: 14.3 % (ref 11.6–15.1)
HCT VFR BLD AUTO: 23.9 % (ref 36.5–49.3)
HGB BLD-MCNC: 7.4 G/DL (ref 12–17)
HYPERCHROMIA BLD QL SMEAR: PRESENT
LYMPHOCYTES # BLD AUTO: 72.92 THOUSAND/UL (ref 0.6–4.47)
LYMPHOCYTES # BLD AUTO: 94 % (ref 14–44)
MCH RBC QN AUTO: 27.3 PG (ref 26.8–34.3)
MCHC RBC AUTO-ENTMCNC: 31 G/DL (ref 31.4–37.4)
MCV RBC AUTO: 88 FL (ref 82–98)
MONOCYTES # BLD AUTO: 1.55 THOUSAND/UL (ref 0–1.22)
MONOCYTES NFR BLD: 2 % (ref 4–12)
NEUTROPHILS # BLD MANUAL: 3.1 THOUSAND/UL (ref 1.85–7.62)
NEUTS SEG NFR BLD AUTO: 4 % (ref 43–75)
NRBC BLD AUTO-RTO: 0 /100 WBCS
PLATELET # BLD AUTO: 141 THOUSANDS/UL (ref 149–390)
PLATELET BLD QL SMEAR: ADEQUATE
PMV BLD AUTO: 10.6 FL (ref 8.9–12.7)
RBC # BLD AUTO: 2.71 MILLION/UL (ref 3.88–5.62)
RBC MORPH BLD: PRESENT
SMUDGE CELLS BLD QL SMEAR: PRESENT
TOTAL CELLS COUNTED SPEC: 100
WBC # BLD AUTO: 77.57 THOUSAND/UL (ref 4.31–10.16)

## 2018-06-04 PROCEDURE — 85007 BL SMEAR W/DIFF WBC COUNT: CPT

## 2018-06-04 PROCEDURE — 85027 COMPLETE CBC AUTOMATED: CPT

## 2018-06-04 PROCEDURE — 87040 BLOOD CULTURE FOR BACTERIA: CPT | Performed by: EMERGENCY MEDICINE

## 2018-06-04 PROCEDURE — 99283 EMERGENCY DEPT VISIT LOW MDM: CPT

## 2018-06-04 PROCEDURE — 36415 COLL VENOUS BLD VENIPUNCTURE: CPT | Performed by: EMERGENCY MEDICINE

## 2018-06-04 RX ORDER — CEPHALEXIN 500 MG/1
500 CAPSULE ORAL ONCE
Status: COMPLETED | OUTPATIENT
Start: 2018-06-04 | End: 2018-06-04

## 2018-06-04 RX ORDER — CEPHALEXIN 500 MG/1
500 CAPSULE ORAL 2 TIMES DAILY
Qty: 10 CAPSULE | Refills: 0 | Status: SHIPPED | OUTPATIENT
Start: 2018-06-04 | End: 2018-06-09

## 2018-06-04 RX ADMIN — CEPHALEXIN 500 MG: 500 CAPSULE ORAL at 12:21

## 2018-06-04 NOTE — TELEPHONE ENCOUNTER
Dr Mora Blount- this patient's lab came back with critical high WBC of 77 57  It looks like it is better than it has been  Is there anything you would like him to do? Thanks!

## 2018-06-04 NOTE — ED PROVIDER NOTES
History  Chief Complaint   Patient presents with    Abnormal Lab     Wife states patient was here on 6/2 and had lab work done   States called today for positive blood culture and asked to return  Patient has leukemia, had blood transfusion on 6/1  Hx LEUKEMIA  PT WAS SEEN IN THE ER 2 DAYS AGO FOR COUGH AND FEVER, WAS CALLED TODAY FOR POSITIVE BL Cx   PT HAS NO COUGH OR FEVER NOW  PT LOOKS WELL, CLEAR LUNGS  I REVIEWED THE CHART, PT HAS POS Cx IN ONE BOTTLE ONLY, GRAM-POS COCCI IN CLUSTERS  LIKELY CONTAMINATION, BUT WILL START KEFLEX TILL REPEAT Cx IS BACK        Medical Problem - Re-Evaluation   Location:  POSITIVE BLOOD CULTURE  Severity:  Unable to specify  Onset quality:  Unable to specify  Chronicity:  New      Prior to Admission Medications   Prescriptions Last Dose Informant Patient Reported? Taking?    FLUoxetine (PROzac) 20 mg capsule 6/4/2018 at Unknown time Spouse/Significant Other Yes Yes   Sig: Take 40 mg by mouth daily   Ibrutinib 420 MG TABS 6/4/2018 at Unknown time Spouse/Significant Other No Yes   Sig: Take 420 mg by mouth daily   benztropine (COGENTIN) 1 mg tablet 6/3/2018 at Unknown time Spouse/Significant Other Yes Yes   Sig: Take 1 mg by mouth Medrol Dose Pack scheduling ONLY     divalproex sodium (DEPAKOTE) 500 mg EC tablet 6/4/2018 at 0700 Spouse/Significant Other Yes Yes   Sig: Take 250 mg by mouth every 12 (twelve) hours     hydrOXYzine pamoate (VISTARIL) 50 mg capsule 6/4/2018 at 0700 Spouse/Significant Other Yes Yes   Sig: Take 50 mg by mouth 2 (two) times a day   hydrochlorothiazide (MICROZIDE) 12 5 mg capsule 6/4/2018 at Unknown time Spouse/Significant Other Yes Yes   Sig: Take 12 5 mg by mouth daily   lisinopril (ZESTRIL) 40 mg tablet 6/4/2018 at Unknown time Spouse/Significant Other Yes Yes   Sig: Take 40 mg by mouth daily   metoprolol tartrate (LOPRESSOR) 25 mg tablet 6/4/2018 at 0700 Spouse/Significant Other No Yes   Sig: Take 0 5 tablets (12 5 mg total) by mouth every 12 (twelve) hours   mirtazapine (REMERON) 15 mg tablet 6/3/2018 at Unknown time Spouse/Significant Other Yes Yes   Sig: Take 15 mg by mouth daily at bedtime   pantoprazole (PROTONIX) 40 mg tablet 6/4/2018 at Unknown time Spouse/Significant Other No Yes   Sig: Take 1 tablet (40 mg total) by mouth daily   risperiDONE (RisperDAL) 2 mg tablet 6/4/2018 at 0700 Spouse/Significant Other Yes Yes   Sig: Take 4 mg by mouth 2 (two) times a day        Facility-Administered Medications: None       Past Medical History:   Diagnosis Date    Anemia     Anxiety     [facility] disorder [facility] [address] [facility]     Cancer ([facility] )     History of alcohol abuse     Hypertension     Hypertension     Insomnia     Leukemia ([facility] )     Opiate abuse, episodic     Psychiatric disorder        Past Surgical History:   Procedure Laterality Date    EGD AND COLONOSCOPY N/A 3/30/2018    Procedure: EGD AND COLONOSCOPY;  Surgeon: Gabrielle Salcedo MD;  Location: [facility] GI [facility]; Service: Gastroenterology       Family History   Problem Relation Age of Onset    Coronary artery disease Mother     No Known Problems Father     Hepatitis Sister     Cancer Brother     Prostate cancer Brother     Early death Brother      I have reviewed and agree with the history as documented  Social History   Substance Use Topics    Smoking status: Current Every Day Smoker     Packs/day: 1 50     Years: 40 00     Types: Cigarettes    Smokeless tobacco: Never Used    Alcohol use No      Comment: last drink nov 19 2017        Review of Systems   Constitutional: Negative  HENT: Negative  Respiratory: Negative  Cardiovascular: Negative  Gastrointestinal: Negative  Skin: Negative  Physical Exam  Physical Exam   Constitutional: He is oriented to person, place, and time  He appears well-developed and well-nourished  No distress  HENT:   Head: Normocephalic and atraumatic     Mouth/Throat: Oropharynx is clear and moist    Eyes: Conjunctivae and EOM are normal  Pupils are equal, round, and reactive to light  Cardiovascular: Normal rate and regular rhythm  Pulmonary/Chest: Effort normal and breath sounds normal    Abdominal: Soft  There is no tenderness  Neurological: He is alert and oriented to person, place, and time  Skin: Skin is warm and dry  He is not diaphoretic  There is pallor  Vitals reviewed  Vital Signs  ED Triage Vitals [06/04/18 1133]   Temperature Pulse Respirations Blood Pressure SpO2   (!) 97 1 °F (36 2 °C) (!) 54 18 124/61 96 %      Temp Source Heart Rate Source Patient Position - Orthostatic VS BP Location FiO2 (%)   Tympanic Monitor Sitting Left arm --      Pain Score       No Pain           Vitals:    06/04/18 1133   BP: 124/61   Pulse: (!) 54   Patient Position - Orthostatic VS: Sitting       Visual Acuity      ED Medications  Medications - No data to display    Diagnostic Studies  Results Reviewed     None                 No orders to display              Procedures  Procedures       Phone Contacts  ED Phone Contact    ED Course                               MDM  CritCare Time    Disposition  Final diagnoses:   None     ED Disposition     None      Follow-up Information    None         Patient's Medications   Discharge Prescriptions    No medications on file     No discharge procedures on file      ED Provider  Electronically Signed by           Shantel Menjivar MD  06/04/18 22 [id number]

## 2018-06-04 NOTE — TELEPHONE ENCOUNTER
The elevated white count of 77 5 is okay  This may be due to the recent starting of ibrutinib    We will continue to monitor the trend, thank you

## 2018-06-07 ENCOUNTER — APPOINTMENT (OUTPATIENT)
Dept: LAB | Facility: HOSPITAL | Age: 63
End: 2018-06-07
Attending: INTERNAL MEDICINE
Payer: COMMERCIAL

## 2018-06-07 ENCOUNTER — OFFICE VISIT (OUTPATIENT)
Dept: FAMILY MEDICINE CLINIC | Facility: CLINIC | Age: 63
End: 2018-06-07
Payer: COMMERCIAL

## 2018-06-07 ENCOUNTER — TELEPHONE (OUTPATIENT)
Dept: HEMATOLOGY ONCOLOGY | Facility: MEDICAL CENTER | Age: 63
End: 2018-06-07

## 2018-06-07 VITALS
OXYGEN SATURATION: 96 % | RESPIRATION RATE: 16 BRPM | WEIGHT: 258 LBS | HEART RATE: 57 BPM | BODY MASS INDEX: 36.12 KG/M2 | SYSTOLIC BLOOD PRESSURE: 118 MMHG | HEIGHT: 71 IN | DIASTOLIC BLOOD PRESSURE: 68 MMHG

## 2018-06-07 DIAGNOSIS — Z13.1 SCREENING FOR DIABETES MELLITUS: ICD-10-CM

## 2018-06-07 DIAGNOSIS — Z13.220 ENCOUNTER FOR SCREENING FOR LIPID DISORDER: ICD-10-CM

## 2018-06-07 DIAGNOSIS — I10 ESSENTIAL HYPERTENSION: Chronic | ICD-10-CM

## 2018-06-07 DIAGNOSIS — Z71.6 ENCOUNTER FOR SMOKING CESSATION COUNSELING: ICD-10-CM

## 2018-06-07 DIAGNOSIS — Z76.89 ENCOUNTER TO ESTABLISH CARE: Primary | ICD-10-CM

## 2018-06-07 DIAGNOSIS — D64.9 ANEMIA, UNSPECIFIED TYPE: ICD-10-CM

## 2018-06-07 LAB
BACTERIA BLD CULT: NORMAL
BASOPHILS # BLD MANUAL: 0 THOUSAND/UL (ref 0–0.1)
BASOPHILS NFR MAR MANUAL: 0 % (ref 0–1)
EOSINOPHIL # BLD MANUAL: 0 THOUSAND/UL (ref 0–0.4)
EOSINOPHIL NFR BLD MANUAL: 0 % (ref 0–6)
ERYTHROCYTE [DISTWIDTH] IN BLOOD BY AUTOMATED COUNT: 14 % (ref 11.6–15.1)
HCT VFR BLD AUTO: 25.5 % (ref 36.5–49.3)
HGB BLD-MCNC: 7.5 G/DL (ref 12–17)
LYMPHOCYTES # BLD AUTO: 93 % (ref 14–44)
LYMPHOCYTES # BLD AUTO: 97.22 THOUSAND/UL (ref 0.6–4.47)
MCH RBC QN AUTO: 26.5 PG (ref 26.8–34.3)
MCHC RBC AUTO-ENTMCNC: 29.4 G/DL (ref 31.4–37.4)
MCV RBC AUTO: 90 FL (ref 82–98)
MONOCYTES # BLD AUTO: 3.14 THOUSAND/UL (ref 0–1.22)
MONOCYTES NFR BLD: 3 % (ref 4–12)
NEUTROPHILS # BLD MANUAL: 4.18 THOUSAND/UL (ref 1.85–7.62)
NEUTS SEG NFR BLD AUTO: 4 % (ref 43–75)
NRBC BLD AUTO-RTO: 0 /100 WBCS
PLATELET # BLD AUTO: 170 THOUSANDS/UL (ref 149–390)
PLATELET BLD QL SMEAR: ADEQUATE
PMV BLD AUTO: 11.1 FL (ref 8.9–12.7)
RBC # BLD AUTO: 2.83 MILLION/UL (ref 3.88–5.62)
RBC MORPH BLD: NORMAL
TOTAL CELLS COUNTED SPEC: 100
WBC # BLD AUTO: 104.54 THOUSAND/UL (ref 4.31–10.16)

## 2018-06-07 PROCEDURE — 85007 BL SMEAR W/DIFF WBC COUNT: CPT

## 2018-06-07 PROCEDURE — 36415 COLL VENOUS BLD VENIPUNCTURE: CPT

## 2018-06-07 PROCEDURE — 99202 OFFICE O/P NEW SF 15 MIN: CPT | Performed by: FAMILY MEDICINE

## 2018-06-07 PROCEDURE — 3078F DIAST BP <80 MM HG: CPT | Performed by: FAMILY MEDICINE

## 2018-06-07 PROCEDURE — 85027 COMPLETE CBC AUTOMATED: CPT

## 2018-06-07 PROCEDURE — 3074F SYST BP LT 130 MM HG: CPT | Performed by: FAMILY MEDICINE

## 2018-06-07 RX ORDER — NICOTINE 21 MG/24HR
1 PATCH, TRANSDERMAL 24 HOURS TRANSDERMAL EVERY 24 HOURS
Qty: 28 PATCH | Refills: 0 | Status: SHIPPED | OUTPATIENT
Start: 2018-06-07 | End: 2018-10-08 | Stop reason: ALTCHOICE

## 2018-06-07 RX ORDER — HYDROCHLOROTHIAZIDE 12.5 MG/1
12.5 CAPSULE, GELATIN COATED ORAL DAILY
Qty: 30 CAPSULE | Refills: 3 | Status: SHIPPED | OUTPATIENT
Start: 2018-06-07 | End: 2018-09-07 | Stop reason: SDUPTHER

## 2018-06-07 NOTE — PROGRESS NOTES
Assessment/Plan:    60year old male with history of active CLL (followed by Dr Roseanna Sanchez), anemia, HTN, depression, psychiatric disorders comes in to establish care  Patient had positive bld culture x1 with P-Acne likely contaminant but start in ED on keflex empirically and waiting on 2nd repeat BCx  Has since resulted negative  Patient had refill of meds and lipid panel ordered  D/W Dr Thuan Ferrer as needed     Diagnoses and all orders for this visit:    Encounter to establish care  Comments:  complete physican and history completed at this visit  Orders:  -     HEMOGLOBIN A1C W/ EAG ESTIMATION; Future  -     Lipid panel; Future    Essential hypertension  Comments:  controlled with /68, refilled regimen  Orders:  -     HEMOGLOBIN A1C W/ EAG ESTIMATION; Future  -     Lipid panel; Future  -     hydrochlorothiazide (MICROZIDE) 12 5 mg capsule; Take 1 capsule (12 5 mg total) by mouth daily    Encounter for smoking cessation counseling  Comments:  counseled smoking cessation and patches ordered  Orders:  -     nicotine (NICODERM CQ) 21 mg/24 hr TD 24 hr patch; Place 1 patch on the skin every 24 hours    Encounter for screening for lipid disorder  Comments:  lipid panel ordered  Orders:  -     Lipid panel; Future    Screening for diabetes mellitus  -     HEMOGLOBIN A1C W/ EAG ESTIMATION; Future        Subjective:      Patient ID: Betzy Kirby is a 60 y o  male  HPI   60year old male with history of active CLL, anemia, HTN, depression, psychiatric disorders comes in to establish care  Patient was seen 6/2 in ED for temp of 100 2 per wife for concerns of infection and sick contact of URI in context of leukemia treatment and recent transfusion  Patient had BCx x 2 collected and subsequently one resulted positive for gram positive cocci in clusters and gm + rods (speciated as microccus/P  Acnes, likely contaminant  2nd Blood culture on 6/2/18 was negative    Patient was called back to ED 6/4 for positive bcx x1 and reevaluated for infection, had repeat 2x BCx and started on Keflex until repeat BCx resulted  Patient follows Dr Asael Laird for his Leukemia (CLL) and is on [place]  Patient recently seen Patient has GERD being treated with protonix and HTN with lisinopril 40 mg and metoprolol and HCTZ  Patient follows with psychiatrist for psychotropic medications including Depakote, Remeron and Rispirdal for bipolar, anxiety, and other psychiatric conditions  Patient needs refill of HCTZ  Patient reports good BP control  Today patient's /68  Patient's last hemoglobin A1c 5 6 on 3/27/18  Patient denies headaches, CP, SOB  Social Hx  Was detox for subutex and alcohol,  Last time had drink nov 10th last year  Detox discharge on January 2018  1 pack per day  For 50 years  Last drink November  No illicit drugs  Preventative   Colonoscopy 2018 per patient normal,  Blood transfusion      The following portions of the patient's history were reviewed and updated as appropriate: allergies, current medications, past family history, past medical history, past social history, past surgical history and problem list         Review of Systems   Constitutional: Positive for fever (low grade fever on 6/2 of 100 2)  Negative for chills  HENT: Negative for congestion and sinus pain  Eyes: Negative for pain and discharge  Respiratory: Positive for shortness of breath (dyspnea on exertion with stairs)  Negative for wheezing  Cardiovascular: Negative for chest pain and palpitations  Gastrointestinal: Negative for abdominal pain, blood in stool, diarrhea and nausea  Genitourinary: Negative for dysuria and hematuria  Skin: Negative for rash  Psychiatric/Behavioral: Negative for agitation, sleep disturbance and suicidal ideas           Objective:      /68 (BP Location: Left arm, Patient Position: Sitting)   Pulse 57   Resp 16   Ht 5' 11" (1 803 m)   Wt 117 kg (258 lb)   SpO2 96%   BMI 35 98 kg/m² Physical Exam   Constitutional: He is oriented to person, place, and time  He appears well-developed and well-nourished  HENT:   Head: Normocephalic and atraumatic  Right Ear: External ear normal    Left Ear: External ear normal    Nose: Nose normal    Mouth/Throat: Oropharynx is clear and moist    Eyes: EOM are normal  Pupils are equal, round, and reactive to light  Right eye exhibits no discharge  Left eye exhibits no discharge  No scleral icterus  Neck: Normal range of motion  Neck supple  Cardiovascular: Normal rate, regular rhythm and intact distal pulses  Pulmonary/Chest: Effort normal  No respiratory distress  He has no wheezes  Slightly diminished airway entry at bases   Abdominal: Soft  Bowel sounds are normal  He exhibits no distension  There is no tenderness  Genitourinary: Penis normal    Musculoskeletal: Normal range of motion  He exhibits no edema or tenderness  Neurological: He is alert and oriented to person, place, and time  Skin: Skin is warm and dry  Psychiatric: He has a normal mood and affect   His behavior is normal  Judgment and thought content normal

## 2018-06-08 ENCOUNTER — OFFICE VISIT (OUTPATIENT)
Dept: HEMATOLOGY ONCOLOGY | Facility: MEDICAL CENTER | Age: 63
End: 2018-06-08
Payer: COMMERCIAL

## 2018-06-08 VITALS
OXYGEN SATURATION: 96 % | BODY MASS INDEX: 36.68 KG/M2 | WEIGHT: 262 LBS | RESPIRATION RATE: 18 BRPM | HEIGHT: 71 IN | HEART RATE: 61 BPM | DIASTOLIC BLOOD PRESSURE: 80 MMHG | TEMPERATURE: 98 F | SYSTOLIC BLOOD PRESSURE: 164 MMHG

## 2018-06-08 DIAGNOSIS — C95.10 CHRONIC LEUKEMIA, NOT HAVING ACHIEVED REMISSION (HCC): Primary | ICD-10-CM

## 2018-06-08 PROCEDURE — 99214 OFFICE O/P EST MOD 30 MIN: CPT | Performed by: INTERNAL MEDICINE

## 2018-06-08 NOTE — PROGRESS NOTES
Barrie Llanes  1955  [facility] [place] [id number] [place] [id number]    DISCUSSION  SUMMARY:    59-year-old male with a number of medical problems recently admitted to the hospital because of the anemia (2nd time)  Mr Erin Edward underwent a bone marrow biopsy demonstrating a packed marrow  Pathology was consistent with CLL  Issues:    1  CLL  Patient was symptomatic and needed to be treated  Patient is now on ibrutinib  Nursing staff went over the potential side effects/toxicities with patient and wife previously  So far, patient denies any side effects/issues with the new medication  The white count/lymphocytosis is higher/worse than before  It may take time for the medication to bring down the white count in the peripheral blood (there was heavy tumor burden)  2  Anemia  Secondary to a packed marrow  Patient will continue to go for weekly CBCs with transfusions as needed  The most recent hemoglobin level was okay/acceptable      3  Psychiatric issues/depression  Patient needs to follow up with Psychiatry as directed      4    Fatigue  Etiology is most likely multifactorial and includes leukemia, other medical and psychiatric issues and secondary to medication use  I have told the patient and wife that this is difficult to completely correct      Patient is to return in 4 weeks  Patient knows to call the hematology/oncology office if there are any other questions or concerns  Carefully review your medication list and verify that the list is accurate and up-to-date  Please call the hematology/oncology office if there are medications missing from the list, medications on the list that you are not currently taking or if there is a dosage or instruction that is different from how you're taking that medication      Patient goals and areas of care: monitor CBC parameters, continue with the CLL treatment  Patient is able to self-care   _____________________________________________________________________________________    Chief Complaint   Patient presents with    Follow-up     CLL on ibrutinib     History of Present Illness:    50-year-old male recently admitted to [address] [place] [address] with severe anemia  Bone marrow biopsy demonstrated CLL  Patient is on a bruit new and returns for follow-up  Mr Margarita Mancilla states feeling +/-  Fatigue is about the same as before  Patient denies any fevers, chills or sweats  No GI or  bleeding  Appetite is okay, weight is stable  Activities are extremely limited  No pain control issues  Depression is about the same as before  No shortness of breath at rest, mild dyspnea on exertion but no different than before  No chest pain or pressure  Patient continues to smoke  Review of Systems   Constitutional: Positive for fatigue  HENT: Negative  Eyes: Negative  Respiratory: Negative          +dyspnea on exertion   Cardiovascular: Negative  Gastrointestinal: Negative  Endocrine: Negative  Genitourinary: Negative  Musculoskeletal: Positive for arthralgias  Skin: Negative  Allergic/Immunologic: Negative  Neurological: Negative  Hematological: Negative  Psychiatric/Behavioral: Positive for behavioral problems  All other systems reviewed and are negative       Patient Active Problem List   Diagnosis    Hypertension    Depression    Polypharmacy    Bronchitis    Encephalopathy    Weakness    Leukemia (HCC)    Lactic acidosis    Leukocytosis    Elevated troponin    Bipolar 1 disorder (HCC)    Psychiatric disorder    Anemia    Encounter for smoking cessation counseling    Encounter for screening for lipid disorder    Screening for diabetes mellitus     Past Medical History:   Diagnosis Date    Anemia     Anxiety     [facility]ipolar disorder ([facility] )     Cancer ([facility] )     History of alcohol abuse     Hypertension     Hypertension     Insomnia     Leukemia ([facility] )     Opiate abuse, episodic     Psychiatric disorder      Past Surgical History:   Procedure Laterality Date    EGD AND COLONOSCOPY N/A 3/30/2018    Procedure: EGD AND COLONOSCOPY;  Surgeon: Barak Edmonds MD;  Location: [facility]; Service: Gastroenterology     Family History   Problem Relation Age of Onset    Coronary artery disease Mother     No Known Problems Father     Hepatitis Sister     Cancer Brother     Prostate cancer Brother     Early death Brother      Social History     Social History    Marital status: /Civil Union     Spouse name: N/A    Number of children: N/A    Years of education: N/A     Occupational History    Not on file       Social History Main Topics    Smoking status: Current Every Day Smoker     Packs/day: 1 50     Years: 40 00     Types: Cigarettes    Smokeless tobacco: Never Used    Alcohol use No      Comment: last drink nov 19 2017    Drug use: No      Comment: hx of heroin and subutex abuse    Sexual activity: Not Currently     Other Topics Concern    Not on file     Social History Narrative    No narrative on file       Current Outpatient Prescriptions:     benztropine (COGENTIN) 1 mg tablet, Take 1 mg by mouth Medrol Dose Pack scheduling ONLY  , Disp: , Rfl:     cephalexin (KEFLEX) 500 mg capsule, Take 1 capsule (500 mg total) by mouth 2 (two) times a day for 10 doses, Disp: 10 capsule, Rfl: 0    divalproex sodium (DEPAKOTE) 500 mg EC tablet, Take 250 mg by mouth every 12 (twelve) hours  , Disp: , Rfl:     FLUoxetine (PROzac) 20 mg capsule, Take 40 mg by mouth daily, Disp: , Rfl:     hydrochlorothiazide (MICROZIDE) 12 5 mg capsule, Take 1 capsule (12 5 mg total) by mouth daily, Disp: 30 capsule, Rfl: 3    hydrOXYzine pamoate (VISTARIL) 50 mg capsule, Take 50 mg by mouth 2 (two) times a day, Disp: , Rfl:     Ibrutinib 420 MG TABS, Take 420 mg by mouth daily, Disp: 30 tablet, Rfl: 3    lisinopril (ZESTRIL) 40 mg tablet, Take 40 mg by mouth daily, Disp: , Rfl:     metoprolol tartrate (LOPRESSOR) 25 mg tablet, Take 0 5 tablets (12 5 mg total) by mouth every 12 (twelve) hours, Disp: 60 tablet, Rfl: 5    mirtazapine (REMERON) 15 mg tablet, Take 15 mg by mouth daily at bedtime, Disp: , Rfl:     nicotine (NICODERM CQ) 21 mg/24 hr TD 24 hr patch, Place 1 patch on the skin every 24 hours, Disp: 28 patch, Rfl: 0    pantoprazole (PROTONIX) 40 mg tablet, Take 1 tablet (40 mg total) by mouth daily, Disp: 30 tablet, Rfl: 5    risperiDONE (RisperDAL) 2 mg tablet, Take 4 mg by mouth 2 (two) times a day  , Disp: , Rfl:     No Known Allergies      Physical Exam   Constitutional: He is oriented to person, place, and time  He appears well-developed and well-nourished  Middle-aged male, no respiratory distress, +tobacco odor   HENT:   Head: Normocephalic and atraumatic  Right Ear: External ear normal    Left Ear: External ear normal    Nose: Nose normal    Mouth/Throat: Oropharynx is clear and moist    Eyes: Conjunctivae and EOM are normal  Pupils are equal, round, and reactive to light  Neck: Normal range of motion  Neck supple  Cardiovascular: Normal rate, regular rhythm, normal heart sounds and intact distal pulses  Pulmonary/Chest:   Lungs with fair air entry bilaterally, distant breath sounds, bilateral rhonchi, no rales   Abdominal: Soft  Bowel sounds are normal    Abdomen is soft, obese, cannot palpate liver or spleen, no rigidity or rebound   Musculoskeletal: Normal range of motion  Neurological: He is alert and oriented to person, place, and time  He has normal reflexes  Skin: Skin is warm  Skin is pale, warm, slightly dry, scattered ecchymoses, no petechiae, no active bleeding   Psychiatric: He has a normal mood and affect   His behavior is normal  Judgment and thought content normal    Extremities: 0-1 + bilateral lower extremity edema, no cords, pulses are 1+  Lymphatics: no adenopathy in the neck, supraclavicular region, axilla and groin bilaterally    Labs:    06/07/2018 WBC = 104 5 hemoglobin = 7 5 hematocrit = 25 5 platelet = 930 neutrophil = 4% lymphocytes = 93% monocyte = 3%  05/14/2018 WBC = 40 2 hemoglobin = 6 9 hematocrit = 21 1 MCV = 87 platelet = 665 lymphocytes = 99%  04/23/2018 WBC = 31 2 hemoglobin = 8 1 hematocrit = 24 5 MCV = 86 platelet = 720 lymphocytes = 83%  04/09/2018 WBC = 22 7 hemoglobin = 7 hematocrit = 21 1 platelet = 376 (no differential)  2/22/18 WBC = 12 1 hemoglobin = 8 3 hematocrit = 24 6 MCV = 86 platelet = 768  02/18/5296 WBC = 15 1 hemoglobin = 8 1 hematocrit = 24 3 platelet = 792  93/19/7060 WBC = 14 4 hemoglobin = 6 8 hematocrit = 20 platelet = 670    Pathology    Case Report   Surgical Pathology Report                         Case: [id number]                                    Authorizing Provider: Tien Herron MD          Collected:           03/28/2018 1318               Ordering Location:     [address] [place] [address] Received:            03/28/2018 [address] [place] [address]                                                                       Pathologist:           Maria Eugenia Hanley MD                                                         Specimens:   A) - Iliac Crest, Left, Bone Marrow   2 Cores                                                        B) - Iliac Crest, Left, Bone Marrow                                                                  C) - Iliac Crest, Left, Bone Marrow  2T  P  Slides, 2 Asp Stained , 8 Asp Unstained          Addendum   - Fluorescence in situ hybridization (FISH) ([facility]#[id number], evaluated by ORLIN Parish , Ph D ) for genetic abnormalities which may be associated with myelodysplastic syndrome is as follows:        Interpretation  1  No evidence of trisomy 12 (+12)  2  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  Comment: Deletions involving 13q14 are detectable by FISH in approximately 50% of persons with CLL   Among them,  about 30% show biallelic or concomitant monoallelic and biallelic deletion  As a group and as the sole aberration, del(13q)  is associated with a more favorable outcome  3  No evidence of p53 (17p13) deletion or amplification  4  No evidence of RENAE (11q22 3) deletion       - IgVH Mutation analysis ([facility]#[id number], evaluated by Dr Joy Ann, Ph D )  is as follows: IGVH MUTATION ANALYSIS: UNMUTATED - CLL  RESULTS  Mutation Rate: 0%  IgVH Family: IGHV1-2*04     -  Cytogenetic studies as performed on the bone marrow aspirate @ [facility] (Specimen ID: [id number], evaluated by Gabriela Oconnell, PhD, [facility] ) as follows:  Karyotype:  46,XY,add(18)(p11 3)[6]/46,XY[1], LIMITED ANALYSIS   Cytogenetics Analysis:  : Metaphases Counted          Metaphases Analyzed            Metaphases Karyotyped        GTG Band level                       Culture Type(s)               7                                     7                                     4                        300-400                                       [id number]/[id number]   Interpretation: Abnormal male karyotype  This is an incomplete study and only 7 cells (as opposed to 20 cells) were available for analysis  Cytogenetic analysis  shows the presence of an abnormal clone (6 out of 7 cells) characterized by addition of material of unknown origin to  18p11  3  No other aberrations were identified, and one normal cell was found during the course of analysis  These current cytogenetic results are consistent with the presence of abnormal clonal cells  Correlation with  hematopathology and follow-up bone marrow studies are recommended to further evaluate the significance of these  findings  Microarray analysis could be considered to further characterize this rearrangement             Addendum electronically signed by Bereket Amanda MD on 4/6/2018 at  4:12 PM     Addendum electronically signed by Bereket Amanda MD on 4/6/2018 at  4:12 PM   Final Diagnosis   A -C    Bone marrow, left iliac crest,  biopsy and aspirate:  -  Chronic lymphocytic leukemia/small lymphocytic lymphoma (CLL/SLL) (see note)  -  Significantly decreased trilineage myelopoiesis with normal appearing morphology and maturation without significant features of dysplasia or increased myeloblasts, secondary to the above  -  Anemia, neutropenia, and lymphocytosis, secondary to the above  -  Normal stainable storage iron  -  Mildly increased reticulin fibers without fibrosis, secondary to the above  -  Negative for collagen fibrosis, granulomata, vasculitis, necrosis           Electronically signed by Yenifer Isidro MD on 3/29/2018 at  2:51 P

## 2018-06-09 LAB
BACTERIA BLD CULT: NORMAL
BACTERIA BLD CULT: NORMAL

## 2018-06-10 LAB
BACTERIA BLD CULT: ABNORMAL
BACTERIA BLD CULT: ABNORMAL
GRAM STN SPEC: ABNORMAL
GRAM STN SPEC: ABNORMAL

## 2018-06-11 ENCOUNTER — TRANSCRIBE ORDERS (OUTPATIENT)
Dept: HEMATOLOGY ONCOLOGY | Facility: MEDICAL CENTER | Age: 63
End: 2018-06-11

## 2018-06-11 ENCOUNTER — APPOINTMENT (OUTPATIENT)
Dept: LAB | Facility: HOSPITAL | Age: 63
End: 2018-06-11
Attending: INTERNAL MEDICINE
Payer: COMMERCIAL

## 2018-06-11 DIAGNOSIS — C91.10 CLL (CHRONIC LYMPHOCYTIC LEUKEMIA) (HCC): Primary | ICD-10-CM

## 2018-06-11 DIAGNOSIS — D64.9 ANEMIA, UNSPECIFIED TYPE: ICD-10-CM

## 2018-06-11 LAB
BASOPHILS # BLD MANUAL: 0 THOUSAND/UL (ref 0–0.1)
BASOPHILS NFR MAR MANUAL: 0 % (ref 0–1)
EOSINOPHIL # BLD MANUAL: 0 THOUSAND/UL (ref 0–0.4)
EOSINOPHIL NFR BLD MANUAL: 0 % (ref 0–6)
ERYTHROCYTE [DISTWIDTH] IN BLOOD BY AUTOMATED COUNT: 14 % (ref 11.6–15.1)
HCT VFR BLD AUTO: 24 % (ref 36.5–49.3)
HGB BLD-MCNC: 7.1 G/DL (ref 12–17)
LYMPHOCYTES # BLD AUTO: 112.22 THOUSAND/UL (ref 0.6–4.47)
LYMPHOCYTES # BLD AUTO: 96 % (ref 14–44)
MCH RBC QN AUTO: 27 PG (ref 26.8–34.3)
MCHC RBC AUTO-ENTMCNC: 29.6 G/DL (ref 31.4–37.4)
MCV RBC AUTO: 91 FL (ref 82–98)
MONOCYTES # BLD AUTO: 1.17 THOUSAND/UL (ref 0–1.22)
MONOCYTES NFR BLD: 1 % (ref 4–12)
NEUTROPHILS # BLD MANUAL: 3.51 THOUSAND/UL (ref 1.85–7.62)
NEUTS SEG NFR BLD AUTO: 3 % (ref 43–75)
NRBC BLD AUTO-RTO: 0 /100 WBCS
PLATELET # BLD AUTO: 195 THOUSANDS/UL (ref 149–390)
PLATELET BLD QL SMEAR: ADEQUATE
PMV BLD AUTO: 10.7 FL (ref 8.9–12.7)
RBC # BLD AUTO: 2.63 MILLION/UL (ref 3.88–5.62)
RBC MORPH BLD: NORMAL
TOTAL CELLS COUNTED SPEC: 100
WBC # BLD AUTO: 116.9 THOUSAND/UL (ref 4.31–10.16)

## 2018-06-11 PROCEDURE — 85027 COMPLETE CBC AUTOMATED: CPT | Performed by: INTERNAL MEDICINE

## 2018-06-11 PROCEDURE — 36415 COLL VENOUS BLD VENIPUNCTURE: CPT | Performed by: INTERNAL MEDICINE

## 2018-06-11 PROCEDURE — 85007 BL SMEAR W/DIFF WBC COUNT: CPT | Performed by: INTERNAL MEDICINE

## 2018-06-14 ENCOUNTER — TELEPHONE (OUTPATIENT)
Dept: HEMATOLOGY ONCOLOGY | Facility: MEDICAL CENTER | Age: 63
End: 2018-06-14

## 2018-06-14 ENCOUNTER — APPOINTMENT (OUTPATIENT)
Dept: LAB | Facility: HOSPITAL | Age: 63
End: 2018-06-14
Attending: INTERNAL MEDICINE
Payer: COMMERCIAL

## 2018-06-14 ENCOUNTER — TRANSCRIBE ORDERS (OUTPATIENT)
Dept: ADMINISTRATIVE | Facility: HOSPITAL | Age: 63
End: 2018-06-14

## 2018-06-14 DIAGNOSIS — C91.10 CLL (CHRONIC LYMPHOCYTIC LEUKEMIA) (HCC): ICD-10-CM

## 2018-06-14 DIAGNOSIS — D64.9 ANEMIA, UNSPECIFIED TYPE: Primary | ICD-10-CM

## 2018-06-14 LAB
BASOPHILS # BLD MANUAL: 0 THOUSAND/UL (ref 0–0.1)
BASOPHILS NFR MAR MANUAL: 0 % (ref 0–1)
EOSINOPHIL # BLD MANUAL: 0 THOUSAND/UL (ref 0–0.4)
EOSINOPHIL NFR BLD MANUAL: 0 % (ref 0–6)
ERYTHROCYTE [DISTWIDTH] IN BLOOD BY AUTOMATED COUNT: 14 % (ref 11.6–15.1)
HCT VFR BLD AUTO: 23.3 % (ref 36.5–49.3)
HGB BLD-MCNC: 7 G/DL (ref 12–17)
LYMPHOCYTES # BLD AUTO: 93 % (ref 14–44)
LYMPHOCYTES # BLD AUTO: 95.72 THOUSAND/UL (ref 0.6–4.47)
MCH RBC QN AUTO: 27 PG (ref 26.8–34.3)
MCHC RBC AUTO-ENTMCNC: 30 G/DL (ref 31.4–37.4)
MCV RBC AUTO: 90 FL (ref 82–98)
METAMYELOCYTES NFR BLD MANUAL: 1 % (ref 0–1)
MONOCYTES # BLD AUTO: 1.03 THOUSAND/UL (ref 0–1.22)
MONOCYTES NFR BLD: 1 % (ref 4–12)
NEUTROPHILS # BLD MANUAL: 5.15 THOUSAND/UL (ref 1.85–7.62)
NEUTS SEG NFR BLD AUTO: 5 % (ref 43–75)
NRBC BLD AUTO-RTO: 0 /100 WBCS
PLATELET # BLD AUTO: 173 THOUSANDS/UL (ref 149–390)
PLATELET BLD QL SMEAR: ADEQUATE
PMV BLD AUTO: 10.8 FL (ref 8.9–12.7)
RBC # BLD AUTO: 2.59 MILLION/UL (ref 3.88–5.62)
RBC MORPH BLD: NORMAL
TOTAL CELLS COUNTED SPEC: 100
WBC # BLD AUTO: 102.92 THOUSAND/UL (ref 4.31–10.16)

## 2018-06-14 PROCEDURE — 86901 BLOOD TYPING SEROLOGIC RH(D): CPT | Performed by: INTERNAL MEDICINE

## 2018-06-14 PROCEDURE — 86921 COMPATIBILITY TEST INCUBATE: CPT

## 2018-06-14 PROCEDURE — 86900 BLOOD TYPING SEROLOGIC ABO: CPT | Performed by: INTERNAL MEDICINE

## 2018-06-14 PROCEDURE — 86850 RBC ANTIBODY SCREEN: CPT | Performed by: INTERNAL MEDICINE

## 2018-06-14 PROCEDURE — 85027 COMPLETE CBC AUTOMATED: CPT

## 2018-06-14 PROCEDURE — 85007 BL SMEAR W/DIFF WBC COUNT: CPT

## 2018-06-14 PROCEDURE — 86922 COMPATIBILITY TEST ANTIGLOB: CPT

## 2018-06-14 PROCEDURE — 36415 COLL VENOUS BLD VENIPUNCTURE: CPT

## 2018-06-15 ENCOUNTER — HOSPITAL ENCOUNTER (OUTPATIENT)
Dept: INFUSION CENTER | Facility: HOSPITAL | Age: 63
Discharge: HOME | End: 2018-06-15
Payer: COMMERCIAL

## 2018-06-15 VITALS
RESPIRATION RATE: 16 BRPM | TEMPERATURE: 97.9 F | HEART RATE: 57 BPM | DIASTOLIC BLOOD PRESSURE: 62 MMHG | SYSTOLIC BLOOD PRESSURE: 130 MMHG

## 2018-06-15 PROCEDURE — P9016 RBC LEUKOCYTES REDUCED: HCPCS

## 2018-06-15 PROCEDURE — 36430 TRANSFUSION BLD/BLD COMPNT: CPT

## 2018-06-15 RX ORDER — ACETAMINOPHEN 325 MG/1
650 TABLET ORAL ONCE
Status: COMPLETED | OUTPATIENT
Start: 2018-06-15 | End: 2018-06-15

## 2018-06-15 RX ORDER — SODIUM CHLORIDE 9 MG/ML
20 INJECTION, SOLUTION INTRAVENOUS ONCE
Status: COMPLETED | OUTPATIENT
Start: 2018-06-15 | End: 2018-06-15

## 2018-06-15 RX ORDER — DIPHENHYDRAMINE HCL 25 MG
25 TABLET ORAL ONCE
Status: COMPLETED | OUTPATIENT
Start: 2018-06-15 | End: 2018-06-15

## 2018-06-15 RX ADMIN — ACETAMINOPHEN 650 MG: 325 TABLET ORAL at 12:38

## 2018-06-15 RX ADMIN — DIPHENHYDRAMINE HCL 25 MG: 25 TABLET ORAL at 12:38

## 2018-06-15 RX ADMIN — SODIUM CHLORIDE 20 ML/HR: 0.9 INJECTION, SOLUTION INTRAVENOUS at 12:38

## 2018-06-18 ENCOUNTER — DOCUMENTATION (OUTPATIENT)
Dept: HEMATOLOGY ONCOLOGY | Facility: MEDICAL CENTER | Age: 63
End: 2018-06-18

## 2018-06-18 ENCOUNTER — VBI (OUTPATIENT)
Dept: FAMILY MEDICINE CLINIC | Facility: CLINIC | Age: 63
End: 2018-06-18

## 2018-06-18 ENCOUNTER — APPOINTMENT (OUTPATIENT)
Dept: LAB | Facility: HOSPITAL | Age: 63
End: 2018-06-18
Attending: INTERNAL MEDICINE
Payer: COMMERCIAL

## 2018-06-18 DIAGNOSIS — C91.10 CLL (CHRONIC LYMPHOCYTIC LEUKEMIA) (HCC): ICD-10-CM

## 2018-06-18 LAB
BASOPHILS # BLD MANUAL: 0 THOUSAND/UL (ref 0–0.1)
BASOPHILS NFR MAR MANUAL: 0 % (ref 0–1)
EOSINOPHIL # BLD MANUAL: 0 THOUSAND/UL (ref 0–0.4)
EOSINOPHIL NFR BLD MANUAL: 0 % (ref 0–6)
ERYTHROCYTE [DISTWIDTH] IN BLOOD BY AUTOMATED COUNT: 14 % (ref 11.6–15.1)
HCT VFR BLD AUTO: 24.3 % (ref 36.5–49.3)
HGB BLD-MCNC: 7.2 G/DL (ref 12–17)
LYMPHOCYTES # BLD AUTO: 93 % (ref 14–44)
LYMPHOCYTES # BLD AUTO: 93.85 THOUSAND/UL (ref 0.6–4.47)
MCH RBC QN AUTO: 27.1 PG (ref 26.8–34.3)
MCHC RBC AUTO-ENTMCNC: 29.6 G/DL (ref 31.4–37.4)
MCV RBC AUTO: 91 FL (ref 82–98)
MONOCYTES # BLD AUTO: 0 THOUSAND/UL (ref 0–1.22)
MONOCYTES NFR BLD: 0 % (ref 4–12)
NEUTROPHILS # BLD MANUAL: 7.06 THOUSAND/UL (ref 1.85–7.62)
NEUTS SEG NFR BLD AUTO: 7 % (ref 43–75)
NRBC BLD AUTO-RTO: 0 /100 WBCS
PLATELET # BLD AUTO: 172 THOUSANDS/UL (ref 149–390)
PLATELET BLD QL SMEAR: ADEQUATE
PMV BLD AUTO: 10.8 FL (ref 8.9–12.7)
POLYCHROMASIA BLD QL SMEAR: PRESENT
RBC # BLD AUTO: 2.66 MILLION/UL (ref 3.88–5.62)
SMUDGE CELLS BLD QL SMEAR: PRESENT
TOTAL CELLS COUNTED SPEC: 100
WBC # BLD AUTO: 100.91 THOUSAND/UL (ref 4.31–10.16)

## 2018-06-18 PROCEDURE — 85007 BL SMEAR W/DIFF WBC COUNT: CPT | Performed by: INTERNAL MEDICINE

## 2018-06-18 PROCEDURE — 85027 COMPLETE CBC AUTOMATED: CPT | Performed by: INTERNAL MEDICINE

## 2018-06-18 PROCEDURE — 36415 COLL VENOUS BLD VENIPUNCTURE: CPT | Performed by: INTERNAL MEDICINE

## 2018-06-18 NOTE — TELEPHONE ENCOUNTER
Pt was seen in [address] on 6/4/18  CC; Abnormal Lab  DX: Abnormal laboratory test result  Pt states he needs lab work and once results are back, he will then schedule a f/u appt @ [facility]  Pt is aware of office hours and phone number

## 2018-06-21 ENCOUNTER — TELEPHONE (OUTPATIENT)
Dept: HEMATOLOGY ONCOLOGY | Facility: MEDICAL CENTER | Age: 63
End: 2018-06-21

## 2018-06-21 ENCOUNTER — APPOINTMENT (OUTPATIENT)
Dept: LAB | Facility: HOSPITAL | Age: 63
End: 2018-06-21
Attending: INTERNAL MEDICINE
Payer: COMMERCIAL

## 2018-06-21 DIAGNOSIS — C91.10 CLL (CHRONIC LYMPHOCYTIC LEUKEMIA) (HCC): ICD-10-CM

## 2018-06-21 LAB
ABO GROUP BLD: NORMAL
ANISOCYTOSIS BLD QL SMEAR: PRESENT
BASOPHILS # BLD MANUAL: 0 THOUSAND/UL (ref 0–0.1)
BASOPHILS NFR MAR MANUAL: 0 % (ref 0–1)
BLD GP AB SCN SERPL QL: NEGATIVE
EOSINOPHIL # BLD MANUAL: 0 THOUSAND/UL (ref 0–0.4)
EOSINOPHIL NFR BLD MANUAL: 0 % (ref 0–6)
ERYTHROCYTE [DISTWIDTH] IN BLOOD BY AUTOMATED COUNT: 14.2 % (ref 11.6–15.1)
HCT VFR BLD AUTO: 23.1 % (ref 36.5–49.3)
HGB BLD-MCNC: 6.8 G/DL (ref 12–17)
HYPERCHROMIA BLD QL SMEAR: PRESENT
LYMPHOCYTES # BLD AUTO: 113.05 THOUSAND/UL (ref 0.6–4.47)
LYMPHOCYTES # BLD AUTO: 99 % (ref 14–44)
MCH RBC QN AUTO: 27.1 PG (ref 26.8–34.3)
MCHC RBC AUTO-ENTMCNC: 29.4 G/DL (ref 31.4–37.4)
MCV RBC AUTO: 92 FL (ref 82–98)
MICROCYTES BLD QL AUTO: PRESENT
MONOCYTES # BLD AUTO: 0 THOUSAND/UL (ref 0–1.22)
MONOCYTES NFR BLD: 0 % (ref 4–12)
NEUTROPHILS # BLD MANUAL: 1.14 THOUSAND/UL (ref 1.85–7.62)
NEUTS SEG NFR BLD AUTO: 1 % (ref 43–75)
NRBC BLD AUTO-RTO: 0 /100 WBCS
PLATELET # BLD AUTO: 190 THOUSANDS/UL (ref 149–390)
PLATELET BLD QL SMEAR: ADEQUATE
PMV BLD AUTO: 10.4 FL (ref 8.9–12.7)
RBC # BLD AUTO: 2.51 MILLION/UL (ref 3.88–5.62)
RBC MORPH BLD: PRESENT
RH BLD: POSITIVE
SPECIMEN EXPIRATION DATE: NORMAL
TOTAL CELLS COUNTED SPEC: 100
WBC # BLD AUTO: 114.19 THOUSAND/UL (ref 4.31–10.16)

## 2018-06-21 PROCEDURE — 86922 COMPATIBILITY TEST ANTIGLOB: CPT

## 2018-06-21 PROCEDURE — 86900 BLOOD TYPING SEROLOGIC ABO: CPT | Performed by: INTERNAL MEDICINE

## 2018-06-21 PROCEDURE — 86901 BLOOD TYPING SEROLOGIC RH(D): CPT | Performed by: INTERNAL MEDICINE

## 2018-06-21 PROCEDURE — 85007 BL SMEAR W/DIFF WBC COUNT: CPT

## 2018-06-21 PROCEDURE — 86850 RBC ANTIBODY SCREEN: CPT | Performed by: INTERNAL MEDICINE

## 2018-06-21 PROCEDURE — 85027 COMPLETE CBC AUTOMATED: CPT

## 2018-06-21 PROCEDURE — 86921 COMPATIBILITY TEST INCUBATE: CPT

## 2018-06-21 RX ORDER — DIPHENHYDRAMINE HCL 25 MG
25 TABLET ORAL ONCE
Status: COMPLETED | OUTPATIENT
Start: 2018-06-22 | End: 2018-06-22

## 2018-06-21 RX ORDER — SODIUM CHLORIDE 9 MG/ML
20 INJECTION, SOLUTION INTRAVENOUS ONCE
Status: COMPLETED | OUTPATIENT
Start: 2018-06-22 | End: 2018-06-22

## 2018-06-21 RX ORDER — ACETAMINOPHEN 325 MG/1
650 TABLET ORAL ONCE
Status: COMPLETED | OUTPATIENT
Start: 2018-06-22 | End: 2018-06-22

## 2018-06-22 ENCOUNTER — HOSPITAL ENCOUNTER (OUTPATIENT)
Dept: INFUSION CENTER | Facility: HOSPITAL | Age: 63
Discharge: HOME | End: 2018-06-22
Payer: COMMERCIAL

## 2018-06-22 VITALS
HEART RATE: 60 BPM | SYSTOLIC BLOOD PRESSURE: 143 MMHG | DIASTOLIC BLOOD PRESSURE: 69 MMHG | TEMPERATURE: 98.6 F | RESPIRATION RATE: 16 BRPM | OXYGEN SATURATION: 95 %

## 2018-06-22 PROCEDURE — P9021 RED BLOOD CELLS UNIT: HCPCS

## 2018-06-22 PROCEDURE — 36430 TRANSFUSION BLD/BLD COMPNT: CPT

## 2018-06-22 RX ADMIN — SODIUM CHLORIDE 20 ML/HR: 0.9 INJECTION, SOLUTION INTRAVENOUS at 09:52

## 2018-06-22 RX ADMIN — DIPHENHYDRAMINE HCL 25 MG: 25 TABLET ORAL at 09:50

## 2018-06-22 RX ADMIN — ACETAMINOPHEN 650 MG: 325 TABLET ORAL at 09:50

## 2018-06-25 ENCOUNTER — APPOINTMENT (OUTPATIENT)
Dept: LAB | Facility: HOSPITAL | Age: 63
End: 2018-06-25
Attending: INTERNAL MEDICINE
Payer: COMMERCIAL

## 2018-06-25 DIAGNOSIS — C91.10 CLL (CHRONIC LYMPHOCYTIC LEUKEMIA) (HCC): ICD-10-CM

## 2018-06-25 LAB
BASOPHILS # BLD MANUAL: 0 THOUSAND/UL (ref 0–0.1)
BASOPHILS NFR MAR MANUAL: 0 % (ref 0–1)
EOSINOPHIL # BLD MANUAL: 0 THOUSAND/UL (ref 0–0.4)
EOSINOPHIL NFR BLD MANUAL: 0 % (ref 0–6)
ERYTHROCYTE [DISTWIDTH] IN BLOOD BY AUTOMATED COUNT: 14.6 % (ref 11.6–15.1)
HCT VFR BLD AUTO: 28.8 % (ref 36.5–49.3)
HGB BLD-MCNC: 8.8 G/DL (ref 12–17)
LG PLATELETS BLD QL SMEAR: PRESENT
LYMPHOCYTES # BLD AUTO: 73.76 THOUSAND/UL (ref 0.6–4.47)
LYMPHOCYTES # BLD AUTO: 97 % (ref 14–44)
MCH RBC QN AUTO: 27.8 PG (ref 26.8–34.3)
MCHC RBC AUTO-ENTMCNC: 30.6 G/DL (ref 31.4–37.4)
MCV RBC AUTO: 91 FL (ref 82–98)
MONOCYTES # BLD AUTO: 0.76 THOUSAND/UL (ref 0–1.22)
MONOCYTES NFR BLD: 1 % (ref 4–12)
NEUTROPHILS # BLD MANUAL: 1.52 THOUSAND/UL (ref 1.85–7.62)
NEUTS BAND NFR BLD MANUAL: 1 % (ref 0–8)
NEUTS SEG NFR BLD AUTO: 1 % (ref 43–75)
NRBC BLD AUTO-RTO: 0 /100 WBCS
PLATELET # BLD AUTO: 166 THOUSANDS/UL (ref 149–390)
PLATELET BLD QL SMEAR: ADEQUATE
PMV BLD AUTO: 10.7 FL (ref 8.9–12.7)
RBC # BLD AUTO: 3.16 MILLION/UL (ref 3.88–5.62)
RBC MORPH BLD: NORMAL
TOTAL CELLS COUNTED SPEC: 100
WBC # BLD AUTO: 76.04 THOUSAND/UL (ref 4.31–10.16)

## 2018-06-25 PROCEDURE — 85027 COMPLETE CBC AUTOMATED: CPT

## 2018-06-25 PROCEDURE — 85007 BL SMEAR W/DIFF WBC COUNT: CPT

## 2018-06-28 ENCOUNTER — TRANSCRIBE ORDERS (OUTPATIENT)
Dept: ADMINISTRATIVE | Facility: HOSPITAL | Age: 63
End: 2018-06-28

## 2018-06-28 ENCOUNTER — APPOINTMENT (OUTPATIENT)
Dept: LAB | Facility: HOSPITAL | Age: 63
End: 2018-06-28
Attending: INTERNAL MEDICINE
Payer: COMMERCIAL

## 2018-06-28 ENCOUNTER — TELEPHONE (OUTPATIENT)
Dept: HEMATOLOGY ONCOLOGY | Facility: MEDICAL CENTER | Age: 63
End: 2018-06-28

## 2018-06-28 DIAGNOSIS — C91.10 CLL (CHRONIC LYMPHOCYTIC LEUKEMIA) (HCC): ICD-10-CM

## 2018-06-28 DIAGNOSIS — D64.9 ANEMIA, UNSPECIFIED TYPE: Primary | ICD-10-CM

## 2018-06-28 LAB
BASOPHILS # BLD MANUAL: 0 THOUSAND/UL (ref 0–0.1)
BASOPHILS NFR MAR MANUAL: 0 % (ref 0–1)
EOSINOPHIL # BLD MANUAL: 0 THOUSAND/UL (ref 0–0.4)
EOSINOPHIL NFR BLD MANUAL: 0 % (ref 0–6)
ERYTHROCYTE [DISTWIDTH] IN BLOOD BY AUTOMATED COUNT: 14.4 % (ref 11.6–15.1)
HCT VFR BLD AUTO: 27 % (ref 36.5–49.3)
HGB BLD-MCNC: 8.3 G/DL (ref 12–17)
LYMPHOCYTES # BLD AUTO: 77.24 THOUSAND/UL (ref 0.6–4.47)
LYMPHOCYTES # BLD AUTO: 97 % (ref 14–44)
MCH RBC QN AUTO: 28.2 PG (ref 26.8–34.3)
MCHC RBC AUTO-ENTMCNC: 30.7 G/DL (ref 31.4–37.4)
MCV RBC AUTO: 92 FL (ref 82–98)
MONOCYTES # BLD AUTO: 0.8 THOUSAND/UL (ref 0–1.22)
MONOCYTES NFR BLD: 1 % (ref 4–12)
NEUTROPHILS # BLD MANUAL: 1.59 THOUSAND/UL (ref 1.85–7.62)
NEUTS BAND NFR BLD MANUAL: 1 % (ref 0–8)
NEUTS SEG NFR BLD AUTO: 1 % (ref 43–75)
NRBC BLD AUTO-RTO: 0 /100 WBCS
PLATELET # BLD AUTO: 161 THOUSANDS/UL (ref 149–390)
PLATELET BLD QL SMEAR: ADEQUATE
PMV BLD AUTO: 10.8 FL (ref 8.9–12.7)
RBC # BLD AUTO: 2.94 MILLION/UL (ref 3.88–5.62)
RBC MORPH BLD: NORMAL
TOTAL CELLS COUNTED SPEC: 100
WBC # BLD AUTO: 79.63 THOUSAND/UL (ref 4.31–10.16)

## 2018-06-28 PROCEDURE — 36415 COLL VENOUS BLD VENIPUNCTURE: CPT

## 2018-06-28 PROCEDURE — 85027 COMPLETE CBC AUTOMATED: CPT

## 2018-06-28 PROCEDURE — 85007 BL SMEAR W/DIFF WBC COUNT: CPT

## 2018-07-02 ENCOUNTER — APPOINTMENT (OUTPATIENT)
Dept: LAB | Facility: HOSPITAL | Age: 63
End: 2018-07-02
Attending: INTERNAL MEDICINE
Payer: COMMERCIAL

## 2018-07-02 DIAGNOSIS — C91.10 CLL (CHRONIC LYMPHOCYTIC LEUKEMIA) (HCC): ICD-10-CM

## 2018-07-02 LAB
ANISOCYTOSIS BLD QL SMEAR: PRESENT
BASOPHILS # BLD MANUAL: 0 THOUSAND/UL (ref 0–0.1)
BASOPHILS NFR MAR MANUAL: 0 % (ref 0–1)
EOSINOPHIL # BLD MANUAL: 0 THOUSAND/UL (ref 0–0.4)
EOSINOPHIL NFR BLD MANUAL: 0 % (ref 0–6)
ERYTHROCYTE [DISTWIDTH] IN BLOOD BY AUTOMATED COUNT: 14.6 % (ref 11.6–15.1)
HCT VFR BLD AUTO: 26.6 % (ref 36.5–49.3)
HGB BLD-MCNC: 8 G/DL (ref 12–17)
LYMPHOCYTES # BLD AUTO: 96 % (ref 14–44)
LYMPHOCYTES # BLD AUTO: 99.75 THOUSAND/UL (ref 0.6–4.47)
MCH RBC QN AUTO: 27.6 PG (ref 26.8–34.3)
MCHC RBC AUTO-ENTMCNC: 30.1 G/DL (ref 31.4–37.4)
MCV RBC AUTO: 92 FL (ref 82–98)
MONOCYTES # BLD AUTO: 2.08 THOUSAND/UL (ref 0–1.22)
MONOCYTES NFR BLD: 2 % (ref 4–12)
NEUTROPHILS # BLD MANUAL: 2.08 THOUSAND/UL (ref 1.85–7.62)
NEUTS BAND NFR BLD MANUAL: 1 % (ref 0–8)
NEUTS SEG NFR BLD AUTO: 1 % (ref 43–75)
NRBC BLD AUTO-RTO: 0 /100 WBCS
PLATELET # BLD AUTO: 206 THOUSANDS/UL (ref 149–390)
PLATELET BLD QL SMEAR: ADEQUATE
PMV BLD AUTO: 10.7 FL (ref 8.9–12.7)
RBC # BLD AUTO: 2.9 MILLION/UL (ref 3.88–5.62)
RBC MORPH BLD: NORMAL
SMUDGE CELLS BLD QL SMEAR: PRESENT
TOTAL CELLS COUNTED SPEC: 100
WBC # BLD AUTO: 103.91 THOUSAND/UL (ref 4.31–10.16)

## 2018-07-02 PROCEDURE — 36415 COLL VENOUS BLD VENIPUNCTURE: CPT

## 2018-07-02 PROCEDURE — 85027 COMPLETE CBC AUTOMATED: CPT

## 2018-07-02 PROCEDURE — 85007 BL SMEAR W/DIFF WBC COUNT: CPT

## 2018-07-05 ENCOUNTER — APPOINTMENT (OUTPATIENT)
Dept: LAB | Facility: HOSPITAL | Age: 63
End: 2018-07-05
Attending: INTERNAL MEDICINE
Payer: COMMERCIAL

## 2018-07-05 ENCOUNTER — TELEPHONE (OUTPATIENT)
Dept: HEMATOLOGY ONCOLOGY | Facility: MEDICAL CENTER | Age: 63
End: 2018-07-05

## 2018-07-05 DIAGNOSIS — C91.10 CLL (CHRONIC LYMPHOCYTIC LEUKEMIA) (HCC): ICD-10-CM

## 2018-07-05 LAB
BASOPHILS # BLD MANUAL: 0 THOUSAND/UL (ref 0–0.1)
BASOPHILS NFR MAR MANUAL: 0 % (ref 0–1)
EOSINOPHIL # BLD MANUAL: 0 THOUSAND/UL (ref 0–0.4)
EOSINOPHIL NFR BLD MANUAL: 0 % (ref 0–6)
ERYTHROCYTE [DISTWIDTH] IN BLOOD BY AUTOMATED COUNT: 14.7 % (ref 11.6–15.1)
HCT VFR BLD AUTO: 22.1 % (ref 36.5–49.3)
HGB BLD-MCNC: 6.8 G/DL (ref 12–17)
LYMPHOCYTES # BLD AUTO: 75.09 THOUSAND/UL (ref 0.6–4.47)
LYMPHOCYTES # BLD AUTO: 93 % (ref 14–44)
MCH RBC QN AUTO: 28.2 PG (ref 26.8–34.3)
MCHC RBC AUTO-ENTMCNC: 30.8 G/DL (ref 31.4–37.4)
MCV RBC AUTO: 92 FL (ref 82–98)
MONOCYTES # BLD AUTO: 0.81 THOUSAND/UL (ref 0–1.22)
MONOCYTES NFR BLD: 1 % (ref 4–12)
NEUTROPHILS # BLD MANUAL: 4.84 THOUSAND/UL (ref 1.85–7.62)
NEUTS BAND NFR BLD MANUAL: 2 % (ref 0–8)
NEUTS SEG NFR BLD AUTO: 4 % (ref 43–75)
NRBC BLD AUTO-RTO: 0 /100 WBCS
PLATELET # BLD AUTO: 191 THOUSANDS/UL (ref 149–390)
PLATELET BLD QL SMEAR: ADEQUATE
PMV BLD AUTO: 10.6 FL (ref 8.9–12.7)
RBC # BLD AUTO: 2.41 MILLION/UL (ref 3.88–5.62)
RBC MORPH BLD: NORMAL
SMUDGE CELLS BLD QL SMEAR: PRESENT
TOTAL CELLS COUNTED SPEC: 100
WBC # BLD AUTO: 80.74 THOUSAND/UL (ref 4.31–10.16)

## 2018-07-05 PROCEDURE — 36415 COLL VENOUS BLD VENIPUNCTURE: CPT

## 2018-07-05 PROCEDURE — 85027 COMPLETE CBC AUTOMATED: CPT

## 2018-07-05 PROCEDURE — 85007 BL SMEAR W/DIFF WBC COUNT: CPT

## 2018-07-06 ENCOUNTER — OFFICE VISIT (OUTPATIENT)
Dept: HEMATOLOGY ONCOLOGY | Facility: MEDICAL CENTER | Age: 63
End: 2018-07-06
Payer: COMMERCIAL

## 2018-07-06 ENCOUNTER — HOSPITAL ENCOUNTER (OUTPATIENT)
Dept: INFUSION CENTER | Facility: HOSPITAL | Age: 63
Discharge: HOME | End: 2018-07-06
Payer: COMMERCIAL

## 2018-07-06 VITALS
OXYGEN SATURATION: 93 % | SYSTOLIC BLOOD PRESSURE: 122 MMHG | DIASTOLIC BLOOD PRESSURE: 60 MMHG | RESPIRATION RATE: 16 BRPM | TEMPERATURE: 98.4 F | HEART RATE: 59 BPM

## 2018-07-06 VITALS
TEMPERATURE: 97.8 F | HEART RATE: 66 BPM | RESPIRATION RATE: 16 BRPM | DIASTOLIC BLOOD PRESSURE: 75 MMHG | OXYGEN SATURATION: 96 % | SYSTOLIC BLOOD PRESSURE: 152 MMHG

## 2018-07-06 DIAGNOSIS — C95.10 CHRONIC LEUKEMIA, NOT HAVING ACHIEVED REMISSION (HCC): Primary | ICD-10-CM

## 2018-07-06 PROCEDURE — 86921 COMPATIBILITY TEST INCUBATE: CPT

## 2018-07-06 PROCEDURE — 86850 RBC ANTIBODY SCREEN: CPT | Performed by: INTERNAL MEDICINE

## 2018-07-06 PROCEDURE — 86900 BLOOD TYPING SEROLOGIC ABO: CPT | Performed by: INTERNAL MEDICINE

## 2018-07-06 PROCEDURE — 86922 COMPATIBILITY TEST ANTIGLOB: CPT

## 2018-07-06 PROCEDURE — 36430 TRANSFUSION BLD/BLD COMPNT: CPT

## 2018-07-06 PROCEDURE — 99215 OFFICE O/P EST HI 40 MIN: CPT | Performed by: INTERNAL MEDICINE

## 2018-07-06 PROCEDURE — 86901 BLOOD TYPING SEROLOGIC RH(D): CPT | Performed by: INTERNAL MEDICINE

## 2018-07-06 PROCEDURE — P9016 RBC LEUKOCYTES REDUCED: HCPCS

## 2018-07-06 RX ORDER — SODIUM CHLORIDE 9 MG/ML
20 INJECTION, SOLUTION INTRAVENOUS ONCE
Status: COMPLETED | OUTPATIENT
Start: 2018-07-06 | End: 2018-07-06

## 2018-07-06 RX ORDER — ACETAMINOPHEN 325 MG/1
650 TABLET ORAL ONCE
Status: COMPLETED | OUTPATIENT
Start: 2018-07-06 | End: 2018-07-06

## 2018-07-06 RX ORDER — DIPHENHYDRAMINE HCL 25 MG
25 TABLET ORAL ONCE
Status: COMPLETED | OUTPATIENT
Start: 2018-07-06 | End: 2018-07-06

## 2018-07-06 RX ADMIN — DIPHENHYDRAMINE HCL 25 MG: 25 TABLET ORAL at 11:45

## 2018-07-06 RX ADMIN — SODIUM CHLORIDE 20 ML/HR: 0.9 INJECTION, SOLUTION INTRAVENOUS at 11:45

## 2018-07-06 RX ADMIN — ACETAMINOPHEN 650 MG: 325 TABLET ORAL at 11:45

## 2018-07-06 NOTE — PROGRESS NOTES
Jason Sethi  1955  [facility] [place] [id number] [place] [id number]    DISCUSSION  SUMMARY:    50-year-old male with a number of medical problems recently admitted to the hospital because of the anemia (2nd time)  Mr Wendy Mc underwent a bone marrow biopsy demonstrating a packed marrow  Pathology was consistent with CLL  Issues:    1  CLL  Patient was symptomatic and needed to be treated  Patient is now on ibrutinib  Nursing staff went over the potential side effects/toxicities with patient and wife previously  So far, patient denies any side effects/issues with the new medication  The white count/lymphocytosis continues to be high/worse than when patient started  The reason for this is not clear  Wife handles all medications  Mrs Wendy Mc states that patient is taking the ibrutinib every day without fail  If this is the case, the medication is not working and a treatment change is needed  Because of patient's insurance, treatment options are limited  The next regimen would be intravenous chemotherapy for which patient would likely lose his hair  In the past, Mr Wendy Mc has refused any regimen that causes alopecia  Patient asked if the lack of response could be to not taking the medication  My response to him was that there was no reason to answer that question since he is taking the medication  Patient then look at his wife, wife looked away  Respectfully, I do not believe patient is taking the medication, at least not the way he is supposed to take this medication  This makes it difficult, in fact impossible to  efficacy  We agreed upon 1 more month of treatment to see response  If not, patient will need to make some decisions  I will not continue with the medication if it is not working      The PCYC-1102 and PCYC-1103 trials of single agent ibrutinib for upfront and relapsed CLL patients demonstrated high response rates  The overall response rate was 89% at 5 years with 14% achieving complete response  In treatment naive patients, the overall response rate was 87% with a complete response rate of 29%  At 5 years, the progression free survival was 92% in treatment naive patients  Overall survival was 92%  Essentially this is is a very effective drug with a very high response rate  It is altogether possible that patient is not responding to the medication by my gut feeling is that Mr Cecile Medeiros is only partially compliant at best     2  Anemia  Secondary to a packed marrow  Patient will continue to go for weekly CBCs  Patient will go for a unit of blood now  CBC will be rechecked next week, earlier than usual   As above, the anemia issue should go way if the ibrutinib is effective and the marrow is unpacked      3  Psychiatric issues/depression  Patient needs to follow up with Psychiatry as directed      4  Fatigue  Etiology is most likely multifactorial and includes leukemia, other medical and psychiatric issues and secondary to medication use  I have told the patient and wife that this is difficult to completely correct      Patient is to return in 4 weeks  Patient knows to call the hematology/oncology office if there are any other questions or concerns  Carefully review your medication list and verify that the list is accurate and up-to-date  Please call the hematology/oncology office if there are medications missing from the list, medications on the list that you are not currently taking or if there is a dosage or instruction that is different from how you're taking that medication      Patient goals and areas of care: monitor CBC parameters, continue with the CLL treatment  Patient is able to self-care   _____________________________________________________________________________________    Chief Complaint   Patient presents with    Follow-up     CLL on ibrutinib     History of Present Illness:    79-year-old male recently admitted to [address] [place] [address] with severe anemia  Bone marrow biopsy demonstrated CLL  Patient is on a bruit new and returns for follow-up  Mr Corby Cortez states feeling poorly  Fatigue is more pronounced  Patient denies any fevers, chills or sweats  No GI or  bleeding  Appetite is okay, weight is stable  Activities are extremely limited  No pain control issues  Depression is about the same as before  No shortness of breath at rest, mild dyspnea on exertion but same as before  No chest pain or pressure  Patient continues to smoke  Review of Systems   Constitutional: Positive for fatigue  HENT: Negative  Eyes: Negative  Respiratory: Negative          +dyspnea on exertion   Cardiovascular: Negative  Gastrointestinal: Negative  Endocrine: Negative  Genitourinary: Negative  Musculoskeletal: Positive for arthralgias  Skin: Negative  Allergic/Immunologic: Negative  Neurological: Negative  Hematological: Negative  Psychiatric/Behavioral: Positive for behavioral problems  All other systems reviewed and are negative       Patient Active Problem List   Diagnosis    Hypertension    Depression    Polypharmacy    Bronchitis    Encephalopathy    Weakness    Leukemia (HCC)    Lactic acidosis    Leukocytosis    Elevated troponin    Bipolar 1 disorder (HCC)    Psychiatric disorder    Anemia    Encounter for smoking cessation counseling    Encounter for screening for lipid disorder    Screening for diabetes mellitus     Past Medical History:   Diagnosis Date    Anemia     Anxiety     [facility] [address]     [facility]     History of alcohol abuse     Hypertension     Hypertension     Insomnia     Leukemia [facility]     Opiate abuse, episodic     Psychiatric disorder      Past Surgical History:   Procedure Laterality Date    EGD AND COLONOSCOPY N/A 3/30/2018    Procedure: EGD AND COLONOSCOPY;  Surgeon: Kieran Meyers MD;  [facility]; Service: Gastroenterology     Family History   Problem Relation Age of Onset    Coronary artery disease Mother     No Known Problems Father     Hepatitis Sister     Cancer Brother     Prostate cancer Brother     Early death Brother      Social History     Social History    Marital status: /Civil Union     Spouse name: N/A    Number of children: N/A    Years of education: N/A     Occupational History    Not on file       Social History Main Topics    Smoking status: Current Every Day Smoker     Packs/day: 1 50     Years: 40 00     Types: Cigarettes    Smokeless tobacco: Never Used    Alcohol use No      Comment: last drink nov 19 2017    Drug use: No      Comment: hx of heroin and subutex abuse    Sexual activity: Not Currently     Other Topics Concern    Not on file     Social History Narrative    No narrative on file       Current Outpatient Prescriptions:     benztropine (COGENTIN) 1 mg tablet, Take 1 mg by mouth Medrol Dose Pack scheduling ONLY  , Disp: , Rfl:     divalproex sodium (DEPAKOTE) 500 mg EC tablet, Take 250 mg by mouth every 12 (twelve) hours  , Disp: , Rfl:     FLUoxetine (PROzac) 20 mg capsule, Take 40 mg by mouth daily, Disp: , Rfl:     hydrochlorothiazide (MICROZIDE) 12 5 mg capsule, Take 1 capsule (12 5 mg total) by mouth daily, Disp: 30 capsule, Rfl: 3    hydrOXYzine pamoate (VISTARIL) 50 mg capsule, Take 50 mg by mouth 2 (two) times a day, Disp: , Rfl:     Ibrutinib 420 MG TABS, Take 420 mg by mouth daily, Disp: 30 tablet, Rfl: 3    lisinopril (ZESTRIL) 40 mg tablet, Take 40 mg by mouth daily, Disp: , Rfl:     metoprolol tartrate (LOPRESSOR) 25 mg tablet, Take 0 5 tablets (12 5 mg total) by mouth every 12 (twelve) hours, Disp: 60 tablet, Rfl: 5    mirtazapine (REMERON) 15 mg tablet, Take 15 mg by mouth daily at bedtime, Disp: , Rfl:     nicotine (NICODERM CQ) 21 mg/24 hr TD 24 hr patch, Place 1 patch on the skin every 24 hours, Disp: 28 patch, Rfl: 0    pantoprazole (PROTONIX) 40 mg tablet, Take 1 tablet (40 mg total) by mouth daily, Disp: 30 tablet, Rfl: 5    risperiDONE (RisperDAL) 2 mg tablet, Take 4 mg by mouth 2 (two) times a day  , Disp: , Rfl:     No Known Allergies    Vitals:  BP = 122/60 pulses = 59 temperature = 98 4 RR = 16 O2 saturation = 93%  Physical Exam   Constitutional: He is oriented to person, place, and time  He appears well-developed and well-nourished  Middle-aged male, no respiratory distress, +tobacco odor   HENT:   Head: Normocephalic and atraumatic  Right Ear: External ear normal    Left Ear: External ear normal    Nose: Nose normal    Mouth/Throat: Oropharynx is clear and moist    Eyes: Conjunctivae and EOM are normal  Pupils are equal, round, and reactive to light  Neck: Normal range of motion  Neck supple  Cardiovascular: Normal rate, regular rhythm, normal heart sounds and intact distal pulses  Pulmonary/Chest:   Lungs with fair air entry bilaterally, distant breath sounds, bilateral rhonchi, no rales   Abdominal: Soft  Bowel sounds are normal    Abdomen is soft, obese, cannot palpate liver or spleen, no rigidity or rebound   Musculoskeletal: Normal range of motion  Neurological: He is alert and oriented to person, place, and time  He has normal reflexes  Skin: Skin is warm  Skin is pale, warm, slightly dry, scattered ecchymoses, no petechiae, no active bleeding   Psychiatric: He has a normal mood and affect   His behavior is normal  Judgment and thought content normal    Extremities: 1 + bilateral lower extremity edema, no cords, pulses are 1+  Lymphatics: no adenopathy in the neck, supraclavicular region, axilla and groin bilaterally    Labs:    07/05/2018 WBC = 80 7 hemoglobin = 6 8 hematocrit = 22 1 platelet = 206 lymphocytes = 93%  06/07/2018 WBC = 104 5 hemoglobin = 7 5 hematocrit = 25 5 platelet = 343 neutrophil = 4% lymphocytes = 93% monocyte = 3%  05/14/2018 WBC = 40 2 hemoglobin = 6 9 hematocrit = 21 1 MCV = 87 platelet = 641 lymphocytes = 99%  04/23/2018 WBC = 31 2 hemoglobin = 8 1 hematocrit = 24 5 MCV = 86 platelet = 795 lymphocytes = 83%  04/09/2018 WBC = 22 7 hemoglobin = 7 hematocrit = 21 1 platelet = 431 (no differential)  2/22/18 WBC = 12 1 hemoglobin = 8 3 hematocrit = 24 6 MCV = 86 platelet = 867  70/57/1853 WBC = 15 1 hemoglobin = 8 1 hematocrit = 24 3 platelet = 977  74/95/9696 WBC = 14 4 hemoglobin = 6 8 hematocrit = 20 platelet = 820    Pathology    Case Report   Surgical Pathology Report                         Case: [id number]                                    Authorizing Provider: Tip Garcia MD          Collected:           03/28/2018 1318               Ordering Location:     [address] [place] [address] Received:            03/28/2018 1354                                      [address]                                                                       Pathologist:           Luis Spence MD                                                         Specimens:   A) - Iliac Crest, Left, Bone Marrow   2 Cores                                                        B) - Iliac Crest, Left, Bone Marrow                                                                  C) - Iliac Crest, Left, Bone Marrow  2T  P  Slides, 2 Asp Stained , 8 Asp Unstained          Addendum   - Fluorescence in situ hybridization (FISH) ([facility]#[id number], evaluated by ORLIN Garcia , Ph D ) for genetic abnormalities which may be associated with myelodysplastic syndrome is as follows:        Interpretation  1  No evidence of trisomy 12 (+12)  2  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  Comment: Deletions involving 13q14 are detectable by FISH in approximately 50% of persons with CLL  Among them,  about 19% show biallelic or concomitant monoallelic and biallelic deletion  As a group and as the sole aberration, del(13q)  is associated with a more favorable outcome    3  No evidence of p53 (17p13) deletion or amplification  4  No evidence of RENAE (11q22 3) deletion       - IgVH Mutation analysis ([facility]#[id number], evaluated by Dr Maribell Interiano, Ph D )  is as follows: IGVH MUTATION ANALYSIS: UNMUTATED - CLL  RESULTS  Mutation Rate: 0%  IgVH Family: IGHV1-2*04     -  Cytogenetic studies as performed on the bone marrow aspirate @ [facility] (Specimen ID: [id number], evaluated by Marcus Duckworth, PhD, [facility] [place] as follows:  Karyotype:  46,XY,add(18)(p11 3)[6]/46,XY[1], LIMITED ANALYSIS   Cytogenetics Analysis:  : Metaphases Counted          Metaphases Analyzed            Metaphases Karyotyped        GTG Band level                       Culture Type(s)               7                                     7                                     4                        300-400                                       [id number]   Interpretation: Abnormal male karyotype  This is an incomplete study and only 7 cells (as opposed to 20 cells) were available for analysis  Cytogenetic analysis  shows the presence of an abnormal clone (6 out of 7 cells) characterized by addition of material of unknown origin to  18p11  3  No other aberrations were identified, and one normal cell was found during the course of analysis  These current cytogenetic results are consistent with the presence of abnormal clonal cells  Correlation with  hematopathology and follow-up bone marrow studies are recommended to further evaluate the significance of these  findings  Microarray analysis could be considered to further characterize this rearrangement             Addendum electronically signed by Cisco Stanley MD on 4/6/2018 at  4:12 PM     Addendum electronically signed by Cisco Stanley MD on 4/6/2018 at  4:12 PM   Final Diagnosis   A -C  Bone marrow,  left iliac crest,  biopsy and aspirate:  -  Chronic lymphocytic leukemia/small lymphocytic lymphoma (CLL/SLL) (see note)    -  Significantly decreased trilineage myelopoiesis with normal appearing morphology and maturation without significant features of dysplasia or increased myeloblasts, secondary to the above  -  Anemia, neutropenia, and lymphocytosis, secondary to the above  -  Normal stainable storage iron  -  Mildly increased reticulin fibers without fibrosis, secondary to the above  -  Negative for collagen fibrosis, granulomata, vasculitis, necrosis           Electronically signed by Maria Eugenia Hanley MD on 3/29/2018 at  2:51 P

## 2018-07-09 ENCOUNTER — TELEPHONE (OUTPATIENT)
Dept: HEMATOLOGY ONCOLOGY | Facility: MEDICAL CENTER | Age: 63
End: 2018-07-09

## 2018-07-09 ENCOUNTER — APPOINTMENT (OUTPATIENT)
Dept: LAB | Facility: HOSPITAL | Age: 63
End: 2018-07-09
Attending: INTERNAL MEDICINE
Payer: COMMERCIAL

## 2018-07-09 DIAGNOSIS — C91.10 CLL (CHRONIC LYMPHOCYTIC LEUKEMIA) (HCC): ICD-10-CM

## 2018-07-09 LAB
BASOPHILS # BLD MANUAL: 0 THOUSAND/UL (ref 0–0.1)
BASOPHILS NFR MAR MANUAL: 0 % (ref 0–1)
EOSINOPHIL # BLD MANUAL: 0 THOUSAND/UL (ref 0–0.4)
EOSINOPHIL NFR BLD MANUAL: 0 % (ref 0–6)
ERYTHROCYTE [DISTWIDTH] IN BLOOD BY AUTOMATED COUNT: 14.8 % (ref 11.6–15.1)
HCT VFR BLD AUTO: 25.2 % (ref 36.5–49.3)
HGB BLD-MCNC: 7.6 G/DL (ref 12–17)
LYMPHOCYTES # BLD AUTO: 87.42 THOUSAND/UL (ref 0.6–4.47)
LYMPHOCYTES # BLD AUTO: 93 % (ref 14–44)
MCH RBC QN AUTO: 27.9 PG (ref 26.8–34.3)
MCHC RBC AUTO-ENTMCNC: 30.2 G/DL (ref 31.4–37.4)
MCV RBC AUTO: 93 FL (ref 82–98)
MONOCYTES # BLD AUTO: 0 THOUSAND/UL (ref 0–1.22)
MONOCYTES NFR BLD: 0 % (ref 4–12)
NEUTROPHILS # BLD MANUAL: 6.58 THOUSAND/UL (ref 1.85–7.62)
NEUTS BAND NFR BLD MANUAL: 2 % (ref 0–8)
NEUTS SEG NFR BLD AUTO: 5 % (ref 43–75)
NRBC BLD AUTO-RTO: 0 /100 WBCS
PLATELET # BLD AUTO: 265 THOUSANDS/UL (ref 149–390)
PLATELET BLD QL SMEAR: ADEQUATE
PMV BLD AUTO: 10.8 FL (ref 8.9–12.7)
RBC # BLD AUTO: 2.72 MILLION/UL (ref 3.88–5.62)
RBC MORPH BLD: NORMAL
TOTAL CELLS COUNTED SPEC: 100
WBC # BLD AUTO: 94 THOUSAND/UL (ref 4.31–10.16)

## 2018-07-09 PROCEDURE — 85007 BL SMEAR W/DIFF WBC COUNT: CPT

## 2018-07-09 PROCEDURE — 85027 COMPLETE CBC AUTOMATED: CPT

## 2018-07-09 PROCEDURE — 36415 COLL VENOUS BLD VENIPUNCTURE: CPT

## 2018-07-12 ENCOUNTER — TELEPHONE (OUTPATIENT)
Dept: HEMATOLOGY ONCOLOGY | Facility: MEDICAL CENTER | Age: 63
End: 2018-07-12

## 2018-07-12 ENCOUNTER — APPOINTMENT (OUTPATIENT)
Dept: LAB | Facility: HOSPITAL | Age: 63
End: 2018-07-12
Attending: INTERNAL MEDICINE
Payer: COMMERCIAL

## 2018-07-12 LAB
BASOPHILS # BLD MANUAL: 0 THOUSAND/UL (ref 0–0.1)
BASOPHILS NFR MAR MANUAL: 0 % (ref 0–1)
EOSINOPHIL # BLD MANUAL: 0.7 THOUSAND/UL (ref 0–0.4)
EOSINOPHIL NFR BLD MANUAL: 1 % (ref 0–6)
ERYTHROCYTE [DISTWIDTH] IN BLOOD BY AUTOMATED COUNT: 14.6 % (ref 11.6–15.1)
HCT VFR BLD AUTO: 23.8 % (ref 36.5–49.3)
HGB BLD-MCNC: 7.5 G/DL (ref 12–17)
LYMPHOCYTES # BLD AUTO: 62.66 THOUSAND/UL (ref 0.6–4.47)
LYMPHOCYTES # BLD AUTO: 89 % (ref 14–44)
MCH RBC QN AUTO: 28.4 PG (ref 26.8–34.3)
MCHC RBC AUTO-ENTMCNC: 31.5 G/DL (ref 31.4–37.4)
MCV RBC AUTO: 90 FL (ref 82–98)
MONOCYTES # BLD AUTO: 0 THOUSAND/UL (ref 0–1.22)
MONOCYTES NFR BLD: 0 % (ref 4–12)
NEUTROPHILS # BLD MANUAL: 7.04 THOUSAND/UL (ref 1.85–7.62)
NEUTS SEG NFR BLD AUTO: 10 % (ref 43–75)
NRBC BLD AUTO-RTO: 0 /100 WBCS
PLATELET # BLD AUTO: 219 THOUSANDS/UL (ref 149–390)
PLATELET BLD QL SMEAR: ADEQUATE
PMV BLD AUTO: 10.3 FL (ref 8.9–12.7)
RBC # BLD AUTO: 2.64 MILLION/UL (ref 3.88–5.62)
RBC MORPH BLD: NORMAL
TOTAL CELLS COUNTED SPEC: 100
WBC # BLD AUTO: 70.41 THOUSAND/UL (ref 4.31–10.16)

## 2018-07-12 PROCEDURE — 85007 BL SMEAR W/DIFF WBC COUNT: CPT | Performed by: INTERNAL MEDICINE

## 2018-07-12 PROCEDURE — 36415 COLL VENOUS BLD VENIPUNCTURE: CPT | Performed by: INTERNAL MEDICINE

## 2018-07-12 PROCEDURE — 85027 COMPLETE CBC AUTOMATED: CPT | Performed by: INTERNAL MEDICINE

## 2018-07-12 NOTE — TELEPHONE ENCOUNTER
hgb is 7 5   No transfusion until hgb is less than or equal to 7 0 no new orders from Dr Cornelia Coffman

## 2018-07-16 ENCOUNTER — DOCUMENTATION (OUTPATIENT)
Dept: HEMATOLOGY ONCOLOGY | Facility: MEDICAL CENTER | Age: 63
End: 2018-07-16

## 2018-07-16 ENCOUNTER — APPOINTMENT (OUTPATIENT)
Dept: LAB | Facility: HOSPITAL | Age: 63
End: 2018-07-16
Attending: INTERNAL MEDICINE
Payer: COMMERCIAL

## 2018-07-16 DIAGNOSIS — C91.10 CLL (CHRONIC LYMPHOCYTIC LEUKEMIA) (HCC): ICD-10-CM

## 2018-07-16 LAB
BASOPHILS # BLD MANUAL: 0 THOUSAND/UL (ref 0–0.1)
BASOPHILS NFR MAR MANUAL: 0 % (ref 0–1)
EOSINOPHIL # BLD MANUAL: 0 THOUSAND/UL (ref 0–0.4)
EOSINOPHIL NFR BLD MANUAL: 0 % (ref 0–6)
ERYTHROCYTE [DISTWIDTH] IN BLOOD BY AUTOMATED COUNT: 15.3 % (ref 11.6–15.1)
HCT VFR BLD AUTO: 22.9 % (ref 36.5–49.3)
HGB BLD-MCNC: 7 G/DL (ref 12–17)
LYMPHOCYTES # BLD AUTO: 70.81 THOUSAND/UL (ref 0.6–4.47)
LYMPHOCYTES # BLD AUTO: 93 % (ref 14–44)
MCH RBC QN AUTO: 28.6 PG (ref 26.8–34.3)
MCHC RBC AUTO-ENTMCNC: 30.6 G/DL (ref 31.4–37.4)
MCV RBC AUTO: 94 FL (ref 82–98)
MONOCYTES # BLD AUTO: 1.52 THOUSAND/UL (ref 0–1.22)
MONOCYTES NFR BLD: 2 % (ref 4–12)
NEUTROPHILS # BLD MANUAL: 3.81 THOUSAND/UL (ref 1.85–7.62)
NEUTS BAND NFR BLD MANUAL: 1 % (ref 0–8)
NEUTS SEG NFR BLD AUTO: 4 % (ref 43–75)
NRBC BLD AUTO-RTO: 0 /100 WBCS
PLATELET # BLD AUTO: 180 THOUSANDS/UL (ref 149–390)
PLATELET BLD QL SMEAR: ADEQUATE
PMV BLD AUTO: 10.4 FL (ref 8.9–12.7)
RBC # BLD AUTO: 2.45 MILLION/UL (ref 3.88–5.62)
RBC MORPH BLD: NORMAL
TOTAL CELLS COUNTED SPEC: 100
WBC # BLD AUTO: 76.14 THOUSAND/UL (ref 4.31–10.16)

## 2018-07-16 PROCEDURE — 86922 COMPATIBILITY TEST ANTIGLOB: CPT

## 2018-07-16 PROCEDURE — 85027 COMPLETE CBC AUTOMATED: CPT

## 2018-07-16 PROCEDURE — 86921 COMPATIBILITY TEST INCUBATE: CPT

## 2018-07-16 PROCEDURE — 85007 BL SMEAR W/DIFF WBC COUNT: CPT

## 2018-07-16 PROCEDURE — 36415 COLL VENOUS BLD VENIPUNCTURE: CPT

## 2018-07-16 PROCEDURE — 86900 BLOOD TYPING SEROLOGIC ABO: CPT | Performed by: INTERNAL MEDICINE

## 2018-07-16 PROCEDURE — 86901 BLOOD TYPING SEROLOGIC RH(D): CPT | Performed by: INTERNAL MEDICINE

## 2018-07-16 PROCEDURE — 86850 RBC ANTIBODY SCREEN: CPT | Performed by: INTERNAL MEDICINE

## 2018-07-16 NOTE — PROGRESS NOTES
Rosina with [facility] lab called to report patients WBC at 76 14 and HGB at 7 0  [facility] aware  I called patient to set up a blood transfusion and was told I have to speak with his wife who is currently at work  I instructed him to have her call me ASAP so he can be set up

## 2018-07-17 RX ORDER — DIPHENHYDRAMINE HCL 25 MG
25 TABLET ORAL ONCE
Status: COMPLETED | OUTPATIENT
Start: 2018-07-18 | End: 2018-07-18

## 2018-07-17 RX ORDER — SODIUM CHLORIDE 9 MG/ML
20 INJECTION, SOLUTION INTRAVENOUS ONCE
Status: COMPLETED | OUTPATIENT
Start: 2018-07-18 | End: 2018-07-18

## 2018-07-17 RX ORDER — ACETAMINOPHEN 325 MG/1
650 TABLET ORAL ONCE
Status: COMPLETED | OUTPATIENT
Start: 2018-07-18 | End: 2018-07-18

## 2018-07-18 ENCOUNTER — HOSPITAL ENCOUNTER (OUTPATIENT)
Dept: INFUSION CENTER | Facility: HOSPITAL | Age: 63
Discharge: HOME | End: 2018-07-18
Payer: COMMERCIAL

## 2018-07-18 VITALS
SYSTOLIC BLOOD PRESSURE: 161 MMHG | DIASTOLIC BLOOD PRESSURE: 74 MMHG | HEART RATE: 61 BPM | OXYGEN SATURATION: 94 % | TEMPERATURE: 98.3 F | RESPIRATION RATE: 18 BRPM

## 2018-07-18 PROCEDURE — 36430 TRANSFUSION BLD/BLD COMPNT: CPT

## 2018-07-18 PROCEDURE — P9021 RED BLOOD CELLS UNIT: HCPCS

## 2018-07-18 RX ADMIN — SODIUM CHLORIDE 20 ML/HR: 0.9 INJECTION, SOLUTION INTRAVENOUS at 08:30

## 2018-07-18 RX ADMIN — DIPHENHYDRAMINE HCL 25 MG: 25 TABLET ORAL at 08:23

## 2018-07-18 RX ADMIN — ACETAMINOPHEN 650 MG: 325 TABLET, FILM COATED ORAL at 08:21

## 2018-07-18 NOTE — PROGRESS NOTES
PATIENT HERE FOR 2 UNITS OF PRBCs  PATIENT TOLERATED INFUSIONS WELL WITH NO ISSUES  PATIENT WILL FOLLOW UP WITH MD ON 8/1/18

## 2018-07-19 ENCOUNTER — APPOINTMENT (OUTPATIENT)
Dept: LAB | Facility: HOSPITAL | Age: 63
End: 2018-07-19
Attending: INTERNAL MEDICINE
Payer: COMMERCIAL

## 2018-07-19 ENCOUNTER — TELEPHONE (OUTPATIENT)
Dept: HEMATOLOGY ONCOLOGY | Facility: MEDICAL CENTER | Age: 63
End: 2018-07-19

## 2018-07-19 DIAGNOSIS — C91.10 CLL (CHRONIC LYMPHOCYTIC LEUKEMIA) (HCC): ICD-10-CM

## 2018-07-19 LAB
ABO GROUP BLD BPU: NORMAL
ABO GROUP BLD BPU: NORMAL
ANISOCYTOSIS BLD QL SMEAR: PRESENT
BASOPHILS # BLD MANUAL: 0 THOUSAND/UL (ref 0–0.1)
BASOPHILS NFR MAR MANUAL: 0 % (ref 0–1)
BPU ID: NORMAL
BPU ID: NORMAL
CROSSMATCH: NORMAL
CROSSMATCH: NORMAL
EOSINOPHIL # BLD MANUAL: 0 THOUSAND/UL (ref 0–0.4)
EOSINOPHIL NFR BLD MANUAL: 0 % (ref 0–6)
ERYTHROCYTE [DISTWIDTH] IN BLOOD BY AUTOMATED COUNT: 14.8 % (ref 11.6–15.1)
HCT VFR BLD AUTO: 26 % (ref 36.5–49.3)
HGB BLD-MCNC: 8.4 G/DL (ref 12–17)
LYMPHOCYTES # BLD AUTO: 41.9 THOUSAND/UL (ref 0.6–4.47)
LYMPHOCYTES # BLD AUTO: 98 % (ref 14–44)
MACROCYTES BLD QL AUTO: PRESENT
MCH RBC QN AUTO: 29.4 PG (ref 26.8–34.3)
MCHC RBC AUTO-ENTMCNC: 32.3 G/DL (ref 31.4–37.4)
MCV RBC AUTO: 91 FL (ref 82–98)
MONOCYTES # BLD AUTO: 0.43 THOUSAND/UL (ref 0–1.22)
MONOCYTES NFR BLD: 1 % (ref 4–12)
NEUTROPHILS # BLD MANUAL: 0.43 THOUSAND/UL (ref 1.85–7.62)
NEUTS SEG NFR BLD AUTO: 1 % (ref 43–75)
NRBC BLD AUTO-RTO: 0 /100 WBCS
PLATELET # BLD AUTO: 138 THOUSANDS/UL (ref 149–390)
PLATELET BLD QL SMEAR: ABNORMAL
PMV BLD AUTO: 10.7 FL (ref 8.9–12.7)
RBC # BLD AUTO: 2.86 MILLION/UL (ref 3.88–5.62)
RBC MORPH BLD: PRESENT
SMUDGE CELLS BLD QL SMEAR: PRESENT
TOTAL CELLS COUNTED SPEC: 100
UNIT DISPENSE STATUS: NORMAL
UNIT DISPENSE STATUS: NORMAL
UNIT PRODUCT CODE: NORMAL
UNIT PRODUCT CODE: NORMAL
UNIT RH: NORMAL
UNIT RH: NORMAL
WBC # BLD AUTO: 42.76 THOUSAND/UL (ref 4.31–10.16)

## 2018-07-19 PROCEDURE — 85007 BL SMEAR W/DIFF WBC COUNT: CPT | Performed by: INTERNAL MEDICINE

## 2018-07-19 PROCEDURE — 36415 COLL VENOUS BLD VENIPUNCTURE: CPT | Performed by: INTERNAL MEDICINE

## 2018-07-19 PROCEDURE — 85027 COMPLETE CBC AUTOMATED: CPT | Performed by: INTERNAL MEDICINE

## 2018-07-23 ENCOUNTER — APPOINTMENT (OUTPATIENT)
Dept: LAB | Facility: HOSPITAL | Age: 63
End: 2018-07-23
Attending: INTERNAL MEDICINE
Payer: COMMERCIAL

## 2018-07-23 DIAGNOSIS — C91.10 CLL (CHRONIC LYMPHOCYTIC LEUKEMIA) (HCC): ICD-10-CM

## 2018-07-23 LAB
BASOPHILS # BLD MANUAL: 0 THOUSAND/UL (ref 0–0.1)
BASOPHILS NFR MAR MANUAL: 0 % (ref 0–1)
EOSINOPHIL # BLD MANUAL: 0 THOUSAND/UL (ref 0–0.4)
EOSINOPHIL NFR BLD MANUAL: 0 % (ref 0–6)
ERYTHROCYTE [DISTWIDTH] IN BLOOD BY AUTOMATED COUNT: 15.9 % (ref 11.6–15.1)
HCT VFR BLD AUTO: 23.5 % (ref 36.5–49.3)
HGB BLD-MCNC: 7.5 G/DL (ref 12–17)
HYPERCHROMIA BLD QL SMEAR: PRESENT
LYMPHOCYTES # BLD AUTO: 55.84 THOUSAND/UL (ref 0.6–4.47)
LYMPHOCYTES # BLD AUTO: 97 % (ref 14–44)
MCH RBC QN AUTO: 29.6 PG (ref 26.8–34.3)
MCHC RBC AUTO-ENTMCNC: 31.9 G/DL (ref 31.4–37.4)
MCV RBC AUTO: 93 FL (ref 82–98)
MONOCYTES # BLD AUTO: 0.58 THOUSAND/UL (ref 0–1.22)
MONOCYTES NFR BLD: 1 % (ref 4–12)
NEUTROPHILS # BLD MANUAL: 1.15 THOUSAND/UL (ref 1.85–7.62)
NEUTS SEG NFR BLD AUTO: 2 % (ref 43–75)
NRBC BLD AUTO-RTO: 0 /100 WBCS
PLATELET # BLD AUTO: 120 THOUSANDS/UL (ref 149–390)
PLATELET BLD QL SMEAR: ABNORMAL
PMV BLD AUTO: 10.7 FL (ref 8.9–12.7)
RBC # BLD AUTO: 2.53 MILLION/UL (ref 3.88–5.62)
RBC MORPH BLD: PRESENT
ROULEAUX BLD QL SMEAR: PRESENT
TOTAL CELLS COUNTED SPEC: 100
WBC # BLD AUTO: 57.57 THOUSAND/UL (ref 4.31–10.16)

## 2018-07-23 PROCEDURE — 85007 BL SMEAR W/DIFF WBC COUNT: CPT

## 2018-07-23 PROCEDURE — 85027 COMPLETE CBC AUTOMATED: CPT

## 2018-07-23 PROCEDURE — 36415 COLL VENOUS BLD VENIPUNCTURE: CPT

## 2018-07-26 ENCOUNTER — TELEPHONE (OUTPATIENT)
Dept: HEMATOLOGY ONCOLOGY | Facility: MEDICAL CENTER | Age: 63
End: 2018-07-26

## 2018-07-26 ENCOUNTER — OFFICE VISIT (OUTPATIENT)
Dept: HEMATOLOGY ONCOLOGY | Facility: MEDICAL CENTER | Age: 63
End: 2018-07-26
Payer: COMMERCIAL

## 2018-07-26 ENCOUNTER — APPOINTMENT (OUTPATIENT)
Dept: LAB | Facility: HOSPITAL | Age: 63
End: 2018-07-26
Attending: INTERNAL MEDICINE
Payer: COMMERCIAL

## 2018-07-26 VITALS
HEIGHT: 71 IN | OXYGEN SATURATION: 93 % | RESPIRATION RATE: 18 BRPM | HEART RATE: 63 BPM | SYSTOLIC BLOOD PRESSURE: 130 MMHG | BODY MASS INDEX: 36.54 KG/M2 | TEMPERATURE: 98.7 F | DIASTOLIC BLOOD PRESSURE: 70 MMHG | WEIGHT: 261 LBS

## 2018-07-26 DIAGNOSIS — C91.10 CLL (CHRONIC LYMPHOCYTIC LEUKEMIA) (HCC): ICD-10-CM

## 2018-07-26 DIAGNOSIS — C95.10 CHRONIC LEUKEMIA, NOT HAVING ACHIEVED REMISSION (HCC): Primary | ICD-10-CM

## 2018-07-26 LAB
BASOPHILS # BLD MANUAL: 0 THOUSAND/UL (ref 0–0.1)
BASOPHILS NFR MAR MANUAL: 0 % (ref 0–1)
EOSINOPHIL # BLD MANUAL: 0 THOUSAND/UL (ref 0–0.4)
EOSINOPHIL NFR BLD MANUAL: 0 % (ref 0–6)
ERYTHROCYTE [DISTWIDTH] IN BLOOD BY AUTOMATED COUNT: 15.5 % (ref 11.6–15.1)
HCT VFR BLD AUTO: 25.3 % (ref 36.5–49.3)
HGB BLD-MCNC: 8 G/DL (ref 12–17)
LYMPHOCYTES # BLD AUTO: 63.07 THOUSAND/UL (ref 0.6–4.47)
LYMPHOCYTES # BLD AUTO: 98 % (ref 14–44)
MCH RBC QN AUTO: 29.5 PG (ref 26.8–34.3)
MCHC RBC AUTO-ENTMCNC: 31.6 G/DL (ref 31.4–37.4)
MCV RBC AUTO: 93 FL (ref 82–98)
MONOCYTES # BLD AUTO: 1.29 THOUSAND/UL (ref 0–1.22)
MONOCYTES NFR BLD: 2 % (ref 4–12)
NEUTROPHILS # BLD MANUAL: 0 THOUSAND/UL (ref 1.85–7.62)
NEUTS SEG NFR BLD AUTO: 0 % (ref 43–75)
NRBC BLD AUTO-RTO: 0 /100 WBCS
PLATELET # BLD AUTO: 162 THOUSANDS/UL (ref 149–390)
PLATELET BLD QL SMEAR: ADEQUATE
PMV BLD AUTO: 10.7 FL (ref 8.9–12.7)
RBC # BLD AUTO: 2.71 MILLION/UL (ref 3.88–5.62)
RBC MORPH BLD: NORMAL
TOTAL CELLS COUNTED SPEC: 100
WBC # BLD AUTO: 64.36 THOUSAND/UL (ref 4.31–10.16)

## 2018-07-26 PROCEDURE — 85007 BL SMEAR W/DIFF WBC COUNT: CPT

## 2018-07-26 PROCEDURE — 36415 COLL VENOUS BLD VENIPUNCTURE: CPT

## 2018-07-26 PROCEDURE — 85027 COMPLETE CBC AUTOMATED: CPT

## 2018-07-26 PROCEDURE — 99214 OFFICE O/P EST MOD 30 MIN: CPT | Performed by: INTERNAL MEDICINE

## 2018-07-30 ENCOUNTER — APPOINTMENT (OUTPATIENT)
Dept: LAB | Facility: HOSPITAL | Age: 63
End: 2018-07-30
Attending: INTERNAL MEDICINE
Payer: COMMERCIAL

## 2018-07-30 ENCOUNTER — TELEPHONE (OUTPATIENT)
Dept: HEMATOLOGY ONCOLOGY | Facility: MEDICAL CENTER | Age: 63
End: 2018-07-30

## 2018-07-30 ENCOUNTER — TRANSCRIBE ORDERS (OUTPATIENT)
Dept: ADMINISTRATIVE | Facility: HOSPITAL | Age: 63
End: 2018-07-30

## 2018-07-30 DIAGNOSIS — C91.10 CLL (CHRONIC LYMPHOCYTIC LEUKEMIA) (HCC): ICD-10-CM

## 2018-07-30 LAB
BASOPHILS # BLD MANUAL: 0 THOUSAND/UL (ref 0–0.1)
BASOPHILS NFR MAR MANUAL: 0 % (ref 0–1)
EOSINOPHIL # BLD MANUAL: 0 THOUSAND/UL (ref 0–0.4)
EOSINOPHIL NFR BLD MANUAL: 0 % (ref 0–6)
ERYTHROCYTE [DISTWIDTH] IN BLOOD BY AUTOMATED COUNT: 15.8 % (ref 11.6–15.1)
HCT VFR BLD AUTO: 23.2 % (ref 36.5–49.3)
HGB BLD-MCNC: 7 G/DL (ref 12–17)
HYPERCHROMIA BLD QL SMEAR: PRESENT
LYMPHOCYTES # BLD AUTO: 95 % (ref 14–44)
LYMPHOCYTES # BLD AUTO: 96.76 THOUSAND/UL (ref 0.6–4.47)
MCH RBC QN AUTO: 29.3 PG (ref 26.8–34.3)
MCHC RBC AUTO-ENTMCNC: 30.2 G/DL (ref 31.4–37.4)
MCV RBC AUTO: 97 FL (ref 82–98)
MONOCYTES # BLD AUTO: 2.04 THOUSAND/UL (ref 0–1.22)
MONOCYTES NFR BLD: 2 % (ref 4–12)
NEUTROPHILS # BLD MANUAL: 3.06 THOUSAND/UL (ref 1.85–7.62)
NEUTS SEG NFR BLD AUTO: 3 % (ref 43–75)
NRBC BLD AUTO-RTO: 0 /100 WBCS
PLATELET # BLD AUTO: 350 THOUSANDS/UL (ref 149–390)
PLATELET BLD QL SMEAR: ADEQUATE
PMV BLD AUTO: 10.4 FL (ref 8.9–12.7)
POLYCHROMASIA BLD QL SMEAR: PRESENT
RBC # BLD AUTO: 2.39 MILLION/UL (ref 3.88–5.62)
RBC MORPH BLD: PRESENT
TOTAL CELLS COUNTED SPEC: 100
WBC # BLD AUTO: 101.85 THOUSAND/UL (ref 4.31–10.16)

## 2018-07-30 PROCEDURE — 36415 COLL VENOUS BLD VENIPUNCTURE: CPT

## 2018-07-30 PROCEDURE — 85027 COMPLETE CBC AUTOMATED: CPT

## 2018-07-30 PROCEDURE — 85007 BL SMEAR W/DIFF WBC COUNT: CPT

## 2018-07-30 RX ORDER — DIPHENHYDRAMINE HCL 25 MG
25 TABLET ORAL ONCE
Status: COMPLETED | OUTPATIENT
Start: 2018-07-31 | End: 2018-07-31

## 2018-07-30 RX ORDER — ACETAMINOPHEN 325 MG/1
650 TABLET ORAL ONCE
Status: COMPLETED | OUTPATIENT
Start: 2018-07-31 | End: 2018-07-31

## 2018-07-30 RX ORDER — SODIUM CHLORIDE 9 MG/ML
20 INJECTION, SOLUTION INTRAVENOUS ONCE
Status: COMPLETED | OUTPATIENT
Start: 2018-07-31 | End: 2018-07-31

## 2018-07-30 NOTE — TELEPHONE ENCOUNTER
Spoke to Elin Moran is [facility] for tues 7/31/18 10am [facility]

## 2018-07-31 ENCOUNTER — HOSPITAL ENCOUNTER (OUTPATIENT)
Dept: INFUSION CENTER | Facility: HOSPITAL | Age: 63
Discharge: HOME | End: 2018-07-31
Payer: COMMERCIAL

## 2018-07-31 VITALS
DIASTOLIC BLOOD PRESSURE: 67 MMHG | HEART RATE: 63 BPM | OXYGEN SATURATION: 96 % | TEMPERATURE: 99.1 F | SYSTOLIC BLOOD PRESSURE: 147 MMHG | RESPIRATION RATE: 18 BRPM

## 2018-07-31 PROCEDURE — 86901 BLOOD TYPING SEROLOGIC RH(D): CPT | Performed by: INTERNAL MEDICINE

## 2018-07-31 PROCEDURE — 86850 RBC ANTIBODY SCREEN: CPT | Performed by: INTERNAL MEDICINE

## 2018-07-31 PROCEDURE — 86921 COMPATIBILITY TEST INCUBATE: CPT

## 2018-07-31 PROCEDURE — 36430 TRANSFUSION BLD/BLD COMPNT: CPT

## 2018-07-31 PROCEDURE — 86922 COMPATIBILITY TEST ANTIGLOB: CPT

## 2018-07-31 PROCEDURE — P9016 RBC LEUKOCYTES REDUCED: HCPCS

## 2018-07-31 PROCEDURE — 86900 BLOOD TYPING SEROLOGIC ABO: CPT | Performed by: INTERNAL MEDICINE

## 2018-07-31 RX ADMIN — SODIUM CHLORIDE 20 ML/HR: 0.9 INJECTION, SOLUTION INTRAVENOUS at 12:05

## 2018-07-31 RX ADMIN — ACETAMINOPHEN 650 MG: 325 TABLET, FILM COATED ORAL at 12:03

## 2018-07-31 RX ADMIN — DIPHENHYDRAMINE HCL 25 MG: 25 TABLET ORAL at 12:03

## 2018-07-31 NOTE — PLAN OF CARE
Problem: HEMATOLOGIC - ADULT  Goal: Maintains hematologic stability  INTERVENTIONS  - Assess for signs and symptoms of bleeding or hemorrhage  - Monitor labs  - Administer supportive blood products/factors as ordered and appropriate  Outcome: Progressing

## 2018-08-02 ENCOUNTER — APPOINTMENT (OUTPATIENT)
Dept: LAB | Facility: HOSPITAL | Age: 63
End: 2018-08-02
Attending: INTERNAL MEDICINE
Payer: COMMERCIAL

## 2018-08-02 ENCOUNTER — TELEPHONE (OUTPATIENT)
Dept: HEMATOLOGY ONCOLOGY | Facility: MEDICAL CENTER | Age: 63
End: 2018-08-02

## 2018-08-02 DIAGNOSIS — C91.10 CLL (CHRONIC LYMPHOCYTIC LEUKEMIA) (HCC): ICD-10-CM

## 2018-08-02 LAB
BASOPHILS # BLD MANUAL: 0 THOUSAND/UL (ref 0–0.1)
BASOPHILS NFR MAR MANUAL: 0 % (ref 0–1)
EOSINOPHIL # BLD MANUAL: 0.83 THOUSAND/UL (ref 0–0.4)
EOSINOPHIL NFR BLD MANUAL: 1 % (ref 0–6)
ERYTHROCYTE [DISTWIDTH] IN BLOOD BY AUTOMATED COUNT: 16.4 % (ref 11.6–15.1)
HCT VFR BLD AUTO: 23.4 % (ref 36.5–49.3)
HGB BLD-MCNC: 7.1 G/DL (ref 12–17)
LYMPHOCYTES # BLD AUTO: 73.22 THOUSAND/UL (ref 0.6–4.47)
LYMPHOCYTES # BLD AUTO: 88 % (ref 14–44)
MCH RBC QN AUTO: 28.9 PG (ref 26.8–34.3)
MCHC RBC AUTO-ENTMCNC: 30.3 G/DL (ref 31.4–37.4)
MCV RBC AUTO: 95 FL (ref 82–98)
MONOCYTES # BLD AUTO: 1.66 THOUSAND/UL (ref 0–1.22)
MONOCYTES NFR BLD: 2 % (ref 4–12)
NEUTROPHILS # BLD MANUAL: 7.49 THOUSAND/UL (ref 1.85–7.62)
NEUTS SEG NFR BLD AUTO: 9 % (ref 43–75)
NRBC BLD AUTO-RTO: 0 /100 WBCS
PLATELET # BLD AUTO: 317 THOUSANDS/UL (ref 149–390)
PLATELET BLD QL SMEAR: ADEQUATE
PMV BLD AUTO: 9.9 FL (ref 8.9–12.7)
RBC # BLD AUTO: 2.46 MILLION/UL (ref 3.88–5.62)
RBC MORPH BLD: NORMAL
TOTAL CELLS COUNTED SPEC: 100
WBC # BLD AUTO: 83.2 THOUSAND/UL (ref 4.31–10.16)

## 2018-08-02 PROCEDURE — 85007 BL SMEAR W/DIFF WBC COUNT: CPT

## 2018-08-02 PROCEDURE — 36415 COLL VENOUS BLD VENIPUNCTURE: CPT

## 2018-08-02 PROCEDURE — 85027 COMPLETE CBC AUTOMATED: CPT

## 2018-08-02 RX ORDER — SODIUM CHLORIDE 9 MG/ML
20 INJECTION, SOLUTION INTRAVENOUS CONTINUOUS
Status: DISCONTINUED | OUTPATIENT
Start: 2018-08-03 | End: 2018-08-06 | Stop reason: HOSPADM

## 2018-08-02 RX ORDER — DIPHENHYDRAMINE HCL 25 MG
25 TABLET ORAL ONCE
Status: COMPLETED | OUTPATIENT
Start: 2018-08-03 | End: 2018-08-03

## 2018-08-02 RX ORDER — ACETAMINOPHEN 325 MG/1
650 TABLET ORAL ONCE
Status: COMPLETED | OUTPATIENT
Start: 2018-08-03 | End: 2018-08-03

## 2018-08-02 NOTE — TELEPHONE ENCOUNTER
Spoke to Esau Pascual is [facility] for fri 8/3/18 10am and mon 8/6/18 10am blood transfusions

## 2018-08-02 NOTE — TELEPHONE ENCOUNTER
Please schedule patient for blood transfusion  One unit tomorrow and one unit on Monday if he is agreeable   thanks

## 2018-08-03 ENCOUNTER — HOSPITAL ENCOUNTER (OUTPATIENT)
Dept: INFUSION CENTER | Facility: HOSPITAL | Age: 63
Discharge: HOME | End: 2018-08-03
Payer: COMMERCIAL

## 2018-08-03 VITALS
HEART RATE: 57 BPM | RESPIRATION RATE: 18 BRPM | OXYGEN SATURATION: 95 % | DIASTOLIC BLOOD PRESSURE: 71 MMHG | SYSTOLIC BLOOD PRESSURE: 141 MMHG | TEMPERATURE: 98.3 F

## 2018-08-03 DIAGNOSIS — D64.9 ANEMIA, UNSPECIFIED TYPE: Primary | ICD-10-CM

## 2018-08-03 PROCEDURE — 86850 RBC ANTIBODY SCREEN: CPT

## 2018-08-03 PROCEDURE — 86901 BLOOD TYPING SEROLOGIC RH(D): CPT

## 2018-08-03 PROCEDURE — 36415 COLL VENOUS BLD VENIPUNCTURE: CPT

## 2018-08-03 PROCEDURE — 86922 COMPATIBILITY TEST ANTIGLOB: CPT

## 2018-08-03 PROCEDURE — 86921 COMPATIBILITY TEST INCUBATE: CPT

## 2018-08-03 PROCEDURE — 36430 TRANSFUSION BLD/BLD COMPNT: CPT

## 2018-08-03 PROCEDURE — 86900 BLOOD TYPING SEROLOGIC ABO: CPT

## 2018-08-03 PROCEDURE — P9016 RBC LEUKOCYTES REDUCED: HCPCS

## 2018-08-03 RX ORDER — DIPHENHYDRAMINE HCL 25 MG
25 TABLET ORAL ONCE
Status: COMPLETED | OUTPATIENT
Start: 2018-08-06 | End: 2018-08-06

## 2018-08-03 RX ORDER — SODIUM CHLORIDE 9 MG/ML
20 INJECTION, SOLUTION INTRAVENOUS CONTINUOUS
Status: DISCONTINUED | OUTPATIENT
Start: 2018-08-06 | End: 2018-08-09 | Stop reason: HOSPADM

## 2018-08-03 RX ORDER — ACETAMINOPHEN 325 MG/1
650 TABLET ORAL ONCE
Status: COMPLETED | OUTPATIENT
Start: 2018-08-06 | End: 2018-08-06

## 2018-08-03 RX ADMIN — DIPHENHYDRAMINE HCL 25 MG: 25 TABLET ORAL at 11:10

## 2018-08-03 RX ADMIN — ACETAMINOPHEN 650 MG: 325 TABLET ORAL at 11:10

## 2018-08-03 RX ADMIN — SODIUM CHLORIDE 20 ML/HR: 0.9 INJECTION, SOLUTION INTRAVENOUS at 11:10

## 2018-08-03 NOTE — PROGRESS NOTES
Pt received for 1 of 2 units PRBC's  Pt noted to have odor of alcohol on his breath  Hgb 7 1  Type and cross drawn  Pt to receive 2nd unit PRBC's on Monday 8/6/18

## 2018-08-06 ENCOUNTER — HOSPITAL ENCOUNTER (OUTPATIENT)
Dept: INFUSION CENTER | Facility: HOSPITAL | Age: 63
Discharge: HOME | End: 2018-08-06
Payer: COMMERCIAL

## 2018-08-06 VITALS
TEMPERATURE: 98.5 F | HEART RATE: 58 BPM | DIASTOLIC BLOOD PRESSURE: 74 MMHG | RESPIRATION RATE: 18 BRPM | OXYGEN SATURATION: 95 % | SYSTOLIC BLOOD PRESSURE: 154 MMHG

## 2018-08-06 PROCEDURE — P9021 RED BLOOD CELLS UNIT: HCPCS

## 2018-08-06 PROCEDURE — 36430 TRANSFUSION BLD/BLD COMPNT: CPT

## 2018-08-06 PROCEDURE — 96374 THER/PROPH/DIAG INJ IV PUSH: CPT

## 2018-08-06 RX ORDER — FUROSEMIDE 10 MG/ML
20 INJECTION INTRAMUSCULAR; INTRAVENOUS ONCE
Status: COMPLETED | OUTPATIENT
Start: 2018-08-06 | End: 2018-08-06

## 2018-08-06 RX ORDER — FUROSEMIDE 10 MG/ML
20 INJECTION INTRAMUSCULAR; INTRAVENOUS ONCE
Status: DISCONTINUED | OUTPATIENT
Start: 2018-08-06 | End: 2018-08-06 | Stop reason: SDUPTHER

## 2018-08-06 RX ADMIN — FUROSEMIDE 20 MG: 10 INJECTION, SOLUTION INTRAMUSCULAR; INTRAVENOUS at 11:54

## 2018-08-06 RX ADMIN — ACETAMINOPHEN 650 MG: 325 TABLET, FILM COATED ORAL at 10:25

## 2018-08-06 RX ADMIN — DIPHENHYDRAMINE HCL 25 MG: 25 TABLET ORAL at 10:25

## 2018-08-06 RX ADMIN — SODIUM CHLORIDE 20 ML/HR: 0.9 INJECTION, SOLUTION INTRAVENOUS at 10:25

## 2018-08-06 NOTE — PROGRESS NOTES
Pt's wife c/o that pt is "retaining fluid"  Pt is noted to have edema of B/L lower extremities and B/L upper extremities  Pt's wife called Dr Kristina Peters office and was instructed to call pt's PCP  Call placed by myself to [facility]  1 X order received for Lasix IV

## 2018-08-07 ENCOUNTER — TELEPHONE (OUTPATIENT)
Dept: FAMILY MEDICINE CLINIC | Facility: CLINIC | Age: 63
End: 2018-08-07

## 2018-08-09 ENCOUNTER — APPOINTMENT (OUTPATIENT)
Dept: LAB | Facility: HOSPITAL | Age: 63
End: 2018-08-09
Attending: INTERNAL MEDICINE
Payer: COMMERCIAL

## 2018-08-09 DIAGNOSIS — C91.10 CLL (CHRONIC LYMPHOCYTIC LEUKEMIA) (HCC): ICD-10-CM

## 2018-08-09 LAB
ERYTHROCYTE [DISTWIDTH] IN BLOOD BY AUTOMATED COUNT: 20 % (ref 11.6–15.1)
HCT VFR BLD AUTO: 29.6 % (ref 36.5–49.3)
HGB BLD-MCNC: 9.3 G/DL (ref 12–17)
MCH RBC QN AUTO: 30.5 PG (ref 26.8–34.3)
MCHC RBC AUTO-ENTMCNC: 31.4 G/DL (ref 31.4–37.4)
MCV RBC AUTO: 97 FL (ref 82–98)
NRBC BLD AUTO-RTO: 0 /100 WBCS
PLATELET # BLD AUTO: 185 THOUSANDS/UL (ref 149–390)
PMV BLD AUTO: 10.4 FL (ref 8.9–12.7)
RBC # BLD AUTO: 3.05 MILLION/UL (ref 3.88–5.62)
WBC # BLD AUTO: 58.47 THOUSAND/UL (ref 4.31–10.16)

## 2018-08-09 PROCEDURE — 85027 COMPLETE CBC AUTOMATED: CPT

## 2018-08-09 PROCEDURE — 36415 COLL VENOUS BLD VENIPUNCTURE: CPT

## 2018-08-09 PROCEDURE — 85007 BL SMEAR W/DIFF WBC COUNT: CPT

## 2018-08-13 ENCOUNTER — TELEPHONE (OUTPATIENT)
Dept: HEMATOLOGY ONCOLOGY | Facility: MEDICAL CENTER | Age: 63
End: 2018-08-13

## 2018-08-13 ENCOUNTER — APPOINTMENT (OUTPATIENT)
Dept: LAB | Facility: HOSPITAL | Age: 63
End: 2018-08-13
Attending: INTERNAL MEDICINE
Payer: COMMERCIAL

## 2018-08-13 DIAGNOSIS — C91.10 CLL (CHRONIC LYMPHOCYTIC LEUKEMIA) (HCC): ICD-10-CM

## 2018-08-13 LAB
ANISOCYTOSIS BLD QL SMEAR: PRESENT
ANISOCYTOSIS BLD QL SMEAR: PRESENT
BASOPHILS # BLD MANUAL: 0 THOUSAND/UL (ref 0–0.1)
BASOPHILS # BLD MANUAL: 0.52 THOUSAND/UL (ref 0–0.1)
BASOPHILS NFR MAR MANUAL: 0 % (ref 0–1)
BASOPHILS NFR MAR MANUAL: 1 % (ref 0–1)
EOSINOPHIL # BLD MANUAL: 0 THOUSAND/UL (ref 0–0.4)
EOSINOPHIL # BLD MANUAL: 0 THOUSAND/UL (ref 0–0.4)
EOSINOPHIL NFR BLD MANUAL: 0 % (ref 0–6)
EOSINOPHIL NFR BLD MANUAL: 0 % (ref 0–6)
ERYTHROCYTE [DISTWIDTH] IN BLOOD BY AUTOMATED COUNT: 22.1 % (ref 11.6–15.1)
HCT VFR BLD AUTO: 30.7 % (ref 36.5–49.3)
HGB BLD-MCNC: 9.5 G/DL (ref 12–17)
LYMPHOCYTES # BLD AUTO: 48.25 THOUSAND/UL (ref 0.6–4.47)
LYMPHOCYTES # BLD AUTO: 5.85 THOUSAND/UL (ref 0.6–4.47)
LYMPHOCYTES # BLD AUTO: 89 % (ref 14–44)
LYMPHOCYTES # BLD AUTO: 92 % (ref 14–44)
MCH RBC QN AUTO: 30.5 PG (ref 26.8–34.3)
MCHC RBC AUTO-ENTMCNC: 30.9 G/DL (ref 31.4–37.4)
MCV RBC AUTO: 99 FL (ref 82–98)
MONOCYTES # BLD AUTO: 0.58 THOUSAND/UL (ref 0–1.22)
MONOCYTES # BLD AUTO: 1.05 THOUSAND/UL (ref 0–1.22)
MONOCYTES NFR BLD: 1 % (ref 4–12)
MONOCYTES NFR BLD: 2 % (ref 4–12)
NEUTROPHILS # BLD MANUAL: 2.62 THOUSAND/UL (ref 1.85–7.62)
NEUTROPHILS # BLD MANUAL: 52.04 THOUSAND/UL (ref 1.85–7.62)
NEUTS SEG NFR BLD AUTO: 10 % (ref 43–75)
NEUTS SEG NFR BLD AUTO: 5 % (ref 43–75)
NRBC BLD AUTO-RTO: 0 /100 WBCS
PLATELET # BLD AUTO: 153 THOUSANDS/UL (ref 149–390)
PLATELET BLD QL SMEAR: ADEQUATE
PLATELET BLD QL SMEAR: ADEQUATE
PMV BLD AUTO: 10.9 FL (ref 8.9–12.7)
POLYCHROMASIA BLD QL SMEAR: PRESENT
RBC # BLD AUTO: 3.11 MILLION/UL (ref 3.88–5.62)
RBC MORPH BLD: PRESENT
RBC MORPH BLD: PRESENT
TOTAL CELLS COUNTED SPEC: 100
TOTAL CELLS COUNTED SPEC: 100
WBC # BLD AUTO: 52.45 THOUSAND/UL (ref 4.31–10.16)

## 2018-08-13 PROCEDURE — 85027 COMPLETE CBC AUTOMATED: CPT

## 2018-08-13 PROCEDURE — 85007 BL SMEAR W/DIFF WBC COUNT: CPT

## 2018-08-13 PROCEDURE — 36415 COLL VENOUS BLD VENIPUNCTURE: CPT

## 2018-08-14 DIAGNOSIS — C95.90 LEUKEMIA NOT HAVING ACHIEVED REMISSION, UNSPECIFIED LEUKEMIA TYPE (HCC): ICD-10-CM

## 2018-08-16 ENCOUNTER — APPOINTMENT (OUTPATIENT)
Dept: LAB | Facility: HOSPITAL | Age: 63
End: 2018-08-16
Attending: INTERNAL MEDICINE
Payer: COMMERCIAL

## 2018-08-16 DIAGNOSIS — C91.10 CLL (CHRONIC LYMPHOCYTIC LEUKEMIA) (HCC): ICD-10-CM

## 2018-08-16 LAB
ANISOCYTOSIS BLD QL SMEAR: PRESENT
BASOPHILS # BLD MANUAL: 0 THOUSAND/UL (ref 0–0.1)
BASOPHILS NFR MAR MANUAL: 0 % (ref 0–1)
EOSINOPHIL # BLD MANUAL: 0 THOUSAND/UL (ref 0–0.4)
EOSINOPHIL NFR BLD MANUAL: 0 % (ref 0–6)
ERYTHROCYTE [DISTWIDTH] IN BLOOD BY AUTOMATED COUNT: 23.9 % (ref 11.6–15.1)
HCT VFR BLD AUTO: 32.8 % (ref 36.5–49.3)
HGB BLD-MCNC: 10 G/DL (ref 12–17)
LYMPHOCYTES # BLD AUTO: 63.28 THOUSAND/UL (ref 0.6–4.47)
LYMPHOCYTES # BLD AUTO: 95 % (ref 14–44)
MCH RBC QN AUTO: 31 PG (ref 26.8–34.3)
MCHC RBC AUTO-ENTMCNC: 30.5 G/DL (ref 31.4–37.4)
MCV RBC AUTO: 102 FL (ref 82–98)
MONOCYTES # BLD AUTO: 0.67 THOUSAND/UL (ref 0–1.22)
MONOCYTES NFR BLD: 1 % (ref 4–12)
NEUTROPHILS # BLD MANUAL: 2.66 THOUSAND/UL (ref 1.85–7.62)
NEUTS SEG NFR BLD AUTO: 4 % (ref 43–75)
NRBC BLD AUTO-RTO: 0 /100 WBCS
PLATELET # BLD AUTO: 198 THOUSANDS/UL (ref 149–390)
PLATELET BLD QL SMEAR: ADEQUATE
PMV BLD AUTO: 11 FL (ref 8.9–12.7)
POLYCHROMASIA BLD QL SMEAR: PRESENT
RBC # BLD AUTO: 3.23 MILLION/UL (ref 3.88–5.62)
RBC MORPH BLD: PRESENT
TOTAL CELLS COUNTED SPEC: 100
WBC # BLD AUTO: 66.61 THOUSAND/UL (ref 4.31–10.16)

## 2018-08-16 PROCEDURE — 36415 COLL VENOUS BLD VENIPUNCTURE: CPT

## 2018-08-16 PROCEDURE — 85027 COMPLETE CBC AUTOMATED: CPT

## 2018-08-16 PROCEDURE — 85007 BL SMEAR W/DIFF WBC COUNT: CPT

## 2018-08-17 RX ORDER — IBRUTINIB 420 MG/1
TABLET, FILM COATED ORAL
Qty: 28 TABLET | Refills: 3 | Status: SHIPPED | OUTPATIENT
Start: 2018-08-17 | End: 2018-10-31 | Stop reason: SDUPTHER

## 2018-08-20 ENCOUNTER — APPOINTMENT (OUTPATIENT)
Dept: LAB | Facility: HOSPITAL | Age: 63
End: 2018-08-20
Attending: INTERNAL MEDICINE
Payer: COMMERCIAL

## 2018-08-20 DIAGNOSIS — C91.10 CLL (CHRONIC LYMPHOCYTIC LEUKEMIA) (HCC): ICD-10-CM

## 2018-08-20 LAB
ANISOCYTOSIS BLD QL SMEAR: PRESENT
BASOPHILS # BLD MANUAL: 0 THOUSAND/UL (ref 0–0.1)
BASOPHILS NFR MAR MANUAL: 0 % (ref 0–1)
EOSINOPHIL # BLD MANUAL: 0 THOUSAND/UL (ref 0–0.4)
EOSINOPHIL NFR BLD MANUAL: 0 % (ref 0–6)
ERYTHROCYTE [DISTWIDTH] IN BLOOD BY AUTOMATED COUNT: 23.9 % (ref 11.6–15.1)
HCT VFR BLD AUTO: 32.8 % (ref 36.5–49.3)
HGB BLD-MCNC: 9.7 G/DL (ref 12–17)
LYMPHOCYTES # BLD AUTO: 58.88 THOUSAND/UL (ref 0.6–4.47)
LYMPHOCYTES # BLD AUTO: 95 % (ref 14–44)
MCH RBC QN AUTO: 30.9 PG (ref 26.8–34.3)
MCHC RBC AUTO-ENTMCNC: 29.6 G/DL (ref 31.4–37.4)
MCV RBC AUTO: 105 FL (ref 82–98)
MONOCYTES # BLD AUTO: 0 THOUSAND/UL (ref 0–1.22)
MONOCYTES NFR BLD: 0 % (ref 4–12)
NEUTROPHILS # BLD MANUAL: 3.1 THOUSAND/UL (ref 1.85–7.62)
NEUTS SEG NFR BLD AUTO: 5 % (ref 43–75)
NRBC BLD AUTO-RTO: 0 /100 WBCS
PLATELET # BLD AUTO: 221 THOUSANDS/UL (ref 149–390)
PLATELET BLD QL SMEAR: ADEQUATE
PMV BLD AUTO: 9.9 FL (ref 8.9–12.7)
POLYCHROMASIA BLD QL SMEAR: PRESENT
RBC # BLD AUTO: 3.14 MILLION/UL (ref 3.88–5.62)
RBC MORPH BLD: PRESENT
TOTAL CELLS COUNTED SPEC: 100
WBC # BLD AUTO: 61.98 THOUSAND/UL (ref 4.31–10.16)

## 2018-08-20 PROCEDURE — 85027 COMPLETE CBC AUTOMATED: CPT

## 2018-08-20 PROCEDURE — 36415 COLL VENOUS BLD VENIPUNCTURE: CPT

## 2018-08-20 PROCEDURE — 85007 BL SMEAR W/DIFF WBC COUNT: CPT

## 2018-08-23 ENCOUNTER — TRANSCRIBE ORDERS (OUTPATIENT)
Dept: ADMINISTRATIVE | Facility: HOSPITAL | Age: 63
End: 2018-08-23

## 2018-08-23 ENCOUNTER — APPOINTMENT (OUTPATIENT)
Dept: LAB | Facility: HOSPITAL | Age: 63
End: 2018-08-23
Attending: INTERNAL MEDICINE
Payer: COMMERCIAL

## 2018-08-23 DIAGNOSIS — C91.10 CLL (CHRONIC LYMPHOCYTIC LEUKEMIA) (HCC): ICD-10-CM

## 2018-08-23 LAB
ANISOCYTOSIS BLD QL SMEAR: PRESENT
BASOPHILS # BLD MANUAL: 0 THOUSAND/UL (ref 0–0.1)
BASOPHILS NFR MAR MANUAL: 0 % (ref 0–1)
EOSINOPHIL # BLD MANUAL: 0.52 THOUSAND/UL (ref 0–0.4)
EOSINOPHIL NFR BLD MANUAL: 1 % (ref 0–6)
ERYTHROCYTE [DISTWIDTH] IN BLOOD BY AUTOMATED COUNT: 24.1 % (ref 11.6–15.1)
HCT VFR BLD AUTO: 33.6 % (ref 36.5–49.3)
HGB BLD-MCNC: 10.4 G/DL (ref 12–17)
LYMPHOCYTES # BLD AUTO: 46.58 THOUSAND/UL (ref 0.6–4.47)
LYMPHOCYTES # BLD AUTO: 90 % (ref 14–44)
MCH RBC QN AUTO: 32.3 PG (ref 26.8–34.3)
MCHC RBC AUTO-ENTMCNC: 31 G/DL (ref 31.4–37.4)
MCV RBC AUTO: 104 FL (ref 82–98)
MONOCYTES # BLD AUTO: 0 THOUSAND/UL (ref 0–1.22)
MONOCYTES NFR BLD: 0 % (ref 4–12)
NEUTROPHILS # BLD MANUAL: 4.66 THOUSAND/UL (ref 1.85–7.62)
NEUTS SEG NFR BLD AUTO: 9 % (ref 43–75)
NRBC BLD AUTO-RTO: 0 /100 WBCS
PLATELET # BLD AUTO: 280 THOUSANDS/UL (ref 149–390)
PLATELET BLD QL SMEAR: ADEQUATE
PMV BLD AUTO: 9.7 FL (ref 8.9–12.7)
POLYCHROMASIA BLD QL SMEAR: PRESENT
RBC # BLD AUTO: 3.22 MILLION/UL (ref 3.88–5.62)
RBC MORPH BLD: PRESENT
TOTAL CELLS COUNTED SPEC: 100
WBC # BLD AUTO: 51.75 THOUSAND/UL (ref 4.31–10.16)

## 2018-08-23 PROCEDURE — 36415 COLL VENOUS BLD VENIPUNCTURE: CPT

## 2018-08-23 PROCEDURE — 85007 BL SMEAR W/DIFF WBC COUNT: CPT

## 2018-08-23 PROCEDURE — 85027 COMPLETE CBC AUTOMATED: CPT

## 2018-08-24 ENCOUNTER — OFFICE VISIT (OUTPATIENT)
Dept: FAMILY MEDICINE CLINIC | Facility: CLINIC | Age: 63
End: 2018-08-24
Payer: COMMERCIAL

## 2018-08-24 VITALS
RESPIRATION RATE: 24 BRPM | SYSTOLIC BLOOD PRESSURE: 140 MMHG | TEMPERATURE: 97.8 F | DIASTOLIC BLOOD PRESSURE: 80 MMHG | WEIGHT: 281 LBS | OXYGEN SATURATION: 98 % | BODY MASS INDEX: 39.19 KG/M2 | HEART RATE: 56 BPM

## 2018-08-24 DIAGNOSIS — R60.9 PERIPHERAL EDEMA: Primary | ICD-10-CM

## 2018-08-24 DIAGNOSIS — C91.10 CLL (CHRONIC LYMPHOCYTIC LEUKEMIA) (HCC): ICD-10-CM

## 2018-08-24 PROBLEM — R60.0 PERIPHERAL EDEMA: Status: ACTIVE | Noted: 2018-08-24

## 2018-08-24 PROCEDURE — 99213 OFFICE O/P EST LOW 20 MIN: CPT | Performed by: FAMILY MEDICINE

## 2018-08-24 RX ORDER — FUROSEMIDE 20 MG/1
20 TABLET ORAL 2 TIMES DAILY
Qty: 7 TABLET | Refills: 0 | Status: SHIPPED | OUTPATIENT
Start: 2018-08-24 | End: 2018-10-08 | Stop reason: ALTCHOICE

## 2018-08-24 NOTE — PROGRESS NOTES
Assessment/Plan:    60year old male comes in for complaints of shortness of breath and lower extremity edema given Rx for lasix and and order for BNP and will f/u for weight check  D/W Dr Brandon Calderon  Patient to follow up in 1-2 weeks for weight check for diuresis for fluid overload/peripheral edema  Diagnoses and all orders for this visit:    Peripheral edema  -     CBC and Platelet; Future  -     Basic metabolic panel; Future  -     NT-BNP PRO; Future  -     furosemide (LASIX) 20 mg tablet; Take 1 tablet (20 mg total) by mouth 2 (two) times a day    CLL (chronic lymphocytic leukemia) (HCC)  -     CBC and Platelet; Future        Subjective:      Patient ID: Felicia Mooney is a 60 y o  male  HPI   60year old male comes in for complaints of shortness of breath and lower extremity edema  Patient 1 ppd x 45 years  Tried adjuvants  SOB worsening over last 4 months  History of MARILEE  Last stress test 3/28/18 stress ECG was negative for ischemia and normal  There were no stress arrhythmias or conduction abnormalities  Echo completed 3/27/18 showed EF 55%  And G1DD  Patient follows Dr Eileen Sims for CLL last seen 7/26/18  The white count has trended down somewhat  This is obviously good  Hemoglobin level is still low; patient will get transfusions as needed  The Plan Is continue with the ibrutinib  History of anemia 2/2 to CLL  SOB with stairs worsening over last 4 months  Reports weight gain  Shows a about 20 lb weight gain over last 2 month  Eating more and increased appetite  The following portions of the patient's history were reviewed and updated as appropriate: allergies, current medications, past family history, past medical history, past social history, past surgical history and problem list     Review of Systems   Constitutional: Positive for appetite change  Negative for chills and fever  Respiratory: Positive for shortness of breath  Cardiovascular: Positive for leg swelling  Negative for chest pain and palpitations  Gastrointestinal: Negative for diarrhea and nausea  Neurological: Positive for weakness  Objective:      /80   Pulse 56   Temp 97 8 °F (36 6 °C) (Tympanic)   Resp (!) 24   Wt 127 kg (281 lb)   SpO2 98%   BMI 39 19 kg/m²          Physical Exam   Constitutional: He is oriented to person, place, and time  He appears well-developed and well-nourished  HENT:   Head: Normocephalic and atraumatic  Eyes: Conjunctivae are normal  No scleral icterus  Cardiovascular: Normal rate and intact distal pulses  Pulmonary/Chest: Effort normal  He has no rales  Musculoskeletal: He exhibits edema  Neurological: He is alert and oriented to person, place, and time  Skin: Skin is warm and dry

## 2018-08-27 ENCOUNTER — TELEPHONE (OUTPATIENT)
Dept: HEMATOLOGY ONCOLOGY | Facility: CLINIC | Age: 63
End: 2018-08-27

## 2018-08-27 ENCOUNTER — APPOINTMENT (OUTPATIENT)
Dept: LAB | Facility: HOSPITAL | Age: 63
End: 2018-08-27
Attending: INTERNAL MEDICINE
Payer: COMMERCIAL

## 2018-08-27 ENCOUNTER — TELEPHONE (OUTPATIENT)
Dept: FAMILY MEDICINE CLINIC | Facility: CLINIC | Age: 63
End: 2018-08-27

## 2018-08-27 DIAGNOSIS — C91.10 CLL (CHRONIC LYMPHOCYTIC LEUKEMIA) (HCC): ICD-10-CM

## 2018-08-27 LAB
BASOPHILS # BLD MANUAL: 0 THOUSAND/UL (ref 0–0.1)
BASOPHILS NFR MAR MANUAL: 0 % (ref 0–1)
EOSINOPHIL # BLD MANUAL: 0 THOUSAND/UL (ref 0–0.4)
EOSINOPHIL NFR BLD MANUAL: 0 % (ref 0–6)
ERYTHROCYTE [DISTWIDTH] IN BLOOD BY AUTOMATED COUNT: 22.9 % (ref 11.6–15.1)
HCT VFR BLD AUTO: 34.9 % (ref 36.5–49.3)
HGB BLD-MCNC: 10.5 G/DL (ref 12–17)
LYMPHOCYTES # BLD AUTO: 51.42 THOUSAND/UL (ref 0.6–4.47)
LYMPHOCYTES # BLD AUTO: 92 % (ref 14–44)
MCH RBC QN AUTO: 32 PG (ref 26.8–34.3)
MCHC RBC AUTO-ENTMCNC: 30.1 G/DL (ref 31.4–37.4)
MCV RBC AUTO: 106 FL (ref 82–98)
MICROCYTES BLD QL AUTO: PRESENT
MONOCYTES # BLD AUTO: 1.68 THOUSAND/UL (ref 0–1.22)
MONOCYTES NFR BLD: 3 % (ref 4–12)
NEUTROPHILS # BLD MANUAL: 2.79 THOUSAND/UL (ref 1.85–7.62)
NEUTS SEG NFR BLD AUTO: 5 % (ref 43–75)
NRBC BLD AUTO-RTO: 0 /100 WBCS
PLATELET # BLD AUTO: 262 THOUSANDS/UL (ref 149–390)
PLATELET BLD QL SMEAR: ADEQUATE
PMV BLD AUTO: 9.7 FL (ref 8.9–12.7)
RBC # BLD AUTO: 3.28 MILLION/UL (ref 3.88–5.62)
RBC MORPH BLD: PRESENT
STOMATOCYTES BLD QL SMEAR: PRESENT
TOTAL CELLS COUNTED SPEC: 100
WBC # BLD AUTO: 55.89 THOUSAND/UL (ref 4.31–10.16)

## 2018-08-27 PROCEDURE — 85007 BL SMEAR W/DIFF WBC COUNT: CPT

## 2018-08-27 PROCEDURE — 36415 COLL VENOUS BLD VENIPUNCTURE: CPT

## 2018-08-27 PROCEDURE — 85027 COMPLETE CBC AUTOMATED: CPT

## 2018-08-27 NOTE — TELEPHONE ENCOUNTER
Dr Reyes Me, Pt is in need of form completion for [facility] stating Pt is able to drive

## 2018-08-30 ENCOUNTER — TELEPHONE (OUTPATIENT)
Dept: HEMATOLOGY ONCOLOGY | Facility: MEDICAL CENTER | Age: 63
End: 2018-08-30

## 2018-08-30 ENCOUNTER — OFFICE VISIT (OUTPATIENT)
Dept: HEMATOLOGY ONCOLOGY | Facility: MEDICAL CENTER | Age: 63
End: 2018-08-30
Payer: COMMERCIAL

## 2018-08-30 ENCOUNTER — APPOINTMENT (OUTPATIENT)
Dept: LAB | Facility: HOSPITAL | Age: 63
End: 2018-08-30
Attending: INTERNAL MEDICINE
Payer: COMMERCIAL

## 2018-08-30 VITALS
TEMPERATURE: 98.2 F | HEART RATE: 60 BPM | BODY MASS INDEX: 39.34 KG/M2 | OXYGEN SATURATION: 92 % | DIASTOLIC BLOOD PRESSURE: 78 MMHG | WEIGHT: 281 LBS | RESPIRATION RATE: 17 BRPM | HEIGHT: 71 IN | SYSTOLIC BLOOD PRESSURE: 146 MMHG

## 2018-08-30 DIAGNOSIS — R60.9 PERIPHERAL EDEMA: ICD-10-CM

## 2018-08-30 DIAGNOSIS — C91.10 CLL (CHRONIC LYMPHOCYTIC LEUKEMIA) (HCC): ICD-10-CM

## 2018-08-30 DIAGNOSIS — C91.10 CLL (CHRONIC LYMPHOCYTIC LEUKEMIA) (HCC): Primary | ICD-10-CM

## 2018-08-30 LAB
ANION GAP SERPL CALCULATED.3IONS-SCNC: 7 MMOL/L (ref 4–13)
BASOPHILS # BLD MANUAL: 0 THOUSAND/UL (ref 0–0.1)
BASOPHILS NFR MAR MANUAL: 0 % (ref 0–1)
BUN SERPL-MCNC: 15 MG/DL (ref 5–25)
CALCIUM SERPL-MCNC: 9.5 MG/DL (ref 8.3–10.1)
CHLORIDE SERPL-SCNC: 98 MMOL/L (ref 100–108)
CO2 SERPL-SCNC: 33 MMOL/L (ref 21–32)
CREAT SERPL-MCNC: 0.85 MG/DL (ref 0.6–1.3)
EOSINOPHIL # BLD MANUAL: 0.5 THOUSAND/UL (ref 0–0.4)
EOSINOPHIL NFR BLD MANUAL: 1 % (ref 0–6)
ERYTHROCYTE [DISTWIDTH] IN BLOOD BY AUTOMATED COUNT: 21 % (ref 11.6–15.1)
GFR SERPL CREATININE-BSD FRML MDRD: 93 ML/MIN/1.73SQ M
GLUCOSE SERPL-MCNC: 130 MG/DL (ref 65–140)
HCT VFR BLD AUTO: 37.1 % (ref 36.5–49.3)
HGB BLD-MCNC: 11.7 G/DL (ref 12–17)
LYMPHOCYTES # BLD AUTO: 45.43 THOUSAND/UL (ref 0.6–4.47)
LYMPHOCYTES # BLD AUTO: 91 % (ref 14–44)
MACROCYTES BLD QL AUTO: PRESENT
MCH RBC QN AUTO: 33 PG (ref 26.8–34.3)
MCHC RBC AUTO-ENTMCNC: 31.5 G/DL (ref 31.4–37.4)
MCV RBC AUTO: 105 FL (ref 82–98)
MONOCYTES # BLD AUTO: 0.5 THOUSAND/UL (ref 0–1.22)
MONOCYTES NFR BLD: 1 % (ref 4–12)
NEUTROPHILS # BLD MANUAL: 3.49 THOUSAND/UL (ref 1.85–7.62)
NEUTS BAND NFR BLD MANUAL: 1 % (ref 0–8)
NEUTS SEG NFR BLD AUTO: 6 % (ref 43–75)
NRBC BLD AUTO-RTO: 0 /100 WBCS
NT-PROBNP SERPL-MCNC: 11 PG/ML
PLATELET # BLD AUTO: 203 THOUSANDS/UL (ref 149–390)
PLATELET BLD QL SMEAR: ADEQUATE
PMV BLD AUTO: 10 FL (ref 8.9–12.7)
POTASSIUM SERPL-SCNC: 3.7 MMOL/L (ref 3.5–5.3)
RBC # BLD AUTO: 3.55 MILLION/UL (ref 3.88–5.62)
RBC MORPH BLD: PRESENT
SODIUM SERPL-SCNC: 138 MMOL/L (ref 136–145)
TOTAL CELLS COUNTED SPEC: 100
WBC # BLD AUTO: 49.92 THOUSAND/UL (ref 4.31–10.16)

## 2018-08-30 PROCEDURE — 83880 ASSAY OF NATRIURETIC PEPTIDE: CPT

## 2018-08-30 PROCEDURE — 36415 COLL VENOUS BLD VENIPUNCTURE: CPT

## 2018-08-30 PROCEDURE — 99214 OFFICE O/P EST MOD 30 MIN: CPT | Performed by: INTERNAL MEDICINE

## 2018-08-30 PROCEDURE — 85027 COMPLETE CBC AUTOMATED: CPT

## 2018-08-30 PROCEDURE — 80048 BASIC METABOLIC PNL TOTAL CA: CPT

## 2018-08-30 PROCEDURE — 85007 BL SMEAR W/DIFF WBC COUNT: CPT

## 2018-08-30 NOTE — TELEPHONE ENCOUNTER
Pt's wife Fahad Ribera) called in and is very upset that paperwork is not ready  States she dropped Paperwork off to be completed 8/24/18 and has not received a phone call in regards to this, please advise

## 2018-08-30 NOTE — PROGRESS NOTES
Cinthia Díaz  1955  [facility]  [facility] [address] [facility] [address]    DISCUSSION  SUMMARY:    34-year-old male with CLL presently on ibrutinib  Issues:    1  CLL  The white count has trended down somewhat  This is obviously good  Hemoglobin level has trended upward, this is also obviously good  I recent stress the importance of compliance with this medication  Patient is to return in 6 weeks    The [facility] and [facility]-1103 trials of single agent ibrutinib for upfront and relapsed CLL patients demonstrated high response rates  The overall response rate was 89% at 5 years with 14% achieving complete response  In treatment naive patients, the overall response rate was 87% with a complete response rate of 29%  At 5 years, the progression free survival was 92% in treatment naive patients  Overall survival was 92%  Essentially this is is a very effective drug with a very high response rate  It is altogether possible that patient is not responding to the medication by my gut feeling is that Mr Zulma Robison is only partially compliant at best     2  Anemia - better  CBCs being monitored      3  Psychiatric issues/depression  Anti depression medications were recently manipulated  Psychiatry follow-up is ongoing      4  Fatigue  Etiology is most likely multifactorial and includes leukemia, other medical and psychiatric issues and secondary to medication use  I have told the patient and wife that this is difficult to completely correct  I have also suggested the patient that he try to be more active walking, using the treadmill etc    Patient/wife were not interested in physical therapy evaluation      Patient is to return in 6 weeks  Patient knows to call the hematology/oncology office if there are any other questions or concerns  Carefully review your medication list and verify that the list is accurate and up-to-date   Please call the hematology/oncology office if there are medications missing from the list, medications on the list that you are not currently taking or if there is a dosage or instruction that is different from how you're taking that medication  Patient goals and areas of care: monitor CBC parameters, continue with the CLL treatment  Patient is able to self-care   _____________________________________________________________________________________    Chief Complaint   Patient presents with    Follow-up     CLL on ibrutinib     History of Present Illness:    44-year-old male recently admitted to [facility] with severe anemia  Bone marrow biopsy demonstrated CLL  Patient is on a bruit new and returns for follow-up  Mr Yahaira Brown states feeling +/-, about the same as before  No fevers, chills or sweats  Generalized body aches are the same as before  Activities are limited, same as before  Depression continues to be an issue  No GI or  issues  Appetite is good, weight is stable  No shortness of breath, mild dyspnea on exertion but no different than before  Patient continues to smoke  Wife is also concerned that patient is eating more than before and is very sedentary  Review of Systems   Constitutional: Positive for fatigue  HENT: Negative  Eyes: Negative  Respiratory: Negative          +dyspnea on exertion   Cardiovascular: Negative  Gastrointestinal: Negative  Endocrine: Negative  Genitourinary: Negative  Musculoskeletal: Positive for arthralgias  Skin: Negative  Allergic/Immunologic: Negative  Neurological: Negative  Hematological: Negative  Psychiatric/Behavioral: Positive for behavioral problems  All other systems reviewed and are negative       Patient Active Problem List   Diagnosis    Hypertension    Depression    Polypharmacy    Bronchitis    Encephalopathy    Weakness    Leukemia (HCC)    Lactic acidosis    Leukocytosis    Elevated troponin    Bipolar 1 disorder ([place] [address] )    Psychiatric disorder    Anemia    Encounter for smoking cessation counseling    Encounter for screening for lipid disorder    Screening for diabetes mellitus    Peripheral edema    CLL (chronic lymphocytic leukemia) ([place] [address] )     Past Medical History:   Diagnosis Date    Anemia     Anxiety     Bipolar disorder ([place] [address] )     Cancer ([place] [address] )     History of alcohol abuse     Hypertension     Hypertension     Insomnia     Leukemia ([place] [address] )     Opiate abuse, episodic     Psychiatric disorder      Past Surgical History:   Procedure Laterality Date    EGD AND COLONOSCOPY N/A 3/30/2018    Procedure: EGD AND COLONOSCOPY;  Surgeon: Lenny Gilliland MD;  Location: [facility]; Service: Gastroenterology     Family History   Problem Relation Age of Onset    Coronary artery disease Mother     No Known Problems Father     Hepatitis Sister     Cancer Brother     Prostate cancer Brother     Early death Brother      Social History     Social History    Marital status: /Civil Union     Spouse name: N/A    Number of children: N/A    Years of education: N/A     Occupational History    Not on file       Social History Main Topics    Smoking status: Current Every Day Smoker     Packs/day: 1 50     Years: 40 00     Types: Cigarettes    Smokeless tobacco: Never Used    Alcohol use No      Comment: last drink nov 19 2017    Drug use: No      Comment: hx of heroin and subutex abuse    Sexual activity: Not Currently     Other Topics Concern    Not on file     Social History Narrative    No narrative on file       Current Outpatient Prescriptions:     benztropine (COGENTIN) 1 mg tablet, Take 1 mg by mouth Medrol Dose Pack scheduling ONLY  , Disp: , Rfl:     divalproex sodium (DEPAKOTE) 500 mg EC tablet, Take 250 mg by mouth every 12 (twelve) hours  , Disp: , Rfl:     FLUoxetine (PROzac) 20 mg capsule, Take 60 mg by mouth daily  , Disp: , Rfl:     furosemide (LASIX) 20 mg tablet, Take 1 tablet (20 mg total) by mouth 2 (two) times a day, Disp: 7 tablet, Rfl: 0    hydrochlorothiazide (MICROZIDE) 12 5 mg capsule, Take 1 capsule (12 5 mg total) by mouth daily, Disp: 30 capsule, Rfl: 3    hydrOXYzine pamoate (VISTARIL) 50 mg capsule, Take 50 mg by mouth 2 (two) times a day, Disp: , Rfl:     IMBRUVICA 420 MG TABS, TAKE 1 TABLET (420MG) BY MOUTH ONE TIME DAILY WITH A FULL GLASS OF WATER , Disp: 28 tablet, Rfl: 3    lisinopril (ZESTRIL) 40 mg tablet, Take 40 mg by mouth daily, Disp: , Rfl:     metoprolol tartrate (LOPRESSOR) 25 mg tablet, Take 0 5 tablets (12 5 mg total) by mouth every 12 (twelve) hours, Disp: 60 tablet, Rfl: 5    mirtazapine (REMERON) 15 mg tablet, Take 15 mg by mouth daily at bedtime, Disp: , Rfl:     nicotine (NICODERM CQ) 21 mg/24 hr TD 24 hr patch, Place 1 patch on the skin every 24 hours, Disp: 28 patch, Rfl: 0    pantoprazole (PROTONIX) 40 mg tablet, Take 1 tablet (40 mg total) by mouth daily, Disp: 30 tablet, Rfl: 5    risperiDONE (RisperDAL) 2 mg tablet, Take 4 mg by mouth 2 (two) times a day  , Disp: , Rfl:     No Known Allergies    Physical Exam   Constitutional: He is oriented to person, place, and time  He appears well-developed and well-nourished  Middle-aged male, no respiratory distress, +tobacco odor   HENT:   Head: Normocephalic and atraumatic  Right Ear: External ear normal    Left Ear: External ear normal    Nose: Nose normal    Mouth/Throat: Oropharynx is clear and moist    Eyes: Conjunctivae and EOM are normal  Pupils are equal, round, and reactive to light  Neck: Normal range of motion  Neck supple  Cardiovascular: Normal rate, regular rhythm, normal heart sounds and intact distal pulses  Pulmonary/Chest:   Lungs with fair air entry bilaterally, distant breath sounds, bilateral rhonchi, no rales   Abdominal: Soft   Bowel sounds are normal    Abdomen is soft, obese, cannot palpate liver or spleen, no rigidity or rebound   Musculoskeletal: Normal range of motion  Neurological: He is alert and oriented to person, place, and time  He has normal reflexes  Skin: Skin is warm     Skin is pale, warm, slightly dry, scattered ecchymoses, no petechiae, no active bleeding   Psychiatric: His behavior is normal  Judgment and thought content normal    Patient is responsive but muted, appropriate, otherwise pleasant   Extremities: 1 + bilateral lower extremity edema, no cords, pulses are 1+  Lymphatics: no adenopathy in the neck, supraclavicular region, axilla and groin bilaterally    Labs:    08/27/2018 WBC = 55 8 hemoglobin = 10 5 hematocrit = 35 MCV = 6 platelet = 051  24/74/4365 WBC = 58 4 hemoglobin = 9 3 hematocrit = 30 platelet = 5  12/98/7419 WBC = 57 5 hemoglobin = 7 5 hematocrit = 23 5 platelet = 560  06/56/3437 WBC = 42 7 hemoglobin = 8 4 hematocrit = 26 platelet = 285  61/23/8196 WBC = 80 7 hemoglobin = 6 8 hematocrit = 22 1 platelet = 445 lymphocytes = 93%  06/07/2018 WBC = 104 5 hemoglobin = 7 5 hematocrit = 25 5 platelet = 539 neutrophil = 4% lymphocytes = 93% monocyte = 3%  05/14/2018 WBC = 40 2 hemoglobin = 6 9 hematocrit = 21 1 MCV = 87 platelet = 232 lymphocytes = 99%  04/23/2018 WBC = 31 2 hemoglobin = 8 1 hematocrit = 24 5 MCV = 86 platelet = 622 lymphocytes = 83%  04/09/2018 WBC = 22 7 hemoglobin = 7 hematocrit = 21 1 platelet = 010 (no differential)  2/22/18 WBC = 12 1 hemoglobin = 8 3 hematocrit = 24 6 MCV = 86 platelet = 928  51/72/9929 WBC = 15 1 hemoglobin = 8 1 hematocrit = 24 3 platelet = 637  92/80/6049 WBC = 14 4 hemoglobin = 6 8 hematocrit = 20 platelet = 403    Pathology    Case Report   Surgical Pathology Report                         Case: [id number]                                    Authorizing Provider: Candie Conner MD          Collected:           03/28/2018 1318               Ordering Location:     [address] [place] [address] Received:            03/28/2018 [address] [place] [address]                                                                       Pathologist:           Ramírez Turner MD                                                         Specimens:   A) - Iliac Crest, Left, Bone Marrow   2 Cores                                                        B) - Iliac Crest, Left, Bone Marrow                                                                  C) - Iliac Crest, Left, Bone Marrow  2T  P  Slides, 2 Asp Stained , 8 Asp Unstained          Addendum   - Fluorescence in situ hybridization (FISH) ([facility]#[id number], evaluated by ORLIN Francis , Ph D ) for genetic abnormalities which may be associated with myelodysplastic syndrome is as follows:        Interpretation  1  No evidence of trisomy 12 (+12)  2  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  Comment: Deletions involving 13q14 are detectable by FISH in approximately 50% of persons with CLL  Among them,  about 97% show biallelic or concomitant monoallelic and biallelic deletion  As a group and as the sole aberration, del(13q)  is associated with a more favorable outcome  3  No evidence of p53 (17p13) deletion or amplification  4  No evidence of RENAE (11q22 3) deletion       - IgVH Mutation analysis ([facility]#[id number], evaluated by Dr Marco Myers, Ph D )  is as follows:   IGVH MUTATION ANALYSIS: UNMUTATED - CLL  RESULTS  Mutation Rate: 0%  IgVH Family: IGHV1-2*04     -  Cytogenetic studies as performed on the bone marrow aspirate @ [facility] (Specimen ID: [id number], evaluated by Jaclyn Reynaga, PhD, [facility], [facility] ) as follows:  Karyotype:  46,XY,add(18)(p11 3)[6]/46,XY[1], LIMITED ANALYSIS   Cytogenetics Analysis:  : Metaphases Counted          Metaphases Analyzed            Metaphases Karyotyped        GTG Band level                       Culture Type(s)               7                                     7                                     4                        300-400                                       [id number]   Interpretation: Abnormal male karyotype  This is an incomplete study and only 7 cells (as opposed to 20 cells) were available for analysis  Cytogenetic analysis  shows the presence of an abnormal clone (6 out of 7 cells) characterized by addition of material of unknown origin to  18p11  3  No other aberrations were identified, and one normal cell was found during the course of analysis  These current cytogenetic results are consistent with the presence of abnormal clonal cells  Correlation with  hematopathology and follow-up bone marrow studies are recommended to further evaluate the significance of these  findings  Microarray analysis could be considered to further characterize this rearrangement             Addendum electronically signed by Mary Jane Aguilar MD on 4/6/2018 at  4:12 PM     Addendum electronically signed by Mary Jane Aguilar MD on 4/6/2018 at  4:12 PM   Final Diagnosis   A -C  Bone marrow,  left iliac crest,  biopsy and aspirate:  -  Chronic lymphocytic leukemia/small lymphocytic lymphoma (CLL/SLL) (see note)  -  Significantly decreased trilineage myelopoiesis with normal appearing morphology and maturation without significant features of dysplasia or increased myeloblasts, secondary to the above  -  Anemia, neutropenia, and lymphocytosis, secondary to the above  -  Normal stainable storage iron  -  Mildly increased reticulin fibers without fibrosis, secondary to the above  -  Negative for collagen fibrosis, granulomata, vasculitis, necrosis           Electronically signed by Mary Jane Aguilar MD on 3/29/2018 at  2:51 P

## 2018-09-04 ENCOUNTER — APPOINTMENT (OUTPATIENT)
Dept: LAB | Facility: HOSPITAL | Age: 63
End: 2018-09-04
Attending: INTERNAL MEDICINE
Payer: COMMERCIAL

## 2018-09-04 DIAGNOSIS — Z76.89 ENCOUNTER TO ESTABLISH CARE: ICD-10-CM

## 2018-09-04 DIAGNOSIS — I10 ESSENTIAL HYPERTENSION: Chronic | ICD-10-CM

## 2018-09-04 DIAGNOSIS — C91.10 CLL (CHRONIC LYMPHOCYTIC LEUKEMIA) (HCC): ICD-10-CM

## 2018-09-04 DIAGNOSIS — Z13.1 SCREENING FOR DIABETES MELLITUS: ICD-10-CM

## 2018-09-04 LAB
BASOPHILS # BLD MANUAL: 0 THOUSAND/UL (ref 0–0.1)
BASOPHILS NFR MAR MANUAL: 0 % (ref 0–1)
EOSINOPHIL # BLD MANUAL: 0 THOUSAND/UL (ref 0–0.4)
EOSINOPHIL NFR BLD MANUAL: 0 % (ref 0–6)
ERYTHROCYTE [DISTWIDTH] IN BLOOD BY AUTOMATED COUNT: 19.5 % (ref 11.6–15.1)
EST. AVERAGE GLUCOSE BLD GHB EST-MCNC: 97 MG/DL
HBA1C MFR BLD: 5 % (ref 4.2–6.3)
HCT VFR BLD AUTO: 37 % (ref 36.5–49.3)
HGB BLD-MCNC: 11.4 G/DL (ref 12–17)
LYMPHOCYTES # BLD AUTO: 53.99 THOUSAND/UL (ref 0.6–4.47)
LYMPHOCYTES # BLD AUTO: 84 % (ref 14–44)
MCH RBC QN AUTO: 32.5 PG (ref 26.8–34.3)
MCHC RBC AUTO-ENTMCNC: 30.8 G/DL (ref 31.4–37.4)
MCV RBC AUTO: 105 FL (ref 82–98)
MONOCYTES # BLD AUTO: 0.64 THOUSAND/UL (ref 0–1.22)
MONOCYTES NFR BLD: 1 % (ref 4–12)
NEUTROPHILS # BLD MANUAL: 9.64 THOUSAND/UL (ref 1.85–7.62)
NEUTS SEG NFR BLD AUTO: 15 % (ref 43–75)
NRBC BLD AUTO-RTO: 0 /100 WBCS
PLATELET # BLD AUTO: 189 THOUSANDS/UL (ref 149–390)
PLATELET BLD QL SMEAR: ADEQUATE
PMV BLD AUTO: 10.5 FL (ref 8.9–12.7)
RBC # BLD AUTO: 3.51 MILLION/UL (ref 3.88–5.62)
RBC MORPH BLD: NORMAL
TOTAL CELLS COUNTED SPEC: 100
WBC # BLD AUTO: 64.27 THOUSAND/UL (ref 4.31–10.16)

## 2018-09-04 PROCEDURE — 85027 COMPLETE CBC AUTOMATED: CPT

## 2018-09-04 PROCEDURE — 85007 BL SMEAR W/DIFF WBC COUNT: CPT

## 2018-09-04 PROCEDURE — 83036 HEMOGLOBIN GLYCOSYLATED A1C: CPT

## 2018-09-04 PROCEDURE — 36415 COLL VENOUS BLD VENIPUNCTURE: CPT

## 2018-09-06 ENCOUNTER — APPOINTMENT (OUTPATIENT)
Dept: LAB | Facility: HOSPITAL | Age: 63
End: 2018-09-06
Attending: INTERNAL MEDICINE
Payer: COMMERCIAL

## 2018-09-06 ENCOUNTER — OFFICE VISIT (OUTPATIENT)
Dept: FAMILY MEDICINE CLINIC | Facility: CLINIC | Age: 63
End: 2018-09-06
Payer: COMMERCIAL

## 2018-09-06 VITALS
BODY MASS INDEX: 40.17 KG/M2 | TEMPERATURE: 97.7 F | RESPIRATION RATE: 22 BRPM | DIASTOLIC BLOOD PRESSURE: 74 MMHG | WEIGHT: 288 LBS | OXYGEN SATURATION: 97 % | SYSTOLIC BLOOD PRESSURE: 162 MMHG | HEART RATE: 68 BPM

## 2018-09-06 DIAGNOSIS — C91.10 CLL (CHRONIC LYMPHOCYTIC LEUKEMIA) (HCC): ICD-10-CM

## 2018-09-06 DIAGNOSIS — F31.9 BIPOLAR 1 DISORDER (HCC): ICD-10-CM

## 2018-09-06 DIAGNOSIS — R60.9 PERIPHERAL EDEMA: Primary | ICD-10-CM

## 2018-09-06 LAB
BASOPHILS # BLD MANUAL: 0 THOUSAND/UL (ref 0–0.1)
BASOPHILS NFR MAR MANUAL: 0 % (ref 0–1)
EOSINOPHIL # BLD MANUAL: 0.52 THOUSAND/UL (ref 0–0.4)
EOSINOPHIL NFR BLD MANUAL: 1 % (ref 0–6)
ERYTHROCYTE [DISTWIDTH] IN BLOOD BY AUTOMATED COUNT: 19.3 % (ref 11.6–15.1)
HCT VFR BLD AUTO: 34.8 % (ref 36.5–49.3)
HGB BLD-MCNC: 11 G/DL (ref 12–17)
LYMPHOCYTES # BLD AUTO: 43.79 THOUSAND/UL (ref 0.6–4.47)
LYMPHOCYTES # BLD AUTO: 84 % (ref 14–44)
MCH RBC QN AUTO: 33.6 PG (ref 26.8–34.3)
MCHC RBC AUTO-ENTMCNC: 31.6 G/DL (ref 31.4–37.4)
MCV RBC AUTO: 106 FL (ref 82–98)
MONOCYTES # BLD AUTO: 1.04 THOUSAND/UL (ref 0–1.22)
MONOCYTES NFR BLD: 2 % (ref 4–12)
NEUTROPHILS # BLD MANUAL: 6.78 THOUSAND/UL (ref 1.85–7.62)
NEUTS BAND NFR BLD MANUAL: 1 % (ref 0–8)
NEUTS SEG NFR BLD AUTO: 12 % (ref 43–75)
NRBC BLD AUTO-RTO: 0 /100 WBCS
PLATELET # BLD AUTO: 190 THOUSANDS/UL (ref 149–390)
PLATELET BLD QL SMEAR: ADEQUATE
PMV BLD AUTO: 10.5 FL (ref 8.9–12.7)
RBC # BLD AUTO: 3.27 MILLION/UL (ref 3.88–5.62)
RBC MORPH BLD: NORMAL
TOTAL CELLS COUNTED SPEC: 100
WBC # BLD AUTO: 52.13 THOUSAND/UL (ref 4.31–10.16)

## 2018-09-06 PROCEDURE — 36415 COLL VENOUS BLD VENIPUNCTURE: CPT

## 2018-09-06 PROCEDURE — 99213 OFFICE O/P EST LOW 20 MIN: CPT | Performed by: FAMILY MEDICINE

## 2018-09-06 PROCEDURE — 85027 COMPLETE CBC AUTOMATED: CPT | Performed by: INTERNAL MEDICINE

## 2018-09-06 PROCEDURE — 85007 BL SMEAR W/DIFF WBC COUNT: CPT | Performed by: INTERNAL MEDICINE

## 2018-09-06 RX ORDER — FUROSEMIDE 40 MG/1
40 TABLET ORAL DAILY
Qty: 30 TABLET | Refills: 1 | Status: SHIPPED | OUTPATIENT
Start: 2018-09-06 | End: 2018-10-04 | Stop reason: SDUPTHER
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
## 2018-09-06 NOTE — ASSESSMENT & PLAN NOTE
Form filled out indicating the patient is not cleared to return to driving, and I do not recommend license be return to patient until he is cleared by his psychiatrist, and I do not prescribe his psychiatric medications

## 2018-09-06 NOTE — ASSESSMENT & PLAN NOTE
Kidney function along with BNP within normal limits  Recent echo revealed grade 1 diastolic dysfunction, stress test revealed no evidence of ischemia  Prescribed furosemide 40 milligram tablet daily, advised patient to continue hydrochlorothiazide 12 5 milligrams daily  Patient will follow up in 1 month

## 2018-09-07 DIAGNOSIS — I10 ESSENTIAL HYPERTENSION: Chronic | ICD-10-CM

## 2018-09-07 DIAGNOSIS — K29.90 GASTRITIS AND DUODENITIS: ICD-10-CM

## 2018-09-08 RX ORDER — PANTOPRAZOLE SODIUM 40 MG/1
40 TABLET, DELAYED RELEASE ORAL DAILY
Qty: 30 TABLET | Refills: 5 | Status: SHIPPED | OUTPATIENT
Start: 2018-09-08 | End: 2018-11-01 | Stop reason: SDDI

## 2018-09-08 RX ORDER — HYDROCHLOROTHIAZIDE 12.5 MG/1
12.5 CAPSULE, GELATIN COATED ORAL DAILY
Qty: 30 CAPSULE | Refills: 3 | Status: SHIPPED | OUTPATIENT
Start: 2018-09-08 | End: 2018-12-20 | Stop reason: SDUPTHER

## 2018-09-10 ENCOUNTER — TRANSCRIBE ORDERS (OUTPATIENT)
Dept: HEMATOLOGY ONCOLOGY | Facility: MEDICAL CENTER | Age: 63
End: 2018-09-10

## 2018-09-10 ENCOUNTER — TRANSCRIBE ORDERS (OUTPATIENT)
Dept: ADMINISTRATIVE | Facility: HOSPITAL | Age: 63
End: 2018-09-10

## 2018-09-10 ENCOUNTER — APPOINTMENT (OUTPATIENT)
Dept: LAB | Facility: HOSPITAL | Age: 63
End: 2018-09-10
Attending: INTERNAL MEDICINE
Payer: COMMERCIAL

## 2018-09-10 DIAGNOSIS — C91.10 CLL (CHRONIC LYMPHOCYTIC LEUKEMIA) (HCC): Primary | ICD-10-CM

## 2018-09-10 DIAGNOSIS — D64.9 ANEMIA, UNSPECIFIED TYPE: ICD-10-CM

## 2018-09-10 DIAGNOSIS — D64.9 ANEMIA, UNSPECIFIED TYPE: Primary | ICD-10-CM

## 2018-09-10 LAB
ANISOCYTOSIS BLD QL SMEAR: PRESENT
BASOPHILS # BLD MANUAL: 0 THOUSAND/UL (ref 0–0.1)
BASOPHILS NFR MAR MANUAL: 0 % (ref 0–1)
EOSINOPHIL # BLD MANUAL: 0 THOUSAND/UL (ref 0–0.4)
EOSINOPHIL NFR BLD MANUAL: 0 % (ref 0–6)
ERYTHROCYTE [DISTWIDTH] IN BLOOD BY AUTOMATED COUNT: 17.6 % (ref 11.6–15.1)
HCT VFR BLD AUTO: 38.4 % (ref 36.5–49.3)
HGB BLD-MCNC: 11.9 G/DL (ref 12–17)
LYMPHOCYTES # BLD AUTO: 53.69 THOUSAND/UL (ref 0.6–4.47)
LYMPHOCYTES # BLD AUTO: 86 % (ref 14–44)
MACROCYTES BLD QL AUTO: PRESENT
MCH RBC QN AUTO: 32.7 PG (ref 26.8–34.3)
MCHC RBC AUTO-ENTMCNC: 31 G/DL (ref 31.4–37.4)
MCV RBC AUTO: 106 FL (ref 82–98)
MONOCYTES # BLD AUTO: 1.87 THOUSAND/UL (ref 0–1.22)
MONOCYTES NFR BLD: 3 % (ref 4–12)
NEUTROPHILS # BLD MANUAL: 6.87 THOUSAND/UL (ref 1.85–7.62)
NEUTS SEG NFR BLD AUTO: 11 % (ref 43–75)
NRBC BLD AUTO-RTO: 0 /100 WBCS
PLATELET # BLD AUTO: 222 THOUSANDS/UL (ref 149–390)
PLATELET BLD QL SMEAR: ADEQUATE
PMV BLD AUTO: 10.6 FL (ref 8.9–12.7)
POLYCHROMASIA BLD QL SMEAR: PRESENT
RBC # BLD AUTO: 3.64 MILLION/UL (ref 3.88–5.62)
RBC MORPH BLD: PRESENT
TOTAL CELLS COUNTED SPEC: 100
WBC # BLD AUTO: 62.43 THOUSAND/UL (ref 4.31–10.16)

## 2018-09-10 PROCEDURE — 85027 COMPLETE CBC AUTOMATED: CPT

## 2018-09-10 PROCEDURE — 36415 COLL VENOUS BLD VENIPUNCTURE: CPT

## 2018-09-10 PROCEDURE — 85007 BL SMEAR W/DIFF WBC COUNT: CPT

## 2018-09-13 ENCOUNTER — APPOINTMENT (OUTPATIENT)
Dept: LAB | Facility: HOSPITAL | Age: 63
End: 2018-09-13
Payer: COMMERCIAL

## 2018-09-13 DIAGNOSIS — I10 ESSENTIAL HYPERTENSION: Chronic | ICD-10-CM

## 2018-09-13 DIAGNOSIS — C91.10 CLL (CHRONIC LYMPHOCYTIC LEUKEMIA) (HCC): ICD-10-CM

## 2018-09-13 DIAGNOSIS — Z13.220 ENCOUNTER FOR SCREENING FOR LIPID DISORDER: ICD-10-CM

## 2018-09-13 DIAGNOSIS — Z76.89 ENCOUNTER TO ESTABLISH CARE: ICD-10-CM

## 2018-09-13 LAB
BASOPHILS # BLD MANUAL: 0 THOUSAND/UL (ref 0–0.1)
BASOPHILS NFR MAR MANUAL: 0 % (ref 0–1)
CHOLEST SERPL-MCNC: 190 MG/DL (ref 50–200)
EOSINOPHIL # BLD MANUAL: 0 THOUSAND/UL (ref 0–0.4)
EOSINOPHIL NFR BLD MANUAL: 0 % (ref 0–6)
ERYTHROCYTE [DISTWIDTH] IN BLOOD BY AUTOMATED COUNT: 17 % (ref 11.6–15.1)
HCT VFR BLD AUTO: 38.8 % (ref 36.5–49.3)
HDLC SERPL-MCNC: 52 MG/DL (ref 40–60)
HGB BLD-MCNC: 12.2 G/DL (ref 12–17)
LDLC SERPL CALC-MCNC: 87 MG/DL (ref 0–100)
LYMPHOCYTES # BLD AUTO: 54.89 THOUSAND/UL (ref 0.6–4.47)
LYMPHOCYTES # BLD AUTO: 96 % (ref 14–44)
MCH RBC QN AUTO: 33 PG (ref 26.8–34.3)
MCHC RBC AUTO-ENTMCNC: 31.4 G/DL (ref 31.4–37.4)
MCV RBC AUTO: 105 FL (ref 82–98)
MONOCYTES # BLD AUTO: 0.57 THOUSAND/UL (ref 0–1.22)
MONOCYTES NFR BLD: 1 % (ref 4–12)
NEUTROPHILS # BLD MANUAL: 1.72 THOUSAND/UL (ref 1.85–7.62)
NEUTS SEG NFR BLD AUTO: 3 % (ref 43–75)
NONHDLC SERPL-MCNC: 138 MG/DL
NRBC BLD AUTO-RTO: 0 /100 WBCS
PLATELET # BLD AUTO: 232 THOUSANDS/UL (ref 149–390)
PLATELET BLD QL SMEAR: ADEQUATE
PMV BLD AUTO: 10.6 FL (ref 8.9–12.7)
RBC # BLD AUTO: 3.7 MILLION/UL (ref 3.88–5.62)
RBC MORPH BLD: NORMAL
TOTAL CELLS COUNTED SPEC: 100
TRIGL SERPL-MCNC: 253 MG/DL
WBC # BLD AUTO: 57.18 THOUSAND/UL (ref 4.31–10.16)

## 2018-09-13 PROCEDURE — 36415 COLL VENOUS BLD VENIPUNCTURE: CPT

## 2018-09-13 PROCEDURE — 85027 COMPLETE CBC AUTOMATED: CPT

## 2018-09-13 PROCEDURE — 80061 LIPID PANEL: CPT

## 2018-09-13 PROCEDURE — 85007 BL SMEAR W/DIFF WBC COUNT: CPT

## 2018-09-17 ENCOUNTER — APPOINTMENT (OUTPATIENT)
Dept: LAB | Facility: HOSPITAL | Age: 63
End: 2018-09-17
Attending: INTERNAL MEDICINE
Payer: COMMERCIAL

## 2018-09-17 ENCOUNTER — TRANSCRIBE ORDERS (OUTPATIENT)
Dept: ADMINISTRATIVE | Facility: HOSPITAL | Age: 63
End: 2018-09-17

## 2018-09-17 ENCOUNTER — TELEPHONE (OUTPATIENT)
Dept: HEMATOLOGY ONCOLOGY | Facility: MEDICAL CENTER | Age: 63
End: 2018-09-17

## 2018-09-17 DIAGNOSIS — C91.10 CLL (CHRONIC LYMPHOCYTIC LEUKEMIA) (HCC): ICD-10-CM

## 2018-09-17 LAB
BASOPHILS # BLD MANUAL: 0 THOUSAND/UL (ref 0–0.1)
BASOPHILS NFR MAR MANUAL: 0 % (ref 0–1)
EOSINOPHIL # BLD MANUAL: 0 THOUSAND/UL (ref 0–0.4)
EOSINOPHIL NFR BLD MANUAL: 0 % (ref 0–6)
ERYTHROCYTE [DISTWIDTH] IN BLOOD BY AUTOMATED COUNT: 16.4 % (ref 11.6–15.1)
HCT VFR BLD AUTO: 38.2 % (ref 36.5–49.3)
HGB BLD-MCNC: 12.1 G/DL (ref 12–17)
LYMPHOCYTES # BLD AUTO: 58.36 THOUSAND/UL (ref 0.6–4.47)
LYMPHOCYTES # BLD AUTO: 94 % (ref 14–44)
MCH RBC QN AUTO: 33.1 PG (ref 26.8–34.3)
MCHC RBC AUTO-ENTMCNC: 31.7 G/DL (ref 31.4–37.4)
MCV RBC AUTO: 104 FL (ref 82–98)
MONOCYTES # BLD AUTO: 1.24 THOUSAND/UL (ref 0–1.22)
MONOCYTES NFR BLD: 2 % (ref 4–12)
NEUTROPHILS # BLD MANUAL: 2.48 THOUSAND/UL (ref 1.85–7.62)
NEUTS SEG NFR BLD AUTO: 4 % (ref 43–75)
NRBC BLD AUTO-RTO: 0 /100 WBCS
PLATELET # BLD AUTO: 250 THOUSANDS/UL (ref 149–390)
PLATELET BLD QL SMEAR: ADEQUATE
PMV BLD AUTO: 10.6 FL (ref 8.9–12.7)
RBC # BLD AUTO: 3.66 MILLION/UL (ref 3.88–5.62)
RBC MORPH BLD: NORMAL
SMUDGE CELLS BLD QL SMEAR: PRESENT
TOTAL CELLS COUNTED SPEC: 100
WBC # BLD AUTO: 62.08 THOUSAND/UL (ref 4.31–10.16)

## 2018-09-17 PROCEDURE — 85027 COMPLETE CBC AUTOMATED: CPT

## 2018-09-17 PROCEDURE — 85007 BL SMEAR W/DIFF WBC COUNT: CPT

## 2018-09-17 PROCEDURE — 36415 COLL VENOUS BLD VENIPUNCTURE: CPT

## 2018-09-18 ENCOUNTER — TELEPHONE (OUTPATIENT)
Dept: HEMATOLOGY ONCOLOGY | Facility: MEDICAL CENTER | Age: 63
End: 2018-09-18

## 2018-09-21 ENCOUNTER — APPOINTMENT (OUTPATIENT)
Dept: LAB | Facility: HOSPITAL | Age: 63
End: 2018-09-21
Attending: INTERNAL MEDICINE
Payer: COMMERCIAL

## 2018-09-21 DIAGNOSIS — C91.10 CLL (CHRONIC LYMPHOCYTIC LEUKEMIA) (HCC): ICD-10-CM

## 2018-09-21 LAB
BASOPHILS # BLD MANUAL: 0 THOUSAND/UL (ref 0–0.1)
BASOPHILS NFR MAR MANUAL: 0 % (ref 0–1)
EOSINOPHIL # BLD MANUAL: 0.52 THOUSAND/UL (ref 0–0.4)
EOSINOPHIL NFR BLD MANUAL: 2 % (ref 0–6)
ERYTHROCYTE [DISTWIDTH] IN BLOOD BY AUTOMATED COUNT: 15.8 % (ref 11.6–15.1)
HCT VFR BLD AUTO: 33.3 % (ref 36.5–49.3)
HGB BLD-MCNC: 10.6 G/DL (ref 12–17)
LYMPHOCYTES # BLD AUTO: 22.18 THOUSAND/UL (ref 0.6–4.47)
LYMPHOCYTES # BLD AUTO: 86 % (ref 14–44)
MCH RBC QN AUTO: 32.8 PG (ref 26.8–34.3)
MCHC RBC AUTO-ENTMCNC: 31.8 G/DL (ref 31.4–37.4)
MCV RBC AUTO: 103 FL (ref 82–98)
MONOCYTES # BLD AUTO: 0.77 THOUSAND/UL (ref 0–1.22)
MONOCYTES NFR BLD: 3 % (ref 4–12)
NEUTROPHILS # BLD MANUAL: 2.32 THOUSAND/UL (ref 1.85–7.62)
NEUTS BAND NFR BLD MANUAL: 1 % (ref 0–8)
NEUTS SEG NFR BLD AUTO: 8 % (ref 43–75)
NRBC BLD AUTO-RTO: 0 /100 WBCS
PLATELET # BLD AUTO: 203 THOUSANDS/UL (ref 149–390)
PLATELET BLD QL SMEAR: ADEQUATE
PMV BLD AUTO: 10.9 FL (ref 8.9–12.7)
POLYCHROMASIA BLD QL SMEAR: PRESENT
RBC # BLD AUTO: 3.23 MILLION/UL (ref 3.88–5.62)
RBC MORPH BLD: NORMAL
SMUDGE CELLS BLD QL SMEAR: PRESENT
TOTAL CELLS COUNTED SPEC: 100
WBC # BLD AUTO: 25.79 THOUSAND/UL (ref 4.31–10.16)

## 2018-09-21 PROCEDURE — 85027 COMPLETE CBC AUTOMATED: CPT

## 2018-09-21 PROCEDURE — 85007 BL SMEAR W/DIFF WBC COUNT: CPT

## 2018-09-21 PROCEDURE — 36415 COLL VENOUS BLD VENIPUNCTURE: CPT

## 2018-09-24 ENCOUNTER — TELEPHONE (OUTPATIENT)
Dept: HEMATOLOGY ONCOLOGY | Facility: MEDICAL CENTER | Age: 63
End: 2018-09-24

## 2018-09-24 ENCOUNTER — APPOINTMENT (OUTPATIENT)
Dept: LAB | Facility: HOSPITAL | Age: 63
End: 2018-09-24
Attending: INTERNAL MEDICINE
Payer: COMMERCIAL

## 2018-09-24 DIAGNOSIS — C91.10 CLL (CHRONIC LYMPHOCYTIC LEUKEMIA) (HCC): ICD-10-CM

## 2018-09-24 LAB
BASOPHILS # BLD MANUAL: 0 THOUSAND/UL (ref 0–0.1)
BASOPHILS NFR MAR MANUAL: 0 % (ref 0–1)
EOSINOPHIL # BLD MANUAL: 0.6 THOUSAND/UL (ref 0–0.4)
EOSINOPHIL NFR BLD MANUAL: 2 % (ref 0–6)
ERYTHROCYTE [DISTWIDTH] IN BLOOD BY AUTOMATED COUNT: 15.3 % (ref 11.6–15.1)
HCT VFR BLD AUTO: 36.6 % (ref 36.5–49.3)
HGB BLD-MCNC: 11.8 G/DL (ref 12–17)
LYMPHOCYTES # BLD AUTO: 25.57 THOUSAND/UL (ref 0.6–4.47)
LYMPHOCYTES # BLD AUTO: 85 % (ref 14–44)
MCH RBC QN AUTO: 32.9 PG (ref 26.8–34.3)
MCHC RBC AUTO-ENTMCNC: 32.2 G/DL (ref 31.4–37.4)
MCV RBC AUTO: 102 FL (ref 82–98)
MONOCYTES # BLD AUTO: 0.9 THOUSAND/UL (ref 0–1.22)
MONOCYTES NFR BLD: 3 % (ref 4–12)
NEUTROPHILS # BLD MANUAL: 3.01 THOUSAND/UL (ref 1.85–7.62)
NEUTS SEG NFR BLD AUTO: 10 % (ref 43–75)
NRBC BLD AUTO-RTO: 0 /100 WBCS
PLATELET # BLD AUTO: 205 THOUSANDS/UL (ref 149–390)
PLATELET BLD QL SMEAR: ADEQUATE
PMV BLD AUTO: 10.1 FL (ref 8.9–12.7)
POLYCHROMASIA BLD QL SMEAR: PRESENT
RBC # BLD AUTO: 3.59 MILLION/UL (ref 3.88–5.62)
RBC MORPH BLD: NORMAL
SMUDGE CELLS BLD QL SMEAR: PRESENT
TOTAL CELLS COUNTED SPEC: 100
WBC # BLD AUTO: 30.08 THOUSAND/UL (ref 4.31–10.16)

## 2018-09-24 PROCEDURE — 85027 COMPLETE CBC AUTOMATED: CPT

## 2018-09-24 PROCEDURE — 85007 BL SMEAR W/DIFF WBC COUNT: CPT

## 2018-09-24 PROCEDURE — 36415 COLL VENOUS BLD VENIPUNCTURE: CPT

## 2018-09-27 ENCOUNTER — TELEPHONE (OUTPATIENT)
Dept: HEMATOLOGY ONCOLOGY | Facility: MEDICAL CENTER | Age: 63
End: 2018-09-27

## 2018-09-27 ENCOUNTER — APPOINTMENT (OUTPATIENT)
Dept: LAB | Facility: HOSPITAL | Age: 63
End: 2018-09-27
Attending: INTERNAL MEDICINE
Payer: COMMERCIAL

## 2018-09-27 DIAGNOSIS — C91.10 CLL (CHRONIC LYMPHOCYTIC LEUKEMIA) (HCC): ICD-10-CM

## 2018-09-27 LAB
BASOPHILS # BLD MANUAL: 0 THOUSAND/UL (ref 0–0.1)
BASOPHILS NFR MAR MANUAL: 0 % (ref 0–1)
EOSINOPHIL # BLD MANUAL: 0.35 THOUSAND/UL (ref 0–0.4)
EOSINOPHIL NFR BLD MANUAL: 1 % (ref 0–6)
ERYTHROCYTE [DISTWIDTH] IN BLOOD BY AUTOMATED COUNT: 15.6 % (ref 11.6–15.1)
HCT VFR BLD AUTO: 37.2 % (ref 36.5–49.3)
HGB BLD-MCNC: 12 G/DL (ref 12–17)
LYMPHOCYTES # BLD AUTO: 29.16 THOUSAND/UL (ref 0.6–4.47)
LYMPHOCYTES # BLD AUTO: 84 % (ref 14–44)
MCH RBC QN AUTO: 33.3 PG (ref 26.8–34.3)
MCHC RBC AUTO-ENTMCNC: 32.3 G/DL (ref 31.4–37.4)
MCV RBC AUTO: 103 FL (ref 82–98)
MONOCYTES # BLD AUTO: 1.39 THOUSAND/UL (ref 0–1.22)
MONOCYTES NFR BLD: 4 % (ref 4–12)
NEUTROPHILS # BLD MANUAL: 3.82 THOUSAND/UL (ref 1.85–7.62)
NEUTS BAND NFR BLD MANUAL: 1 % (ref 0–8)
NEUTS SEG NFR BLD AUTO: 10 % (ref 43–75)
NRBC BLD AUTO-RTO: 0 /100 WBCS
PLATELET # BLD AUTO: 211 THOUSANDS/UL (ref 149–390)
PLATELET BLD QL SMEAR: ADEQUATE
PMV BLD AUTO: 10.6 FL (ref 8.9–12.7)
RBC # BLD AUTO: 3.6 MILLION/UL (ref 3.88–5.62)
RBC MORPH BLD: NORMAL
TOTAL CELLS COUNTED SPEC: 100
WBC # BLD AUTO: 34.71 THOUSAND/UL (ref 4.31–10.16)

## 2018-09-27 PROCEDURE — 36415 COLL VENOUS BLD VENIPUNCTURE: CPT

## 2018-09-27 PROCEDURE — 85027 COMPLETE CBC AUTOMATED: CPT

## 2018-09-27 PROCEDURE — 85007 BL SMEAR W/DIFF WBC COUNT: CPT

## 2018-10-01 ENCOUNTER — APPOINTMENT (OUTPATIENT)
Dept: LAB | Facility: HOSPITAL | Age: 63
End: 2018-10-01
Attending: INTERNAL MEDICINE
Payer: COMMERCIAL

## 2018-10-01 ENCOUNTER — TELEPHONE (OUTPATIENT)
Dept: HEMATOLOGY ONCOLOGY | Facility: MEDICAL CENTER | Age: 63
End: 2018-10-01

## 2018-10-01 DIAGNOSIS — C91.10 CLL (CHRONIC LYMPHOCYTIC LEUKEMIA) (HCC): ICD-10-CM

## 2018-10-01 LAB
BASOPHILS # BLD MANUAL: 0 THOUSAND/UL (ref 0–0.1)
BASOPHILS NFR MAR MANUAL: 0 % (ref 0–1)
EOSINOPHIL # BLD MANUAL: 0 THOUSAND/UL (ref 0–0.4)
EOSINOPHIL NFR BLD MANUAL: 0 % (ref 0–6)
ERYTHROCYTE [DISTWIDTH] IN BLOOD BY AUTOMATED COUNT: 15.8 % (ref 11.6–15.1)
HCT VFR BLD AUTO: 36.9 % (ref 36.5–49.3)
HGB BLD-MCNC: 11.7 G/DL (ref 12–17)
LYMPHOCYTES # BLD AUTO: 27.09 THOUSAND/UL (ref 0.6–4.47)
LYMPHOCYTES # BLD AUTO: 92 % (ref 14–44)
MCH RBC QN AUTO: 33.1 PG (ref 26.8–34.3)
MCHC RBC AUTO-ENTMCNC: 31.7 G/DL (ref 31.4–37.4)
MCV RBC AUTO: 104 FL (ref 82–98)
MONOCYTES # BLD AUTO: 0.88 THOUSAND/UL (ref 0–1.22)
MONOCYTES NFR BLD: 3 % (ref 4–12)
NEUTROPHILS # BLD MANUAL: 1.47 THOUSAND/UL (ref 1.85–7.62)
NEUTS SEG NFR BLD AUTO: 5 % (ref 43–75)
NRBC BLD AUTO-RTO: 0 /100 WBCS
PLATELET # BLD AUTO: 215 THOUSANDS/UL (ref 149–390)
PLATELET BLD QL SMEAR: ADEQUATE
PMV BLD AUTO: 10.7 FL (ref 8.9–12.7)
RBC # BLD AUTO: 3.54 MILLION/UL (ref 3.88–5.62)
RBC MORPH BLD: NORMAL
TOTAL CELLS COUNTED SPEC: 100
WBC # BLD AUTO: 29.45 THOUSAND/UL (ref 4.31–10.16)

## 2018-10-01 PROCEDURE — 36415 COLL VENOUS BLD VENIPUNCTURE: CPT

## 2018-10-01 PROCEDURE — 85027 COMPLETE CBC AUTOMATED: CPT

## 2018-10-01 PROCEDURE — 85007 BL SMEAR W/DIFF WBC COUNT: CPT

## 2018-10-02 ENCOUNTER — APPOINTMENT (OUTPATIENT)
Dept: LAB | Facility: HOSPITAL | Age: 63
End: 2018-10-02
Attending: FAMILY MEDICINE
Payer: COMMERCIAL

## 2018-10-02 ENCOUNTER — HOSPITAL ENCOUNTER (OUTPATIENT)
Dept: RADIOLOGY | Facility: HOSPITAL | Age: 63
Discharge: HOME | End: 2018-10-02
Attending: FAMILY MEDICINE
Payer: COMMERCIAL

## 2018-10-02 ENCOUNTER — TRANSCRIBE ORDERS (OUTPATIENT)
Dept: ADMINISTRATIVE | Facility: HOSPITAL | Age: 63
End: 2018-10-02

## 2018-10-02 ENCOUNTER — TELEPHONE (OUTPATIENT)
Dept: HEMATOLOGY ONCOLOGY | Facility: MEDICAL CENTER | Age: 63
End: 2018-10-02

## 2018-10-02 ENCOUNTER — TELEPHONE (OUTPATIENT)
Dept: FAMILY MEDICINE CLINIC | Facility: CLINIC | Age: 63
End: 2018-10-02

## 2018-10-02 ENCOUNTER — OFFICE VISIT (OUTPATIENT)
Dept: FAMILY MEDICINE CLINIC | Facility: CLINIC | Age: 63
End: 2018-10-02
Payer: COMMERCIAL

## 2018-10-02 VITALS
WEIGHT: 296 LBS | DIASTOLIC BLOOD PRESSURE: 90 MMHG | RESPIRATION RATE: 20 BRPM | SYSTOLIC BLOOD PRESSURE: 200 MMHG | TEMPERATURE: 97.8 F | BODY MASS INDEX: 41.28 KG/M2 | HEART RATE: 78 BPM | OXYGEN SATURATION: 94 %

## 2018-10-02 DIAGNOSIS — R60.9 EDEMA, UNSPECIFIED TYPE: ICD-10-CM

## 2018-10-02 DIAGNOSIS — I10 ESSENTIAL HYPERTENSION: Chronic | ICD-10-CM

## 2018-10-02 DIAGNOSIS — R60.9 EDEMA, UNSPECIFIED TYPE: Primary | ICD-10-CM

## 2018-10-02 DIAGNOSIS — C91.10 CLL (CHRONIC LYMPHOCYTIC LEUKEMIA) (HCC): ICD-10-CM

## 2018-10-02 LAB
ALBUMIN SERPL BCP-MCNC: 3.3 G/DL (ref 3.5–5)
ALP SERPL-CCNC: 82 U/L (ref 46–116)
ALT SERPL W P-5'-P-CCNC: 32 U/L (ref 12–78)
ANION GAP SERPL CALCULATED.3IONS-SCNC: 14 MMOL/L (ref 4–13)
AST SERPL W P-5'-P-CCNC: 21 U/L (ref 5–45)
BASO STIPL BLD QL SMEAR: PRESENT
BASOPHILS # BLD MANUAL: 0 THOUSAND/UL (ref 0–0.1)
BASOPHILS NFR MAR MANUAL: 0 % (ref 0–1)
BILIRUB SERPL-MCNC: 0.5 MG/DL (ref 0.2–1)
BILIRUB UR QL STRIP: NEGATIVE
BUN SERPL-MCNC: 13 MG/DL (ref 5–25)
CALCIUM SERPL-MCNC: 8.8 MG/DL (ref 8.3–10.1)
CHLORIDE SERPL-SCNC: 97 MMOL/L (ref 100–108)
CLARITY UR: CLEAR
CO2 SERPL-SCNC: 30 MMOL/L (ref 21–32)
COLOR UR: YELLOW
CREAT SERPL-MCNC: 1.03 MG/DL (ref 0.6–1.3)
EOSINOPHIL # BLD MANUAL: 0.34 THOUSAND/UL (ref 0–0.4)
EOSINOPHIL NFR BLD MANUAL: 1 % (ref 0–6)
ERYTHROCYTE [DISTWIDTH] IN BLOOD BY AUTOMATED COUNT: 15.8 % (ref 11.6–15.1)
GFR SERPL CREATININE-BSD FRML MDRD: 77 ML/MIN/1.73SQ M
GLUCOSE SERPL-MCNC: 260 MG/DL (ref 65–140)
GLUCOSE UR STRIP-MCNC: NEGATIVE MG/DL
HCT VFR BLD AUTO: 38.2 % (ref 36.5–49.3)
HGB BLD-MCNC: 12.3 G/DL (ref 12–17)
HGB UR QL STRIP.AUTO: NEGATIVE
KETONES UR STRIP-MCNC: NEGATIVE MG/DL
LEUKOCYTE ESTERASE UR QL STRIP: NEGATIVE
LG PLATELETS BLD QL SMEAR: PRESENT
LYMPHOCYTES # BLD AUTO: 31.24 THOUSAND/UL (ref 0.6–4.47)
LYMPHOCYTES # BLD AUTO: 93 % (ref 14–44)
MACROCYTES BLD QL AUTO: PRESENT
MCH RBC QN AUTO: 33.2 PG (ref 26.8–34.3)
MCHC RBC AUTO-ENTMCNC: 32.2 G/DL (ref 31.4–37.4)
MCV RBC AUTO: 103 FL (ref 82–98)
MONOCYTES # BLD AUTO: 0.34 THOUSAND/UL (ref 0–1.22)
MONOCYTES NFR BLD: 1 % (ref 4–12)
NEUTROPHILS # BLD MANUAL: 1.68 THOUSAND/UL (ref 1.85–7.62)
NEUTS SEG NFR BLD AUTO: 5 % (ref 43–75)
NITRITE UR QL STRIP: NEGATIVE
NRBC BLD AUTO-RTO: 0 /100 WBCS
NT-PROBNP SERPL-MCNC: 14 PG/ML
PH UR STRIP.AUTO: 5 [PH] (ref 5–9)
PLATELET # BLD AUTO: 229 THOUSANDS/UL (ref 149–390)
PLATELET BLD QL SMEAR: ADEQUATE
PMV BLD AUTO: 10.7 FL (ref 8.9–12.7)
POLYCHROMASIA BLD QL SMEAR: PRESENT
POTASSIUM SERPL-SCNC: 2.7 MMOL/L (ref 3.5–5.3)
PROT SERPL-MCNC: 6.8 G/DL (ref 6.4–8.2)
PROT UR STRIP-MCNC: NEGATIVE MG/DL
RBC # BLD AUTO: 3.71 MILLION/UL (ref 3.88–5.62)
RBC MORPH BLD: PRESENT
SODIUM SERPL-SCNC: 141 MMOL/L (ref 136–145)
SP GR UR STRIP.AUTO: 1.02 (ref 1–1.03)
TOTAL CELLS COUNTED SPEC: 100
TSH SERPL DL<=0.05 MIU/L-ACNC: 1.54 UIU/ML (ref 0.36–3.74)
UROBILINOGEN UR QL STRIP.AUTO: 0.2 E.U./DL
WBC # BLD AUTO: 33.59 THOUSAND/UL (ref 4.31–10.16)

## 2018-10-02 PROCEDURE — 71046 X-RAY EXAM CHEST 2 VIEWS: CPT

## 2018-10-02 PROCEDURE — 85007 BL SMEAR W/DIFF WBC COUNT: CPT

## 2018-10-02 PROCEDURE — 80053 COMPREHEN METABOLIC PANEL: CPT

## 2018-10-02 PROCEDURE — 81003 URINALYSIS AUTO W/O SCOPE: CPT | Performed by: FAMILY MEDICINE

## 2018-10-02 PROCEDURE — 99213 OFFICE O/P EST LOW 20 MIN: CPT | Performed by: FAMILY MEDICINE

## 2018-10-02 PROCEDURE — 36415 COLL VENOUS BLD VENIPUNCTURE: CPT

## 2018-10-02 PROCEDURE — 84443 ASSAY THYROID STIM HORMONE: CPT

## 2018-10-02 PROCEDURE — 83880 ASSAY OF NATRIURETIC PEPTIDE: CPT

## 2018-10-02 PROCEDURE — 85027 COMPLETE CBC AUTOMATED: CPT

## 2018-10-02 RX ORDER — POTASSIUM CHLORIDE 20 MEQ/1
20 TABLET, EXTENDED RELEASE ORAL DAILY
Qty: 90 TABLET | Refills: 3 | Status: SHIPPED | OUTPATIENT
Start: 2018-10-02 | End: 2018-12-04 | Stop reason: ALTCHOICE

## 2018-10-02 NOTE — TELEPHONE ENCOUNTER
Pt's wife Lisandro Sloan called in and would like a call back regarding xray results  She is very upset she hasn't received a call back today  Please call with results   [phone]

## 2018-10-02 NOTE — PROGRESS NOTES
Assessment/Plan:    No problem-specific Assessment & Plan notes found for this encounter  Diagnoses and all orders for this visit:    Edema, unspecified type  -     CBC and differential; Future  -     Comprehensive metabolic panel; Future  -     NT-BNP PRO; Future  -     TSH, 3rd generation; Future  -     UA (URINE) with reflex to Microscopic  -     XR chest pa & lateral; Future  -     potassium chloride (K-DUR,KLOR-CON) 20 mEq tablet; Take 1 tablet (20 mEq total) by mouth daily Tonight take three tablets (Patient taking differently: Take 20 mEq by mouth 2 (two) times a day Tonight take three tablets )    CLL (chronic lymphocytic leukemia) (HCC)    Essential hypertension          Subjective:      Patient ID: Hubert Gracia is a 60 y o  male  HPI patient is a 23-year-old male with a history of CLL  He presents with lower extremity swelling which she feels has been more acute over the past few weeks  The patient is a smoker who has likely COPD  In March of this year he had a stress test and an echo has not recently followed up with Cardiology  There was concern for pulmonary hypertension as well as a borderline low ejection fraction  He is also a type 2 diabetic  He denies chest pain or palpitations  He has no dyspnea other than his baseline  He has no nausea vomiting  He denies fever, sweats or chills  The following portions of the patient's history were reviewed and updated as appropriate: allergies, current medications, past family history, past medical history, past social history, past surgical history and problem list     Review of Systems  denies melena or blood per rectum  Denies hematuria  Is having no difficulties with urination  Objective:      BP (!) 200/90   Pulse 78   Temp 97 8 °F (36 6 °C)   Resp 20   Wt 134 kg (296 lb)   SpO2 94%   BMI 41 28 kg/m²          Physical Exam  repeat blood pressure of 176/86 general cooperative in no acute distress  On neck exam is no JVD  His lungs with mild rhonchi but slight decreased breath sounds  No crackles  Abdomen BS positive, obese cannot appreciate a fluid wave  Is nontender  His lower extremities have about 2+ edema to the level of the knees

## 2018-10-03 ENCOUNTER — TELEPHONE (OUTPATIENT)
Dept: HEMATOLOGY ONCOLOGY | Facility: MEDICAL CENTER | Age: 63
End: 2018-10-03

## 2018-10-03 ENCOUNTER — APPOINTMENT (OUTPATIENT)
Dept: LAB | Facility: HOSPITAL | Age: 63
End: 2018-10-03
Attending: FAMILY MEDICINE
Payer: COMMERCIAL

## 2018-10-03 DIAGNOSIS — E87.6 HYPOKALEMIA: Primary | ICD-10-CM

## 2018-10-03 DIAGNOSIS — E87.6 HYPOKALEMIA: ICD-10-CM

## 2018-10-03 LAB
ANION GAP SERPL CALCULATED.3IONS-SCNC: 14 MMOL/L (ref 4–13)
BUN SERPL-MCNC: 12 MG/DL (ref 5–25)
CALCIUM SERPL-MCNC: 8.3 MG/DL (ref 8.3–10.1)
CHLORIDE SERPL-SCNC: 98 MMOL/L (ref 100–108)
CO2 SERPL-SCNC: 27 MMOL/L (ref 21–32)
CREAT SERPL-MCNC: 1.03 MG/DL (ref 0.6–1.3)
GFR SERPL CREATININE-BSD FRML MDRD: 77 ML/MIN/1.73SQ M
GLUCOSE SERPL-MCNC: 231 MG/DL (ref 65–140)
POTASSIUM SERPL-SCNC: 3.5 MMOL/L (ref 3.5–5.3)
SODIUM SERPL-SCNC: 139 MMOL/L (ref 136–145)

## 2018-10-03 PROCEDURE — 36415 COLL VENOUS BLD VENIPUNCTURE: CPT

## 2018-10-03 PROCEDURE — 80048 BASIC METABOLIC PNL TOTAL CA: CPT

## 2018-10-03 NOTE — TELEPHONE ENCOUNTER
Jailene Nicolas is going to call me tomorrow with [employer] contact information  Cassie Romo is aware that he will now be getting his medication filled with them

## 2018-10-04 ENCOUNTER — TELEPHONE (OUTPATIENT)
Dept: HEMATOLOGY ONCOLOGY | Facility: MEDICAL CENTER | Age: 63
End: 2018-10-04

## 2018-10-04 ENCOUNTER — APPOINTMENT (OUTPATIENT)
Dept: LAB | Facility: HOSPITAL | Age: 63
End: 2018-10-04
Attending: INTERNAL MEDICINE
Payer: COMMERCIAL

## 2018-10-04 DIAGNOSIS — C91.10 CLL (CHRONIC LYMPHOCYTIC LEUKEMIA) (HCC): ICD-10-CM

## 2018-10-04 DIAGNOSIS — R60.9 PERIPHERAL EDEMA: ICD-10-CM

## 2018-10-04 LAB
BASOPHILS # BLD MANUAL: 0 THOUSAND/UL (ref 0–0.1)
BASOPHILS NFR MAR MANUAL: 0 % (ref 0–1)
EOSINOPHIL # BLD MANUAL: 0.33 THOUSAND/UL (ref 0–0.4)
EOSINOPHIL NFR BLD MANUAL: 1 % (ref 0–6)
ERYTHROCYTE [DISTWIDTH] IN BLOOD BY AUTOMATED COUNT: 16 % (ref 11.6–15.1)
HCT VFR BLD AUTO: 38.7 % (ref 36.5–49.3)
HGB BLD-MCNC: 12.6 G/DL (ref 12–17)
LYMPHOCYTES # BLD AUTO: 28.44 THOUSAND/UL (ref 0.6–4.47)
LYMPHOCYTES # BLD AUTO: 85 % (ref 14–44)
MCH RBC QN AUTO: 33.8 PG (ref 26.8–34.3)
MCHC RBC AUTO-ENTMCNC: 32.6 G/DL (ref 31.4–37.4)
MCV RBC AUTO: 104 FL (ref 82–98)
MONOCYTES # BLD AUTO: 0.33 THOUSAND/UL (ref 0–1.22)
MONOCYTES NFR BLD: 1 % (ref 4–12)
NEUTROPHILS # BLD MANUAL: 4.35 THOUSAND/UL (ref 1.85–7.62)
NEUTS SEG NFR BLD AUTO: 13 % (ref 43–75)
NRBC BLD AUTO-RTO: 0 /100 WBCS
PLATELET # BLD AUTO: 253 THOUSANDS/UL (ref 149–390)
PLATELET BLD QL SMEAR: ADEQUATE
PMV BLD AUTO: 10.3 FL (ref 8.9–12.7)
POLYCHROMASIA BLD QL SMEAR: PRESENT
RBC # BLD AUTO: 3.73 MILLION/UL (ref 3.88–5.62)
RBC MORPH BLD: PRESENT
TOTAL CELLS COUNTED SPEC: 100
WBC # BLD AUTO: 33.46 THOUSAND/UL (ref 4.31–10.16)

## 2018-10-04 PROCEDURE — 36415 COLL VENOUS BLD VENIPUNCTURE: CPT

## 2018-10-04 PROCEDURE — 85007 BL SMEAR W/DIFF WBC COUNT: CPT

## 2018-10-04 PROCEDURE — 85027 COMPLETE CBC AUTOMATED: CPT

## 2018-10-04 RX ORDER — FUROSEMIDE 40 MG/1
40 TABLET ORAL DAILY
Qty: 30 TABLET | Refills: 1 | Status: SHIPPED | OUTPATIENT
Start: 2018-10-04 | End: 2018-12-14 | Stop reason: SDUPTHER

## 2018-10-08 ENCOUNTER — APPOINTMENT (OUTPATIENT)
Dept: LAB | Facility: HOSPITAL | Age: 63
End: 2018-10-08
Attending: INTERNAL MEDICINE
Payer: COMMERCIAL

## 2018-10-08 ENCOUNTER — OFFICE VISIT (OUTPATIENT)
Dept: FAMILY MEDICINE CLINIC | Facility: CLINIC | Age: 63
End: 2018-10-08
Payer: COMMERCIAL

## 2018-10-08 VITALS
HEART RATE: 77 BPM | WEIGHT: 288 LBS | DIASTOLIC BLOOD PRESSURE: 76 MMHG | OXYGEN SATURATION: 95 % | HEIGHT: 71 IN | BODY MASS INDEX: 40.32 KG/M2 | RESPIRATION RATE: 16 BRPM | SYSTOLIC BLOOD PRESSURE: 148 MMHG

## 2018-10-08 DIAGNOSIS — R60.9 EDEMA, UNSPECIFIED TYPE: ICD-10-CM

## 2018-10-08 DIAGNOSIS — Z13.1 SCREENING FOR DIABETES MELLITUS: Primary | ICD-10-CM

## 2018-10-08 DIAGNOSIS — E08.00 DIABETES MELLITUS DUE TO UNDERLYING CONDITION WITH HYPEROSMOLARITY WITHOUT COMA, WITHOUT LONG-TERM CURRENT USE OF INSULIN (HCC): ICD-10-CM

## 2018-10-08 DIAGNOSIS — C91.10 CLL (CHRONIC LYMPHOCYTIC LEUKEMIA) (HCC): ICD-10-CM

## 2018-10-08 LAB
BASOPHILS # BLD MANUAL: 0 THOUSAND/UL (ref 0–0.1)
BASOPHILS NFR MAR MANUAL: 0 % (ref 0–1)
EOSINOPHIL # BLD MANUAL: 0 THOUSAND/UL (ref 0–0.4)
EOSINOPHIL NFR BLD MANUAL: 0 % (ref 0–6)
ERYTHROCYTE [DISTWIDTH] IN BLOOD BY AUTOMATED COUNT: 15.1 % (ref 11.6–15.1)
HCT VFR BLD AUTO: 41.7 % (ref 36.5–49.3)
HGB BLD-MCNC: 13.6 G/DL (ref 12–17)
LYMPHOCYTES # BLD AUTO: 25.34 THOUSAND/UL (ref 0.6–4.47)
LYMPHOCYTES # BLD AUTO: 86 % (ref 14–44)
MACROCYTES BLD QL AUTO: PRESENT
MCH RBC QN AUTO: 33.1 PG (ref 26.8–34.3)
MCHC RBC AUTO-ENTMCNC: 32.6 G/DL (ref 31.4–37.4)
MCV RBC AUTO: 102 FL (ref 82–98)
MONOCYTES # BLD AUTO: 0 THOUSAND/UL (ref 0–1.22)
MONOCYTES NFR BLD: 0 % (ref 4–12)
NEUTROPHILS # BLD MANUAL: 4.12 THOUSAND/UL (ref 1.85–7.62)
NEUTS SEG NFR BLD AUTO: 14 % (ref 43–75)
NRBC BLD AUTO-RTO: 0 /100 WBCS
PLATELET # BLD AUTO: 218 THOUSANDS/UL (ref 149–390)
PLATELET BLD QL SMEAR: ADEQUATE
PMV BLD AUTO: 10.1 FL (ref 8.9–12.7)
RBC # BLD AUTO: 4.11 MILLION/UL (ref 3.88–5.62)
RBC MORPH BLD: PRESENT
SL AMB POCT HEMOGLOBIN AIC: 5.9
TOTAL CELLS COUNTED SPEC: 100
WBC # BLD AUTO: 29.46 THOUSAND/UL (ref 4.31–10.16)

## 2018-10-08 PROCEDURE — 83036 HEMOGLOBIN GLYCOSYLATED A1C: CPT | Performed by: FAMILY MEDICINE

## 2018-10-08 PROCEDURE — 36415 COLL VENOUS BLD VENIPUNCTURE: CPT

## 2018-10-08 PROCEDURE — 85027 COMPLETE CBC AUTOMATED: CPT

## 2018-10-08 PROCEDURE — 85007 BL SMEAR W/DIFF WBC COUNT: CPT

## 2018-10-08 PROCEDURE — 99213 OFFICE O/P EST LOW 20 MIN: CPT | Performed by: FAMILY MEDICINE

## 2018-10-08 RX ORDER — FLUOXETINE HYDROCHLORIDE 40 MG/1
CAPSULE ORAL
Refills: 2 | COMMUNITY
Start: 2018-09-07 | End: 2020-07-31

## 2018-10-08 RX ORDER — PROPRANOLOL HYDROCHLORIDE 10 MG/1
10 TABLET ORAL 2 TIMES DAILY
Refills: 2 | COMMUNITY
Start: 2018-09-07 | End: 2018-12-04 | Stop reason: ALTCHOICE

## 2018-10-08 NOTE — PROGRESS NOTES
Assessment/Plan:    No problem-specific Assessment & Plan notes found for this encounter  Diagnoses and all orders for this visit:    Screening for diabetes mellitus  -     POCT hemoglobin A1c    Edema, unspecified type  Comments:  Have referred the patient to Cardiology given his unclear source of edema and medical history  Continue medications current  Orders:  -     Ambulatory referral to Cardiology; Future    Diabetes mellitus due to underlying condition with hyperosmolarity without coma, without long-term current use of insulin (HCC)  -     Discontinue: metFORMIN (GLUCOPHAGE) 500 mg tablet; Take 1 tablet (500 mg total) by mouth 2 (two) times a day with meals    Other orders  -     FLUoxetine (PROzac) 40 MG capsule;   -     propranolol (INDERAL) 10 mg tablet; Take 10 mg by mouth 2 (two) times a day           Subjective:      Patient ID: Cecilia Chong is a 60 y o  male  HPI patient presents from follow-up of last week with concerns over edema  It is slightly improved  We reviewed the prior weeks test results  He has no evidence of congestive heart failure  He states his breathing is sometimes short but no different than his baseline  He denies chest pain or palpitations  He denies nausea vomiting  He has no fever, sweats or chills  Patient does suffer from CLL  He was accompanied by his wife during examination  The following portions of the patient's history were reviewed and updated as appropriate: allergies, current medications, past family history, past medical history, past social history, past surgical history and problem list     Review of Systems  no new rashes  No melena or blood per rectum  No hematuria  Rest of review of systems per HPI  Objective:      /76   Pulse 77   Resp 16   Ht 5' 11" (1 803 m)   Wt 131 kg (288 lb)   SpO2 95%   BMI 40 17 kg/m²          Physical Exam  patient is cooperative in no acute distress  He has no visible JVD    No scleral icterus and moist mucous membranes  Lungs were fairly clear to auscultation was regular with no S3 or 4  Abdomen is obese and nontender extremities with the edema 1 to 2+ perhaps slightly improved from last week  Hard to tell

## 2018-10-11 ENCOUNTER — TELEPHONE (OUTPATIENT)
Dept: HEMATOLOGY ONCOLOGY | Facility: MEDICAL CENTER | Age: 63
End: 2018-10-11

## 2018-10-11 ENCOUNTER — APPOINTMENT (OUTPATIENT)
Dept: LAB | Facility: HOSPITAL | Age: 63
End: 2018-10-11
Attending: INTERNAL MEDICINE
Payer: COMMERCIAL

## 2018-10-11 DIAGNOSIS — D64.9 ANEMIA, UNSPECIFIED TYPE: ICD-10-CM

## 2018-10-11 DIAGNOSIS — C91.10 CLL (CHRONIC LYMPHOCYTIC LEUKEMIA) (HCC): ICD-10-CM

## 2018-10-11 LAB
BASOPHILS # BLD MANUAL: 0 THOUSAND/UL (ref 0–0.1)
BASOPHILS NFR MAR MANUAL: 0 % (ref 0–1)
DAT C3-SP REAG RBC QL: NORMAL
DAT IGG-SP REAG RBCCO QL: NEGATIVE
DAT POLY-SP REAG RBC QL: NORMAL
EOSINOPHIL # BLD MANUAL: 0 THOUSAND/UL (ref 0–0.4)
EOSINOPHIL NFR BLD MANUAL: 0 % (ref 0–6)
ERYTHROCYTE [DISTWIDTH] IN BLOOD BY AUTOMATED COUNT: 15.1 % (ref 11.6–15.1)
FOLATE SERPL-MCNC: >20 NG/ML (ref 3.1–17.5)
HCT VFR BLD AUTO: 40.2 % (ref 36.5–49.3)
HGB BLD-MCNC: 12.8 G/DL (ref 12–17)
LYMPHOCYTES # BLD AUTO: 36.41 THOUSAND/UL (ref 0.6–4.47)
LYMPHOCYTES # BLD AUTO: 91 % (ref 14–44)
MACROCYTES BLD QL AUTO: PRESENT
MCH RBC QN AUTO: 33.2 PG (ref 26.8–34.3)
MCHC RBC AUTO-ENTMCNC: 31.8 G/DL (ref 31.4–37.4)
MCV RBC AUTO: 104 FL (ref 82–98)
MONOCYTES # BLD AUTO: 1.2 THOUSAND/UL (ref 0–1.22)
MONOCYTES NFR BLD: 3 % (ref 4–12)
NEUTROPHILS # BLD MANUAL: 2.4 THOUSAND/UL (ref 1.85–7.62)
NEUTS SEG NFR BLD AUTO: 6 % (ref 43–75)
NRBC BLD AUTO-RTO: 0 /100 WBCS
PLATELET # BLD AUTO: 201 THOUSANDS/UL (ref 149–390)
PLATELET BLD QL SMEAR: ADEQUATE
PMV BLD AUTO: 10.6 FL (ref 8.9–12.7)
RBC # BLD AUTO: 3.86 MILLION/UL (ref 3.88–5.62)
RBC MORPH BLD: PRESENT
STOMATOCYTES BLD QL SMEAR: PRESENT
TOTAL CELLS COUNTED SPEC: 100
VIT B12 SERPL-MCNC: 1992 PG/ML (ref 100–900)
WBC # BLD AUTO: 40.01 THOUSAND/UL (ref 4.31–10.16)

## 2018-10-11 PROCEDURE — 82607 VITAMIN B-12: CPT

## 2018-10-11 PROCEDURE — 84165 PROTEIN E-PHORESIS SERUM: CPT

## 2018-10-11 PROCEDURE — 86038 ANTINUCLEAR ANTIBODIES: CPT

## 2018-10-11 PROCEDURE — 85027 COMPLETE CBC AUTOMATED: CPT

## 2018-10-11 PROCEDURE — 86880 COOMBS TEST DIRECT: CPT

## 2018-10-11 PROCEDURE — 82746 ASSAY OF FOLIC ACID SERUM: CPT

## 2018-10-11 PROCEDURE — 84165 PROTEIN E-PHORESIS SERUM: CPT | Performed by: PATHOLOGY

## 2018-10-11 PROCEDURE — 82668 ASSAY OF ERYTHROPOIETIN: CPT

## 2018-10-11 PROCEDURE — 36415 COLL VENOUS BLD VENIPUNCTURE: CPT

## 2018-10-11 PROCEDURE — 85007 BL SMEAR W/DIFF WBC COUNT: CPT

## 2018-10-12 LAB
ALBUMIN SERPL ELPH-MCNC: 4.22 G/DL (ref 3.5–5)
ALBUMIN SERPL ELPH-MCNC: 59.5 % (ref 52–65)
ALPHA1 GLOB SERPL ELPH-MCNC: 0.33 G/DL (ref 0.1–0.4)
ALPHA1 GLOB SERPL ELPH-MCNC: 4.7 % (ref 2.5–5)
ALPHA2 GLOB SERPL ELPH-MCNC: 0.84 G/DL (ref 0.4–1.2)
ALPHA2 GLOB SERPL ELPH-MCNC: 11.9 % (ref 7–13)
BETA GLOB ABNORMAL SERPL ELPH-MCNC: 0.42 G/DL (ref 0.4–0.8)
BETA1 GLOB SERPL ELPH-MCNC: 5.9 % (ref 5–13)
BETA2 GLOB SERPL ELPH-MCNC: 6.2 % (ref 2–8)
BETA2+GAMMA GLOB SERPL ELPH-MCNC: 0.44 G/DL (ref 0.2–0.5)
EPO SERPL-ACNC: 8.2 MIU/ML (ref 2.6–18.5)
GAMMA GLOB ABNORMAL SERPL ELPH-MCNC: 0.84 G/DL (ref 0.5–1.6)
GAMMA GLOB SERPL ELPH-MCNC: 11.8 % (ref 12–22)
IGG/ALB SER: 1.47 {RATIO} (ref 1.1–1.8)
PROT PATTERN SERPL ELPH-IMP: ABNORMAL
PROT SERPL-MCNC: 7.1 G/DL (ref 6.4–8.2)
RYE IGE QN: NEGATIVE

## 2018-10-13 ENCOUNTER — TELEPHONE (OUTPATIENT)
Dept: OTHER | Facility: HOSPITAL | Age: 63
End: 2018-10-13

## 2018-10-13 NOTE — TELEPHONE ENCOUNTER
I spoke to the patient's wife who called concerning shortness of breath  The patient shortness of breath has been ongoing for 1 to 2 weeks  He did have a chest x-ray on 10/02 which was unremarkable  The patient reports that his shortness of breath is only upon exertion and is not when he is sedentary  The patient does continue to smoke  The wife does report that she is worried about his shortness of breath  I have encouraged them to come to the emergency department of his shortness of breath worsens  I have advised them to stop the Imbruvica over the weekend however he has been on this medication for a decent amount of time and I do not feel that the shortness of breath is secondary to a side effect

## 2018-10-15 ENCOUNTER — APPOINTMENT (OUTPATIENT)
Dept: LAB | Facility: HOSPITAL | Age: 63
End: 2018-10-15
Attending: INTERNAL MEDICINE
Payer: COMMERCIAL

## 2018-10-15 ENCOUNTER — TRANSCRIBE ORDERS (OUTPATIENT)
Dept: ADMINISTRATIVE | Facility: HOSPITAL | Age: 63
End: 2018-10-15

## 2018-10-15 DIAGNOSIS — C91.10 CLL (CHRONIC LYMPHOCYTIC LEUKEMIA) (HCC): ICD-10-CM

## 2018-10-15 LAB
BASOPHILS # BLD MANUAL: 0 THOUSAND/UL (ref 0–0.1)
BASOPHILS NFR MAR MANUAL: 0 % (ref 0–1)
EOSINOPHIL # BLD MANUAL: 0 THOUSAND/UL (ref 0–0.4)
EOSINOPHIL NFR BLD MANUAL: 0 % (ref 0–6)
ERYTHROCYTE [DISTWIDTH] IN BLOOD BY AUTOMATED COUNT: 14.8 % (ref 11.6–15.1)
HCT VFR BLD AUTO: 37.9 % (ref 36.5–49.3)
HGB BLD-MCNC: 12.2 G/DL (ref 12–17)
LYMPHOCYTES # BLD AUTO: 17.53 THOUSAND/UL (ref 0.6–4.47)
LYMPHOCYTES # BLD AUTO: 74 % (ref 14–44)
MCH RBC QN AUTO: 32.8 PG (ref 26.8–34.3)
MCHC RBC AUTO-ENTMCNC: 32.2 G/DL (ref 31.4–37.4)
MCV RBC AUTO: 102 FL (ref 82–98)
MONOCYTES # BLD AUTO: 0.95 THOUSAND/UL (ref 0–1.22)
MONOCYTES NFR BLD: 4 % (ref 4–12)
NEUTROPHILS # BLD MANUAL: 5.21 THOUSAND/UL (ref 1.85–7.62)
NEUTS SEG NFR BLD AUTO: 22 % (ref 43–75)
NRBC BLD AUTO-RTO: 0 /100 WBCS
PLATELET # BLD AUTO: 193 THOUSANDS/UL (ref 149–390)
PLATELET BLD QL SMEAR: ADEQUATE
PMV BLD AUTO: 11 FL (ref 8.9–12.7)
RBC # BLD AUTO: 3.72 MILLION/UL (ref 3.88–5.62)
RBC MORPH BLD: NORMAL
TOTAL CELLS COUNTED SPEC: 100
WBC # BLD AUTO: 23.69 THOUSAND/UL (ref 4.31–10.16)

## 2018-10-15 PROCEDURE — 36415 COLL VENOUS BLD VENIPUNCTURE: CPT

## 2018-10-15 PROCEDURE — 85007 BL SMEAR W/DIFF WBC COUNT: CPT

## 2018-10-15 PROCEDURE — 85027 COMPLETE CBC AUTOMATED: CPT

## 2018-10-16 ENCOUNTER — OFFICE VISIT (OUTPATIENT)
Dept: HEMATOLOGY ONCOLOGY | Facility: MEDICAL CENTER | Age: 63
End: 2018-10-16
Payer: COMMERCIAL

## 2018-10-16 VITALS
DIASTOLIC BLOOD PRESSURE: 70 MMHG | WEIGHT: 290 LBS | TEMPERATURE: 98 F | HEART RATE: 85 BPM | RESPIRATION RATE: 20 BRPM | OXYGEN SATURATION: 94 % | BODY MASS INDEX: 40.6 KG/M2 | HEIGHT: 71 IN | SYSTOLIC BLOOD PRESSURE: 128 MMHG

## 2018-10-16 DIAGNOSIS — C95.10 CHRONIC LEUKEMIA, NOT HAVING ACHIEVED REMISSION (HCC): Primary | ICD-10-CM

## 2018-10-16 PROCEDURE — 99214 OFFICE O/P EST MOD 30 MIN: CPT | Performed by: INTERNAL MEDICINE

## 2018-10-16 RX ORDER — ALPRAZOLAM 1 MG/1
TABLET ORAL
Refills: 3 | COMMUNITY
Start: 2018-10-08

## 2018-10-16 NOTE — PROGRESS NOTES
Elizabeth Failing  1955  [facility]  [facility] [address] [facility] [address]    DISCUSSION  SUMMARY:    65-year-old male with CLL presently on ibrutinib  Issues:    1  CLL  The white count continues to trend downward  Hemoglobin level is higher/better than before  Platelet count is also good/acceptable  We will continue with the ibrutinib  We once again discussed the importance of compliance with this medication  Patient is to return in 4 weeks  The [facility] trials of single agent ibrutinib for upfront and relapsed CLL patients demonstrated high response rates  The overall response rate was 89% at 5 years with 14% achieving complete response  In treatment naive patients, the overall response rate was 87% with a complete response rate of 29%  At 5 years, the progression free survival was 92% in treatment naive patients  Overall survival was 92%  Essentially this is is a very effective drug with a very high response rate  It is altogether possible that patient is not responding to the medication by my gut feeling is that Mr Michelle Carreon is only partially compliant at best     2  Anemia - better (consistent with treatment)  CBCs being monitored      3  Psychiatric issues/depression  Psychiatry follow-up is ongoing      4  Fatigue  Etiology is most likely multifactorial and includes leukemia, other medical and psychiatric issues and secondary to medication use  I previously told the patient and wife that this is difficult to completely correct  I have also suggested the patient that he try to be more active walking, using the treadmill etc    Patient/wife were not interested in physical therapy evaluation      Patient is to return in 4 weeks  Patient knows to call the hematology/oncology office if there are any other questions or concerns      Carefully review your medication list and verify that the list is accurate and up-to-date  Please call the hematology/oncology office if there are medications missing from the list, medications on the list that you are not currently taking or if there is a dosage or instruction that is different from how you're taking that medication  Patient goals and areas of care: monitor CBC parameters, continue with the CLL treatment  Patient is able to self-care   _____________________________________________________________________________________    Chief Complaint   Patient presents with    Follow-up     CLL on ibrutinib     History of Present Illness:    44-year-old male recently admitted to [facility] with severe anemia  Bone marrow biopsy demonstrated CLL  Patient is on a bruit new and returns for follow-up  Mr Dodson Rm states feeling +/-, about the same as before  No fevers, chills or sweats  Generalized body aches are the same as before  Activities are limited, same as before  No GI or  issues  Appetite is good, weight is stable  No shortness of breath, mild dyspnea on exertion but no different than before  Patient continues to smoke  Wife is also concerned that patient is eating more than before and is very sedentary  Weight has increased  Review of Systems   Constitutional: Positive for fatigue  HENT: Negative  Eyes: Negative  Respiratory: Negative          +dyspnea on exertion   Cardiovascular: Negative  Gastrointestinal: Negative  Endocrine: Negative  Genitourinary: Negative  Musculoskeletal: Positive for arthralgias  Skin: Negative  Allergic/Immunologic: Negative  Neurological: Negative  Hematological: Negative  Psychiatric/Behavioral: Positive for behavioral problems  All other systems reviewed and are negative       Patient Active Problem List   Diagnosis    Hypertension    Depression    Polypharmacy    Bronchitis    Encephalopathy    Weakness    Leukemia (HCC)    Lactic acidosis    Leukocytosis    Elevated troponin    Bipolar 1 disorder ([place] [address] )    Psychiatric disorder    Anemia    Encounter for smoking cessation counseling    Encounter for screening for lipid disorder    Screening for diabetes mellitus    Peripheral edema    CLL (chronic lymphocytic leukemia) ([place] [address] )     Past Medical History:   Diagnosis Date    Anemia     Anxiety     Bipolar disorder ([place] [address] )     Cancer ([place] [address] )     History of alcohol abuse     Hypertension     Hypertension     Insomnia     Leukemia ([place] [address] )     Opiate abuse, episodic     Psychiatric disorder      Past Surgical History:   Procedure Laterality Date    EGD AND COLONOSCOPY N/A 3/30/2018    Procedure: EGD AND COLONOSCOPY;  Surgeon: Cherylene Needy, MD;  Location: [place] [address] [facility]; Service: Gastroenterology     Family History   Problem Relation Age of Onset    Coronary artery disease Mother     No Known Problems Father     Hepatitis Sister     Cancer Brother     Prostate cancer Brother     Early death Brother      Social History     Social History    Marital status: /Civil Union     Spouse name: N/A    Number of children: N/A    Years of education: N/A     Occupational History    Not on file       Social History Main Topics    Smoking status: Current Every Day Smoker     Packs/day: 1 50     Years: 40 00     Types: Cigarettes    Smokeless tobacco: Never Used    Alcohol use No      Comment: last drink nov 19 2017    Drug use: No      Comment: hx of heroin and subutex abuse    Sexual activity: Not Currently     Other Topics Concern    Not on file     Social History Narrative    No narrative on file       Current Outpatient Prescriptions:     ALPRAZolam (XANAX) 1 mg tablet, , Disp: , Rfl: 3    benztropine (COGENTIN) 1 mg tablet, Take 1 mg by mouth 2 (two) times a day , Disp: , Rfl:     divalproex sodium (DEPAKOTE) 500 mg EC tablet, Take 500 mg by mouth 2 (two) times a day one in the morning and 2 in the evening , Disp: , Rfl:     FLUoxetine (PROzac) 20 mg capsule, Take 60 mg by mouth daily  , Disp: , Rfl:     FLUoxetine (PROzac) 40 MG capsule, , Disp: , Rfl: 2    furosemide (LASIX) 40 mg tablet, TAKE 1 TABLET (40 MG TOTAL) BY MOUTH DAILY, Disp: 30 tablet, Rfl: 1    hydrochlorothiazide (MICROZIDE) 12 5 mg capsule, TAKE 1 CAPSULE (12 5 MG TOTAL) BY MOUTH DAILY, Disp: 30 capsule, Rfl: 3    hydrOXYzine pamoate (VISTARIL) 50 mg capsule, Take 50 mg by mouth 2 (two) times a day, Disp: , Rfl:     IMBRUVICA 420 MG TABS, TAKE 1 TABLET (420MG) BY MOUTH ONE TIME DAILY WITH A FULL GLASS OF WATER , Disp: 28 tablet, Rfl: 3    lisinopril (ZESTRIL) 40 mg tablet, Take 20 mg by mouth daily , Disp: , Rfl:     metFORMIN (GLUCOPHAGE) 500 mg tablet, Take 1 tablet (500 mg total) by mouth 2 (two) times a day with meals, Disp: 60 tablet, Rfl: 1    mirtazapine (REMERON) 15 mg tablet, Take 15 mg by mouth daily at bedtime, Disp: , Rfl:     pantoprazole (PROTONIX) 40 mg tablet, TAKE 1 TABLET (40 MG TOTAL) BY MOUTH DAILY, Disp: 30 tablet, Rfl: 5    potassium chloride (K-DUR,KLOR-CON) 20 mEq tablet, Take 1 tablet (20 mEq total) by mouth daily Tonight take three tablets, Disp: 90 tablet, Rfl: 3    propranolol (INDERAL) 10 mg tablet, Take 10 mg by mouth 2 (two) times a day , Disp: , Rfl: 2    risperiDONE (RisperDAL) 2 mg tablet, Take 4 mg by mouth 2 (two) times a day  , Disp: , Rfl:     No Known Allergies    Vitals:  BP = 128/70 pulse = 85 temperature = 98 0 respiratory rate = 16 = 295 lb    Physical Exam   Constitutional: He is oriented to person, place, and time  He appears well-developed and well-nourished  Middle-aged male, no respiratory distress, +tobacco odor   HENT:   Head: Normocephalic and atraumatic  Right Ear: External ear normal    Left Ear: External ear normal    Nose: Nose normal    Mouth/Throat: Oropharynx is clear and moist    Eyes: Pupils are equal, round, and reactive to light  Conjunctivae and EOM are normal    Neck: Normal range of motion  Neck supple     Cardiovascular: Normal rate, regular rhythm, normal heart sounds and intact distal pulses  Pulmonary/Chest:   Lungs with fair air entry bilaterally, distant breath sounds, bilateral rhonchi, no rales   Abdominal: Soft  Bowel sounds are normal    Abdomen is soft, obese, cannot palpate liver or spleen, no rigidity or rebound   Musculoskeletal: Normal range of motion  Neurological: He is alert and oriented to person, place, and time  He has normal reflexes  Skin: Skin is warm     Skin is pale, warm, slightly dry, scattered ecchymoses, no petechiae, no active bleeding   Psychiatric: His behavior is normal  Judgment and thought content normal    Patient is responsive but muted, appropriate, otherwise pleasant   Extremities: 1 + bilateral lower extremity edema, no cords, pulses are 1+  Lymphatics: no adenopathy in the neck, supraclavicular region, axilla and groin bilaterally    Labs:    10/15/2018 WBC = 23 6 hemoglobin = 12 2 hematocrit = 37 9 MCV = 102 platelet = 088  81/72/0030 WBC = 34 7 hemoglobin = 12 hematocrit = 37 2 platelet = 974  63/09/1307 WBC = 55 8 hemoglobin = 10 5 hematocrit = 35 MCV = 6 platelet = 302  66/22/9564 WBC = 58 4 hemoglobin = 9 3 hematocrit = 30 platelet = 5  88/22/5640 WBC = 57 5 hemoglobin = 7 5 hematocrit = 23 5 platelet = 174  95/80/5737 WBC = 42 7 hemoglobin = 8 4 hematocrit = 26 platelet = 682  25/10/1394 WBC = 80 7 hemoglobin = 6 8 hematocrit = 22 1 platelet = 178 lymphocytes = 93%  06/07/2018 WBC = 104 5 hemoglobin = 7 5 hematocrit = 25 5 platelet = 127 neutrophil = 4% lymphocytes = 93% monocyte = 3%  05/14/2018 WBC = 40 2 hemoglobin = 6 9 hematocrit = 21 1 MCV = 87 platelet = 507 lymphocytes = 99%  04/23/2018 WBC = 31 2 hemoglobin = 8 1 hematocrit = 24 5 MCV = 86 platelet = 857 lymphocytes = 83%  04/09/2018 WBC = 22 7 hemoglobin = 7 hematocrit = 21 1 platelet = 099 (no differential)    Pathology    Case Report   Surgical Pathology Report                         Case: [id number]                                 Authorizing Provider: Ezio Guzman MD          Collected:           03/28/2018 1318               Ordering Location:     [address] [place] [address] Received:            03/28/2018 8914                                      [address]                                                                       Pathologist:           Atiya Cabrera MD                                                         Specimens:   A) - Iliac Crest, Left, Bone Marrow   2 Cores                                                        B) - Iliac Crest, Left, Bone Marrow                                                                  C) - Iliac Crest, Left, Bone Marrow  2T  P  Slides, 2 Asp Stained , 8 Asp Unstained          Addendum   - Fluorescence in situ hybridization (FISH) ([facility]#[id number], evaluated by ORLIN Lutz , Ph D ) for genetic abnormalities which may be associated with myelodysplastic syndrome is as follows:        Interpretation  1  No evidence of trisomy 12 (+12)  2  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  Comment: Deletions involving 13q14 are detectable by FISH in approximately 50% of persons with CLL  Among them,  about 50% show biallelic or concomitant monoallelic and biallelic deletion  As a group and as the sole aberration, del(13q)  is associated with a more favorable outcome  3  No evidence of p53 (17p13) deletion or amplification  4  No evidence of RENAE (11q22 3) deletion       - IgVH Mutation analysis ([facility]#[id number], evaluated by Dr Rebekah Sandoval, Ph D )  is as follows:   IGVH MUTATION ANALYSIS: UNMUTATED - CLL  RESULTS  Mutation Rate: 0%  IgVH Family: IGHV1-2*04     -  Cytogenetic studies as performed on the bone marrow aspirate @ [facility] (Specimen ID: [id number], evaluated by Yamilex Mckinnon, PhD, [facility], [facility] ) as follows:  Karyotype:  46,XY,add(18)(p11 3)[6]/46,XY[1], LIMITED ANALYSIS   Cytogenetics Analysis:  : [address] Karyotyped        GTG Band level                       Culture Type(s)               7                                     7                                     4                        300-400                                       [id number]   Interpretation: Abnormal male karyotype  This is an incomplete study and only 7 cells (as opposed to 20 cells) were available for analysis  Cytogenetic analysis  shows the presence of an abnormal clone (6 out of 7 cells) characterized by addition of material of unknown origin to  18p11  3  No other aberrations were identified, and one normal cell was found during the course of analysis  These current cytogenetic results are consistent with the presence of abnormal clonal cells  Correlation with  hematopathology and follow-up bone marrow studies are recommended to further evaluate the significance of these  findings  Microarray analysis could be considered to further characterize this rearrangement             Addendum electronically signed by Lucila Parker MD on 4/6/2018 at  4:12 PM     Addendum electronically signed by Lucila Parker MD on 4/6/2018 at  4:12 PM   Final Diagnosis   A -C  Bone marrow,  left iliac crest,  biopsy and aspirate:  -  Chronic lymphocytic leukemia/small lymphocytic lymphoma (CLL/SLL) (see note)  -  Significantly decreased trilineage myelopoiesis with normal appearing morphology and maturation without significant features of dysplasia or increased myeloblasts, secondary to the above  -  Anemia, neutropenia, and lymphocytosis, secondary to the above  -  Normal stainable storage iron  -  Mildly increased reticulin fibers without fibrosis, secondary to the above  -  Negative for collagen fibrosis, granulomata, vasculitis, necrosis           Electronically signed by Lucila Parker MD on 3/29/2018 at  2:51 P

## 2018-10-29 ENCOUNTER — APPOINTMENT (OUTPATIENT)
Dept: LAB | Facility: HOSPITAL | Age: 63
End: 2018-10-29
Attending: INTERNAL MEDICINE
Payer: COMMERCIAL

## 2018-10-29 ENCOUNTER — TELEPHONE (OUTPATIENT)
Dept: HEMATOLOGY ONCOLOGY | Facility: MEDICAL CENTER | Age: 63
End: 2018-10-29

## 2018-10-29 ENCOUNTER — TELEPHONE (OUTPATIENT)
Dept: FAMILY MEDICINE CLINIC | Facility: CLINIC | Age: 63
End: 2018-10-29

## 2018-10-29 DIAGNOSIS — C95.10 CHRONIC LEUKEMIA, NOT HAVING ACHIEVED REMISSION (HCC): ICD-10-CM

## 2018-10-29 DIAGNOSIS — C91.10 CLL (CHRONIC LYMPHOCYTIC LEUKEMIA) (HCC): ICD-10-CM

## 2018-10-29 LAB
ALBUMIN SERPL BCP-MCNC: 3.2 G/DL (ref 3.5–5)
ALP SERPL-CCNC: 82 U/L (ref 46–116)
ALT SERPL W P-5'-P-CCNC: 51 U/L (ref 12–78)
ANION GAP SERPL CALCULATED.3IONS-SCNC: 10 MMOL/L (ref 4–13)
AST SERPL W P-5'-P-CCNC: 34 U/L (ref 5–45)
BASOPHILS # BLD MANUAL: 0 THOUSAND/UL (ref 0–0.1)
BASOPHILS NFR MAR MANUAL: 0 % (ref 0–1)
BILIRUB SERPL-MCNC: 0.3 MG/DL (ref 0.2–1)
BUN SERPL-MCNC: 15 MG/DL (ref 5–25)
CALCIUM SERPL-MCNC: 9 MG/DL (ref 8.3–10.1)
CHLORIDE SERPL-SCNC: 99 MMOL/L (ref 100–108)
CO2 SERPL-SCNC: 32 MMOL/L (ref 21–32)
CREAT SERPL-MCNC: 0.98 MG/DL (ref 0.6–1.3)
EOSINOPHIL # BLD MANUAL: 0.33 THOUSAND/UL (ref 0–0.4)
EOSINOPHIL NFR BLD MANUAL: 1 % (ref 0–6)
ERYTHROCYTE [DISTWIDTH] IN BLOOD BY AUTOMATED COUNT: 14.1 % (ref 11.6–15.1)
GFR SERPL CREATININE-BSD FRML MDRD: 82 ML/MIN/1.73SQ M
GLUCOSE SERPL-MCNC: 121 MG/DL (ref 65–140)
HCT VFR BLD AUTO: 39.7 % (ref 36.5–49.3)
HGB BLD-MCNC: 12.8 G/DL (ref 12–17)
HYPERCHROMIA BLD QL SMEAR: PRESENT
LYMPHOCYTES # BLD AUTO: 27.37 THOUSAND/UL (ref 0.6–4.47)
LYMPHOCYTES # BLD AUTO: 82 % (ref 14–44)
MACROCYTES BLD QL AUTO: PRESENT
MCH RBC QN AUTO: 32.3 PG (ref 26.8–34.3)
MCHC RBC AUTO-ENTMCNC: 32.2 G/DL (ref 31.4–37.4)
MCV RBC AUTO: 100 FL (ref 82–98)
MONOCYTES # BLD AUTO: 0.67 THOUSAND/UL (ref 0–1.22)
MONOCYTES NFR BLD: 2 % (ref 4–12)
NEUTROPHILS # BLD MANUAL: 5.01 THOUSAND/UL (ref 1.85–7.62)
NEUTS SEG NFR BLD AUTO: 15 % (ref 43–75)
NRBC BLD AUTO-RTO: 0 /100 WBCS
PLATELET # BLD AUTO: 209 THOUSANDS/UL (ref 149–390)
PLATELET BLD QL SMEAR: ADEQUATE
PMV BLD AUTO: 10.5 FL (ref 8.9–12.7)
POTASSIUM SERPL-SCNC: 3.7 MMOL/L (ref 3.5–5.3)
PROT SERPL-MCNC: 6.9 G/DL (ref 6.4–8.2)
RBC # BLD AUTO: 3.96 MILLION/UL (ref 3.88–5.62)
RBC MORPH BLD: PRESENT
SODIUM SERPL-SCNC: 141 MMOL/L (ref 136–145)
TOTAL CELLS COUNTED SPEC: 100
WBC # BLD AUTO: 33.38 THOUSAND/UL (ref 4.31–10.16)

## 2018-10-29 PROCEDURE — 85007 BL SMEAR W/DIFF WBC COUNT: CPT

## 2018-10-29 PROCEDURE — 80053 COMPREHEN METABOLIC PANEL: CPT

## 2018-10-29 PROCEDURE — 36415 COLL VENOUS BLD VENIPUNCTURE: CPT

## 2018-10-29 PROCEDURE — 85027 COMPLETE CBC AUTOMATED: CPT

## 2018-10-30 ENCOUNTER — TELEPHONE (OUTPATIENT)
Dept: HEMATOLOGY ONCOLOGY | Facility: MEDICAL CENTER | Age: 63
End: 2018-10-30

## 2018-10-31 DIAGNOSIS — C95.90 LEUKEMIA NOT HAVING ACHIEVED REMISSION, UNSPECIFIED LEUKEMIA TYPE (HCC): ICD-10-CM

## 2018-11-01 ENCOUNTER — OFFICE VISIT (OUTPATIENT)
Dept: CARDIOLOGY CLINIC | Facility: CLINIC | Age: 63
End: 2018-11-01
Payer: COMMERCIAL

## 2018-11-01 VITALS
DIASTOLIC BLOOD PRESSURE: 84 MMHG | BODY MASS INDEX: 43.38 KG/M2 | OXYGEN SATURATION: 95 % | HEART RATE: 64 BPM | WEIGHT: 303 LBS | SYSTOLIC BLOOD PRESSURE: 160 MMHG | HEIGHT: 70 IN

## 2018-11-01 DIAGNOSIS — D64.81 ANEMIA DUE TO ANTINEOPLASTIC CHEMOTHERAPY: ICD-10-CM

## 2018-11-01 DIAGNOSIS — F17.210 CIGARETTE NICOTINE DEPENDENCE WITHOUT COMPLICATION: ICD-10-CM

## 2018-11-01 DIAGNOSIS — R06.00 DYSPNEA ON EXERTION: ICD-10-CM

## 2018-11-01 DIAGNOSIS — F31.9 BIPOLAR 1 DISORDER (HCC): ICD-10-CM

## 2018-11-01 DIAGNOSIS — R53.81 MALAISE AND FATIGUE: ICD-10-CM

## 2018-11-01 DIAGNOSIS — R19.07 ABDOMINAL SWELLING, GENERALIZED: ICD-10-CM

## 2018-11-01 DIAGNOSIS — I10 ESSENTIAL HYPERTENSION: ICD-10-CM

## 2018-11-01 DIAGNOSIS — T45.1X5A ANEMIA DUE TO ANTINEOPLASTIC CHEMOTHERAPY: ICD-10-CM

## 2018-11-01 DIAGNOSIS — E11.00 TYPE 2 DIABETES MELLITUS WITH HYPEROSMOLARITY WITHOUT COMA, WITHOUT LONG-TERM CURRENT USE OF INSULIN (HCC): ICD-10-CM

## 2018-11-01 DIAGNOSIS — R60.9 PERIPHERAL EDEMA: ICD-10-CM

## 2018-11-01 DIAGNOSIS — R60.9 EDEMA, UNSPECIFIED TYPE: Primary | ICD-10-CM

## 2018-11-01 DIAGNOSIS — R53.83 MALAISE AND FATIGUE: ICD-10-CM

## 2018-11-01 DIAGNOSIS — C91.10 CLL (CHRONIC LYMPHOCYTIC LEUKEMIA) (HCC): ICD-10-CM

## 2018-11-01 DIAGNOSIS — F32.9 REACTIVE DEPRESSION: Chronic | ICD-10-CM

## 2018-11-01 PROCEDURE — 99215 OFFICE O/P EST HI 40 MIN: CPT | Performed by: INTERNAL MEDICINE

## 2018-11-01 PROCEDURE — 93000 ELECTROCARDIOGRAM COMPLETE: CPT | Performed by: INTERNAL MEDICINE

## 2018-11-01 NOTE — PATIENT INSTRUCTIONS
1  Check abdominal and pelvic ultrasound for ascites and consider paracentesis if a large amount is documented  2  Update echocardiogram for left and right heart function and PA systolic pressure regarding lower extremity edema and suspected ascites with possible underlying pulmonary hypertension  3  Discuss with family practice in Psychiatry regarding reduction in number of maintenance medications  4  A lower sodium diet was discussed with the patient, including elimination of Pepsi, high sodium snacks,  fast foods  5  Smoking cessation was encouraged  6  We will contact the patient with the results of these studies, as well as further recommendations

## 2018-11-01 NOTE — PROGRESS NOTES
Consultation - Cardiology   Melani Wilson 60 y o  male MRN: [id number]  Unit/Bed#:  Encounter: [id number]        Assessment/Plan     Assessment:    1  Multifactorial exertional dyspnea with underlying COPD and chronic bronchitis, morbid obesity, recent development of edema and possible ascites, suspected pulmonary hypertension, but no evidence of CAD with nonischemic nuclear stress study on 03/28/2018  2  Refractory lower extremity edema, possibly related to cor pulmonale from COPD/chronic bronchitis, morbid obesity, dietary sodium excess with nothing to suggest a primary cardiac etiology  3  Treated and controlled chronic lymphocytic leukemia  4  Progressive abdominal distention and swelling with underlying ascites to be excluded  5  Essential hypertension, currently uncontrolled, probably from excessive sodium intake and fluid retention  6  Type 2 diabetes mellitus or impaired fasting glucose, with most recent fasting glucose 121     7  Controlled mixed dyslipidemia  8  Chronic bipolar disorder with depression  9  Alcoholism with sober status for 1 year  10  Cigarette/nicotine dependence with 1 pack per day smoking for 47 years with complicating COPD/chronic bronchitis  11  Morbid obesity      Plan:    Patient Instructions     1  Check abdominal and pelvic ultrasound for ascites and consider paracentesis if a large amount is documented  2  Update echocardiogram for left and right heart function and PA systolic pressure regarding lower extremity edema and suspected ascites with possible underlying pulmonary hypertension  3  Discuss with family practice and Psychiatry regarding reduction in number of maintenance medications  4  A lower sodium diet was discussed with the patient, including elimination of Pepsi, high sodium snacks,  fast foods  5  Smoking cessation was encouraged  6  We will contact the patient with the results of these studies, as well as further recommendations          Current Outpatient Prescriptions   Medication Sig Dispense Refill    ALPRAZolam (XANAX) 1 mg tablet   3    benztropine (COGENTIN) 1 mg tablet Take 1 mg by mouth 2 (two) times a day       divalproex sodium (DEPAKOTE) 500 mg EC tablet Take 500 mg by mouth 2 (two) times a day one in the morning and 2 in the evening       FLUoxetine (PROzac) 20 mg capsule Take 60 mg by mouth daily        FLUoxetine (PROzac) 40 MG capsule   2    furosemide (LASIX) 40 mg tablet TAKE 1 TABLET (40 MG TOTAL) BY MOUTH DAILY 30 tablet 1    hydrochlorothiazide (MICROZIDE) 12 5 mg capsule TAKE 1 CAPSULE (12 5 MG TOTAL) BY MOUTH DAILY 30 capsule 3    hydrOXYzine pamoate (VISTARIL) 50 mg capsule Take 50 mg by mouth 2 (two) times a day      Ibrutinib (IMBRUVICA) 420 MG TABS Take 420 mg by mouth daily 28 tablet 0    lisinopril (ZESTRIL) 40 mg tablet Take 20 mg by mouth daily       mirtazapine (REMERON) 15 mg tablet Take 15 mg by mouth daily at bedtime      potassium chloride (K-DUR,KLOR-CON) 20 mEq tablet Take 1 tablet (20 mEq total) by mouth daily Tonight take three tablets (Patient taking differently: Take 20 mEq by mouth 2 (two) times a day Tonight take three tablets ) 90 tablet 3    propranolol (INDERAL) 10 mg tablet Take 10 mg by mouth 2 (two) times a day   2    risperiDONE (RisperDAL) 2 mg tablet Take 4 mg by mouth 2 (two) times a day        metoprolol tartrate (LOPRESSOR) 25 mg tablet Take 0 5 tablets (12 5 mg total) by mouth every 12 (twelve) hours 30 tablet 0     No current facility-administered medications for this visit            History of Present Illness     Physician Requesting Consult:  Jose Morales MD and Lillian Chaudhary MD     Reason for Consult / Principal Problem:  Dyspnea with low level exertion and refractory lower extremity edema    HPI: Talia Diallo is a 60y o  year old male who presents with progressive dyspnea on exertion beginning in January, 2018 and becoming much more pronounced in the last 2-3 months  He also has noted progressive leg edema for 2-3 months, despite the use of furosemide 40 milligrams daily, which has not created any improvement  There has been no associated chest discomfort, orthopnea, paroxysmal nocturnal dyspnea, syncope, dizziness, palpitations, wheezing  The patient does have a chronic cough productive of greenish to mucoid sputum, which he has difficulty quantitating  He has been a 1 pack per day smoker for approximately 47 years  He is an alcoholic and just quit drinking about 1 year ago  He admits to a diet very high in sodium with frequent consumption of Pepsi, high sodium snacks, fast foods, and other processed foods  This patient was diagnosed with chronic lymphocytic leukemia after discovery of profound anemia in March, 2018  He has a history of depression and bipolar disorder, multifactorial fatigue, type 2 NSTEMI at a time when he had profound anemia, history of hyperglycemia, essential hypertension, mixed dyslipidemia, alcoholism, cigarette abuse, high sodium diet  Historical Information   Past Medical History:   Diagnosis Date    Anemia     Anxiety     Bipolar disorder ([place] [address] )     Cancer ([place] [address] )     History of alcohol abuse     Hypertension     Hypertension     Insomnia     Leukemia ([place] [address] )     Opiate abuse, episodic ([place] [address] )     Psychiatric disorder      Past Surgical History:  Past Surgical History:   Procedure Laterality Date    EGD AND COLONOSCOPY N/A 3/30/2018    Procedure: EGD AND COLONOSCOPY;  Surgeon: Rodney Nickerson MD;  Location: [facility] GI [facility];   Service: Gastroenterology     History   Alcohol Use No     Comment: last drink nov 19 2017     History   Drug Use No     Comment: hx of heroin and subutex abuse     History   Smoking Status    Current Every Day Smoker    Packs/day: 1 50    Years: 40 00    Types: Cigarettes   Smokeless Tobacco    Never Used     Family History   Problem Relation Age of Onset    Coronary artery disease Mother     No Known Problems Father     Hepatitis Sister     Cancer Brother     Prostate cancer Brother     Early death Brother        Meds/Allergies   Prior to Admission medications    Medication Sig Start Date End Date Taking?  Authorizing Provider   ALPRAZolam Harlon Needle) 1 mg tablet  10/8/18  Yes Historical Provider, MD   benztropine (COGENTIN) 1 mg tablet Take 1 mg by mouth 2 (two) times a day    Yes Historical Provider, MD   divalproex sodium (DEPAKOTE) 500 mg EC tablet Take 500 mg by mouth 2 (two) times a day one in the morning and 2 in the evening    Yes Historical Provider, MD   FLUoxetine (PROzac) 20 mg capsule Take 60 mg by mouth daily   7/14/18  Yes Historical Provider, MD   FLUoxetine (PROzac) 40 MG capsule  9/7/18  Yes Historical Provider, MD   furosemide (LASIX) 40 mg tablet TAKE 1 TABLET (40 MG TOTAL) BY MOUTH DAILY 10/4/18  Yes Leatha Sweet MD   hydrochlorothiazide (MICROZIDE) 12 5 mg capsule TAKE 1 CAPSULE (12 5 MG TOTAL) BY MOUTH DAILY 9/8/18  Yes Dedra Mattson   hydrOXYzine pamoate (VISTARIL) 50 mg capsule Take 50 mg by mouth 2 (two) times a day   Yes Historical Provider, MD   Ibrutinib (IMBRUVICA) 420 MG TABS Take 420 mg by mouth daily 10/31/18  Yes Emmett Epps MD   lisinopril (ZESTRIL) 40 mg tablet Take 20 mg by mouth daily    Yes Historical Provider, MD   mirtazapine (REMERON) 15 mg tablet Take 15 mg by mouth daily at bedtime   Yes Historical Provider, MD   potassium chloride (K-DUR,KLOR-CON) 20 mEq tablet Take 1 tablet (20 mEq total) by mouth daily Tonight take three tablets  Patient taking differently: Take 20 mEq by mouth 2 (two) times a day Tonight take three tablets  10/2/18  Yes Giovanni Vargas MD   propranolol (INDERAL) 10 mg tablet Take 10 mg by mouth 2 (two) times a day  9/7/18  Yes Historical Provider, MD   risperiDONE (RisperDAL) 2 mg tablet Take 4 mg by mouth 2 (two) times a day     Yes Historical Provider, MD   metFORMIN (GLUCOPHAGE) 500 mg tablet Take 1 tablet (500 mg total) by mouth 2 (two) times a day with meals 10/8/18 11/1/18 Yes Sha Pandya MD   pantoprazole (PROTONIX) 40 mg tablet TAKE 1 TABLET (40 MG TOTAL) BY MOUTH DAILY 9/8/18 11/1/18 Yes Manda Perez MD   metoprolol tartrate (LOPRESSOR) 25 mg tablet Take 0 5 tablets (12 5 mg total) by mouth every 12 (twelve) hours 11/1/18   Keith Rosenthal MD     No Known Allergies    Cardiovascular ROS:       More than 10 systems reviewed and all systems negative, except as noted in history of present illness    Objective     Vitals: Blood pressure 160/84, pulse 64, height 5' 10 25" (1 784 m), weight (!) 137 kg (303 lb), SpO2 95 %  Body mass index is 43 17 kg/m²  15 pound weight gain noted in approximately 2 months, compared to wait obtained by [facility]    Physical Exam  General-Well-developed morbidly obese   male  in no acute distress  Patient is alert, oriented X3 and cooperative  Skin-Warm and dry with no pallor, rashes, ecchymoses, lesions  HEENT-Normocephalic  Pupils equally round and reactive to light and accommodation  Extraocular muscles intact  Mucous membranes pink and moist  Sclerae nonicteric  Optic fundi poorly seen  Neck-No jugular venous distention, AJR, masses, thyromegaly, adenopathy, bruits  Lungs-No rales, rhonchi, wheezes  Mild tachypnea with activity  Heart-No murmur, gallop, rub, click, heave, thrill  Abdomen-Normal bowel sounds with no masses, organomegaly, guarding, tenderness, or rigidity  Markedly obese and tense to palpation, raising the question of underlying ascites  Extremities-No cyanosis, clubbing,  pulse decrease with 2+ pretibial ankle and pedal edema noted bilaterally     Neurological-No focal neurological signs  Imaging:     EKG 11/01/2018:     Normal EKG, unchanged from 05/18/2018    Imaging    Chest X-Ray:    Xr Chest Pa & Lateral    Result Date: 10/2/2018  Impression No acute cardiopulmonary disease   Workstation performed: [id number]     [employer] Chest 2 Views    Result Date: 6/2/2018  Impression No active pulmonary disease  Workstation performed: [id number]     Xr Chest Pa & Lateral    Result Date: 2/27/2018  Impression No acute cardiopulmonary disease  Workstation performed: [id number]     Xr Chest Pa & Lateral    Result Date: 2/20/2018  Impression Unchanged nonspecific right base opacity which may represent atelectasis or pneumonia in the appropriate clinical setting  Follow-up chest radiograph is recommended in approximately 2 months to ensure complete resolution  Workstation performed: [id number]     [facility] nuclear stress study 03/28/2018:    Fixed inferior-apical defect, improved with prone imaging  No ischemia  50% LVEF    Transthoracic echocardiogram 03/27/2018:    55% LVEF with mild LVH and grade 1 diastolic dysfunction  1+ MR/AI/TR  PA systolic pressure 45 mmHg    Lab Review     Lab Results   Component Value Date     10/29/2018    K 3 7 10/29/2018    CL 99 (L) 10/29/2018    CO2 32 10/29/2018    BUN 15 10/29/2018    CREATININE 0 98 10/29/2018    CALCIUM 9 0 10/29/2018    AST 34 10/29/2018    ALT 51 10/29/2018    ALKPHOS 82 10/29/2018    EGFR 82 10/29/2018    Glucose 10/29/2018:  121     Cholesterol 09/13/2018:  190  Lab Results   Component Value Date    HDL 52 09/13/2018    HDL 36 (L) 03/27/2018     Lab Results   Component Value Date    LDLCALC 87 09/13/2018    LDLCALC 53 03/27/2018     Lab Results   Component Value Date    TRIG 253 (H) 09/13/2018    TRIG 124 03/27/2018         Lab Results   Component Value Date    CALCIUM 9 0 10/29/2018     10/29/2018    K 3 7 10/29/2018    CO2 32 10/29/2018    CL 99 (L) 10/29/2018    BUN 15 10/29/2018    CREATININE 0 98 10/29/2018       Counseling / Coordination of Care  Totaloffice time spent today   62  minutes       Alexus Troncoso MD

## 2018-11-01 NOTE — LETTER
November 1, 2018     [address]    Patient: Belen Day   YOB: 1955   Date of Visit: 11/1/2018       Dear Dr John Santoyo:    Thank you for referring Belen Day to me for evaluation  Below are my notes for this consultation  If you have questions, please do not hesitate to call me  I look forward to following your patient along with you  Sincerely,        Alexus Troncoso MD        CC: MD Alexus Rodarte MD  11/1/2018 10:33 AM  Sign at close encounter  Consultation - Cardiology   Belen Day 60 y o  male MRN: [id number]  Unit/Bed#:  Encounter: [id number]        Assessment/Plan     Assessment:    1  Multifactorial exertional dyspnea with underlying COPD and chronic bronchitis, morbid obesity, recent development of edema and possible ascites, suspected pulmonary hypertension, but no evidence of CAD with nonischemic nuclear stress study on 03/28/2018  2  Refractory lower extremity edema, possibly related to cor pulmonale from COPD/chronic bronchitis, morbid obesity, dietary sodium excess with nothing to suggest a primary cardiac etiology  3  Treated and controlled chronic lymphocytic leukemia  4  Progressive abdominal distention and swelling with underlying ascites to be excluded  5  Essential hypertension, currently uncontrolled, probably from excessive sodium intake and fluid retention  6  Type 2 diabetes mellitus or impaired fasting glucose, with most recent fasting glucose 121     7  Controlled mixed dyslipidemia  8  Chronic bipolar disorder with depression  9  Alcoholism with sober status for 1 year  10  Cigarette/nicotine dependence with 1 pack per day smoking for 47 years with complicating COPD/chronic bronchitis  11  Morbid obesity      Plan:    Patient Instructions     1  Check abdominal and pelvic ultrasound for ascites and consider paracentesis if a large amount is documented      2  Update echocardiogram for left and right heart function and PA systolic pressure regarding lower extremity edema and suspected ascites with possible underlying pulmonary hypertension  3  Discuss with family practice and Psychiatry regarding reduction in number of maintenance medications  4  A lower sodium diet was discussed with the patient, including elimination of Pepsi, high sodium snacks,  fast foods  5  Smoking cessation was encouraged  6  We will contact the patient with the results of these studies, as well as further recommendations          Current Outpatient Prescriptions   Medication Sig Dispense Refill    ALPRAZolam (XANAX) 1 mg tablet   3    benztropine (COGENTIN) 1 mg tablet Take 1 mg by mouth 2 (two) times a day       divalproex sodium (DEPAKOTE) 500 mg EC tablet Take 500 mg by mouth 2 (two) times a day one in the morning and 2 in the evening       FLUoxetine (PROzac) 20 mg capsule Take 60 mg by mouth daily        FLUoxetine (PROzac) 40 MG capsule   2    furosemide (LASIX) 40 mg tablet TAKE 1 TABLET (40 MG TOTAL) BY MOUTH DAILY 30 tablet 1    hydrochlorothiazide (MICROZIDE) 12 5 mg capsule TAKE 1 CAPSULE (12 5 MG TOTAL) BY MOUTH DAILY 30 capsule 3    hydrOXYzine pamoate (VISTARIL) 50 mg capsule Take 50 mg by mouth 2 (two) times a day      Ibrutinib (IMBRUVICA) 420 MG TABS Take 420 mg by mouth daily 28 tablet 0    lisinopril (ZESTRIL) 40 mg tablet Take 20 mg by mouth daily       mirtazapine (REMERON) 15 mg tablet Take 15 mg by mouth daily at bedtime      potassium chloride (K-DUR,KLOR-CON) 20 mEq tablet Take 1 tablet (20 mEq total) by mouth daily Tonight take three tablets (Patient taking differently: Take 20 mEq by mouth 2 (two) times a day Tonight take three tablets ) 90 tablet 3    propranolol (INDERAL) 10 mg tablet Take 10 mg by mouth 2 (two) times a day   2    risperiDONE (RisperDAL) 2 mg tablet Take 4 mg by mouth 2 (two) times a day        metoprolol tartrate (LOPRESSOR) 25 mg tablet Take 0 5 tablets (12 5 mg total) by mouth every 12 (twelve) hours 30 tablet 0     No current facility-administered medications for this visit  History of Present Illness     Physician Requesting Consult:  Tree Chung MD and Goran Ellis MD     Reason for Consult / Principal Problem:  Dyspnea with low level exertion and refractory lower extremity edema    HPI: Kenji Gonzalez is a 60y o  year old male who presents with progressive dyspnea on exertion beginning in January, 2018 and becoming much more pronounced in the last 2-3 months  He also has noted progressive leg edema for 2-3 months, despite the use of furosemide 40 milligrams daily, which has not created any improvement  There has been no associated chest discomfort, orthopnea, paroxysmal nocturnal dyspnea, syncope, dizziness, palpitations, wheezing  The patient does have a chronic cough productive of greenish to mucoid sputum, which he has difficulty quantitating  He has been a 1 pack per day smoker for approximately 47 years  He is an alcoholic and just quit drinking about 1 year ago  He admits to a diet very high in sodium with frequent consumption of Pepsi, high sodium snacks, fast foods, and other processed foods  This patient was diagnosed with chronic lymphocytic leukemia after discovery of profound anemia in March, 2018  He has a history of depression and bipolar disorder, multifactorial fatigue, type 2 NSTEMI at a time when he had profound anemia, history of hyperglycemia, essential hypertension, mixed dyslipidemia, alcoholism, cigarette abuse, high sodium diet          Historical Information   Past Medical History:   Diagnosis Date    Anemia     Anxiety     [facility] )     Cancer ([facility] )     History of alcohol abuse     Hypertension     Hypertension     Insomnia     Leukemia ([facility] )     Opiate abuse, episodic ([facility] )     Psychiatric disorder      Past Surgical History:  Past Surgical History: Procedure Laterality Date    EGD AND COLONOSCOPY N/A 3/30/2018    Procedure: EGD AND COLONOSCOPY;  Surgeon: Bebeto Trevizo MD;  Location: [place] [address] GI LAB; Service: Gastroenterology     History   Alcohol Use No     Comment: last drink nov 19 2017     History   Drug Use No     Comment: hx of heroin and subutex abuse     History   Smoking Status    Current Every Day Smoker    Packs/day: 1 50    Years: 40 00    Types: Cigarettes   Smokeless Tobacco    Never Used     Family History   Problem Relation Age of Onset    Coronary artery disease Mother     No Known Problems Father     Hepatitis Sister     Cancer Brother     Prostate cancer Brother     Early death Brother        Meds/Allergies   Prior to Admission medications    Medication Sig Start Date End Date Taking?  Authorizing Provider   ALPRAZolam Lakesha Chiki) 1 mg tablet  10/8/18  Yes Historical Provider, MD   benztropine (COGENTIN) 1 mg tablet Take 1 mg by mouth 2 (two) times a day    Yes Historical Provider, MD   divalproex sodium (DEPAKOTE) 500 mg EC tablet Take 500 mg by mouth 2 (two) times a day one in the morning and 2 in the evening    Yes Historical Provider, MD   FLUoxetine (PROzac) 20 mg capsule Take 60 mg by mouth daily   7/14/18  Yes Historical Provider, MD   FLUoxetine (PROzac) 40 MG capsule  9/7/18  Yes Historical Provider, MD   furosemide (LASIX) 40 mg tablet TAKE 1 TABLET (40 MG TOTAL) BY MOUTH DAILY 10/4/18  Yes Kayla Dover MD   hydrochlorothiazide (MICROZIDE) 12 5 mg capsule TAKE 1 CAPSULE (12 5 MG TOTAL) BY MOUTH DAILY 9/8/18  Yes Tori Ahn   hydrOXYzine pamoate (VISTARIL) 50 mg capsule Take 50 mg by mouth 2 (two) times a day   Yes Historical Provider, MD   Ibrutinib (IMBRUVICA) 420 MG TABS Take 420 mg by mouth daily 10/31/18  Yes Aries Main MD   lisinopril (ZESTRIL) 40 mg tablet Take 20 mg by mouth daily    Yes Historical Provider, MD   mirtazapine (REMERON) 15 mg tablet Take 15 mg by mouth daily at bedtime   Yes Historical Provider, MD potassium chloride (K-DUR,KLOR-CON) 20 mEq tablet Take 1 tablet (20 mEq total) by mouth daily Tonight take three tablets  Patient taking differently: Take 20 mEq by mouth 2 (two) times a day Tonight take three tablets  10/2/18  Yes Carol Olivas MD   propranolol (INDERAL) 10 mg tablet Take 10 mg by mouth 2 (two) times a day  9/7/18  Yes Historical Provider, MD   risperiDONE (RisperDAL) 2 mg tablet Take 4 mg by mouth 2 (two) times a day     Yes Historical Provider, MD   metFORMIN (GLUCOPHAGE) 500 mg tablet Take 1 tablet (500 mg total) by mouth 2 (two) times a day with meals 10/8/18 11/1/18 Yes Carol Olivas MD   pantoprazole (PROTONIX) 40 mg tablet TAKE 1 TABLET (40 MG TOTAL) BY MOUTH DAILY 9/8/18 11/1/18 Yes J Luis Guzman MD   metoprolol tartrate (LOPRESSOR) 25 mg tablet Take 0 5 tablets (12 5 mg total) by mouth every 12 (twelve) hours 11/1/18   Brianna Menjivar MD     No Known Allergies    Cardiovascular ROS:       More than 10 systems reviewed and all systems negative, except as noted in history of present illness    Objective     Vitals: Blood pressure 160/84, pulse 64, height 5' 10 25" (1 784 m), weight (!) 137 kg (303 lb), SpO2 95 %  Body mass index is 43 17 kg/m²  15 pound weight gain noted in approximately 2 months, compared to wait obtained by [facility]    Physical Exam  General-Well-developed morbidly obese   male  in no acute distress  Patient is alert, oriented X3 and cooperative  Skin-Warm and dry with no pallor, rashes, ecchymoses, lesions  HEENT-Normocephalic  Pupils equally round and reactive to light and accommodation  Extraocular muscles intact  Mucous membranes pink and moist  Sclerae nonicteric  Optic fundi poorly seen  Neck-No jugular venous distention, AJR, masses, thyromegaly, adenopathy, bruits  Lungs-No rales, rhonchi, wheezes  Mild tachypnea with activity  Heart-No murmur, gallop, rub, click, heave, thrill    Abdomen-Normal bowel sounds with no masses, organomegaly, guarding, tenderness, or rigidity  Markedly obese and tense to palpation, raising the question of underlying ascites  Extremities-No cyanosis, clubbing,  pulse decrease with 2+ pretibial ankle and pedal edema noted bilaterally     Neurological-No focal neurological signs  Imaging:     EKG 11/01/2018:     Normal EKG, unchanged from 05/18/2018    Imaging    Chest X-Ray:    Xr Chest Pa & Lateral    Result Date: 10/2/2018  Impression No acute cardiopulmonary disease  Workstation performed: [id number]     Xr Chest 2 Views    Result Date: 6/2/2018  Impression No active pulmonary disease  Workstation performed: [id number]     Xr Chest Pa & Lateral    Result Date: 2/27/2018  Impression No acute cardiopulmonary disease  Workstation performed: [id number]     Xr Chest Pa & Lateral    Result Date: 2/20/2018  Impression Unchanged nonspecific right base opacity which may represent atelectasis or pneumonia in the appropriate clinical setting  Follow-up chest radiograph is recommended in approximately 2 months to ensure complete resolution  Workstation performed: [id number]     [facility] nuclear stress study 03/28/2018:    Fixed inferior-apical defect, improved with prone imaging  No ischemia    50% LVEF    Transthoracic echocardiogram 03/27/2018:    55% LVEF with mild LVH and grade 1 diastolic dysfunction  1+ MR/AI/TR  PA systolic pressure 45 mmHg    Lab Review     Lab Results   Component Value Date     10/29/2018    K 3 7 10/29/2018    CL 99 (L) 10/29/2018    CO2 32 10/29/2018    BUN 15 10/29/2018    CREATININE 0 98 10/29/2018    CALCIUM 9 0 10/29/2018    AST 34 10/29/2018    ALT 51 10/29/2018    ALKPHOS 82 10/29/2018    EGFR 82 10/29/2018    Glucose 10/29/2018:  121     Cholesterol 09/13/2018:  190  Lab Results   Component Value Date    HDL 52 09/13/2018    HDL 36 (L) 03/27/2018     Lab Results   Component Value Date    LDLCALC 87 09/13/2018    LDLCALC 53 03/27/2018 Lab Results   Component Value Date    TRIG 253 (H) 09/13/2018    TRIG 124 03/27/2018         Lab Results   Component Value Date    CALCIUM 9 0 10/29/2018     10/29/2018    K 3 7 10/29/2018    CO2 32 10/29/2018    CL 99 (L) 10/29/2018    BUN 15 10/29/2018    CREATININE 0 98 10/29/2018       Counseling / Coordination of Care  Totaloffice time spent today   62  minutes       Brianna Menjivar MD

## 2018-11-05 ENCOUNTER — HOSPITAL ENCOUNTER (OUTPATIENT)
Dept: RADIOLOGY | Facility: HOSPITAL | Age: 63
Discharge: HOME | End: 2018-11-05
Attending: INTERNAL MEDICINE
Payer: COMMERCIAL

## 2018-11-05 DIAGNOSIS — R19.07 ABDOMINAL SWELLING, GENERALIZED: ICD-10-CM

## 2018-11-05 DIAGNOSIS — R60.9 PERIPHERAL EDEMA: ICD-10-CM

## 2018-11-05 DIAGNOSIS — R60.9 EDEMA, UNSPECIFIED TYPE: ICD-10-CM

## 2018-11-05 DIAGNOSIS — R06.00 DYSPNEA ON EXERTION: ICD-10-CM

## 2018-11-05 PROCEDURE — 76705 ECHO EXAM OF ABDOMEN: CPT

## 2018-11-06 ENCOUNTER — TELEPHONE (OUTPATIENT)
Dept: CARDIOLOGY CLINIC | Facility: CLINIC | Age: 63
End: 2018-11-06

## 2018-11-06 NOTE — TELEPHONE ENCOUNTER
----- Message from Carlita Taylor MD sent at 11/6/2018  1:38 PM EST -----  Call patient and tell them that nothing serious was found on recent abdominal ultrasound  No fluid found in abdomen  We are awaiting other test results

## 2018-11-09 DIAGNOSIS — E08.00 DIABETES MELLITUS DUE TO UNDERLYING CONDITION WITH HYPEROSMOLARITY WITHOUT COMA, WITHOUT LONG-TERM CURRENT USE OF INSULIN (HCC): ICD-10-CM

## 2018-11-12 ENCOUNTER — APPOINTMENT (OUTPATIENT)
Dept: LAB | Facility: HOSPITAL | Age: 63
End: 2018-11-12
Attending: INTERNAL MEDICINE
Payer: COMMERCIAL

## 2018-11-12 ENCOUNTER — TELEPHONE (OUTPATIENT)
Dept: HEMATOLOGY ONCOLOGY | Facility: MEDICAL CENTER | Age: 63
End: 2018-11-12

## 2018-11-12 ENCOUNTER — TRANSCRIBE ORDERS (OUTPATIENT)
Dept: ADMINISTRATIVE | Facility: HOSPITAL | Age: 63
End: 2018-11-12

## 2018-11-12 DIAGNOSIS — C95.10 CHRONIC LEUKEMIA, NOT HAVING ACHIEVED REMISSION (HCC): ICD-10-CM

## 2018-11-12 DIAGNOSIS — C91.10 CLL (CHRONIC LYMPHOCYTIC LEUKEMIA) (HCC): ICD-10-CM

## 2018-11-12 LAB
ALBUMIN SERPL BCP-MCNC: 3.2 G/DL (ref 3.5–5)
ALP SERPL-CCNC: 90 U/L (ref 46–116)
ALT SERPL W P-5'-P-CCNC: 52 U/L (ref 12–78)
ANION GAP SERPL CALCULATED.3IONS-SCNC: 9 MMOL/L (ref 4–13)
ANISOCYTOSIS BLD QL SMEAR: PRESENT
AST SERPL W P-5'-P-CCNC: 32 U/L (ref 5–45)
BASOPHILS # BLD MANUAL: 0 THOUSAND/UL (ref 0–0.1)
BASOPHILS NFR MAR MANUAL: 0 % (ref 0–1)
BILIRUB SERPL-MCNC: 0.3 MG/DL (ref 0.2–1)
BUN SERPL-MCNC: 15 MG/DL (ref 5–25)
CALCIUM SERPL-MCNC: 8.8 MG/DL (ref 8.3–10.1)
CHLORIDE SERPL-SCNC: 97 MMOL/L (ref 100–108)
CO2 SERPL-SCNC: 29 MMOL/L (ref 21–32)
CREAT SERPL-MCNC: 0.86 MG/DL (ref 0.6–1.3)
EOSINOPHIL # BLD MANUAL: 0 THOUSAND/UL (ref 0–0.4)
EOSINOPHIL NFR BLD MANUAL: 0 % (ref 0–6)
ERYTHROCYTE [DISTWIDTH] IN BLOOD BY AUTOMATED COUNT: 14.2 % (ref 11.6–15.1)
GFR SERPL CREATININE-BSD FRML MDRD: 92 ML/MIN/1.73SQ M
GLUCOSE SERPL-MCNC: 194 MG/DL (ref 65–140)
HCT VFR BLD AUTO: 40.6 % (ref 36.5–49.3)
HGB BLD-MCNC: 13.1 G/DL (ref 12–17)
LYMPHOCYTES # BLD AUTO: 26.3 THOUSAND/UL (ref 0.6–4.47)
LYMPHOCYTES # BLD AUTO: 85 % (ref 14–44)
MACROCYTES BLD QL AUTO: PRESENT
MCH RBC QN AUTO: 32.3 PG (ref 26.8–34.3)
MCHC RBC AUTO-ENTMCNC: 32.3 G/DL (ref 31.4–37.4)
MCV RBC AUTO: 100 FL (ref 82–98)
MONOCYTES # BLD AUTO: 1.24 THOUSAND/UL (ref 0–1.22)
MONOCYTES NFR BLD: 4 % (ref 4–12)
NEUTROPHILS # BLD MANUAL: 3.4 THOUSAND/UL (ref 1.85–7.62)
NEUTS SEG NFR BLD AUTO: 11 % (ref 43–75)
NRBC BLD AUTO-RTO: 0 /100 WBCS
PLATELET # BLD AUTO: 216 THOUSANDS/UL (ref 149–390)
PLATELET BLD QL SMEAR: ADEQUATE
PMV BLD AUTO: 10.7 FL (ref 8.9–12.7)
POTASSIUM SERPL-SCNC: 4.2 MMOL/L (ref 3.5–5.3)
PROT SERPL-MCNC: 6.8 G/DL (ref 6.4–8.2)
RBC # BLD AUTO: 4.06 MILLION/UL (ref 3.88–5.62)
RBC MORPH BLD: PRESENT
SODIUM SERPL-SCNC: 135 MMOL/L (ref 136–145)
TOTAL CELLS COUNTED SPEC: 100
WBC # BLD AUTO: 30.94 THOUSAND/UL (ref 4.31–10.16)

## 2018-11-12 PROCEDURE — 36415 COLL VENOUS BLD VENIPUNCTURE: CPT

## 2018-11-12 PROCEDURE — 85027 COMPLETE CBC AUTOMATED: CPT

## 2018-11-12 PROCEDURE — 80053 COMPREHEN METABOLIC PANEL: CPT

## 2018-11-12 PROCEDURE — 85007 BL SMEAR W/DIFF WBC COUNT: CPT

## 2018-11-15 ENCOUNTER — OFFICE VISIT (OUTPATIENT)
Dept: HEMATOLOGY ONCOLOGY | Facility: MEDICAL CENTER | Age: 63
End: 2018-11-15
Payer: COMMERCIAL

## 2018-11-15 VITALS
WEIGHT: 308 LBS | DIASTOLIC BLOOD PRESSURE: 90 MMHG | HEART RATE: 74 BPM | HEIGHT: 70 IN | SYSTOLIC BLOOD PRESSURE: 160 MMHG | OXYGEN SATURATION: 96 % | BODY MASS INDEX: 44.09 KG/M2 | RESPIRATION RATE: 20 BRPM | TEMPERATURE: 97.1 F

## 2018-11-15 DIAGNOSIS — C91.10 CLL (CHRONIC LYMPHOCYTIC LEUKEMIA) (HCC): Primary | ICD-10-CM

## 2018-11-15 PROCEDURE — 99214 OFFICE O/P EST MOD 30 MIN: CPT | Performed by: INTERNAL MEDICINE

## 2018-11-15 NOTE — PROGRESS NOTES
Nigel Shelby  1955  [facility] [address] [address]    DISCUSSION  SUMMARY:    27-year-old male with CLL presently on ibrutinib  Issues:    1  CLL  The CBC parameters are much better than before  Clinically patient is better from a hematology standpoint  The plan is to continue with the ibrutinib  I once again discussed the need for compliance  Patient is to return in 8 weeks  We will continue with monthly blood work instead of every 2 weeks  2  Anemia - resolved  This was likely due to the fact that patient was not compliant with treatment previously  Hemoglobin level is being monitored      3  Psychiatric issues/depression  Psychiatry follow-up is ongoing      4    Weight gain  Etiology is likely multifactorial, possibly due to the medications that patient is on  That being said, Mr Rafal Browne is only sleeping in eating all day  Patient needs to be more active, exercise as well as changes diet  Patient may be best served with a nutrition specialist as well as a physical therapy evaluation for a treatment plan  I will defer this to Dr Lakhwinder Hyatt  Patient is to return in 8 weeks  Patient knows to call the hematology/oncology office if there are any other questions or concerns  Carefully review your medication list and verify that the list is accurate and up-to-date  Please call the hematology/oncology office if there are medications missing from the list, medications on the list that you are not currently taking or if there is a dosage or instruction that is different from how you're taking that medication      Patient goals and areas of care: monitor CBC parameters, continue with the CLL treatment  Patient is able to self-care   _____________________________________________________________________________________    Chief Complaint   Patient presents with    Follow-up     CLL     History of Present Illness:    74-year-old male recently admitted to [address] [place] [address] with severe anemia  Bone marrow biopsy demonstrated CLL  Patient is on ibrutinib and returns for follow-up  Mr Yumi Qiu states feeling okay  No issues from hematology standpoint  No problems with the ibrutinib  Generalized body aches are the same as before  Patient denies any pain control issues  Wife is very concerned because patient basically eats and sleeps all day  Patient has gained ++weight  No shortness of breath at rest but patient has dyspnea on exertion  Patient continues to smoke  No recent fevers, chills or sweats  No problems with excessive bruising or bleeding  No other GI or  issues  Review of Systems   Constitutional: Positive for appetite change, fatigue and unexpected weight change  HENT: Negative  Eyes: Negative  Respiratory: Negative          +dyspnea on exertion   Cardiovascular: Negative  Gastrointestinal: Negative  Endocrine: Negative  Genitourinary: Negative  Musculoskeletal: Positive for arthralgias  Skin: Negative  Allergic/Immunologic: Negative  Neurological: Negative  Hematological: Negative  Psychiatric/Behavioral: Positive for behavioral problems  All other systems reviewed and are negative       Patient Active Problem List   Diagnosis    Hypertension    Depression    Polypharmacy    Bronchitis    Encephalopathy    Weakness    Leukemia (HCC)    Lactic acidosis    Leukocytosis    Elevated troponin    Bipolar 1 disorder (HCC)    Psychiatric disorder    Anemia    Encounter for smoking cessation counseling    Encounter for screening for lipid disorder    Screening for diabetes mellitus    Peripheral edema    CLL (chronic lymphocytic leukemia) (HCC)     Past Medical History:   Diagnosis Date    Anemia     Anxiety     [facility] disorder [facility]     Cancer ([facility]     History of alcohol abuse     Hypertension     Hypertension     Insomnia     Leukemia ([facility]     Opiate abuse, episodic ([facility]     Psychiatric disorder      Past Surgical History:   Procedure Laterality Date    EGD AND COLONOSCOPY N/A 3/30/2018    Procedure: EGD AND COLONOSCOPY;  Surgeon: Luz Maria Johnston MD;  Location: [place] [address] [facility]; Service: Gastroenterology     Family History   Problem Relation Age of Onset    Coronary artery disease Mother     No Known Problems Father     Hepatitis Sister     Cancer Brother     Prostate cancer Brother     Early death Brother      Social History     Social History    Marital status: /Civil Union     Spouse name: N/A    Number of children: N/A    Years of education: N/A     Occupational History    Not on file       Social History Main Topics    Smoking status: Current Every Day Smoker     Packs/day: 1 50     Years: 40 00     Types: Cigarettes    Smokeless tobacco: Never Used    Alcohol use No      Comment: last drink nov 19 2017    Drug use: No      Comment: hx of heroin and subutex abuse    Sexual activity: Not Currently     Other Topics Concern    Not on file     Social History Narrative    No narrative on file       Current Outpatient Prescriptions:     ALPRAZolam (XANAX) 1 mg tablet, , Disp: , Rfl: 3    benztropine (COGENTIN) 1 mg tablet, Take 1 mg by mouth 2 (two) times a day , Disp: , Rfl:     divalproex sodium (DEPAKOTE) 500 mg EC tablet, Take 500 mg by mouth 2 (two) times a day one in the morning and 2 in the evening , Disp: , Rfl:     FLUoxetine (PROzac) 20 mg capsule, Take 60 mg by mouth daily  , Disp: , Rfl:     FLUoxetine (PROzac) 40 MG capsule, , Disp: , Rfl: 2    furosemide (LASIX) 40 mg tablet, TAKE 1 TABLET (40 MG TOTAL) BY MOUTH DAILY, Disp: 30 tablet, Rfl: 1    hydrochlorothiazide (MICROZIDE) 12 5 mg capsule, TAKE 1 CAPSULE (12 5 MG TOTAL) BY MOUTH DAILY, Disp: 30 capsule, Rfl: 3    hydrOXYzine pamoate (VISTARIL) 50 mg capsule, Take 50 mg by mouth 2 (two) times a day, Disp: , Rfl:     Ibrutinib (IMBRUVICA) 420 MG TABS, Take 420 mg by mouth daily, Disp: 28 tablet, Rfl: 0    lisinopril (ZESTRIL) 40 mg tablet, Take 20 mg by mouth daily , Disp: , Rfl:     metoprolol tartrate (LOPRESSOR) 25 mg tablet, Take 0 5 tablets (12 5 mg total) by mouth every 12 (twelve) hours, Disp: 30 tablet, Rfl: 0    mirtazapine (REMERON) 15 mg tablet, Take 15 mg by mouth daily at bedtime, Disp: , Rfl:     potassium chloride (K-DUR,KLOR-CON) 20 mEq tablet, Take 1 tablet (20 mEq total) by mouth daily Tonight take three tablets (Patient taking differently: Take 20 mEq by mouth 2 (two) times a day Tonight take three tablets ), Disp: 90 tablet, Rfl: 3    propranolol (INDERAL) 10 mg tablet, Take 10 mg by mouth 2 (two) times a day , Disp: , Rfl: 2    risperiDONE (RisperDAL) 2 mg tablet, Take 4 mg by mouth 2 (two) times a day  , Disp: , Rfl:     metFORMIN (GLUCOPHAGE) 500 mg tablet, , Disp: , Rfl: 1    No Known Allergies      Physical Exam   Constitutional: He is oriented to person, place, and time  He appears well-developed and well-nourished  Obese male, no respiratory distress   HENT:   Head: Normocephalic and atraumatic  Right Ear: External ear normal    Left Ear: External ear normal    Nose: Nose normal    Mouth/Throat: Oropharynx is clear and moist    Eyes: Pupils are equal, round, and reactive to light  Conjunctivae and EOM are normal    Neck: Normal range of motion  Neck supple  Supple, no JVD, no thyromegaly, no adenopathy   Cardiovascular: Normal rate, regular rhythm, normal heart sounds and intact distal pulses  Pulmonary/Chest:   Fair to good air entry bilaterally, scattered bilateral rhonchi, no rales   Abdominal: Soft  Bowel sounds are normal    Abdomen is ++obese, nontender, cannot palpate liver or spleen, no rigidity or rebound, +bowel sounds   Musculoskeletal: Normal range of motion     Motor and sensory in all 4 extremities is equal bilaterally, good range of motion in 4 extremities   Neurological: He is alert and oriented to person, place, and time  He has normal reflexes  Skin: Skin is warm     Skin is warm, moist, good color, few upper extremities purpura, no petechiae, no active bleeding   Psychiatric: His behavior is normal  Judgment and thought content normal    Patient is responsive but muted, appropriate, otherwise pleasant   Extremities: 1 + bilateral lower extremity edema, no cords, pulses are 1+  Lymphatics: no adenopathy in the neck, supraclavicular region, axilla and groin bilaterally    Labs:    11/12/2018 WBC = 30 9 hemoglobin = 13 1 hematocrit = 41 MCV = 100 platelet = 053 neutrophils = 11% lymphocytes = 85% monocyte = 4% BUN = 15 creatinine = 0 86 LFTs WNL    10/15/2018 WBC = 23 6 hemoglobin = 12 2 hematocrit = 37 9 MCV = 102 platelet = 444  41/68/3457 WBC = 34 7 hemoglobin = 12 hematocrit = 37 2 platelet = 649  25/73/7271 WBC = 55 8 hemoglobin = 10 5 hematocrit = 35 MCV = 6 platelet = 188  77/23/0582 WBC = 58 4 hemoglobin = 9 3 hematocrit = 30 platelet = 5  66/33/6684 WBC = 57 5 hemoglobin = 7 5 hematocrit = 23 5 platelet = 332  14/79/8800 WBC = 42 7 hemoglobin = 8 4 hematocrit = 26 platelet = 529  78/08/4475 WBC = 80 7 hemoglobin = 6 8 hematocrit = 22 1 platelet = 102 lymphocytes = 93%  06/07/2018 WBC = 104 5 hemoglobin = 7 5 hematocrit = 25 5 platelet = 119 neutrophil = 4% lymphocytes = 93% monocyte = 3%  05/14/2018 WBC = 40 2 hemoglobin = 6 9 hematocrit = 21 1 MCV = 87 platelet = 651 lymphocytes = 99%  04/23/2018 WBC = 31 2 hemoglobin = 8 1 hematocrit = 24 5 MCV = 86 platelet = 938 lymphocytes = 83%  04/09/2018 WBC = 22 7 hemoglobin = 7 hematocrit = 21 1 platelet = 467 (no differential)    Pathology    Case Report   Surgical Pathology Report                         Case: [id number]                                    Authorizing Provider: Carlos Riley MD          Collected:           03/28/2018 1318               Ordering Location:     [address] [place] [address] Received:            03/28/2018 1121                                      [address]                                                                       Pathologist:           Miriam Goetz MD                                                         Specimens:   A) - Iliac Crest, Left, Bone Marrow   2 Cores                                                        B) - Iliac Crest, Left, Bone Marrow                                                                  C) - Iliac Crest, Left, Bone Marrow  2T  P  Slides, 2 Asp Stained , 8 Asp Unstained          Addendum   - Fluorescence in situ hybridization (FISH) ([facility]#[id number], evaluated by ORLIN Manuel , Ph D ) for genetic abnormalities which may be associated with myelodysplastic syndrome is as follows:        Interpretation  1  No evidence of trisomy 12 (+12)  2  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  Comment: Deletions involving 13q14 are detectable by FISH in approximately 50% of persons with CLL  Among them,  about 05% show biallelic or concomitant monoallelic and biallelic deletion  As a group and as the sole aberration, del(13q)  is associated with a more favorable outcome  3  No evidence of p53 (17p13) deletion or amplification  4  No evidence of RENAE (11q22 3) deletion       - IgVH Mutation analysis ([facility]#[id number], evaluated by Dr Ramirez Varghese, Ph D )  is as follows:   IGVH MUTATION ANALYSIS: UNMUTATED - CLL  RESULTS  Mutation Rate: 0%  IgVH Family: IGHV1-2*04     -  Cytogenetic studies as performed on the bone marrow aspirate @ [facility] (Specimen ID: [id number], evaluated by Julia Chandra, PhD, [facility], [facility] ) as follows:  Karyotype:  46,XY,add(18)(p11 3)[6]/46,XY[1], LIMITED ANALYSIS   Cytogenetics Analysis:  : Metaphases Counted          Metaphases Analyzed            Metaphases Karyotyped        GTG Band level                       Culture Type(s)               7                                     7                                     4                        300-400 [id number]   Interpretation: Abnormal male karyotype  This is an incomplete study and only 7 cells (as opposed to 20 cells) were available for analysis  Cytogenetic analysis  shows the presence of an abnormal clone (6 out of 7 cells) characterized by addition of material of unknown origin to  18p11  3  No other aberrations were identified, and one normal cell was found during the course of analysis  These current cytogenetic results are consistent with the presence of abnormal clonal cells  Correlation with  hematopathology and follow-up bone marrow studies are recommended to further evaluate the significance of these  findings  Microarray analysis could be considered to further characterize this rearrangement             Addendum electronically signed by Miriam Goetz MD on 4/6/2018 at  4:12 PM     Addendum electronically signed by Miriam Goetz MD on 4/6/2018 at  4:12 PM   Final Diagnosis   A -C  Bone marrow,  left iliac crest,  biopsy and aspirate:  -  Chronic lymphocytic leukemia/small lymphocytic lymphoma (CLL/SLL) (see note)  -  Significantly decreased trilineage myelopoiesis with normal appearing morphology and maturation without significant features of dysplasia or increased myeloblasts, secondary to the above  -  Anemia, neutropenia, and lymphocytosis, secondary to the above  -  Normal stainable storage iron  -  Mildly increased reticulin fibers without fibrosis, secondary to the above  -  Negative for collagen fibrosis, granulomata, vasculitis, necrosis           Electronically signed by Miriam Goetz MD on 3/29/2018 at  2:51 P

## 2018-11-17 ENCOUNTER — HOSPITAL ENCOUNTER (OUTPATIENT)
Dept: NON INVASIVE DIAGNOSTICS | Facility: HOSPITAL | Age: 63
Discharge: HOME | End: 2018-11-17
Attending: INTERNAL MEDICINE
Payer: COMMERCIAL

## 2018-11-17 DIAGNOSIS — R06.00 DYSPNEA ON EXERTION: ICD-10-CM

## 2018-11-17 DIAGNOSIS — R60.9 PERIPHERAL EDEMA: ICD-10-CM

## 2018-11-17 PROCEDURE — 93306 TTE W/DOPPLER COMPLETE: CPT

## 2018-11-19 PROCEDURE — 93306 TTE W/DOPPLER COMPLETE: CPT | Performed by: INTERNAL MEDICINE

## 2018-11-20 ENCOUNTER — TELEPHONE (OUTPATIENT)
Dept: CARDIOLOGY CLINIC | Facility: CLINIC | Age: 63
End: 2018-11-20

## 2018-11-20 NOTE — TELEPHONE ENCOUNTER
----- Message from Alexus Troncoso MD sent at 11/20/2018  1:23 PM EST -----  Call patient and tell him that nothing serious was found on recent the echocardiogram and no fluid was seen on ultrasound of abdomen  Suggest weight loss, low-sodium diet, regular exercise, quitting of smoking  No need at this time for further cardiac testing  A letter has been sent to [facility]  regarding results

## 2018-11-26 DIAGNOSIS — C95.90 LEUKEMIA NOT HAVING ACHIEVED REMISSION, UNSPECIFIED LEUKEMIA TYPE (HCC): ICD-10-CM

## 2018-11-29 DIAGNOSIS — C95.90 LEUKEMIA NOT HAVING ACHIEVED REMISSION, UNSPECIFIED LEUKEMIA TYPE (HCC): ICD-10-CM

## 2018-11-30 ENCOUNTER — OFFICE VISIT (OUTPATIENT)
Dept: CARDIOLOGY CLINIC | Facility: CLINIC | Age: 63
End: 2018-11-30
Payer: COMMERCIAL

## 2018-11-30 ENCOUNTER — TELEPHONE (OUTPATIENT)
Dept: HEMATOLOGY ONCOLOGY | Facility: MEDICAL CENTER | Age: 63
End: 2018-11-30

## 2018-11-30 VITALS
HEIGHT: 71 IN | DIASTOLIC BLOOD PRESSURE: 70 MMHG | BODY MASS INDEX: 41.55 KG/M2 | SYSTOLIC BLOOD PRESSURE: 130 MMHG | HEART RATE: 91 BPM | OXYGEN SATURATION: 96 % | WEIGHT: 296.8 LBS

## 2018-11-30 DIAGNOSIS — J44.9 COPD (CHRONIC OBSTRUCTIVE PULMONARY DISEASE) WITH CHRONIC BRONCHITIS (HCC): Primary | ICD-10-CM

## 2018-11-30 DIAGNOSIS — I10 ESSENTIAL HYPERTENSION: Chronic | ICD-10-CM

## 2018-11-30 DIAGNOSIS — I89.0 LYMPHEDEMA OF BOTH LOWER EXTREMITIES: ICD-10-CM

## 2018-11-30 DIAGNOSIS — E11.00 TYPE 2 DIABETES MELLITUS WITH HYPEROSMOLARITY WITHOUT COMA, WITHOUT LONG-TERM CURRENT USE OF INSULIN (HCC): ICD-10-CM

## 2018-11-30 DIAGNOSIS — F17.219 CIGARETTE NICOTINE DEPENDENCE WITH NICOTINE-INDUCED DISORDER: ICD-10-CM

## 2018-11-30 DIAGNOSIS — E78.2 MIXED DYSLIPIDEMIA: ICD-10-CM

## 2018-11-30 DIAGNOSIS — E66.01 MORBID OBESITY (HCC): ICD-10-CM

## 2018-11-30 DIAGNOSIS — R60.9 PERIPHERAL EDEMA: ICD-10-CM

## 2018-11-30 DIAGNOSIS — C91.10 CHRONIC LYMPHOCYTIC LEUKEMIA (HCC): ICD-10-CM

## 2018-11-30 DIAGNOSIS — R06.00 DYSPNEA ON EXERTION: ICD-10-CM

## 2018-11-30 DIAGNOSIS — G47.33 OBSTRUCTIVE SLEEP APNEA: ICD-10-CM

## 2018-11-30 DIAGNOSIS — F31.9 BIPOLAR DISORDER WITH DEPRESSION (HCC): ICD-10-CM

## 2018-11-30 PROCEDURE — 99215 OFFICE O/P EST HI 40 MIN: CPT | Performed by: INTERNAL MEDICINE

## 2018-11-30 RX ORDER — IBRUTINIB 420 MG/1
TABLET, FILM COATED ORAL
Qty: 28 TABLET | Refills: 3 | Status: SHIPPED | OUTPATIENT
Start: 2018-11-30 | End: 2019-07-26 | Stop reason: SDUPTHER

## 2018-11-30 NOTE — PATIENT INSTRUCTIONS
1  Continue weight loss diet and reduction in bread consumption and carbohydrate usage  2  Continue to reduce and discontinue cigarette smoking  3  Use CPAP at bedtime for untreated obstructive sleep apnea  4  Recommend nuclear stress testing in approximately 3 years, since patient had testing on 03/28/2018  5  No further cardiac testing is indicated at this time  6  Compression hosiery was recommended for lower extremity edema  7  Since patient has access to treadmill, have suggested use of treadmill a minimum of 4 days a week to tolerance

## 2018-11-30 NOTE — LETTER
November 30, 2018     [address]  [address]    Patient: Bertha Adams   YOB: 1955   Date of Visit: 11/30/2018       Dear Dr Diana Zhong:    Thank you for referring Bertha Adams to me for evaluation  Below are my notes for this consultation  If you have questions, please do not hesitate to call me  I look forward to following your patient along with you  Sincerely,        Benito Batres MD        CC: No Recipients  Benito Batres MD  11/30/2018  1:25 PM  Sign at close encounter  Cardiology Progress Note    ASSESSMENT:       1  Multifactorial exertional dyspnea with underlying COPD and chronic bronchitis, morbid obesity, recent development of edema and abdominal swelling, suspected pulmonary hypertension, but no evidence of CAD with nonischemic nuclear stress study on 03/28/2018  Workup suggest presence of lymphedema with no evidence of ascites and only minimal pulmonary hypertension  2  Refractory lower extremity edema, possibly related to  morbid obesity, dietary sodium excess with nothing to suggest a primary cardiac etiology  Underlying bilateral lymphedema is suspected  3  Treated and controlled chronic lymphocytic leukemia  Imbruvica treatment can cause side effects of peripheral edema, dyspnea, and hypertension  4  Progressive abdominal distention and swelling with underlying ascites  excluded on recent abdominal ultrasound testing  5  Essential hypertension, currently controlled, previously worsened from excessive sodium intake and fluid retention  6  Type 2 diabetes mellitus, with most recent fasting glucose 194 on 11/12/2018  7  Controlled mixed dyslipidemia  8  Chronic bipolar disorder with depression  9  Alcoholism with sober status for 1+ years  10  Cigarette/nicotine dependence with 1 pack per day smoking for 47 years, but now down to 1/2 pack per day, with complicating COPD/chronic bronchitis  11   Morbid obesity 12  Untreated obstructive sleep apnea    Plan       Patient Instructions     1  Continue weight loss diet and reduction in bread consumption and carbohydrate usage  2  Continue to reduce and discontinue cigarette smoking  3  Use CPAP at bedtime for untreated obstructive sleep apnea  4  Recommend nuclear stress testing in approximately 3 years, since patient had testing on 03/28/2018  5  No further cardiac testing is indicated at this time  6  Compression hosiery was recommended for lower extremity edema  7  Since patient has access to treadmill, have suggested use of treadmill a minimum of 4 days a week to tolerance  8  As patient is relocating to the [address] [place] [address] later this month, he will get his primary care added new site and see [facility] Cardiology as necessary in that region  HPI    This 60 y o  male  denies new cardiopulmonary and medical symptoms  He has chronic insomnia and daytime sleepiness but has not been using his CPAP for management of his known obstructive sleep apnea  He has reduced his calorie intake and his smoking and has noted slight improvement in his shortness of breath and leg swelling  The bulk of his office visit was spent in face-to-face consultation regarding the results of his recent cardiac testing detailed below  The remaining time was spent discussing future measures of treatment and follow-up  Discussion and review of records consumed more than half of his visit, totaling 41 minutes        Review of Systems    All other systems negative, except as noted in history of present illness    Historical Information   Past Medical History:   Diagnosis Date    Anemia     Anxiety     [facility]     Cancer [facility]     History of alcohol abuse     Hypertension     Hypertension     Insomnia     Leukemia [facility]     Opiate abuse, episodic ([facility] )     Psychiatric disorder      Past Surgical History:   Procedure Laterality Date    EGD AND COLONOSCOPY N/A 3/30/2018    Procedure: EGD AND COLONOSCOPY;  Surgeon: Eri Boyd MD;  Location: [place] [address] [facility]; Service: Gastroenterology     History   Alcohol Use No     Comment: last drink nov 19 2017     History   Drug Use No     Comment: hx of heroin and subutex abuse     History   Smoking Status    Current Every Day Smoker    Packs/day: 1 50    Years: 40 00    Types: Cigarettes   Smokeless Tobacco    Never Used       Family History:  Family History   Problem Relation Age of Onset    Coronary artery disease Mother     No Known Problems Father     Hepatitis Sister     Cancer Brother     Prostate cancer Brother     Early death Brother          Meds/Allergies     Prior to Admission medications    Medication Sig Start Date End Date Taking?  Authorizing Provider   ALPRAZolam Audery [place]) 1 mg tablet  10/8/18  Yes Historical Provider, MD   benztropine (COGENTIN) 1 mg tablet Take 1 mg by mouth 2 (two) times a day    Yes Historical Provider, MD   divalproex sodium (DEPAKOTE) 500 mg EC tablet Take 500 mg by mouth 2 (two) times a day one in the morning and 2 in the evening    Yes Historical Provider, MD   FLUoxetine (PROzac) 20 mg capsule Take 60 mg by mouth daily   7/14/18  Yes Historical Provider, MD   FLUoxetine (PROzac) 40 MG capsule  9/7/18  Yes Historical Provider, MD   furosemide (LASIX) 40 mg tablet TAKE 1 TABLET (40 MG TOTAL) BY MOUTH DAILY 10/4/18  Yes Kayla Farias MD   hydrochlorothiazide (MICROZIDE) 12 5 mg capsule TAKE 1 CAPSULE (12 5 MG TOTAL) BY MOUTH DAILY 9/8/18  Yes Nithin Goldman   hydrOXYzine pamoate (VISTARIL) 50 mg capsule Take 50 mg by mouth 2 (two) times a day   Yes Historical Provider, MD   Ibrutinib (IMBRUVICA) 420 MG TABS Take 420 mg by mouth daily 10/31/18  Yes Aura Mcknight MD   lisinopril (ZESTRIL) 40 mg tablet Take 20 mg by mouth daily    Yes Historical Provider, MD   mirtazapine (REMERON) 15 mg tablet Take 15 mg by mouth daily at bedtime   Yes Historical Provider, MD   potassium chloride (K-DUR,KLOR-CON) 20 mEq tablet Take 1 tablet (20 mEq total) by mouth daily Tonight take three tablets  Patient taking differently: Take 20 mEq by mouth 2 (two) times a day Tonight take three tablets  10/2/18  Yes Sha Pandya MD   propranolol (INDERAL) 10 mg tablet Take 10 mg by mouth 2 (two) times a day  9/7/18  Yes Historical Provider, MD   risperiDONE (RisperDAL) 2 mg tablet Take 4 mg by mouth 2 (two) times a day     Yes Historical Provider, MD   metFORMIN (GLUCOPHAGE) 500 mg tablet  11/9/18   Historical Provider, MD   metoprolol tartrate (LOPRESSOR) 25 mg tablet Take 0 5 tablets (12 5 mg total) by mouth every 12 (twelve) hours 11/1/18   Keith Rosenthal MD       No Known Allergies      Vitals:    11/30/18 1016   BP: 130/70   BP Location: Right arm   Patient Position: Sitting   Cuff Size: Large   Pulse: 91   SpO2: 96%   Weight: 135 kg (296 lb 12 8 oz)   Height: 5' 11" (1 803 m)       Body mass index is 41 4 kg/m²    6 2 pound weight loss in approximately 4 weeks    Physical Exam:    General Appearance:  Alert, cooperative, no distress, appears stated age and is morbidly obese   Head:  Normocephalic, without obvious abnormality, atraumatic   Eyes:  PERRL, conjunctiva/corneas clear, EOM's intact,   both eyes   Ears:  Normal TM's and external ear canals, both ears   Nose: Nares normal, septum midline, mucosa normal, no drainage or sinus tenderness   Throat: Lips, mucosa, and tongue normal; teeth and gums normal   Neck: Supple, symmetrical, trachea midline, no adenopathy, thyroid: not enlarged, symmetric, no tenderness/mass/nodules, no carotid bruit or JVD   Back:   Symmetric, no curvature, ROM normal, no CVA tenderness   Lungs:   Clear to auscultation bilaterally, respirations unlabored   Chest Wall:  No tenderness or deformity   Heart:  Regular rate and rhythm, S1, S2 normal, no murmur, rub or gallop   Abdomen:   Soft, non-tender, bowel sounds active all four quadrants,  no masses, no organomegaly with marked obesity noted   Extremities: Extremities normal, atraumatic, no cyanosis with trace-1+ pretibial, ankle and pedal edema noted   Pulses: 2+ and symmetric   Skin: Skin showed normal color, texture, turgor and no rashes or lesions   Lymph nodes: Cervical, supraclavicular, and axillary nodes normal   Neurologic: Normal         Cardiographics    ECG  :  Not applicable      Imaging    Echo Doppler study 11/17/2018:    50% LVEF with mild LVH and grade 1 diastolic dysfunction  Mild LAE  1+ MR/TR  PA systolic pressure 43 mmHg    Ultrasound of abdomen 11/05/2018:    No ascites identified      Chest X-Ray :  Xr Chest Pa & Lateral    Result Date: 10/2/2018  Impression No acute cardiopulmonary disease  Workstation performed: [id number]     Xr Chest 2 Views    Result Date: 6/2/2018  Impression No active pulmonary disease  Workstation performed: [id number]     Xr Chest Pa & Lateral    Result Date: 2/27/2018  Impression No acute cardiopulmonary disease  Workstation performed: [id number]     Xr Chest Pa & Lateral    Result Date: 2/20/2018  Impression Unchanged nonspecific right base opacity which may represent atelectasis or pneumonia in the appropriate clinical setting  Follow-up chest radiograph is recommended in approximately 2 months to ensure complete resolution   Workstation performed: [id number]             Lab Review       Lab Results   Component Value Date    SODIUM 135 (L) 11/12/2018    K 4 2 11/12/2018    CL 97 (L) 11/12/2018    CO2 29 11/12/2018    BUN 15 11/12/2018    CREATININE 0 86 11/12/2018    CALCIUM 8 8 11/12/2018    AST 32 11/12/2018    ALT 52 11/12/2018    ALKPHOS 90 11/12/2018    EGFR 92 11/12/2018    CBC 11/12/2018:  WBC 30 94 with normal H/H and platelets    Lab Results   Component Value Date    CHOLESTEROL 190 09/13/2018    CHOLESTEROL 114 03/27/2018     Lab Results   Component Value Date    HDL 52 09/13/2018    HDL 36 (L) 03/27/2018     No results found for: LDLCHOLEST  Lab Results   Component Value Date    LDLCALC 87 09/13/2018    LDLCALC 53 03/27/2018     Lab Results   Component Value Date    TRIG 253 (H) 09/13/2018    TRIG 124 03/27/2018         Lab Results   Component Value Date    CALCIUM 8 8 11/12/2018    K 4 2 11/12/2018    CO2 29 11/12/2018    CL 97 (L) 11/12/2018    BUN 15 11/12/2018    CREATININE 0 86 11/12/2018           Rosina Larios MD

## 2018-11-30 NOTE — PROGRESS NOTES
Cardiology Progress Note    ASSESSMENT:       1  Multifactorial exertional dyspnea with underlying COPD and chronic bronchitis, morbid obesity, recent development of edema and abdominal swelling, suspected pulmonary hypertension, but no evidence of CAD with nonischemic nuclear stress study on 03/28/2018  Workup suggest presence of lymphedema with no evidence of ascites and only minimal pulmonary hypertension  2  Refractory lower extremity edema, possibly related to  morbid obesity, dietary sodium excess with nothing to suggest a primary cardiac etiology  Underlying bilateral lymphedema is suspected  3  Treated and controlled chronic lymphocytic leukemia  Imbruvica treatment can cause side effects of peripheral edema, dyspnea, and hypertension  4  Progressive abdominal distention and swelling with underlying ascites  excluded on recent abdominal ultrasound testing  5  Essential hypertension, currently controlled, previously worsened from excessive sodium intake and fluid retention  6  Type 2 diabetes mellitus, with most recent fasting glucose 194 on 11/12/2018  7  Controlled mixed dyslipidemia  8  Chronic bipolar disorder with depression  9  Alcoholism with sober status for 1+ years  10  Cigarette/nicotine dependence with 1 pack per day smoking for 47 years, but now down to 1/2 pack per day, with complicating COPD/chronic bronchitis  11  Morbid obesity   12  Untreated obstructive sleep apnea    Plan       Patient Instructions     1  Continue weight loss diet and reduction in bread consumption and carbohydrate usage  2  Continue to reduce and discontinue cigarette smoking  3  Use CPAP at bedtime for untreated obstructive sleep apnea  4  Recommend nuclear stress testing in approximately 3 years, since patient had testing on 03/28/2018  5  No further cardiac testing is indicated at this time  6  Compression hosiery was recommended for lower extremity edema    7  Since patient has access to treadmill, have suggested use of treadmill a minimum of 4 days a week to tolerance  8  As patient is relocating to the [address] [place] [address] later this month, he will get his primary care added new site and see [facility] Cardiology as necessary in that region  HPI    This 60 y o  male  denies new cardiopulmonary and medical symptoms  He has chronic insomnia and daytime sleepiness but has not been using his CPAP for management of his known obstructive sleep apnea  He has reduced his calorie intake and his smoking and has noted slight improvement in his shortness of breath and leg swelling  The bulk of his office visit was spent in face-to-face consultation regarding the results of his recent cardiac testing detailed below  The remaining time was spent discussing future measures of treatment and follow-up  Discussion and review of records consumed more than half of his visit, totaling 41 minutes  Review of Systems    All other systems negative, except as noted in history of present illness    Historical Information   Past Medical History:   Diagnosis Date    Anemia     Anxiety     [facility]     Cancer [facility]     History of alcohol abuse     Hypertension     Hypertension     Insomnia     Leukemia ([facility] )     Opiate abuse, episodic ([place] [address] )     Psychiatric disorder      Past Surgical History:   Procedure Laterality Date    EGD AND COLONOSCOPY N/A 3/30/2018    Procedure: EGD AND COLONOSCOPY;  Surgeon: Nhi Centeno MD;  Location: [facility];   Service: Gastroenterology     History   Alcohol Use No     Comment: last drink nov 19 2017     History   Drug Use No     Comment: hx of heroin and subutex abuse     History   Smoking Status    Current Every Day Smoker    Packs/day: 1 50    Years: 40 00    Types: Cigarettes   Smokeless Tobacco    Never Used       Family History:  Family History   Problem Relation Age of Onset    Coronary artery disease Mother     No Known Problems Father    Jass Muse Hepatitis Sister     Cancer Brother     Prostate cancer Brother     Early death Brother          Meds/Allergies     Prior to Admission medications    Medication Sig Start Date End Date Taking?  Authorizing Provider   ALPRAZolam Delford Piero) 1 mg tablet  10/8/18  Yes Historical Provider, MD   benztropine (COGENTIN) 1 mg tablet Take 1 mg by mouth 2 (two) times a day    Yes Historical Provider, MD   divalproex sodium (DEPAKOTE) 500 mg EC tablet Take 500 mg by mouth 2 (two) times a day one in the morning and 2 in the evening    Yes Historical Provider, MD   FLUoxetine (PROzac) 20 mg capsule Take 60 mg by mouth daily   7/14/18  Yes Historical Provider, MD   FLUoxetine (PROzac) 40 MG capsule  9/7/18  Yes Historical Provider, MD   furosemide (LASIX) 40 mg tablet TAKE 1 TABLET (40 MG TOTAL) BY MOUTH DAILY 10/4/18  Yes Kristen Lee MD   hydrochlorothiazide (MICROZIDE) 12 5 mg capsule TAKE 1 CAPSULE (12 5 MG TOTAL) BY MOUTH DAILY 9/8/18  Yes Caroline Armstrong   hydrOXYzine pamoate (VISTARIL) 50 mg capsule Take 50 mg by mouth 2 (two) times a day   Yes Historical Provider, MD   Ibrutinib (IMBRUVICA) 420 MG TABS Take 420 mg by mouth daily 10/31/18  Yes Fernando Mckinley MD   lisinopril (ZESTRIL) 40 mg tablet Take 20 mg by mouth daily    Yes Historical Provider, MD   mirtazapine (REMERON) 15 mg tablet Take 15 mg by mouth daily at bedtime   Yes Historical Provider, MD   potassium chloride (K-DUR,KLOR-CON) 20 mEq tablet Take 1 tablet (20 mEq total) by mouth daily Tonight take three tablets  Patient taking differently: Take 20 mEq by mouth 2 (two) times a day Tonight take three tablets  10/2/18  Yes Callie Campbell MD   propranolol (INDERAL) 10 mg tablet Take 10 mg by mouth 2 (two) times a day  9/7/18  Yes Historical Provider, MD   risperiDONE (RisperDAL) 2 mg tablet Take 4 mg by mouth 2 (two) times a day     Yes Historical Provider, MD   metFORMIN (GLUCOPHAGE) 500 mg tablet  11/9/18   Historical Provider, MD   metoprolol tartrate (LOPRESSOR) 25 mg tablet Take 0 5 tablets (12 5 mg total) by mouth every 12 (twelve) hours 11/1/18   Rosa Gamble MD       No Known Allergies      Vitals:    11/30/18 1016   BP: 130/70   BP Location: Right arm   Patient Position: Sitting   Cuff Size: Large   Pulse: 91   SpO2: 96%   Weight: 135 kg (296 lb 12 8 oz)   Height: 5' 11" (1 803 m)       Body mass index is 41 4 kg/m²  6 2 pound weight loss in approximately 4 weeks    Physical Exam:    General Appearance:  Alert, cooperative, no distress, appears stated age and is morbidly obese   Head:  Normocephalic, without obvious abnormality, atraumatic   Eyes:  PERRL, conjunctiva/corneas clear, EOM's intact,   both eyes   Ears:  Normal TM's and external ear canals, both ears   Nose: Nares normal, septum midline, mucosa normal, no drainage or sinus tenderness   Throat: Lips, mucosa, and tongue normal; teeth and gums normal   Neck: Supple, symmetrical, trachea midline, no adenopathy, thyroid: not enlarged, symmetric, no tenderness/mass/nodules, no carotid bruit or JVD   Back:   Symmetric, no curvature, ROM normal, no CVA tenderness   Lungs:   Clear to auscultation bilaterally, respirations unlabored   Chest Wall:  No tenderness or deformity   Heart:  Regular rate and rhythm, S1, S2 normal, no murmur, rub or gallop   Abdomen:   Soft, non-tender, bowel sounds active all four quadrants,  no masses, no organomegaly with marked obesity noted   Extremities: Extremities normal, atraumatic, no cyanosis with trace-1+ pretibial, ankle and pedal edema noted   Pulses: 2+ and symmetric   Skin: Skin showed normal color, texture, turgor and no rashes or lesions   Lymph nodes: Cervical, supraclavicular, and axillary nodes normal   Neurologic: Normal         Cardiographics    ECG  :  Not applicable      Imaging    Echo Doppler study 11/17/2018:    50% LVEF with mild LVH and grade 1 diastolic dysfunction  Mild LAE  1+ MR/TR  PA systolic pressure 43 mmHg    Ultrasound of abdomen 11/05/2018:     No ascites identified      Chest X-Ray :  Xr Chest Pa & Lateral    Result Date: 10/2/2018  Impression No acute cardiopulmonary disease  Workstation performed: [id number]     Xr Chest 2 Views    Result Date: 6/2/2018  Impression No active pulmonary disease  Workstation performed: [id number]     Xr Chest Pa & Lateral    Result Date: 2/27/2018  Impression No acute cardiopulmonary disease  Workstation performed: [id number]     Xr Chest Pa & Lateral    Result Date: 2/20/2018  Impression Unchanged nonspecific right base opacity which may represent atelectasis or pneumonia in the appropriate clinical setting  Follow-up chest radiograph is recommended in approximately 2 months to ensure complete resolution   Workstation performed: [id number]             Lab Review       Lab Results   Component Value Date    SODIUM 135 (L) 11/12/2018    K 4 2 11/12/2018    CL 97 (L) 11/12/2018    CO2 29 11/12/2018    BUN 15 11/12/2018    CREATININE 0 86 11/12/2018    CALCIUM 8 8 11/12/2018    AST 32 11/12/2018    ALT 52 11/12/2018    ALKPHOS 90 11/12/2018    EGFR 92 11/12/2018    CBC 11/12/2018:  WBC 30 94 with normal H/H and platelets    Lab Results   Component Value Date    CHOLESTEROL 190 09/13/2018    CHOLESTEROL 114 03/27/2018     Lab Results   Component Value Date    HDL 52 09/13/2018    HDL 36 (L) 03/27/2018     No results found for: LDLCHOLEST  Lab Results   Component Value Date    LDLCALC 87 09/13/2018    LDLCALC 53 03/27/2018     Lab Results   Component Value Date    TRIG 253 (H) 09/13/2018    TRIG 124 03/27/2018         Lab Results   Component Value Date    CALCIUM 8 8 11/12/2018    K 4 2 11/12/2018    CO2 29 11/12/2018    CL 97 (L) 11/12/2018    BUN 15 11/12/2018    CREATININE 0 86 11/12/2018           Esther Whitfield MD

## 2018-12-04 ENCOUNTER — OFFICE VISIT (OUTPATIENT)
Dept: FAMILY MEDICINE CLINIC | Facility: CLINIC | Age: 63
End: 2018-12-04
Payer: COMMERCIAL

## 2018-12-04 VITALS
DIASTOLIC BLOOD PRESSURE: 82 MMHG | HEART RATE: 96 BPM | WEIGHT: 291 LBS | SYSTOLIC BLOOD PRESSURE: 138 MMHG | RESPIRATION RATE: 16 BRPM | BODY MASS INDEX: 40.59 KG/M2 | OXYGEN SATURATION: 97 %

## 2018-12-04 DIAGNOSIS — R60.9 EDEMA, UNSPECIFIED TYPE: Primary | ICD-10-CM

## 2018-12-04 DIAGNOSIS — J44.9 CHRONIC OBSTRUCTIVE PULMONARY DISEASE, UNSPECIFIED COPD TYPE (HCC): ICD-10-CM

## 2018-12-04 DIAGNOSIS — I10 ESSENTIAL HYPERTENSION: ICD-10-CM

## 2018-12-04 DIAGNOSIS — R73.03 PREDIABETES: ICD-10-CM

## 2018-12-04 PROCEDURE — 3079F DIAST BP 80-89 MM HG: CPT | Performed by: FAMILY MEDICINE

## 2018-12-04 PROCEDURE — 3075F SYST BP GE 130 - 139MM HG: CPT | Performed by: FAMILY MEDICINE

## 2018-12-04 PROCEDURE — 99213 OFFICE O/P EST LOW 20 MIN: CPT | Performed by: FAMILY MEDICINE

## 2018-12-04 RX ORDER — ALBUTEROL SULFATE 90 UG/1
2 AEROSOL, METERED RESPIRATORY (INHALATION) EVERY 6 HOURS PRN
Qty: 18 G | Refills: 0 | Status: SHIPPED | OUTPATIENT
Start: 2018-12-04 | End: 2020-07-31

## 2018-12-04 RX ORDER — POTASSIUM CHLORIDE 20 MEQ/1
20 TABLET, EXTENDED RELEASE ORAL DAILY
Qty: 90 TABLET | Refills: 1 | Status: SHIPPED | OUTPATIENT
Start: 2018-12-04 | End: 2019-06-03 | Stop reason: SDUPTHER

## 2018-12-04 NOTE — PROGRESS NOTES
Assessment/Plan:    No problem-specific Assessment & Plan notes found for this encounter  Diagnoses and all orders for this visit:    Edema, unspecified type  -     potassium chloride (K-DUR,KLOR-CON) 20 mEq tablet; Take 1 tablet (20 mEq total) by mouth daily    Prediabetes  -     HEMOGLOBIN A1C W/ EAG ESTIMATION; Future    Chronic obstructive pulmonary disease, unspecified COPD type ([facility])  -     fluticasone-salmeterol (ADVAIR DISKUS) 250-50 mcg/dose inhaler; Inhale 1 puff 2 (two) times a day Rinse mouth after use  -     albuterol (VENTOLIN HFA) 90 mcg/act inhaler; Inhale 2 puffs every 6 (six) hours as needed for wheezing    Essential hypertension  -     metoprolol tartrate (LOPRESSOR) 25 mg tablet; Take 0 5 tablets (12 5 mg total) by mouth every 12 (twelve) hours          Subjective:      Patient ID: Shruti Belcher is a 60 y o  male  HPI patient presents follow-up medical conditions  Recently saw Cardiology who felt lot of his symptoms were predominantly due to his CLL as well as his COPD  He currently takes no medications for his COPD  He denies chest pain or palpitations  He has no resting shortness of breath but does get dyspnea sometimes with exertion  His wife feels that he is taking too many psychiatric medications which sometimes makes and excessively fatigued  Does see a psychiatrist and will discuss this in the near future  The following portions of the patient's history were reviewed and updated as appropriate: allergies, current medications, past family history, past medical history, past social history, past surgical history and problem list     Review of Systems  he denies fever, sweats or chills  He has no URI symptoms  He is no current GI or  symptoms  His edema is improved over his previous visit  He professes to mild depression without suicidal or homicidal ideation      Objective:      /82   Pulse 96   Resp 16   Wt 132 kg (291 lb)   SpO2 97%   BMI 40 59 kg/m² Physical Exam  general he is pleasant and cooperative no acute distress  HEENT within normal limits  Neck is supple without lymphadenopathy or JVD  Lungs clear to auscultation  Heart regular without S3 or 4  Lower extremity with 1+ nonpitting edema which is improved over previous  While he was very quiet and solemn he was cooperative

## 2018-12-07 NOTE — PROGRESS NOTES
Ciro Lopez  1955  [facility] [place] [id number] [place] [id number]    DISCUSSION  SUMMARY:    76-year-old male with a number of medical problems recently admitted to the hospital because of the anemia (2nd time)  Mr Bettylou Osgood underwent a bone marrow biopsy demonstrating a packed marrow  Pathology was consistent with CLL  Issues:    1  CLL  The white count has trended down somewhat  This is obviously good  Hemoglobin level is still low; patient will get transfusions as needed  The Plan Is continue with the ibrutinib  I restressed the need for compliance  Patient is to return in 4 weeks  The PCYC-1102 and PCYC-1103 trials of single agent ibrutinib for upfront and relapsed CLL patients demonstrated high response rates  The overall response rate was 89% at 5 years with 14% achieving complete response  In treatment naive patients, the overall response rate was 87% with a complete response rate of 29%  At 5 years, the progression free survival was 92% in treatment naive patients  Overall survival was 92%  Essentially this is is a very effective drug with a very high response rate  It is altogether possible that patient is not responding to the medication by my gut feeling is that Mr Bettylou Osgood is only partially compliant at best     2  Anemia  Secondary to a packed marrow  Patient will continue to go for weekly CBCs  As discussed previously, the anemia issue should go way if the ibrutinib is effective and the marrow is unpacked      3  Psychiatric issues/depression  Anti depression medications were recently manipulated  Psychiatry follow-up is ongoing      4  Fatigue  Etiology is most likely multifactorial and includes leukemia, other medical and psychiatric issues and secondary to medication use  I have told the patient and wife that this is difficult to completely correct      Patient is to return in 4 weeks    Patient knows to call the hematology/oncology office if there are any other questions or concerns  Carefully review your medication list and verify that the list is accurate and up-to-date  Please call the hematology/oncology office if there are medications missing from the list, medications on the list that you are not currently taking or if there is a dosage or instruction that is different from how you're taking that medication  Patient goals and areas of care: monitor CBC parameters, continue with the CLL treatment  Patient is able to self-care   _____________________________________________________________________________________    Chief Complaint   Patient presents with    Follow-up     CLL on ibrutinib     History of Present Illness:    60-year-old male recently admitted to [facility] with severe anemia  Bone marrow biopsy demonstrated CLL  Patient is on a bruit new and returns for follow-up  Mr Zimmerman Lipmary lou states feeling +/-, about the same as before  No fevers, chills or sweats  Generalized body aches are the same as before  Activities are limited, same as before  Depression continues to be an issue  No GI or  issues  Appetite is good, weight is stable  No shortness of breath, mild dyspnea on exertion but no different than before  Patient continues to smoke  Review of Systems   Constitutional: Positive for fatigue  HENT: Negative  Eyes: Negative  Respiratory: Negative          +dyspnea on exertion   Cardiovascular: Negative  Gastrointestinal: Negative  Endocrine: Negative  Genitourinary: Negative  Musculoskeletal: Positive for arthralgias  Skin: Negative  Allergic/Immunologic: Negative  Neurological: Negative  Hematological: Negative  Psychiatric/Behavioral: Positive for behavioral problems  All other systems reviewed and are negative       Patient Active Problem List   Diagnosis    Hypertension    Depression    Polypharmacy    Bronchitis    Encephalopathy    Weakness    Leukemia (HCC)    Lactic acidosis    Leukocytosis    Elevated troponin    Bipolar 1 disorder (HCC)    Psychiatric disorder    Anemia    Encounter for smoking cessation counseling    Encounter for screening for lipid disorder    Screening for diabetes mellitus     Past Medical History:   Diagnosis Date    Anemia     Anxiety     [facility]     [facility]     History of alcohol abuse     Hypertension     Hypertension     Insomnia     Leukemia [facility]     Opiate abuse, episodic     Psychiatric disorder      Past Surgical History:   Procedure Laterality Date    EGD AND COLONOSCOPY N/A 3/30/2018    Procedure: EGD AND COLONOSCOPY;  Surgeon: Brennon Santiago MD;  Location: [facility] GI LAB; Service: Gastroenterology     Family History   Problem Relation Age of Onset    Coronary artery disease Mother     No Known Problems Father     Hepatitis Sister     Cancer Brother     Prostate cancer Brother     Early death Brother      Social History     Social History    Marital status: /Civil Union     Spouse name: N/A    Number of children: N/A    Years of education: N/A     Occupational History    Not on file       Social History Main Topics    Smoking status: Current Every Day Smoker     Packs/day: 1 50     Years: 40 00     Types: Cigarettes    Smokeless tobacco: Never Used    Alcohol use No      Comment: last drink nov 19 2017    Drug use: No      Comment: hx of heroin and subutex abuse    Sexual activity: Not Currently     Other Topics Concern    Not on file     Social History Narrative    No narrative on file       Current Outpatient Prescriptions:     benztropine (COGENTIN) 1 mg tablet, Take 1 mg by mouth Medrol Dose Pack scheduling ONLY  , Disp: , Rfl:     divalproex sodium (DEPAKOTE) 500 mg EC tablet, Take 250 mg by mouth every 12 (twelve) hours  , Disp: , Rfl:     FLUoxetine (PROzac) 20 mg capsule, Take 60 mg by mouth daily  , Disp: , Rfl:    hydrochlorothiazide (MICROZIDE) 12 5 mg capsule, Take 1 capsule (12 5 mg total) by mouth daily, Disp: 30 capsule, Rfl: 3    hydrOXYzine pamoate (VISTARIL) 50 mg capsule, Take 50 mg by mouth 2 (two) times a day, Disp: , Rfl:     Ibrutinib 420 MG TABS, Take 420 mg by mouth daily, Disp: 30 tablet, Rfl: 3    lisinopril (ZESTRIL) 40 mg tablet, Take 40 mg by mouth daily, Disp: , Rfl:     metoprolol tartrate (LOPRESSOR) 25 mg tablet, Take 0 5 tablets (12 5 mg total) by mouth every 12 (twelve) hours, Disp: 60 tablet, Rfl: 5    mirtazapine (REMERON) 15 mg tablet, Take 15 mg by mouth daily at bedtime, Disp: , Rfl:     nicotine (NICODERM CQ) 21 mg/24 hr TD 24 hr patch, Place 1 patch on the skin every 24 hours, Disp: 28 patch, Rfl: 0    pantoprazole (PROTONIX) 40 mg tablet, Take 1 tablet (40 mg total) by mouth daily, Disp: 30 tablet, Rfl: 5    risperiDONE (RisperDAL) 2 mg tablet, Take 4 mg by mouth 2 (two) times a day  , Disp: , Rfl:     No Known Allergies    Vitals:  Blood pressure = 130/70 pulse = 63 temperature = 98 7 weight = 261 height = 5 feet 11 inches O2 saturation = 93%  Physical Exam   Constitutional: He is oriented to person, place, and time  He appears well-developed and well-nourished  Middle-aged male, no respiratory distress, +tobacco odor   HENT:   Head: Normocephalic and atraumatic  Right Ear: External ear normal    Left Ear: External ear normal    Nose: Nose normal    Mouth/Throat: Oropharynx is clear and moist    Eyes: Conjunctivae and EOM are normal  Pupils are equal, round, and reactive to light  Neck: Normal range of motion  Neck supple  Cardiovascular: Normal rate, regular rhythm, normal heart sounds and intact distal pulses  Pulmonary/Chest:   Lungs with fair air entry bilaterally, distant breath sounds, bilateral rhonchi, no rales   Abdominal: Soft   Bowel sounds are normal    Abdomen is soft, obese, cannot palpate liver or spleen, no rigidity or rebound   Musculoskeletal: Normal range normal... of motion  Neurological: He is alert and oriented to person, place, and time  He has normal reflexes  Skin: Skin is warm     Skin is pale, warm, slightly dry, scattered ecchymoses, no petechiae, no active bleeding   Psychiatric: His behavior is normal  Judgment and thought content normal    Patient is responsive but muted, appropriate, otherwise pleasant   Extremities: 1 + bilateral lower extremity edema, no cords, pulses are 1+  Lymphatics: no adenopathy in the neck, supraclavicular region, axilla and groin bilaterally    Labs:    07/23/2018 WBC = 57 5 hemoglobin = 7 5 hematocrit = 23 5 platelet = 394  74/99/0793 WBC = 42 7 hemoglobin = 8 4 hematocrit = 26 platelet = 742  23/13/6835 WBC = 80 7 hemoglobin = 6 8 hematocrit = 22 1 platelet = 742 lymphocytes = 93%  06/07/2018 WBC = 104 5 hemoglobin = 7 5 hematocrit = 25 5 platelet = 012 neutrophil = 4% lymphocytes = 93% monocyte = 3%  05/14/2018 WBC = 40 2 hemoglobin = 6 9 hematocrit = 21 1 MCV = 87 platelet = 013 lymphocytes = 99%  04/23/2018 WBC = 31 2 hemoglobin = 8 1 hematocrit = 24 5 MCV = 86 platelet = 898 lymphocytes = 83%  04/09/2018 WBC = 22 7 hemoglobin = 7 hematocrit = 21 1 platelet = 654 (no differential)  2/22/18 WBC = 12 1 hemoglobin = 8 3 hematocrit = 24 6 MCV = 86 platelet = 038  08/56/9522 WBC = 15 1 hemoglobin = 8 1 hematocrit = 24 3 platelet = 530  80/00/9754 WBC = 14 4 hemoglobin = 6 8 hematocrit = 20 platelet = 522    Pathology    Case Report   Surgical Pathology Report                         Case: [id number]                                    Authorizing Provider: Avery Figueroa MD          Collected:           03/28/2018 1318               Ordering Location:     [address] [place] [address] Received:            03/28/2018 1354                                      [address]                                                                       Pathologist:           Bereket Amanda MD                                                       Specimens:   A) - Iliac Crest, Left, Bone Marrow   2 Cores                                                        B) - Iliac Crest, Left, Bone Marrow                                                                  C) - Iliac Crest, Left, Bone Marrow  2T  P  Slides, 2 Asp Stained , 8 Asp Unstained          Addendum   - Fluorescence in situ hybridization (FISH) ([facility]#[id number], evaluated by ORLIN Snider , Ph D ) for genetic abnormalities which may be associated with myelodysplastic syndrome is as follows:        Interpretation  1  No evidence of trisomy 12 (+12)  2  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  Comment: Deletions involving 13q14 are detectable by FISH in approximately 50% of persons with CLL  Among them,  about 90% show biallelic or concomitant monoallelic and biallelic deletion  As a group and as the sole aberration, del(13q)  is associated with a more favorable outcome  3  No evidence of p53 (17p13) deletion or amplification  4  No evidence of RENAE (11q22 3) deletion       - IgVH Mutation analysis ([facility]#[id number], evaluated by Dr Lauren Vasquez, Ph D )  is as follows: IGVH MUTATION ANALYSIS: UNMUTATED - CLL  RESULTS  Mutation Rate: 0%  IgVH Family: IGHV1-2*04     -  Cytogenetic studies as performed on the bone marrow aspirate @ [facility] (Specimen ID: [id number], evaluated by Jerome Damon, PhD, [facility], [facility] ) as follows:  Karyotype:  46,XY,add(18)(p11 3)[6]/46,XY[1], LIMITED ANALYSIS   Cytogenetics Analysis:  : Metaphases Counted          Metaphases Analyzed            Metaphases Karyotyped        GTG Band level                       Culture Type(s)               7                                     7                                     4                        300-400                                       [id number]   Interpretation: Abnormal male karyotype  This is an incomplete study and only 7 cells (as opposed to 20 cells) were available for analysis   Cytogenetic Well appearing, well nourished, awake, alert, oriented to person, place, time/situation and in no apparent distress. analysis  shows the presence of an abnormal clone (6 out of 7 cells) characterized by addition of material of unknown origin to  18p11  3  No other aberrations were identified, and one normal cell was found during the course of analysis  These current cytogenetic results are consistent with the presence of abnormal clonal cells  Correlation with  hematopathology and follow-up bone marrow studies are recommended to further evaluate the significance of these  findings  Microarray analysis could be considered to further characterize this rearrangement             Addendum electronically signed by Rosa M Tamayo MD on 4/6/2018 at  4:12 PM     Addendum electronically signed by Rosa M Tamayo MD on 4/6/2018 at  4:12 PM   Final Diagnosis   A -C  Bone marrow,  left iliac crest,  biopsy and aspirate:  -  Chronic lymphocytic leukemia/small lymphocytic lymphoma (CLL/SLL) (see note)  -  Significantly decreased trilineage myelopoiesis with normal appearing morphology and maturation without significant features of dysplasia or increased myeloblasts, secondary to the above  -  Anemia, neutropenia, and lymphocytosis, secondary to the above  -  Normal stainable storage iron  -  Mildly increased reticulin fibers without fibrosis, secondary to the above  -  Negative for collagen fibrosis, granulomata, vasculitis, necrosis           Electronically signed by Rosa M Tamayo MD on 3/29/2018 at  2:51 P

## 2018-12-10 ENCOUNTER — APPOINTMENT (OUTPATIENT)
Dept: LAB | Facility: HOSPITAL | Age: 63
End: 2018-12-10
Attending: INTERNAL MEDICINE
Payer: COMMERCIAL

## 2018-12-10 DIAGNOSIS — C91.10 CLL (CHRONIC LYMPHOCYTIC LEUKEMIA) (HCC): ICD-10-CM

## 2018-12-10 DIAGNOSIS — R73.03 PREDIABETES: ICD-10-CM

## 2018-12-10 DIAGNOSIS — C95.10 CHRONIC LEUKEMIA, NOT HAVING ACHIEVED REMISSION (HCC): ICD-10-CM

## 2018-12-10 LAB
ALBUMIN SERPL BCP-MCNC: 3.8 G/DL (ref 3.5–5)
ALP SERPL-CCNC: 91 U/L (ref 46–116)
ALT SERPL W P-5'-P-CCNC: 85 U/L (ref 12–78)
ANION GAP SERPL CALCULATED.3IONS-SCNC: 11 MMOL/L (ref 4–13)
AST SERPL W P-5'-P-CCNC: 32 U/L (ref 5–45)
BASOPHILS # BLD MANUAL: 0 THOUSAND/UL (ref 0–0.1)
BASOPHILS NFR MAR MANUAL: 0 % (ref 0–1)
BILIRUB SERPL-MCNC: 0.5 MG/DL (ref 0.2–1)
BUN SERPL-MCNC: 58 MG/DL (ref 5–25)
CALCIUM SERPL-MCNC: 9.6 MG/DL (ref 8.3–10.1)
CHLORIDE SERPL-SCNC: 98 MMOL/L (ref 100–108)
CO2 SERPL-SCNC: 28 MMOL/L (ref 21–32)
CREAT SERPL-MCNC: 2.18 MG/DL (ref 0.6–1.3)
EOSINOPHIL # BLD MANUAL: 0.28 THOUSAND/UL (ref 0–0.4)
EOSINOPHIL NFR BLD MANUAL: 2 % (ref 0–6)
ERYTHROCYTE [DISTWIDTH] IN BLOOD BY AUTOMATED COUNT: 13.7 % (ref 11.6–15.1)
EST. AVERAGE GLUCOSE BLD GHB EST-MCNC: 186 MG/DL
GFR SERPL CREATININE-BSD FRML MDRD: 31 ML/MIN/1.73SQ M
GLUCOSE SERPL-MCNC: 136 MG/DL (ref 65–140)
HBA1C MFR BLD: 8.1 % (ref 4.2–6.3)
HCT VFR BLD AUTO: 42.1 % (ref 36.5–49.3)
HGB BLD-MCNC: 13.6 G/DL (ref 12–17)
LYMPHOCYTES # BLD AUTO: 12.1 THOUSAND/UL (ref 0.6–4.47)
LYMPHOCYTES # BLD AUTO: 88 % (ref 14–44)
MACROCYTES BLD QL AUTO: PRESENT
MCH RBC QN AUTO: 31.3 PG (ref 26.8–34.3)
MCHC RBC AUTO-ENTMCNC: 32.3 G/DL (ref 31.4–37.4)
MCV RBC AUTO: 97 FL (ref 82–98)
MONOCYTES # BLD AUTO: 0.83 THOUSAND/UL (ref 0–1.22)
MONOCYTES NFR BLD: 6 % (ref 4–12)
NEUTROPHILS # BLD MANUAL: 0.55 THOUSAND/UL (ref 1.85–7.62)
NEUTS SEG NFR BLD AUTO: 4 % (ref 43–75)
NRBC BLD AUTO-RTO: 0 /100 WBCS
PLATELET # BLD AUTO: 216 THOUSANDS/UL (ref 149–390)
PLATELET BLD QL SMEAR: ADEQUATE
PMV BLD AUTO: 11.2 FL (ref 8.9–12.7)
POTASSIUM SERPL-SCNC: 4.6 MMOL/L (ref 3.5–5.3)
PROT SERPL-MCNC: 7.3 G/DL (ref 6.4–8.2)
RBC # BLD AUTO: 4.34 MILLION/UL (ref 3.88–5.62)
RBC MORPH BLD: PRESENT
SODIUM SERPL-SCNC: 137 MMOL/L (ref 136–145)
TOTAL CELLS COUNTED SPEC: 100
WBC # BLD AUTO: 13.75 THOUSAND/UL (ref 4.31–10.16)

## 2018-12-10 PROCEDURE — 80053 COMPREHEN METABOLIC PANEL: CPT

## 2018-12-10 PROCEDURE — 36415 COLL VENOUS BLD VENIPUNCTURE: CPT

## 2018-12-10 PROCEDURE — 83036 HEMOGLOBIN GLYCOSYLATED A1C: CPT

## 2018-12-10 PROCEDURE — 85027 COMPLETE CBC AUTOMATED: CPT

## 2018-12-10 PROCEDURE — 85007 BL SMEAR W/DIFF WBC COUNT: CPT

## 2018-12-13 ENCOUNTER — TRANSCRIBE ORDERS (OUTPATIENT)
Dept: ADMINISTRATIVE | Facility: HOSPITAL | Age: 63
End: 2018-12-13

## 2018-12-13 ENCOUNTER — APPOINTMENT (OUTPATIENT)
Dept: LAB | Facility: HOSPITAL | Age: 63
End: 2018-12-13
Attending: FAMILY MEDICINE
Payer: COMMERCIAL

## 2018-12-13 ENCOUNTER — TELEPHONE (OUTPATIENT)
Dept: FAMILY MEDICINE CLINIC | Facility: CLINIC | Age: 63
End: 2018-12-13

## 2018-12-13 ENCOUNTER — OFFICE VISIT (OUTPATIENT)
Dept: FAMILY MEDICINE CLINIC | Facility: CLINIC | Age: 63
End: 2018-12-13
Payer: COMMERCIAL

## 2018-12-13 VITALS
TEMPERATURE: 97.7 F | DIASTOLIC BLOOD PRESSURE: 64 MMHG | WEIGHT: 294 LBS | BODY MASS INDEX: 41 KG/M2 | OXYGEN SATURATION: 93 % | RESPIRATION RATE: 20 BRPM | SYSTOLIC BLOOD PRESSURE: 104 MMHG | HEART RATE: 76 BPM

## 2018-12-13 DIAGNOSIS — N17.9 AKI (ACUTE KIDNEY INJURY) (HCC): Primary | ICD-10-CM

## 2018-12-13 DIAGNOSIS — C95.10 CHRONIC LEUKEMIA, NOT HAVING ACHIEVED REMISSION (HCC): ICD-10-CM

## 2018-12-13 DIAGNOSIS — N17.9 AKI (ACUTE KIDNEY INJURY) (HCC): ICD-10-CM

## 2018-12-13 LAB
ALBUMIN SERPL BCP-MCNC: 3.4 G/DL (ref 3.5–5)
ALP SERPL-CCNC: 80 U/L (ref 46–116)
ALT SERPL W P-5'-P-CCNC: 61 U/L (ref 12–78)
ANION GAP SERPL CALCULATED.3IONS-SCNC: 8 MMOL/L (ref 4–13)
AST SERPL W P-5'-P-CCNC: 25 U/L (ref 5–45)
BASOPHILS # BLD AUTO: 0.05 THOUSANDS/ΜL (ref 0–0.1)
BASOPHILS NFR BLD AUTO: 0 % (ref 0–1)
BILIRUB SERPL-MCNC: 0.3 MG/DL (ref 0.2–1)
BUN SERPL-MCNC: 45 MG/DL (ref 5–25)
CALCIUM SERPL-MCNC: 9.3 MG/DL (ref 8.3–10.1)
CHLORIDE SERPL-SCNC: 103 MMOL/L (ref 100–108)
CO2 SERPL-SCNC: 30 MMOL/L (ref 21–32)
CREAT SERPL-MCNC: 1.51 MG/DL (ref 0.6–1.3)
EOSINOPHIL # BLD AUTO: 0.13 THOUSAND/ΜL (ref 0–0.61)
EOSINOPHIL NFR BLD AUTO: 1 % (ref 0–6)
ERYTHROCYTE [DISTWIDTH] IN BLOOD BY AUTOMATED COUNT: 13.7 % (ref 11.6–15.1)
GFR SERPL CREATININE-BSD FRML MDRD: 48 ML/MIN/1.73SQ M
GLUCOSE SERPL-MCNC: 135 MG/DL (ref 65–140)
HCT VFR BLD AUTO: 37.8 % (ref 36.5–49.3)
HGB BLD-MCNC: 12.2 G/DL (ref 12–17)
IMM GRANULOCYTES # BLD AUTO: 0.07 THOUSAND/UL (ref 0–0.2)
IMM GRANULOCYTES NFR BLD AUTO: 1 % (ref 0–2)
LYMPHOCYTES # BLD AUTO: 12.57 THOUSANDS/ΜL (ref 0.6–4.47)
LYMPHOCYTES NFR BLD AUTO: 84 % (ref 14–44)
MCH RBC QN AUTO: 31.5 PG (ref 26.8–34.3)
MCHC RBC AUTO-ENTMCNC: 32.3 G/DL (ref 31.4–37.4)
MCV RBC AUTO: 98 FL (ref 82–98)
MONOCYTES # BLD AUTO: 1.01 THOUSAND/ΜL (ref 0.17–1.22)
MONOCYTES NFR BLD AUTO: 7 % (ref 4–12)
NEUTROPHILS # BLD AUTO: 0.95 THOUSANDS/ΜL (ref 1.85–7.62)
NEUTS SEG NFR BLD AUTO: 7 % (ref 43–75)
NRBC BLD AUTO-RTO: 0 /100 WBCS
PLATELET # BLD AUTO: 198 THOUSANDS/UL (ref 149–390)
PMV BLD AUTO: 11.7 FL (ref 8.9–12.7)
POTASSIUM SERPL-SCNC: 4.3 MMOL/L (ref 3.5–5.3)
PROT SERPL-MCNC: 7 G/DL (ref 6.4–8.2)
RBC # BLD AUTO: 3.87 MILLION/UL (ref 3.88–5.62)
SODIUM SERPL-SCNC: 141 MMOL/L (ref 136–145)
WBC # BLD AUTO: 14.78 THOUSAND/UL (ref 4.31–10.16)

## 2018-12-13 PROCEDURE — 99213 OFFICE O/P EST LOW 20 MIN: CPT | Performed by: FAMILY MEDICINE

## 2018-12-13 PROCEDURE — 4004F PT TOBACCO SCREEN RCVD TLK: CPT | Performed by: FAMILY MEDICINE

## 2018-12-13 PROCEDURE — 36415 COLL VENOUS BLD VENIPUNCTURE: CPT

## 2018-12-13 PROCEDURE — 3066F NEPHROPATHY DOC TX: CPT | Performed by: FAMILY MEDICINE

## 2018-12-13 PROCEDURE — 80053 COMPREHEN METABOLIC PANEL: CPT

## 2018-12-13 PROCEDURE — 85025 COMPLETE CBC W/AUTO DIFF WBC: CPT

## 2018-12-13 NOTE — PROGRESS NOTES
Assessment/Plan:    No problem-specific Assessment & Plan notes found for this encounter  Diagnoses and all orders for this visit:    RA (acute kidney injury) [facility])  Comments:  Sent for stat labs  Creatinine improved to 1 5  Patient informed and hydrochlorothiazide held  Push hydration  Follow up in 3-4 days  Orders:  -     Comprehensive metabolic panel; Future  -     CBC and differential; Future          Subjective:      Patient ID: Curtis Pablo is a 58 y o  male  HPI called patient in for an appointment due to elevated creatinine on a blood draw earlier this week  Patient states he has not been taking any new medications including over-the-counter anti-inflammatories  He does however admit to smoking pot daily for pain relief especially given his past history of opioid abuse  He denies chest pain, dyspnea or palpitations  He denies fever, sweats or chills  He has no GI or  symptoms  He has no melena or blood per rectum  He denies hematuria  In no URI symptoms  He does mild orthostatic dizziness when he first gets up in the morning but this has been going on for years and is unchanged  The following portions of the patient's history were reviewed and updated as appropriate: allergies, current medications, past family history, past medical history, past social history, past surgical history and problem list     Review of Systems  see above  Objective:      /64 (BP Location: Left arm, Patient Position: Sitting)   Pulse 76   Temp 97 7 °F (36 5 °C) (Tympanic)   Resp 20   Wt 133 kg (294 lb)   SpO2 93%   BMI 41 00 kg/m²          Physical Exam  general pleasant in no acute distress  HEENT within normal limits  Neck is supple without JVD  Lungs clear to auscultation  Heart regular with no S3 or 4  Abdomen BS positive benign  Trace edema of the lower extremities    Neuro nonfocal

## 2018-12-14 DIAGNOSIS — R60.9 PERIPHERAL EDEMA: ICD-10-CM

## 2018-12-14 PROBLEM — N17.9 AKI (ACUTE KIDNEY INJURY) (HCC): Status: ACTIVE | Noted: 2018-12-14

## 2018-12-18 RX ORDER — FUROSEMIDE 40 MG/1
40 TABLET ORAL DAILY
Qty: 30 TABLET | Refills: 1 | Status: SHIPPED | OUTPATIENT
Start: 2018-12-18 | End: 2018-12-20 | Stop reason: SDUPTHER

## 2018-12-20 ENCOUNTER — OFFICE VISIT (OUTPATIENT)
Dept: FAMILY MEDICINE CLINIC | Facility: CLINIC | Age: 63
End: 2018-12-20
Payer: COMMERCIAL

## 2018-12-20 ENCOUNTER — APPOINTMENT (OUTPATIENT)
Dept: LAB | Facility: HOSPITAL | Age: 63
End: 2018-12-20
Attending: INTERNAL MEDICINE
Payer: COMMERCIAL

## 2018-12-20 ENCOUNTER — TRANSCRIBE ORDERS (OUTPATIENT)
Dept: ADMINISTRATIVE | Facility: HOSPITAL | Age: 63
End: 2018-12-20

## 2018-12-20 VITALS
HEART RATE: 87 BPM | SYSTOLIC BLOOD PRESSURE: 140 MMHG | HEIGHT: 71 IN | BODY MASS INDEX: 40.74 KG/M2 | TEMPERATURE: 98 F | OXYGEN SATURATION: 99 % | WEIGHT: 291 LBS | DIASTOLIC BLOOD PRESSURE: 80 MMHG

## 2018-12-20 DIAGNOSIS — R06.2 EXPIRATORY WHEEZING: ICD-10-CM

## 2018-12-20 DIAGNOSIS — R60.9 PERIPHERAL EDEMA: ICD-10-CM

## 2018-12-20 DIAGNOSIS — I10 ESSENTIAL HYPERTENSION: Chronic | ICD-10-CM

## 2018-12-20 DIAGNOSIS — C91.10 CLL (CHRONIC LYMPHOCYTIC LEUKEMIA) (HCC): ICD-10-CM

## 2018-12-20 DIAGNOSIS — N17.9 ACUTE RENAL FAILURE, UNSPECIFIED ACUTE RENAL FAILURE TYPE (HCC): Primary | ICD-10-CM

## 2018-12-20 DIAGNOSIS — C95.10 CHRONIC LEUKEMIA, NOT HAVING ACHIEVED REMISSION (HCC): ICD-10-CM

## 2018-12-20 DIAGNOSIS — N17.9 AKI (ACUTE KIDNEY INJURY) (HCC): Primary | ICD-10-CM

## 2018-12-20 PROBLEM — J44.9 CHRONIC OBSTRUCTIVE PULMONARY DISEASE (HCC): Status: ACTIVE | Noted: 2018-12-20

## 2018-12-20 LAB
ALBUMIN SERPL BCP-MCNC: 3.5 G/DL (ref 3.5–5)
ALP SERPL-CCNC: 81 U/L (ref 46–116)
ALT SERPL W P-5'-P-CCNC: 68 U/L (ref 12–78)
ANION GAP SERPL CALCULATED.3IONS-SCNC: 9 MMOL/L (ref 4–13)
AST SERPL W P-5'-P-CCNC: 30 U/L (ref 5–45)
BILIRUB SERPL-MCNC: 0.4 MG/DL (ref 0.2–1)
BUN SERPL-MCNC: 20 MG/DL (ref 5–25)
CALCIUM SERPL-MCNC: 9.5 MG/DL (ref 8.3–10.1)
CHLORIDE SERPL-SCNC: 101 MMOL/L (ref 100–108)
CO2 SERPL-SCNC: 31 MMOL/L (ref 21–32)
CREAT SERPL-MCNC: 1.19 MG/DL (ref 0.6–1.3)
GFR SERPL CREATININE-BSD FRML MDRD: 64 ML/MIN/1.73SQ M
GLUCOSE SERPL-MCNC: 110 MG/DL (ref 65–140)
POTASSIUM SERPL-SCNC: 3.8 MMOL/L (ref 3.5–5.3)
PROT SERPL-MCNC: 7.2 G/DL (ref 6.4–8.2)
SODIUM SERPL-SCNC: 141 MMOL/L (ref 136–145)

## 2018-12-20 PROCEDURE — 3008F BODY MASS INDEX DOCD: CPT | Performed by: FAMILY MEDICINE

## 2018-12-20 PROCEDURE — 99214 OFFICE O/P EST MOD 30 MIN: CPT | Performed by: FAMILY MEDICINE

## 2018-12-20 PROCEDURE — 80053 COMPREHEN METABOLIC PANEL: CPT

## 2018-12-20 RX ORDER — FUROSEMIDE 40 MG/1
40 TABLET ORAL DAILY
Qty: 30 TABLET | Refills: 5 | Status: SHIPPED | OUTPATIENT
Start: 2018-12-20 | End: 2019-01-17 | Stop reason: SDUPTHER

## 2018-12-20 NOTE — PROGRESS NOTES
Assessment/Plan:    No problem-specific Assessment & Plan notes found for this encounter  Diagnoses and all orders for this visit:    RA (acute kidney injury) ([facility] )  -     Basic metabolic panel; Future    Essential hypertension    CLL (chronic lymphocytic leukemia) ([facility])    Chronic obstructive pulmonary disease, unspecified COPD type ([facility] )    Peripheral edema  -     furosemide (LASIX) 40 mg tablet; Take 1 tablet (40 mg total) by mouth daily        Subjective:      Patient ID: Farheen Jasmine is a 58 y o  male  This is a follow-up appointment for 58year old white male with a recent history of acute kidney injury  His recent creatinine had risen to greater than 2  The creatinine done 3 days ago was 1 51  His baseline creatinine is normally below 1  The patient recently stopped taking his Lasix secondary to his kidney issues  The patient does have a history of peripheral edema  Patient is also being treated for CLL by Dr Luca Blackwood  He also has a history of hypertension  The patient has also been treated with HCTZ in the past but also was held secondary to his kidney issues  He denies any new complaints today except shortness of breath while walking up hills  The patient is a smoker  He does have a prescription for an albuterol inhaler but he states he has not been using this inhaler  The patient's weight is actually 3 lb down from his last office visit  The following portions of the patient's history were reviewed and updated as appropriate: allergies, current medications, past family history, past medical history, past social history, past surgical history and problem list     Review of Systems   Constitutional: Positive for fatigue  Respiratory:        Shortness of breath with exertion  Cardiovascular: Positive for leg swelling  Endocrine: Negative  Neurological: Negative  Psychiatric/Behavioral: Negative            Objective:      /80   Pulse 87   Temp 98 °F (36 7 °C)  5' 11" (1 803 m)   Wt 132 kg (291 lb)   SpO2 99%   BMI 40 59 kg/m²          Physical Exam   Constitutional: He is oriented to person, place, and time  The patient is morbidly obese with a BMI of 40 59 all   Cardiovascular: Normal rate, regular rhythm and normal heart sounds  Pulmonary/Chest: He has wheezes  The patient has bilateral expiratory wheezes  Musculoskeletal: He exhibits edema  The patient has +1 pitting edema in his ankles bilaterally   Neurological: He is alert and oriented to person, place, and time  He has normal reflexes  Psychiatric: He has a normal mood and affect  His behavior is normal  Judgment and thought content normal    Vitals reviewed

## 2018-12-20 NOTE — PATIENT INSTRUCTIONS
Patient has a history of acute kidney injury  The patient was given an order for BMP to be done today to check the status of his creatinine  The patient also has peripheral edema but the patient also had stop some of his diuretics  The patient's Lasix was refilled today and he would restart this medication to treat his peripheral edema  Patient blood pressure was relatively controlled today  The patient will continue his blood pressure medicines to control his blood pressure  The patient does have a follow-up appoint with Dr Godfrey Zhang on 12/31/2018  I instructed the patient with:  Back with the results of his kidney functions once the test his returned today  The patient was instructed to uses albuterol inhaler for his wheezing is present on exam today  Note that the patient is a smoker and he could actually have a diagnosis of COPD  The patient was advised to attempt to quit smoking  Patient will continue to follow up with Dr Darling Anna for the CLL

## 2018-12-23 RX ORDER — HYDROCHLOROTHIAZIDE 12.5 MG/1
12.5 CAPSULE, GELATIN COATED ORAL DAILY
Qty: 30 CAPSULE | Refills: 3 | Status: SHIPPED | OUTPATIENT
Start: 2018-12-23 | End: 2019-02-08 | Stop reason: SDUPTHER

## 2018-12-31 DIAGNOSIS — C95.90 LEUKEMIA NOT HAVING ACHIEVED REMISSION, UNSPECIFIED LEUKEMIA TYPE (HCC): ICD-10-CM

## 2019-01-07 RX ORDER — RISPERIDONE 2 MG/1
TABLET, FILM COATED ORAL
Qty: 60 TABLET | Refills: 2 | OUTPATIENT
Start: 2019-01-07

## 2019-01-17 ENCOUNTER — APPOINTMENT (OUTPATIENT)
Dept: LAB | Facility: HOSPITAL | Age: 64
End: 2019-01-17
Attending: INTERNAL MEDICINE
Payer: COMMERCIAL

## 2019-01-17 ENCOUNTER — OFFICE VISIT (OUTPATIENT)
Dept: HEMATOLOGY ONCOLOGY | Facility: MEDICAL CENTER | Age: 64
End: 2019-01-17
Payer: COMMERCIAL

## 2019-01-17 ENCOUNTER — TRANSCRIBE ORDERS (OUTPATIENT)
Dept: ADMINISTRATIVE | Facility: HOSPITAL | Age: 64
End: 2019-01-17

## 2019-01-17 VITALS
OXYGEN SATURATION: 94 % | BODY MASS INDEX: 40.88 KG/M2 | HEIGHT: 71 IN | TEMPERATURE: 98.4 F | WEIGHT: 292 LBS | SYSTOLIC BLOOD PRESSURE: 142 MMHG | RESPIRATION RATE: 17 BRPM | HEART RATE: 75 BPM | DIASTOLIC BLOOD PRESSURE: 74 MMHG

## 2019-01-17 DIAGNOSIS — C95.10 CHRONIC LEUKEMIA, NOT HAVING ACHIEVED REMISSION (HCC): Primary | ICD-10-CM

## 2019-01-17 DIAGNOSIS — N17.9 AKI (ACUTE KIDNEY INJURY) (HCC): ICD-10-CM

## 2019-01-17 DIAGNOSIS — R60.9 PERIPHERAL EDEMA: ICD-10-CM

## 2019-01-17 DIAGNOSIS — C95.10 CHRONIC LEUKEMIA, NOT HAVING ACHIEVED REMISSION (HCC): ICD-10-CM

## 2019-01-17 DIAGNOSIS — C91.10 CLL (CHRONIC LYMPHOCYTIC LEUKEMIA) (HCC): ICD-10-CM

## 2019-01-17 LAB
ALBUMIN SERPL BCP-MCNC: 3.8 G/DL (ref 3.5–5)
ALP SERPL-CCNC: 85 U/L (ref 46–116)
ALT SERPL W P-5'-P-CCNC: 54 U/L (ref 12–78)
ANION GAP SERPL CALCULATED.3IONS-SCNC: 9 MMOL/L (ref 4–13)
AST SERPL W P-5'-P-CCNC: 26 U/L (ref 5–45)
BASOPHILS # BLD MANUAL: 0 THOUSAND/UL (ref 0–0.1)
BASOPHILS NFR MAR MANUAL: 0 % (ref 0–1)
BILIRUB SERPL-MCNC: 0.4 MG/DL (ref 0.2–1)
BUN SERPL-MCNC: 20 MG/DL (ref 5–25)
CALCIUM SERPL-MCNC: 9.2 MG/DL (ref 8.3–10.1)
CHLORIDE SERPL-SCNC: 99 MMOL/L (ref 100–108)
CO2 SERPL-SCNC: 32 MMOL/L (ref 21–32)
CREAT SERPL-MCNC: 1.13 MG/DL (ref 0.6–1.3)
EOSINOPHIL # BLD MANUAL: 0 THOUSAND/UL (ref 0–0.4)
EOSINOPHIL NFR BLD MANUAL: 0 % (ref 0–6)
ERYTHROCYTE [DISTWIDTH] IN BLOOD BY AUTOMATED COUNT: 13.5 % (ref 11.6–15.1)
GFR SERPL CREATININE-BSD FRML MDRD: 68 ML/MIN/1.73SQ M
GLUCOSE SERPL-MCNC: 190 MG/DL (ref 65–140)
HCT VFR BLD AUTO: 41.8 % (ref 36.5–49.3)
HGB BLD-MCNC: 13.6 G/DL (ref 12–17)
LYMPHOCYTES # BLD AUTO: 16.05 THOUSAND/UL (ref 0.6–4.47)
LYMPHOCYTES # BLD AUTO: 75 % (ref 14–44)
MCH RBC QN AUTO: 31.1 PG (ref 26.8–34.3)
MCHC RBC AUTO-ENTMCNC: 32.5 G/DL (ref 31.4–37.4)
MCV RBC AUTO: 95 FL (ref 82–98)
MONOCYTES # BLD AUTO: 0.43 THOUSAND/UL (ref 0–1.22)
MONOCYTES NFR BLD: 2 % (ref 4–12)
NEUTROPHILS # BLD MANUAL: 4.92 THOUSAND/UL (ref 1.85–7.62)
NEUTS SEG NFR BLD AUTO: 23 % (ref 43–75)
NRBC BLD AUTO-RTO: 0 /100 WBCS
PLATELET # BLD AUTO: 182 THOUSANDS/UL (ref 149–390)
PLATELET BLD QL SMEAR: ADEQUATE
PMV BLD AUTO: 11.8 FL (ref 8.9–12.7)
POTASSIUM SERPL-SCNC: 3.7 MMOL/L (ref 3.5–5.3)
PROT SERPL-MCNC: 7.2 G/DL (ref 6.4–8.2)
RBC # BLD AUTO: 4.38 MILLION/UL (ref 3.88–5.62)
RBC MORPH BLD: NORMAL
SODIUM SERPL-SCNC: 140 MMOL/L (ref 136–145)
TOTAL CELLS COUNTED SPEC: 100
WBC # BLD AUTO: 21.4 THOUSAND/UL (ref 4.31–10.16)

## 2019-01-17 PROCEDURE — 36415 COLL VENOUS BLD VENIPUNCTURE: CPT

## 2019-01-17 PROCEDURE — 99213 OFFICE O/P EST LOW 20 MIN: CPT | Performed by: INTERNAL MEDICINE

## 2019-01-17 PROCEDURE — 85027 COMPLETE CBC AUTOMATED: CPT

## 2019-01-17 PROCEDURE — 80053 COMPREHEN METABOLIC PANEL: CPT

## 2019-01-17 PROCEDURE — 85007 BL SMEAR W/DIFF WBC COUNT: CPT

## 2019-01-17 NOTE — PROGRESS NOTES
Nigel Shelby  1955  [facility] [place] [facility] [place] [id number] [place] [id number]    DISCUSSION  SUMMARY:    78-year-old male with CLL presently on ibrutinib  Issues:    1  CLL  The CBC parameters good/stable  Clinically patient is better from hematology standpoint  The plan is to continue with the ibrutinib about the same dose  We once again discussed side effects and toxicities as well as the need for compliance  Mr  and Mrs Rafal Browne recently moved up to the / [facility]  Wife states that is more difficult to get down to the [facility]  We previously discussed this; options include transferring care to the [facility]  I have referred the patient to Dr Caryle Service  Patient is to return in 1 month here but this will likely change (be canceled) once Mr Rafal Browne has been established with Dr Caryle Service     2  Anemia - resolved  This was likely due to the fact that patient was not compliant with treatment previously  Hemoglobin level is being monitored      3  Psychiatric issues/depression  Psychiatry follow-up is ongoing      4    Weight gain  Weight has been stable recently  Etiology is likely multifactorial, possibly partially due to medications but clearly due to the patient's eating habits  Patient also has very little activity  We discussed nutritional options as well as a more active lifestyle  Patient knows to call the hematology/oncology office if there are any other questions or concerns  Carefully review your medication list and verify that the list is accurate and up-to-date  Please call the hematology/oncology office if there are medications missing from the list, medications on the list that you are not currently taking or if there is a dosage or instruction that is different from how you're taking that medication      Patient goals and areas of care: monitor CBC parameters, continue with the CLL treatment  Patient is able to self-care   ____________________________________________________________________________________________________    Chief Complaint   Patient presents with    Follow-up     Chronic lymphocytic leukemia     History of Present Illness:    27-year-old male recently admitted to [facility] with severe anemia  Bone marrow biopsy demonstrated CLL  Patient is on ibrutinib and returns for follow-up  Mr Lizeth Brownlee states feeling okay  No issues from hematology standpoint  No problems with the ibrutinib  Generalized body aches are the same as before  Patient denies any pain control issues  Wife is very concerned because patient basically eats and sleeps all day  Weight has been stable recently  No shortness of breath at rest, mild dyspnea on exertion but no different than before  Patient continues to smoke  No recent fevers, chills or sweats  No problems with excessive bruising or bleeding  No other GI or  issues  Patient and his wife recently moved to the [place] [address] - there finding it more difficult to travel down to the [facility]  Review of Systems   Constitutional: Positive for appetite change, fatigue and unexpected weight change  HENT: Negative  Eyes: Negative  Respiratory: Negative          +dyspnea on exertion   Cardiovascular: Negative  Gastrointestinal: Negative  Endocrine: Negative  Genitourinary: Negative  Musculoskeletal: Positive for arthralgias  Skin: Negative  Allergic/Immunologic: Negative  Neurological: Negative  Hematological: Negative  Psychiatric/Behavioral: Positive for behavioral problems  All other systems reviewed and are negative       Patient Active Problem List   Diagnosis    Hypertension    Depression    Polypharmacy    Bronchitis    Encephalopathy    Weakness    Leukemia (HCC)    Lactic acidosis    Leukocytosis    Elevated troponin    Bipolar 1 disorder (HCC)    Psychiatric disorder    Anemia    Encounter for smoking cessation counseling    Encounter for screening for lipid disorder    Screening for diabetes mellitus    Peripheral edema    CLL (chronic lymphocytic leukemia) ([place] [address] )    RA (acute kidney injury) ([place] [address] )    Chronic obstructive pulmonary disease (HCC)    Expiratory wheezing     Past Medical History:   Diagnosis Date    Anemia     Anxiety     Bipolar disorder ([place] [address] )     Cancer ([place] [address] )     History of alcohol abuse     Hypertension     Hypertension     Insomnia     Leukemia ([place] [address] )     Opiate abuse, episodic ([place] [address] )     Psychiatric disorder      Past Surgical History:   Procedure Laterality Date    EGD AND COLONOSCOPY N/A 3/30/2018    Procedure: EGD AND COLONOSCOPY;  Surgeon: Yisel Ruiz MD;  Location: [facility] GI [facility]; Service: Gastroenterology     Family History   Problem Relation Age of Onset    Coronary artery disease Mother     No Known Problems Father     Hepatitis Sister     Cancer Brother     Prostate cancer Brother     Early death Brother      Social History     Social History    Marital status: /Civil Union     Spouse name: N/A    Number of children: N/A    Years of education: N/A     Occupational History    Not on file       Social History Main Topics    Smoking status: Current Every Day Smoker     Packs/day: 1 50     Years: 40 00     Types: Cigarettes    Smokeless tobacco: Never Used    Alcohol use No      Comment: last drink nov 19 2017    Drug use: No      Comment: hx of heroin and subutex abuse    Sexual activity: Not Currently     Other Topics Concern    Not on file     Social History Narrative    No narrative on file       Current Outpatient Prescriptions:     albuterol (VENTOLIN HFA) 90 mcg/act inhaler, Inhale 2 puffs every 6 (six) hours as needed for wheezing, Disp: 18 g, Rfl: 0    ALPRAZolam (XANAX) 1 mg tablet, , Disp: , Rfl: 3    benztropine (COGENTIN) 1 mg tablet, Take 1 mg by mouth 2 (two) times a day , Disp: , Rfl:     divalproex sodium (DEPAKOTE) 500 mg EC tablet, Take 500 mg by mouth 2 (two) times a day one in the morning and 2 in the evening , Disp: , Rfl:     FLUoxetine (PROzac) 20 mg capsule, Take 60 mg by mouth daily  , Disp: , Rfl:     FLUoxetine (PROzac) 40 MG capsule, , Disp: , Rfl: 2    fluticasone-salmeterol (ADVAIR DISKUS) 250-50 mcg/dose inhaler, Inhale 1 puff 2 (two) times a day Rinse mouth after use , Disp: 1 Inhaler, Rfl: 3    furosemide (LASIX) 40 mg tablet, Take 1 tablet (40 mg total) by mouth daily, Disp: 30 tablet, Rfl: 5    hydrochlorothiazide (MICROZIDE) 12 5 mg capsule, TAKE 1 CAPSULE (12 5 MG TOTAL) BY MOUTH DAILY, Disp: 30 capsule, Rfl: 3    hydrOXYzine pamoate (VISTARIL) 50 mg capsule, Take 50 mg by mouth 2 (two) times a day, Disp: , Rfl:     Ibrutinib (IMBRUVICA) 420 MG TABS, Take 420 mg by mouth daily, Disp: 28 tablet, Rfl: 0    IMBRUVICA 420 MG TABS, TAKE 1 TABLET (420MG) BY MOUTH ONE TIME DAILY WITH A FULL GLASS OF WATER , Disp: 28 tablet, Rfl: 3    lisinopril (ZESTRIL) 40 mg tablet, Take 20 mg by mouth daily , Disp: , Rfl:     metoprolol tartrate (LOPRESSOR) 25 mg tablet, Take 0 5 tablets (12 5 mg total) by mouth every 12 (twelve) hours, Disp: 30 tablet, Rfl: 0    mirtazapine (REMERON) 15 mg tablet, Take 15 mg by mouth daily at bedtime, Disp: , Rfl:     potassium chloride (K-DUR,KLOR-CON) 20 mEq tablet, Take 1 tablet (20 mEq total) by mouth daily, Disp: 90 tablet, Rfl: 1    risperiDONE (RisperDAL) 2 mg tablet, Take 4 mg by mouth 2 (two) times a day  , Disp: , Rfl:     No Known Allergies    Physical Exam   Constitutional: He is oriented to person, place, and time  He appears well-developed and well-nourished  Obese male, no respiratory distress   HENT:   Head: Normocephalic and atraumatic  Right Ear: External ear normal    Left Ear: External ear normal    Nose: Nose normal    Mouth/Throat: Oropharynx is clear and moist    Eyes: Pupils are equal, round, and reactive to light   Conjunctivae and EOM are normal    Neck: Normal range of motion  Neck supple  Supple, no JVD, no thyromegaly, no adenopathy   Cardiovascular: Normal rate, regular rhythm, normal heart sounds and intact distal pulses  Pulmonary/Chest:   Fair to good air entry bilaterally, scattered bilateral rhonchi, no rales   Abdominal: Soft  Bowel sounds are normal    Abdomen is ++obese, nontender, cannot palpate liver or spleen, no rigidity or rebound, +bowel sounds   Musculoskeletal: Normal range of motion  Motor and sensory in all 4 extremities is equal bilaterally, good range of motion in 4 extremities   Neurological: He is alert and oriented to person, place, and time  He has normal reflexes  Skin: Skin is warm     Skin is warm, moist, good color, few upper extremities purpura, no petechiae, no active bleeding   Psychiatric: His behavior is normal  Judgment and thought content normal    Patient is responsive but muted, appropriate, otherwise pleasant   Extremities: 1 + bilateral lower extremity edema, no cords, pulses are 1+  Lymphatics: no adenopathy in the neck, supraclavicular region, axilla and groin bilaterally    Labs    01/17/2019 WBC = 21 4 hemoglobin = 13 6 hematocrit = 42 platelet = 493 (differential still pending)    11/12/2018 WBC = 30 9 hemoglobin = 13 1 hematocrit = 41 MCV = 100 platelet = 392 neutrophils = 11% lymphocytes = 85% monocyte = 4% BUN = 15 creatinine = 0 86 LFTs WNL  10/15/2018 WBC = 23 6 hemoglobin = 12 2 hematocrit = 37 9 MCV = 102 platelet = 224  89/26/1957 WBC = 34 7 hemoglobin = 12 hematocrit = 37 2 platelet = 398  30/91/2304 WBC = 55 8 hemoglobin = 10 5 hematocrit = 35 MCV = 6 platelet = 390  46/83/0357 WBC = 58 4 hemoglobin = 9 3 hematocrit = 30 platelet = 5  29/49/3534 WBC = 57 5 hemoglobin = 7 5 hematocrit = 23 5 platelet = 018  76/17/3989 WBC = 42 7 hemoglobin = 8 4 hematocrit = 26 platelet = 753  77/81/4142 WBC = 80 7 hemoglobin = 6 8 hematocrit = 22 1 platelet = 995 lymphocytes = 93%  06/07/2018 WBC = 104 5 hemoglobin = 7 5 hematocrit = 25 5 platelet = 439 neutrophil = 4% lymphocytes = 93% monocyte = 3%    Pathology    Case Report   Surgical Pathology Report                         Case: [id number]                                    Authorizing Provider: Kathrine Hernandez MD          Collected:           03/28/2018 1318               Ordering Location:     [facility] Received:            03/28/2018 1354                                      [address]                                                                       Pathologist:           Patricia Gillespie MD                                                         Specimens:   A) - Iliac Crest, Left, Bone Marrow   2 Cores                                                        B) - Iliac Crest, Left, Bone Marrow                                                                  C) - Iliac Crest, Left, Bone Marrow  2T  P  Slides, 2 Asp Stained , 8 Asp Unstained          Addendum   - Fluorescence in situ hybridization (FISH) ([facility]#[id number], evaluated by ORLIN Snell , Ph D ) for genetic abnormalities which may be associated with myelodysplastic syndrome is as follows:        Interpretation  1  No evidence of trisomy 12 (+12)  2  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  Comment: Deletions involving 13q14 are detectable by FISH in approximately 50% of persons with CLL  Among them,  about 00% show biallelic or concomitant monoallelic and biallelic deletion  As a group and as the sole aberration, del(13q)  is associated with a more favorable outcome  3  No evidence of p53 (17p13) deletion or amplification  4  No evidence of RENAE (11q22 3) deletion       - IgVH Mutation analysis ([facility]#[id number], evaluated by Dr Mouna Conde, Ph D )  is as follows:   IGVH MUTATION ANALYSIS: UNMUTATED - CLL  RESULTS  Mutation Rate: 0%  IgVH Family: IGHV1-2*04     -  Cytogenetic studies as performed on the bone marrow aspirate @ [facility] (Specimen ID: [id number], evaluated by Louis Curry PhD, [facility] ) as follows:  Karyotype:  46,XY,add(18)(p11 3)[6]/46,XY[1], LIMITED ANALYSIS   Cytogenetics Analysis:  : Metaphases Counted          Metaphases Analyzed            Metaphases Karyotyped        GTG Band level                       Culture Type(s)               7                                     7                                     4                        300-400                                       [id number]   Interpretation: Abnormal male karyotype  This is an incomplete study and only 7 cells (as opposed to 20 cells) were available for analysis  Cytogenetic analysis  shows the presence of an abnormal clone (6 out of 7 cells) characterized by addition of material of unknown origin to  18p11  3  No other aberrations were identified, and one normal cell was found during the course of analysis  These current cytogenetic results are consistent with the presence of abnormal clonal cells  Correlation with  hematopathology and follow-up bone marrow studies are recommended to further evaluate the significance of these  findings  Microarray analysis could be considered to further characterize this rearrangement             Addendum electronically signed by Sal Smith MD on 4/6/2018 at  4:12 PM     Addendum electronically signed by Sal Smith MD on 4/6/2018 at  4:12 PM   Final Diagnosis   A -C  Bone marrow,  left iliac crest,  biopsy and aspirate:  -  Chronic lymphocytic leukemia/small lymphocytic lymphoma (CLL/SLL) (see note)  -  Significantly decreased trilineage myelopoiesis with normal appearing morphology and maturation without significant features of dysplasia or increased myeloblasts, secondary to the above  -  Anemia, neutropenia, and lymphocytosis, secondary to the above  -  Normal stainable storage iron  -  Mildly increased reticulin fibers without fibrosis, secondary to the above  -  Negative for collagen fibrosis, granulomata, vasculitis, necrosis           Electronically signed by Stu Alfonso MD on 3/29/2018 at  2:51 P

## 2019-01-18 RX ORDER — FUROSEMIDE 40 MG/1
40 TABLET ORAL DAILY
Qty: 30 TABLET | Refills: 5 | Status: SHIPPED | OUTPATIENT
Start: 2019-01-18 | End: 2019-07-12 | Stop reason: SDUPTHER

## 2019-01-22 DIAGNOSIS — C95.90 LEUKEMIA NOT HAVING ACHIEVED REMISSION, UNSPECIFIED LEUKEMIA TYPE (HCC): ICD-10-CM

## 2019-01-28 DIAGNOSIS — I10 ESSENTIAL HYPERTENSION: Primary | ICD-10-CM

## 2019-01-28 RX ORDER — LISINOPRIL 40 MG/1
40 TABLET ORAL DAILY
Qty: 90 TABLET | Refills: 1 | Status: SHIPPED | OUTPATIENT
Start: 2019-01-28 | End: 2020-02-20 | Stop reason: SDUPTHER

## 2019-02-04 ENCOUNTER — APPOINTMENT (OUTPATIENT)
Dept: LAB | Facility: CLINIC | Age: 64
End: 2019-02-04
Payer: COMMERCIAL

## 2019-02-04 DIAGNOSIS — C95.10 CHRONIC LEUKEMIA, NOT HAVING ACHIEVED REMISSION (HCC): ICD-10-CM

## 2019-02-04 LAB
ALBUMIN SERPL BCP-MCNC: 4 G/DL (ref 3.5–5)
ALP SERPL-CCNC: 73 U/L (ref 46–116)
ALT SERPL W P-5'-P-CCNC: 46 U/L (ref 12–78)
ANION GAP SERPL CALCULATED.3IONS-SCNC: 6 MMOL/L (ref 4–13)
AST SERPL W P-5'-P-CCNC: 19 U/L (ref 5–45)
BILIRUB SERPL-MCNC: 0.48 MG/DL (ref 0.2–1)
BUN SERPL-MCNC: 16 MG/DL (ref 5–25)
CALCIUM SERPL-MCNC: 9.9 MG/DL (ref 8.3–10.1)
CHLORIDE SERPL-SCNC: 97 MMOL/L (ref 100–108)
CO2 SERPL-SCNC: 32 MMOL/L (ref 21–32)
CREAT SERPL-MCNC: 1.09 MG/DL (ref 0.6–1.3)
GFR SERPL CREATININE-BSD FRML MDRD: 71 ML/MIN/1.73SQ M
GLUCOSE SERPL-MCNC: 129 MG/DL (ref 65–140)
POTASSIUM SERPL-SCNC: 3.7 MMOL/L (ref 3.5–5.3)
PROT SERPL-MCNC: 7.3 G/DL (ref 6.4–8.2)
SODIUM SERPL-SCNC: 135 MMOL/L (ref 136–145)

## 2019-02-04 PROCEDURE — 80053 COMPREHEN METABOLIC PANEL: CPT

## 2019-02-08 DIAGNOSIS — I10 ESSENTIAL HYPERTENSION: Chronic | ICD-10-CM

## 2019-02-08 RX ORDER — HYDROCHLOROTHIAZIDE 12.5 MG/1
12.5 CAPSULE, GELATIN COATED ORAL DAILY
Qty: 30 CAPSULE | Refills: 3 | Status: SHIPPED | OUTPATIENT
Start: 2019-02-08 | End: 2019-06-11 | Stop reason: SDUPTHER

## 2019-02-23 ENCOUNTER — APPOINTMENT (OUTPATIENT)
Dept: LAB | Facility: CLINIC | Age: 64
End: 2019-02-23
Payer: COMMERCIAL

## 2019-02-23 DIAGNOSIS — C95.10 CHRONIC LEUKEMIA, NOT HAVING ACHIEVED REMISSION (HCC): ICD-10-CM

## 2019-02-23 LAB
ALBUMIN SERPL BCP-MCNC: 4 G/DL (ref 3.5–5)
ALP SERPL-CCNC: 68 U/L (ref 46–116)
ALT SERPL W P-5'-P-CCNC: 44 U/L (ref 12–78)
ANION GAP SERPL CALCULATED.3IONS-SCNC: 9 MMOL/L (ref 4–13)
AST SERPL W P-5'-P-CCNC: 19 U/L (ref 5–45)
BILIRUB SERPL-MCNC: 0.59 MG/DL (ref 0.2–1)
BUN SERPL-MCNC: 17 MG/DL (ref 5–25)
CALCIUM SERPL-MCNC: 9.6 MG/DL (ref 8.3–10.1)
CHLORIDE SERPL-SCNC: 98 MMOL/L (ref 100–108)
CO2 SERPL-SCNC: 30 MMOL/L (ref 21–32)
CREAT SERPL-MCNC: 0.95 MG/DL (ref 0.6–1.3)
GFR SERPL CREATININE-BSD FRML MDRD: 84 ML/MIN/1.73SQ M
GLUCOSE SERPL-MCNC: 144 MG/DL (ref 65–140)
POTASSIUM SERPL-SCNC: 3.9 MMOL/L (ref 3.5–5.3)
PROT SERPL-MCNC: 7.4 G/DL (ref 6.4–8.2)
SODIUM SERPL-SCNC: 137 MMOL/L (ref 136–145)

## 2019-02-23 PROCEDURE — 80053 COMPREHEN METABOLIC PANEL: CPT

## 2019-03-04 DIAGNOSIS — C95.90 LEUKEMIA NOT HAVING ACHIEVED REMISSION, UNSPECIFIED LEUKEMIA TYPE (HCC): ICD-10-CM

## 2019-03-09 ENCOUNTER — APPOINTMENT (OUTPATIENT)
Dept: LAB | Facility: CLINIC | Age: 64
End: 2019-03-09
Payer: COMMERCIAL

## 2019-03-09 DIAGNOSIS — C95.10 CHRONIC LEUKEMIA, NOT HAVING ACHIEVED REMISSION (HCC): Primary | ICD-10-CM

## 2019-03-09 LAB
ALBUMIN SERPL BCP-MCNC: 3.7 G/DL (ref 3.5–5)
ALP SERPL-CCNC: 68 U/L (ref 46–116)
ALT SERPL W P-5'-P-CCNC: 48 U/L (ref 12–78)
ANION GAP SERPL CALCULATED.3IONS-SCNC: 6 MMOL/L (ref 4–13)
AST SERPL W P-5'-P-CCNC: 19 U/L (ref 5–45)
BILIRUB SERPL-MCNC: 0.5 MG/DL (ref 0.2–1)
BUN SERPL-MCNC: 13 MG/DL (ref 5–25)
CALCIUM SERPL-MCNC: 8.8 MG/DL (ref 8.3–10.1)
CHLORIDE SERPL-SCNC: 100 MMOL/L (ref 100–108)
CO2 SERPL-SCNC: 33 MMOL/L (ref 21–32)
CREAT SERPL-MCNC: 1 MG/DL (ref 0.6–1.3)
GFR SERPL CREATININE-BSD FRML MDRD: 79 ML/MIN/1.73SQ M
GLUCOSE P FAST SERPL-MCNC: 192 MG/DL (ref 65–99)
POTASSIUM SERPL-SCNC: 3.7 MMOL/L (ref 3.5–5.3)
PROT SERPL-MCNC: 6.9 G/DL (ref 6.4–8.2)
SODIUM SERPL-SCNC: 139 MMOL/L (ref 136–145)

## 2019-03-09 PROCEDURE — 80053 COMPREHEN METABOLIC PANEL: CPT

## 2019-03-19 DIAGNOSIS — J44.9 CHRONIC OBSTRUCTIVE PULMONARY DISEASE, UNSPECIFIED COPD TYPE (HCC): ICD-10-CM

## 2019-03-19 RX ORDER — RISPERIDONE 2 MG/1
TABLET, FILM COATED ORAL
Qty: 60 TABLET | Refills: 2 | OUTPATIENT
Start: 2019-03-19

## 2019-03-26 ENCOUNTER — APPOINTMENT (OUTPATIENT)
Dept: LAB | Facility: CLINIC | Age: 64
End: 2019-03-26
Payer: COMMERCIAL

## 2019-03-26 DIAGNOSIS — I10 ESSENTIAL HYPERTENSION: Primary | Chronic | ICD-10-CM

## 2019-03-26 LAB
ALBUMIN SERPL BCP-MCNC: 3.8 G/DL (ref 3.5–5)
ALP SERPL-CCNC: 82 U/L (ref 46–116)
ALT SERPL W P-5'-P-CCNC: 52 U/L (ref 12–78)
ANION GAP SERPL CALCULATED.3IONS-SCNC: 4 MMOL/L (ref 4–13)
AST SERPL W P-5'-P-CCNC: 22 U/L (ref 5–45)
BILIRUB SERPL-MCNC: 0.41 MG/DL (ref 0.2–1)
BUN SERPL-MCNC: 16 MG/DL (ref 5–25)
CALCIUM SERPL-MCNC: 9 MG/DL (ref 8.3–10.1)
CHLORIDE SERPL-SCNC: 98 MMOL/L (ref 100–108)
CO2 SERPL-SCNC: 33 MMOL/L (ref 21–32)
CREAT SERPL-MCNC: 1.01 MG/DL (ref 0.6–1.3)
GFR SERPL CREATININE-BSD FRML MDRD: 78 ML/MIN/1.73SQ M
GLUCOSE P FAST SERPL-MCNC: 235 MG/DL (ref 65–99)
POTASSIUM SERPL-SCNC: 3.7 MMOL/L (ref 3.5–5.3)
PROT SERPL-MCNC: 7.1 G/DL (ref 6.4–8.2)
SODIUM SERPL-SCNC: 135 MMOL/L (ref 136–145)

## 2019-03-26 PROCEDURE — 80053 COMPREHEN METABOLIC PANEL: CPT

## 2019-04-05 ENCOUNTER — OFFICE VISIT (OUTPATIENT)
Dept: HEMATOLOGY ONCOLOGY | Facility: CLINIC | Age: 64
End: 2019-04-05
Payer: COMMERCIAL

## 2019-04-05 ENCOUNTER — APPOINTMENT (OUTPATIENT)
Dept: LAB | Facility: CLINIC | Age: 64
End: 2019-04-05
Payer: COMMERCIAL

## 2019-04-05 ENCOUNTER — TRANSCRIBE ORDERS (OUTPATIENT)
Dept: LAB | Facility: CLINIC | Age: 64
End: 2019-04-05

## 2019-04-05 VITALS
HEIGHT: 71 IN | SYSTOLIC BLOOD PRESSURE: 112 MMHG | RESPIRATION RATE: 18 BRPM | TEMPERATURE: 98.1 F | OXYGEN SATURATION: 93 % | DIASTOLIC BLOOD PRESSURE: 72 MMHG | HEART RATE: 74 BPM | WEIGHT: 294 LBS | BODY MASS INDEX: 41.16 KG/M2

## 2019-04-05 DIAGNOSIS — D72.820 LYMPHOCYTOSIS: ICD-10-CM

## 2019-04-05 DIAGNOSIS — C91.10 CLL (CHRONIC LYMPHOCYTIC LEUKEMIA) (HCC): Primary | ICD-10-CM

## 2019-04-05 DIAGNOSIS — D64.81 ANEMIA DUE TO ANTINEOPLASTIC CHEMOTHERAPY: ICD-10-CM

## 2019-04-05 DIAGNOSIS — T45.1X5A ANEMIA DUE TO ANTINEOPLASTIC CHEMOTHERAPY: ICD-10-CM

## 2019-04-05 DIAGNOSIS — C95.10 CHRONIC LEUKEMIA, NOT HAVING ACHIEVED REMISSION (HCC): ICD-10-CM

## 2019-04-05 DIAGNOSIS — C95.10 CHRONIC LEUKEMIA, NOT HAVING ACHIEVED REMISSION (HCC): Primary | ICD-10-CM

## 2019-04-05 LAB
BASOPHILS # BLD MANUAL: 0 THOUSAND/UL (ref 0–0.1)
BASOPHILS NFR MAR MANUAL: 0 % (ref 0–1)
EOSINOPHIL # BLD MANUAL: 0 THOUSAND/UL (ref 0–0.4)
EOSINOPHIL NFR BLD MANUAL: 0 % (ref 0–6)
ERYTHROCYTE [DISTWIDTH] IN BLOOD BY AUTOMATED COUNT: 13.1 % (ref 11.6–15.1)
HCT VFR BLD AUTO: 42.4 % (ref 36.5–49.3)
HGB BLD-MCNC: 14 G/DL (ref 12–17)
LYMPHOCYTES # BLD AUTO: 11.94 THOUSAND/UL (ref 0.6–4.47)
LYMPHOCYTES # BLD AUTO: 73 % (ref 14–44)
MCH RBC QN AUTO: 30.6 PG (ref 26.8–34.3)
MCHC RBC AUTO-ENTMCNC: 33 G/DL (ref 31.4–37.4)
MCV RBC AUTO: 93 FL (ref 82–98)
MONOCYTES # BLD AUTO: 0 THOUSAND/UL (ref 0–1.22)
MONOCYTES NFR BLD: 0 % (ref 4–12)
NEUTROPHILS # BLD MANUAL: 4.42 THOUSAND/UL (ref 1.85–7.62)
NEUTS SEG NFR BLD AUTO: 27 % (ref 43–75)
NRBC BLD AUTO-RTO: 0 /100 WBCS
PLATELET # BLD AUTO: 172 THOUSANDS/UL (ref 149–390)
PLATELET BLD QL SMEAR: ADEQUATE
PMV BLD AUTO: 11.5 FL (ref 8.9–12.7)
RBC # BLD AUTO: 4.57 MILLION/UL (ref 3.88–5.62)
RBC MORPH BLD: NORMAL
WBC # BLD AUTO: 16.36 THOUSAND/UL (ref 4.31–10.16)

## 2019-04-05 PROCEDURE — 36415 COLL VENOUS BLD VENIPUNCTURE: CPT

## 2019-04-05 PROCEDURE — 85007 BL SMEAR W/DIFF WBC COUNT: CPT

## 2019-04-05 PROCEDURE — 99214 OFFICE O/P EST MOD 30 MIN: CPT | Performed by: INTERNAL MEDICINE

## 2019-04-05 PROCEDURE — 85027 COMPLETE CBC AUTOMATED: CPT

## 2019-05-06 ENCOUNTER — OFFICE VISIT (OUTPATIENT)
Dept: FAMILY MEDICINE CLINIC | Facility: CLINIC | Age: 64
End: 2019-05-06
Payer: COMMERCIAL

## 2019-05-06 VITALS
SYSTOLIC BLOOD PRESSURE: 144 MMHG | OXYGEN SATURATION: 96 % | HEART RATE: 65 BPM | RESPIRATION RATE: 20 BRPM | WEIGHT: 284.7 LBS | TEMPERATURE: 97.5 F | HEIGHT: 71 IN | DIASTOLIC BLOOD PRESSURE: 80 MMHG | BODY MASS INDEX: 39.86 KG/M2

## 2019-05-06 DIAGNOSIS — C91.10 CLL (CHRONIC LYMPHOCYTIC LEUKEMIA) (HCC): ICD-10-CM

## 2019-05-06 DIAGNOSIS — Z71.6 ENCOUNTER FOR SMOKING CESSATION COUNSELING: ICD-10-CM

## 2019-05-06 DIAGNOSIS — E08.00 DIABETES MELLITUS DUE TO UNDERLYING CONDITION WITH HYPEROSMOLARITY WITHOUT COMA, WITHOUT LONG-TERM CURRENT USE OF INSULIN (HCC): Primary | ICD-10-CM

## 2019-05-06 DIAGNOSIS — I10 ESSENTIAL HYPERTENSION: Chronic | ICD-10-CM

## 2019-05-06 DIAGNOSIS — R60.9 PERIPHERAL EDEMA: ICD-10-CM

## 2019-05-06 DIAGNOSIS — F31.9 BIPOLAR 1 DISORDER (HCC): ICD-10-CM

## 2019-05-06 LAB — SL AMB POCT HEMOGLOBIN AIC: 6.9 (ref ?–6.5)

## 2019-05-06 PROCEDURE — 3044F HG A1C LEVEL LT 7.0%: CPT | Performed by: FAMILY MEDICINE

## 2019-05-06 PROCEDURE — 99214 OFFICE O/P EST MOD 30 MIN: CPT | Performed by: FAMILY MEDICINE

## 2019-05-06 PROCEDURE — 83036 HEMOGLOBIN GLYCOSYLATED A1C: CPT | Performed by: FAMILY MEDICINE

## 2019-05-06 PROCEDURE — 3008F BODY MASS INDEX DOCD: CPT | Performed by: FAMILY MEDICINE

## 2019-05-18 ENCOUNTER — APPOINTMENT (OUTPATIENT)
Dept: LAB | Facility: CLINIC | Age: 64
End: 2019-05-18
Payer: COMMERCIAL

## 2019-05-31 ENCOUNTER — TELEPHONE (OUTPATIENT)
Dept: HEMATOLOGY ONCOLOGY | Facility: CLINIC | Age: 64
End: 2019-05-31

## 2019-05-31 DIAGNOSIS — C95.90 LEUKEMIA NOT HAVING ACHIEVED REMISSION, UNSPECIFIED LEUKEMIA TYPE (HCC): ICD-10-CM

## 2019-06-03 ENCOUNTER — DOCUMENTATION (OUTPATIENT)
Dept: HEMATOLOGY ONCOLOGY | Facility: CLINIC | Age: 64
End: 2019-06-03

## 2019-06-03 DIAGNOSIS — R60.9 EDEMA, UNSPECIFIED TYPE: ICD-10-CM

## 2019-06-03 RX ORDER — POTASSIUM CHLORIDE 20 MEQ/1
20 TABLET, EXTENDED RELEASE ORAL DAILY
Qty: 90 TABLET | Refills: 1 | Status: SHIPPED | OUTPATIENT
Start: 2019-06-03 | End: 2020-02-20 | Stop reason: SDUPTHER

## 2019-06-11 ENCOUNTER — TELEPHONE (OUTPATIENT)
Dept: FAMILY MEDICINE CLINIC | Facility: CLINIC | Age: 64
End: 2019-06-11

## 2019-06-11 DIAGNOSIS — I10 ESSENTIAL HYPERTENSION: Chronic | ICD-10-CM

## 2019-06-12 RX ORDER — HYDROCHLOROTHIAZIDE 12.5 MG/1
CAPSULE, GELATIN COATED ORAL
Qty: 30 CAPSULE | Refills: 2 | Status: SHIPPED | OUTPATIENT
Start: 2019-06-12 | End: 2020-10-19 | Stop reason: ALTCHOICE

## 2019-07-06 ENCOUNTER — APPOINTMENT (OUTPATIENT)
Dept: LAB | Facility: CLINIC | Age: 64
End: 2019-07-06
Payer: COMMERCIAL

## 2019-07-12 DIAGNOSIS — R60.9 PERIPHERAL EDEMA: ICD-10-CM

## 2019-07-15 RX ORDER — FUROSEMIDE 40 MG/1
TABLET ORAL
Qty: 30 TABLET | Refills: 4 | Status: SHIPPED | OUTPATIENT
Start: 2019-07-15 | End: 2020-02-20 | Stop reason: SDUPTHER

## 2019-07-25 ENCOUNTER — TELEPHONE (OUTPATIENT)
Dept: HEMATOLOGY ONCOLOGY | Facility: CLINIC | Age: 64
End: 2019-07-25

## 2019-07-25 DIAGNOSIS — C95.90 LEUKEMIA NOT HAVING ACHIEVED REMISSION, UNSPECIFIED LEUKEMIA TYPE (HCC): ICD-10-CM

## 2019-07-25 RX ORDER — IBRUTINIB 420 MG/1
TABLET, FILM COATED ORAL
Qty: 28 TABLET | Refills: 2 | OUTPATIENT
Start: 2019-07-25

## 2019-07-25 NOTE — TELEPHONE ENCOUNTER
Call transferred from 2 [facility]  Patients wife Neri Mireles called to request a refill on patients Ibrutinib  I did advise Neri Mireles that the prescription is pending Dr Melodie Vasquez signature  Neri Mireles stated that he has 5 pills left  Avised patients wife as soon as Dr Javed Thomas signs it it will be sent to the pharmacy  Call back # [phone]

## 2019-07-26 ENCOUNTER — TELEPHONE (OUTPATIENT)
Dept: HEMATOLOGY ONCOLOGY | Facility: CLINIC | Age: 64
End: 2019-07-26

## 2019-07-26 DIAGNOSIS — C95.90 LEUKEMIA NOT HAVING ACHIEVED REMISSION, UNSPECIFIED LEUKEMIA TYPE (HCC): ICD-10-CM

## 2019-07-26 NOTE — TELEPHONE ENCOUNTER
The patients wife called stating that she needs a refill on the patients IMBRUVICA  It looks like it was sent to Dr Mora Blount but this patient is now a dr Brigida Abernathy patient  Can this be refilled for the patient  Please call the patient back at [phone]  It needs to to be sent to [facility]  [address]

## 2019-08-01 ENCOUNTER — OFFICE VISIT (OUTPATIENT)
Dept: HEMATOLOGY ONCOLOGY | Facility: CLINIC | Age: 64
End: 2019-08-01
Payer: COMMERCIAL

## 2019-08-01 VITALS
OXYGEN SATURATION: 95 % | WEIGHT: 260 LBS | TEMPERATURE: 98.4 F | SYSTOLIC BLOOD PRESSURE: 134 MMHG | HEART RATE: 65 BPM | HEIGHT: 71 IN | RESPIRATION RATE: 16 BRPM | DIASTOLIC BLOOD PRESSURE: 80 MMHG | BODY MASS INDEX: 36.4 KG/M2

## 2019-08-01 DIAGNOSIS — C91.10 CLL (CHRONIC LYMPHOCYTIC LEUKEMIA) (HCC): Primary | ICD-10-CM

## 2019-08-01 DIAGNOSIS — T45.1X5A ANEMIA DUE TO ANTINEOPLASTIC CHEMOTHERAPY: ICD-10-CM

## 2019-08-01 DIAGNOSIS — R63.4 WEIGHT LOSS: ICD-10-CM

## 2019-08-01 DIAGNOSIS — D64.81 ANEMIA DUE TO ANTINEOPLASTIC CHEMOTHERAPY: ICD-10-CM

## 2019-08-01 DIAGNOSIS — R60.9 PERIPHERAL EDEMA: ICD-10-CM

## 2019-08-01 PROCEDURE — 99214 OFFICE O/P EST MOD 30 MIN: CPT | Performed by: INTERNAL MEDICINE

## 2019-08-01 NOTE — PROGRESS NOTES
HEMATOLOGY / ONCOLOGY CLINIC NOTE    Primary Care Provider: Emily Mason MD  Referring Provider:    MRN: [id number]  : 1955    Reason for Encounter:    Chief Complaint   Patient presents with   Janina Garcia Follow-up         Hematology / Oncology History:     Maribel Solo is a 58 y o  male who came in for follow up      1, CLL  - ?  Stage III with anemia; also having splenomegaly  No FISH panel  - has been on ibrutinib for 20 mg since 2018   - highest white blood cell 128k, decreased to 16 K now     2, anemia  - resolved        Assessment / Plan:     1  CLL (chronic lymphocytic leukemia) ([facility] [address] )  - as above, has been on ibrutinib for year  Had a good response  Lymphocytosis decreased significantly to 9 K now    -based overall tolerated treatment well no bleeding, no thrombosis, no frequent infection  Patient reported has been losing weight however albumin level is in normal range  No major concern as for now  - plan to continue current treatment, indefinite if tolerated  Patient is in the process of changing insurance, likely to be a gap until Medicare case in in 2019  We plan to change lab to every 2 months and follow in 4 months  Will ask  to evaluate patient's social situation  2  Lymphocytosis  - as above, numbers are keep trending down  Suggesting a good therapeutic effect      3  Anemia due to antineoplastic chemotherapy  - hemoglobin 13  Stable  4, weight loss  - patient reported this is due to poor appetite  Unclear etiology  Albumin is normal        25       minutes were spent face to face with patient with greater than 50% of the time spent in counseling or coordination of care including discussions of treatment instructions  All of the patient's questions were answered to their satisfactory during this discussion  Advised pt to call if there is any further questions  Interval History:     2019:  Came in for follow-up    Reported has been doing okay at baseline health status  Using home oxygen for COPD  No bleeding, no leg swelling, no new chest pain, cough, hemoptysis  No frequent infection  8/1/2019 :  Came in for follow-up  Overall doing well  Reported has been losing weight about 20 lb  No lumps bumps  No other constitutional symptoms  No bleeding no frequent infection  Problem list:       Patient Active Problem List   Diagnosis    Hypertension    Depression    Polypharmacy    Bronchitis    Encephalopathy    Weakness    Leukemia (HCC)    Lactic acidosis    Leukocytosis    Elevated troponin    Bipolar 1 disorder (HCC)    Psychiatric disorder    Anemia    Encounter for smoking cessation counseling    Encounter for screening for lipid disorder    Screening for diabetes mellitus    Peripheral edema    CLL (chronic lymphocytic leukemia) ([facility] )    RA (acute kidney injury) ([place] [address] )    Chronic obstructive pulmonary disease (HCC)    Expiratory wheezing       PHYSICIAL EXAMINATION:       Vital Signs:   [unfilled]  Body mass index is 36 26 kg/m²  Body surface area is 2 36 meters squared  Obesity  Bilateral lower extremity trace swelling   No major difference from previous visit    GEN: Alert, awake oriented x3, in no acute distress  HEENT- No pallor, icterus, cyanosis, no oral mucosal lesions,   LAD - no palpable cervical, clavicle, axillary, inguinal LAD  Heart- normal S1 S2, regular rate and rhythm, No murmur, rubs  Lungs- decreased breathing sound bilateral    Abdomen- soft, Non tender, bowel sounds present  Extremities- No cyanosis, clubbing, edema  Neuro- No focal neurological deficit           PAST MEDICAL HISTORY:   has a past medical history of Anemia, Anxiety, Bipolar disorder ([facility] ), Cancer ([place] [address] ), History of alcohol abuse, Hypertension, Hypertension, Insomnia, Leukemia ([place] [address] ), Opiate abuse, episodic ([place] [address] ), and Psychiatric disorder      PAST SURGICAL HISTORY:   has a past surgical history that includes EGD AND COLONOSCOPY (N/A, 3/30/2018)  CURRENT MEDICATIONS:     Current Outpatient Medications   Medication Sig Dispense Refill    ADVAIR DISKUS 250-50 MCG/DOSE inhaler INHALE 1 PUFF 2 (TWO) TIMES A DAY RINSE MOUTH AFTER USE  60 each 3    albuterol (VENTOLIN HFA) 90 mcg/act inhaler Inhale 2 puffs every 6 (six) hours as needed for wheezing 18 g 0    ALPRAZolam (XANAX) 1 mg tablet   3    benztropine (COGENTIN) 1 mg tablet Take 1 mg by mouth 2 (two) times a day       divalproex sodium (DEPAKOTE) 500 mg EC tablet Take 500 mg by mouth 2 (two) times a day one in the morning and 2 in the evening       FLUoxetine (PROzac) 20 mg capsule Take 60 mg by mouth daily        FLUoxetine (PROzac) 40 MG capsule   2    furosemide (LASIX) 40 mg tablet TAKE ONE TABLET BY MOUTH DAILY 30 tablet 4    hydrochlorothiazide (MICROZIDE) 12 5 mg capsule TAKE ONE CAPSULE BY MOUTH DAILY 30 capsule 2    Ibrutinib (IMBRUVICA) 420 MG TABS Take 420 mg by mouth daily 28 tablet 3    lisinopril (ZESTRIL) 40 mg tablet Take 1 tablet (40 mg total) by mouth daily 90 tablet 1    mirtazapine (REMERON) 15 mg tablet Take 15 mg by mouth daily at bedtime      potassium chloride (K-DUR,KLOR-CON) 20 mEq tablet Take 1 tablet (20 mEq total) by mouth daily 90 tablet 1     No current facility-administered medications for this visit  [unfilled]    SOCIAL HISTORY:   reports that he has been smoking cigarettes  He has a 60 00 pack-year smoking history  He has never used smokeless tobacco  He reports that he does not drink alcohol or use drugs  FAMILY HISTORY:  family history includes Cancer in his brother; Coronary artery disease in his mother; Early death in his brother; Hepatitis in his sister; No Known Problems in his father; Prostate cancer in his brother  ALLERGIES:  has No Known Allergies      REVIEW OF SYSTEMS:  Please note that a 14-point review of systems was performed to include Constitutional, HEENT, Respiratory, CVS, GI, , Musculoskeletal, Integumentary, Neurologic, Rheumatologic, Endocrinologic, Psychiatric, Lymphatic, and Hematologic/Oncologic systems were reviewed and are negative unless otherwise stated in HPI  Positive and negative findings pertinent to this evaluation are incorporated into the history of present illness  LAB:    Lab Results   Component Value Date    WBC 15 85 (H) 07/06/2019    HGB 13 4 07/06/2019    HCT 39 9 07/06/2019    MCV 92 07/06/2019     07/06/2019       Lab Results   Component Value Date    SODIUM 139 07/06/2019    K 3 6 07/06/2019     07/06/2019    CO2 29 07/06/2019    AGAP 6 07/06/2019    BUN 12 07/06/2019    CREATININE 1 06 07/06/2019    GLUC 157 (H) 05/18/2019    GLUF 123 (H) 07/06/2019    CALCIUM 8 5 07/06/2019    AST 19 07/06/2019    ALT 49 07/06/2019    ALKPHOS 78 07/06/2019    TP 6 7 07/06/2019    TBILI 0 54 07/06/2019    EGFR 74 07/06/2019       CBC with diff:       Invalid input(s):  RBC, TOTALCELLSCOUNTED, SEGS%, GRANS%, LYMPHS%, EOS%, BASO%, ABNEUT, ABGRANS, ABLYMPHS, ABMOMOS, ABEOS, ABBASO    CMP:      Invalid input(s): ALBUMIN        IMAGING:    No orders to display     No results found

## 2019-08-02 ENCOUNTER — DOCUMENTATION (OUTPATIENT)
Dept: INFUSION CENTER | Facility: CLINIC | Age: 64
End: 2019-08-02

## 2019-08-02 NOTE — SOCIAL WORK
MSW received notice from Dr Denise Duenas that this pt thinks he will lose his insurance coverage in September 2019 and is not eligible for Medicare until December  Based on pt's [birth year] birthday, he is eligible for Medicare in September  MSW attempted to explain that to pt over the phone, however he is very confused and is unable to comprehend what we are discussing or why I am calling  I attempted to call his wife to discuss, however her listed phone number is pt's phone number as well  She is at work, He is unable to tell me when she will be home, where she works, what hours she works, or when I can reach her at home  He is unable to take a message with my contact information  MSW will try to reach pt's wife on Monday earlier in the day

## 2019-08-05 PROBLEM — E11.9 TYPE II DIABETES MELLITUS (HCC): Status: ACTIVE | Noted: 2019-08-05

## 2019-08-16 ENCOUNTER — TELEPHONE (OUTPATIENT)
Dept: HEMATOLOGY ONCOLOGY | Facility: CLINIC | Age: 64
End: 2019-08-16

## 2019-08-16 NOTE — TELEPHONE ENCOUNTER
Brock Brown called stating that she has been waiting for a call regarding her  [place] funding  I see there is a note for 6/3/19 but the patient wife stated that she knows he does not have funding and is concerned about him getting his medication paid for  Please review with [employer] and contact the patients wife with an update  She said she prefers her cell to be called, [phone]

## 2019-09-05 ENCOUNTER — PATIENT OUTREACH (OUTPATIENT)
Dept: FAMILY MEDICINE CLINIC | Facility: CLINIC | Age: 64
End: 2019-09-05

## 2019-09-05 ENCOUNTER — HOSPITAL ENCOUNTER (EMERGENCY)
Facility: HOSPITAL | Age: 64
End: 2019-09-06
Attending: EMERGENCY MEDICINE | Admitting: EMERGENCY MEDICINE
Payer: COMMERCIAL

## 2019-09-05 ENCOUNTER — TELEPHONE (OUTPATIENT)
Dept: FAMILY MEDICINE CLINIC | Facility: CLINIC | Age: 64
End: 2019-09-05

## 2019-09-05 DIAGNOSIS — Z00.8 ENCOUNTER FOR PSYCHOLOGICAL EVALUATION: Primary | ICD-10-CM

## 2019-09-05 DIAGNOSIS — R44.3 HALLUCINATIONS: ICD-10-CM

## 2019-09-05 DIAGNOSIS — F31.9 BIPOLAR 1 DISORDER (HCC): Primary | ICD-10-CM

## 2019-09-05 LAB
AMPHETAMINES SERPL QL SCN: NEGATIVE
ANION GAP SERPL CALCULATED.3IONS-SCNC: 10 MMOL/L (ref 4–13)
ATRIAL RATE: 62 BPM
BARBITURATES UR QL: NEGATIVE
BASOPHILS # BLD AUTO: 0.02 THOUSANDS/ΜL (ref 0–0.1)
BASOPHILS NFR BLD AUTO: 0 % (ref 0–1)
BENZODIAZ UR QL: POSITIVE
BILIRUB UR QL STRIP: NEGATIVE
BUN SERPL-MCNC: 13 MG/DL (ref 5–25)
CALCIUM SERPL-MCNC: 9.6 MG/DL (ref 8.3–10.1)
CHLORIDE SERPL-SCNC: 98 MMOL/L (ref 100–108)
CLARITY UR: CLEAR
CO2 SERPL-SCNC: 31 MMOL/L (ref 21–32)
COCAINE UR QL: NEGATIVE
COLOR UR: NORMAL
CREAT SERPL-MCNC: 0.96 MG/DL (ref 0.6–1.3)
EOSINOPHIL # BLD AUTO: 0.01 THOUSAND/ΜL (ref 0–0.61)
EOSINOPHIL NFR BLD AUTO: 0 % (ref 0–6)
ERYTHROCYTE [DISTWIDTH] IN BLOOD BY AUTOMATED COUNT: 12.9 % (ref 11.6–15.1)
ETHANOL EXG-MCNC: 0 MG/DL
GFR SERPL CREATININE-BSD FRML MDRD: 83 ML/MIN/1.73SQ M
GLUCOSE SERPL-MCNC: 113 MG/DL (ref 65–140)
GLUCOSE UR STRIP-MCNC: NEGATIVE MG/DL
HCT VFR BLD AUTO: 40.9 % (ref 36.5–49.3)
HGB BLD-MCNC: 13.9 G/DL (ref 12–17)
HGB UR QL STRIP.AUTO: NEGATIVE
IMM GRANULOCYTES # BLD AUTO: 0.04 THOUSAND/UL (ref 0–0.2)
IMM GRANULOCYTES NFR BLD AUTO: 0 % (ref 0–2)
KETONES UR STRIP-MCNC: NEGATIVE MG/DL
LEUKOCYTE ESTERASE UR QL STRIP: NEGATIVE
LYMPHOCYTES # BLD AUTO: 2.53 THOUSANDS/ΜL (ref 0.6–4.47)
LYMPHOCYTES NFR BLD AUTO: 24 % (ref 14–44)
MCH RBC QN AUTO: 31 PG (ref 26.8–34.3)
MCHC RBC AUTO-ENTMCNC: 34 G/DL (ref 31.4–37.4)
MCV RBC AUTO: 91 FL (ref 82–98)
METHADONE UR QL: NEGATIVE
MONOCYTES # BLD AUTO: 0.64 THOUSAND/ΜL (ref 0.17–1.22)
MONOCYTES NFR BLD AUTO: 6 % (ref 4–12)
NEUTROPHILS # BLD AUTO: 7.52 THOUSANDS/ΜL (ref 1.85–7.62)
NEUTS SEG NFR BLD AUTO: 70 % (ref 43–75)
NITRITE UR QL STRIP: NEGATIVE
NRBC BLD AUTO-RTO: 0 /100 WBCS
OPIATES UR QL SCN: NEGATIVE
P AXIS: 79 DEGREES
PCP UR QL: NEGATIVE
PH UR STRIP.AUTO: 7 [PH]
PLATELET # BLD AUTO: 176 THOUSANDS/UL (ref 149–390)
PMV BLD AUTO: 11.6 FL (ref 8.9–12.7)
POTASSIUM SERPL-SCNC: 3.3 MMOL/L (ref 3.5–5.3)
PR INTERVAL: 188 MS
PROT UR STRIP-MCNC: NEGATIVE MG/DL
QRS AXIS: -19 DEGREES
QRSD INTERVAL: 94 MS
QT INTERVAL: 416 MS
QTC INTERVAL: 422 MS
RBC # BLD AUTO: 4.48 MILLION/UL (ref 3.88–5.62)
SODIUM SERPL-SCNC: 139 MMOL/L (ref 136–145)
SP GR UR STRIP.AUTO: 1.01 (ref 1–1.03)
T WAVE AXIS: 63 DEGREES
THC UR QL: POSITIVE
TSH SERPL DL<=0.05 MIU/L-ACNC: 1.59 UIU/ML (ref 0.36–3.74)
UROBILINOGEN UR QL STRIP.AUTO: 0.2 E.U./DL
VENTRICULAR RATE: 62 BPM
WBC # BLD AUTO: 10.76 THOUSAND/UL (ref 4.31–10.16)

## 2019-09-05 PROCEDURE — 93005 ELECTROCARDIOGRAM TRACING: CPT

## 2019-09-05 PROCEDURE — 99285 EMERGENCY DEPT VISIT HI MDM: CPT

## 2019-09-05 PROCEDURE — 81003 URINALYSIS AUTO W/O SCOPE: CPT | Performed by: EMERGENCY MEDICINE

## 2019-09-05 PROCEDURE — 96372 THER/PROPH/DIAG INJ SC/IM: CPT

## 2019-09-05 PROCEDURE — 93010 ELECTROCARDIOGRAM REPORT: CPT | Performed by: INTERNAL MEDICINE

## 2019-09-05 PROCEDURE — 85025 COMPLETE CBC W/AUTO DIFF WBC: CPT | Performed by: EMERGENCY MEDICINE

## 2019-09-05 PROCEDURE — 36415 COLL VENOUS BLD VENIPUNCTURE: CPT | Performed by: EMERGENCY MEDICINE

## 2019-09-05 PROCEDURE — 80048 BASIC METABOLIC PNL TOTAL CA: CPT | Performed by: EMERGENCY MEDICINE

## 2019-09-05 PROCEDURE — 80307 DRUG TEST PRSMV CHEM ANLYZR: CPT | Performed by: EMERGENCY MEDICINE

## 2019-09-05 PROCEDURE — 82075 ASSAY OF BREATH ETHANOL: CPT | Performed by: EMERGENCY MEDICINE

## 2019-09-05 PROCEDURE — 84443 ASSAY THYROID STIM HORMONE: CPT | Performed by: EMERGENCY MEDICINE

## 2019-09-05 PROCEDURE — 99285 EMERGENCY DEPT VISIT HI MDM: CPT | Performed by: EMERGENCY MEDICINE

## 2019-09-05 RX ORDER — ZIPRASIDONE MESYLATE 20 MG/ML
20 INJECTION, POWDER, LYOPHILIZED, FOR SOLUTION INTRAMUSCULAR ONCE
Status: COMPLETED | OUTPATIENT
Start: 2019-09-05 | End: 2019-09-05

## 2019-09-05 RX ORDER — LORAZEPAM 2 MG/ML
2 INJECTION INTRAMUSCULAR ONCE
Status: COMPLETED | OUTPATIENT
Start: 2019-09-05 | End: 2019-09-05

## 2019-09-05 RX ORDER — DIPHENHYDRAMINE HCL 25 MG
50 TABLET ORAL ONCE
Status: COMPLETED | OUTPATIENT
Start: 2019-09-05 | End: 2019-09-05

## 2019-09-05 RX ORDER — FUROSEMIDE 40 MG/1
20 TABLET ORAL ONCE
Status: DISCONTINUED | OUTPATIENT
Start: 2019-09-05 | End: 2019-09-05

## 2019-09-05 RX ORDER — MYCOPHENOLIC ACID 180 MG/1
720 TABLET, DELAYED RELEASE ORAL EVERY 12 HOURS SCHEDULED
Status: DISCONTINUED | OUTPATIENT
Start: 2019-09-05 | End: 2019-09-05

## 2019-09-05 RX ORDER — LORAZEPAM 2 MG/ML
1 INJECTION INTRAMUSCULAR ONCE
Status: DISCONTINUED | OUTPATIENT
Start: 2019-09-05 | End: 2019-09-05

## 2019-09-05 RX ORDER — DOCUSATE SODIUM 100 MG/1
100 CAPSULE, LIQUID FILLED ORAL ONCE
Status: DISCONTINUED | OUTPATIENT
Start: 2019-09-05 | End: 2019-09-05

## 2019-09-05 RX ORDER — SULFAMETHOXAZOLE AND TRIMETHOPRIM 800; 160 MG/1; MG/1
1 TABLET ORAL ONCE
Status: DISCONTINUED | OUTPATIENT
Start: 2019-09-05 | End: 2019-09-05

## 2019-09-05 RX ORDER — FLUCONAZOLE 100 MG/1
100 TABLET ORAL ONCE
Status: DISCONTINUED | OUTPATIENT
Start: 2019-09-05 | End: 2019-09-05

## 2019-09-05 RX ORDER — LEVOTHYROXINE SODIUM 0.1 MG/1
100 TABLET ORAL ONCE
Status: DISCONTINUED | OUTPATIENT
Start: 2019-09-05 | End: 2019-09-05

## 2019-09-05 RX ORDER — URSODIOL 300 MG/1
300 CAPSULE ORAL 2 TIMES DAILY
Status: DISCONTINUED | OUTPATIENT
Start: 2019-09-05 | End: 2019-09-05

## 2019-09-05 RX ORDER — ACETAMINOPHEN 325 MG/1
975 TABLET ORAL ONCE
Status: COMPLETED | OUTPATIENT
Start: 2019-09-05 | End: 2019-09-05

## 2019-09-05 RX ORDER — VALGANCICLOVIR 450 MG/1
450 TABLET, FILM COATED ORAL ONCE
Status: DISCONTINUED | OUTPATIENT
Start: 2019-09-05 | End: 2019-09-05

## 2019-09-05 RX ADMIN — LORAZEPAM 2 MG: 2 INJECTION INTRAMUSCULAR; INTRAVENOUS at 18:07

## 2019-09-05 RX ADMIN — ZIPRASIDONE MESYLATE 20 MG: 20 INJECTION, POWDER, LYOPHILIZED, FOR SOLUTION INTRAMUSCULAR at 19:32

## 2019-09-05 RX ADMIN — ACETAMINOPHEN 975 MG: 325 TABLET ORAL at 19:33

## 2019-09-05 RX ADMIN — DIPHENHYDRAMINE HCL 50 MG: 25 TABLET ORAL at 19:33

## 2019-09-05 RX ADMIN — WATER 10 ML: 1 INJECTION INTRAMUSCULAR; INTRAVENOUS; SUBCUTANEOUS at 19:34

## 2019-09-05 NOTE — ED NOTES
BAT performed and belongings inventoried by this writer  BAT resulted at 0 000%       Belongings include:  1 x shirt  1 x underwear  1 x pant  2 x socks  2 x shoes       Stephen Carmona  09/05/19 [id number]

## 2019-09-05 NOTE — ED NOTES
Security called to bedside to help calm patient and keep him in his room until provider and crisis worker can discuss treatment with patients wife        Nunu Prasad RN  09/05/19 8241
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
## 2019-09-05 NOTE — LETTER
[address] [facility] [address]  [address] [address] [facility] [place] [facility] [address]  Dept: [phone]                                                                  IWMEMW TRANSFER CONSENT    NAME Roxanna Lowe                      1955                              MRN [id number]    I have been informed of my rights regarding examination, treatment, and transfer   by Dr Lucila Cornell MD    Benefits: Other benefits (Include comment)_______________________(Inpatient Psych Tx (201))    Risks: Potential deterioration of medical condition    Consent for Transfer:  I acknowledge that my medical condition has been evaluated and explained to me by the emergency department physician or other qualified medical person and/or my attending physician, who has recommended that I be transferred to the service of  Accepting Physician: Dr Fracisco Pearson at [address] Name, [address] : [address]  The above potential benefits of such transfer, the potential risks associated with such transfer, and the probable risks of not being transferred have been explained to me, and I fully understand them  The doctor has explained that, in my case, the benefits of transfer outweigh the risks  I agree to be transferred  I authorize the performance of emergency medical procedures and treatments upon me in both transit and upon arrival at the receiving facility  Additionally, I authorize the release of any and all medical records to the receiving facility and request they be transported with me, if possible  I understand that the safest mode of transportation during a medical emergency is an ambulance and that the Hospital advocates the use of this mode of transport   Risks of traveling to the receiving facility by car, including absence of medical control, life sustaining equipment, such as oxygen, and medical personnel has been explained to me and I fully understand them     ([facility])  [X]  I consent to the stated transfer and to be transported by ambulance/helicopter  [  ]  I consent to the stated transfer, but refuse transportation by ambulance and accept full responsibility for my transportation by car  I understand the risks of non-ambulance transfers and I exonerate the Hospital and its staff from any deterioration in my condition that results from this refusal     X___________________________________________    DATE  19  TIME________  Signature of patient or legally responsible individual signing on patient behalf           RELATIONSHIP TO PATIENT_________________________                          Provider Certification    NAME Cheryl Arora                       1955                              MRN [id number]    A medical screening exam was performed on the above named patient  Based on the examination:    Condition Necessitating Transfer  Psychosis  Hoahaoism Preoccupation      Patient Condition: The patient has been stabilized such that within reasonable medical probability, no material deterioration of the patient condition or the condition of the unborn child(sunny) is likely to result from the transfer    Reason for Transfer: Level of Care needed not available at this facility    Transfer Requirements: [place], [place]   · Space available and qualified personnel available for treatment as acknowledged by DESTINY Cote   [phone]  · Agreed to accept transfer and to provide appropriate medical treatment as acknowledged by       Dr Daquan Mckeon  · Appropriate medical records of the examination and treatment of the patient are provided at the time of transfer   [address] _JG______  · Transfer will be performed by qualified personnel from ____________________________  and appropriate transfer equipment as required, including the use of necessary and appropriate life support measures  Provider Certification: I have examined the patient and explained the following risks and benefits of being transferred/refusing transfer to the patient/family:  The patient is stable for psychiatric transfer because they are medically stable, and is protected from harming him/herself or others during transport      Based on these reasonable risks and benefits to the patient and/or the unborn child(sunny), and based upon the information available at the time of the patients examination, I certify that the medical benefits reasonably to be expected from the provision of appropriate medical treatments at another medical facility outweigh the increasing risks, if any, to the individuals medical condition, and in the case of labor to the unborn child, from effecting the transfer      X____________________________________________ DATE 09/06/19        TIME_______      ORIGINAL - SEND TO MEDICAL RECORDS   COPY - SEND WITH PATIENT DURING TRANSFER

## 2019-09-06 VITALS
TEMPERATURE: 98.9 F | BODY MASS INDEX: 32.81 KG/M2 | SYSTOLIC BLOOD PRESSURE: 128 MMHG | OXYGEN SATURATION: 95 % | RESPIRATION RATE: 20 BRPM | HEIGHT: 71 IN | HEART RATE: 62 BPM | DIASTOLIC BLOOD PRESSURE: 96 MMHG | WEIGHT: 234.35 LBS

## 2019-09-06 PROCEDURE — 96372 THER/PROPH/DIAG INJ SC/IM: CPT

## 2019-09-06 RX ORDER — ZIPRASIDONE MESYLATE 20 MG/ML
20 INJECTION, POWDER, LYOPHILIZED, FOR SOLUTION INTRAMUSCULAR ONCE
Status: COMPLETED | OUTPATIENT
Start: 2019-09-06 | End: 2019-09-06

## 2019-09-06 RX ORDER — DIPHENHYDRAMINE HYDROCHLORIDE 50 MG/ML
50 INJECTION INTRAMUSCULAR; INTRAVENOUS ONCE
Status: COMPLETED | OUTPATIENT
Start: 2019-09-06 | End: 2019-09-06

## 2019-09-06 RX ADMIN — DIPHENHYDRAMINE HYDROCHLORIDE 50 MG: 50 INJECTION, SOLUTION INTRAMUSCULAR; INTRAVENOUS at 02:23

## 2019-09-06 RX ADMIN — WATER 10 ML: 1 INJECTION INTRAMUSCULAR; INTRAVENOUS; SUBCUTANEOUS at 02:22

## 2019-09-06 RX ADMIN — ZIPRASIDONE MESYLATE 20 MG: 20 INJECTION, POWDER, LYOPHILIZED, FOR SOLUTION INTRAMUSCULAR at 02:22

## 2019-09-06 NOTE — ED NOTES
Transport arrived at 830am  [facility] transporting      Ramin Yahir, [facility]  09/06/19 6072

## 2019-09-06 NOTE — ED NOTES
Both pt and Dr Renny Glynn signed the 12 document  CW spoke with Cj Hiceky of Intake who stated there are no in-network older adult beds available [facility]  CW spoke with Iliana Banks who stated she will have an AM bed available  CW faxed pt's chart to her for review      Nagi Malik, DAVIAN  09/05/19 20:22

## 2019-09-06 NOTE — ED NOTES
Home medications reviewed with spouse  Wife reported "I only give him half a tablet because I think the full dose it too much for him", referring to all prescribed mental health medications  Crisis worker informed of wife's statement       Yesenia, [facility]  09/05/19 1577

## 2019-09-06 NOTE — ED NOTES
Per Dr Alejandro Roque pt may come off 1 to [address] [facility] [place] [address], RN  09/06/19 0754 [facility], RN  09/06/19 0721

## 2019-09-06 NOTE — ED NOTES
Patient is accepted at [facility]  Patient is accepted by Dr Dinesh Coombs per Cesia Mcguire  Transportation is arranged with   Transportation is scheduled for **  Patient may go to the floor at **  As per Yakelin Oliva, transport will be arranged in the AM  [facility] to be called with ETA then  Pre-cert to be completed by AM Nagi Wen CW  09/06/19 12:05
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
## 2019-09-06 NOTE — ED NOTES
Insurance Authorization for admission:   Phone call placed to [employer]  Phone number: [phone]  Spoke to Johana Sidhu who provided pending case #   1 days approved, and additional days added once patient is d/c   Level of care: Inpatient Psych Tx (201)  Review on 9/7  Authorization # [id number]  Reviewer: UR to call JENNIFER team @ [phone]  EVS (Eligibility Verification System) called - [phone]  Automated system indicates: not eligible  Insurance Authorization for Transportation:    Phone call placed to [email or web address]  Phone number [phone]  Spoke to [employer]  Authorization #: not required for ground transport  [employer] at [employer] [facility] [employer] aware of transport time       Amanda Tran LMSW  09/06/19  6638

## 2019-09-06 NOTE — ED NOTES
Erika Fuentes called CW to say that they will require a new set of vitals and a reassessment of pt's mental status at 23:30 since he was given IM meds @ 19:30     CW to inform pt's nurse of same      Nagi Beard Winfield Gimenez  09/05/19 21:43

## 2019-09-11 ENCOUNTER — TELEPHONE (OUTPATIENT)
Dept: FAMILY MEDICINE CLINIC | Facility: CLINIC | Age: 64
End: 2019-09-11

## 2019-09-11 NOTE — TELEPHONE ENCOUNTER
Pt's wife called regarding pt's psych admission  He was admtted to [facility] in [facility], allowed to walk halls at night, fell and taken to [facility] in [facility]  She says he has pneumonia, kidney damage and dehydration  She said [facility] is disgusting and he wont eat the food or drink and they didn't give him anything to sleep  She would like a call back from Dr Linda Darby asap as she is very upset and wants to know what she should do    [address] [facility] #  [phone] - cell #

## 2019-09-13 ENCOUNTER — DOCUMENTATION (OUTPATIENT)
Dept: HEMATOLOGY ONCOLOGY | Facility: CLINIC | Age: 64
End: 2019-09-13

## 2019-09-13 NOTE — PROGRESS NOTES
8-16-19  Received e-mail and task from providers office stating Patient had questions regarding his funding for [facility]  Reviewed file, did not find any information regarding funding  Placed call to patient no response  Left message with contact info to return mu call       8-20-19  Follow up call to patient, no response  Left another message with contact information  Received call back from patients spouse Magy Brown, who began conversation by saying I left her a message with wrong information  She said they are not calling about funding information they have insurance questions  I told her I was referencing the information on the e-mail and I apologized for the error  She continued to say I needed to do my job because Dr Cherelle Quinn told her I would get her insurance  I stated to her that I am trying to assist her however I would not allow her to continue yelling at me  She stated she did not have time to continue the conversation and she simply just wanted to me help her with their concerns and would call me back  I did not get any information from her during this call     8-26-19  Received call from [facility]  Stating the reason for her call is that her husbands insurance terms on 9-30-19 and he would not have any coverage until Dec  1st  She stated she is also applying for RX Coverage  I inquired how much medication did the patient have on hand and she said she did not know and would get back to me  I explained we would need to complete a  application  9-5-19  Received voicemail message from Magy Brown stating patient currently had enough meds to hold him as he just picked up a refill on 8-21-19  And is due to  another on Sept 14th  Called [facility] who confirmed the information given by Magy Brown  Calculations are the refill on 9-14-19 would provide the patient with enough medication through 10-11-19    Explained to Magy Brown that a [employer] lesli would need to be completed and proof of income and patients signature would be required  Karolina Rios stated the income for 2018 was completed different as they are not making the same money  I advised her that this information is needed when application is submitted  She said she did not know if she had the documents and she would look for them and call me back  Received call from anna stating the taxes were in a box as they moved into the home in Jan 2019 and she still has not finished unpacking  I advised her that without proof of income they would not process the application  Received call back from patient she has the tax documents and is going to fax them to me      9-9-19   Received tax forms via fax  Also received call from Karolina Rios stating Eleonore Course was admitted over the weekend due to hallucinations  She stated Eleonore Course was in pain and she remembered when she suffered from pain she would smoke marijuana and it would relieve the pain  She thought this would also help him with his pain however he had a bad reaction to it  She stated patient was admitted at [facility]  I asked if there was a  I could speak with in order to fax the lesli to have is signed  Karolina Rios stated  was Rosemarie Baez [phone]  Placed call to [employer]  No response, left message with contact information to return call  9-12-19  Received call from Karolina Rios, She stated that Eleonore Course fell at [facility] and was transferred to [facility] where he is being taken care of  She would get the information on  then call me  E.M[employer] Application for patient has not been submitted   needs patients signature  9-13-19  Spoke with Arturo Lesches-  who is working with patient  Forwarded application to obtain patients signature  9-15-19  Received E.M.A.R.C. lesli with patients signature  Faxed to [phone]       9-19-19    For patient [place]  [phone]    Patient has been approved for free drug through the [employer]  Eff 3-36-18  Patient will be notified by [employer] to set up shipment  I spoke with Kizzy-spouse to advise of approval      EPIC Noted, Patient notified

## 2019-09-16 ENCOUNTER — TELEPHONE (OUTPATIENT)
Dept: FAMILY MEDICINE CLINIC | Facility: CLINIC | Age: 64
End: 2019-09-16

## 2019-09-16 NOTE — TELEPHONE ENCOUNTER
Spoke with patient and wife last 1-2 weeks - sent for urgent psych eval near home  Admitted to psych facility and then sent to medical facility as patient not eating and drinking prior to wife's account   Tried to call back wife at [phone] for ststaus update

## 2019-09-25 ENCOUNTER — PATIENT OUTREACH (OUTPATIENT)
Dept: FAMILY MEDICINE CLINIC | Facility: CLINIC | Age: 64
End: 2019-09-25

## 2019-09-25 NOTE — TELEPHONE ENCOUNTER
Wife called to let you know ptdid not have pneumonia at previous admission and was dehydrated and is now home and is fine

## 2019-09-25 NOTE — PROGRESS NOTES
OP CM called to pts wife to check status of pt  Left message on her cell and unable to leave message on home phone  OP CM will continue to follow

## 2019-09-26 ENCOUNTER — PATIENT OUTREACH (OUTPATIENT)
Dept: FAMILY MEDICINE CLINIC | Facility: CLINIC | Age: 64
End: 2019-09-26

## 2019-09-26 NOTE — PROGRESS NOTES
OP CM rcvd call back from pts wife who states pt has been home the past 1 5 weeks  She states pt has been doing well mentally and his behavior is much improved  She states pts medications were adjusted  Pt and wife are not interested in [address] therapy at this time  PT states she quit her job to take care of pt at home  Wife made aware to call back if she starts noticing a decline again with pt

## 2019-09-30 ENCOUNTER — APPOINTMENT (OUTPATIENT)
Dept: LAB | Facility: CLINIC | Age: 64
End: 2019-09-30
Payer: COMMERCIAL

## 2019-10-17 DIAGNOSIS — C95.90 LEUKEMIA NOT HAVING ACHIEVED REMISSION, UNSPECIFIED LEUKEMIA TYPE (HCC): ICD-10-CM

## 2020-01-02 ENCOUNTER — APPOINTMENT (OUTPATIENT)
Dept: LAB | Facility: CLINIC | Age: 65
End: 2020-01-02
Payer: COMMERCIAL

## 2020-01-02 ENCOUNTER — TELEPHONE (OUTPATIENT)
Dept: HEMATOLOGY ONCOLOGY | Facility: CLINIC | Age: 65
End: 2020-01-02

## 2020-01-02 NOTE — TELEPHONE ENCOUNTER
Tried to call patient to instruct him to have his labs completed prior to his appt on 1/3/20 with Dr Sergey Bridges  There was on way to leave a message with the instructions  If patient calls back please inform him his labs need to be completed prior to his appt   In 1/3/20 at 11:40 am with Dr Sergey Bridges

## 2020-01-03 ENCOUNTER — OFFICE VISIT (OUTPATIENT)
Dept: HEMATOLOGY ONCOLOGY | Facility: CLINIC | Age: 65
End: 2020-01-03
Payer: COMMERCIAL

## 2020-01-03 VITALS
SYSTOLIC BLOOD PRESSURE: 128 MMHG | HEIGHT: 71 IN | HEART RATE: 79 BPM | DIASTOLIC BLOOD PRESSURE: 70 MMHG | OXYGEN SATURATION: 95 % | TEMPERATURE: 98.6 F | RESPIRATION RATE: 20 BRPM | WEIGHT: 279 LBS | BODY MASS INDEX: 39.06 KG/M2

## 2020-01-03 DIAGNOSIS — C91.10 CLL (CHRONIC LYMPHOCYTIC LEUKEMIA) (HCC): Primary | ICD-10-CM

## 2020-01-03 PROCEDURE — 99214 OFFICE O/P EST MOD 30 MIN: CPT | Performed by: INTERNAL MEDICINE

## 2020-01-03 RX ORDER — RISPERIDONE 1 MG/1
1 TABLET, FILM COATED ORAL 2 TIMES DAILY
COMMUNITY

## 2020-01-03 NOTE — PROGRESS NOTES
HEMATOLOGY / ONCOLOGY CLINIC NOTE    Primary Care Provider: Nahid Thomason MD  Referring Provider:    MRN: [id number]  : 1955    Reason for Encounter:    Chief Complaint   Patient presents with   Rigo Adame Follow-up         Hematology / Oncology History:     Geovanni Gutierrez is a 71 y o  male who came in for follow up      1, CLL  - ?  Stage III with anemia; also having splenomegaly  No FISH panel  - has been on ibrutinib for 20 mg since 2018   - highest white blood cell 128k, decreased to 16 K now     2, anemia  - resolved        Assessment / Plan:     1  CLL (chronic lymphocytic leukemia) ([facility] )  - as above, has been on ibrutinib for year  Had a good response  Lymphocytosis decreased significantly to 9 K now    -tolerating treatment very well no major issues  - CLL is under control, continue treatment no change  Patient will have a lab in 3 months and will follow patient afterwards; if remains doing well, patient will continue do labs every 3 months and will follow patient every 6 months          - CBC and differential; Standing  - Comprehensive metabolic panel; Standing  - CBC and differential  - Comprehensive metabolic panel         25       minutes were spent face to face with patient with greater than 50% of the time spent in counseling or coordination of care including discussions of treatment instructions  All of the patient's questions were answered to their satisfactory during this discussion  Advised pt to call if there is any further questions  Interval History:     2019:  Came in for follow-up  Reported has been doing okay at baseline health status  Using home oxygen for COPD  No bleeding, no leg swelling, no new chest pain, cough, hemoptysis  No frequent infection  2019 :  Came in for follow-up  Overall doing well  Reported has been losing weight about 20 lb  No lumps bumps  No other constitutional symptoms  No bleeding no frequent infection      1/3/31009 :  Came in for follow-up, doing well, body weight is stable  based overall tolerated treatment well no bleeding, no thrombosis, no frequent infection  Patient reported has been losing weight however albumin level is in normal range  Problem list:       Patient Active Problem List   Diagnosis    Hypertension    Depression    Polypharmacy    Bronchitis    Encephalopathy    Weakness    Leukemia (HCC)    Lactic acidosis    Leukocytosis    Elevated troponin    Bipolar 1 disorder (HCC)    Psychiatric disorder    Anemia    Encounter for smoking cessation counseling    Encounter for screening for lipid disorder    Screening for diabetes mellitus    Peripheral edema    CLL (chronic lymphocytic leukemia) ([facility] )    RA (acute kidney injury) ([place] [address] )    Chronic obstructive pulmonary disease (HCC)    Expiratory wheezing    Type II diabetes mellitus (HCC)       PHYSICIAL EXAMINATION:       Vital Signs:   [unfilled]  Body mass index is 38 91 kg/m²  Body surface area is 2 43 meters squared  Obesity  Bilateral lower extremity trace swelling   No major difference from previous visit    GEN: Alert, awake oriented x3, in no acute distress  HEENT- No pallor, icterus, cyanosis, no oral mucosal lesions,   LAD - no palpable cervical, clavicle, axillary, inguinal LAD  Heart- normal S1 S2, regular rate and rhythm, No murmur, rubs  Lungs- decreased breathing sound bilateral    Abdomen- soft, Non tender, bowel sounds present  Extremities- No cyanosis, clubbing, edema  Neuro- No focal neurological deficit           PAST MEDICAL HISTORY:   has a past medical history of Anemia, Anxiety, Bipolar disorder ([facility] ), Cancer ([place] [address] ), History of alcohol abuse, Hypertension, Hypertension, Insomnia, Leukemia ([facility] ), Opiate abuse, episodic ([place] [address] ), and Psychiatric disorder  PAST SURGICAL HISTORY:   has a past surgical history that includes EGD AND COLONOSCOPY (N/A, 3/30/2018)      CURRENT MEDICATIONS:     Current Outpatient Medications Medication Sig Dispense Refill    ALPRAZolam (XANAX) 1 mg tablet   3    divalproex sodium (DEPAKOTE) 500 mg EC tablet Take 500 mg by mouth 2 (two) times a day one in the morning and 2 in the evening       FLUoxetine (PROzac) 20 mg capsule Take 60 mg by mouth daily        furosemide (LASIX) 40 mg tablet TAKE ONE TABLET BY MOUTH DAILY 30 tablet 4    Ibrutinib (IMBRUVICA) 420 MG TABS Take 420 mg by mouth daily 28 tablet 3    lisinopril (ZESTRIL) 40 mg tablet Take 1 tablet (40 mg total) by mouth daily 90 tablet 1    mirtazapine (REMERON) 15 mg tablet Take 15 mg by mouth daily at bedtime      risperiDONE (RisperDAL) 1 mg tablet Take 1 mg by mouth      ADVAIR DISKUS 250-50 MCG/DOSE inhaler INHALE 1 PUFF 2 (TWO) TIMES A DAY RINSE MOUTH AFTER USE  (Patient not taking: Reported on 9/5/2019) 60 each 3    albuterol (VENTOLIN HFA) 90 mcg/act inhaler Inhale 2 puffs every 6 (six) hours as needed for wheezing (Patient not taking: Reported on 9/5/2019) 18 g 0    benztropine (COGENTIN) 1 mg tablet Take 1 mg by mouth 2 (two) times a day       FLUoxetine (PROzac) 40 MG capsule   2    hydrochlorothiazide (MICROZIDE) 12 5 mg capsule TAKE ONE CAPSULE BY MOUTH DAILY (Patient not taking: Reported on 1/3/2020) 30 capsule 2    potassium chloride (K-DUR,KLOR-CON) 20 mEq tablet Take 1 tablet (20 mEq total) by mouth daily (Patient not taking: Reported on 1/3/2020) 90 tablet 1     No current facility-administered medications for this visit  [unfilled]    SOCIAL HISTORY:   reports that he has been smoking cigarettes  He has a 60 00 pack-year smoking history  He has never used smokeless tobacco  He reports that he does not drink alcohol or use drugs  FAMILY HISTORY:  family history includes Cancer in his brother; Coronary artery disease in his mother; Early death in his brother; Hepatitis in his sister; No Known Problems in his father; Prostate cancer in his brother  ALLERGIES:  has No Known Allergies      REVIEW OF SYSTEMS:  Please note that a 14-point review of systems was performed to include Constitutional, HEENT, Respiratory, CVS, GI, , Musculoskeletal, Integumentary, Neurologic, Rheumatologic, Endocrinologic, Psychiatric, Lymphatic, and Hematologic/Oncologic systems were reviewed and are negative unless otherwise stated in HPI  Positive and negative findings pertinent to this evaluation are incorporated into the history of present illness  Lab Re  Lab Results   Component Value Date    WBC 11 74 (H) 01/02/2020    HGB 14 8 01/02/2020    HCT 43 4 01/02/2020    MCV 94 01/02/2020     01/02/2020   sults   Component Value Date    WBC 15 85 (H) 07/06/2019    HGB 13 4 07/06/2019    HCT 39 9 07/06/2019    MCV 92 07/06/2019     07/06/2019      Component Value Date    SODIUM 137 01/02/2020    K 3 5 01/02/2020     01/02/2020    CO2 30 01/02/2020    AGAP 5 01/02/2020    BUN 21 01/02/2020    CREATININE 1 00 01/02/2020    GLUC 263 (H) 01/02/2020    GLUF 120 (H) 09/30/2019    CALCIUM 9 3 01/02/2020    AST 11 01/02/2020    ALT 32 01/02/2020    ALKPHOS 110 01/02/2020    TP 7 6 01/02/2020    TBILI 0 37 01/02/2020    EGFR 79 01/02/2020       CBC with diff:   Results from last 7 days   Lab Units 01/02/20  1351   WBC Thousand/uL 11 74*   HEMOGLOBIN g/dL 14 8   HEMATOCRIT % 43 4   MCV fL 94   PLATELETS Thousands/uL 196   MCH pg 32 0   MCHC g/dL 34 1   RDW % 13 2   MPV fL 11 9   NRBC AUTO /100 WBCs 0       CMP:  Results from last 7 days   Lab Units 01/02/20  1351   POTASSIUM mmol/L 3 5   CHLORIDE mmol/L 102   CO2 mmol/L 30   BUN mg/dL 21   CREATININE mg/dL 1 00   CALCIUM mg/dL 9 3   AST U/L 11   ALT U/L 32   ALK PHOS U/L 110   EGFR ml/min/1 73sq m 79           IMAGING:    No orders to display     No results found

## 2020-02-20 DIAGNOSIS — R60.9 PERIPHERAL EDEMA: ICD-10-CM

## 2020-02-20 DIAGNOSIS — I10 ESSENTIAL HYPERTENSION: ICD-10-CM

## 2020-02-20 DIAGNOSIS — R60.9 EDEMA, UNSPECIFIED TYPE: ICD-10-CM

## 2020-02-20 RX ORDER — LISINOPRIL 40 MG/1
40 TABLET ORAL DAILY
Qty: 90 TABLET | Refills: 1 | Status: SHIPPED | OUTPATIENT
Start: 2020-02-20 | End: 2020-06-09

## 2020-02-20 RX ORDER — FUROSEMIDE 40 MG/1
40 TABLET ORAL DAILY
Qty: 90 TABLET | Refills: 1 | Status: SHIPPED | OUTPATIENT
Start: 2020-02-20 | End: 2020-06-10

## 2020-02-20 RX ORDER — POTASSIUM CHLORIDE 20 MEQ/1
20 TABLET, EXTENDED RELEASE ORAL DAILY
Qty: 90 TABLET | Refills: 1 | Status: SHIPPED | OUTPATIENT
Start: 2020-02-20 | End: 2020-06-10

## 2020-02-26 DIAGNOSIS — C95.90 LEUKEMIA NOT HAVING ACHIEVED REMISSION, UNSPECIFIED LEUKEMIA TYPE (HCC): ICD-10-CM

## 2020-03-24 ENCOUNTER — TELEPHONE (OUTPATIENT)
Dept: FAMILY MEDICINE CLINIC | Facility: CLINIC | Age: 65
End: 2020-03-24

## 2020-04-01 ENCOUNTER — APPOINTMENT (OUTPATIENT)
Dept: LAB | Facility: CLINIC | Age: 65
End: 2020-04-01
Payer: COMMERCIAL

## 2020-04-03 ENCOUNTER — TELEMEDICINE (OUTPATIENT)
Dept: HEMATOLOGY ONCOLOGY | Facility: CLINIC | Age: 65
End: 2020-04-03
Payer: COMMERCIAL

## 2020-04-03 DIAGNOSIS — N17.9 AKI (ACUTE KIDNEY INJURY) (HCC): ICD-10-CM

## 2020-04-03 DIAGNOSIS — C91.10 CLL (CHRONIC LYMPHOCYTIC LEUKEMIA) (HCC): Primary | ICD-10-CM

## 2020-04-03 PROCEDURE — 99214 OFFICE O/P EST MOD 30 MIN: CPT | Performed by: INTERNAL MEDICINE

## 2020-04-03 PROCEDURE — 3066F NEPHROPATHY DOC TX: CPT | Performed by: INTERNAL MEDICINE

## 2020-04-21 ENCOUNTER — TRANSCRIBE ORDERS (OUTPATIENT)
Dept: LAB | Facility: CLINIC | Age: 65
End: 2020-04-21

## 2020-04-21 ENCOUNTER — APPOINTMENT (OUTPATIENT)
Dept: LAB | Facility: CLINIC | Age: 65
End: 2020-04-21
Payer: COMMERCIAL

## 2020-04-21 DIAGNOSIS — C91.10 CLL (CHRONIC LYMPHOCYTIC LEUKEMIA) (HCC): ICD-10-CM

## 2020-04-21 DIAGNOSIS — C91.10 CLL (CHRONIC LYMPHOCYTIC LEUKEMIA) (HCC): Primary | ICD-10-CM

## 2020-04-21 LAB
ALBUMIN SERPL BCP-MCNC: 3.6 G/DL (ref 3.5–5)
ALP SERPL-CCNC: 125 U/L (ref 46–116)
ALT SERPL W P-5'-P-CCNC: 27 U/L (ref 12–78)
ANION GAP SERPL CALCULATED.3IONS-SCNC: 7 MMOL/L (ref 4–13)
AST SERPL W P-5'-P-CCNC: 11 U/L (ref 5–45)
BASOPHILS # BLD AUTO: 0.06 THOUSANDS/ΜL (ref 0–0.1)
BASOPHILS NFR BLD AUTO: 1 % (ref 0–1)
BILIRUB SERPL-MCNC: 0.5 MG/DL (ref 0.2–1)
BUN SERPL-MCNC: 13 MG/DL (ref 5–25)
CALCIUM SERPL-MCNC: 9.3 MG/DL (ref 8.3–10.1)
CHLORIDE SERPL-SCNC: 101 MMOL/L (ref 100–108)
CO2 SERPL-SCNC: 27 MMOL/L (ref 21–32)
CREAT SERPL-MCNC: 0.88 MG/DL (ref 0.6–1.3)
EOSINOPHIL # BLD AUTO: 0.05 THOUSAND/ΜL (ref 0–0.61)
EOSINOPHIL NFR BLD AUTO: 1 % (ref 0–6)
ERYTHROCYTE [DISTWIDTH] IN BLOOD BY AUTOMATED COUNT: 13.2 % (ref 11.6–15.1)
GFR SERPL CREATININE-BSD FRML MDRD: 90 ML/MIN/1.73SQ M
GLUCOSE P FAST SERPL-MCNC: 270 MG/DL (ref 65–99)
HCT VFR BLD AUTO: 46.1 % (ref 36.5–49.3)
HGB BLD-MCNC: 15.3 G/DL (ref 12–17)
IMM GRANULOCYTES # BLD AUTO: 0.12 THOUSAND/UL (ref 0–0.2)
IMM GRANULOCYTES NFR BLD AUTO: 1 % (ref 0–2)
LYMPHOCYTES # BLD AUTO: 2.7 THOUSANDS/ΜL (ref 0.6–4.47)
LYMPHOCYTES NFR BLD AUTO: 31 % (ref 14–44)
MCH RBC QN AUTO: 29.8 PG (ref 26.8–34.3)
MCHC RBC AUTO-ENTMCNC: 33.2 G/DL (ref 31.4–37.4)
MCV RBC AUTO: 90 FL (ref 82–98)
MONOCYTES # BLD AUTO: 0.47 THOUSAND/ΜL (ref 0.17–1.22)
MONOCYTES NFR BLD AUTO: 5 % (ref 4–12)
NEUTROPHILS # BLD AUTO: 5.27 THOUSANDS/ΜL (ref 1.85–7.62)
NEUTS SEG NFR BLD AUTO: 61 % (ref 43–75)
NRBC BLD AUTO-RTO: 0 /100 WBCS
PLATELET # BLD AUTO: 158 THOUSANDS/UL (ref 149–390)
PMV BLD AUTO: 12.5 FL (ref 8.9–12.7)
POTASSIUM SERPL-SCNC: 4 MMOL/L (ref 3.5–5.3)
PROT SERPL-MCNC: 7.7 G/DL (ref 6.4–8.2)
RBC # BLD AUTO: 5.13 MILLION/UL (ref 3.88–5.62)
SODIUM SERPL-SCNC: 135 MMOL/L (ref 136–145)
WBC # BLD AUTO: 8.67 THOUSAND/UL (ref 4.31–10.16)

## 2020-04-21 PROCEDURE — 36415 COLL VENOUS BLD VENIPUNCTURE: CPT

## 2020-04-21 PROCEDURE — 80053 COMPREHEN METABOLIC PANEL: CPT

## 2020-04-21 PROCEDURE — 85025 COMPLETE CBC W/AUTO DIFF WBC: CPT

## 2020-04-24 ENCOUNTER — TELEMEDICINE (OUTPATIENT)
Dept: HEMATOLOGY ONCOLOGY | Facility: CLINIC | Age: 65
End: 2020-04-24
Payer: COMMERCIAL

## 2020-04-24 DIAGNOSIS — C91.10 CLL (CHRONIC LYMPHOCYTIC LEUKEMIA) (HCC): Primary | ICD-10-CM

## 2020-04-24 DIAGNOSIS — N17.9 AKI (ACUTE KIDNEY INJURY) (HCC): ICD-10-CM

## 2020-04-24 PROCEDURE — 99443 PR PHYS/QHP TELEPHONE EVALUATION 21-30 MIN: CPT | Performed by: INTERNAL MEDICINE

## 2020-05-03 ENCOUNTER — TELEPHONE (OUTPATIENT)
Dept: OTHER | Facility: OTHER | Age: 65
End: 2020-05-03

## 2020-05-12 ENCOUNTER — TELEPHONE (OUTPATIENT)
Dept: FAMILY MEDICINE CLINIC | Facility: CLINIC | Age: 65
End: 2020-05-12

## 2020-05-12 ENCOUNTER — TELEPHONE (OUTPATIENT)
Dept: HEMATOLOGY ONCOLOGY | Facility: CLINIC | Age: 65
End: 2020-05-12

## 2020-05-13 ENCOUNTER — TELEMEDICINE (OUTPATIENT)
Dept: FAMILY MEDICINE CLINIC | Facility: CLINIC | Age: 65
End: 2020-05-13
Payer: COMMERCIAL

## 2020-05-13 ENCOUNTER — TELEPHONE (OUTPATIENT)
Dept: FAMILY MEDICINE CLINIC | Facility: CLINIC | Age: 65
End: 2020-05-13

## 2020-05-13 DIAGNOSIS — R59.9 LYMPH NODE ENLARGEMENT: Primary | ICD-10-CM

## 2020-05-13 DIAGNOSIS — C91.10 CLL (CHRONIC LYMPHOCYTIC LEUKEMIA) (HCC): ICD-10-CM

## 2020-05-13 PROCEDURE — 99442 PR PHYS/QHP TELEPHONE EVALUATION 11-20 MIN: CPT | Performed by: FAMILY MEDICINE

## 2020-05-14 ENCOUNTER — CONSULT (OUTPATIENT)
Dept: SURGERY | Facility: CLINIC | Age: 65
End: 2020-05-14
Payer: COMMERCIAL

## 2020-05-14 ENCOUNTER — APPOINTMENT (OUTPATIENT)
Dept: LAB | Facility: HOSPITAL | Age: 65
End: 2020-05-14
Attending: SPECIALIST
Payer: COMMERCIAL

## 2020-05-14 ENCOUNTER — TRANSCRIBE ORDERS (OUTPATIENT)
Dept: ADMINISTRATIVE | Facility: HOSPITAL | Age: 65
End: 2020-05-14

## 2020-05-14 VITALS
HEIGHT: 71 IN | SYSTOLIC BLOOD PRESSURE: 134 MMHG | TEMPERATURE: 98 F | WEIGHT: 262.8 LBS | BODY MASS INDEX: 36.79 KG/M2 | HEART RATE: 96 BPM | DIASTOLIC BLOOD PRESSURE: 80 MMHG

## 2020-05-14 DIAGNOSIS — C91.10 CLL (CHRONIC LYMPHOCYTIC LEUKEMIA) (HCC): ICD-10-CM

## 2020-05-14 DIAGNOSIS — R59.9 LYMPH NODE ENLARGEMENT: ICD-10-CM

## 2020-05-14 DIAGNOSIS — C91.10 CLL (CHRONIC LYMPHOCYTIC LEUKEMIA) (HCC): Primary | ICD-10-CM

## 2020-05-14 LAB
ANION GAP SERPL CALCULATED.3IONS-SCNC: 10 MMOL/L (ref 4–13)
BASOPHILS # BLD AUTO: 0.04 THOUSANDS/ΜL (ref 0–0.1)
BASOPHILS NFR BLD AUTO: 0 % (ref 0–1)
BUN SERPL-MCNC: 17 MG/DL (ref 5–25)
CALCIUM SERPL-MCNC: 9.5 MG/DL (ref 8.3–10.1)
CHLORIDE SERPL-SCNC: 102 MMOL/L (ref 100–108)
CO2 SERPL-SCNC: 27 MMOL/L (ref 21–32)
CREAT SERPL-MCNC: 1.11 MG/DL (ref 0.6–1.3)
EOSINOPHIL # BLD AUTO: 0.05 THOUSAND/ΜL (ref 0–0.61)
EOSINOPHIL NFR BLD AUTO: 1 % (ref 0–6)
ERYTHROCYTE [DISTWIDTH] IN BLOOD BY AUTOMATED COUNT: 13.3 % (ref 11.6–15.1)
ERYTHROCYTE [SEDIMENTATION RATE] IN BLOOD: 14 MM/HOUR (ref 2–10)
GFR SERPL CREATININE-BSD FRML MDRD: 69 ML/MIN/1.73SQ M
GLUCOSE P FAST SERPL-MCNC: 132 MG/DL (ref 65–99)
HCT VFR BLD AUTO: 47.5 % (ref 36.5–49.3)
HGB BLD-MCNC: 15.8 G/DL (ref 12–17)
IMM GRANULOCYTES # BLD AUTO: 0.07 THOUSAND/UL (ref 0–0.2)
IMM GRANULOCYTES NFR BLD AUTO: 1 % (ref 0–2)
LYMPHOCYTES # BLD AUTO: 2.5 THOUSANDS/ΜL (ref 0.6–4.47)
LYMPHOCYTES NFR BLD AUTO: 23 % (ref 14–44)
MCH RBC QN AUTO: 30.3 PG (ref 26.8–34.3)
MCHC RBC AUTO-ENTMCNC: 33.3 G/DL (ref 31.4–37.4)
MCV RBC AUTO: 91 FL (ref 82–98)
MONOCYTES # BLD AUTO: 0.69 THOUSAND/ΜL (ref 0.17–1.22)
MONOCYTES NFR BLD AUTO: 6 % (ref 4–12)
NEUTROPHILS # BLD AUTO: 7.35 THOUSANDS/ΜL (ref 1.85–7.62)
NEUTS SEG NFR BLD AUTO: 69 % (ref 43–75)
NRBC BLD AUTO-RTO: 0 /100 WBCS
PLATELET # BLD AUTO: 186 THOUSANDS/UL (ref 149–390)
PMV BLD AUTO: 12 FL (ref 8.9–12.7)
POTASSIUM SERPL-SCNC: 3.8 MMOL/L (ref 3.5–5.3)
RBC # BLD AUTO: 5.21 MILLION/UL (ref 3.88–5.62)
SODIUM SERPL-SCNC: 139 MMOL/L (ref 136–145)
WBC # BLD AUTO: 10.7 THOUSAND/UL (ref 4.31–10.16)

## 2020-05-14 PROCEDURE — 85652 RBC SED RATE AUTOMATED: CPT

## 2020-05-14 PROCEDURE — 85025 COMPLETE CBC W/AUTO DIFF WBC: CPT

## 2020-05-14 PROCEDURE — 99204 OFFICE O/P NEW MOD 45 MIN: CPT | Performed by: SPECIALIST

## 2020-05-14 PROCEDURE — 80048 BASIC METABOLIC PNL TOTAL CA: CPT

## 2020-05-14 PROCEDURE — 36415 COLL VENOUS BLD VENIPUNCTURE: CPT

## 2020-05-14 RX ORDER — AMOXICILLIN AND CLAVULANATE POTASSIUM 875; 125 MG/1; MG/1
1 TABLET, FILM COATED ORAL EVERY 12 HOURS SCHEDULED
Qty: 14 TABLET | Refills: 0 | Status: SHIPPED | OUTPATIENT
Start: 2020-05-14 | End: 2020-05-21

## 2020-05-20 ENCOUNTER — TELEPHONE (OUTPATIENT)
Dept: OTHER | Facility: OTHER | Age: 65
End: 2020-05-20

## 2020-05-20 ENCOUNTER — TELEPHONE (OUTPATIENT)
Dept: FAMILY MEDICINE CLINIC | Facility: CLINIC | Age: 65
End: 2020-05-20

## 2020-05-22 ENCOUNTER — TELEPHONE (OUTPATIENT)
Dept: OTHER | Facility: HOSPITAL | Age: 65
End: 2020-05-22

## 2020-05-23 ENCOUNTER — TELEPHONE (OUTPATIENT)
Dept: OTHER | Facility: OTHER | Age: 65
End: 2020-05-23

## 2020-05-24 ENCOUNTER — TELEPHONE (OUTPATIENT)
Dept: OTHER | Facility: OTHER | Age: 65
End: 2020-05-24

## 2020-06-05 ENCOUNTER — TELEPHONE (OUTPATIENT)
Dept: OTHER | Facility: HOSPITAL | Age: 65
End: 2020-06-05

## 2020-06-08 ENCOUNTER — TELEMEDICINE (OUTPATIENT)
Dept: FAMILY MEDICINE CLINIC | Facility: CLINIC | Age: 65
End: 2020-06-08
Payer: COMMERCIAL

## 2020-06-08 DIAGNOSIS — N52.9 ERECTILE DYSFUNCTION, UNSPECIFIED ERECTILE DYSFUNCTION TYPE: Primary | ICD-10-CM

## 2020-06-08 PROCEDURE — 99442 PR PHYS/QHP TELEPHONE EVALUATION 11-20 MIN: CPT | Performed by: FAMILY MEDICINE

## 2020-06-08 RX ORDER — TADALAFIL 20 MG/1
20 TABLET ORAL DAILY PRN
Qty: 10 TABLET | Refills: 0 | Status: SHIPPED | OUTPATIENT
Start: 2020-06-08 | End: 2020-10-19 | Stop reason: ALTCHOICE

## 2020-06-09 DIAGNOSIS — I10 ESSENTIAL HYPERTENSION: ICD-10-CM

## 2020-06-09 RX ORDER — LISINOPRIL 40 MG/1
TABLET ORAL
Qty: 30 TABLET | Refills: 4 | Status: SHIPPED | OUTPATIENT
Start: 2020-06-09 | End: 2020-06-17 | Stop reason: SDUPTHER

## 2020-06-10 DIAGNOSIS — R60.9 PERIPHERAL EDEMA: ICD-10-CM

## 2020-06-10 DIAGNOSIS — R60.9 EDEMA, UNSPECIFIED TYPE: ICD-10-CM

## 2020-06-10 RX ORDER — FUROSEMIDE 40 MG/1
TABLET ORAL
Qty: 30 TABLET | Refills: 4 | Status: SHIPPED | OUTPATIENT
Start: 2020-06-10 | End: 2020-06-17 | Stop reason: SDUPTHER

## 2020-06-10 RX ORDER — POTASSIUM CHLORIDE 20 MEQ/1
TABLET, EXTENDED RELEASE ORAL
Qty: 30 TABLET | Refills: 4 | Status: SHIPPED | OUTPATIENT
Start: 2020-06-10 | End: 2020-06-17 | Stop reason: SDUPTHER

## 2020-06-11 ENCOUNTER — TELEPHONE (OUTPATIENT)
Dept: HEMATOLOGY ONCOLOGY | Facility: CLINIC | Age: 65
End: 2020-06-11

## 2020-06-17 DIAGNOSIS — I10 ESSENTIAL HYPERTENSION: ICD-10-CM

## 2020-06-17 DIAGNOSIS — R60.9 EDEMA, UNSPECIFIED TYPE: ICD-10-CM

## 2020-06-17 DIAGNOSIS — R60.9 PERIPHERAL EDEMA: ICD-10-CM

## 2020-06-17 PROCEDURE — 4010F ACE/ARB THERAPY RXD/TAKEN: CPT | Performed by: INTERNAL MEDICINE

## 2020-06-17 RX ORDER — LISINOPRIL 40 MG/1
40 TABLET ORAL DAILY
Qty: 90 TABLET | Refills: 3 | Status: SHIPPED | OUTPATIENT
Start: 2020-06-17 | End: 2021-06-14 | Stop reason: SDUPTHER

## 2020-06-17 RX ORDER — POTASSIUM CHLORIDE 20 MEQ/1
20 TABLET, EXTENDED RELEASE ORAL DAILY
Qty: 90 TABLET | Refills: 3 | Status: SHIPPED | OUTPATIENT
Start: 2020-06-17 | End: 2021-06-14 | Stop reason: SDUPTHER

## 2020-06-17 RX ORDER — FUROSEMIDE 40 MG/1
40 TABLET ORAL DAILY
Qty: 30 TABLET | Refills: 3 | Status: SHIPPED | OUTPATIENT
Start: 2020-06-17 | End: 2020-12-23

## 2020-06-26 ENCOUNTER — HOSPITAL ENCOUNTER (OUTPATIENT)
Dept: CT IMAGING | Facility: HOSPITAL | Age: 65
Discharge: HOME | End: 2020-06-26
Attending: SPECIALIST
Payer: COMMERCIAL

## 2020-06-26 DIAGNOSIS — C91.10 CLL (CHRONIC LYMPHOCYTIC LEUKEMIA) (HCC): ICD-10-CM

## 2020-06-26 DIAGNOSIS — R59.9 LYMPH NODE ENLARGEMENT: ICD-10-CM

## 2020-06-26 PROCEDURE — 70491 CT SOFT TISSUE NECK W/DYE: CPT

## 2020-06-26 RX ADMIN — IOHEXOL 85 ML: 350 INJECTION, SOLUTION INTRAVENOUS at 13:38

## 2020-07-29 ENCOUNTER — APPOINTMENT (OUTPATIENT)
Dept: LAB | Facility: CLINIC | Age: 65
End: 2020-07-29
Payer: COMMERCIAL

## 2020-07-31 ENCOUNTER — OFFICE VISIT (OUTPATIENT)
Dept: HEMATOLOGY ONCOLOGY | Facility: CLINIC | Age: 65
End: 2020-07-31
Payer: COMMERCIAL

## 2020-07-31 VITALS
WEIGHT: 275 LBS | RESPIRATION RATE: 20 BRPM | HEART RATE: 71 BPM | OXYGEN SATURATION: 95 % | BODY MASS INDEX: 38.5 KG/M2 | DIASTOLIC BLOOD PRESSURE: 86 MMHG | SYSTOLIC BLOOD PRESSURE: 172 MMHG | HEIGHT: 71 IN

## 2020-07-31 DIAGNOSIS — C91.10 CLL (CHRONIC LYMPHOCYTIC LEUKEMIA) (HCC): Primary | ICD-10-CM

## 2020-07-31 PROCEDURE — 3077F SYST BP >= 140 MM HG: CPT | Performed by: INTERNAL MEDICINE

## 2020-07-31 PROCEDURE — 1160F RVW MEDS BY RX/DR IN RCRD: CPT | Performed by: INTERNAL MEDICINE

## 2020-07-31 PROCEDURE — 4004F PT TOBACCO SCREEN RCVD TLK: CPT | Performed by: INTERNAL MEDICINE

## 2020-07-31 PROCEDURE — 99214 OFFICE O/P EST MOD 30 MIN: CPT | Performed by: INTERNAL MEDICINE

## 2020-07-31 PROCEDURE — 3008F BODY MASS INDEX DOCD: CPT | Performed by: INTERNAL MEDICINE

## 2020-07-31 PROCEDURE — 3079F DIAST BP 80-89 MM HG: CPT | Performed by: INTERNAL MEDICINE

## 2020-07-31 NOTE — PROGRESS NOTES
HEMATOLOGY / ONCOLOGY CLINIC NOTE    Primary Care Provider: Rajesh Joshua MD  Referring Provider:    MRN: [id number]  : 1955    Reason for Encounter:    Chief Complaint   Patient presents with   [address] [address]         Hematology / Oncology History:     Bertha Adams is a 71 y o  male who came in for follow up      1, CLL  - ?  Stage III with anemia; also having splenomegaly  No FISH panel  - has been on ibrutinib for 20 mg since 2018   - highest white blood cell 128k, decreased to 16 K now     2, anemia  - resolved        Assessment / Plan:     1  CLL (chronic lymphocytic leukemia) (HCC)  - CLL is under control  Patient reported having fatigue, ED, unclear whether these are side effects of ibrutinib for now  Advised patient okay to hold ibrutinib for week, if symptoms is significantly improved, we can consider to lower the dose to 280 mg  If I holding the ibrutinib, patient has no difference, he should restart at the same dose  - will check EKG to monitor toxicity      - CBC and differential; Standing  - Comprehensive metabolic panel; Standing  - ECG 12 lead; Future  - CBC and differential  - Comprehensive metabolic panel      25       minutes were spent face to face with patient with greater than 50% of the time spent in counseling or coordination of care including discussions of treatment instructions  All of the patient's questions were answered to their satisfactory during this discussion  Advised pt to call if there is any further questions  Interval History:     2019:  Came in for follow-up  Reported has been doing okay at baseline health status  Using home oxygen for COPD  No bleeding, no leg swelling, no new chest pain, cough, hemoptysis  No frequent infection  2019 :  Came in for follow-up  Overall doing well  Reported has been losing weight about 20 lb  No lumps bumps  No other constitutional symptoms  No bleeding no frequent infection      1/3/28331 : Came in for follow-up, doing well, body weight is stable  based overall tolerated treatment well no bleeding, no thrombosis, no frequent infection  Patient reported has been losing weight however albumin level is in normal range  7/31/2020 :  Patient came in for follow-up doing okay  Gaining weight  Reported having ED and more fatigue  No infection of skin rash      Problem list:       Patient Active Problem List   Diagnosis    Hypertension    Depression    Polypharmacy    Bronchitis    Encephalopathy    Weakness    Chronic lymphocytic leukemia of B-cell type not having achieved remission (HCC)    Lactic acidosis    Leukocytosis    Elevated troponin    Bipolar 1 disorder (HCC)    Psychiatric disorder    Anemia    Encounter for smoking cessation counseling    Encounter for screening for lipid disorder    Screening for diabetes mellitus    Peripheral edema    [facility]) ([facility] )    RA (acute kidney injury) ([place] [address] )    Chronic obstructive pulmonary disease (HCC)    Expiratory wheezing    Type II diabetes mellitus (HCC)    Lymph node enlargement       PHYSICIAL EXAMINATION:       Vital Signs:   [unfilled]  Body mass index is 38 35 kg/m²  Body surface area is 2 42 meters squared  Obesity  Bilateral lower extremity trace swelling   No palpable lymphadenopathy no other major findings compared to previous visit    GEN: Alert, awake oriented x3, in no acute distress  HEENT- No pallor, icterus, cyanosis, no oral mucosal lesions,   LAD - no palpable cervical, clavicle, axillary, inguinal LAD  Heart- normal S1 S2, regular rate and rhythm, No murmur, rubs     Lungs- decreased breathing sound bilateral    Abdomen- soft, Non tender, bowel sounds present  Extremities- No cyanosis, clubbing, edema  Neuro- No focal neurological deficit           PAST MEDICAL HISTORY:   has a past medical history of Anemia, Anxiety, Bipolar disorder ([facility] ), Cancer ([facility] ), History of alcohol abuse, Hypertension, Hypertension, Insomnia, Leukemia ([facility] ), Opiate abuse, episodic ([facility] ), and Psychiatric disorder  PAST SURGICAL HISTORY:   has a past surgical history that includes EGD AND COLONOSCOPY (N/A, 3/30/2018)  CURRENT MEDICATIONS:     Current Outpatient Medications   Medication Sig Dispense Refill    ALPRAZolam (XANAX) 1 mg tablet   3    FLUoxetine (PROzac) 20 mg capsule Take 60 mg by mouth daily        furosemide (LASIX) 40 mg tablet Take 1 tablet (40 mg total) by mouth daily 30 tablet 3    lisinopril (ZESTRIL) 40 mg tablet Take 1 tablet (40 mg total) by mouth daily 90 tablet 3    mirtazapine (REMERON) 15 mg tablet Take 15 mg by mouth daily at bedtime      potassium chloride (K-DUR,KLOR-CON) 20 mEq tablet Take 1 tablet (20 mEq total) by mouth daily 90 tablet 3    risperiDONE (RisperDAL) 1 mg tablet Take 1 mg by mouth      benztropine (COGENTIN) 1 mg tablet Take 1 mg by mouth 2 (two) times a day       hydrochlorothiazide (MICROZIDE) 12 5 mg capsule TAKE ONE CAPSULE BY MOUTH DAILY (Patient not taking: Reported on 1/3/2020) 30 capsule 2    Ibrutinib (Imbruvica) 420 MG TABS Take 420 mg by mouth daily 30 tablet 11    tadalafil (CIALIS) 20 MG tablet Take 1 tablet (20 mg total) by mouth daily as needed for erectile dysfunction (Patient not taking: Reported on 7/31/2020) 10 tablet 0     No current facility-administered medications for this visit  [unfilled]    SOCIAL HISTORY:   reports that he has been smoking cigarettes  He has a 60 00 pack-year smoking history  He has never used smokeless tobacco  He reports that he does not drink alcohol or use drugs  FAMILY HISTORY:  family history includes Cancer in his brother; Coronary artery disease in his mother; Diabetes in his mother; Early death in his brother; Hepatitis in his sister; No Known Problems in his father; Prostate cancer in his brother  ALLERGIES:  has No Known Allergies      REVIEW OF SYSTEMS:  Please note that a 14-point review of systems was performed to include Constitutional, HEENT, Respiratory, CVS, GI, , Musculoskeletal, Integumentary, Neurologic, Rheumatologic, Endocrinologic, Psychiatric, Lymphatic, and Hematologic/Oncologic systems were reviewed and are negative unless otherwise stated in HPI  Positive and negative findings pertinent to this evaluation are incorporated into the history of present illness  Lab Results   Component Value Date    SODIUM 137 07/29/2020    K 4 0 07/29/2020     07/29/2020    CO2 28 07/29/2020    AGAP 6 07/29/2020    BUN 19 07/29/2020    CREATININE 1 15 07/29/2020    GLUC 156 (H) 07/29/2020    GLUF 132 (H) 05/14/2020    CALCIUM 9 3 07/29/2020    AST 12 07/29/2020    ALT 38 07/29/2020    ALKPHOS 87 07/29/2020    TP 7 7 07/29/2020    TBILI 0 50 07/29/2020    EGFR 66 07/29/2020       CBC with diff:   Results from last 7 days   Lab Units 07/29/20  1430   WBC Thousand/uL 10 40*   HEMOGLOBIN g/dL 15 3   HEMATOCRIT % 46 2   MCV fL 91   PLATELETS Thousands/uL 181   MCH pg 30 1   MCHC g/dL 33 1   RDW % 13 7   MPV fL 12 2   NRBC AUTO /100 WBCs 0       CMP:  Results from last 7 days   Lab Units 07/29/20  1430   POTASSIUM mmol/L 4 0   CHLORIDE mmol/L 103   CO2 mmol/L 28   BUN mg/dL 19   CREATININE mg/dL 1 15   CALCIUM mg/dL 9 3   AST U/L 12   ALT U/L 38   ALK PHOS U/L 87   EGFR ml/min/1 73sq m 66           IMAGING:    No orders to display     No results found

## 2020-08-19 ENCOUNTER — TELEPHONE (OUTPATIENT)
Dept: FAMILY MEDICINE CLINIC | Facility: CLINIC | Age: 65
End: 2020-08-19

## 2020-08-19 NOTE — TELEPHONE ENCOUNTER
Patient would like to get a CPAP machine and [address] would like us to fax a script for a home sleep study     Fax: [phone]

## 2020-08-25 ENCOUNTER — OFFICE VISIT (OUTPATIENT)
Dept: FAMILY MEDICINE CLINIC | Facility: CLINIC | Age: 65
End: 2020-08-25
Payer: COMMERCIAL

## 2020-08-25 VITALS
TEMPERATURE: 98.7 F | BODY MASS INDEX: 37.36 KG/M2 | DIASTOLIC BLOOD PRESSURE: 76 MMHG | HEIGHT: 72 IN | HEART RATE: 86 BPM | WEIGHT: 275.8 LBS | RESPIRATION RATE: 22 BRPM | OXYGEN SATURATION: 96 % | SYSTOLIC BLOOD PRESSURE: 132 MMHG

## 2020-08-25 DIAGNOSIS — Z12.5 PROSTATE CANCER SCREENING: ICD-10-CM

## 2020-08-25 DIAGNOSIS — N52.9 ERECTILE DYSFUNCTION, UNSPECIFIED ERECTILE DYSFUNCTION TYPE: Primary | ICD-10-CM

## 2020-08-25 DIAGNOSIS — Z12.11 COLON CANCER SCREENING: ICD-10-CM

## 2020-08-25 DIAGNOSIS — Z13.6 ENCOUNTER FOR ABDOMINAL AORTIC ANEURYSM (AAA) SCREENING: ICD-10-CM

## 2020-08-25 DIAGNOSIS — J44.9 COPD (CHRONIC OBSTRUCTIVE PULMONARY DISEASE) (HCC): ICD-10-CM

## 2020-08-25 DIAGNOSIS — E08.00 DIABETES MELLITUS DUE TO UNDERLYING CONDITION WITH HYPEROSMOLARITY WITHOUT COMA, WITHOUT LONG-TERM CURRENT USE OF INSULIN (HCC): ICD-10-CM

## 2020-08-25 DIAGNOSIS — Z12.2 ENCOUNTER FOR SCREENING FOR LUNG CANCER: ICD-10-CM

## 2020-08-25 DIAGNOSIS — E11.9 TYPE II DIABETES MELLITUS (HCC): ICD-10-CM

## 2020-08-25 PROCEDURE — 3008F BODY MASS INDEX DOCD: CPT | Performed by: INTERNAL MEDICINE

## 2020-08-25 PROCEDURE — G0438 PPPS, INITIAL VISIT: HCPCS | Performed by: FAMILY MEDICINE

## 2020-08-25 RX ORDER — MIRTAZAPINE 30 MG/1
TABLET, FILM COATED ORAL
COMMUNITY
Start: 2020-08-21 | End: 2022-03-10 | Stop reason: ALTCHOICE

## 2020-08-26 ENCOUNTER — TELEPHONE (OUTPATIENT)
Dept: HEMATOLOGY ONCOLOGY | Facility: MEDICAL CENTER | Age: 65
End: 2020-08-26

## 2020-08-26 NOTE — TELEPHONE ENCOUNTER
Patient wife called in as she needed a form from [employer] filled out by Dr Padma Samson reached out to the office and spoke to [place] and was advised that it is OK for patient to come by tomorrow

## 2020-08-31 ENCOUNTER — TELEPHONE (OUTPATIENT)
Dept: HEMATOLOGY ONCOLOGY | Facility: CLINIC | Age: 65
End: 2020-08-31

## 2020-08-31 NOTE — TELEPHONE ENCOUNTER
Patient called stating we will be receiving a request for records from [facility] for financial assistance

## 2020-09-02 ENCOUNTER — DOCUMENTATION (OUTPATIENT)
Dept: HEMATOLOGY ONCOLOGY | Facility: CLINIC | Age: 65
End: 2020-09-02

## 2020-09-02 NOTE — TELEPHONE ENCOUNTER
Stephane Morales will call patient/family directly  J&J is submitted yearly and it cannot be submitted too early

## 2020-09-02 NOTE — PROGRESS NOTES
20  Received renewal application from providers off for patient's Imbruvica medication  Patients application is due to  20    Alternate funding must be used prior to submitted application to [address] [place] [address]     64  Obtained alternate funding for patients imbruvica thru the [employer]  Also faxed renewal application, however [address] [place] [address] will denied as other funding is available  Member ID: [id number]  Group ID: [id number]  RxBin ID: [id number]  PCN: [id number]  Eligibility Start Date: 2020  Eligibility End Date:  2021  Assistance Amount:  $8,400 00     Call to patient to advise of updates on funding  Also attempted to reach out to spouse Sheba Page  No response left message at bother numbers with contact information to return call     Email to team to advise of update     9-3-20  Received voicemail from patient's spouse regarding funding  Attempt made to return call to patient  No response left message to call back  10-7-20  Received notification from [address] [place] [address] stating patient was approved on temp basis until 20 until they receive the medicare attestation signed by patient  Call to patient to advise that attestation must be signed and mailed back to [address] [place] [address] for extended approval  Patient is still covered under [facility]      10-13-20  Received signed medicare attestation, faxed back to [address] [place] [address] for approval extension to end of year, Patient also still has coverage thru [facility]

## 2020-09-02 NOTE — TELEPHONE ENCOUNTER
Yes, this form was handed to me and Yudith Jerry said she would handle it and send it to finance or billing

## 2020-09-02 NOTE — TELEPHONE ENCOUNTER
Patient wife called stating that she would like to know if the form from [address] is ready for   Best call back [phone]

## 2020-09-03 ENCOUNTER — DOCUMENTATION (OUTPATIENT)
Dept: HEMATOLOGY ONCOLOGY | Facility: CLINIC | Age: 65
End: 2020-09-03

## 2020-09-03 NOTE — PROGRESS NOTES
9/2/2020  Received notification from finance the [address] [place] [address] may need to be re-autorized    Pt has [employer] COMM BLUE/EXPRESS SCRIPT  ID # [id number]   BIN# [id number]  PCN # [id number]  GRP # [id number]    Needed to confirm dosing  Chart note stated dose may decrease to 280 mg    9/3/2020 per clinical, pt is still on the 420 mg    Submitted for auth through cover my meds  9-4-2020  Received the approval letter today  Per the approval letter Avery Pruett has been approved  Ernesto Vega is valid from 7-4-2020 through 9-3-2021    Notified clinical, [employer], and finance

## 2020-09-04 DIAGNOSIS — C95.90 LEUKEMIA NOT HAVING ACHIEVED REMISSION, UNSPECIFIED LEUKEMIA TYPE (HCC): ICD-10-CM

## 2020-09-14 NOTE — TELEPHONE ENCOUNTER
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 However, patient instructed to call the office if not contacted within 2 weeks.

## 2020-09-17 ENCOUNTER — TELEPHONE (OUTPATIENT)
Dept: HEMATOLOGY ONCOLOGY | Facility: CLINIC | Age: 65
End: 2020-09-17

## 2020-09-17 NOTE — TELEPHONE ENCOUNTER
Patient called to get fax number for the [employer] to fax to  I provided [phone]   Patient understood

## 2020-09-25 ENCOUNTER — APPOINTMENT (OUTPATIENT)
Dept: LAB | Facility: CLINIC | Age: 65
End: 2020-09-25
Payer: COMMERCIAL

## 2020-09-25 DIAGNOSIS — Z12.5 PROSTATE CANCER SCREENING: ICD-10-CM

## 2020-09-25 DIAGNOSIS — E08.00 DIABETES MELLITUS DUE TO UNDERLYING CONDITION WITH HYPEROSMOLARITY WITHOUT COMA, WITHOUT LONG-TERM CURRENT USE OF INSULIN (HCC): ICD-10-CM

## 2020-09-25 DIAGNOSIS — E11.9 TYPE II DIABETES MELLITUS (HCC): ICD-10-CM

## 2020-09-25 LAB
CHOLEST SERPL-MCNC: 180 MG/DL (ref 50–200)
EST. AVERAGE GLUCOSE BLD GHB EST-MCNC: 137 MG/DL
HBA1C MFR BLD: 6.4 %
HDLC SERPL-MCNC: 48 MG/DL
LDLC SERPL CALC-MCNC: 107 MG/DL (ref 0–100)
NONHDLC SERPL-MCNC: 132 MG/DL
PSA SERPL-MCNC: 0.7 NG/ML (ref 0–4)
TRIGL SERPL-MCNC: 125 MG/DL

## 2020-09-25 PROCEDURE — 3044F HG A1C LEVEL LT 7.0%: CPT | Performed by: PHYSICIAN ASSISTANT

## 2020-09-25 PROCEDURE — 36415 COLL VENOUS BLD VENIPUNCTURE: CPT

## 2020-09-25 PROCEDURE — 83036 HEMOGLOBIN GLYCOSYLATED A1C: CPT

## 2020-09-25 PROCEDURE — G0103 PSA SCREENING: HCPCS

## 2020-09-25 PROCEDURE — 80061 LIPID PANEL: CPT

## 2020-10-09 PROCEDURE — 3066F NEPHROPATHY DOC TX: CPT | Performed by: PHYSICIAN ASSISTANT

## 2020-10-10 ENCOUNTER — HOSPITAL ENCOUNTER (OUTPATIENT)
Dept: CT IMAGING | Facility: HOSPITAL | Age: 65
Discharge: HOME | End: 2020-10-10
Payer: COMMERCIAL

## 2020-10-10 ENCOUNTER — HOSPITAL ENCOUNTER (OUTPATIENT)
Dept: ULTRASOUND IMAGING | Facility: HOSPITAL | Age: 65
Discharge: HOME | End: 2020-10-10
Payer: COMMERCIAL

## 2020-10-10 DIAGNOSIS — Z87.891 PERSONAL HISTORY OF TOBACCO USE, PRESENTING HAZARDS TO HEALTH: ICD-10-CM

## 2020-10-10 DIAGNOSIS — Z12.2 ENCOUNTER FOR SCREENING FOR LUNG CANCER: ICD-10-CM

## 2020-10-10 DIAGNOSIS — Z13.6 ENCOUNTER FOR ABDOMINAL AORTIC ANEURYSM (AAA) SCREENING: ICD-10-CM

## 2020-10-10 PROCEDURE — G1004 CDSM NDSC: HCPCS

## 2020-10-10 PROCEDURE — G0297 LDCT FOR LUNG CA SCREEN: HCPCS

## 2020-10-10 PROCEDURE — 76706 US ABDL AORTA SCREEN AAA: CPT

## 2020-10-13 ENCOUNTER — TELEPHONE (OUTPATIENT)
Dept: HEMATOLOGY ONCOLOGY | Facility: CLINIC | Age: 65
End: 2020-10-13

## 2020-10-19 ENCOUNTER — OFFICE VISIT (OUTPATIENT)
Dept: GASTROENTEROLOGY | Facility: CLINIC | Age: 65
End: 2020-10-19
Payer: COMMERCIAL

## 2020-10-19 VITALS
WEIGHT: 280 LBS | BODY MASS INDEX: 37.93 KG/M2 | RESPIRATION RATE: 18 BRPM | TEMPERATURE: 98 F | HEART RATE: 67 BPM | HEIGHT: 72 IN | SYSTOLIC BLOOD PRESSURE: 152 MMHG | DIASTOLIC BLOOD PRESSURE: 90 MMHG

## 2020-10-19 DIAGNOSIS — Z12.11 COLON CANCER SCREENING: ICD-10-CM

## 2020-10-19 PROCEDURE — 99203 OFFICE O/P NEW LOW 30 MIN: CPT | Performed by: PHYSICIAN ASSISTANT

## 2020-10-19 PROCEDURE — 4004F PT TOBACCO SCREEN RCVD TLK: CPT | Performed by: PHYSICIAN ASSISTANT

## 2020-10-19 PROCEDURE — 1160F RVW MEDS BY RX/DR IN RCRD: CPT | Performed by: PHYSICIAN ASSISTANT

## 2020-10-19 PROCEDURE — 3080F DIAST BP >= 90 MM HG: CPT | Performed by: PHYSICIAN ASSISTANT

## 2020-10-20 ENCOUNTER — TELEPHONE (OUTPATIENT)
Dept: HEMATOLOGY ONCOLOGY | Facility: CLINIC | Age: 65
End: 2020-10-20

## 2020-10-20 DIAGNOSIS — C91.10 CLL (CHRONIC LYMPHOCYTIC LEUKEMIA) (HCC): Primary | ICD-10-CM

## 2020-10-23 ENCOUNTER — LAB (OUTPATIENT)
Dept: LAB | Facility: CLINIC | Age: 65
End: 2020-10-23
Payer: COMMERCIAL

## 2020-10-23 DIAGNOSIS — C91.10 CLL (CHRONIC LYMPHOCYTIC LEUKEMIA) (HCC): ICD-10-CM

## 2020-10-23 LAB
ALBUMIN SERPL BCP-MCNC: 4.1 G/DL (ref 3.5–5)
ALP SERPL-CCNC: 98 U/L (ref 46–116)
ALT SERPL W P-5'-P-CCNC: 45 U/L (ref 12–78)
ANION GAP SERPL CALCULATED.3IONS-SCNC: 5 MMOL/L (ref 4–13)
AST SERPL W P-5'-P-CCNC: 18 U/L (ref 5–45)
BASOPHILS # BLD AUTO: 0.05 THOUSANDS/ΜL (ref 0–0.1)
BASOPHILS NFR BLD AUTO: 1 % (ref 0–1)
BILIRUB SERPL-MCNC: 0.57 MG/DL (ref 0.2–1)
BUN SERPL-MCNC: 13 MG/DL (ref 5–25)
CALCIUM SERPL-MCNC: 8.9 MG/DL (ref 8.3–10.1)
CHLORIDE SERPL-SCNC: 104 MMOL/L (ref 100–108)
CO2 SERPL-SCNC: 29 MMOL/L (ref 21–32)
CREAT SERPL-MCNC: 1 MG/DL (ref 0.6–1.3)
EOSINOPHIL # BLD AUTO: 0.1 THOUSAND/ΜL (ref 0–0.61)
EOSINOPHIL NFR BLD AUTO: 1 % (ref 0–6)
ERYTHROCYTE [DISTWIDTH] IN BLOOD BY AUTOMATED COUNT: 13.2 % (ref 11.6–15.1)
GFR SERPL CREATININE-BSD FRML MDRD: 79 ML/MIN/1.73SQ M
GLUCOSE P FAST SERPL-MCNC: 142 MG/DL (ref 65–99)
HCT VFR BLD AUTO: 43.3 % (ref 36.5–49.3)
HGB BLD-MCNC: 14.4 G/DL (ref 12–17)
IMM GRANULOCYTES # BLD AUTO: 0.17 THOUSAND/UL (ref 0–0.2)
IMM GRANULOCYTES NFR BLD AUTO: 2 % (ref 0–2)
LYMPHOCYTES # BLD AUTO: 1.39 THOUSANDS/ΜL (ref 0.6–4.47)
LYMPHOCYTES NFR BLD AUTO: 17 % (ref 14–44)
MCH RBC QN AUTO: 31.4 PG (ref 26.8–34.3)
MCHC RBC AUTO-ENTMCNC: 33.3 G/DL (ref 31.4–37.4)
MCV RBC AUTO: 95 FL (ref 82–98)
MONOCYTES # BLD AUTO: 0.74 THOUSAND/ΜL (ref 0.17–1.22)
MONOCYTES NFR BLD AUTO: 9 % (ref 4–12)
NEUTROPHILS # BLD AUTO: 5.73 THOUSANDS/ΜL (ref 1.85–7.62)
NEUTS SEG NFR BLD AUTO: 70 % (ref 43–75)
NRBC BLD AUTO-RTO: 0 /100 WBCS
PLATELET # BLD AUTO: 170 THOUSANDS/UL (ref 149–390)
PMV BLD AUTO: 11.7 FL (ref 8.9–12.7)
POTASSIUM SERPL-SCNC: 3.7 MMOL/L (ref 3.5–5.3)
PROT SERPL-MCNC: 7.7 G/DL (ref 6.4–8.2)
RBC # BLD AUTO: 4.58 MILLION/UL (ref 3.88–5.62)
SODIUM SERPL-SCNC: 138 MMOL/L (ref 136–145)
WBC # BLD AUTO: 8.18 THOUSAND/UL (ref 4.31–10.16)

## 2020-10-23 PROCEDURE — 80053 COMPREHEN METABOLIC PANEL: CPT | Performed by: INTERNAL MEDICINE

## 2020-10-23 PROCEDURE — 85025 COMPLETE CBC W/AUTO DIFF WBC: CPT | Performed by: INTERNAL MEDICINE

## 2020-10-23 PROCEDURE — 36415 COLL VENOUS BLD VENIPUNCTURE: CPT | Performed by: INTERNAL MEDICINE

## 2020-10-25 ENCOUNTER — TELEPHONE (OUTPATIENT)
Dept: OTHER | Facility: OTHER | Age: 65
End: 2020-10-25

## 2020-10-26 ENCOUNTER — TELEPHONE (OUTPATIENT)
Dept: HEMATOLOGY ONCOLOGY | Facility: MEDICAL CENTER | Age: 65
End: 2020-10-26

## 2020-11-02 ENCOUNTER — ANESTHESIA EVENT (OUTPATIENT)
Dept: GASTROENTEROLOGY | Facility: HOSPITAL | Age: 65
End: 2020-11-02

## 2020-11-02 ENCOUNTER — ANESTHESIA (OUTPATIENT)
Dept: GASTROENTEROLOGY | Facility: HOSPITAL | Age: 65
End: 2020-11-02

## 2020-11-02 ENCOUNTER — HOSPITAL ENCOUNTER (OUTPATIENT)
Dept: GASTROENTEROLOGY | Facility: HOSPITAL | Age: 65
Setting detail: OUTPATIENT SURGERY
Discharge: HOME | End: 2020-11-02
Attending: INTERNAL MEDICINE
Payer: COMMERCIAL

## 2020-11-02 VITALS
SYSTOLIC BLOOD PRESSURE: 169 MMHG | HEIGHT: 71 IN | WEIGHT: 270.5 LBS | DIASTOLIC BLOOD PRESSURE: 81 MMHG | BODY MASS INDEX: 37.87 KG/M2 | HEART RATE: 58 BPM | TEMPERATURE: 98.4 F | RESPIRATION RATE: 15 BRPM | OXYGEN SATURATION: 96 %

## 2020-11-02 VITALS — HEART RATE: 60 BPM

## 2020-11-02 DIAGNOSIS — Z12.11 COLON CANCER SCREENING: ICD-10-CM

## 2020-11-02 PROCEDURE — 88305 TISSUE EXAM BY PATHOLOGIST: CPT | Performed by: PATHOLOGY

## 2020-11-02 PROCEDURE — 45385 COLONOSCOPY W/LESION REMOVAL: CPT | Performed by: INTERNAL MEDICINE

## 2020-11-02 RX ORDER — LIDOCAINE HYDROCHLORIDE 10 MG/ML
INJECTION, SOLUTION EPIDURAL; INFILTRATION; INTRACAUDAL; PERINEURAL AS NEEDED
Status: DISCONTINUED | OUTPATIENT
Start: 2020-11-02 | End: 2020-11-02

## 2020-11-02 RX ORDER — PROPOFOL 10 MG/ML
INJECTION, EMULSION INTRAVENOUS AS NEEDED
Status: DISCONTINUED | OUTPATIENT
Start: 2020-11-02 | End: 2020-11-02

## 2020-11-02 RX ORDER — SODIUM CHLORIDE, SODIUM LACTATE, POTASSIUM CHLORIDE, CALCIUM CHLORIDE 600; 310; 30; 20 MG/100ML; MG/100ML; MG/100ML; MG/100ML
125 INJECTION, SOLUTION INTRAVENOUS CONTINUOUS
Status: DISCONTINUED | OUTPATIENT
Start: 2020-11-02 | End: 2020-11-06 | Stop reason: HOSPADM

## 2020-11-02 RX ADMIN — PROPOFOL 20 MG: 10 INJECTION, EMULSION INTRAVENOUS at 13:20

## 2020-11-02 RX ADMIN — PROPOFOL 10 MG: 10 INJECTION, EMULSION INTRAVENOUS at 13:24

## 2020-11-02 RX ADMIN — PROPOFOL 20 MG: 10 INJECTION, EMULSION INTRAVENOUS at 13:17

## 2020-11-02 RX ADMIN — PROPOFOL 20 MG: 10 INJECTION, EMULSION INTRAVENOUS at 13:22

## 2020-11-02 RX ADMIN — LIDOCAINE HYDROCHLORIDE 100 MG: 10 INJECTION, SOLUTION EPIDURAL; INFILTRATION; INTRACAUDAL; PERINEURAL at 13:13

## 2020-11-02 RX ADMIN — PROPOFOL 120 MG: 10 INJECTION, EMULSION INTRAVENOUS at 13:13

## 2020-11-02 RX ADMIN — SODIUM CHLORIDE, SODIUM LACTATE, POTASSIUM CHLORIDE, AND CALCIUM CHLORIDE 125 ML/HR: .6; .31; .03; .02 INJECTION, SOLUTION INTRAVENOUS at 11:58

## 2020-11-11 ENCOUNTER — TELEPHONE (OUTPATIENT)
Dept: HEMATOLOGY ONCOLOGY | Facility: CLINIC | Age: 65
End: 2020-11-11

## 2020-11-12 ENCOUNTER — TELEPHONE (OUTPATIENT)
Dept: HEMATOLOGY ONCOLOGY | Facility: CLINIC | Age: 65
End: 2020-11-12

## 2020-11-13 ENCOUNTER — TELEPHONE (OUTPATIENT)
Dept: HEMATOLOGY ONCOLOGY | Facility: CLINIC | Age: 65
End: 2020-11-13

## 2020-11-13 ENCOUNTER — OFFICE VISIT (OUTPATIENT)
Dept: HEMATOLOGY ONCOLOGY | Facility: CLINIC | Age: 65
End: 2020-11-13
Payer: COMMERCIAL

## 2020-11-13 VITALS
OXYGEN SATURATION: 99 % | DIASTOLIC BLOOD PRESSURE: 70 MMHG | SYSTOLIC BLOOD PRESSURE: 148 MMHG | BODY MASS INDEX: 38.22 KG/M2 | HEIGHT: 71 IN | HEART RATE: 66 BPM | TEMPERATURE: 98.1 F | WEIGHT: 273 LBS | RESPIRATION RATE: 18 BRPM

## 2020-11-13 DIAGNOSIS — Z78.9 NEED FOR FOLLOW-UP BY SOCIAL WORKER: Primary | ICD-10-CM

## 2020-11-13 DIAGNOSIS — C91.10 CLL (CHRONIC LYMPHOCYTIC LEUKEMIA) (HCC): ICD-10-CM

## 2020-11-13 PROCEDURE — 99214 OFFICE O/P EST MOD 30 MIN: CPT | Performed by: INTERNAL MEDICINE

## 2020-11-17 ENCOUNTER — CONSULT (OUTPATIENT)
Dept: UROLOGY | Facility: CLINIC | Age: 65
End: 2020-11-17
Payer: COMMERCIAL

## 2020-11-17 VITALS
HEIGHT: 71 IN | SYSTOLIC BLOOD PRESSURE: 154 MMHG | BODY MASS INDEX: 38.08 KG/M2 | WEIGHT: 272 LBS | HEART RATE: 75 BPM | DIASTOLIC BLOOD PRESSURE: 82 MMHG

## 2020-11-17 DIAGNOSIS — N52.9 ERECTILE DYSFUNCTION, UNSPECIFIED ERECTILE DYSFUNCTION TYPE: ICD-10-CM

## 2020-11-17 PROCEDURE — 3008F BODY MASS INDEX DOCD: CPT | Performed by: PHYSICIAN ASSISTANT

## 2020-11-17 PROCEDURE — 1160F RVW MEDS BY RX/DR IN RCRD: CPT | Performed by: PHYSICIAN ASSISTANT

## 2020-11-17 PROCEDURE — 99204 OFFICE O/P NEW MOD 45 MIN: CPT | Performed by: PHYSICIAN ASSISTANT

## 2020-11-17 PROCEDURE — 3077F SYST BP >= 140 MM HG: CPT | Performed by: PHYSICIAN ASSISTANT

## 2020-11-17 PROCEDURE — 1036F TOBACCO NON-USER: CPT | Performed by: PHYSICIAN ASSISTANT

## 2020-11-17 PROCEDURE — 3079F DIAST BP 80-89 MM HG: CPT | Performed by: PHYSICIAN ASSISTANT

## 2020-11-23 ENCOUNTER — TELEPHONE (OUTPATIENT)
Dept: UROLOGY | Facility: CLINIC | Age: 65
End: 2020-11-23

## 2020-11-24 DIAGNOSIS — R60.9 PERIPHERAL EDEMA: ICD-10-CM

## 2020-12-01 ENCOUNTER — PATIENT OUTREACH (OUTPATIENT)
Dept: CASE MANAGEMENT | Facility: HOSPITAL | Age: 65
End: 2020-12-01

## 2020-12-02 ENCOUNTER — TELEPHONE (OUTPATIENT)
Dept: HEMATOLOGY ONCOLOGY | Facility: CLINIC | Age: 65
End: 2020-12-02

## 2020-12-09 ENCOUNTER — TELEPHONE (OUTPATIENT)
Dept: UROLOGY | Facility: CLINIC | Age: 65
End: 2020-12-09

## 2020-12-10 ENCOUNTER — TELEPHONE (OUTPATIENT)
Dept: GASTROENTEROLOGY | Facility: CLINIC | Age: 65
End: 2020-12-10

## 2020-12-21 ENCOUNTER — TELEPHONE (OUTPATIENT)
Dept: UROLOGY | Facility: MEDICAL CENTER | Age: 65
End: 2020-12-21

## 2020-12-22 DIAGNOSIS — R60.9 PERIPHERAL EDEMA: ICD-10-CM

## 2020-12-23 RX ORDER — FUROSEMIDE 40 MG/1
TABLET ORAL
Qty: 30 TABLET | Refills: 10 | Status: SHIPPED | OUTPATIENT
Start: 2020-12-23 | End: 2021-06-14 | Stop reason: SDUPTHER

## 2020-12-25 ENCOUNTER — TELEPHONE (OUTPATIENT)
Dept: OTHER | Facility: OTHER | Age: 65
End: 2020-12-25

## 2021-01-01 PROCEDURE — 3066F NEPHROPATHY DOC TX: CPT | Performed by: PHYSICIAN ASSISTANT

## 2021-01-02 ENCOUNTER — TELEPHONE (OUTPATIENT)
Dept: OTHER | Facility: OTHER | Age: 66
End: 2021-01-02

## 2021-01-04 ENCOUNTER — TELEPHONE (OUTPATIENT)
Dept: UROLOGY | Facility: CLINIC | Age: 66
End: 2021-01-04

## 2021-01-04 NOTE — TELEPHONE ENCOUNTER
Patient given OV date as per message from Raphael Bryant 99 today Patient upset it is too far out  He stated he was cancelled too many times with Layton Cowart and beverly Leija who he spoke to before to be notified  Please assist to handle complaint

## 2021-01-04 NOTE — TELEPHONE ENCOUNTER
Pt called in very unhappy with Ap's choice to RS his appointment for the 4th time without him being told or seen  Pt feels that AP does not care for him and would like to see a different ap if possible  ASAP

## 2021-01-05 NOTE — TELEPHONE ENCOUNTER
After Visit Summary   11/9/2017    Kameron Whitley    MRN: [id number]           Patient Information     Date Of Birth          1949        Visit Information        Provider Department      11/9/2017 8:40 AM Nicola Andrews PA-C [facility]        Today's Diagnoses     Routine general medical examination at a health care facility    -  1    At risk for falling        Need for prophylactic vaccination and inoculation against influenza        Elevated blood pressure reading without diagnosis of hypertension        Hyperlipidemia LDL goal <130        Finger deformity, acquired, right        Arthritis          Care Instructions      Preventive Health Recommendations:   Male Ages 65 and over    Yearly exam:             See your health care provider every year in order to  o   Review health changes.   o   Discuss preventive care.    o   Review your medicines if your doctor has prescribed any.    Talk with your health care provider about whether you should have a test to screen for prostate cancer (PSA).    Every 3 years, have a diabetes test (fasting glucose). If you are at risk for diabetes, you should have this test more often.    Every 5 years, have a cholesterol test. Have this test more often if you are at risk for high cholesterol or heart disease.     Every 10 years, have a colonoscopy. Or, have a yearly FIT test (stool test). These exams will check for colon cancer.    Talk to with your health care provider about screening for Abdominal Aortic Aneurysm if you have a family history of AAA or have a history of smoking.    Shots:     Get a flu shot each year.     Get a tetanus shot every 10 years.     Talk to your doctor about your pneumonia vaccines. There are now two you should receive - Pneumovax (PPSV 23) and Prevnar (PCV 13).     Talk to your doctor about a shingles vaccine.     Talk to your doctor about the hepatitis B vaccine.  Nutrition:     Eat at least 5 servings of fruits  Called and spoke with patient  He states that he finally did pickup his Trimix and was able to get an appt with Meir Lorenzo on Thursday, 1/7  Patient is very happy  I provided my contact information again to patient should he need anything else  and vegetables each day.     Eat whole-grain bread, whole-wheat pasta and brown rice instead of white grains and rice.     Talk to your provider about Calcium and Vitamin D.   Lifestyle    Exercise for at least 150 minutes a week (30 minutes a day, 5 days a week). This will help you control your weight and prevent disease.     Limit alcohol to one drink per day.     No smoking.     Wear sunscreen to prevent skin cancer.     See your dentist every six months for an exam and cleaning.     See your eye doctor every 1 to 2 years to screen for conditions such as glaucoma, macular degeneration, cataracts, etc           Follow-ups after your visit        Additional Services     ORTHOPEDICS ADULT REFERRAL       Your provider has referred you to: FM[facility]: [facility] [place] [phone]   [email or web address]  [facility]: [facility] [place] [phone]   [email or web address]    Right index finger    Please be aware that coverage of these services is subject to the terms and limitations of your health insurance plan.  Call member services at your health plan with any benefit or coverage questions.      Please bring the following to your appointment:    >>   Any x-rays, CTs or MRIs which have been performed.  Contact the facility where they were done to arrange for  prior to your scheduled appointment.    >>   List of current medications   >>   This referral request   >>   Any documents/labs given to you for this referral                  Who to contact     If you have questions or need follow up information about today's clinic visit or your schedule please contact [facility] directly at [phone].  Normal or non-critical lab and imaging results will be communicated to you by MyChart, letter or phone within 4 business days after the clinic has received the results. If you do not hear from us within 7 days, please  "contact the clinic through [employer] or phone. If you have a critical or abnormal lab result, we will notify you by phone as soon as possible.  Submit refill requests through [employer] or call your pharmacy and they will forward the refill request to us. Please allow 3 business days for your refill to be completed.          Additional Information About Your Visit        [employer] Information     [employer] lets you send messages to your doctor, view your test results, renew your prescriptions, schedule appointments and more. To sign up, go to [email or web address] . Click on \"Log in\" on the left side of the screen, which will take you to the Welcome page. Then click on \"Sign up Now\" on the right side of the page.     You will be asked to enter the access code listed below, as well as some personal information. Please follow the directions to create your username and password.     Your access code is: [email or web address]  Expires: 2018  9:13 AM     Your access code will  in 90 days. If you need help or a new code, please call your [facility] clinic or [phone].        Care EveryWhere ID     This is your Care EveryWhere ID. This could be used by other organizations to access your [facility] medical records  [id number]        Your Vitals Were     Pulse Temperature Respirations Height BMI (Body Mass Index)       52 97.7  F (36.5  C) (Temporal) 18 5' 3.5\" (1.613 m) 29.57 kg/m2        Blood Pressure from Last 3 Encounters:   17 132/80   17 116/68   17 110/60    Weight from Last 3 Encounters:   17 169 lb 9.6 oz (76.9 kg)   17 167 lb 3.2 oz (75.8 kg)   17 169 lb 4.8 oz (76.8 kg)              We Performed the Following     CBC with platelets     Comprehensive metabolic panel     Lipid panel reflex to direct LDL Fasting     ORTHOPEDICS ADULT REFERRAL        Primary Care Provider Office Phone # Fax #    Nicola Andrews PA-C [phone]       [address] [place] [address]      "   Equal Access to Services     [facility] Center[facility] : Hadii rodolfo la deejaytomas Jordanali, wamaricarmenda luqadaha, qaybta kaemilysatya montejo, enio sam. So [facility] [phone].    ATENCIÓN: Si habla español, tiene a kaufman disposición servicios gratuitos de asistencia lingüística. Nigelame al [phone].    We comply with applicable federal civil rights laws and [place] laws. We do not discriminate on the basis of race, color, national origin, age, disability, sex, sexual orientation, or gender identity.            Thank you!     Thank you for choosing [facility]  for your care. Our goal is always to provide you with excellent care. Hearing back from our patients is one way we can continue to improve our services. Please take a few minutes to complete the written survey that you may receive in the mail after your visit with us. Thank you!             Your Updated Medication List - Protect others around you: Learn how to safely use, store and throw away your medicines at www.disposemymeds.org.          This list is accurate as of: 11/9/17  9:13 AM.  Always use your most recent med list.                   Brand Name Dispense Instructions for use Diagnosis    ketorolac 0.5 % ophthalmic solution    ACULAR          LOW-DOSE ASPIRIN PO      Take  by mouth.        ofloxacin 0.3 % ophthalmic solution    OCUFLOX          omeprazole 40 MG capsule    priLOSEC    30 capsule    Take 1 capsule (40 mg) by mouth daily Take 30-60 minutes before a meal.    Abdominal pain, generalized, Abdominal pain, epigastric       prednisoLONE acetate 1 % ophthalmic susp    PRED FORTE          sildenafil 100 MG tablet    VIAGRA    8 tablet    Take 1 tablet (100 mg) by mouth daily as needed for erectile dysfunction Take 30 min to 4 hours before intercourse.  Never use with nitroglycerin, terazosin or doxazosin.    Erectile dysfunction due to diseases classified elsewhere

## 2021-01-05 NOTE — TELEPHONE ENCOUNTER
Called and spoke to patient  He reports that he can afford medication now  He will get medication from [facility] and is aware to bring to office  Patient scheduled for Thursday in a 30 minute spot for tri mix teaching with Clement Cordero

## 2021-01-07 ENCOUNTER — OFFICE VISIT (OUTPATIENT)
Dept: UROLOGY | Facility: CLINIC | Age: 66
End: 2021-01-07
Payer: COMMERCIAL

## 2021-01-07 VITALS
BODY MASS INDEX: 38.36 KG/M2 | HEIGHT: 71 IN | WEIGHT: 274 LBS | HEART RATE: 65 BPM | DIASTOLIC BLOOD PRESSURE: 80 MMHG | SYSTOLIC BLOOD PRESSURE: 140 MMHG

## 2021-01-07 DIAGNOSIS — N52.9 ERECTILE DYSFUNCTION, UNSPECIFIED ERECTILE DYSFUNCTION TYPE: ICD-10-CM

## 2021-01-07 PROCEDURE — 99213 OFFICE O/P EST LOW 20 MIN: CPT | Performed by: PHYSICIAN ASSISTANT

## 2021-01-07 PROCEDURE — 3079F DIAST BP 80-89 MM HG: CPT | Performed by: PHYSICIAN ASSISTANT

## 2021-01-07 PROCEDURE — 1036F TOBACCO NON-USER: CPT | Performed by: PHYSICIAN ASSISTANT

## 2021-01-07 PROCEDURE — 51798 US URINE CAPACITY MEASURE: CPT | Performed by: PHYSICIAN ASSISTANT

## 2021-01-07 PROCEDURE — 1160F RVW MEDS BY RX/DR IN RCRD: CPT | Performed by: PHYSICIAN ASSISTANT

## 2021-01-07 PROCEDURE — 3008F BODY MASS INDEX DOCD: CPT | Performed by: PHYSICIAN ASSISTANT

## 2021-01-07 PROCEDURE — 3077F SYST BP >= 140 MM HG: CPT | Performed by: PHYSICIAN ASSISTANT

## 2021-01-07 NOTE — PROGRESS NOTES
1/7/2021  Tere George  1955  [id number]      Assessment/Plan  Erectile dysfunction    Discussion  Lexie Hurt is a 76 y o  male  He was provided verbal and physical demonstration of intracavernosal injection use  He was instructed on storage, disposal, and titration of the medication  He was instructed on hospital precautions for a priapism  He was able to demonstrate understanding of injection use  All questions were answered  History of Present Illness  76 y o  male with ED presents today for intracavernosal injection teaching  Vitals:    01/07/21 1327   BP: 140/80   BP Location: Left arm   Patient Position: Sitting   Cuff Size: Adult   Pulse: 65   Weight: 124 kg (274 lb)   Height: 5' 11" (1 803 m)       Procedure    Procedure: intracavernosal injection  Indication: Erectile Dysfunction  Discussed:  Proper technique and instruction for intracavernosal injection were explained to the patient  risks of injection including but not limited to pain, bleeding, infection, fibrosis, and priapism (including the potential complications of priapism) were discussed  Procedure: The skin overlying the injection site was then prepped with Alcohol  0 1 ml of was injected into the corpora cavernosum  Patient Status:  the patient was closely monitored for 10 minutes and reassessed  He tolerated the procedure well  Response to intracavernosal injection was fair   Complications:  No complications noted  Instructions:  the patient was counseled about priapism and instructed to return to the clinic or the emergency room if his erection lasted up to 4 hours  the patient expressed understanding of the injection technique and felt able to perform self-injection

## 2021-01-08 NOTE — TELEPHONE ENCOUNTER
As instructed yesterday, patient needs to increase by 0 1 mL increments each time  He cannot do more than 1 injection in a 24 hour period, therefore he is not due for an increase in the concentration at this time  If we increase the concentration patient will have a priapism

## 2021-01-08 NOTE — TELEPHONE ENCOUNTER
Called and left message for patient to call the office back  Office number was left in the message  What dose did patient go up to? Patient needs to increase by 0 1 until he is able to get an erection  It patient does not get an erection with 0 8 then we would increase concentration of medication

## 2021-01-08 NOTE — TELEPHONE ENCOUNTER
Patient called looking for a higher dosage of trimax  He was upset that when he used the injection it didn't do anything  Patient isnt sure he can afford to take multiple doses  Please advise patient

## 2021-01-09 ENCOUNTER — TELEPHONE (OUTPATIENT)
Dept: OTHER | Facility: OTHER | Age: 66
End: 2021-01-09

## 2021-01-11 ENCOUNTER — TELEPHONE (OUTPATIENT)
Dept: UROLOGY | Facility: AMBULATORY SURGERY CENTER | Age: 66
End: 2021-01-11

## 2021-01-11 NOTE — TELEPHONE ENCOUNTER
Patient returned call to the office  Accecpted call from patient  Made him aware that as instructed by Wang White on Thursday he needs to increase by 0 1 mL increments each time  Made him aware that he cannot do more than 1 injection in a 24 hour period, therefore he is not due for an increase in the concentration at this time  Made him aware that he needs to go up to 0 8mL before we would increase concentration and to let us know if he is unable to achieve an erection at this does we would consider changing the concentraction  He repots that at this time he is unable to achieve an erection at 0 5ml  Made him aware that if we increase the concentration patient will have a priapism  Patient reported that Wang White is young and inexperienced and that he does not think that these recommendations are right  Repeated the recommendations again to patient and made him aware that these are the standard recommendations for the tri mix  He reports that if he goes up to that much he would be using the whole bottle every time he uses it and he cant afford that  Patient is requesting a phone call by Delta Almazan  Made him aware that message will be sent to Adan Glass as requested

## 2021-01-11 NOTE — TELEPHONE ENCOUNTER
Copied from duplicate task    [facility] Just now (8:27 AM)        Patient called regarding the intracavernosal injection he is taking for ED  He is stating that nothing is working for him and after speaking with the pharmacist they told him to try Trimix 13 (60-30-6) ?     He was disappointed with the amount of money he spent out of pocket for the injection

## 2021-01-11 NOTE — TELEPHONE ENCOUNTER
Called and left message for patient to call the office back  Office number was left in the message  As instructed by Warner Woodward on Thursday patient needs to increase by 0 1 mL increments each time  He cannot do more than 1 injection in a 24 hour period, therefore he is not due for an increase in the concentration at this time  He needs to go up to 0 8mL before we would increase concentration  If we increase the concentration patient will have a priapism

## 2021-01-11 NOTE — TELEPHONE ENCOUNTER
Patient called regarding the intracavernosal injection he is taking for ED  He is stating that nothing is working for him and after speaking with the pharmacist they told him to try Trimix 13 (60-30-6) ? He was disappointed with the amount of money he spent out of pocket for the injection   Please call him at [phone]

## 2021-01-12 NOTE — TELEPHONE ENCOUNTER
Case reviewed with Dr Victor Hugo Gale    If patient can verify that he will only start at 0 1mL and titrate by 0 1mL increments no more than once every 24 hours, then I will send increased concentration to the pharmacy  1) if patient demonstrates misuse of this medication by injecting more than the recommended amount and experiences a priapism, patient should be discharged from our practice  Patient has been counseled numerous times  He has been nasty to our staff  Patient has also demonstrated agism and sexism in his criticism      2) if patient utilizes the medication appropriately and titrate up to 0 8 mL without adequate response, he should be referred to Dr Connor Vivas for discussion of IPP

## 2021-01-12 NOTE — TELEPHONE ENCOUNTER
Patient called in requesting to speak with Paz Gravely regarding his medication prescription, patient stated he gave the information to someone already at the office  Patient only gave me " Med ED"  Patient stated he would also like discuss possible surgery   Patient can be reached at [phone]

## 2021-01-12 NOTE — TELEPHONE ENCOUNTER
I attempted to call the patient directly however he did not answer his phone  Patient mentioned a specific pharmacy that he would like this sent to other than [facility]  Please verify this pharmacy  Thank you

## 2021-01-12 NOTE — TELEPHONE ENCOUNTER
Patient called back and asked if we can fax trimix to the [facility] and the fax number is [phone]  Greg Given He also asked if he can have a call back when this is completed and he said they close at 6 pm and if he does not answer to please leave a message on his phone at [phone]

## 2021-01-13 NOTE — TELEPHONE ENCOUNTER
I personally talked to the patient on the phone yesterday who requested the prescription be sent to [facility]  Would recommend holding off on sending additional medication to a different pharmacy should he be getting his medication as [facility] today, at least until we know what his response is to the increased concentration

## 2021-01-13 NOTE — TELEPHONE ENCOUNTER
Patient called in to verify Alcon Nogueira received his message from last evening also, he provided the pharmacy information which he would like his trimix to be ordered: [facility] Phone: [phone] Fax: [phone]  Patient can be reached at [phone]

## 2021-01-13 NOTE — TELEPHONE ENCOUNTER
Patient called in asking to be called once the prescription has been ordered   Patient can be reached at [phone]

## 2021-01-13 NOTE — TELEPHONE ENCOUNTER
Patient called back and he is going to get prescription at [facility] today and he will use the other company next time  If there are any questions he can be reached at [phone]

## 2021-01-13 NOTE — TELEPHONE ENCOUNTER
Discussed with Jose Aparicio PA-C   reports that patient needs to be scheduled with Dr Finn to discuss surgery

## 2021-01-14 NOTE — TELEPHONE ENCOUNTER
Eneida from [facility] calling to obtain procedure/diagnoses codes for both Trimix and Penile Implant    She can be reached at [phone]

## 2021-01-14 NOTE — TELEPHONE ENCOUNTER
Attempted to call back Eneida from [facility]  I was unable to speak to anyone  Phone tree kept repeating through options without anyone answering phone  When they call back please make them aware that:   Diagnosis code would be: N52 9, male erectile dysfunction       Patient is has not seen any provider to discuss and is not schedule for a penile implant

## 2021-01-15 ENCOUNTER — TELEPHONE (OUTPATIENT)
Dept: OTHER | Facility: OTHER | Age: 66
End: 2021-01-15

## 2021-01-15 NOTE — TELEPHONE ENCOUNTER
Attempted to call back Eneida Smith  Received message that the office is closed and to call back during normal business hours   Will attempt to call again at another time       When they call back please make them aware that:   Diagnosis code would be: N52 9, male erectile dysfunction       Patient is has not seen any provider to discuss and is not schedule for a penile implant

## 2021-01-15 NOTE — TELEPHONE ENCOUNTER
I am planning on having surgery for erectile dysfunction, and I would like to have a conversation with Dr Funmi Rahman prior to making any discission  Please have the doctor call me

## 2021-01-15 NOTE — TELEPHONE ENCOUNTER
Patient called and advised that he would like to have the implant surgery in May  I advised the patient that he would need to see Dr Chago Kiran to discuss first before scheduling a surgery  Patient Advised that the location Jorge Wiseman is at is in [facility] he asked if he can schedule a virtual appointment  Please advise

## 2021-01-15 NOTE — TELEPHONE ENCOUNTER
Call placed to patient  He did not answer  LMOM for him to schedule appointment with Dr Isaias Oneill  Office number provided for call back  *This appointment cannot be virtual because Dr Isaias Oneill does need to do an examination   Please schedule him for 1st available with Dr Isaias Oneill*

## 2021-01-15 NOTE — TELEPHONE ENCOUNTER
Patient called stating he increase his dosage and its not working    Patient would like a call back to see what else he can do    Patient can be reached at  [phone]

## 2021-01-16 ENCOUNTER — TELEPHONE (OUTPATIENT)
Dept: OTHER | Facility: OTHER | Age: 66
End: 2021-01-16

## 2021-01-16 NOTE — TELEPHONE ENCOUNTER
"Dimitry Dennis will be going my surgery for Erectile dysfunction   I would like Dr Sergey Bridges to call my Urologist to about my Carlita Lynsey and what is the best course of action to take "

## 2021-01-17 NOTE — TELEPHONE ENCOUNTER
Pt already scheduled for next available on 3/3, will call to confirm short term intact/long term intact

## 2021-01-18 NOTE — PROGRESS NOTES
CLL under control, on ibrutinib, which may potential cause more bleeding  Pt is scheduled for surgery in 4/2021, pt will need to hold ibrutinib 1 w before and 1w after surgery for total 14 days

## 2021-01-18 NOTE — TELEPHONE ENCOUNTER
Please see other encounter  Patient is being scheduled at [facility] office with Dr Finn to schedule office visit for IPP   Per Dr Finn appointment cant be virtual

## 2021-01-18 NOTE — TELEPHONE ENCOUNTER
Spoke to pt asking for appt to discuss IPP Implant  He asked for a virtual appt due to 2-1/2 hour distance from office  He was told appt could not be virtual    Pt stated he has leukemia and takes Imbruvica 420 mgs daily  His oncologist is Dr Brooks Dyer [phone]  Pt was taking  intracavernosal injection for ED  He is stating that nothing is working for him and after speaking with the pharmacist they told him to try Trimix 13 (60-30-6) ? He was disappointed with the amount of money he spent out of pocket for the injection  He is not looking to discuss implant  Advised pt Dr Dorcas Varghese would be consulted as to whether or not he would be a candidate for this procedure in light of his pre existing condition    He was told he would receive a call back

## 2021-01-20 PROBLEM — N18.2 STAGE 2 CHRONIC KIDNEY DISEASE: Status: ACTIVE | Noted: 2021-01-20

## 2021-02-08 ENCOUNTER — TELEPHONE (OUTPATIENT)
Dept: UROLOGY | Facility: MEDICAL CENTER | Age: 66
End: 2021-02-08

## 2021-02-08 NOTE — TELEPHONE ENCOUNTER
Patient scheduled with Dr Manda Bryan on 04/08 for a IPP implant consult  Patient wanted to get estimate of how long it will take to schedule procedure as he is moving at end of year and wanted to ensure there was enough time to get this done  Please advise

## 2021-02-09 NOTE — TELEPHONE ENCOUNTER
Typically the IPP cases are scheduling 2-3 months out  He shouldn't have a problem having his surgery well before the end of the year

## 2021-02-09 NOTE — TELEPHONE ENCOUNTER
Called patient - LMOM that it usually takes 2-3 months to schedule the IPP  He is asked to call back if he has any further questions or concerns

## 2021-02-15 PROBLEM — E11.22 TYPE 2 DIABETES MELLITUS WITH STAGE 2 CHRONIC KIDNEY DISEASE (HCC): Status: ACTIVE | Noted: 2019-08-05

## 2021-02-15 PROBLEM — N18.2 TYPE 2 DIABETES MELLITUS WITH STAGE 2 CHRONIC KIDNEY DISEASE (HCC): Status: ACTIVE | Noted: 2019-08-05

## 2021-02-15 PROBLEM — E11.9 TYPE 2 DIABETES MELLITUS, WITHOUT LONG-TERM CURRENT USE OF INSULIN (HCC): Status: ACTIVE | Noted: 2021-02-15

## 2021-02-17 ENCOUNTER — LAB (OUTPATIENT)
Dept: LAB | Facility: CLINIC | Age: 66
End: 2021-02-17
Payer: COMMERCIAL

## 2021-02-17 LAB
ALBUMIN SERPL BCP-MCNC: 4 G/DL (ref 3.5–5)
ALP SERPL-CCNC: 88 U/L (ref 46–116)
ALT SERPL W P-5'-P-CCNC: 45 U/L (ref 12–78)
ANION GAP SERPL CALCULATED.3IONS-SCNC: 5 MMOL/L (ref 4–13)
AST SERPL W P-5'-P-CCNC: 18 U/L (ref 5–45)
BASOPHILS # BLD AUTO: 0.04 THOUSANDS/ΜL (ref 0–0.1)
BASOPHILS NFR BLD AUTO: 0 % (ref 0–1)
BILIRUB SERPL-MCNC: 0.65 MG/DL (ref 0.2–1)
BUN SERPL-MCNC: 20 MG/DL (ref 5–25)
CALCIUM SERPL-MCNC: 9.8 MG/DL (ref 8.3–10.1)
CHLORIDE SERPL-SCNC: 105 MMOL/L (ref 100–108)
CO2 SERPL-SCNC: 31 MMOL/L (ref 21–32)
CREAT SERPL-MCNC: 1.03 MG/DL (ref 0.6–1.3)
EOSINOPHIL # BLD AUTO: 0.08 THOUSAND/ΜL (ref 0–0.61)
EOSINOPHIL NFR BLD AUTO: 1 % (ref 0–6)
ERYTHROCYTE [DISTWIDTH] IN BLOOD BY AUTOMATED COUNT: 12.9 % (ref 11.6–15.1)
GFR SERPL CREATININE-BSD FRML MDRD: 75 ML/MIN/1.73SQ M
GLUCOSE P FAST SERPL-MCNC: 146 MG/DL (ref 65–99)
HCT VFR BLD AUTO: 45.2 % (ref 36.5–49.3)
HGB BLD-MCNC: 15.1 G/DL (ref 12–17)
IMM GRANULOCYTES # BLD AUTO: 0.12 THOUSAND/UL (ref 0–0.2)
IMM GRANULOCYTES NFR BLD AUTO: 1 % (ref 0–2)
LDH SERPL-CCNC: 166 U/L (ref 81–234)
LYMPHOCYTES # BLD AUTO: 3.18 THOUSANDS/ΜL (ref 0.6–4.47)
LYMPHOCYTES NFR BLD AUTO: 30 % (ref 14–44)
MCH RBC QN AUTO: 31.3 PG (ref 26.8–34.3)
MCHC RBC AUTO-ENTMCNC: 33.4 G/DL (ref 31.4–37.4)
MCV RBC AUTO: 94 FL (ref 82–98)
MONOCYTES # BLD AUTO: 0.64 THOUSAND/ΜL (ref 0.17–1.22)
MONOCYTES NFR BLD AUTO: 6 % (ref 4–12)
NEUTROPHILS # BLD AUTO: 6.43 THOUSANDS/ΜL (ref 1.85–7.62)
NEUTS SEG NFR BLD AUTO: 62 % (ref 43–75)
NRBC BLD AUTO-RTO: 0 /100 WBCS
PLATELET # BLD AUTO: 173 THOUSANDS/UL (ref 149–390)
PMV BLD AUTO: 12.2 FL (ref 8.9–12.7)
POTASSIUM SERPL-SCNC: 3.8 MMOL/L (ref 3.5–5.3)
PROT SERPL-MCNC: 7.8 G/DL (ref 6.4–8.2)
RBC # BLD AUTO: 4.83 MILLION/UL (ref 3.88–5.62)
SODIUM SERPL-SCNC: 141 MMOL/L (ref 136–145)
WBC # BLD AUTO: 10.49 THOUSAND/UL (ref 4.31–10.16)

## 2021-02-17 PROCEDURE — 36415 COLL VENOUS BLD VENIPUNCTURE: CPT | Performed by: INTERNAL MEDICINE

## 2021-02-17 PROCEDURE — 80053 COMPREHEN METABOLIC PANEL: CPT | Performed by: INTERNAL MEDICINE

## 2021-02-17 PROCEDURE — 85025 COMPLETE CBC W/AUTO DIFF WBC: CPT | Performed by: INTERNAL MEDICINE

## 2021-02-17 PROCEDURE — 83615 LACTATE (LD) (LDH) ENZYME: CPT | Performed by: INTERNAL MEDICINE

## 2021-02-19 ENCOUNTER — TELEPHONE (OUTPATIENT)
Dept: HEMATOLOGY ONCOLOGY | Facility: CLINIC | Age: 66
End: 2021-02-19

## 2021-02-19 ENCOUNTER — TELEPHONE (OUTPATIENT)
Dept: FAMILY MEDICINE CLINIC | Facility: CLINIC | Age: 66
End: 2021-02-19

## 2021-02-19 NOTE — TELEPHONE ENCOUNTER
Patient will call back if he is prescribed pain medication by another physician to confirm ok to take with Imbruvica

## 2021-02-19 NOTE — TELEPHONE ENCOUNTER
Pt would like a call back  From Dr Marianela Westbrook  He tried to contact his office and could not reach him

## 2021-02-19 NOTE — TELEPHONE ENCOUNTER
Spoke with patient and advised him Dr Bulmaro Jenkins was out of office until Monday 2/22/21     I did let him know he would need to follow up with his PCP regarding the pain in his arm  He stated she would not give him anything stronger than 800mg of Ibuprofen and he would need to see a specialist    Again, I advised him this is not something Dr Bulmaro Jenkins would handle  He verbalized understanding    Regarding his fatigue he feels is related to his Imbruvica, I advised him he could fold off on taking it until I spoke with Dr Bulmaro Jenkins on Monday if it was causing him that much fatigue  I also advised him I would need to speak with Dr Bulmaro Jeknins in reference to the Covid vaccine,  He stated he would rather wait to hear back after speaking with Dr Bulmaro Jenkins before stopping the Imbruvica and verbalized understanding about hearing back about the vaccine  I advised him one of Dr Bess Gomez would contact him at some point on Monday after reviewing his concerns with Dr Bulmaro Jenkins    He verbalized understanding

## 2021-02-19 NOTE — TELEPHONE ENCOUNTER
Can you please call patient and advise him of the attached information  In regards to pain and arm injury patient needs to follow up with his PCP  Symptoms related to Imbruvica and COVID vaccine will be reviewed with Dr Poly Foster on Monday when he returns to office  Will reach out to patient on Monday  If fatigue is severe patient can hold medication until contacted by our office on Monday 2/22/21

## 2021-02-19 NOTE — TELEPHONE ENCOUNTER
Patient reports he injured his arm starting his   He reached out to PCP who recommended OTC medication  Patient reports this is not helping  I suggested he reach out to PCP again for further recommendation    Brian Lima is causing patient to feel very fatigued fatigued  He worried that this will effect him being able to work  He is asking if there is there anything different that can be prescribed that might not cause him to feel as tired? Can patient have the COVID vaccine once he is able to schedule?     Will send to RN to review with Dr Johnson Gain [phone]

## 2021-02-22 ENCOUNTER — TELEPHONE (OUTPATIENT)
Dept: HEMATOLOGY ONCOLOGY | Facility: CLINIC | Age: 66
End: 2021-02-22

## 2021-02-22 NOTE — TELEPHONE ENCOUNTER
Called patient after discussing symptoms from last week with Dr Funmi Rahman  Which per Dr Funmi Rahman the patient can hold his Ibrutinib for 1 week to see if his symptoms lessen  Pt declined and stated he just thinks he will have this with any medication for his disease  Again stated he can hold it to determine if this is what is causing the fatigue, patient denied holding the medication  Advised the patient to monitor his symptoms and if they worsen he should give us a call back  Dr Funmi Rahman also stated patient is okay to receive Covid-19 vaccine  Patient verbalized understanding of the above information

## 2021-02-25 ENCOUNTER — TELEPHONE (OUTPATIENT)
Dept: FAMILY MEDICINE CLINIC | Facility: CLINIC | Age: 66
End: 2021-02-25

## 2021-02-25 ENCOUNTER — TELEMEDICINE (OUTPATIENT)
Dept: FAMILY MEDICINE CLINIC | Facility: HOME HEALTHCARE | Age: 66
End: 2021-02-25
Payer: COMMERCIAL

## 2021-02-25 ENCOUNTER — TELEPHONE (OUTPATIENT)
Dept: OTHER | Facility: OTHER | Age: 66
End: 2021-02-25

## 2021-02-25 DIAGNOSIS — C91.10 CLL (CHRONIC LYMPHOCYTIC LEUKEMIA) (HCC): ICD-10-CM

## 2021-02-25 DIAGNOSIS — E11.9 TYPE 2 DIABETES MELLITUS, WITHOUT LONG-TERM CURRENT USE OF INSULIN (HCC): ICD-10-CM

## 2021-02-25 DIAGNOSIS — M25.511 ACUTE PAIN OF RIGHT SHOULDER: Primary | ICD-10-CM

## 2021-02-25 DIAGNOSIS — J44.9 CHRONIC OBSTRUCTIVE PULMONARY DISEASE, UNSPECIFIED COPD TYPE (HCC): ICD-10-CM

## 2021-02-25 PROCEDURE — G2025 DIS SITE TELE SVCS RHC/FQHC: HCPCS | Performed by: FAMILY MEDICINE

## 2021-02-25 PROCEDURE — 99213 OFFICE O/P EST LOW 20 MIN: CPT | Performed by: FAMILY MEDICINE

## 2021-02-25 RX ORDER — FUROSEMIDE 40 MG/1
TABLET ORAL
Qty: 30 TABLET | Refills: 10 | OUTPATIENT
Start: 2021-02-25

## 2021-02-25 RX ORDER — MELOXICAM 7.5 MG/1
7.5 TABLET ORAL 2 TIMES DAILY PRN
Qty: 30 TABLET | Refills: 0 | Status: SHIPPED | OUTPATIENT
Start: 2021-02-25 | End: 2022-03-10 | Stop reason: ALTCHOICE

## 2021-02-25 NOTE — PROGRESS NOTES
Virtual Regular Visit      Assessment/Plan:    Problem List Items Addressed This Visit        Endocrine    Type 2 diabetes mellitus, without long-term current use of insulin (HCC)       Respiratory    Chronic obstructive pulmonary disease (HCC)       Other    CLL (chronic lymphocytic leukemia) ([facility] )    Acute pain of right shoulder - Primary    Relevant Medications    meloxicam (MOBIC) 7 5 mg tablet               Reason for visit is   Chief Complaint   Patient presents with    Virtual Regular Visit        Encounter provider Tarun Ibarra MD    Provider located at [address] [place] [address]  [phone]      Recent Visits  No visits were found meeting these conditions  Showing recent visits within past 7 days and meeting all other requirements     Today's Visits  Date Type Provider Dept   02/25/21 Telemedicine Tarun Ibarra MD [facility]   Showing today's visits and meeting all other requirements     Future Appointments  No visits were found meeting these conditions  Showing future appointments within next 150 days and meeting all other requirements        The patient was identified by name and date of birth  Tere George was informed that this is a telemedicine visit and that the visit is being conducted through [employer] and patient was informed that this is not a secure, HIPAA-compliant platform  He agrees to proceed     My office door was closed  No one else was in the room  He acknowledged consent and understanding of privacy and security of the video platform  The patient has agreed to participate and understands they can discontinue the visit at any time  Patient is aware this is a billable service  Subjective  Tere George is a 76 y o  male  With a history of with a history of CLL,type 2 diabetes, sleep apnea, COPD, depression, erectile dysfunction, and tobacco use    He recently had follow-up with gastroenterology in November with polyp removal via colonoscopy  He has scheduled appointment in April with urology due to erectile dysfunction  He follows regularly with Dr Shahla Lehman in Oncology  He states that he has a psychiatrist but cannot remember the doctor's name  His wife assists him with medications so he cannot remember all them  His wife is currently sleeping  He continues to smoke 1 pack of cigarettes per day  His main issue today was right shoulder pain  He developed the pain over the last 2 weeks while trying to start his   The pain causes him discomfort when holding objects  He denies paresthesias  He has tried ibuprofen with little relief  He was requesting Percocet  His only other complaint is mild fatigue which he relates to his leukemia  He denies chest pain or dyspnea  He has no GI symptoms  He has no fever  He is hoping to secure a part-time job  He wants to lose approximately 50 lb by increasing his activity level both with exercise and hopefully with employment  He takes multiple supplements a day including powdered forms of fruits and vegetables  He describes his mood as good  He is hoping to move to [place] in the future with his wife  HPI     Past Medical History:   Diagnosis Date    Anemia     Anxiety     Bipolar disorder ([facility] )     Cancer ([facility] )     History of alcohol abuse     Hypertension     Hypertension     Insomnia     Leukemia ([facility] )     Opiate abuse, episodic ([place] [address] )     Psychiatric disorder     Sleep apnea with use of continuous positive airway pressure (CPAP)        Past Surgical History:   Procedure Laterality Date    COLONOSCOPY      EGD AND COLONOSCOPY N/A 3/30/2018    Procedure: EGD AND COLONOSCOPY;  Surgeon: Kristel Carrera MD;  Location: [facility];   Service: Gastroenterology       Current Outpatient Medications   Medication Sig Dispense Refill    ALPRAZolam (XANAX) 1 mg tablet   3    benztropine (COGENTIN) 1 mg tablet Take 1 mg by mouth 2 (two) times a day       FLUoxetine (PROzac) 20 mg capsule Take 60 mg by mouth daily        furosemide (LASIX) 40 mg tablet TAKE 1 TABLET BY MOUTH ONCE DAILY 30 tablet 10    Ibrutinib 140 MG TABS Take by mouth      Ibrutinib 420 MG TABS Take 420 mg by mouth daily 30 tablet 11    lisinopril (ZESTRIL) 40 mg tablet Take 1 tablet (40 mg total) by mouth daily 90 tablet 3    meloxicam (MOBIC) 7 5 mg tablet Take 1 tablet (7 5 mg total) by mouth 2 (two) times a day as needed for moderate pain 30 tablet 0    mirtazapine (REMERON) 15 mg tablet Take 15 mg by mouth daily at bedtime      mirtazapine (REMERON) 30 mg tablet       potassium chloride (K-DUR,KLOR-CON) 20 mEq tablet Take 1 tablet (20 mEq total) by mouth daily 90 tablet 3    risperiDONE (RisperDAL) 1 mg tablet Take 1 mg by mouth       No current facility-administered medications for this visit  No Known Allergies    Review of Systems  No fever, sweats or chills  No URI symptoms  No chest pain or palpitations  No GI symptoms  Video Exam    There were no vitals filed for this visit  Physical Exam     I spent 40 minutes directly with the patient during this visit      VIRTUAL VISIT DISCLAIMER    Kary Monk acknowledges that he has consented to an online visit or consultation  He understands that the online visit is based solely on information provided by him, and that, in the absence of a face-to-face physical evaluation by the physician, the diagnosis he receives is both limited and provisional in terms of accuracy and completeness  This is not intended to replace a full medical face-to-face evaluation by the physician  Kary Monk understands and accepts these terms

## 2021-02-25 NOTE — TELEPHONE ENCOUNTER
Patient called in to advise Dr Reymundo Dominguez that he does want help with quitting smoking   He was told there are medication available to help

## 2021-02-26 ENCOUNTER — TELEPHONE (OUTPATIENT)
Dept: OTHER | Facility: OTHER | Age: 66
End: 2021-02-26

## 2021-03-01 ENCOUNTER — TELEPHONE (OUTPATIENT)
Dept: FAMILY MEDICINE CLINIC | Facility: CLINIC | Age: 66
End: 2021-03-01

## 2021-03-01 NOTE — TELEPHONE ENCOUNTER
Patient called stating that you had offered to help him quit smoking and he was thinking about it and would like a pill to help him quit if you can order him oral medication for that

## 2021-03-03 ENCOUNTER — TELEPHONE (OUTPATIENT)
Dept: FAMILY MEDICINE CLINIC | Facility: CLINIC | Age: 66
End: 2021-03-03

## 2021-03-03 ENCOUNTER — TELEPHONE (OUTPATIENT)
Dept: OTHER | Facility: HOSPITAL | Age: 66
End: 2021-03-03

## 2021-03-03 NOTE — TELEPHONE ENCOUNTER
Viviann Nageotte from [facility] called asking if you are going to order him any medication to assist with smoking cessation  Please advise  Thank you

## 2021-03-03 NOTE — TELEPHONE ENCOUNTER
Have tried patients home phone again - left message  Tried Monday too   Also tried cell but no answer

## 2021-03-03 NOTE — TELEPHONE ENCOUNTER
[facility]  Patient states that he had a virtual visit the other day and you both discussed him quitting smoking  He has thought about it and would like some form of medication in pill form, he does not want patches, lozenges or gum  Please advise  He would like a call back at [phone]

## 2021-03-04 ENCOUNTER — TELEPHONE (OUTPATIENT)
Dept: FAMILY MEDICINE CLINIC | Facility: CLINIC | Age: 66
End: 2021-03-04

## 2021-03-04 DIAGNOSIS — Z71.6 ENCOUNTER FOR SMOKING CESSATION COUNSELING: Primary | ICD-10-CM

## 2021-03-04 RX ORDER — BUPROPION HYDROCHLORIDE 150 MG/1
150 TABLET, EXTENDED RELEASE ORAL 2 TIMES DAILY
Qty: 60 TABLET | Refills: 2 | Status: SHIPPED | OUTPATIENT
Start: 2021-03-04 | End: 2022-03-10 | Stop reason: ALTCHOICE

## 2021-03-04 NOTE — PROGRESS NOTES
Discussed with pt and wife - wants to quit smoking  Does not want nicotine replacement, did not work  Cannot use chantix due to possible side effects  Will prescribe Zyban but should clear with his psychiatrist  Patient is aware

## 2021-03-04 NOTE — TELEPHONE ENCOUNTER
Patient called office, says he keeps missing your call  He is ready for your help in quitting smoking    He asked if you could send an Rx to the [facility] for the pills to help him stop smoking

## 2021-03-09 NOTE — TELEPHONE ENCOUNTER
Call returned to patient, left message to call office back  Office number provided       Of note, patient does have an appointment next month to discuss IPP with MD

## 2021-03-11 ENCOUNTER — TELEPHONE (OUTPATIENT)
Dept: FAMILY MEDICINE CLINIC | Facility: CLINIC | Age: 66
End: 2021-03-11

## 2021-03-11 NOTE — TELEPHONE ENCOUNTER
PATIENT LEFT A MESSAGE ON THE ANSWERING MACHINE THAT HE WOULD LIKE TO TALK TO DR Murillo Patches    SAYS HIS ARM IS NOT GOOD ( DID NOT SAY WHICH ARM IT IS ) PLEASE CALL HIM AT [facility]   [phone]

## 2021-03-16 ENCOUNTER — TELEPHONE (OUTPATIENT)
Dept: UROLOGY | Facility: AMBULATORY SURGERY CENTER | Age: 66
End: 2021-03-16

## 2021-03-16 ENCOUNTER — TELEPHONE (OUTPATIENT)
Dept: FAMILY MEDICINE CLINIC | Facility: CLINIC | Age: 66
End: 2021-03-16

## 2021-03-16 NOTE — TELEPHONE ENCOUNTER
No pharmacy noted in patient's chart for [facility]"  Left message for patient to return call to confirm pharmacy  Of note, unable to look up a pharmacy called [facility] in system as well  When patient returns call, please confirm pharmacy name and phone number  Also confirm that he is requesting his Trimix prescription be sent to pharmacy  Patient last received Trimix script from [facility]  Script written for Alprostadil 20 mcg, Papaverine 30 mg, Phentolamine 1 mg; dispense 5 ml, 3 refills

## 2021-03-16 NOTE — TELEPHONE ENCOUNTER
Patient last seen Sharon Cardozo  Patient called in stating he does not want his medication sent to [facility] but sent to [facility]  Asked patient for information he stated he gave the information to someone already who typed it in the system     Patient did not provide any other information   Patient can be reached at [phone]

## 2021-03-16 NOTE — TELEPHONE ENCOUNTER
Patient called stating he was suppose to get a phone call from you today at 12:00pm  I am not sure if you were suppose to  I don't see anything scheduled for him with you today

## 2021-03-17 ENCOUNTER — TELEPHONE (OUTPATIENT)
Dept: OTHER | Facility: OTHER | Age: 66
End: 2021-03-17

## 2021-03-18 ENCOUNTER — TELEPHONE (OUTPATIENT)
Dept: FAMILY MEDICINE CLINIC | Facility: CLINIC | Age: 66
End: 2021-03-18

## 2021-03-18 DIAGNOSIS — M25.519 ACUTE SHOULDER PAIN, UNSPECIFIED LATERALITY: Primary | ICD-10-CM

## 2021-03-18 NOTE — TELEPHONE ENCOUNTER
Called and spoke with patient, advised we are unable to find pharmacy he is referring to in our system  Patient states the Trimix is not working anyway and does not want it sent to [facility] due to price  Patient just wants to follow up with Dr Gia De Los Santos next week to discuss IPP implant  If patient finds the pharmacy he is referring to, he will call us back  Otherwise, patient will follow up at his upcoming appt

## 2021-03-18 NOTE — TELEPHONE ENCOUNTER
Patient called asking if you can return his call back he would like to discuss some things about him and his wife

## 2021-03-18 NOTE — PROGRESS NOTES
Spoke with patient and wife - he has appt next week for pain management  Has shoulder pain and wants to try something to alleviate before appt  "feels muscular"   Worse with activity and movement  He discussed this with me previously  Will try voltaren gel

## 2021-03-18 NOTE — TELEPHONE ENCOUNTER
Patient called in provided the pharmacy information:  [facility] - Phone [phone]  Patient can be reached at [phone]

## 2021-03-18 NOTE — TELEPHONE ENCOUNTER
Call placed to patient, left detailed message per communication to advise that we are unable to electronically prescribe that that pharmacy  Advised if medication not working anyway, would recommend to keep upcoming appointment as scheduled to discuss other options  If patient does want Trimix sent to another pharmacy, we can try to accommodate for him or possibly give written order for him to send to pharmacy  Office number provided on voicemail

## 2021-03-20 ENCOUNTER — HOSPITAL ENCOUNTER (EMERGENCY)
Facility: HOSPITAL | Age: 66
Discharge: HOME | End: 2021-03-20
Attending: EMERGENCY MEDICINE | Admitting: EMERGENCY MEDICINE
Payer: COMMERCIAL

## 2021-03-20 VITALS
DIASTOLIC BLOOD PRESSURE: 87 MMHG | HEART RATE: 62 BPM | HEIGHT: 71 IN | TEMPERATURE: 96.5 F | BODY MASS INDEX: 38.27 KG/M2 | WEIGHT: 273.37 LBS | OXYGEN SATURATION: 98 % | SYSTOLIC BLOOD PRESSURE: 183 MMHG | RESPIRATION RATE: 18 BRPM

## 2021-03-20 DIAGNOSIS — S43.401A SPRAIN OF RIGHT SHOULDER: ICD-10-CM

## 2021-03-20 DIAGNOSIS — M25.511 ACUTE PAIN OF RIGHT SHOULDER: Primary | ICD-10-CM

## 2021-03-20 PROCEDURE — 99283 EMERGENCY DEPT VISIT LOW MDM: CPT

## 2021-03-20 PROCEDURE — 99282 EMERGENCY DEPT VISIT SF MDM: CPT | Performed by: EMERGENCY MEDICINE

## 2021-03-20 NOTE — DISCHARGE INSTRUCTIONS
Use the medications as prescribed to you by your doctor  Use ice or heat as they help  Do gentle stretching and range-of-motion exercises to keep the muscles loose  Follow up with Orthopedics for further evaluation of your shoulder pain

## 2021-03-20 NOTE — ED PROVIDER NOTES
History  Chief Complaint   Patient presents with    Shoulder Pain     Patient co right shoulder pain  Patient states "I was pulling the string to start my  and the pain started  Then I had to push my friend out of a snow bank and hurt my shoulder again and then my wife and I were arguing and I jumped up and jerked my shoulder and it hurts even more now "      HPI    Prior to Admission Medications   Prescriptions Last Dose Informant Patient Reported? Taking?    ALPRAZolam (XANAX) 1 mg tablet  Spouse/Significant Other Yes No   Diclofenac Sodium (VOLTAREN) 1 %   No No   Sig: Apply 2 g topically 4 (four) times a day   FLUoxetine (PROzac) 20 mg capsule  Spouse/Significant Other Yes No   Sig: Take 60 mg by mouth daily     Ibrutinib 140 MG TABS  Spouse/Significant Other Yes No   Sig: Take by mouth   Ibrutinib 420 MG TABS   No No   Sig: Take 420 mg by mouth daily   benztropine (COGENTIN) 1 mg tablet  Spouse/Significant Other Yes No   Sig: Take 1 mg by mouth 2 (two) times a day    buPROPion (ZYBAN) 150 MG 12 hr tablet   No No   Sig: Take 1 tablet (150 mg total) by mouth 2 (two) times a day   furosemide (LASIX) 40 mg tablet   No No   Sig: TAKE 1 TABLET BY MOUTH ONCE DAILY   lisinopril (ZESTRIL) 40 mg tablet  Spouse/Significant Other No No   Sig: Take 1 tablet (40 mg total) by mouth daily   meloxicam (MOBIC) 7 5 mg tablet   No No   Sig: Take 1 tablet (7 5 mg total) by mouth 2 (two) times a day as needed for moderate pain   mirtazapine (REMERON) 15 mg tablet  Spouse/Significant Other Yes No   Sig: Take 15 mg by mouth daily at bedtime   mirtazapine (REMERON) 30 mg tablet  Spouse/Significant Other Yes No   potassium chloride (K-DUR,KLOR-CON) 20 mEq tablet  Spouse/Significant Other No No   Sig: Take 1 tablet (20 mEq total) by mouth daily   risperiDONE (RisperDAL) 1 mg tablet  Spouse/Significant Other Yes No   Sig: Take 1 mg by mouth      Facility-Administered Medications: None       Past Medical History:   Diagnosis Date    Anemia     Anxiety     [facility]     Cancer [facility]     History of alcohol abuse     Hypertension     Hypertension     Insomnia     Leukemia ([facility]     Opiate abuse, episodic ([place] [address] )     Psychiatric disorder     Sleep apnea with use of continuous positive airway pressure (CPAP)        Past Surgical History:   Procedure Laterality Date    COLONOSCOPY      EGD AND COLONOSCOPY N/A 3/30/2018    Procedure: EGD AND COLONOSCOPY;  Surgeon: Madeleine Read MD;  Location: [facility]; Service: Gastroenterology       Family History   Problem Relation Age of Onset    Coronary artery disease Mother     Diabetes Mother     No Known Problems Father     Hepatitis Sister     Cancer Brother     Prostate cancer Brother     Early death Brother      I have reviewed and agree with the history as documented  E-Cigarette/Vaping    E-Cigarette Use Never User      E-Cigarette/Vaping Substances     Social History     Tobacco Use    Smoking status: Current Every Day Smoker     Packs/day: 1 00     Years: 40 00     Pack years: 40 00     Types: Cigarettes     Last attempt to quit: 10/13/2020     Years since quittin 4    Smokeless tobacco: Never Used   Substance Use Topics    Alcohol use: No     Comment: last drink 2017    Drug use: No     Comment: hx of heroin and subutex abuse       Review of Systems    Physical Exam  Physical Exam  Vitals signs and nursing note reviewed  Constitutional:       General: He is not in acute distress  Appearance: He is well-developed  Comments: Hypertensive   HENT:      Head: Normocephalic and atraumatic  Eyes:      Conjunctiva/sclera: Conjunctivae normal       Pupils: Pupils are equal, round, and reactive to light  Neck:      Musculoskeletal: Normal range of motion  Trachea: No tracheal deviation  Cardiovascular:      Rate and Rhythm: Normal rate and regular rhythm  Pulses:           Radial pulses are 2+ on the right side  Pulmonary:      Effort: Pulmonary effort is normal  No respiratory distress  Musculoskeletal:      Right shoulder: He exhibits decreased range of motion (all directions, improves slightly with passive movement, compensation by surrounding muscles noticed when trying to actively move the arm) and tenderness (mild, diffuse)  He exhibits no bony tenderness, no swelling, no effusion, no crepitus and no deformity  Right elbow: He exhibits normal range of motion  No tenderness found  Right wrist: He exhibits normal range of motion and no tenderness  Right upper arm: He exhibits no tenderness  Right forearm: He exhibits no tenderness  Comments: Sensation intact throughout the arm   Skin:     General: Skin is warm and dry  Neurological:      Mental Status: He is alert and oriented to person, place, and time  GCS: GCS eye subscore is 4  GCS verbal subscore is 5  GCS motor subscore is 6  Psychiatric:         Behavior: Behavior normal          Vital Signs  ED Triage Vitals [03/20/21 1314]   Temperature Pulse Respirations Blood Pressure SpO2   (!) 96 5 °F (35 8 °C) 78 17 (!) 208/102 98 %      Temp Source Heart Rate Source Patient Position - Orthostatic VS BP Location FiO2 (%)   Temporal Monitor Sitting Left arm --      Pain Score       Worst Possible Pain           Vitals:    03/20/21 1314 03/20/21 1414   BP: (!) 208/102 (!) 183/87   Pulse: 78 62   Patient Position - Orthostatic VS: Sitting Sitting         Visual Acuity      ED Medications  Medications - No data to display    Diagnostic Studies  Results Reviewed     None                 No orders to display              Procedures  Procedures         ED Course               Identification of Seniors at Risk      Most Recent Value   (ISAR) Identification of Seniors at Risk   Before the illness or injury that brought you to the Emergency, did you need someone to help you on a regular basis?   0 Filed at: 03/20/2021 1318   In the last 24 hours, have you needed more help than usual?  0 Filed at: 03/20/2021 1318   Have you been hospitalized for one or more nights during the past 6 months? 0 Filed at: 03/20/2021 1318   In general, do you see well?  0 Filed at: 03/20/2021 1318   In general, do you have serious problems with your memory? 0 Filed at: 03/20/2021 1318   Do you take more than three different medications every day? 1 Filed at: 03/20/2021 1318   ISAR Score  1 Filed at: 03/20/2021 1318                    SBIRT 22yo+      Most Recent Value   SBIRT (25 yo +)   In order to provide better care to our patients, we are screening all of our patients for alcohol and drug use  Would it be okay to ask you these screening questions? No Filed at: 03/20/2021 1341   Initial Alcohol Screen: US AUDIT-C    1  How often do you have a drink containing alcohol?  0 Filed at: 03/20/2021 1341   3a  Male UNDER 65: How often do you have five or more drinks on one occasion? 0 Filed at: 03/20/2021 1341   3b  FEMALE Any Age, or MALE 65+: How often do you have 4 or more drinks on one occassion? 0 Filed at: 03/20/2021 1341   Audit-C Score  0 Filed at: 03/20/2021 1341   DARIO: How many times in the past year have you    Used an illegal drug or used a prescription medication for non-medical reasons? Never Filed at: 03/20/2021 1341                    MDM  Number of Diagnoses or Management Options  Acute pain of right shoulder: new and does not require workup  Sprain of right shoulder: new and does not require workup  Diagnosis management comments: This is a 77 y/o right hand dominant M who presents here today with right shoulder pain for several weeks  He states it started initially about 6-8 weeks ago after he was repetitively trying to pull the cord to start his , and began having right shoulder pain  He states it is diffusely in the shoulder, feels like it is inside and worse with trying to move it    He says it started to feel better when he was than trying to push a neighbor out of this snow and began having worsening pain again  He began having improvement, but was getting into a verbal altercation with his wife and jerked his shoulder around and again had worsening of his pain  He saw his doctor about this has been taking ibuprofen occasional mild improvement  He talked to his doctor the other day and was prescribed a cream that he never filled because he does think it will be helpful  He states because of other medical problems and medications he is unable to take many medications to help with his pain  He denies prior injuries or problems with the shoulder  He denies any falls or blunt trauma to his shoulder  He denies any radiation of pain out side of the shoulder area, any distal weakness or numbness  He denies any acute worsening or changing of symptoms, but states he is tired of dealing with and wants to know exactly what is wrong  He is concerned because a friend of his just had to have shoulder for his rotator cuff and that his injury might be the same  Review of systems:  Otherwise negative unless stated as above    He is well-appearing, in no acute distress  He has mild tenderness diffusely to the shoulder without any significant focal areas of tenderness  He does have decreased range of motion which improves slightly with passive range of motion, but is noticed to compensating with use of other surrounding muscle groups to help with his pain  I discussed than there is likely underlying muscular injury, potentially several muscle groups affected given two injuries of different mechanisms, and need for evaluation by Orthopedics for further evaluation and additional management  My suspicion for acute bony injury is low given lack of blunt trauma and lack of focal bony tenderness  I did offer him an x-ray to evaluate the bony structures, and he declines this    I discussed with him benefits of topical medications especially if he is limited what he can take orally, and additional treatment, including stretching and passive range of motion exercises to do to help with his pain  He expresses understanding with this plan  Amount and/or Complexity of Data Reviewed  Decide to obtain previous medical records or to obtain history from someone other than the patient: yes  Review and summarize past medical records: yes        Disposition  Final diagnoses:   Acute pain of right shoulder   Sprain of right shoulder     Time reflects when diagnosis was documented in both MDM as applicable and the Disposition within this note     Time User Action Codes Description Comment    3/20/2021  2:01 PM Kenneth Mckay Add [B65 [id number]] Acute pain of right shoulder     3/20/2021  2:01 PM Kenneth Mckay Add [G83 662S] Sprain of right shoulder       ED Disposition     ED Disposition Condition Date/Time Comment    Discharge Good Sat Mar 20, 2021  2:01 PM Hailee Mccoy discharge to home/self care        Follow-up Information     Follow up With Specialties Details Why Contact Info Additional [address] [facility] Orth[facility] Surgery Schedule an appointment as soon as possible for a visit  to follow up on your shoulder [address] [facility] [address] [id number]  [address] [facility], [address], [place], [place], [facility]          Discharge Medication List as of 3/20/2021  2:04 PM      CONTINUE these medications which have NOT CHANGED    Details   ALPRAZolam Evin Rocher) 1 mg tablet Starting Mon 10/8/2018, Historical Med      benztropine (COGENTIN) 1 mg tablet Take 1 mg by mouth 2 (two) times a day , Historical Med      buPROPion (ZYBAN) 150 MG 12 hr tablet Take 1 tablet (150 mg total) by mouth 2 (two) times a day, Starting Thu 3/4/2021, Normal      Diclofenac Sodium (VOLTAREN) 1 % Apply 2 g topically 4 (four) times a day, Starting Thu 3/18/2021, Normal      FLUoxetine (PROzac) 20 mg capsule Take 60 mg by mouth daily  , Starting Sat 7/14/2018, Historical Med      furosemide (LASIX) 40 mg tablet TAKE 1 TABLET BY MOUTH ONCE DAILY, Normal      Ibrutinib 140 MG TABS Take by mouth, Historical Med      lisinopril (ZESTRIL) 40 mg tablet Take 1 tablet (40 mg total) by mouth daily, Starting Wed 6/17/2020, Normal      meloxicam (MOBIC) 7 5 mg tablet Take 1 tablet (7 5 mg total) by mouth 2 (two) times a day as needed for moderate pain, Starting Thu 2/25/2021, Normal      !! mirtazapine (REMERON) 15 mg tablet Take 15 mg by mouth daily at bedtime, Historical Med      !! mirtazapine (REMERON) 30 mg tablet Starting Fri 8/21/2020, Historical Med      potassium chloride (K-DUR,KLOR-CON) 20 mEq tablet Take 1 tablet (20 mEq total) by mouth daily, Starting Wed 6/17/2020, Normal      risperiDONE (RisperDAL) 1 mg tablet Take 1 mg by mouth, Historical Med       !! - Potential duplicate medications found  Please discuss with provider  No discharge procedures on file      PDMP Review     None          ED Provider  Electronically Signed by           Michelle Lehman MD  03/20/21 9390

## 2021-03-23 ENCOUNTER — OFFICE VISIT (OUTPATIENT)
Dept: OBGYN CLINIC | Facility: CLINIC | Age: 66
End: 2021-03-23
Payer: COMMERCIAL

## 2021-03-23 ENCOUNTER — APPOINTMENT (OUTPATIENT)
Dept: RADIOLOGY | Facility: CLINIC | Age: 66
End: 2021-03-23
Payer: COMMERCIAL

## 2021-03-23 VITALS
SYSTOLIC BLOOD PRESSURE: 172 MMHG | HEIGHT: 71 IN | BODY MASS INDEX: 37.52 KG/M2 | HEART RATE: 73 BPM | WEIGHT: 268 LBS | DIASTOLIC BLOOD PRESSURE: 74 MMHG

## 2021-03-23 DIAGNOSIS — S46.001A ROTATOR CUFF INJURY, RIGHT, INITIAL ENCOUNTER: Primary | ICD-10-CM

## 2021-03-23 DIAGNOSIS — S46.001A ROTATOR CUFF INJURY, RIGHT, INITIAL ENCOUNTER: ICD-10-CM

## 2021-03-23 PROCEDURE — 99203 OFFICE O/P NEW LOW 30 MIN: CPT | Performed by: FAMILY MEDICINE

## 2021-03-23 PROCEDURE — 1160F RVW MEDS BY RX/DR IN RCRD: CPT | Performed by: FAMILY MEDICINE

## 2021-03-23 PROCEDURE — 73030 X-RAY EXAM OF SHOULDER: CPT

## 2021-03-23 PROCEDURE — 3008F BODY MASS INDEX DOCD: CPT | Performed by: FAMILY MEDICINE

## 2021-03-23 PROCEDURE — 4004F PT TOBACCO SCREEN RCVD TLK: CPT | Performed by: FAMILY MEDICINE

## 2021-03-23 RX ORDER — OXYCODONE HYDROCHLORIDE AND ACETAMINOPHEN 5; 325 MG/1; MG/1
1 TABLET ORAL 2 TIMES DAILY PRN
Qty: 10 TABLET | Refills: 0 | Status: SHIPPED | OUTPATIENT
Start: 2021-03-23 | End: 2021-03-30 | Stop reason: SDUPTHER

## 2021-03-23 RX ORDER — IBUPROFEN 800 MG/1
800 TABLET ORAL 3 TIMES DAILY PRN
Qty: 90 TABLET | Refills: 1 | Status: SHIPPED | OUTPATIENT
Start: 2021-03-23 | End: 2022-03-10 | Stop reason: ALTCHOICE

## 2021-03-23 RX ORDER — NALOXONE HYDROCHLORIDE 4 MG/.1ML
SPRAY NASAL
Qty: 1 EACH | Refills: 1 | Status: SHIPPED | OUTPATIENT
Start: 2021-03-23 | End: 2022-03-10 | Stop reason: ALTCHOICE

## 2021-03-23 NOTE — PROGRESS NOTES
Assessment/Plan:  Assessment/Plan   Diagnoses and all orders for this visit:    Rotator cuff injury, right, initial encounter  -     XR shoulder 2+ vw right; Future  -     oxyCODONE-acetaminophen (PERCOCET) 5-325 mg per tablet; Take 1 tablet by mouth 2 (two) times a day as needed for moderate painMax Daily Amount: 2 tablets  -     naloxone (NARCAN) 4 mg/0 1 mL nasal spray; Administer 1 spray into a nostril  If no response after 2-3 minutes, give another dose in the other nostril using a new spray  -     ibuprofen (MOTRIN) 800 mg tablet; Take 1 tablet (800 mg total) by mouth 3 (three) times a day as needed for moderate pain  -     MRI shoulder right wo contrast; Future        14-year-old right-hand-dominant male with right shoulder pain of more than 7 weeks duration following pulling injury  Discussed with patient physical exam, radiographs, impression and plan  X-rays of the right shoulder are unremarkable for acute osseous abnormality  Right shoulder noted for tenderness at the trapezius, anterior, and lateral aspects  He has range of motion limited to forward flexion of 120°, abduction 90°, and internal rotation to the sacrum  He has 4+/5 external rotation and 4/5 strength supraspinatus  There is positive empty can test and positive Murillo maneuver  He has normal sensation, biceps reflex, and radial pulse in both upper extremities  Based on mechanism of injury, subsequent symptoms, and clinical exam there is suspicion for muscle tear  He has not improved despite rest, NSAIDs, and icing  At this time I will refer him for MRI of the right shoulder to evaluate for rotator cuff tear as surgical intervention may be warranted  He will follow up after getting MRI done  Subjective:   Patient ID: Dede Owusu is a 76 y o  male    Chief Complaint   Patient presents with    Right Shoulder - Pain       14-year-old right-hand-dominant male presents for evaluation of right shoulder pain of more than 7 weeks duration  He reports that following snow storm he repeatedly was pulling on his pull start   He had pain described as sudden onset, sharp, localized to the superior anterior aspect the shoulder, severe intensity, radiating along the anterior lateral aspect the upper arm, worse with movement of the arm and with physical activity, associated limited range of motion, and improved resting  He rested and symptoms started to improve  Couple weeks later he was helping a friend pushes car that was stuck in snow and with exertion he felt worsening of the pain in the right shoulder  He also had another incident in which he was sitting down with his arm hanging down to side when he suddenly went to raise his arm and he felt sharp pain in the shoulder  He has been experiencing limited range of motion and weakness in the arm  He took ibuprofen without any relief  He was evaluated by primary care provider and prescribed meloxicam 15 mg  He reports having presented to Urgent Care  He presented to the emergency room and was advised to follow up with orthopedic care  Shoulder Pain  This is a new problem  The current episode started more than 1 month ago  The problem occurs constantly  The problem has been unchanged  Associated symptoms include arthralgias and weakness  Pertinent negatives include no abdominal pain, chest pain, chills, fever, joint swelling, numbness, rash or sore throat  Exacerbated by: Arm movement  He has tried rest, NSAIDs and position changes for the symptoms  The treatment provided mild relief  The following portions of the patient's history were reviewed and updated as appropriate: He  has a past medical history of Anemia, Anxiety, Bipolar disorder ([facility] ), Cancer ([facility] ), History of alcohol abuse, Hypertension, Hypertension, Insomnia, Leukemia ([facility] ), Opiate abuse, episodic ([facility] ), Psychiatric disorder, and Sleep apnea with use of continuous positive airway pressure (CPAP)    He  has a past surgical history that includes EGD AND COLONOSCOPY (N/A, 3/30/2018) and Colonoscopy  His family history includes Cancer in his brother; Coronary artery disease in his mother; Diabetes in his mother; Early death in his brother; Hepatitis in his sister; No Known Problems in his father; Prostate cancer in his brother  He  reports that he has been smoking cigarettes  He has a 40 00 pack-year smoking history  He has never used smokeless tobacco  He reports that he does not drink alcohol or use drugs  He has No Known Allergies       Review of Systems   Constitutional: Negative for chills and fever  HENT: Negative for sore throat  Eyes: Negative for visual disturbance  Respiratory: Negative for shortness of breath  Cardiovascular: Negative for chest pain  Gastrointestinal: Negative for abdominal pain  Genitourinary: Negative for flank pain  Musculoskeletal: Positive for arthralgias  Negative for joint swelling  Skin: Negative for rash and wound  Neurological: Positive for weakness  Negative for numbness  Hematological: Does not bruise/bleed easily  Psychiatric/Behavioral: Negative for self-injury  Objective:  Vitals:    03/23/21 1229   BP: (!) 172/74   Pulse: 73   Weight: 122 kg (268 lb)   Height: 5' 11" (1 803 m)     Back Exam     Reflexes   Biceps: normal      Right Hand Exam     Muscle Strength   The patient has normal right wrist strength  Other   Sensation: normal  Pulse: present      Left Hand Exam     Muscle Strength   The patient has normal left wrist strength  Other   Sensation: normal  Pulse: present      Right Elbow Exam     Tenderness   The patient is experiencing tenderness in the lateral epicondyle  Range of Motion   The patient has normal right elbow ROM  Muscle Strength   The patient has normal right elbow strength (5/5 strength flexion and extension)      Other   Sensation: normal      Left Elbow Exam     Muscle Strength   Left elbow normal strength: 5/5 strength flexion and extension  Other   Sensation: normal      Right Shoulder Exam     Tenderness   Right shoulder tenderness location: Trapezius, anterior, lateral     Range of Motion   Active abduction: 90   Forward flexion: 120   Internal rotation 0 degrees: Sacrum     Muscle Strength   Right shoulder normal muscle strength: 4+/5 strength external rotation  Abduction: 5/5   Internal rotation: 5/5   Supraspinatus: 4/5     Tests   Murillo test: positive    Other   Sensation: normal    Comments:  Positive empty can test  Negative belly press      Left Shoulder Exam     Other   Sensation: normal           Strength/Myotome Testing     Left Wrist/Hand   Normal wrist strength    Right Wrist/Hand   Normal wrist strength      Physical Exam  Vitals signs and nursing note reviewed  Constitutional:       General: He is not in acute distress  Appearance: He is well-developed  HENT:      Head: Normocephalic and atraumatic  Right Ear: External ear normal       Left Ear: External ear normal    Eyes:      Conjunctiva/sclera: Conjunctivae normal    Neck:      Trachea: No tracheal deviation  Cardiovascular:      Rate and Rhythm: Normal rate  Pulmonary:      Effort: Pulmonary effort is normal  No respiratory distress  Abdominal:      General: There is no distension  Musculoskeletal:         General: Tenderness present  Skin:     General: Skin is warm and dry  Neurological:      Mental Status: He is alert and oriented to person, place, and time  Psychiatric:         Behavior: Behavior normal          I have personally reviewed pertinent films in PACS and my interpretation is No acute osseous abnormality of the right shoulder

## 2021-03-24 ENCOUNTER — TELEPHONE (OUTPATIENT)
Dept: OBGYN CLINIC | Facility: CLINIC | Age: 66
End: 2021-03-24

## 2021-03-26 ENCOUNTER — TELEPHONE (OUTPATIENT)
Dept: HEMATOLOGY ONCOLOGY | Facility: CLINIC | Age: 66
End: 2021-03-26

## 2021-03-26 DIAGNOSIS — C95.90 LEUKEMIA NOT HAVING ACHIEVED REMISSION, UNSPECIFIED LEUKEMIA TYPE (HCC): Primary | ICD-10-CM

## 2021-03-26 NOTE — TELEPHONE ENCOUNTER
Patient advised of above  Patient verbalized understanding of above  Oriented - self; Oriented - place; Oriented - time

## 2021-03-26 NOTE — TELEPHONE ENCOUNTER
Patient is calling questioning if Dr Bulmaro Jenkins can decrease his Ibrutinib dosage? Patient stated that he is experiencing erectile dysfunction and + mild fatigue  Will forward to Dr Nicole Bravo RN

## 2021-03-26 NOTE — TELEPHONE ENCOUNTER
Per Dr Sol Botello will decrease Ibrutinib dosage to 280 mg daily  New script was pended to Dr Sol Botello to sign

## 2021-03-26 NOTE — TELEPHONE ENCOUNTER
Patient's wife is calling stating [facility] needs a new prescription for patient's Ibrutinib  Forwarded request to Dr Usha Duong RN

## 2021-03-29 ENCOUNTER — TELEPHONE (OUTPATIENT)
Dept: OBGYN CLINIC | Facility: OTHER | Age: 66
End: 2021-03-29

## 2021-03-29 NOTE — TELEPHONE ENCOUNTER
Patient called in needing more pain medications  He stated that the pain medication you gave him is not working at all  He said that he even "doubled up" on some of them  He does not want a refill on any of the medication , he just wants something stronger      [facility] [address]     C/b # [phone]  , Can leave a message

## 2021-03-30 DIAGNOSIS — S46.001A ROTATOR CUFF INJURY, RIGHT, INITIAL ENCOUNTER: ICD-10-CM

## 2021-03-30 RX ORDER — OXYCODONE HYDROCHLORIDE AND ACETAMINOPHEN 5; 325 MG/1; MG/1
1 TABLET ORAL 2 TIMES DAILY PRN
Qty: 10 TABLET | Refills: 0 | Status: SHIPPED | OUTPATIENT
Start: 2021-03-30 | End: 2022-03-10 | Stop reason: ALTCHOICE

## 2021-03-30 NOTE — TELEPHONE ENCOUNTER
Patient made aware of msg above  He is taking too much ibuprofen 800mg 2 tabs at a time  Advised that he is only to take 800mg TID max dose daily  He cancelled his appt for pain mgt to see you  He will call to reschedule appt  He is now out of his oxycodone  He is wonder if he can get a refill until he can get into pain mgt again  His MRI is scheduled for Tuesday  Please advise

## 2021-03-30 NOTE — TELEPHONE ENCOUNTER
Please advise patient that at our visit he was prescribed the strongest medication that we can give  He may continue to alternate with Ibuprofen 800 mg and extra strength tylenol  He may apply topical diclofenac gel and topical lidocaine  Would patient be interested in moving up his MRI date?     Thanks

## 2021-03-31 NOTE — TELEPHONE ENCOUNTER
Please advise patient that refill was sent and he is to take medication as directed  Orthopedics does not prescribe pain medications for extended durations, and also not certain that pain management with continue him on same medications       Thanks

## 2021-03-31 NOTE — TELEPHONE ENCOUNTER
Spoke to patient  He stated he cancelled his appointment with [facility]  He stated his wife told him that he cannot see them without an MRI  Patient reports that he will call and reschedule  Advised that we definitely do not prescribe long term pain meds so he needs to see them ASAP  Take all medications as prescribed  Verbalized understanding

## 2021-04-06 ENCOUNTER — HOSPITAL ENCOUNTER (OUTPATIENT)
Dept: MRI IMAGING | Facility: CLINIC | Age: 66
Discharge: HOME | End: 2021-04-06
Payer: COMMERCIAL

## 2021-04-06 DIAGNOSIS — S46.001A ROTATOR CUFF INJURY, RIGHT, INITIAL ENCOUNTER: ICD-10-CM

## 2021-04-06 PROCEDURE — G1004 CDSM NDSC: HCPCS

## 2021-04-06 PROCEDURE — 73221 MRI JOINT UPR EXTREM W/O DYE: CPT

## 2021-04-08 ENCOUNTER — TELEPHONE (OUTPATIENT)
Dept: OBGYN CLINIC | Facility: HOSPITAL | Age: 66
End: 2021-04-08

## 2021-04-08 ENCOUNTER — OFFICE VISIT (OUTPATIENT)
Dept: OBGYN CLINIC | Facility: CLINIC | Age: 66
End: 2021-04-08
Payer: COMMERCIAL

## 2021-04-08 VITALS
WEIGHT: 276 LBS | HEIGHT: 71 IN | DIASTOLIC BLOOD PRESSURE: 102 MMHG | HEART RATE: 62 BPM | BODY MASS INDEX: 38.64 KG/M2 | SYSTOLIC BLOOD PRESSURE: 198 MMHG

## 2021-04-08 DIAGNOSIS — M75.51 SUBACROMIAL BURSITIS OF RIGHT SHOULDER JOINT: ICD-10-CM

## 2021-04-08 DIAGNOSIS — M75.101 TEAR OF RIGHT SUPRASPINATUS TENDON: Primary | ICD-10-CM

## 2021-04-08 DIAGNOSIS — M75.101 TEAR OF RIGHT SUPRASPINATUS TENDON: ICD-10-CM

## 2021-04-08 PROCEDURE — 99213 OFFICE O/P EST LOW 20 MIN: CPT | Performed by: FAMILY MEDICINE

## 2021-04-08 RX ORDER — OXYCODONE HYDROCHLORIDE AND ACETAMINOPHEN 5; 325 MG/1; MG/1
1 TABLET ORAL 3 TIMES DAILY PRN
Qty: 15 TABLET | Refills: 0 | Status: SHIPPED | OUTPATIENT
Start: 2021-04-08 | End: 2022-03-10 | Stop reason: ALTCHOICE

## 2021-04-08 RX ORDER — OXYCODONE HYDROCHLORIDE AND ACETAMINOPHEN 5; 325 MG/1; MG/1
1 TABLET ORAL 3 TIMES DAILY PRN
Qty: 15 TABLET | Refills: 0 | Status: SHIPPED | OUTPATIENT
Start: 2021-04-08 | End: 2021-04-08 | Stop reason: SDUPTHER

## 2021-04-08 NOTE — TELEPHONE ENCOUNTER
Please advise patient that Rx was sent to [facility] in [place] as requested        Thanks

## 2021-04-08 NOTE — TELEPHONE ENCOUNTER
Dr Pamela Chung    Please send the script to [facility] in [place] at patient's request     Jas Paz # [phone]

## 2021-04-08 NOTE — TELEPHONE ENCOUNTER
Dr Garrett Moreno from [facility] called  They do not have Percocet and they can't get it        # [phone]

## 2021-04-08 NOTE — TELEPHONE ENCOUNTER
Patient called to see if it was re-sent to [facility]  Advised he called around 5pm when the office was closed and the medication was not re-sent yet  This will be worked on when Dr Melissa Barnard is back in the office tomorrow  Patient said thanks a lot and disconnected the call

## 2021-04-08 NOTE — PROGRESS NOTES
Assessment/Plan:  Assessment/Plan   Diagnoses and all orders for this visit:    Tear of right supraspinatus tendon  -     Ambulatory referral to Orthopedic Surgery; Future  -     Sling  -     oxyCODONE-acetaminophen (PERCOCET) 5-325 mg per tablet; Take 1 tablet by mouth 3 (three) times a day as needed for moderate painMax Daily Amount: 3 tablets    Subacromial bursitis of right shoulder joint  -     Ambulatory referral to Orthopedic Surgery; Future        68-year-old right-hand-dominant male with right shoulder pain of more than 9 weeks duration following pulling injury  Discussed with patient MRI results, impression and plan  MRI of the right shoulder is noted for high-grade bursal surface tear of the supraspinatus with 1 cm retraction  There is moderate subacromial subdeltoid bursitis  Clinical impression is that he is symptomatic combination of the supraspinatus tear and bursitis  He may benefit from corticosteroid injection, however due to concurrent supraspinatus tear surgical intervention may be warranted so I will defer corticosteroid injection and having be seen and evaluated by orthopedic surgeon  I will place him in arm sling for comfort  Patient was advised that even though ibuprofen does not help with pain, he should continue with taking ibuprofen to help with inflammation  Subjective:   Patient ID: Madonna Whatley is a 76 y o  male  Chief Complaint   Patient presents with    Right Shoulder - Follow-up, Pain         68-year-old right-hand-dominant male with right shoulder pain of more than 9 weeks duration following pulling injury  He was last seen by me 2 weeks ago at which point he was referred for MRI of the right shoulder  He denies any changes in his symptoms since his last visit    He has been experiencing pain described as localized to the anterior aspect the shoulder, radiating distally along the anterior aspect the upper arm, throbbing and sometimes sharp, worse with movement of the arm, associated with limited range of motion and weakness, and improved with resting  He was advised to alternate pain medication with ibuprofen, but states that ibuprofen has not been helping with pain  Shoulder Pain  This is a new problem  The current episode started more than 1 month ago  The problem occurs constantly  The problem has been unchanged  Associated symptoms include arthralgias and weakness  Pertinent negatives include no joint swelling or numbness  Exacerbated by: Arm movement  He has tried NSAIDs, ice, heat, oral narcotics, position changes and acetaminophen for the symptoms  The treatment provided mild relief  Review of Systems   Musculoskeletal: Positive for arthralgias  Negative for joint swelling  Neurological: Positive for weakness  Negative for numbness  Objective:  Vitals:    04/08/21 1113 04/08/21 1120   BP: (!) 199/90 (!) 198/102   Pulse: 82 62   Weight: 125 kg (276 lb)    Height: 5' 11" (1 803 m)      Ortho Exam    Physical Exam  Vitals signs and nursing note reviewed  Constitutional:       General: He is not in acute distress  Appearance: He is well-developed  HENT:      Head: Normocephalic and atraumatic  Right Ear: External ear normal       Left Ear: External ear normal    Eyes:      Conjunctiva/sclera: Conjunctivae normal    Neck:      Trachea: No tracheal deviation  Cardiovascular:      Rate and Rhythm: Normal rate  Pulmonary:      Effort: Pulmonary effort is normal  No respiratory distress  Abdominal:      General: There is no distension  Skin:     General: Skin is warm and dry  Neurological:      Mental Status: He is alert and oriented to person, place, and time  Psychiatric:         Behavior: Behavior normal          I have personally reviewed pertinent films in PACS and my interpretation is Subacromial bursitis  Supraspinatus tear

## 2021-04-13 ENCOUNTER — TELEPHONE (OUTPATIENT)
Dept: OBGYN CLINIC | Facility: HOSPITAL | Age: 66
End: 2021-04-13

## 2021-04-13 DIAGNOSIS — M75.101 TEAR OF RIGHT SUPRASPINATUS TENDON: Primary | ICD-10-CM

## 2021-04-13 RX ORDER — OXYCODONE HYDROCHLORIDE AND ACETAMINOPHEN 5; 325 MG/1; MG/1
1 TABLET ORAL 3 TIMES DAILY PRN
Qty: 9 TABLET | Refills: 0 | Status: SHIPPED | OUTPATIENT
Start: 2021-04-13 | End: 2022-03-10 | Stop reason: ALTCHOICE

## 2021-04-13 NOTE — TELEPHONE ENCOUNTER
Sterling Lau is calling to speak with Dr Jose Whitlock  Pain medication is not helping, he is out of it, and he is in a lot of pain and needs some kind of help right away      Please call Sterling Lau [phone]

## 2021-04-13 NOTE — TELEPHONE ENCOUNTER
Patient is calling back to find out if the med was called in but I let him know that Dr Mirna Cr was out of the office  Patient was put through to the nurse

## 2021-04-13 NOTE — TELEPHONE ENCOUNTER
Patient is calling again and is in a lot of pain, and wants a call back asap or he wants to speak to [facility] complaint dept      CB - [phone]

## 2021-04-13 NOTE — TELEPHONE ENCOUNTER
I spoke to patient and he would like to request a refill for the Percocet be sent to [facility] in [place]  He has an appt with Dr Cuauhtemoc Ga on 4/15  He will continue ibuprofen 800mg TID with food, heat/ice 20 min on 20 min off  Please advise and I will let patient know when RX goes to pharmacy

## 2021-04-15 ENCOUNTER — OFFICE VISIT (OUTPATIENT)
Dept: OBGYN CLINIC | Facility: CLINIC | Age: 66
End: 2021-04-15
Payer: COMMERCIAL

## 2021-04-15 VITALS
RESPIRATION RATE: 20 BRPM | HEIGHT: 71 IN | HEART RATE: 69 BPM | DIASTOLIC BLOOD PRESSURE: 84 MMHG | WEIGHT: 271 LBS | BODY MASS INDEX: 37.94 KG/M2 | SYSTOLIC BLOOD PRESSURE: 169 MMHG

## 2021-04-15 DIAGNOSIS — M75.41 SUBACROMIAL IMPINGEMENT OF RIGHT SHOULDER: ICD-10-CM

## 2021-04-15 DIAGNOSIS — M75.51 SUBACROMIAL BURSITIS OF RIGHT SHOULDER JOINT: ICD-10-CM

## 2021-04-15 DIAGNOSIS — M75.111 INCOMPLETE TEAR OF RIGHT ROTATOR CUFF, UNSPECIFIED WHETHER TRAUMATIC: Primary | ICD-10-CM

## 2021-04-15 DIAGNOSIS — E11.9: ICD-10-CM

## 2021-04-15 PROCEDURE — 1160F RVW MEDS BY RX/DR IN RCRD: CPT | Performed by: ORTHOPAEDIC SURGERY

## 2021-04-15 PROCEDURE — 3008F BODY MASS INDEX DOCD: CPT | Performed by: ORTHOPAEDIC SURGERY

## 2021-04-15 PROCEDURE — 20610 DRAIN/INJ JOINT/BURSA W/O US: CPT | Performed by: ORTHOPAEDIC SURGERY

## 2021-04-15 PROCEDURE — 4004F PT TOBACCO SCREEN RCVD TLK: CPT | Performed by: ORTHOPAEDIC SURGERY

## 2021-04-15 PROCEDURE — 99213 OFFICE O/P EST LOW 20 MIN: CPT | Performed by: ORTHOPAEDIC SURGERY

## 2021-04-15 RX ORDER — KETOROLAC TROMETHAMINE 30 MG/ML
60 INJECTION, SOLUTION INTRAMUSCULAR; INTRAVENOUS
Status: COMPLETED | OUTPATIENT
Start: 2021-04-15 | End: 2021-04-15

## 2021-04-15 RX ORDER — BUPIVACAINE HYDROCHLORIDE 2.5 MG/ML
2 INJECTION, SOLUTION INFILTRATION; PERINEURAL
Status: COMPLETED | OUTPATIENT
Start: 2021-04-15 | End: 2021-04-15

## 2021-04-15 RX ORDER — LIDOCAINE HYDROCHLORIDE 10 MG/ML
2 INJECTION, SOLUTION INFILTRATION; PERINEURAL
Status: COMPLETED | OUTPATIENT
Start: 2021-04-15 | End: 2021-04-15

## 2021-04-15 RX ADMIN — BUPIVACAINE HYDROCHLORIDE 2 ML: 2.5 INJECTION, SOLUTION INFILTRATION; PERINEURAL at 12:06

## 2021-04-15 RX ADMIN — LIDOCAINE HYDROCHLORIDE 2 ML: 10 INJECTION, SOLUTION INFILTRATION; PERINEURAL at 12:06

## 2021-04-15 RX ADMIN — KETOROLAC TROMETHAMINE 60 MG: 30 INJECTION, SOLUTION INTRAMUSCULAR; INTRAVENOUS at 12:06

## 2021-04-15 NOTE — PROGRESS NOTES
Patient Name:  Adriane Saavedra  MRN:  [id number]    [address] [place] [address]     1  Incomplete tear of right rotator cuff, unspecified whether traumatic  -     Ambulatory referral to Orthopedic Surgery  -     Large joint arthrocentesis: R subacromial bursa  -     Ambulatory referral to Physical Therapy; Future  -     Ambulatory referral to Pain Management; Future    2  Subacromial bursitis of right shoulder joint  -     Ambulatory referral to Orthopedic Surgery  -     Large joint arthrocentesis: R subacromial bursa  -     Ambulatory referral to Physical Therapy; Future  -     Ambulatory referral to Pain Management; Future    3  Subacromial impingement of right shoulder    4  Type 2 diabetes mellitus with goal of symptom management [facility])  -     Ambulatory referral to [facility]; Future          The patient is asymptomatic high-grade bursal sided tear of his right shoulder after an injury just over 2 months ago  We discussed multiple conservative treatment options with the patient at this time  He has found little success with oral analgesics including anti-inflammatories and oxycodone to this point  I recommended discontinuing his sling and starting to work on range of motion exercises for his elbow and shoulder  He was given a home exercise program during his visit today  I have also given a prescription to begin formal physical therapy to work on range of motion strengthening  We did also discuss injection therapy  Due to his history of diabetes and the fact that he does not monitor his blood sugars we have decided to refrain from corticosteroid injection in the office today  The patient plans to follow-up with a primary care physician for further management of his diabetes and multiple medical comorbidities  We did discuss Toradol injection into his shoulder today  We discussed risks and benefits  The patient elected to proceed forward  He tolerated the procedure well    The patient was also concerned about receiving a new narcotic prescription  I did state to him that due to this subacute pain he is having I would not prescribe further narcotics at this time  If he does in fact need surgery in the future we discussed a short term of oral narcotics to be use then  In the meantime I gave him a new prescription for ibuprofen  The patient is interested in having consultation with pain management for further potential pain medication  A referral for consultation was written today  I will see the patient back in 6 weeks for repeat evaluation of his symptoms  Chief Complaint       Right shoulder pain    History of the Present Illness     Shruti Belcher is a 76 y o  male with  Right shoulder pain that began roughly 2 months ago  His pain started after pulling the start for his   Shortly after that time he was helping a neighbor push their car out of the snow when he felt a worsening of this pain  Since that time he has had persistent pain and notes minimal improvement with oral analgesics  He is wearing a sling in the office today  He has pain with overhead motion  He denies any numbness or tingling  No other new complaints  Review of Systems     Review of Systems    Physical Exam     /84   Pulse 69   Resp 20   Ht 5' 11" (1 803 m)   Wt 123 kg (271 lb)   BMI 37 80 kg/m²       Right Shoulder: Active range of motion to  120 degrees forward flexion,  90 degrees abduction,  70 degrees external rotation, and internal rotation  L4  Passive range of motion to  150° of forward flexion  There is  no tenderness present over the  Greater tuberosity or proximal biceps tendon  Empty can testing is  positive  There is  4+/5 strength with external rotation testing at the side  Belly press test is  negative  Murillo test is  positive  Lane's test is  positive  Speed's test is negative  The patient is neurovascularly intact distally in the extremity  Eyes:  Anicteric sclerae    Neck: Supple  Lungs:  Normal respiratory effort  Cardiovascular:  Capillary refill is less than 2 seconds  Skin:  Intact without erythema  Neurologic:  Sensation grossly intact to light touch  Psychiatric:  Mood and affect are appropriate  Data Review     I have personally reviewed pertinent films in PACS, and my interpretation follows:     x-rays of the right shoulder reviewed the office today display no fracture dislocation  Glenohumeral joint space is well maintained with signs of a subacromial spur appreciated  MRI of the right shoulder reviewed in the office today displays a subacromial spur with reactive subacromial bursitis  There is also a high-grade bursal sided tear of the infraspinatus with tendinosis of the supraspinatus and infraspinatus  Past Medical History:   Diagnosis Date    Anemia     Anxiety     Bipolar disorder ([facility] )     Cancer ([place] [address] )     History of alcohol abuse     Hypertension     Hypertension     Insomnia     Leukemia ([facility] )     Opiate abuse, episodic ([place] [address] )     Psychiatric disorder     Sleep apnea with use of continuous positive airway pressure (CPAP)        Past Surgical History:   Procedure Laterality Date    COLONOSCOPY      EGD AND COLONOSCOPY N/A 3/30/2018    Procedure: EGD AND COLONOSCOPY;  Surgeon: Nhi Centeno MD;  Location: [facility];   Service: Gastroenterology       No Known Allergies    Current Outpatient Medications on File Prior to Visit   Medication Sig Dispense Refill    ALPRAZolam (XANAX) 1 mg tablet   3    benztropine (COGENTIN) 1 mg tablet Take 1 mg by mouth 2 (two) times a day       FLUoxetine (PROzac) 20 mg capsule Take 60 mg by mouth daily        furosemide (LASIX) 40 mg tablet TAKE 1 TABLET BY MOUTH ONCE DAILY 30 tablet 10    Ibrutinib 280 MG TABS Take 280 mg by mouth daily 30 tablet 5    ibuprofen (MOTRIN) 800 mg tablet Take 1 tablet (800 mg total) by mouth 3 (three) times a day as needed for moderate pain 90 tablet 1    lisinopril (ZESTRIL) 40 mg tablet Take 1 tablet (40 mg total) by mouth daily 90 tablet 3    mirtazapine (REMERON) 15 mg tablet Take 15 mg by mouth daily at bedtime      mirtazapine (REMERON) 30 mg tablet       naloxone (NARCAN) 4 mg/0 1 mL nasal spray Administer 1 spray into a nostril  If no response after 2-3 minutes, give another dose in the other nostril using a new spray  1 each 1    potassium chloride (K-DUR,KLOR-CON) 20 mEq tablet Take 1 tablet (20 mEq total) by mouth daily 90 tablet 3    risperiDONE (RisperDAL) 1 mg tablet Take 1 mg by mouth      buPROPion (ZYBAN) 150 MG 12 hr tablet Take 1 tablet (150 mg total) by mouth 2 (two) times a day (Patient not taking: Reported on 3/23/2021) 60 tablet 2    Diclofenac Sodium (VOLTAREN) 1 % Apply 2 g topically 4 (four) times a day (Patient not taking: Reported on 3/23/2021) 1 Tube 1    Ibrutinib 140 MG TABS Take by mouth      Ibrutinib 420 MG TABS Take 420 mg by mouth daily 30 tablet 11    meloxicam (MOBIC) 7 5 mg tablet Take 1 tablet (7 5 mg total) by mouth 2 (two) times a day as needed for moderate pain (Patient not taking: Reported on 3/23/2021) 30 tablet 0    oxyCODONE-acetaminophen (PERCOCET) 5-325 mg per tablet Take 1 tablet by mouth 2 (two) times a day as needed for moderate painMax Daily Amount: 2 tablets (Patient not taking: Reported on 4/8/2021) 10 tablet 0    oxyCODONE-acetaminophen (PERCOCET) 5-325 mg per tablet Take 1 tablet by mouth 3 (three) times a day as needed for moderate painMax Daily Amount: 3 tablets (Patient not taking: Reported on 4/15/2021) 15 tablet 0    oxyCODONE-acetaminophen (PERCOCET) 5-325 mg per tablet Take 1 tablet by mouth 3 (three) times a day as needed for moderate painMax Daily Amount: 3 tablets (Patient not taking: Reported on 4/15/2021) 9 tablet 0     No current facility-administered medications on file prior to visit          Social History     Tobacco Use    Smoking status: Current Every Day Smoker     Packs/day: 1 00 Years: 40 00     Pack years: 40 00     Types: Cigarettes     Last attempt to quit: 10/13/2020     Years since quittin 5    Smokeless tobacco: Never Used   Substance Use Topics    Alcohol use: No     Comment: last drink 2017    Drug use: No     Comment: hx of heroin and subutex abuse       Family History   Problem Relation Age of Onset    Coronary artery disease Mother     Diabetes Mother     No Known Problems Father     Hepatitis Sister     Cancer Brother     Prostate cancer Brother     Early death Brother              Procedures Performed     Large joint arthrocentesis: R subacromial bursa  Universal Protocol:  Consent: Verbal consent obtained  Risks and benefits: risks, benefits and alternatives were discussed  Consent given by: patient  Time out: Immediately prior to procedure a "time out" was called to verify the correct patient, procedure, equipment, support staff and site/side marked as required  Patient understanding: patient states understanding of the procedure being performed  Patient consent: the patient's understanding of the procedure matches consent given  Radiology Images displayed and confirmed   If images not available, report reviewed: imaging studies available    Supporting Documentation  Indications: pain   Procedure Details  Location: shoulder - R subacromial bursa  Needle size: 22 G  Ultrasound guidance: no  Approach: posterolateral  Medications administered: 2 mL bupivacaine 0 25 %; 2 mL lidocaine 1 %; 60 mg ketorolac 60 mg/2 mL    Patient tolerance: patient tolerated the procedure well with no immediate complications  Dressing:  Sterile dressing applied              Pawel Deras DO

## 2021-04-16 ENCOUNTER — TELEPHONE (OUTPATIENT)
Dept: OBGYN CLINIC | Facility: HOSPITAL | Age: 66
End: 2021-04-16

## 2021-04-16 NOTE — TELEPHONE ENCOUNTER
Patient sees Dr Gifty Patricio  Patient is calling to advise Dr Gifty Patricio that the injection he received in his shoulder yesterday from Dr Gary Dunn made him sick to his stomach, vomit, lightheaded, and he couldn't sleep and he isn't taking that "crap" any more  He would like his pain medication refilled instead        oxyCODONE-acetaminophen (PERCOCET) 5-325 mg per tablet [[id number]]   Take 1 tablet by mouth 3 (three) times a day as needed for moderate painMax  [address] [facility], [facility] [address]: [phone]

## 2021-04-17 NOTE — TELEPHONE ENCOUNTER
Patient is calling in wanting to make sure that the Dr got his message  He is stating that when he takes the Ibuprofen 800 mg tabs along it is not working for him but when he takes it together with the Oxycodone it works  He is also asking for a medication refill on Oxycodone to be sent to [facility] located at [address] [facility] [address]   Please reach out to patient relating this at [phone]

## 2021-04-19 ENCOUNTER — TELEPHONE (OUTPATIENT)
Dept: OBGYN CLINIC | Facility: HOSPITAL | Age: 66
End: 2021-04-19

## 2021-04-19 NOTE — TELEPHONE ENCOUNTER
Patient sees Dr Idalmis Mar    Patient called to submit a complaint about the lack of responses he's been receiving regarding his treatment  Patient feels he is being neglected and that this is poor way to treat someone who is paying for proper treatment  Email sent to practice administrator

## 2021-04-19 NOTE — TELEPHONE ENCOUNTER
Please re-emphasize to patient that orthopedics does not prescribe opiate medications on prolonged basis  He was given multiple refills beyond the acute period  He was seen by orthopedic surgeon and was referred to pain management to discuss other options      Thanks

## 2021-04-20 NOTE — TELEPHONE ENCOUNTER
Pt  Called back approx  2:45pm  Advised pt  Dr Peewee Dove will not be refilling the prescription  Pt  Aware he will be seeing Dr Radha Yeung on Tuesday at [facility]  Pt  Did say "Thanks for nothing " then hung up

## 2021-04-20 NOTE — TELEPHONE ENCOUNTER
I spoke to patient and provided information to Dr Sharon Butcher with request for one more Percocet RX until patient is seen on 4/27 by [facility]  Awaiting response   Please open task

## 2021-04-20 NOTE — TELEPHONE ENCOUNTER
I spoke with patient and he was given above information  He maintains that he would like to have one more Percocet RX until he sees pain mgt  He has an appt scheduled with pain mgt on 4/27 with Dr Marti Farrar    Please advise

## 2021-04-20 NOTE — TELEPHONE ENCOUNTER
Spoke with patients wife  Left 2 telephone numbers to contact me at  Will advise patient that Dr Rossy Joyce will not be refilling the prescription  Pt  Scheduled with [facility] on Tuesday

## 2021-04-21 ENCOUNTER — TELEPHONE (OUTPATIENT)
Dept: OBGYN CLINIC | Facility: MEDICAL CENTER | Age: 66
End: 2021-04-21

## 2021-04-21 NOTE — TELEPHONE ENCOUNTER
Patient sees Dr Theron Islas  Patient is calling to let Doctor know, he truly would like to apologize for all the pushing and not nice things he said to the doctor  He is a reborn [employer] and he spoke to God and would like to speak to the doctor to let him know how sorry he is for anything out of character       # [phone]

## 2021-05-12 ENCOUNTER — APPOINTMENT (OUTPATIENT)
Dept: LAB | Facility: CLINIC | Age: 66
End: 2021-05-12
Payer: COMMERCIAL

## 2021-05-13 NOTE — TELEPHONE ENCOUNTER
I spoke with practice administrator who will be reaching out to patient to advise him I will not be refilling percocet      Thanks [Time Spent: ___ minutes] : I have spent [unfilled] minutes of time on the encounter.

## 2021-05-17 ENCOUNTER — OFFICE VISIT (OUTPATIENT)
Dept: HEMATOLOGY ONCOLOGY | Facility: CLINIC | Age: 66
End: 2021-05-17
Payer: COMMERCIAL

## 2021-05-17 VITALS
WEIGHT: 259 LBS | BODY MASS INDEX: 36.26 KG/M2 | HEART RATE: 79 BPM | HEIGHT: 71 IN | RESPIRATION RATE: 18 BRPM | SYSTOLIC BLOOD PRESSURE: 158 MMHG | DIASTOLIC BLOOD PRESSURE: 86 MMHG | OXYGEN SATURATION: 96 %

## 2021-05-17 DIAGNOSIS — N18.2 TYPE 2 DIABETES MELLITUS WITH STAGE 2 CHRONIC KIDNEY DISEASE, UNSPECIFIED WHETHER LONG TERM INSULIN USE (HCC): ICD-10-CM

## 2021-05-17 DIAGNOSIS — J42 CHRONIC BRONCHITIS, UNSPECIFIED CHRONIC BRONCHITIS TYPE (HCC): ICD-10-CM

## 2021-05-17 DIAGNOSIS — C91.10 CLL (CHRONIC LYMPHOCYTIC LEUKEMIA) (HCC): Primary | ICD-10-CM

## 2021-05-17 DIAGNOSIS — E11.22 TYPE 2 DIABETES MELLITUS WITH STAGE 2 CHRONIC KIDNEY DISEASE, UNSPECIFIED WHETHER LONG TERM INSULIN USE (HCC): ICD-10-CM

## 2021-05-17 PROCEDURE — 4004F PT TOBACCO SCREEN RCVD TLK: CPT | Performed by: INTERNAL MEDICINE

## 2021-05-17 PROCEDURE — 1160F RVW MEDS BY RX/DR IN RCRD: CPT | Performed by: INTERNAL MEDICINE

## 2021-05-17 PROCEDURE — 3008F BODY MASS INDEX DOCD: CPT | Performed by: INTERNAL MEDICINE

## 2021-05-17 PROCEDURE — 99214 OFFICE O/P EST MOD 30 MIN: CPT | Performed by: INTERNAL MEDICINE

## 2021-05-17 PROCEDURE — 3066F NEPHROPATHY DOC TX: CPT | Performed by: INTERNAL MEDICINE

## 2021-05-17 NOTE — PROGRESS NOTES
HEMATOLOGY / ONCOLOGY CLINIC NOTE    Primary Care Provider: Seema Jules MD  Referring Provider:    MRN: [id number]  : 1955    Reason for Encounter:    Chief Complaint   Patient presents with   [address],[place] [address]         Hematology / Oncology History:     Farheen Jasmine is a 76 y o  male who came in for follow up      1, CLL  - ?  Stage III with anemia; also having splenomegaly  No FISH panel  - has been on ibrutinib 420 mg since 2018   - highest white blood cell 128k, decreased to 8 K    - 2021:  Due to fatigue and potential shoulder surgery, patient has decreased ibrutinib to 280 mg daily by himself  WBC and lymphocyte remains stable  Okay to continue 280 mg     2, anemia  - resolved        Assessment / Plan:     1  CLL (chronic lymphocytic [facility]) ([facility] )  - okay to continue ibrutinib 280 mg  Patient need to check labs every 3 months follow-up every 6 months     - made patient aware that if he is going for surgical procedures depending on the etiology and risk for bleeding, patient usually need to hold ibrutinib a week before and a week after procedure       - LD,Blood; Standing  - Comprehensive metabolic panel; Standing  - CBC and differential; Standing  - LD,Blood  - Comprehensive metabolic panel  - CBC and differential                 25       minutes were spent face to face with patient with greater than 50% of the time spent in counseling or coordination of care including discussions of treatment instructions  All of the patient's questions were answered to their satisfactory during this discussion  Advised pt to call if there is any further questions  Interval History:     2019:  Came in for follow-up  Reported has been doing okay at baseline health status  Using home oxygen for COPD  No bleeding, no leg swelling, no new chest pain, cough, hemoptysis  No frequent infection  2019 :  Came in for follow-up  Overall doing well    Reported has been losing weight about 20 lb  No lumps bumps  No other constitutional symptoms  No bleeding no frequent infection  1/3/75540 :  Came in for follow-up, doing well, body weight is stable  based overall tolerated treatment well no bleeding, no thrombosis, no frequent infection  Patient reported has been losing weight however albumin level is in normal range  7/31/2020 :  Patient came in for follow-up doing okay  Gaining weight  Reported having ED and more fatigue  No infection of skin rash    11/13/2020 :  Came for follow-up, overall doing okay  Diagnosed sleep apnea using CPAP machine  No infection, no bleeding    5/17/2021 :  Patient came in follow-up, overall doing okay  No bleeding, no infection, no palpitations  Patient reported some mild fatigue  Recently has been evaluated for also for shoulder surgery       Problem list:       Patient Active Problem List   Diagnosis    Hypertension    Depression    Polypharmacy    Bronchitis    Encephalopathy    Weakness    Chronic lymphocytic leukemia of B-cell type not having achieved remission (HCC)    Lactic acidosis    Leukocytosis    Elevated troponin    Bipolar 1 disorder (HCC)    Psychiatric disorder    Anemia    Peripheral edema    CLL (chronic lymphocytic leukemia) (HCC)    RA (acute kidney injury) (HCC)    Chronic obstructive pulmonary disease (HCC)    Expiratory wheezing    Type 2 diabetes mellitus with stage 2 chronic kidney disease (HCC)    Lymph node enlargement    Body mass index (BMI) 40 0-44 9, adult    Stage 2 chronic kidney disease    Type 2 diabetes mellitus, without long-term current use of insulin (HCC)    Acute pain of right shoulder       PHYSICIAL EXAMINATION:       Vital Signs:   [unfilled]  Body mass index is 36 12 kg/m²  Body surface area is 2 35 meters squared  Overweight;  Bilateral lower extremity trace swelling   No major findings on examination      GEN: Alert, awake oriented x3, in no acute distress  HEENT- No pallor, icterus, cyanosis, no oral mucosal lesions,   LAD - no palpable cervical, clavicle, axillary, inguinal LAD  Heart- normal S1 S2, regular rate and rhythm, No murmur, rubs  Lungs- decreased breathing sound bilateral    Abdomen- soft, Non tender, bowel sounds present  Extremities- No cyanosis, clubbing, edema  Neuro- No focal neurological deficit           PAST MEDICAL HISTORY:   has a past medical history of Anemia, Anxiety, Bipolar disorder ([place] [address] ), Cancer ([place] [address] ), History of alcohol abuse, Hypertension, Hypertension, Insomnia, Leukemia ([place] [address] ), Opiate abuse, episodic ([place] [address] ), Psychiatric disorder, and Sleep apnea with use of continuous positive airway pressure (CPAP)  PAST SURGICAL HISTORY:   has a past surgical history that includes EGD AND COLONOSCOPY (N/A, 3/30/2018) and Colonoscopy  CURRENT MEDICATIONS:     Current Outpatient Medications   Medication Sig Dispense Refill    ALPRAZolam (XANAX) 1 mg tablet   3    benztropine (COGENTIN) 1 mg tablet Take 1 mg by mouth 2 (two) times a day       FLUoxetine (PROzac) 20 mg capsule Take 60 mg by mouth daily        furosemide (LASIX) 40 mg tablet TAKE 1 TABLET BY MOUTH ONCE DAILY 30 tablet 10    Ibrutinib 140 MG TABS Take by mouth      Ibrutinib 280 MG TABS Take 280 mg by mouth daily 30 tablet 5    ibuprofen (MOTRIN) 800 mg tablet Take 1 tablet (800 mg total) by mouth 3 (three) times a day as needed for moderate pain 90 tablet 1    lisinopril (ZESTRIL) 40 mg tablet Take 1 tablet (40 mg total) by mouth daily 90 tablet 3    meloxicam (MOBIC) 7 5 mg tablet Take 1 tablet (7 5 mg total) by mouth 2 (two) times a day as needed for moderate pain 30 tablet 0    mirtazapine (REMERON) 15 mg tablet Take 15 mg by mouth daily at bedtime      mirtazapine (REMERON) 30 mg tablet       naloxone (NARCAN) 4 mg/0 1 mL nasal spray Administer 1 spray into a nostril  If no response after 2-3 minutes, give another dose in the other nostril using a new spray  1 each 1    potassium chloride (K-DUR,KLOR-CON) 20 mEq tablet Take 1 tablet (20 mEq total) by mouth daily 90 tablet 3    risperiDONE (RisperDAL) 1 mg tablet Take 1 mg by mouth      buPROPion (ZYBAN) 150 MG 12 hr tablet Take 1 tablet (150 mg total) by mouth 2 (two) times a day (Patient not taking: Reported on 3/23/2021) 60 tablet 2    Diclofenac Sodium (VOLTAREN) 1 % Apply 2 g topically 4 (four) times a day (Patient not taking: Reported on 3/23/2021) 1 Tube 1    Ibrutinib 420 MG TABS Take 420 mg by mouth daily 30 tablet 11    oxyCODONE-acetaminophen (PERCOCET) 5-325 mg per tablet Take 1 tablet by mouth 2 (two) times a day as needed for moderate painMax Daily Amount: 2 tablets (Patient not taking: Reported on 4/8/2021) 10 tablet 0    oxyCODONE-acetaminophen (PERCOCET) 5-325 mg per tablet Take 1 tablet by mouth 3 (three) times a day as needed for moderate painMax Daily Amount: 3 tablets (Patient not taking: Reported on 4/15/2021) 15 tablet 0    oxyCODONE-acetaminophen (PERCOCET) 5-325 mg per tablet Take 1 tablet by mouth 3 (three) times a day as needed for moderate painMax Daily Amount: 3 tablets (Patient not taking: Reported on 4/15/2021) 9 tablet 0     No current facility-administered medications for this visit  [unfilled]    SOCIAL HISTORY:   reports that he has been smoking cigarettes  He has a 40 00 pack-year smoking history  He has never used smokeless tobacco  He reports that he does not drink alcohol or use drugs  FAMILY HISTORY:  family history includes Cancer in his brother; Coronary artery disease in his mother; Diabetes in his mother; Early death in his brother; Hepatitis in his sister; No Known Problems in his father; Prostate cancer in his brother  ALLERGIES:  has No Known Allergies      REVIEW OF SYSTEMS:  Please note that a 14-point review of systems was performed to include Constitutional, HEENT, Respiratory, CVS, GI, , Musculoskeletal, Integumentary, Neurologic, Rheumatologic, Endocrinologic, Psychiatric, Lymphatic, and Hematologic/Oncologic systems were reviewed and are negative unless otherwise stated in HPI  Positive and negative findings pertinent to this evaluation are incorporated into the history of present illness  Lab Results   Component Value Date    SODIUM 139 05/12/2021    K 3 7 05/12/2021     05/12/2021    CO2 29 05/12/2021    AGAP 3 (L) 05/12/2021    BUN 15 05/12/2021    CREATININE 0 80 05/12/2021    GLUC 156 (H) 07/29/2020    GLUF 159 (H) 05/12/2021    CALCIUM 8 9 05/12/2021    AST 13 05/12/2021    ALT 36 05/12/2021    ALKPHOS 96 05/12/2021    TP 7 4 05/12/2021    TBILI 0 38 05/12/2021    EGFR 93 05/12/2021       CBC with diff:   Results from last 7 days   Lab Units 05/12/21  1412   WBC Thousand/uL 10 10   HEMOGLOBIN g/dL 13 9   HEMATOCRIT % 40 6   MCV fL 93   PLATELETS Thousands/uL 172   MCH pg 31 8   MCHC g/dL 34 2   RDW % 12 6   MPV fL 12 0   NRBC AUTO /100 WBCs 0       CMP:  Results from last 7 days   Lab Units 05/12/21  1412   POTASSIUM mmol/L 3 7   CHLORIDE mmol/L 107   CO2 mmol/L 29   BUN mg/dL 15   CREATININE mg/dL 0 80   CALCIUM mg/dL 8 9   AST U/L 13   ALT U/L 36   ALK PHOS U/L 96   EGFR ml/min/1 73sq m 93           IMAGING:    No orders to display     No results found

## 2021-05-24 ENCOUNTER — TELEPHONE (OUTPATIENT)
Dept: OBGYN CLINIC | Facility: CLINIC | Age: 66
End: 2021-05-24

## 2021-05-24 NOTE — TELEPHONE ENCOUNTER
LVM with my telephone number to call regarding Orthopedics and [facility] will be unable to prescribe oxycodone  Advised patient to obtain a pain management provider

## 2021-05-26 NOTE — TELEPHONE ENCOUNTER
Call to this patient no answer left message to inform him  is out of the office this week and he message will me sent on to covering  Please Advise

## 2021-05-26 NOTE — TELEPHONE ENCOUNTER
Patient called in stating that he in shoulder pain and hes having surgery in June   Hes not sure what he can do for his pain -pt doesn't want to have injections with pain mgt and wants medication       [phone]

## 2021-05-27 NOTE — TELEPHONE ENCOUNTER
Called patient to discuss and left voicemail indicating would only advised OTC medications at this time  IF narcotic Rx indicated post operatively, will perscribe at that time  Thank you

## 2021-05-31 ENCOUNTER — TELEPHONE (OUTPATIENT)
Dept: OTHER | Facility: OTHER | Age: 66
End: 2021-05-31

## 2021-05-31 NOTE — TELEPHONE ENCOUNTER
Patient called to address an issue he's experiencing with obtaining pain medication for his rotator cuff injury  He received a message from his orthetic doctor that states the practice is not able to prescribe him pain medication, and that he is to use something OTC  The patient stated he is unable to take OTC pain medications because he has leukemia  He also had a bad reaction to a recent injection  Spoke to the patient for about a half an hour regarding this situation where he expressed his interested in potentially seeking  regarding this situation  He addressed that he doesn't believe this situation is fair, and that he is suffering greatly with pain that is impairing his ability to work and live his life  I let him know that I would document his complaints for the office, as it is closed for the holiday

## 2021-06-01 ENCOUNTER — TELEPHONE (OUTPATIENT)
Dept: OBGYN CLINIC | Facility: CLINIC | Age: 66
End: 2021-06-01

## 2021-06-01 NOTE — TELEPHONE ENCOUNTER
LVM advised calling regarding the message he left  I advised per our previous discussion we would not be providing the medication he was requesting and also advised I would call him back again this afternoon  I left my [phone] to contact me back

## 2021-06-02 ENCOUNTER — TELEPHONE (OUTPATIENT)
Dept: OBGYN CLINIC | Facility: MEDICAL CENTER | Age: 66
End: 2021-06-02

## 2021-06-02 ENCOUNTER — TELEPHONE (OUTPATIENT)
Dept: OBGYN CLINIC | Facility: CLINIC | Age: 66
End: 2021-06-02

## 2021-06-02 NOTE — TELEPHONE ENCOUNTER
Identified myself and left # [phone] and indicated if he had questions to please give me a call

## 2021-06-02 NOTE — TELEPHONE ENCOUNTER
6 2 21 @11:58am [facility] for patient to call me at [phone]      6 1 21 4:57pm   Sonya Matthews this is Riaz Counter calling you back again at [phone] I was told to go to a Pain Management doctor uh by Dr Emily Stock so if youre discriminating against me or you think Im a junkie or anything like that I got about 12 or 13 charges that I could slap against you  I already got an  and uh they are already handling the case with me called Rolando Ferrari and Im getting a substantial settlement by August so I just want to make sure that uh well find out whether youre telling me the truth or not or youre holding something against me or youre discriminating colin why you called pain management if you dont handle pain and thats all I gotta say about it at this time  Goodbye      6 1 21 @ 4:30pm Yennifer Matthews This is Riaz Counter [phone] - call ended

## 2021-06-06 ENCOUNTER — APPOINTMENT (EMERGENCY)
Dept: CT IMAGING | Facility: HOSPITAL | Age: 66
End: 2021-06-06
Payer: COMMERCIAL

## 2021-06-06 ENCOUNTER — HOSPITAL ENCOUNTER (EMERGENCY)
Facility: HOSPITAL | Age: 66
Discharge: HOME | End: 2021-06-06
Attending: EMERGENCY MEDICINE | Admitting: EMERGENCY MEDICINE
Payer: COMMERCIAL

## 2021-06-06 ENCOUNTER — APPOINTMENT (EMERGENCY)
Dept: ULTRASOUND IMAGING | Facility: HOSPITAL | Age: 66
End: 2021-06-06
Payer: COMMERCIAL

## 2021-06-06 VITALS
RESPIRATION RATE: 18 BRPM | BODY MASS INDEX: 35.98 KG/M2 | DIASTOLIC BLOOD PRESSURE: 72 MMHG | TEMPERATURE: 97.8 F | HEART RATE: 66 BPM | SYSTOLIC BLOOD PRESSURE: 163 MMHG | WEIGHT: 257.94 LBS | OXYGEN SATURATION: 96 %

## 2021-06-06 DIAGNOSIS — N20.1 URETEROLITHIASIS: Primary | ICD-10-CM

## 2021-06-06 DIAGNOSIS — N43.3 HYDROCELE: ICD-10-CM

## 2021-06-06 DIAGNOSIS — R31.9 HEMATURIA: ICD-10-CM

## 2021-06-06 LAB
ALBUMIN SERPL BCP-MCNC: 3.8 G/DL (ref 3.5–5)
ALP SERPL-CCNC: 86 U/L (ref 46–116)
ALT SERPL W P-5'-P-CCNC: 40 U/L (ref 12–78)
ANION GAP SERPL CALCULATED.3IONS-SCNC: 6 MMOL/L (ref 4–13)
APTT PPP: 33 SECONDS (ref 23–37)
AST SERPL W P-5'-P-CCNC: 16 U/L (ref 5–45)
BACTERIA UR QL AUTO: ABNORMAL /HPF
BASOPHILS # BLD AUTO: 0.03 THOUSANDS/ΜL (ref 0–0.1)
BASOPHILS NFR BLD AUTO: 0 % (ref 0–1)
BILIRUB SERPL-MCNC: 0.41 MG/DL (ref 0.2–1)
BILIRUB UR QL STRIP: ABNORMAL
BUN SERPL-MCNC: 20 MG/DL (ref 5–25)
CALCIUM SERPL-MCNC: 9.7 MG/DL (ref 8.3–10.1)
CHLORIDE SERPL-SCNC: 103 MMOL/L (ref 100–108)
CLARITY UR: ABNORMAL
CO2 SERPL-SCNC: 30 MMOL/L (ref 21–32)
COLOR UR: YELLOW
CREAT SERPL-MCNC: 0.89 MG/DL (ref 0.6–1.3)
EOSINOPHIL # BLD AUTO: 0.11 THOUSAND/ΜL (ref 0–0.61)
EOSINOPHIL NFR BLD AUTO: 1 % (ref 0–6)
ERYTHROCYTE [DISTWIDTH] IN BLOOD BY AUTOMATED COUNT: 13.2 % (ref 11.6–15.1)
GFR SERPL CREATININE-BSD FRML MDRD: 89 ML/MIN/1.73SQ M
GLUCOSE SERPL-MCNC: 156 MG/DL (ref 65–140)
GLUCOSE UR STRIP-MCNC: NEGATIVE MG/DL
HCT VFR BLD AUTO: 42.6 % (ref 36.5–49.3)
HGB BLD-MCNC: 14.4 G/DL (ref 12–17)
HGB UR QL STRIP.AUTO: ABNORMAL
IMM GRANULOCYTES # BLD AUTO: 0.09 THOUSAND/UL (ref 0–0.2)
IMM GRANULOCYTES NFR BLD AUTO: 1 % (ref 0–2)
INR PPP: 1.01 (ref 0.84–1.19)
KETONES UR STRIP-MCNC: NEGATIVE MG/DL
LEUKOCYTE ESTERASE UR QL STRIP: NEGATIVE
LIPASE SERPL-CCNC: 104 U/L (ref 73–393)
LYMPHOCYTES # BLD AUTO: 2.16 THOUSANDS/ΜL (ref 0.6–4.47)
LYMPHOCYTES NFR BLD AUTO: 21 % (ref 14–44)
MCH RBC QN AUTO: 31.1 PG (ref 26.8–34.3)
MCHC RBC AUTO-ENTMCNC: 33.8 G/DL (ref 31.4–37.4)
MCV RBC AUTO: 92 FL (ref 82–98)
MONOCYTES # BLD AUTO: 0.68 THOUSAND/ΜL (ref 0.17–1.22)
MONOCYTES NFR BLD AUTO: 7 % (ref 4–12)
NEUTROPHILS # BLD AUTO: 7.18 THOUSANDS/ΜL (ref 1.85–7.62)
NEUTS SEG NFR BLD AUTO: 70 % (ref 43–75)
NITRITE UR QL STRIP: NEGATIVE
NON-SQ EPI CELLS URNS QL MICRO: ABNORMAL /HPF
NRBC BLD AUTO-RTO: 0 /100 WBCS
PH UR STRIP.AUTO: 6.5 [PH]
PLATELET # BLD AUTO: 163 THOUSANDS/UL (ref 149–390)
PMV BLD AUTO: 11.7 FL (ref 8.9–12.7)
POTASSIUM SERPL-SCNC: 3.8 MMOL/L (ref 3.5–5.3)
PROT SERPL-MCNC: 7.5 G/DL (ref 6.4–8.2)
PROT UR STRIP-MCNC: ABNORMAL MG/DL
PROTHROMBIN TIME: 12.8 SECONDS (ref 11.6–14.5)
RBC # BLD AUTO: 4.63 MILLION/UL (ref 3.88–5.62)
RBC #/AREA URNS AUTO: ABNORMAL /HPF
SODIUM SERPL-SCNC: 139 MMOL/L (ref 136–145)
SP GR UR STRIP.AUTO: 1.02 (ref 1–1.03)
UROBILINOGEN UR QL STRIP.AUTO: 1 E.U./DL
WBC # BLD AUTO: 10.25 THOUSAND/UL (ref 4.31–10.16)
WBC #/AREA URNS AUTO: ABNORMAL /HPF

## 2021-06-06 PROCEDURE — 85610 PROTHROMBIN TIME: CPT | Performed by: EMERGENCY MEDICINE

## 2021-06-06 PROCEDURE — 76870 US EXAM SCROTUM: CPT

## 2021-06-06 PROCEDURE — 81001 URINALYSIS AUTO W/SCOPE: CPT | Performed by: EMERGENCY MEDICINE

## 2021-06-06 PROCEDURE — 99284 EMERGENCY DEPT VISIT MOD MDM: CPT

## 2021-06-06 PROCEDURE — 36415 COLL VENOUS BLD VENIPUNCTURE: CPT | Performed by: EMERGENCY MEDICINE

## 2021-06-06 PROCEDURE — 80053 COMPREHEN METABOLIC PANEL: CPT | Performed by: EMERGENCY MEDICINE

## 2021-06-06 PROCEDURE — 85025 COMPLETE CBC W/AUTO DIFF WBC: CPT | Performed by: EMERGENCY MEDICINE

## 2021-06-06 PROCEDURE — 83690 ASSAY OF LIPASE: CPT | Performed by: EMERGENCY MEDICINE

## 2021-06-06 PROCEDURE — G1004 CDSM NDSC: HCPCS

## 2021-06-06 PROCEDURE — 99284 EMERGENCY DEPT VISIT MOD MDM: CPT | Performed by: EMERGENCY MEDICINE

## 2021-06-06 PROCEDURE — 74177 CT ABD & PELVIS W/CONTRAST: CPT

## 2021-06-06 PROCEDURE — 85730 THROMBOPLASTIN TIME PARTIAL: CPT | Performed by: EMERGENCY MEDICINE

## 2021-06-06 RX ORDER — OXYCODONE HYDROCHLORIDE AND ACETAMINOPHEN 5; 325 MG/1; MG/1
1 TABLET ORAL EVERY 4 HOURS PRN
Qty: 10 TABLET | Refills: 0 | Status: SHIPPED | OUTPATIENT
Start: 2021-06-06 | End: 2021-06-16

## 2021-06-06 RX ORDER — ONDANSETRON 4 MG/1
4 TABLET, ORALLY DISINTEGRATING ORAL EVERY 6 HOURS PRN
Qty: 20 TABLET | Refills: 0 | Status: SHIPPED | OUTPATIENT
Start: 2021-06-06 | End: 2022-03-10 | Stop reason: ALTCHOICE

## 2021-06-06 RX ORDER — TAMSULOSIN HYDROCHLORIDE 0.4 MG/1
0.4 CAPSULE ORAL
Qty: 7 CAPSULE | Refills: 0 | Status: SHIPPED | OUTPATIENT
Start: 2021-06-06 | End: 2021-06-30

## 2021-06-06 RX ADMIN — IOHEXOL 100 ML: 350 INJECTION, SOLUTION INTRAVENOUS at 16:11

## 2021-06-06 NOTE — ED PROVIDER NOTES
History  Chief Complaint   Patient presents with    Blood in Urine     blood in urine and testicular pain since last night  hx of CLL, currently in remission    Testicle Pain     77 y/o male, hx of CLL currently in remission, presents to the ED for hematuria and right testicular pain  Patient states that he noticed hematuria yesterday  States that today he woke up with right testicular pain, which is currently resolved  States that he gets this pain intermittently x years but it resolves spontaneously  States that today it was worse  He denies any fever, cp, sob, abd pain, n/v, d/c, or other urinary symptoms  No other complaints  History provided by:  Patient  Blood in Urine  This is a new problem  The current episode started yesterday  The problem is unchanged  He describes the hematuria as gross hematuria  The hematuria occurs throughout his entire urinary stream  His pain is at a severity of 0/10  Irritative symptoms do not include frequency or urgency  Obstructive symptoms do not include incomplete emptying  Pertinent negatives include no abdominal pain, chills, dysuria, fever, flank pain, inability to urinate, nausea or vomiting  Testicle Pain  Associated symptoms: no abdominal pain, no chest pain, no congestion, no cough, no diarrhea, no ear pain, no fever, no headaches, no nausea, no rash, no shortness of breath, no sore throat, no vomiting and no wheezing        Prior to Admission Medications   Prescriptions Last Dose Informant Patient Reported? Taking?    ALPRAZolam (XANAX) 1 mg tablet Past Week at Unknown time Spouse/Significant Other Yes Yes   Diclofenac Sodium (VOLTAREN) 1 %  Spouse/Significant Other No No   Sig: Apply 2 g topically 4 (four) times a day   Patient not taking: Reported on 3/23/2021   FLUoxetine (PROzac) 20 mg capsule 6/5/2021 at Unknown time Spouse/Significant Other Yes Yes   Sig: Take 60 mg by mouth daily     Ibrutinib 140 MG TABS Not Taking at Unknown time Spouse/Significant Other Yes No   Sig: Take by mouth   Ibrutinib 280 MG TABS 6/5/2021 at Unknown time Spouse/Significant Other No Yes   Sig: Take 280 mg by mouth daily   Ibrutinib 420 MG TABS   No No   Sig: Take 420 mg by mouth daily   benztropine (COGENTIN) 1 mg tablet Not Taking at Unknown time Spouse/Significant Other Yes No   Sig: Take 1 mg by mouth 2 (two) times a day    buPROPion (ZYBAN) 150 MG 12 hr tablet  Spouse/Significant Other No No   Sig: Take 1 tablet (150 mg total) by mouth 2 (two) times a day   Patient not taking: Reported on 3/23/2021   furosemide (LASIX) 40 mg tablet 6/5/2021 at Unknown time Spouse/Significant Other No Yes   Sig: TAKE 1 TABLET BY MOUTH ONCE DAILY   ibuprofen (MOTRIN) 800 mg tablet Not Taking at Unknown time Spouse/Significant Other No No   Sig: Take 1 tablet (800 mg total) by mouth 3 (three) times a day as needed for moderate pain   Patient not taking: Reported on 6/6/2021   lisinopril (ZESTRIL) 40 mg tablet 6/5/2021 at Unknown time Spouse/Significant Other No Yes   Sig: Take 1 tablet (40 mg total) by mouth daily   meloxicam (MOBIC) 7 5 mg tablet Not Taking at Unknown time Spouse/Significant Other No No   Sig: Take 1 tablet (7 5 mg total) by mouth 2 (two) times a day as needed for moderate pain   Patient not taking: Reported on 6/6/2021   mirtazapine (REMERON) 15 mg tablet 6/5/2021 at Unknown time Spouse/Significant Other Yes Yes   Sig: Take 15 mg by mouth daily at bedtime   mirtazapine (REMERON) 30 mg tablet 6/5/2021 at Unknown time Spouse/Significant Other Yes Yes   naloxone (NARCAN) 4 mg/0 1 mL nasal spray Not Taking at Unknown time Spouse/Significant Other No No   Sig: Administer 1 spray into a nostril  If no response after 2-3 minutes, give another dose in the other nostril using a new spray     Patient not taking: Reported on 6/6/2021   oxyCODONE-acetaminophen (PERCOCET) 5-325 mg per tablet  Spouse/Significant Other No No   Sig: Take 1 tablet by mouth 2 (two) times a day as needed for moderate painMax Daily Amount: 2 tablets   Patient not taking: Reported on 4/8/2021   oxyCODONE-acetaminophen (PERCOCET) 5-325 mg per tablet  Spouse/Significant Other No No   Sig: Take 1 tablet by mouth 3 (three) times a day as needed for moderate painMax Daily Amount: 3 tablets   Patient not taking: Reported on 4/15/2021   oxyCODONE-acetaminophen (PERCOCET) 5-325 mg per tablet  Spouse/Significant Other No No   Sig: Take 1 tablet by mouth 3 (three) times a day as needed for moderate painMax Daily Amount: 3 tablets   Patient not taking: Reported on 4/15/2021   potassium chloride (K-DUR,KLOR-CON) 20 mEq tablet 6/5/2021 at Unknown time Spouse/Significant Other No Yes   Sig: Take 1 tablet (20 mEq total) by mouth daily   risperiDONE (RisperDAL) 1 mg tablet 6/5/2021 at Unknown time Spouse/Significant Other Yes Yes   Sig: Take 1 mg by mouth      Facility-Administered Medications: None       Past Medical History:   Diagnosis Date    Anemia     Anxiety     [facility]ipolar disorder ([facility] )     Cancer ([place] [address] )     History of alcohol abuse     Hypertension     Hypertension     Insomnia     Leukemia ([facility] )     Opiate abuse, episodic ([place] [address] )     Psychiatric disorder     Sleep apnea with use of continuous positive airway pressure (CPAP)        Past Surgical History:   Procedure Laterality Date    COLONOSCOPY      EGD AND COLONOSCOPY N/A 3/30/2018    Procedure: EGD AND COLONOSCOPY;  Surgeon: Loni Hilton MD;  Location[facility] GI LAB; Service: Gastroenterology       Family History   Problem Relation Age of Onset    Coronary artery disease Mother     Diabetes Mother     No Known Problems Father     Hepatitis Sister     Cancer Brother     Prostate cancer Brother     Early death Brother      I have reviewed and agree with the history as documented      E-Cigarette/Vaping    E-Cigarette Use Never User      E-Cigarette/Vaping Substances     Social History     Tobacco Use    Smoking status: Current Every Day Smoker     Packs/day: 1 00 Years: 40 00     Pack years: 40 00     Types: Cigarettes     Last attempt to quit: 10/13/2020     Years since quittin 6    Smokeless tobacco: Never Used   Substance Use Topics    Alcohol use: No     Comment: last drink 2017    Drug use: No     Comment: hx of heroin and subutex abuse       Review of Systems   Constitutional: Negative for chills and fever  HENT: Negative for congestion, ear pain and sore throat  Eyes: Negative for pain and visual disturbance  Respiratory: Negative for cough, shortness of breath and wheezing  Cardiovascular: Negative for chest pain and leg swelling  Gastrointestinal: Negative for abdominal pain, diarrhea, nausea and vomiting  Genitourinary: Positive for hematuria and testicular pain  Negative for dysuria, flank pain, frequency, incomplete emptying and urgency  Musculoskeletal: Negative for neck pain and neck stiffness  Skin: Negative for rash and wound  Neurological: Negative for weakness, numbness and headaches  Psychiatric/Behavioral: Negative for agitation and confusion  All other systems reviewed and are negative  Physical Exam  Physical Exam  Vitals signs and nursing note reviewed  Constitutional:       Appearance: He is well-developed  HENT:      Head: Normocephalic and atraumatic  Eyes:      Pupils: Pupils are equal, round, and reactive to light  Neck:      Musculoskeletal: Normal range of motion and neck supple  Cardiovascular:      Rate and Rhythm: Normal rate and regular rhythm  Pulmonary:      Effort: Pulmonary effort is normal       Breath sounds: Normal breath sounds  Abdominal:      General: Bowel sounds are normal  There is no distension  Palpations: Abdomen is soft  Tenderness: There is no abdominal tenderness  Genitourinary:     Penis: No erythema, tenderness, discharge or swelling  Scrotum/Testes:         Right: Tenderness present  Swelling not present  Cremasteric reflex is present  Left: Tenderness or swelling not present  Cremasteric reflex is present  Musculoskeletal: Normal range of motion  Skin:     General: Skin is warm and dry  Neurological:      General: No focal deficit present  Mental Status: He is alert and oriented to person, place, and time        Comments: No focal deficits         Vital Signs  ED Triage Vitals   Temperature Pulse Respirations Blood Pressure SpO2   06/06/21 1434 06/06/21 1434 06/06/21 1434 06/06/21 1434 06/06/21 1434   97 8 °F (36 6 °C) 77 17 (!) 178/94 98 %      Temp Source Heart Rate Source Patient Position - Orthostatic VS BP Location FiO2 (%)   06/06/21 1434 06/06/21 1434 06/06/21 1645 06/06/21 1434 --   Oral Monitor Lying Left arm       Pain Score       06/06/21 1434       5           Vitals:    06/06/21 1434 06/06/21 1645 06/06/21 1730   BP: (!) 178/94 (!) 172/81 163/72   Pulse: 77 68 66   Patient Position - Orthostatic VS:  Lying Lying         Visual Acuity      ED Medications  Medications   iohexol (OMNIPAQUE) 350 MG/ML injection (SINGLE-DOSE) 100 mL (100 mL Intravenous Given 6/6/21 1611)       Diagnostic Studies  Results Reviewed     Procedure Component Value Units Date/Time    Protime-INR [[id number]]  (Normal) Collected: 06/06/21 1639    Lab Status: Final result Specimen: Blood from Arm, Right Updated: 06/06/21 1659     Protime 12 8 seconds      INR 1 01    APTT [[id number]]  (Normal) Collected: 06/06/21 1639    Lab Status: Final result Specimen: Blood from Arm, Right Updated: 06/06/21 1659     PTT 33 seconds     Urine Microscopic [[id number]]  (Abnormal) Collected: 06/06/21 1529    Lab Status: Final result Specimen: Urine, Clean Catch Updated: 06/06/21 1621     RBC, UA Innumerable /hpf      WBC, UA None Seen /hpf      Epithelial Cells None Seen /hpf      Bacteria, UA None Seen /hpf     CMP [[id number]]  (Abnormal) Collected: 06/06/21 1529    Lab Status: Final result Specimen: Blood from Arm, Right Updated: 06/06/21 1603     Sodium 139 mmol/L Potassium 3 8 mmol/L      Chloride 103 mmol/L      CO2 30 mmol/L      ANION GAP 6 mmol/L      BUN 20 mg/dL      Creatinine 0 89 mg/dL      Glucose 156 mg/dL      Calcium 9 7 mg/dL      AST 16 U/L      ALT 40 U/L      Alkaline Phosphatase 86 U/L      Total Protein 7 5 g/dL      Albumin 3 8 g/dL      Total Bilirubin 0 41 mg/dL      eGFR 89 ml/min/1 73sq m     Narrative:      National Kidney Disease Foundation guidelines for Chronic Kidney Disease (CKD):     Stage 1 with normal or high GFR (GFR > 90 mL/min/1 73 square meters)    Stage 2 Mild CKD (GFR = 60-89 mL/min/1 73 square meters)    Stage 3A Moderate CKD (GFR = 45-59 mL/min/1 73 square meters)    Stage 3B Moderate CKD (GFR = 30-44 mL/min/1 73 square meters)    Stage 4 Severe CKD (GFR = 15-29 mL/min/1 73 square meters)    Stage 5 End Stage CKD (GFR <15 mL/min/1 73 square meters)  Note: GFR calculation is accurate only with a steady state creatinine    Lipase [[id number]]  (Normal) Collected: 06/06/21 1529    Lab Status: Final result Specimen: Blood from Arm, Right Updated: 06/06/21 1603     Lipase 104 u/L     UA w Reflex to Microscopic w Reflex to Culture [[id number]]  (Abnormal) Collected: 06/06/21 1529    Lab Status: Final result Specimen: Urine, Clean Catch Updated: 06/06/21 1600     Color, UA Yellow     Clarity, UA Cloudy     Specific [place], UA 1 020     pH, UA 6 5     Leukocytes, UA Negative     Nitrite, UA Negative     Protein, UA Trace mg/dl      Glucose, UA Negative mg/dl      Ketones, UA Negative mg/dl      Urobilinogen, UA 1 0 E U /dl      Bilirubin, UA Small     Blood, UA Large    CBC and differential [[id number]]  (Abnormal) Collected: 06/06/21 1529    Lab Status: Final result Specimen: Blood from Arm, Right Updated: 06/06/21 1543     WBC 10 25 Thousand/uL      RBC 4 63 Million/uL      Hemoglobin 14 4 g/dL      Hematocrit 42 6 %      MCV 92 fL      MCH 31 1 pg      MCHC 33 8 g/dL      RDW 13 2 %      MPV 11 7 fL      Platelets 833 Thousands/uL nRBC 0 /100 WBCs      Neutrophils Relative 70 %      Immat GRANS % 1 %      Lymphocytes Relative 21 %      Monocytes Relative 7 %      Eosinophils Relative 1 %      Basophils Relative 0 %      Neutrophils Absolute 7 18 Thousands/µL      Immature Grans Absolute 0 09 Thousand/uL      Lymphocytes Absolute 2 16 Thousands/µL      Monocytes Absolute 0 68 Thousand/µL      Eosinophils Absolute 0 11 Thousand/µL      Basophils Absolute 0 03 Thousands/µL                  CT abdomen pelvis with contrast   Final Result by Amy Dallas MD (06/06 1711)      Mild right-sided hydronephrosis secondary to right UPJ J calculus as described above  Hepatic steatosis, increased compared to prior  Interval decrease in splenomegaly and abdominal /retroperitoneal adenopathy this patient with known history of CLL  Stable infiltration within the small bowel mesenteric root  The study was marked in [facility] for immediate notification  Workstation performed: [id number]         US scrotum and testicles   Final Result by Amy Dallas MD (06/06 1625)       Mild nonloculated right hydrocele  Incidental note made of accessory appendage on the right  No acute testicular abnormality appreciated  Workstation performed: [id number]                    Procedures  Procedures         ED Course                             SBIRT 22yo+      Most Recent Value   SBIRT (25 yo +)   In order to provide better care to our patients, we are screening all of our patients for alcohol and drug use  Would it be okay to ask you these screening questions? No Filed at: 06/06/2021 1542                    MDM  Number of Diagnoses or Management Options  Hematuria: new and requires workup  Hydrocele: new and requires workup  Ureterolithiasis: new and requires workup  Diagnosis management comments: Patient with hematuria and testicular pain- will get labs, UA, ct abd/pel, and testicular US  Patient reevaluated and feels improved   Patient updated on results of tests  Discharge instructions given including medications, follow-up, and return precautions  Patient demonstrates verbal understanding and agrees with plan  narcotic precautions given        Amount and/or Complexity of Data Reviewed  Clinical lab tests: ordered and reviewed  Tests in the radiology section of CPT®: ordered and reviewed  Tests in the medicine section of CPT®: ordered and reviewed  Discussion of test results with the performing providers: yes  Decide to obtain previous medical records or to obtain history from someone other than the patient: yes  Obtain history from someone other than the patient: yes  Review and summarize past medical records: yes  Discuss the patient with other providers: yes  Independent visualization of images, tracings, or specimens: yes    Patient Progress  Patient progress: improved      Disposition  Final diagnoses:   Ureterolithiasis   Hydrocele   Hematuria     Time reflects when diagnosis was documented in both MDM as applicable and the Disposition within this note     Time User Action Codes Description Comment    6/6/2021  5:57 PM Jeanine MORRIS Add [N20 1] Ureterolithiasis     6/6/2021  5:57 PM Jeanine MORRIS Add [N43 3] Hydrocele     6/6/2021  5:57 PM Jeanine MORRIS Add [R31 9] Hematuria       ED Disposition     ED Disposition Condition Date/Time Comment    Discharge Stable Sun Jun 6, 2021  5:56 PM Mau Herrera discharge to home/self care              Follow-up Information     Follow up With Specialties Details Why Contact Info Additional [address] [facility] [address] For Urology [facility] Urology Call in 1 day for follow up within 1 week [id number]  [place] [address] [facility],[address] [address] [phone] [facility], [address]  [address] [facility], [place] [address], [facility], [place], [address]    [facility] Emergency Department Emergency Medicine Go to  immediately for any new or worsening symptoms  [address] [facility] [address] [id number] [facility] Emergency Department, [address] [facility], [place], [place], [address]          Patient's Medications   Discharge Prescriptions    ONDANSETRON (ZOFRAN-ODT) 4 MG DISINTEGRATING TABLET    Take 1 tablet (4 mg total) by mouth every 6 (six) hours as needed for nausea or vomiting       Start Date: 6/6/2021  End Date: --       Order Dose: 4 mg       Quantity: 20 tablet    Refills: 0    OXYCODONE-ACETAMINOPHEN (PERCOCET) 5-325 MG PER TABLET    Take 1 tablet by mouth every 4 (four) hours as needed for moderate pain for up to 10 daysMax Daily Amount: 6 tablets       Start Date: 6/6/2021  End Date: 6/16/2021       Order Dose: 1 tablet       Quantity: 10 tablet    Refills: 0    TAMSULOSIN (FLOMAX) 0 4 MG    Take 1 capsule (0 4 mg total) by mouth daily with dinner       Start Date: 6/6/2021  End Date: --       Order Dose: 0 4 mg       Quantity: 7 capsule    Refills: 0     No discharge procedures on file      PDMP Review       Value Time User    PDMP Reviewed  Yes 4/13/2021  5:38 PM Bienvenido Cullen DO          ED Provider  Electronically Signed by           Monse Anaya DO  06/06/21 0647

## 2021-06-07 ENCOUNTER — TELEPHONE (OUTPATIENT)
Dept: UROLOGY | Facility: CLINIC | Age: 66
End: 2021-06-07

## 2021-06-07 NOTE — TELEPHONE ENCOUNTER
Please schedule patient for f/u within the next week for kidney stone   He should continue hydration 2-3 L water daily and MET until then 1

## 2021-06-07 NOTE — TELEPHONE ENCOUNTER
Patient of our office  History of ED  Was last seen in office January 2021  Was recommended for six month follow up and is scheduled to be seen in July  Was seen in ER yesterday for blood in urine and testicular discomfort  US showed:   IMPRESSION:     Mild nonloculated right hydrocele  Incidental note made of accessory appendage on the right  No acute testicular abnormality appreciated  CT abd/pelvis showed:   IMPRESSION:     Mild right-sided hydronephrosis secondary to right UPJ J calculus as described above  Patient discharged with Zofran, Percocet, Flomax  Will route to provider to review and advise on follow up at this time

## 2021-06-07 NOTE — TELEPHONE ENCOUNTER
Pt called stated he was at [facility] ER yesterday because he was having severe pain in right testicle, and was urinating blood  Diagnosis per pt was he has a kidney stone  The ER provided pt a strainer to catch the kidney stone and advised to contact our office to schedule a FU  Please advise, pt would like a cb to discuss next move  Thanks!

## 2021-06-08 ENCOUNTER — NURSE TRIAGE (OUTPATIENT)
Dept: OTHER | Facility: OTHER | Age: 66
End: 2021-06-08

## 2021-06-08 NOTE — TELEPHONE ENCOUNTER
Reason for Disposition   History of kidney stones    Answer Assessment - Initial Assessment Questions  1  LOCATION: "Where does it hurt?" (e g , left, right)      Right side  2  ONSET: "When did the pain start?"      yesterday  3  SEVERITY: "How bad is the pain?" (e g , Scale 1-10; mild, moderate, or severe)    - MILD (1-3): doesn't interfere with normal activities     - MODERATE (4-7): interferes with normal activities or awakens from sleep     - SEVERE (8-10): excruciating pain and patient unable to do normal activities (stays in bed)       0/10 pain  4  PATTERN: "Does the pain come and go, or is it constant?"   Comes and goes  5  CAUSE: "What do you think is causing the pain?"      Kidney stone  6  OTHER SYMPTOMS:  "Do you have any other symptoms?" (e g , fever, abdominal pain, vomiting, leg weakness, burning with urination, blood in urine)      Peeing a lot    Protocols used:  FLANK PAIN-ADULT-

## 2021-06-08 NOTE — TELEPHONE ENCOUNTER
Regarding: Waiting for a kidney stone to pass and i am peeing a lot want to know why   ----- Message from Jay Roa sent at 6/8/2021  6:12 PM EDT -----  " I am waiting for a kidney stone to pass and I and peeing a lot and want to know why and I also having diarrhea "

## 2021-06-08 NOTE — TELEPHONE ENCOUNTER
L/m for pt to call back to schedule-   Gave him the advice of the the AP as well - upon return please schedule this week or next w /AP

## 2021-06-09 NOTE — TELEPHONE ENCOUNTER
Scheduled patient to see Nancy Rajan PA-C in our [facility] location for an ER follow up for kidney stone  ER precautions were explained and discussed with the patient  He repeated back verbally that he is aware and understands that if his pain level increases above 5, has increased blood and or passes clots, gets doubled over in pain that he should report the nearest ER as soon as possible  All was well with patient at the time of this call with a verbal statement from him that his pain is minimal right now

## 2021-06-09 NOTE — TELEPHONE ENCOUNTER
Patient managed by Frank Porter is calling back to set patient appointment up for frequency and bleeding  At the moment he is not having too much pain but stated he has some pain in right testicle

## 2021-06-10 NOTE — TELEPHONE ENCOUNTER
Patient called stating he is cancelling appointment for 06/11 at 2 pm    He stated he has not passed the stone and doesn't see the need to come into the office

## 2021-06-11 NOTE — TELEPHONE ENCOUNTER
Patient is calling regarding questions on kidney stones  He wants to know if he might have passed stone because it is very small  He is passing blood and blood clots

## 2021-06-11 NOTE — TELEPHONE ENCOUNTER
Pt is a pt of our office  Pt was last seen 1/2021  Pt was seen in the ED 6/6/21 and referred to urology for follow up  Pt has cancelled appointment for follow up  Reporting he wanted to wait until stone passed  Pt reporting he currently passed stone at 130pm today  Tommie Guzmán was identified in the [address]  A stone very small in size  Pt states he is currently passing a few clots and is having intermittent bleeding of pink joão colored urine  Pt is denying any other symptoms  Reviewed with pt care after passing a stone  Pt will continue to monitor and will call back should his symptoms worsen or not improve  Pt with complete understanding with no other questions or concerns at this time  Please be advised   Thank you

## 2021-06-14 DIAGNOSIS — R60.9 PERIPHERAL EDEMA: ICD-10-CM

## 2021-06-14 DIAGNOSIS — I10 ESSENTIAL HYPERTENSION: ICD-10-CM

## 2021-06-14 DIAGNOSIS — R60.9 EDEMA, UNSPECIFIED TYPE: ICD-10-CM

## 2021-06-14 NOTE — TELEPHONE ENCOUNTER
Call placed to Pt and left message on his VM Advising him to bring stone in to his Appointment  Pt CT Shows only 1 calculus   Please Advise Pt if he calls back

## 2021-06-14 NOTE — TELEPHONE ENCOUNTER
Patient called stating he passed to stones and he wants to know if he can bring them for his appointment on the 06/22  He would like a call back to confirm it  He has some questions on how many stones he had       Patient can be reached at [phone] (

## 2021-06-17 PROCEDURE — 4010F ACE/ARB THERAPY RXD/TAKEN: CPT | Performed by: INTERNAL MEDICINE

## 2021-06-17 RX ORDER — POTASSIUM CHLORIDE 20 MEQ/1
20 TABLET, EXTENDED RELEASE ORAL DAILY
Qty: 90 TABLET | Refills: 3 | Status: SHIPPED | OUTPATIENT
Start: 2021-06-17 | End: 2022-05-02

## 2021-06-17 RX ORDER — POTASSIUM CHLORIDE 20 MEQ/1
TABLET, EXTENDED RELEASE ORAL
Qty: 30 TABLET | Refills: 3 | OUTPATIENT
Start: 2021-06-17

## 2021-06-17 RX ORDER — LISINOPRIL 40 MG/1
TABLET ORAL
Qty: 30 TABLET | Refills: 3 | OUTPATIENT
Start: 2021-06-17

## 2021-06-17 RX ORDER — FUROSEMIDE 40 MG/1
40 TABLET ORAL DAILY
Qty: 30 TABLET | Refills: 10 | Status: SHIPPED | OUTPATIENT
Start: 2021-06-17 | End: 2022-05-02

## 2021-06-17 RX ORDER — FUROSEMIDE 40 MG/1
TABLET ORAL
Qty: 30 TABLET | Refills: 3 | OUTPATIENT
Start: 2021-06-17

## 2021-06-17 RX ORDER — LISINOPRIL 40 MG/1
40 TABLET ORAL DAILY
Qty: 90 TABLET | Refills: 3 | Status: SHIPPED | OUTPATIENT
Start: 2021-06-17 | End: 2022-07-29

## 2021-06-18 ENCOUNTER — TELEPHONE (OUTPATIENT)
Dept: FAMILY MEDICINE CLINIC | Facility: CLINIC | Age: 66
End: 2021-06-18

## 2021-06-22 ENCOUNTER — TELEPHONE (OUTPATIENT)
Dept: UROLOGY | Facility: AMBULATORY SURGERY CENTER | Age: 66
End: 2021-06-22

## 2021-06-22 NOTE — TELEPHONE ENCOUNTER
Patient was scheduled with Dr Rafat Lind [facility] Discussion on 6/22  Patient called in to cancel because his wife was ill and unable to drive  Patient requested to reschedule  Next available is 10/6, patient asked if possible Dr Rafat Lind can see him any sooner    Patient can be reached at [phone]

## 2021-06-30 ENCOUNTER — OFFICE VISIT (OUTPATIENT)
Dept: UROLOGY | Facility: MEDICAL CENTER | Age: 66
End: 2021-06-30
Payer: COMMERCIAL

## 2021-06-30 VITALS
HEART RATE: 70 BPM | HEIGHT: 71 IN | BODY MASS INDEX: 35.98 KG/M2 | DIASTOLIC BLOOD PRESSURE: 80 MMHG | WEIGHT: 257 LBS | SYSTOLIC BLOOD PRESSURE: 120 MMHG

## 2021-06-30 DIAGNOSIS — N52.03 COMBINED ARTERIAL INSUFFICIENCY AND CORPORO-VENOUS OCCLUSIVE ERECTILE DYSFUNCTION: ICD-10-CM

## 2021-06-30 DIAGNOSIS — N20.0 CALCULUS OF KIDNEY: ICD-10-CM

## 2021-06-30 DIAGNOSIS — R31.0 GROSS HEMATURIA: ICD-10-CM

## 2021-06-30 DIAGNOSIS — N13.2 HYDRONEPHROSIS WITH RENAL AND URETERAL CALCULUS OBSTRUCTION: Primary | ICD-10-CM

## 2021-06-30 DIAGNOSIS — N40.0 BPH WITHOUT OBSTRUCTION/LOWER URINARY TRACT SYMPTOMS: ICD-10-CM

## 2021-06-30 PROCEDURE — 4004F PT TOBACCO SCREEN RCVD TLK: CPT | Performed by: UROLOGY

## 2021-06-30 PROCEDURE — 3008F BODY MASS INDEX DOCD: CPT | Performed by: UROLOGY

## 2021-06-30 PROCEDURE — 82360 CALCULUS ASSAY QUANT: CPT | Performed by: UROLOGY

## 2021-06-30 PROCEDURE — 1160F RVW MEDS BY RX/DR IN RCRD: CPT | Performed by: UROLOGY

## 2021-06-30 PROCEDURE — 99214 OFFICE O/P EST MOD 30 MIN: CPT | Performed by: UROLOGY

## 2021-06-30 RX ORDER — FLUOXETINE HYDROCHLORIDE 40 MG/1
CAPSULE ORAL
COMMUNITY
Start: 2021-06-29 | End: 2022-05-02 | Stop reason: ALTCHOICE

## 2021-06-30 NOTE — PROGRESS NOTES
Assessment/Plan:  1  Erectile dysfunction-multifactorial-patient prediabetic, on multiple medications with CLL, cigarette smoker-refractory to PDE 5 inhibitors injection therapy and vacuum devices  Patient present with his significant other requesting placement of penile prosthesis  Discussed semi rigid 2 part in 3 part inflatable  Discussed potential side effects of bleeding infection deformity loss of size or girth need for explantation revision or additional surgeries possible need for removal   We discussed increase rates of infection diabetics as well as cigarette smokers  The patient more than likely will proceed with implantation of a 3 part prosthesis  These were all demonstrated to the patient as well  2  Right UPJ stone-3 x 5 mm, patient passed stones and recovered them with biochemical analysis pending  Will repeat CT stone study to make sure that all stones have been passed  3  Hydronephrosis-as above  4  BPH without obstruction-AUA symptom score 1-no intervention    5  Gross hematuria most likely secondary to renal stone passage however will plan cystoscopy  When patient returns and gross hematuria/renal stone workup finished will be scheduled for placement of IPP  No problem-specific Assessment & Plan notes found for this encounter  Diagnoses and all orders for this visit:    Gross hematuria  -     CT renal stone study abdomen pelvis wo contrast; Future  -     Cytology, urine; Future    Calculus of kidney  -     CT renal stone study abdomen pelvis wo contrast; Future  -     Cystoscopy; Future    Hydronephrosis with renal and ureteral calculus obstruction    BPH without obstruction/lower urinary tract symptoms  -     Urine culture;  Future    Combined arterial insufficiency and corporo-venous occlusive erectile dysfunction    Body mass index (BMI) of 40 0 to 44 9 in adult [facility])    Other orders  -     FLUoxetine (PROzac) 40 MG capsule          Subjective:      Patient ID: Alejandra García is a 76 y o  male  HPI   26-year-old male with CLL and multiple medical issues presents with longstanding 5 year history of erectile failure refractory to PDE 5 inhibitors vasoactive drug injection therapy including Tri Mix at all levels as well as VCD therapy  The patient also notes that approximately a month ago he developed right renal colic passed a couple of stones and had gross hematuria  Workupfor that is also requested  The patient presents for discussion and probable scheduling of penile implant surgery after completion of gross hematuria and stone workup  The following portions of the patient's history were reviewedand updated as appropriate: allergies, current medications, past family history, past medical history, past social history, past surgical history and problem list     Review of Systems   Constitutional: Positive for fatigue  All other systems reviewed and are negative  Objective:      /80   Pulse 70   Ht 5' 11" (1 803 m)   Wt 117 kg (257 lb)   BMI 35 84 kg/m²          Physical Exam  Vitals reviewed  Constitutional:       Appearance: Normal appearance  He is obese  He is not ill-appearing, toxic-appearing or diaphoretic  HENT:      Head: Normocephalic and atraumatic  Nose: Nose normal       Mouth/Throat:      Mouth: Mucous membranes are moist    Eyes:      Extraocular Movements: Extraocular movements intact  Pulmonary:      Effort: Pulmonary effort is normal  No respiratory distress  Abdominal:      Palpations: Abdomen is soft  Genitourinary:     Penis: Normal        Testes: Normal       Prostate: Normal       Rectum: Normal    Musculoskeletal:         General: Normal range of motion  Cervical back: Normal range of motion  Skin:     General: Skin is dry  Neurological:      Mental Status: He is alert and oriented to person, place, and time     Psychiatric:         Mood and Affect: Mood normal          Behavior: Behavior normal  Thought Content:  Thought content normal          Judgment: Judgment normal

## 2021-07-05 ENCOUNTER — TELEPHONE (OUTPATIENT)
Dept: OTHER | Facility: OTHER | Age: 66
End: 2021-07-05

## 2021-07-05 NOTE — TELEPHONE ENCOUNTER
I have many questions concerning my appointment last week with Dr Kandy Lewis  Please give me a call back to discuss

## 2021-07-06 NOTE — TELEPHONE ENCOUNTER
Returned pt's call re questions he has following his last OV with Dr Meryl Roberts  Left msg on VM to return call to office  Per pt's last OV with Dr Meryl Roberts on 6/30/21:    1  Pt t schedule CT renal stone study abdomen pelvis w/o contrast    2  Pt has cysto scheduled with Dr Meryl Roberts on 9/23/21    3    Pt discussed poss penile implant with Dr Meryl Roberts

## 2021-07-06 NOTE — TELEPHONE ENCOUNTER
Patient called back stating he has questions on the penile implant  He has specific questions about it and would like a call back

## 2021-07-09 NOTE — TELEPHONE ENCOUNTER
Patient returning call to speak with clinical   Patient states he did some research and what he is talking about is connected to epididymis

## 2021-07-09 NOTE — TELEPHONE ENCOUNTER
Returned pts call and advised comment and remarks from previous encounter  Pt with no other questions or concerns at this time

## 2021-07-09 NOTE — TELEPHONE ENCOUNTER
Pt calling stating he is considering to have a penile implant  Pt is asking if he will be able to experience ejaculation ?     Please advise thank you

## 2021-07-11 LAB
CALCIUM OXALATE DIHYDRATE MFR STONE IR: 50 %
COLOR STONE: NORMAL
COM MFR STONE: 50 %
COMMENT-STONE3: NORMAL
COMPOSITION: NORMAL
LABORATORY COMMENT REPORT: NORMAL
PHOTO: NORMAL
SIZE STONE: NORMAL MM
SPEC SOURCE SUBJ: NORMAL
STONE ANALYSIS-IMP: NORMAL
WT STONE: 81 MG

## 2021-07-15 ENCOUNTER — TELEPHONE (OUTPATIENT)
Dept: HEMATOLOGY ONCOLOGY | Facility: CLINIC | Age: 66
End: 2021-07-15

## 2021-07-15 ENCOUNTER — TELEPHONE (OUTPATIENT)
Dept: UROLOGY | Facility: AMBULATORY SURGERY CENTER | Age: 66
End: 2021-07-15

## 2021-07-15 NOTE — TELEPHONE ENCOUNTER
Call from patient  Patient questioning diagnosis of DM with stg 2 kidney disease on AVS from a visit with Dr Yvon Valero    Patient will call family doctor to inquire about diagnosis

## 2021-07-15 NOTE — TELEPHONE ENCOUNTER
Patient managed by Sherry Celaya is calling to ask questions about a procedure for penile implant  He wants to know the results and outcome of such a surgery  He can go to [facility] office also

## 2021-07-15 NOTE — TELEPHONE ENCOUNTER
Called pt and left msg on VM per comm consent that questions pt has about IPP Implant need to be addressed to Dr Rica Gray  Pt has upcoming appt on 9/23/21  His questions can be addressed at this appt  Also advised Dr Rica Gray does not work out of the [employer]

## 2021-07-15 NOTE — TELEPHONE ENCOUNTER
Patient requesting to speak with Brennon Franklin in regards to previous notes  Advised nurse recommendations as previous notes but patient believes Brennon Franklin can help answer his questions now

## 2021-07-16 ENCOUNTER — TELEPHONE (OUTPATIENT)
Dept: HEMATOLOGY ONCOLOGY | Facility: CLINIC | Age: 66
End: 2021-07-16

## 2021-07-16 NOTE — TELEPHONE ENCOUNTER
Called and spoke with patient and advised him this would have to be discussed with his present  PCP and/or Dr Clementina Ivan (previous PCP) as this is where he would have been diagnose with diabetes

## 2021-07-16 NOTE — TELEPHONE ENCOUNTER
Patient is calling in indicating that on his after visit summary indicated a few other health issues that he was not aware of   He would like a call back at [phone]

## 2021-07-16 NOTE — TELEPHONE ENCOUNTER
Patient is calling back that he would like to discuss below note  Isabella Wheeler could best be reached at [facility]: [phone]

## 2021-07-18 ENCOUNTER — TELEPHONE (OUTPATIENT)
Dept: OTHER | Facility: OTHER | Age: 66
End: 2021-07-18

## 2021-07-18 NOTE — TELEPHONE ENCOUNTER
Please have Dr Kamron Hua call patient tomorrow 7/19 after 1pm  Patient calling today wanting to know why he had been given an AVS that said he has T2 DM and stage 2 liver disease

## 2021-07-19 ENCOUNTER — APPOINTMENT (OUTPATIENT)
Dept: LAB | Facility: CLINIC | Age: 66
End: 2021-07-19
Payer: COMMERCIAL

## 2021-07-19 PROCEDURE — 87086 URINE CULTURE/COLONY COUNT: CPT | Performed by: UROLOGY

## 2021-07-19 PROCEDURE — 88112 CYTOPATH CELL ENHANCE TECH: CPT | Performed by: PATHOLOGY

## 2021-07-19 NOTE — TELEPHONE ENCOUNTER
It looks as though this patient has some questions regarding a few dx that are in his chart, you are his new PCP, he last saw Dr Rebecca Herman 8/2020    Please call patient

## 2021-07-20 ENCOUNTER — NURSE TRIAGE (OUTPATIENT)
Dept: OTHER | Facility: OTHER | Age: 66
End: 2021-07-20

## 2021-07-20 DIAGNOSIS — Z20.822 EXPOSURE TO COVID-19 VIRUS: Primary | ICD-10-CM

## 2021-07-20 LAB — BACTERIA UR CULT: NORMAL

## 2021-07-20 NOTE — TELEPHONE ENCOUNTER
Regarding: COVID-19-Symptomatic- Sore Throat 1:2  ----- Message from Elgin Sims sent at 7/20/2021 11:06 AM EDT -----  "He has a sore throat and I would like him to be tested for COVID-19 we have not been feeling well for a while "

## 2021-07-20 NOTE — TELEPHONE ENCOUNTER
Reason for Disposition   [1] HIGH RISK patient (e g , age > 59 years, diabetes, heart or lung disease, weak immune system) AND [2] new or worsening symptoms    Answer Assessment - Initial Assessment Questions  Were you within 6 feet or less, for up to 15 minutes or more with a person that has a confirmed COVID-19 test?        unknown    What was the date of your exposure?        n/a    Are you experiencing any symptoms attributed to the virus?  (Assess for SOB, cough, fever, difficulty breathing)        Yes    Sore throat, cough    HIGH RISK: Do you have any history heart or lung conditions, weakened immune system, diabetes, Asthma, CHF, HIV, COPD, Chemo, renal failure, sickle cell, etc?        Leukemia (CLL)    Protocols used: CORONAVIRUS (COVID-19) DIAGNOSED OR SUSPECTED-ADULT-

## 2021-07-20 NOTE — TELEPHONE ENCOUNTER
Pt is requesting a covid test and does not have a [facility] PCP  Order placed  Pt informed of closest testing site and was advised of hours of operation, address, to wear a mask, and to stay in the car  Pt prefers a call with results

## 2021-07-27 ENCOUNTER — TELEPHONE (OUTPATIENT)
Dept: HEMATOLOGY ONCOLOGY | Facility: CLINIC | Age: 66
End: 2021-07-27

## 2021-07-27 NOTE — TELEPHONE ENCOUNTER
Patient called back stating that he does not have a family doctor  He wants to know who put this in his AVS?  Patient is questioning who follows him for this? Patient and wife arguing in the back ground over above  Patient's previous PCP was Dr Austin Dolan [phone]  Advised to contact them to see when diagnosis was added to patient's history      Meir Half (Self) [phone] (H)     Will forward to Dr Emily Bradshaw RN to review above with MD

## 2021-07-27 NOTE — TELEPHONE ENCOUNTER
Attempted to call patient, no answer received  If he was to return my phone call please notify him that he needs to contact his PCP with his concerns related to mentioned diagnosis

## 2021-07-31 ENCOUNTER — HOSPITAL ENCOUNTER (OUTPATIENT)
Dept: CT IMAGING | Facility: HOSPITAL | Age: 66
Discharge: HOME | End: 2021-07-31
Attending: UROLOGY
Payer: COMMERCIAL

## 2021-07-31 DIAGNOSIS — N20.0 CALCULUS OF KIDNEY: ICD-10-CM

## 2021-07-31 DIAGNOSIS — R31.0 GROSS HEMATURIA: ICD-10-CM

## 2021-07-31 PROCEDURE — 74176 CT ABD & PELVIS W/O CONTRAST: CPT

## 2021-08-23 RX ORDER — TRAMADOL HYDROCHLORIDE 50 MG/1
TABLET ORAL
COMMUNITY
Start: 2021-07-20 | End: 2022-03-10 | Stop reason: ALTCHOICE

## 2021-08-26 ENCOUNTER — PROCEDURE VISIT (OUTPATIENT)
Dept: UROLOGY | Facility: MEDICAL CENTER | Age: 66
End: 2021-08-26
Payer: COMMERCIAL

## 2021-08-26 VITALS
HEIGHT: 71 IN | WEIGHT: 257 LBS | HEART RATE: 57 BPM | BODY MASS INDEX: 35.98 KG/M2 | DIASTOLIC BLOOD PRESSURE: 80 MMHG | SYSTOLIC BLOOD PRESSURE: 138 MMHG

## 2021-08-26 DIAGNOSIS — N52.8 OTHER MALE ERECTILE DYSFUNCTION: ICD-10-CM

## 2021-08-26 DIAGNOSIS — N40.0 BPH WITHOUT OBSTRUCTION/LOWER URINARY TRACT SYMPTOMS: ICD-10-CM

## 2021-08-26 DIAGNOSIS — R31.0 GROSS HEMATURIA: Primary | ICD-10-CM

## 2021-08-26 DIAGNOSIS — N20.0 CALCULUS OF KIDNEY: ICD-10-CM

## 2021-08-26 LAB
SL AMB  POCT GLUCOSE, UA: ABNORMAL
SL AMB LEUKOCYTE ESTERASE,UA: ABNORMAL
SL AMB POCT BILIRUBIN,UA: ABNORMAL
SL AMB POCT BLOOD,UA: ABNORMAL
SL AMB POCT CLARITY,UA: CLEAR
SL AMB POCT COLOR,UA: YELLOW
SL AMB POCT KETONES,UA: ABNORMAL
SL AMB POCT NITRITE,UA: ABNORMAL
SL AMB POCT PH,UA: 6
SL AMB POCT SPECIFIC GRAVITY,UA: 1.01
SL AMB POCT URINE PROTEIN: ABNORMAL
SL AMB POCT UROBILINOGEN: 0.2

## 2021-08-26 PROCEDURE — 99214 OFFICE O/P EST MOD 30 MIN: CPT | Performed by: UROLOGY

## 2021-08-26 PROCEDURE — 3061F NEG MICROALBUMINURIA REV: CPT | Performed by: UROLOGY

## 2021-08-26 PROCEDURE — 3008F BODY MASS INDEX DOCD: CPT | Performed by: UROLOGY

## 2021-08-26 PROCEDURE — 1160F RVW MEDS BY RX/DR IN RCRD: CPT | Performed by: UROLOGY

## 2021-08-26 PROCEDURE — 52000 CYSTOURETHROSCOPY: CPT | Performed by: UROLOGY

## 2021-08-26 PROCEDURE — 81003 URINALYSIS AUTO W/O SCOPE: CPT | Performed by: UROLOGY

## 2021-08-26 RX ORDER — SULFAMETHOXAZOLE AND TRIMETHOPRIM 800; 160 MG/1; MG/1
1 TABLET ORAL EVERY 12 HOURS SCHEDULED
Qty: 4 TABLET | Refills: 0 | Status: SHIPPED | OUTPATIENT
Start: 2021-08-26 | End: 2021-08-28

## 2021-08-26 NOTE — LETTER
August 26, 2021     Jessica Ureña MD  [employer]  [address]    Patient: Landy Templeton   YOB: 1955   Date of Visit: 8/26/2021       Dear Dr Cat Triana: Thank you for referring Landy Templeton to me for evaluation  Below are my notes for this consultation  If you have questions, please do not hesitate to call me  I look forward to following your patient along with you  Sincerely,        Frances Walker MD        CC: No Recipients  Frances Walker MD  8/26/2021  4:24 PM  Sign when Signing Visit  H&P Exam - Urology   Landy Templeton 76 y o  male MRN: [id number]  Unit/Bed#:  Encounter: [id number]    Assessment/Plan     Assessment:  Erectile suogevscxtz-roncgangoueqab-qbcpweuvmg to PDE 5 inhibition injection therapy and vacuum devices  CLL  History of renal stones-no further obstructing UPJ calculus on right-passed  BPH without obstructive symptoms  Plan:  Implantation of a 3 part inflatable penile prosthesis    History of Present Illness   HPI:  Landy Templeton is a 76 y o  male who presents with longstanding history of erectile dysfunction that has been progressive  Currently the patient is unable to get any erectile activity whatsoever failing to respond to multiple PDE5 inhibitors vaso active drug it penile injection as well as vacuum devices  The patient presented requesting implantation of a penile implant  I explained semi rigid 2 part in 3 part inflatable prostheses  The patient request implantation of a penile implant  He understands risks of mechanical failure bleeding infection injury to the urethra bladder need for explantation with her without reimplantation either delayed or immediate  The patient agrees to proceed to the procedure  The patient also had a history of a right UPJ stone which passed spontaneously  Follow-up CT revealed no evidence of persistent stone in the ureter    Hematuria workup was otherwise negative with cystourethroscopy being performed 2021 without any intravesical or intraurethral lesions       Review of Systems   Constitutional: Positive for fatigue  Respiratory: Positive for shortness of breath  Genitourinary: Positive for frequency  Musculoskeletal: Positive for arthralgias and myalgias  Hematological:        Being treated for CLL  Historical Information   Past Medical History:   Diagnosis Date    Anemia     Anxiety     Bipolar disorder ([facility] )     Cancer ([place] [address] )     History of alcohol abuse     Hypertension     Hypertension     Insomnia     Leukemia ([place] [address] )     Opiate abuse, episodic ([place] [address] )     Psychiatric disorder     Sleep apnea with use of continuous positive airway pressure (CPAP)      Past Surgical History:   Procedure Laterality Date    COLONOSCOPY      EGD AND COLONOSCOPY N/A 3/30/2018    Procedure: EGD AND COLONOSCOPY;  Surgeon: Preethi Anne MD;  Location: [facility]; Service: Gastroenterology     Social History   Social History     Substance and Sexual Activity   Alcohol Use No    Comment: last drink 2017     Social History     Substance and Sexual Activity   Drug Use No    Comment: hx of heroin and subutex abuse     Social History     Tobacco Use   Smoking Status Current Every Day Smoker    Packs/day: 1 00    Years: 40 00    Pack years: 40 00    Types: Cigarettes    Last attempt to quit: 10/13/2020    Years since quittin 8   Smokeless Tobacco Never Used     Family History:   Family History   Problem Relation Age of Onset    Coronary artery disease Mother     Diabetes Mother     No Known Problems Father     Hepatitis Sister     Cancer Brother     Prostate cancer Brother     Early death Brother        Meds/Allergies   all medications and allergies reviewed  No Known Allergies    Objective   Vitals: Blood pressure 138/80, pulse 57, height 5' 11" (1 803 m), weight 117 kg (257 lb)      [unfilled]    Invasive Devices     None Physical Exam  Vitals reviewed  Constitutional:       General: He is not in acute distress  Appearance: Normal appearance  He is obese  He is not ill-appearing, toxic-appearing or diaphoretic  HENT:      Head: Normocephalic  Nose: Nose normal       Mouth/Throat:      Mouth: Mucous membranes are moist    Eyes:      Extraocular Movements: Extraocular movements intact  Cardiovascular:      Rate and Rhythm: Normal rate and regular rhythm  Heart sounds: Normal heart sounds  Pulmonary:      Effort: Pulmonary effort is normal       Breath sounds: Normal breath sounds  Abdominal:      Palpations: Abdomen is soft  Skin:     General: Skin is dry  Neurological:      Mental Status: He is alert and oriented to person, place, and time  Psychiatric:         Mood and Affect: Mood normal          Behavior: Behavior normal          Thought Content: Thought content normal          Judgment: Judgment normal          Lab Results: I have personally reviewed pertinent reports  Imaging: I have personally reviewed pertinent reports  EKG, Pathology, and Other Studies: I have personally reviewed pertinent reports  VTE Prophylaxis: Sequential compression device (Venodyne)  and Heparin    Code Status: [unfilled]  Advance Directive and Living Will:      Power of :    POLST:      Counseling / Coordination of Care  Total floor / unit time spent today 30  minutes  Greater than 50% of total time was spent with the patient and / or family counseling and / or coordination of care  A description of the counseling / coordination of care: Franck Matthew Cystoscopy     Date/Time 8/26/2021 4:18 PM     Performed by  Bruno Epley, MD     Authorized by Bruno Epley, MD      Universal Protocol:  Consent: Verbal consent obtained  Written consent obtained    Risks and benefits: risks, benefits and alternatives were discussed  Consent given by: patient  Patient understanding: patient states understanding of the procedure being performed  Patient consent: the patient's understanding of the procedure matches consent given  Procedure consent: procedure consent matches procedure scheduled  Required items: required blood products, implants, devices, and special equipment available  Patient identity confirmed: verbally with patient        Procedure Details:  Procedure type: cystoscopy    Patient tolerance: Patient tolerated the procedure well with no immediate complications    Additional Procedure Details: With the patient the supine position cystourethroscopy was performed after prepping the urethral meatus with Betadine and instilling 2 percent lidocaine lubricant the urethral lumen  Urethra was within normal limits without stricture lesion  The prostatic urethra was also within normal limits except for bilobar enlargement the lateral lobes of the prostate with visual occlusion of the bladder outlet  Prostatic urethral length was approximately 3-4 centimeters  Approximately 4-6 Uro lift implants would be appropriate for the prostate  Cystourethroscopy revealed AV trabeculated mildly trabeculated urinary bladder without intrinsic lesion or extrinsic mass compression  Ureteral orifices were normal bilaterally with clear efflux bilaterally  Retroflexion revealed no lesions at the level of the bladder neck and no intravesical median lobe  The cystoscope was removed atraumatically and the patient urinated spontaneously thereafter recovering uneventfully

## 2021-08-26 NOTE — PROGRESS NOTES
H&P Exam - Urology   Betzy Kirby 76 y o  male MRN: [id number]  Unit/Bed#:  Encounter: [id number]    Assessment/Plan     Assessment:  Erectile bkhcpdeurpn-czbqljydhvkfwk-oroysggslm to PDE 5 inhibition injection therapy and vacuum devices  CLL  History of renal stones-no further obstructing UPJ calculus on right-passed  BPH without obstructive symptoms  Plan:  Implantation of a 3 part inflatable penile prosthesis    History of Present Illness   HPI:  Betzy Kirby is a 76 y o  male who presents with longstanding history of erectile dysfunction that has been progressive  Currently the patient is unable to get any erectile activity whatsoever failing to respond to multiple PDE5 inhibitors vaso active drug it penile injection as well as vacuum devices  The patient presented requesting implantation of a penile implant  I explained semi rigid 2 part in 3 part inflatable prostheses  The patient request implantation of a penile implant  He understands risks of mechanical failure bleeding infection injury to the urethra bladder need for explantation with her without reimplantation either delayed or immediate  The patient agrees to proceed to the procedure  The patient also had a history of a right UPJ stone which passed spontaneously  Follow-up CT revealed no evidence of persistent stone in the ureter  Hematuria workup was otherwise negative with cystourethroscopy being performed 08/26/2021 without any intravesical or intraurethral lesions       Review of Systems   Constitutional: Positive for fatigue  Respiratory: Positive for shortness of breath  Genitourinary: Positive for frequency  Musculoskeletal: Positive for arthralgias and myalgias  Hematological:        Being treated for CLL         Historical Information   Past Medical History:   Diagnosis Date    Anemia     Anxiety     [facility] disorder ([facility] )     [facility]     History of alcohol abuse     Hypertension     Hypertension     Insomnia  Leukemia ([facility] )     Opiate abuse, episodic ([facility] )     Psychiatric disorder     Sleep apnea with use of continuous positive airway pressure (CPAP)      Past Surgical History:   Procedure Laterality Date    COLONOSCOPY      EGD AND COLONOSCOPY N/A 3/30/2018    Procedure: EGD AND COLONOSCOPY;  Surgeon: Preethi Anne MD;  Location: [facility]; Service: Gastroenterology     Social History   Social History     Substance and Sexual Activity   Alcohol Use No    Comment: last drink 2017     Social History     Substance and Sexual Activity   Drug Use No    Comment: hx of heroin and subutex abuse     Social History     Tobacco Use   Smoking Status Current Every Day Smoker    Packs/day: 1 00    Years: 40 00    Pack years: 40 00    Types: Cigarettes    Last attempt to quit: 10/13/2020    Years since quittin 8   Smokeless Tobacco Never Used     Family History:   Family History   Problem Relation Age of Onset    Coronary artery disease Mother     Diabetes Mother     No Known Problems Father     Hepatitis Sister     Cancer Brother     Prostate cancer Brother     Early death Brother        Meds/Allergies   all medications and allergies reviewed  No Known Allergies    Objective   Vitals: Blood pressure 138/80, pulse 57, height 5' 11" (1 803 m), weight 117 kg (257 lb)  [unfilled]    Invasive Devices     None                 Physical Exam  Vitals reviewed  Constitutional:       General: He is not in acute distress  Appearance: Normal appearance  He is obese  He is not ill-appearing, toxic-appearing or diaphoretic  HENT:      Head: Normocephalic  Nose: Nose normal       Mouth/Throat:      Mouth: Mucous membranes are moist    Eyes:      Extraocular Movements: Extraocular movements intact  Cardiovascular:      Rate and Rhythm: Normal rate and regular rhythm  Heart sounds: Normal heart sounds     Pulmonary:      Effort: Pulmonary effort is normal       Breath sounds: Normal breath sounds  Abdominal:      Palpations: Abdomen is soft  Skin:     General: Skin is dry  Neurological:      Mental Status: He is alert and oriented to person, place, and time  Psychiatric:         Mood and Affect: Mood normal          Behavior: Behavior normal          Thought Content: Thought content normal          Judgment: Judgment normal          Lab Results: I have personally reviewed pertinent reports  Imaging: I have personally reviewed pertinent reports  EKG, Pathology, and Other Studies: I have personally reviewed pertinent reports  VTE Prophylaxis: Sequential compression device (Venodyne)  and Heparin    Code Status: [unfilled]  Advance Directive and Living Will:      Power of :    POLST:      Counseling / Coordination of Care  Total floor / unit time spent today 30  minutes  Greater than 50% of total time was spent with the patient and / or family counseling and / or coordination of care  A description of the counseling / coordination of care: Nellie Davis Cystoscopy     Date/Time 8/26/2021 4:18 PM     Performed by  Stephen Blanco MD     Authorized by Stephen Blanco MD      Universal Protocol:  Consent: Verbal consent obtained  Written consent obtained    Risks and benefits: risks, benefits and alternatives were discussed  Consent given by: patient  Patient understanding: patient states understanding of the procedure being performed  Patient consent: the patient's understanding of the procedure matches consent given  Procedure consent: procedure consent matches procedure scheduled  Required items: required blood products, implants, devices, and special equipment available  Patient identity confirmed: verbally with patient        Procedure Details:  Procedure type: cystoscopy    Patient tolerance: Patient tolerated the procedure well with no immediate complications    Additional Procedure Details: With the patient the supine position cystourethroscopy was performed after prepping the urethral meatus with Betadine and instilling 2 percent lidocaine lubricant the urethral lumen  Urethra was within normal limits without stricture lesion  The prostatic urethra was also within normal limits except for bilobar enlargement the lateral lobes of the prostate with visual occlusion of the bladder outlet  Prostatic urethral length was approximately 3-4 centimeters  Approximately 4-6 Uro lift implants would be appropriate for the prostate  Cystourethroscopy revealed AV trabeculated mildly trabeculated urinary bladder without intrinsic lesion or extrinsic mass compression  Ureteral orifices were normal bilaterally with clear efflux bilaterally  Retroflexion revealed no lesions at the level of the bladder neck and no intravesical median lobe  The cystoscope was removed atraumatically and the patient urinated spontaneously thereafter recovering uneventfully

## 2021-08-26 NOTE — H&P
H&P Exam - Urology   Betzy Kirby 76 y o  male MRN: [id number]  Unit/Bed#:  Encounter: [id number]    Assessment/Plan     Assessment:  Erectile fxjavykheus-lijbdcqzdtwrcy-oncsaqdunu to PDE 5 inhibition injection therapy and vacuum devices  CLL  History of renal stones-no further obstructing UPJ calculus on right-passed  BPH without obstructive symptoms  Plan:  Implantation of a 3 part inflatable penile prosthesis    History of Present Illness   HPI:  Betzy Kirby is a 76 y o  male who presents with longstanding history of erectile dysfunction that has been progressive  Currently the patient is unable to get any erectile activity whatsoever failing to respond to multiple PDE5 inhibitors vaso active drug it penile injection as well as vacuum devices  The patient presented requesting implantation of a penile implant  I explained semi rigid 2 part in 3 part inflatable prostheses  The patient request implantation of a penile implant  He understands risks of mechanical failure bleeding infection injury to the urethra bladder need for explantation with her without reimplantation either delayed or immediate  The patient agrees to proceed to the procedure  The patient also had a history of a right UPJ stone which passed spontaneously  Follow-up CT revealed no evidence of persistent stone in the ureter  Hematuria workup was otherwise negative with cystourethroscopy being performed 08/26/2021 without any intravesical or intraurethral lesions       Review of Systems   Constitutional: Positive for fatigue  Respiratory: Positive for shortness of breath  Genitourinary: Positive for frequency  Musculoskeletal: Positive for arthralgias and myalgias  Hematological:        Being treated for CLL         Historical Information   Past Medical History:   Diagnosis Date    Anemia     Anxiety     [facility] disorder [facility]     [facility]     History of alcohol abuse     Hypertension     Hypertension     Insomnia  Leukemia ([facility] )     Opiate abuse, episodic ([facility] )     Psychiatric disorder     Sleep apnea with use of continuous positive airway pressure (CPAP)      Past Surgical History:   Procedure Laterality Date    COLONOSCOPY      EGD AND COLONOSCOPY N/A 3/30/2018    Procedure: EGD AND COLONOSCOPY;  Surgeon: Neris Christie MD;  Location: [facility] GI LAB; Service: Gastroenterology     Social History   Social History     Substance and Sexual Activity   Alcohol Use No    Comment: last drink 2017     Social History     Substance and Sexual Activity   Drug Use No    Comment: hx of heroin and subutex abuse     Social History     Tobacco Use   Smoking Status Current Every Day Smoker    Packs/day: 1 00    Years: 40 00    Pack years: 40 00    Types: Cigarettes    Last attempt to quit: 10/13/2020    Years since quittin 8   Smokeless Tobacco Never Used     Family History:   Family History   Problem Relation Age of Onset    Coronary artery disease Mother     Diabetes Mother     No Known Problems Father     Hepatitis Sister     Cancer Brother     Prostate cancer Brother     Early death Brother        Meds/Allergies   all medications and allergies reviewed  No Known Allergies    Objective   Vitals: Blood pressure 138/80, pulse 57, height 5' 11" (1 803 m), weight 117 kg (257 lb)  [unfilled]    Invasive Devices     None                 Physical Exam  Vitals reviewed  Constitutional:       General: He is not in acute distress  Appearance: Normal appearance  He is obese  He is not ill-appearing, toxic-appearing or diaphoretic  HENT:      Head: Normocephalic  Nose: Nose normal       Mouth/Throat:      Mouth: Mucous membranes are moist    Eyes:      Extraocular Movements: Extraocular movements intact  Cardiovascular:      Rate and Rhythm: Normal rate and regular rhythm  Heart sounds: Normal heart sounds     Pulmonary:      Effort: Pulmonary effort is normal       Breath sounds: Normal breath sounds  Abdominal:      Palpations: Abdomen is soft  Skin:     General: Skin is dry  Neurological:      Mental Status: He is alert and oriented to person, place, and time  Psychiatric:         Mood and Affect: Mood normal          Behavior: Behavior normal          Thought Content: Thought content normal          Judgment: Judgment normal          Lab Results: I have personally reviewed pertinent reports  Imaging: I have personally reviewed pertinent reports  EKG, Pathology, and Other Studies: I have personally reviewed pertinent reports  VTE Prophylaxis: Sequential compression device (Venodyne)  and Heparin    [facility]  Advance Directive and Living Will:      Power of :    POLST:      Counseling / Coordination of Care  Total floor / unit time spent today 30  minutes  Greater than 50% of total time was spent with the patient and / or family counseling and / or coordination of care  A description of the counseling / coordination of care: Rachel Silva Cystoscopy     Date/Time 8/26/2021 4:18 PM     Performed by  Terri Hart MD     Authorized by Terri Hart MD      Universal Protocol:  Consent: Verbal consent obtained  Written consent obtained    Risks and benefits: risks, benefits and alternatives were discussed  Consent given by: patient  Patient understanding: patient states understanding of the procedure being performed  Patient consent: the patient's understanding of the procedure matches consent given  Procedure consent: procedure consent matches procedure scheduled  Required items: required blood products, implants, devices, and special equipment available  Patient identity confirmed: verbally with patient        Procedure Details:  Procedure type: cystoscopy    Patient tolerance: Patient tolerated the procedure well with no immediate complications    Additional Procedure Details: With the patient the supine position cystourethroscopy was performed after prepping the urethral meatus with Betadine and instilling 2 percent lidocaine lubricant the urethral lumen  Urethra was within normal limits without stricture lesion  The prostatic urethra was also within normal limits except for bilobar enlargement the lateral lobes of the prostate with visual occlusion of the bladder outlet  Prostatic urethral length was approximately 3-4 centimeters  Approximately 4-6 Uro lift implants would be appropriate for the prostate  Cystourethroscopy revealed AV trabeculated mildly trabeculated urinary bladder without intrinsic lesion or extrinsic mass compression  Ureteral orifices were normal bilaterally with clear efflux bilaterally  Retroflexion revealed no lesions at the level of the bladder neck and no intravesical median lobe  The cystoscope was removed atraumatically and the patient urinated spontaneously thereafter recovering uneventfully

## 2021-08-27 ENCOUNTER — TELEPHONE (OUTPATIENT)
Dept: UROLOGY | Facility: MEDICAL CENTER | Age: 66
End: 2021-08-27

## 2021-08-27 NOTE — TELEPHONE ENCOUNTER
Spoke with patient in addressed his questions regarding implantation of penile implant  Patient verbalized understanding and grateful for the  call

## 2021-08-27 NOTE — TELEPHONE ENCOUNTER
Patient is calling with questions about the penial implant and would like to speak with a provider penile implant surgery

## 2021-08-31 ENCOUNTER — TELEPHONE (OUTPATIENT)
Dept: UROLOGY | Facility: MEDICAL CENTER | Age: 66
End: 2021-08-31

## 2021-08-31 DIAGNOSIS — C95.90 LEUKEMIA NOT HAVING ACHIEVED REMISSION, UNSPECIFIED LEUKEMIA TYPE (HCC): ICD-10-CM

## 2021-08-31 NOTE — TELEPHONE ENCOUNTER
I spoke to the patient and his wife and scheduled the IPP with Dr Trilby Heimlich for 4/5/2022  Patient declined any sooner dates due to possible snow  Patient is aware that he will need a H&P visit prior as well as clearance appointments     -instructions given verbally and mail  -patient aware he needs a  and to be NPO after midnight the night prior  -H&P 3/17/21 @ 1:30PM  -CBC, CMP, Urine C&S and EKG 2 weeks prior  -patient will avoid any potentially blood thinning medications 7 days prior  -HemOnc Clearance with Dr Tracy Pandya, request form faxed  -Internal Med Clearance , patient will establish with a new PCP and advise of the name so a request form can be faxed  -[address]  -per patient he has a approval letter form [id number], no notations in chart  Patient will fax the letter to the office

## 2021-09-09 ENCOUNTER — TELEPHONE (OUTPATIENT)
Dept: FAMILY MEDICINE CLINIC | Facility: CLINIC | Age: 66
End: 2021-09-09

## 2021-09-09 NOTE — TELEPHONE ENCOUNTER
----- Message from Janet Pittman MD sent at 9/9/2021  9:54 AM EDT -----  Please schedule a [employer] follow up    Thank you

## 2021-09-09 NOTE — TELEPHONE ENCOUNTER
Patient wife is calling back to find out why we left a message to schedule and appointment for Patient to come into the office  We are no longer PCP and patient has not been seen in the office in a year   Dr Christina Faulkner was PCP

## 2021-09-14 ENCOUNTER — TELEPHONE (OUTPATIENT)
Dept: OTHER | Facility: OTHER | Age: 66
End: 2021-09-14

## 2021-09-14 NOTE — TELEPHONE ENCOUNTER
The patient called and would like to know if he can have his Testerone checked as well as if he can speak to a woman named Angel Moseley in regards to him being eligibility for 100% coverage  She wanted to let her know that he faxed over the information she requested on 9/11/2021  He would like verification that she received it

## 2021-09-14 NOTE — TELEPHONE ENCOUNTER
Clerical, please advise if faxed letter has been received  Per patient it is a approval letter for [facility] care  No documentation in chart of approval, patient stated to myself and Renny Marks that they would fax it over to the office

## 2021-09-15 ENCOUNTER — APPOINTMENT (OUTPATIENT)
Dept: LAB | Facility: CLINIC | Age: 66
End: 2021-09-15
Payer: COMMERCIAL

## 2021-09-15 DIAGNOSIS — N40.0 BPH WITHOUT OBSTRUCTION/LOWER URINARY TRACT SYMPTOMS: ICD-10-CM

## 2021-09-15 PROCEDURE — 80053 COMPREHEN METABOLIC PANEL: CPT | Performed by: INTERNAL MEDICINE

## 2021-09-15 PROCEDURE — 36415 COLL VENOUS BLD VENIPUNCTURE: CPT | Performed by: INTERNAL MEDICINE

## 2021-09-15 PROCEDURE — 85025 COMPLETE CBC W/AUTO DIFF WBC: CPT | Performed by: INTERNAL MEDICINE

## 2021-09-15 PROCEDURE — 83615 LACTATE (LD) (LDH) ENZYME: CPT | Performed by: INTERNAL MEDICINE

## 2021-09-15 PROCEDURE — 84153 ASSAY OF PSA TOTAL: CPT

## 2021-09-16 LAB
ALBUMIN SERPL BCP-MCNC: 3.9 G/DL (ref 3.5–5)
ALP SERPL-CCNC: 84 U/L (ref 46–116)
ALT SERPL W P-5'-P-CCNC: 49 U/L (ref 12–78)
ANION GAP SERPL CALCULATED.3IONS-SCNC: 6 MMOL/L (ref 4–13)
AST SERPL W P-5'-P-CCNC: 22 U/L (ref 5–45)
BASOPHILS # BLD AUTO: 0.04 THOUSANDS/ΜL (ref 0–0.1)
BASOPHILS NFR BLD AUTO: 0 % (ref 0–1)
BILIRUB SERPL-MCNC: 0.48 MG/DL (ref 0.2–1)
BUN SERPL-MCNC: 20 MG/DL (ref 5–25)
CALCIUM SERPL-MCNC: 9.3 MG/DL (ref 8.3–10.1)
CHLORIDE SERPL-SCNC: 103 MMOL/L (ref 100–108)
CO2 SERPL-SCNC: 27 MMOL/L (ref 21–32)
CREAT SERPL-MCNC: 0.93 MG/DL (ref 0.6–1.3)
EOSINOPHIL # BLD AUTO: 0.1 THOUSAND/ΜL (ref 0–0.61)
EOSINOPHIL NFR BLD AUTO: 1 % (ref 0–6)
ERYTHROCYTE [DISTWIDTH] IN BLOOD BY AUTOMATED COUNT: 13.3 % (ref 11.6–15.1)
GFR SERPL CREATININE-BSD FRML MDRD: 85 ML/MIN/1.73SQ M
GLUCOSE P FAST SERPL-MCNC: 146 MG/DL (ref 65–99)
HCT VFR BLD AUTO: 43.8 % (ref 36.5–49.3)
HGB BLD-MCNC: 14.9 G/DL (ref 12–17)
IMM GRANULOCYTES # BLD AUTO: 0.08 THOUSAND/UL (ref 0–0.2)
IMM GRANULOCYTES NFR BLD AUTO: 1 % (ref 0–2)
LDH SERPL-CCNC: 190 U/L (ref 81–234)
LYMPHOCYTES # BLD AUTO: 3.45 THOUSANDS/ΜL (ref 0.6–4.47)
LYMPHOCYTES NFR BLD AUTO: 37 % (ref 14–44)
MCH RBC QN AUTO: 32.4 PG (ref 26.8–34.3)
MCHC RBC AUTO-ENTMCNC: 34 G/DL (ref 31.4–37.4)
MCV RBC AUTO: 95 FL (ref 82–98)
MONOCYTES # BLD AUTO: 0.61 THOUSAND/ΜL (ref 0.17–1.22)
MONOCYTES NFR BLD AUTO: 7 % (ref 4–12)
NEUTROPHILS # BLD AUTO: 5.05 THOUSANDS/ΜL (ref 1.85–7.62)
NEUTS SEG NFR BLD AUTO: 54 % (ref 43–75)
NRBC BLD AUTO-RTO: 0 /100 WBCS
PLATELET # BLD AUTO: 164 THOUSANDS/UL (ref 149–390)
PMV BLD AUTO: 12.5 FL (ref 8.9–12.7)
POTASSIUM SERPL-SCNC: 3.7 MMOL/L (ref 3.5–5.3)
PROT SERPL-MCNC: 7.8 G/DL (ref 6.4–8.2)
PSA SERPL-MCNC: 1 NG/ML (ref 0–4)
RBC # BLD AUTO: 4.6 MILLION/UL (ref 3.88–5.62)
SODIUM SERPL-SCNC: 136 MMOL/L (ref 136–145)
WBC # BLD AUTO: 9.33 THOUSAND/UL (ref 4.31–10.16)

## 2021-09-16 NOTE — TELEPHONE ENCOUNTER
Patient called in stating he went to get blood work and for [place]  Patient informed he is taking Testerone pills which he ordered from [employer]   Patient can be reached at [phone]

## 2021-09-16 NOTE — TELEPHONE ENCOUNTER
LMOM for pt to return call  His labs were normal except for glucose was a little elevated  He did not have blood work ordered for testosterone, so there are no results for testosterone

## 2021-09-17 NOTE — TELEPHONE ENCOUNTER
Patient called to say he spoke to Coleen Darden in billing  and [facility]  Stated he was approved and was told its in the system and did not have to fax anything to office   fyi

## 2021-10-06 ENCOUNTER — TELEPHONE (OUTPATIENT)
Dept: HEMATOLOGY ONCOLOGY | Facility: CLINIC | Age: 66
End: 2021-10-06

## 2021-10-06 NOTE — TELEPHONE ENCOUNTER
I spoke to [employer] at [facility] care, patient is approved but notes are not visible to non billing staff  Noreen Olmos will Email me a copy of the letter  However, this approval expires 1/19/2022 and the surgery is scheduled for 4/5/2022  Patient will need to reapply in January

## 2021-11-29 ENCOUNTER — APPOINTMENT (OUTPATIENT)
Dept: LAB | Facility: CLINIC | Age: 66
End: 2021-11-29
Payer: COMMERCIAL

## 2021-12-03 ENCOUNTER — OFFICE VISIT (OUTPATIENT)
Dept: HEMATOLOGY ONCOLOGY | Facility: CLINIC | Age: 66
End: 2021-12-03
Payer: COMMERCIAL

## 2021-12-03 ENCOUNTER — TELEPHONE (OUTPATIENT)
Dept: HEMATOLOGY ONCOLOGY | Facility: CLINIC | Age: 66
End: 2021-12-03

## 2021-12-03 VITALS
HEART RATE: 96 BPM | HEIGHT: 71 IN | SYSTOLIC BLOOD PRESSURE: 152 MMHG | TEMPERATURE: 97.1 F | RESPIRATION RATE: 18 BRPM | OXYGEN SATURATION: 96 % | DIASTOLIC BLOOD PRESSURE: 96 MMHG | BODY MASS INDEX: 36.68 KG/M2 | WEIGHT: 262 LBS

## 2021-12-03 DIAGNOSIS — C91.10 CLL (CHRONIC LYMPHOCYTIC LEUKEMIA) (HCC): Primary | ICD-10-CM

## 2021-12-03 PROCEDURE — 1160F RVW MEDS BY RX/DR IN RCRD: CPT | Performed by: INTERNAL MEDICINE

## 2021-12-03 PROCEDURE — 99214 OFFICE O/P EST MOD 30 MIN: CPT | Performed by: INTERNAL MEDICINE

## 2021-12-03 PROCEDURE — 3008F BODY MASS INDEX DOCD: CPT | Performed by: INTERNAL MEDICINE

## 2021-12-14 ENCOUNTER — APPOINTMENT (OUTPATIENT)
Dept: LAB | Facility: CLINIC | Age: 66
End: 2021-12-14
Payer: COMMERCIAL

## 2021-12-14 DIAGNOSIS — R60.0 LOCALIZED EDEMA: ICD-10-CM

## 2021-12-14 DIAGNOSIS — Z00.00 ROUTINE GENERAL MEDICAL EXAMINATION AT A HEALTH CARE FACILITY: ICD-10-CM

## 2021-12-14 LAB
25(OH)D3 SERPL-MCNC: 32 NG/ML (ref 30–100)
ALBUMIN SERPL BCP-MCNC: 4.2 G/DL (ref 3.5–5)
ALP SERPL-CCNC: 85 U/L (ref 46–116)
ALT SERPL W P-5'-P-CCNC: 51 U/L (ref 12–78)
ANION GAP SERPL CALCULATED.3IONS-SCNC: 4 MMOL/L (ref 4–13)
AST SERPL W P-5'-P-CCNC: 16 U/L (ref 5–45)
BASOPHILS # BLD AUTO: 0.05 THOUSANDS/ΜL (ref 0–0.1)
BASOPHILS NFR BLD AUTO: 1 % (ref 0–1)
BILIRUB SERPL-MCNC: 0.47 MG/DL (ref 0.2–1)
BUN SERPL-MCNC: 15 MG/DL (ref 5–25)
CALCIUM SERPL-MCNC: 10.3 MG/DL (ref 8.3–10.1)
CHLORIDE SERPL-SCNC: 104 MMOL/L (ref 100–108)
CHOLEST SERPL-MCNC: 295 MG/DL
CO2 SERPL-SCNC: 29 MMOL/L (ref 21–32)
CREAT SERPL-MCNC: 0.91 MG/DL (ref 0.6–1.3)
EOSINOPHIL # BLD AUTO: 0.15 THOUSAND/ΜL (ref 0–0.61)
EOSINOPHIL NFR BLD AUTO: 2 % (ref 0–6)
ERYTHROCYTE [DISTWIDTH] IN BLOOD BY AUTOMATED COUNT: 13.5 % (ref 11.6–15.1)
EST. AVERAGE GLUCOSE BLD GHB EST-MCNC: 140 MG/DL
FOLATE SERPL-MCNC: >20 NG/ML (ref 3.1–17.5)
GFR SERPL CREATININE-BSD FRML MDRD: 86 ML/MIN/1.73SQ M
GLUCOSE SERPL-MCNC: 102 MG/DL (ref 65–140)
HBA1C MFR BLD: 6.5 %
HCT VFR BLD AUTO: 43.2 % (ref 36.5–49.3)
HDLC SERPL-MCNC: 46 MG/DL
HGB BLD-MCNC: 14.4 G/DL (ref 12–17)
IMM GRANULOCYTES # BLD AUTO: 0.16 THOUSAND/UL (ref 0–0.2)
IMM GRANULOCYTES NFR BLD AUTO: 2 % (ref 0–2)
LDLC SERPL CALC-MCNC: 209 MG/DL (ref 0–100)
LYMPHOCYTES # BLD AUTO: 2.43 THOUSANDS/ΜL (ref 0.6–4.47)
LYMPHOCYTES NFR BLD AUTO: 29 % (ref 14–44)
MCH RBC QN AUTO: 31.7 PG (ref 26.8–34.3)
MCHC RBC AUTO-ENTMCNC: 33.3 G/DL (ref 31.4–37.4)
MCV RBC AUTO: 95 FL (ref 82–98)
MONOCYTES # BLD AUTO: 0.63 THOUSAND/ΜL (ref 0.17–1.22)
MONOCYTES NFR BLD AUTO: 8 % (ref 4–12)
NEUTROPHILS # BLD AUTO: 4.96 THOUSANDS/ΜL (ref 1.85–7.62)
NEUTS SEG NFR BLD AUTO: 58 % (ref 43–75)
NONHDLC SERPL-MCNC: 249 MG/DL
NRBC BLD AUTO-RTO: 0 /100 WBCS
NT-PROBNP SERPL-MCNC: 12 PG/ML
PLATELET # BLD AUTO: 202 THOUSANDS/UL (ref 149–390)
PMV BLD AUTO: 11.3 FL (ref 8.9–12.7)
POTASSIUM SERPL-SCNC: 4.1 MMOL/L (ref 3.5–5.3)
PROT SERPL-MCNC: 8.1 G/DL (ref 6.4–8.2)
RBC # BLD AUTO: 4.54 MILLION/UL (ref 3.88–5.62)
SODIUM SERPL-SCNC: 137 MMOL/L (ref 136–145)
T4 FREE SERPL-MCNC: 0.92 NG/DL (ref 0.76–1.46)
TRIGL SERPL-MCNC: 200 MG/DL
TSH SERPL DL<=0.05 MIU/L-ACNC: 2.08 UIU/ML (ref 0.36–3.74)
VIT B12 SERPL-MCNC: 1171 PG/ML (ref 100–900)
WBC # BLD AUTO: 8.38 THOUSAND/UL (ref 4.31–10.16)

## 2021-12-14 PROCEDURE — 82607 VITAMIN B-12: CPT

## 2021-12-14 PROCEDURE — 85025 COMPLETE CBC W/AUTO DIFF WBC: CPT

## 2021-12-14 PROCEDURE — 3044F HG A1C LEVEL LT 7.0%: CPT | Performed by: INTERNAL MEDICINE

## 2021-12-14 PROCEDURE — 83036 HEMOGLOBIN GLYCOSYLATED A1C: CPT

## 2021-12-14 PROCEDURE — 82306 VITAMIN D 25 HYDROXY: CPT

## 2021-12-14 PROCEDURE — 36415 COLL VENOUS BLD VENIPUNCTURE: CPT

## 2021-12-14 PROCEDURE — 84443 ASSAY THYROID STIM HORMONE: CPT

## 2021-12-14 PROCEDURE — 82746 ASSAY OF FOLIC ACID SERUM: CPT

## 2021-12-14 PROCEDURE — 80061 LIPID PANEL: CPT

## 2021-12-14 PROCEDURE — 83880 ASSAY OF NATRIURETIC PEPTIDE: CPT

## 2021-12-14 PROCEDURE — 80053 COMPREHEN METABOLIC PANEL: CPT

## 2021-12-14 PROCEDURE — 84439 ASSAY OF FREE THYROXINE: CPT

## 2021-12-29 ENCOUNTER — TELEPHONE (OUTPATIENT)
Dept: GASTROENTEROLOGY | Facility: CLINIC | Age: 66
End: 2021-12-29

## 2022-01-28 ENCOUNTER — TELEPHONE (OUTPATIENT)
Dept: SURGICAL ONCOLOGY | Facility: CLINIC | Age: 67
End: 2022-01-28

## 2022-01-28 NOTE — TELEPHONE ENCOUNTER
Patient's wife called stating [facility] Dixon Hemp office to confirm appt on 6/24/22 to be able [facility] stated Maricruz Tello will not be having Terence Pickering but just financial help

## 2022-02-04 NOTE — TELEPHONE ENCOUNTER
Clinical, please call patient to discuss any medication recommendations Dr Trilby Heimlich may have and let me know if he is canceling his 4/5/2022 Penile Implant

## 2022-02-04 NOTE — TELEPHONE ENCOUNTER
Called pt and left msg on VM to return call to office to discuss poss appt for penile injections  Pt is scheduled for IPP surgery in April and is having second thoughts

## 2022-02-04 NOTE — TELEPHONE ENCOUNTER
Patient called in stating he is having second thoughts about surgery and asked if it is possible to be placed on medication   Patient can be reached at [phone]

## 2022-02-04 NOTE — TELEPHONE ENCOUNTER
He should make an appointment to discuss injection therapy if he does not wish to undergo the hip penile implant    He apparently has tried oral PDE5 inhibitors without success

## 2022-02-11 NOTE — TELEPHONE ENCOUNTER
Called pt and left msg on VM to return call to office  Pt can be offered an appt with Dr April Mclean to discuss reservations he is having about upcoming penile implant surgery scheduled for 4/5/22  This should be a 30 min appt with Dr April Mclean

## 2022-02-23 NOTE — TELEPHONE ENCOUNTER
Left a voice mail message asking the patient to call back  1  Is he looking to schedule a office visit with Dr Spike Koo to discuss alternative therapy's  2  Is he intending to cancel his 2022 surgery  3  If going forward with surgery he will need to keep his 3/17/2022 H&P visit  4  Has not established with a PCP yet to obtain surgical clearance  5  Has not yet scheduled a clearance appointment with [facility]  6   Reminder that rafael care  on  and needs to be renewed

## 2022-02-24 ENCOUNTER — TELEPHONE (OUTPATIENT)
Dept: UROLOGY | Facility: MEDICAL CENTER | Age: 67
End: 2022-02-24

## 2022-02-24 NOTE — TELEPHONE ENCOUNTER
Brianna Donald LPN 13 minutes ago (9:99 AM)     Read Paul       Patient calling to cancel his surgery scheduled for 4/5/22      Pt does not want to reschedule      Please be advised   Thank you               Documentation

## 2022-02-24 NOTE — TELEPHONE ENCOUNTER
Patient calling to cancel his surgery scheduled for 4/5/22  Pt does not want to reschedule  Please be advised   Thank you

## 2022-03-10 ENCOUNTER — OFFICE VISIT (OUTPATIENT)
Dept: CARDIOLOGY CLINIC | Facility: CLINIC | Age: 67
End: 2022-03-10
Payer: COMMERCIAL

## 2022-03-10 ENCOUNTER — APPOINTMENT (OUTPATIENT)
Dept: LAB | Facility: CLINIC | Age: 67
End: 2022-03-10
Payer: COMMERCIAL

## 2022-03-10 VITALS
SYSTOLIC BLOOD PRESSURE: 150 MMHG | HEART RATE: 68 BPM | OXYGEN SATURATION: 98 % | HEIGHT: 71 IN | DIASTOLIC BLOOD PRESSURE: 88 MMHG | WEIGHT: 266 LBS | BODY MASS INDEX: 37.24 KG/M2 | RESPIRATION RATE: 16 BRPM

## 2022-03-10 DIAGNOSIS — E66.9 OBESITY (BMI 30-39.9): ICD-10-CM

## 2022-03-10 DIAGNOSIS — E78.2 MIXED HYPERLIPIDEMIA: ICD-10-CM

## 2022-03-10 DIAGNOSIS — R60.9 EDEMA, UNSPECIFIED TYPE: ICD-10-CM

## 2022-03-10 DIAGNOSIS — I10 ESSENTIAL HYPERTENSION: ICD-10-CM

## 2022-03-10 DIAGNOSIS — R06.02 SHORTNESS OF BREATH ON EXERTION: ICD-10-CM

## 2022-03-10 DIAGNOSIS — R01.1 CARDIAC MURMUR: Primary | ICD-10-CM

## 2022-03-10 DIAGNOSIS — I10 ESSENTIAL HYPERTENSION, MALIGNANT: ICD-10-CM

## 2022-03-10 DIAGNOSIS — E78.5 HYPERLIPIDEMIA, UNSPECIFIED HYPERLIPIDEMIA TYPE: ICD-10-CM

## 2022-03-10 DIAGNOSIS — E13.69 OTHER SPECIFIED DIABETES MELLITUS WITH OTHER SPECIFIED COMPLICATION, UNSPECIFIED WHETHER LONG TERM INSULIN USE (HCC): ICD-10-CM

## 2022-03-10 LAB
ALBUMIN SERPL BCP-MCNC: 4.1 G/DL (ref 3.5–5)
ALP SERPL-CCNC: 89 U/L (ref 46–116)
ALT SERPL W P-5'-P-CCNC: 38 U/L (ref 12–78)
ANION GAP SERPL CALCULATED.3IONS-SCNC: 4 MMOL/L (ref 4–13)
AST SERPL W P-5'-P-CCNC: 16 U/L (ref 5–45)
BASOPHILS # BLD AUTO: 0.05 THOUSANDS/ΜL (ref 0–0.1)
BASOPHILS NFR BLD AUTO: 1 % (ref 0–1)
BILIRUB SERPL-MCNC: 0.37 MG/DL (ref 0.2–1)
BUN SERPL-MCNC: 22 MG/DL (ref 5–25)
CALCIUM SERPL-MCNC: 9.3 MG/DL (ref 8.3–10.1)
CHLORIDE SERPL-SCNC: 109 MMOL/L (ref 100–108)
CHOLEST SERPL-MCNC: 157 MG/DL
CK MB SERPL-MCNC: 1.6 % (ref 0–2.5)
CK MB SERPL-MCNC: 2.6 NG/ML (ref 0–5)
CK SERPL-CCNC: 167 U/L (ref 39–308)
CO2 SERPL-SCNC: 27 MMOL/L (ref 21–32)
CREAT SERPL-MCNC: 1.06 MG/DL (ref 0.6–1.3)
EOSINOPHIL # BLD AUTO: 0.26 THOUSAND/ΜL (ref 0–0.61)
EOSINOPHIL NFR BLD AUTO: 3 % (ref 0–6)
ERYTHROCYTE [DISTWIDTH] IN BLOOD BY AUTOMATED COUNT: 13.1 % (ref 11.6–15.1)
EST. AVERAGE GLUCOSE BLD GHB EST-MCNC: 140 MG/DL
GFR SERPL CREATININE-BSD FRML MDRD: 72 ML/MIN/1.73SQ M
GLUCOSE P FAST SERPL-MCNC: 131 MG/DL (ref 65–99)
HBA1C MFR BLD: 6.5 %
HCT VFR BLD AUTO: 40.6 % (ref 36.5–49.3)
HDLC SERPL-MCNC: 38 MG/DL
HGB BLD-MCNC: 13.8 G/DL (ref 12–17)
IMM GRANULOCYTES # BLD AUTO: 0.06 THOUSAND/UL (ref 0–0.2)
IMM GRANULOCYTES NFR BLD AUTO: 1 % (ref 0–2)
LDH SERPL-CCNC: 151 U/L (ref 81–234)
LDLC SERPL CALC-MCNC: 84 MG/DL (ref 0–100)
LYMPHOCYTES # BLD AUTO: 2.25 THOUSANDS/ΜL (ref 0.6–4.47)
LYMPHOCYTES NFR BLD AUTO: 25 % (ref 14–44)
MCH RBC QN AUTO: 31.5 PG (ref 26.8–34.3)
MCHC RBC AUTO-ENTMCNC: 34 G/DL (ref 31.4–37.4)
MCV RBC AUTO: 93 FL (ref 82–98)
MONOCYTES # BLD AUTO: 0.84 THOUSAND/ΜL (ref 0.17–1.22)
MONOCYTES NFR BLD AUTO: 9 % (ref 4–12)
NEUTROPHILS # BLD AUTO: 5.51 THOUSANDS/ΜL (ref 1.85–7.62)
NEUTS SEG NFR BLD AUTO: 61 % (ref 43–75)
NONHDLC SERPL-MCNC: 119 MG/DL
NRBC BLD AUTO-RTO: 0 /100 WBCS
PLATELET # BLD AUTO: 185 THOUSANDS/UL (ref 149–390)
PMV BLD AUTO: 11.7 FL (ref 8.9–12.7)
POTASSIUM SERPL-SCNC: 4.2 MMOL/L (ref 3.5–5.3)
PROT SERPL-MCNC: 7.6 G/DL (ref 6.4–8.2)
RBC # BLD AUTO: 4.38 MILLION/UL (ref 3.88–5.62)
SODIUM SERPL-SCNC: 140 MMOL/L (ref 136–145)
TRIGL SERPL-MCNC: 177 MG/DL
WBC # BLD AUTO: 8.97 THOUSAND/UL (ref 4.31–10.16)

## 2022-03-10 PROCEDURE — 80053 COMPREHEN METABOLIC PANEL: CPT | Performed by: INTERNAL MEDICINE

## 2022-03-10 PROCEDURE — 36415 COLL VENOUS BLD VENIPUNCTURE: CPT

## 2022-03-10 PROCEDURE — 82553 CREATINE MB FRACTION: CPT

## 2022-03-10 PROCEDURE — 82550 ASSAY OF CK (CPK): CPT

## 2022-03-10 PROCEDURE — 93000 ELECTROCARDIOGRAM COMPLETE: CPT | Performed by: INTERNAL MEDICINE

## 2022-03-10 PROCEDURE — 99204 OFFICE O/P NEW MOD 45 MIN: CPT | Performed by: INTERNAL MEDICINE

## 2022-03-10 PROCEDURE — 3044F HG A1C LEVEL LT 7.0%: CPT | Performed by: INTERNAL MEDICINE

## 2022-03-10 PROCEDURE — 83615 LACTATE (LD) (LDH) ENZYME: CPT | Performed by: INTERNAL MEDICINE

## 2022-03-10 PROCEDURE — 1160F RVW MEDS BY RX/DR IN RCRD: CPT | Performed by: INTERNAL MEDICINE

## 2022-03-10 PROCEDURE — 85025 COMPLETE CBC W/AUTO DIFF WBC: CPT | Performed by: INTERNAL MEDICINE

## 2022-03-10 PROCEDURE — 80061 LIPID PANEL: CPT

## 2022-03-10 PROCEDURE — 3008F BODY MASS INDEX DOCD: CPT | Performed by: INTERNAL MEDICINE

## 2022-03-10 PROCEDURE — 83036 HEMOGLOBIN GLYCOSYLATED A1C: CPT

## 2022-03-10 NOTE — PROGRESS NOTES
[address] [facility]  [address], [facility] [place]  Tel: [phone]      NAME: Roland Brady  AGE: 78 y o  SEX: male  : 1955   MRN: [id number]      Chief Complaint:  Chief Complaint   Patient presents with    New Patient Visit         History of Present Illness:   Patient referred by PCP because of a cardiac murmur heard on physical exam     48-year-old male on chemotherapy for leukemia and has hypertension and hyperlipidemia for which he takes regular medications  Patient feels tired and SOB due to his leukemia chemotherapy but otherwise he denies chest pain / pressure, palpitations, lightheadedness, syncope, swelling feet, orthopnea, PND, claudication  HTN -  Has been hypertensive for many years  Taking medications regularly  Denies lightheadedness, headache, medication side effects  HLP -  Has had hyperlipidemia for many years  Taking statin regularly along with diet control  Denies myalgia  PCP closely monitoring the blood work  Obesity -  Trying to lose weight  Past Medical History:  Past Medical History:   Diagnosis Date    Anemia     Anxiety     B[facility] disorder [facility]     Cancer ([facility]     History of alcohol abuse     Hypertension     Hypertension     Insomnia     Leukemia ([facility]     [facility], [facility]     Psychiatric disorder     Sleep apnea with use of continuous positive airway pressure (CPAP)          Past Surgical History:  Past Surgical History:   Procedure Laterality Date    COLONOSCOPY      EGD AND COLONOSCOPY N/A 3/30/2018    Procedure: EGD AND COLONOSCOPY;  Surgeon: Theo Hernandez MD;  Location: [facility];   Service: Gastroenterology         Family History:  Family History   Problem Relation Age of Onset    Coronary artery disease Mother     Diabetes Mother     No Known Problems Father     Hepatitis Sister     Cancer Brother     Prostate cancer Brother     Early death Brother          Social History:  Social History     Socioeconomic History    Marital status: /Civil Union     Spouse name: None    Number of children: None    Years of education: None    Highest education level: None   Occupational History    None   Tobacco Use    Smoking status: Current Every Day Smoker     Packs/day: 1 00     Years: 40 00     Pack years: 40 00     Types: Cigarettes     Last attempt to quit: 10/13/2020     Years since quittin 4    Smokeless tobacco: Never Used   Vaping Use    Vaping Use: Never used   Substance and Sexual Activity    Alcohol use: No     Comment: last drink 2017    Drug use: No     Comment: hx of heroin and subutex abuse    Sexual activity: Not Currently   Other Topics Concern    None   Social History Narrative    None     Social Determinants of Health     Financial Resource Strain: Not on file   Food Insecurity: Not on file   Transportation Needs: Not on file   Physical Activity: Not on file   Stress: Not on file   Social Connections: Not on file   Intimate Partner Violence: Not on file   Housing Stability: Not on file         Active Problems:  Patient Active Problem List   Diagnosis    Hypertension    Depression    Polypharmacy    Bronchitis    Encephalopathy    Weakness    Chronic lymphocytic leukemia of B-cell type not having achieved remission (HCC)    Lactic acidosis    Leukocytosis    Elevated troponin    [facility]    Psychiatric disorder    Anemia    Peripheral edema    CLL (chronic lymphocytic leukemia) [facility]    RA (acute kidney injury) ([facility]    Chronic obstructive pulmonary disease (HCC)    Expiratory wheezing    Type 2 diabetes mellitus with stage 2 chronic kidney disease (HCC)    Lymph node enlargement    Body mass index (BMI) 40 0-44 9, adult    Stage 2 chronic kidney disease    Type 2 diabetes mellitus, without long-term current use of insulin ([facility]    Acute pain of right shoulder         The following portions of the patient's history were reviewed and updated as appropriate: past medical history, past surgical history, past family history,  past social history, current medications, allergies and problem list       Review of Systems:  Constitutional: Denies fever, chills  +tired  Eyes: Denies eye redness, eye discharge  ENT: Denies hearing loss, sneezing, nasal discharge, sore throat   Respiratory: Denies cough, expectoration  +shortness of breath  Cardiovascular: Denies chest pain, palpitations, lower extremity swelling  Gastrointestinal: Denies abdominal pain, vomiting, diarrhea  Genito-Urinary: Denies dysuria, incontinence  Musculoskeletal: Denies back pain, joint pain  Neurologic: Denies syncope, headache, seizures  Endocrine: Denies polydipsia, temperature intolerance  Allergy and Immunology: Denies hives, insect bite sensitivity  Hematological and Lymphatic: Denies bleeding problems, swollen glands   Psychological: Denies depression, suicidal ideation, anxiety, panic  Dermatological: Denies pruritus, rash, skin lesion changes      Vitals:  Vitals:    03/10/22 1512   BP: 150/88   Pulse: 68   Resp: 16   SpO2: 98%       Body mass index is 37 1 kg/m²  Weight (last 2 days)     Date/Time Weight    03/10/22 1512 121 (266)            Physical Examination:  General:  Looking tired  Awake, alert, oriented in time, place and person  Responding to commands  Head: Normocephalic  Atraumatic  Eyes: Both pupils normal sized, round and reactive to light  Nonicteric  ENT: Normal external ear canals  Neck: Supple  JVP not raised  Trachea central  No thyromegaly  Lungs: Bilateral bronchovascular breath sounds with no crackles or rhonchi  Chest wall: No tenderness  Cardiovascular: RRR  S1 and S2 normal  Grade 3/6 LANCE at base+  Gastrointestinal: Abdomen soft, nontender  No guarding or rigidity  Liver and spleen not palpable   Bowel sounds present  Neurologic: Patient is awake, alert, oriented in time, place and person  Responding to commands  Moving all extremities  Integumentary:  No skin rash  Lymphatic: No cervical lymphadenopathy  Back: Symmetric   No CVA tenderness  Extremities: No clubbing, cyanosis or edema      Laboratory Results:  CBC with diff:   Lab Results   Component Value Date    WBC 8 38 2021    RBC 4 54 2021    HGB 14 4 2021    HCT 43 2 2021    MCV 95 2021    MCH 31 7 2021    RDW 13 5 2021     2021       CMP:  Lab Results   Component Value Date    CREATININE 0 91 2021    BUN 15 2021    K 4 1 2021     2021    CO2 29 2021    ALKPHOS 85 2021    ALT 51 2021    AST 16 2021    BILIDIR 0 10 2018       Lab Results   Component Value Date    HGBA1C 6 5 (H) 2021    MG 2 8 (H) 2018    PHOS 4 3 (H) 2018       Lab Results   Component Value Date    TROPONINI 0 05 (H) 2018    TROPONINI 0 10 (H) 2018    TROPONINI 0 12 (H) 2018    CKTOTAL 81 2018       Lipid Profile:   No results found for: CHOL  Lab Results   Component Value Date    HDL 46 2021    HDL 48 2020    HDL 52 2018     Lab Results   Component Value Date    LDLCALC 209 (H) 2021    LDLCALC 107 (H) 2020    LDLCALC 87 2018     Lab Results   Component Value Date    TRIG 200 (H) 2021    TRIG 125 2020    TRIG 253 (H) 2018       Cardiac testing:   Results for orders placed during the hospital encounter of 18    Echo complete with contrast if indicated    [address]  [address] [facility]  [phone]    Transthoracic Echocardiogram  2D, M-mode, Doppler, and Color Doppler    Study date:  2018    Patient: Asif Schmitz  MR number: [id number]  Account number: [de-identified]  : 1955  Age: 60 years  Gender: Male  Status: Outpatient  Location: Echo lab  Height: 70 in  Weight: 307 3 lb  BP: 124/ 72 mmHg    Indications: Dyspnea    Diagnoses: R06 00 - Dyspnea, unspecified    Sonographer:  MORGAN Gillespie  Primary Physician:  Patti Smith MD  Referring Physician:  Jany Porter MD  Group[facility]  [facility]  Interpreting Physician:  Jose M Mayorga DO    SUMMARY    LEFT VENTRICLE:  Systolic function was at the lower limits of normal by visual assessment  Ejection fraction was estimated to be 50 %  There were no regional wall motion abnormalities  There was mild concentric hypertrophy  Doppler parameters were consistent with abnormal left ventricular relaxation (grade 1 diastolic dysfunction)  LEFT ATRIUM:  The atrium was mildly dilated  MITRAL VALVE:  There was mild regurgitation  AORTIC VALVE:  There was no evidence for stenosis  There was no regurgitation  TRICUSPID VALVE:  There was mild regurgitation  Pulmonary artery systolic pressure was mildly increased  HISTORY: PRIOR HISTORY: HTN, CLL, Anemia, Bipolar, DM    PROCEDURE: The procedure was performed in the echo lab  This was a routine study  The transthoracic approach was used  The study included complete 2D imaging, M-mode, complete spectral Doppler, and color Doppler  The heart rate was 70 bpm,  at the start of the study  Images were not obtained from the subcostal acoustic windows  Echocardiographic views were limited due to restricted patient mobility, poor acoustic window availability, decreased penetration, and lung  interference  This was a technically difficult study  LEFT VENTRICLE: Size was normal  Systolic function was at the lower limits of normal by visual assessment  Ejection fraction was estimated to be 50 %  There were no regional wall motion abnormalities  There was mild concentric hypertrophy  DOPPLER: Doppler parameters were consistent with abnormal left ventricular relaxation (grade 1 diastolic dysfunction)      RIGHT VENTRICLE: The size was normal  Systolic function was normal  DOPPLER: Systolic pressure was within the normal range  LEFT ATRIUM: The atrium was mildly dilated  No thrombus was identified  RIGHT ATRIUM: Size was normal     MITRAL VALVE: Valve structure was normal  There was normal leaflet separation  No echocardiographic evidence for prolapse  DOPPLER: The transmitral velocity was within the normal range  There was no evidence for stenosis  There was mild  regurgitation  AORTIC VALVE: The valve was trileaflet  Leaflets exhibited normal thickness and normal cuspal separation  DOPPLER: Transaortic velocity was within the normal range  There was no evidence for stenosis  There was no regurgitation  TRICUSPID VALVE: The valve structure was normal  There was normal leaflet separation  DOPPLER: The transtricuspid velocity was within the normal range  There was mild regurgitation  Pulmonary artery systolic pressure was mildly increased  Estimated peak PA pressure was 43 mmHg  PULMONIC VALVE: Leaflets exhibited normal thickness, no calcification, and normal cuspal separation  DOPPLER: The transpulmonic velocity was within the normal range  There was no regurgitation  PERICARDIUM: There was no thickening  There was no pericardial effusion  AORTA: The root exhibited normal size  PULMONARY ARTERY: The size was normal  The morphology appeared normal     SYSTEM MEASUREMENT TABLES    2D mode  AoR Diam 2D: 3 4 cm  LA Diam (2D): 3 4 cm  LA/Ao (2D): 1  FS (2D Teich): 24 9 %  IVSd (2D): 1 05 cm  LVDEV: 72 5 cm³  LVESV: 36 4 cm³  LVIDd(2D): 4 06 cm  LVISd (2D): 3 05 cm  LVOT Area 2D: 3 14 cm squared  LVPWd (2D): 1 07 cm  SV (Teich): 36 1 cm³    Apical four chamber  LVEF A4C: 49 %    Unspecified Scan Mode  LEXI Cont Eq (Peak Arnulfo): 2 14 cm squared  LVOT (VTI): 22 9 cm  LVOT Diam : 2 cm  LVOT Vmax: 1150 mm/s  LVOT Vmax; Mean: 1150 mm/s  Peak Grad ; Mean: 5 mm[Hg]  SV (LVOT): 72 cm³  VTI;Mean: 3 mm[Hg]  MV Peak A Arnulfo: 925 mm/s  MV Peak E Arnulfo   Mean: 710 mm/s  MVA (PHT): 3 93 cm squared  PHT: 56 ms  Max P mm[Hg]  V Max: 2980 mm/s  Vmax: 2970 mm/s  RA Area: 17 3 cm squared  RA Volume: 45 2 cm³  TAPSE: 2 5 cm    [facility]    Prepared and electronically signed by    Becky Ann DO  Signed 2018 12:08:39      EKG:  Reviewed by me   03/10/2022  Sinus rhythm  Possible LAFB      Medications:    Current Outpatient Medications:     ALPRAZolam (XANAX) 1 mg tablet, , Disp: , Rfl: 3    FLUoxetine (PROzac) 40 MG capsule, , Disp: , Rfl:     furosemide (LASIX) 40 mg tablet, Take 1 tablet (40 mg total) by mouth daily, Disp: 30 tablet, Rfl: 10    Ibrutinib 140 MG TABS, Take by mouth, Disp: , Rfl:     lisinopril (ZESTRIL) 40 mg tablet, Take 1 tablet (40 mg total) by mouth daily, Disp: 90 tablet, Rfl: 3    potassium chloride (K-DUR,KLOR-CON) 20 mEq tablet, Take 1 tablet (20 mEq total) by mouth daily, Disp: 90 tablet, Rfl: 3    risperiDONE (RisperDAL) 1 mg tablet, Take 1 mg by mouth, Disp: , Rfl:       Allergies:  No Known Allergies      Assessment and Plan:  1  Cardiac murmur  EKG done in the clinic reviewed the patient and spouse  2D echocardiogram ordered for evaluation    2  Essential hypertension  BP high  Patient unclear regarding his home medications which he will call in tomorrow  If BP still high at home, will adjust medications    3  Mixed hyperlipidemia  Continue statin and diet control  His PCP closely monitors blood    4  Obesity (BMI 30-39  9)  States he is trying to lose weight    Recommend aggressive risk factor modification and therapeutic lifestyle changes  Low-salt, low-calorie, low-fat, low-cholesterol diet with regular exercise and to optimize weight  I will defer the ordering and monitoring of necessity lab studies to you, but I am available and happy to review and manage any of the data at your request in the future      Discussed concepts of atherosclerosis, including signs and symptoms of cardiac disease  Previous studies were reviewed  Safety measures were reviewed  Questions were entertained and answered  Patient was advised to report any problems requiring medical attention  Follow-up with PCP and appropriate specialist and lab work as discussed  Return for follow up visit as scheduled or earlier, if needed  Thank you for allowing me to participate in the care and evaluation of your patient  Should you have any questions, please feel free to contact me        Jessica Menjivar MD  3/10/2022,4:28 PM

## 2022-03-11 ENCOUNTER — TELEPHONE (OUTPATIENT)
Dept: CARDIOLOGY CLINIC | Facility: CLINIC | Age: 67
End: 2022-03-11

## 2022-03-11 RX ORDER — MIRTAZAPINE 45 MG/1
45 TABLET, FILM COATED ORAL
COMMUNITY
End: 2022-05-02 | Stop reason: ALTCHOICE

## 2022-03-11 RX ORDER — ROSUVASTATIN CALCIUM 10 MG/1
10 TABLET, COATED ORAL DAILY
COMMUNITY

## 2022-03-11 NOTE — TELEPHONE ENCOUNTER
Wife called clarifying pt medication and stated that pt is not  Actively taking Furosemide  Pts wife stated that pt has been on daily fiber and swelling went down  Pt does not have Furosemide pills left and wife stated pt has not been on medication for 2 months     Call [phone]

## 2022-03-21 ENCOUNTER — APPOINTMENT (EMERGENCY)
Dept: CT IMAGING | Facility: HOSPITAL | Age: 67
End: 2022-03-21
Payer: COMMERCIAL

## 2022-03-21 ENCOUNTER — TELEPHONE (OUTPATIENT)
Dept: HEMATOLOGY ONCOLOGY | Facility: CLINIC | Age: 67
End: 2022-03-21

## 2022-03-21 ENCOUNTER — HOSPITAL ENCOUNTER (EMERGENCY)
Facility: HOSPITAL | Age: 67
Discharge: HOME | End: 2022-03-21
Attending: EMERGENCY MEDICINE | Admitting: EMERGENCY MEDICINE
Payer: COMMERCIAL

## 2022-03-21 VITALS
OXYGEN SATURATION: 95 % | HEART RATE: 72 BPM | RESPIRATION RATE: 18 BRPM | SYSTOLIC BLOOD PRESSURE: 154 MMHG | TEMPERATURE: 97 F | DIASTOLIC BLOOD PRESSURE: 87 MMHG

## 2022-03-21 DIAGNOSIS — N20.1 URETEROLITHIASIS: Primary | ICD-10-CM

## 2022-03-21 LAB
ALBUMIN SERPL BCP-MCNC: 4.1 G/DL (ref 3.5–5)
ALP SERPL-CCNC: 103 U/L (ref 46–116)
ALT SERPL W P-5'-P-CCNC: 43 U/L (ref 12–78)
ANION GAP SERPL CALCULATED.3IONS-SCNC: 5 MMOL/L (ref 4–13)
AST SERPL W P-5'-P-CCNC: 17 U/L (ref 5–45)
BACTERIA UR QL AUTO: ABNORMAL /HPF
BASOPHILS # BLD AUTO: 0.05 THOUSANDS/ΜL (ref 0–0.1)
BASOPHILS NFR BLD AUTO: 1 % (ref 0–1)
BILIRUB SERPL-MCNC: 0.37 MG/DL (ref 0.2–1)
BILIRUB UR QL STRIP: NEGATIVE
BUN SERPL-MCNC: 17 MG/DL (ref 5–25)
CALCIUM SERPL-MCNC: 8.9 MG/DL (ref 8.3–10.1)
CHLORIDE SERPL-SCNC: 102 MMOL/L (ref 100–108)
CLARITY UR: ABNORMAL
CO2 SERPL-SCNC: 28 MMOL/L (ref 21–32)
COLOR UR: YELLOW
CREAT SERPL-MCNC: 1.01 MG/DL (ref 0.6–1.3)
EOSINOPHIL # BLD AUTO: 0.14 THOUSAND/ΜL (ref 0–0.61)
EOSINOPHIL NFR BLD AUTO: 1 % (ref 0–6)
ERYTHROCYTE [DISTWIDTH] IN BLOOD BY AUTOMATED COUNT: 13 % (ref 11.6–15.1)
GFR SERPL CREATININE-BSD FRML MDRD: 76 ML/MIN/1.73SQ M
GLUCOSE SERPL-MCNC: 159 MG/DL (ref 65–140)
GLUCOSE UR STRIP-MCNC: NEGATIVE MG/DL
HCT VFR BLD AUTO: 40.8 % (ref 36.5–49.3)
HGB BLD-MCNC: 14.1 G/DL (ref 12–17)
HGB UR QL STRIP.AUTO: ABNORMAL
IMM GRANULOCYTES # BLD AUTO: 0.05 THOUSAND/UL (ref 0–0.2)
IMM GRANULOCYTES NFR BLD AUTO: 1 % (ref 0–2)
KETONES UR STRIP-MCNC: NEGATIVE MG/DL
LEUKOCYTE ESTERASE UR QL STRIP: NEGATIVE
LYMPHOCYTES # BLD AUTO: 2.4 THOUSANDS/ΜL (ref 0.6–4.47)
LYMPHOCYTES NFR BLD AUTO: 24 % (ref 14–44)
MCH RBC QN AUTO: 31.8 PG (ref 26.8–34.3)
MCHC RBC AUTO-ENTMCNC: 34.6 G/DL (ref 31.4–37.4)
MCV RBC AUTO: 92 FL (ref 82–98)
MONOCYTES # BLD AUTO: 0.65 THOUSAND/ΜL (ref 0.17–1.22)
MONOCYTES NFR BLD AUTO: 7 % (ref 4–12)
NEUTROPHILS # BLD AUTO: 6.55 THOUSANDS/ΜL (ref 1.85–7.62)
NEUTS SEG NFR BLD AUTO: 66 % (ref 43–75)
NITRITE UR QL STRIP: NEGATIVE
NON-SQ EPI CELLS URNS QL MICRO: ABNORMAL /HPF
NRBC BLD AUTO-RTO: 0 /100 WBCS
PH UR STRIP.AUTO: 6 [PH]
PLATELET # BLD AUTO: 183 THOUSANDS/UL (ref 149–390)
PMV BLD AUTO: 11 FL (ref 8.9–12.7)
POTASSIUM SERPL-SCNC: 4.3 MMOL/L (ref 3.5–5.3)
PROT SERPL-MCNC: 7.5 G/DL (ref 6.4–8.2)
PROT UR STRIP-MCNC: NEGATIVE MG/DL
RBC # BLD AUTO: 4.43 MILLION/UL (ref 3.88–5.62)
RBC #/AREA URNS AUTO: ABNORMAL /HPF
SODIUM SERPL-SCNC: 135 MMOL/L (ref 136–145)
SP GR UR STRIP.AUTO: 1.01 (ref 1–1.03)
UROBILINOGEN UR QL STRIP.AUTO: 0.2 E.U./DL
WBC # BLD AUTO: 9.84 THOUSAND/UL (ref 4.31–10.16)
WBC #/AREA URNS AUTO: ABNORMAL /HPF

## 2022-03-21 PROCEDURE — 36415 COLL VENOUS BLD VENIPUNCTURE: CPT | Performed by: EMERGENCY MEDICINE

## 2022-03-21 PROCEDURE — 81001 URINALYSIS AUTO W/SCOPE: CPT | Performed by: EMERGENCY MEDICINE

## 2022-03-21 PROCEDURE — 99284 EMERGENCY DEPT VISIT MOD MDM: CPT

## 2022-03-21 PROCEDURE — 74176 CT ABD & PELVIS W/O CONTRAST: CPT

## 2022-03-21 PROCEDURE — 85025 COMPLETE CBC W/AUTO DIFF WBC: CPT | Performed by: EMERGENCY MEDICINE

## 2022-03-21 PROCEDURE — 99284 EMERGENCY DEPT VISIT MOD MDM: CPT | Performed by: EMERGENCY MEDICINE

## 2022-03-21 PROCEDURE — 96360 HYDRATION IV INFUSION INIT: CPT

## 2022-03-21 PROCEDURE — 80053 COMPREHEN METABOLIC PANEL: CPT | Performed by: EMERGENCY MEDICINE

## 2022-03-21 RX ORDER — HYDROCODONE BITARTRATE AND ACETAMINOPHEN 5; 325 MG/1; MG/1
1 TABLET ORAL EVERY 6 HOURS PRN
Qty: 12 TABLET | Refills: 0 | Status: SHIPPED | OUTPATIENT
Start: 2022-03-21 | End: 2022-03-31

## 2022-03-21 RX ORDER — TAMSULOSIN HYDROCHLORIDE 0.4 MG/1
0.4 CAPSULE ORAL
Qty: 30 CAPSULE | Refills: 0 | Status: SHIPPED | OUTPATIENT
Start: 2022-03-21 | End: 2022-05-02 | Stop reason: CLARIF

## 2022-03-21 RX ADMIN — SODIUM CHLORIDE 1000 ML: 0.9 INJECTION, SOLUTION INTRAVENOUS at 12:54

## 2022-03-21 NOTE — TELEPHONE ENCOUNTER
Patients emergency contact, Damaris Mirza, called in wanting to see blood test results from 3/10/22 where in chart  Confirmed with Damaris Mirza

## 2022-03-21 NOTE — ED PROVIDER NOTES
History  Chief Complaint   Patient presents with    Blood in Urine     hematuria x 3 days; hx of kidney stones with same last year      HPI  50-year-old male presents with blood in his urine for the past 3 days  He denies any pain  He has history of kidney stones  He denies any difficulty urinating  No fevers or chills  Denies testicular pain  Prior to Admission Medications   Prescriptions Last Dose Informant Patient Reported? Taking?    ALPRAZolam (XANAX) 1 mg tablet  Spouse/Significant Other Yes No   FLUoxetine (PROzac) 40 MG capsule  Spouse/Significant Other Yes No   IBRUTINIB PO   Yes No   Sig: Take 180 mg by mouth in the morning   Psyllium (DAILY FIBER PO)   Yes No   Sig: Take 1 capsule by mouth in the morning   furosemide (LASIX) 40 mg tablet  Spouse/Significant Other No No   Sig: Take 1 tablet (40 mg total) by mouth daily   Patient not taking: Reported on 3/11/2022    lisinopril (ZESTRIL) 40 mg tablet  Spouse/Significant Other No No   Sig: Take 1 tablet (40 mg total) by mouth daily   mirtazapine (REMERON) 45 MG tablet   Yes No   Sig: Take 45 mg by mouth daily at bedtime   potassium chloride (K-DUR,KLOR-CON) 20 mEq tablet  Spouse/Significant Other No No   Sig: Take 1 tablet (20 mEq total) by mouth daily   risperiDONE (RisperDAL) 1 mg tablet  Spouse/Significant Other Yes No   Sig: Take 1 mg by mouth 2 (two) times a day     rosuvastatin (CRESTOR) 10 MG tablet   Yes No   Sig: Take 10 mg by mouth daily      Facility-Administered Medications: None       Past Medical History:   Diagnosis Date    Anemia     Anxiety     Bipolar disorder (HCC)     Cancer (HCC)     History of alcohol abuse     Hypertension     Hypertension     Insomnia     [facility] [address] )     [facility], [facility]     Psychiatric disorder     Sleep apnea with use of continuous positive airway pressure (CPAP)        Past Surgical History:   Procedure Laterality Date    COLONOSCOPY      EGD AND COLONOSCOPY N/A 3/30/2018 Procedure: EGD AND COLONOSCOPY;  Surgeon: Jaye Haider MD;  Location: [facility] GI LAB; Service: Gastroenterology       Family History   Problem Relation Age of Onset    Coronary artery disease Mother     Diabetes Mother     No Known Problems Father     Hepatitis Sister     Cancer Brother     Prostate cancer Brother     Early death Brother      I have reviewed and agree with the history as documented  E-Cigarette/Vaping    E-Cigarette Use Never User      E-Cigarette/Vaping Substances     Social History     Tobacco Use    Smoking status: Current Every Day Smoker     Packs/day: 1 00     Years: 40 00     Pack years: 40 00     Types: Cigarettes     Last attempt to quit: 10/13/2020     Years since quittin 4    Smokeless tobacco: Never Used   Vaping Use    Vaping Use: Never used   Substance Use Topics    Alcohol use: No     Comment: last drink 2017    Drug use: No     Comment: hx of heroin and subutex abuse       Review of Systems   Constitutional: Negative for chills and fever  HENT: Negative for dental problem and ear pain  Eyes: Negative for pain and redness  Respiratory: Negative for cough and shortness of breath  Cardiovascular: Negative for chest pain and palpitations  Gastrointestinal: Negative for abdominal pain and nausea  Endocrine: Negative for polydipsia and polyphagia  Genitourinary: Positive for hematuria  Negative for dysuria and frequency  Musculoskeletal: Negative for arthralgias and joint swelling  Skin: Negative for color change and rash  Neurological: Negative for dizziness and headaches  Psychiatric/Behavioral: Negative for behavioral problems and confusion  All other systems reviewed and are negative  Physical Exam  Physical Exam  Vitals and nursing note reviewed  Constitutional:       General: He is not in acute distress  Appearance: He is well-developed  He is not diaphoretic  HENT:      Head: Atraumatic        Right Ear: External ear normal       Left Ear: External ear normal       Nose: Nose normal    Eyes:      Conjunctiva/sclera: Conjunctivae normal       Pupils: Pupils are equal, round, and reactive to light  Neck:      Vascular: No JVD  Cardiovascular:      Rate and Rhythm: Normal rate and regular rhythm  Heart sounds: Normal heart sounds  No murmur heard  Pulmonary:      Effort: Pulmonary effort is normal  No respiratory distress  Breath sounds: Normal breath sounds  No wheezing  Abdominal:      General: Bowel sounds are normal  There is no distension  Palpations: Abdomen is soft  Tenderness: There is no abdominal tenderness  Musculoskeletal:         General: Normal range of motion  Cervical back: Normal range of motion and neck supple  Skin:     General: Skin is warm and dry  Capillary Refill: Capillary refill takes less than 2 seconds  Neurological:      Mental Status: He is alert and oriented to person, place, and time  Cranial Nerves: No cranial nerve deficit     Psychiatric:         Behavior: Behavior normal          Vital Signs  ED Triage Vitals [03/21/22 1229]   Temperature Pulse Respirations Blood Pressure SpO2   (!) 97 °F (36 1 °C) 72 18 154/87 95 %      Temp Source Heart Rate Source Patient Position - Orthostatic VS BP Location FiO2 (%)   Tympanic Monitor Sitting Left arm --      Pain Score       --           Vitals:    03/21/22 1229   BP: 154/87   Pulse: 72   Patient Position - Orthostatic VS: Sitting         Visual Acuity      ED Medications  Medications   sodium chloride 0 9 % bolus 1,000 mL (1,000 mL Intravenous New Bag 3/21/22 1254)       Diagnostic Studies  Results Reviewed     Procedure Component Value Units Date/Time    Comprehensive metabolic panel [[id number]]  (Abnormal) Collected: 03/21/22 1252    Lab Status: Final result Specimen: Blood from Arm, Right Updated: 03/21/22 1316     Sodium 135 mmol/L      Potassium 4 3 mmol/L      Chloride 102 mmol/L      CO2 28 mmol/L ANION GAP 5 mmol/L      BUN 17 mg/dL      Creatinine 1 01 mg/dL      Glucose 159 mg/dL      Calcium 8 9 mg/dL      AST 17 U/L      ALT 43 U/L      Alkaline Phosphatase 103 U/L      Total Protein 7 5 g/dL      Albumin 4 1 g/dL      Total Bilirubin 0 37 mg/dL      eGFR 76 ml/min/1 73sq m     Narrative:      National Kidney Disease Foundation guidelines for Chronic Kidney Disease (CKD):     Stage 1 with normal or high GFR (GFR > 90 mL/min/1 73 square meters)    Stage 2 Mild CKD (GFR = 60-89 mL/min/1 73 square meters)    Stage 3A Moderate CKD (GFR = 45-59 mL/min/1 73 square meters)    Stage 3B Moderate CKD (GFR = 30-44 mL/min/1 73 square meters)    Stage 4 Severe CKD (GFR = 15-29 mL/min/1 73 square meters)    Stage 5 End Stage CKD (GFR <15 mL/min/1 73 square meters)  Note: GFR calculation is accurate only with a steady state creatinine    UA w Reflex to Microscopic w Reflex to Culture [[id number]]  (Abnormal) Collected: 03/21/22 1241    Lab Status: Final result Specimen: Urine, Clean Catch Updated: 03/21/22 1314     Color, UA Yellow     Clarity, UA Slightly Cloudy     Specific Gravity, UA 1 015     pH, UA 6 0     Leukocytes, UA Negative     Nitrite, UA Negative     Protein, UA Negative mg/dl      Glucose, UA Negative mg/dl      Ketones, UA Negative mg/dl      Urobilinogen, UA 0 2 E U /dl      Bilirubin, UA Negative     Blood, UA Large    Urine Microscopic [[id number]] Collected: 03/21/22 1241    Lab Status:  In process Specimen: Urine, Clean Catch Updated: 03/21/22 1314    CBC and differential [[id number]] Collected: 03/21/22 1252    Lab Status: Final result Specimen: Blood from Arm, Right Updated: 03/21/22 1257     WBC 9 84 Thousand/uL      RBC 4 43 Million/uL      Hemoglobin 14 1 g/dL      Hematocrit 40 8 %      MCV 92 fL      MCH 31 8 pg      MCHC 34 6 g/dL      RDW 13 0 %      MPV 11 0 fL      Platelets 529 Thousands/uL      nRBC 0 /100 WBCs      Neutrophils Relative 66 %      Immat GRANS % 1 % Lymphocytes Relative 24 %      Monocytes Relative 7 %      Eosinophils Relative 1 %      Basophils Relative 1 %      Neutrophils Absolute 6 55 Thousands/µL      Immature Grans Absolute 0 05 Thousand/uL      Lymphocytes Absolute 2 40 Thousands/µL      Monocytes Absolute 0 65 Thousand/µL      Eosinophils Absolute 0 14 Thousand/µL      Basophils Absolute 0 05 Thousands/µL                  CT renal stone study abdomen pelvis wo contrast   Final Result by Cristiane Madden MD (03/21 1333)      3 mm calculus in the distal right ureter just proximal to the UVJ with trace upstream hydronephroureterosis  This study demonstrates a significant  finding and was documented as such in [facility] for liaison and referring practitioner notification  Workstation performed: [id number]                    Procedures  Procedures         ED Course                               SBIRT 20yo+      Most Recent Value   SBIRT (25 yo +)    In order to provide better care to our patients, we are screening all of our patients for alcohol and drug use  Would it be okay to ask you these screening questions? Yes Filed at: 03/21/2022 1245   Initial Alcohol Screen: US AUDIT-C     1  How often do you have a drink containing alcohol? 1 Filed at: 03/21/2022 1245   2  How many drinks containing alcohol do you have on a typical day you are drinking? 0 Filed at: 03/21/2022 1245   3a  Male UNDER 65: How often do you have five or more drinks on one occasion? 0 Filed at: 03/21/2022 1245   3b  FEMALE Any Age, or MALE 65+: How often do you have 4 or more drinks on one occassion? 0 Filed at: 03/21/2022 1245   Audit-C Score 1 Filed at: 03/21/2022 1245   DARIO: How many times in the past year have you    Used an illegal drug or used a prescription medication for non-medical reasons? Never Filed at: 03/21/2022 1245                    MDM  CT shows 3 mm stone distal R ureter  No signs of RA or infection   No pain currently however will rx pain control, close urology follow up and return precautions given  Disposition  Final diagnoses:   Ureterolithiasis     Time reflects when diagnosis was documented in both MDM as applicable and the Disposition within this note     Time User Action Codes Description Comment    3/21/2022  1:43 PM ALEKSANDRA Pink  [address] [[address]] Ureterolithiasis       ED Disposition     ED Disposition Condition Date/Time Comment    Discharge Stable Mon Mar 21, 2022  1:48 PM Meryl Ishaan discharge to home/self care  Follow-up Information     Follow up With Specialties Details Why Contact Info Additional [address] For Urology [facility] Urology Schedule an appointment as soon as possible for a visit   [address]  [address] [address]  701  [facility] Urology [facility], [address] [facility], [facility] [address], [place], [place], [address] [phone]          Patient's Medications   Discharge Prescriptions    HYDROCODONE-ACETAMINOPHEN (NORCO) 5-325 MG PER TABLET    Take 1 tablet by mouth every 6 (six) hours as needed for pain for up to 10 days Max Daily Amount: 4 tablets       Start Date: 3/21/2022 End Date: 3/31/2022       Order Dose: 1 tablet       Quantity: 12 tablet    Refills: 0    TAMSULOSIN (FLOMAX) 0 4 MG    Take 1 capsule (0 4 mg total) by mouth daily with dinner       Start Date: 3/21/2022 End Date: --       Order Dose: 0 4 mg       Quantity: 30 capsule    Refills: 0       No discharge procedures on file      PDMP Review       Value Time User    PDMP Reviewed  Yes 4/13/2021  5:38 PM [employer], DO          ED Provider  Electronically Signed by           Jonah Orozco MD  03/21/22 8163

## 2022-03-21 NOTE — DISCHARGE INSTRUCTIONS
Take flomax as prescribed, norco for severe pain do not drive while taking this as it can make you drowsy, motrin over the counter for mild to moderate pain   Make appointment with urologist

## 2022-03-22 ENCOUNTER — TELEPHONE (OUTPATIENT)
Dept: UROLOGY | Facility: MEDICAL CENTER | Age: 67
End: 2022-03-22

## 2022-03-22 NOTE — TELEPHONE ENCOUNTER
Patient of Dr Estefany Guzman at [facility]    Patient called stating he was at [facility] and the medication was sent to pharmacy and they are out of stock  He will call the pharmacy again to see if they are ordering more medication for him   He is to take tamsulosin (FLOMAX) 0 4 mg  HYDROcodone-acetaminophen (NORCO) 5-325 mg per tablet

## 2022-03-23 ENCOUNTER — TELEPHONE (OUTPATIENT)
Dept: UROLOGY | Facility: MEDICAL CENTER | Age: 67
End: 2022-03-23

## 2022-04-13 ENCOUNTER — HOSPITAL ENCOUNTER (OUTPATIENT)
Dept: NON INVASIVE DIAGNOSTICS | Facility: CLINIC | Age: 67
Discharge: HOME | End: 2022-04-13
Payer: COMMERCIAL

## 2022-04-13 VITALS
HEART RATE: 71 BPM | DIASTOLIC BLOOD PRESSURE: 87 MMHG | BODY MASS INDEX: 37.24 KG/M2 | SYSTOLIC BLOOD PRESSURE: 154 MMHG | HEIGHT: 71 IN | WEIGHT: 266 LBS

## 2022-04-13 DIAGNOSIS — R01.1 CARDIAC MURMUR: ICD-10-CM

## 2022-04-13 LAB
AORTIC ROOT: 3.9 CM
AORTIC VALVE MEAN VELOCITY: 12.9 M/S
APICAL FOUR CHAMBER EJECTION FRACTION: 66 %
AV LVOT MEAN GRADIENT: 4 MMHG
AV LVOT PEAK GRADIENT: 7 MMHG
AV MEAN GRADIENT: 8 MMHG
AV PEAK GRADIENT: 15 MMHG
AV VELOCITY RATIO: 0.67
DOP CALC AO PEAK VEL: 1.96 M/S
DOP CALC AO VTI: 36.32 CM
DOP CALC LVOT PEAK VEL VTI: 25.34 CM
DOP CALC LVOT PEAK VEL: 1.31 M/S
E WAVE DECELERATION TIME: 268 MS
FRACTIONAL SHORTENING: 40 % (ref 28–44)
INTERVENTRICULAR SEPTUM IN DIASTOLE (PARASTERNAL SHORT AXIS VIEW): 1.6 CM
INTERVENTRICULAR SEPTUM: 1.6 CM (ref 0.58–1.09)
LAAS-AP2: 18.9 CM2
LAAS-AP4: 19.3 CM2
LEFT ATRIUM AREA SYSTOLE SINGLE PLANE A4C: 19.2 CM2
LEFT ATRIUM SIZE: 3.6 CM
LEFT INTERNAL DIMENSION IN SYSTOLE: 2.4 CM (ref 6.71–10.17)
LEFT VENTRICULAR INTERNAL DIMENSION IN DIASTOLE: 4 CM (ref 11.41–17)
LEFT VENTRICULAR POSTERIOR WALL IN END DIASTOLE: 1.5 CM (ref 0.57–1.08)
LEFT VENTRICULAR STROKE VOLUME: 52 ML
LVSV (TEICH): 52 ML
MV E'TISSUE VEL-LAT: 8 CM/S
MV PEAK A VEL: 0.78 M/S
MV PEAK E VEL: 68 CM/S
MV STENOSIS PRESSURE HALF TIME: 78 MS
MV VALVE AREA P 1/2 METHOD: 2.82 CM2
RIGHT ATRIUM AREA SYSTOLE A4C: 14.2 CM2
RIGHT VENTRICLE ID DIMENSION: 3.4 CM
SL CV LEFT ATRIUM LENGTH A2C: 5.2 CM
SL CV LV EF: 60
SL CV PED ECHO LEFT VENTRICLE DIASTOLIC VOLUME (MOD BIPLANE) 2D: 72 ML
SL CV PED ECHO LEFT VENTRICLE SYSTOLIC VOLUME (MOD BIPLANE) 2D: 20 ML
Z-SCORE OF INTERVENTRICULAR SEPTUM IN END DIASTOLE: 5.88
Z-SCORE OF LEFT VENTRICULAR DIMENSION IN END DIASTOLE: -12.47
Z-SCORE OF LEFT VENTRICULAR DIMENSION IN END SYSTOLE: -9.77
Z-SCORE OF LEFT VENTRICULAR POSTERIOR WALL IN END DIASTOLE: 5.22

## 2022-04-13 PROCEDURE — 93306 TTE W/DOPPLER COMPLETE: CPT | Performed by: INTERNAL MEDICINE

## 2022-04-13 PROCEDURE — 93306 TTE W/DOPPLER COMPLETE: CPT

## 2022-04-19 ENCOUNTER — TELEPHONE (OUTPATIENT)
Dept: HEMATOLOGY ONCOLOGY | Facility: CLINIC | Age: 67
End: 2022-04-19

## 2022-04-19 NOTE — TELEPHONE ENCOUNTER
Spoke w/ Branda Cabot, he will have his wife call us back to confirm 6/23 is okay for JESSICA ballesterost w/ Dr Syed Medina

## 2022-04-21 NOTE — TELEPHONE ENCOUNTER
Patient's wife anna called back, they are both ok with the appointment on 6/23 with Daniella Kingston at 2:20pm

## 2022-05-02 ENCOUNTER — OFFICE VISIT (OUTPATIENT)
Dept: CARDIOLOGY CLINIC | Facility: CLINIC | Age: 67
End: 2022-05-02
Payer: COMMERCIAL

## 2022-05-02 VITALS
HEART RATE: 76 BPM | WEIGHT: 266 LBS | RESPIRATION RATE: 16 BRPM | DIASTOLIC BLOOD PRESSURE: 92 MMHG | HEIGHT: 71 IN | OXYGEN SATURATION: 97 % | SYSTOLIC BLOOD PRESSURE: 150 MMHG | BODY MASS INDEX: 37.24 KG/M2

## 2022-05-02 DIAGNOSIS — I51.7 LVH (LEFT VENTRICULAR HYPERTROPHY): ICD-10-CM

## 2022-05-02 DIAGNOSIS — E66.9 OBESITY (BMI 30-39.9): ICD-10-CM

## 2022-05-02 DIAGNOSIS — I77.810 DILATED AORTIC ROOT (HCC): ICD-10-CM

## 2022-05-02 DIAGNOSIS — E78.2 MIXED HYPERLIPIDEMIA: ICD-10-CM

## 2022-05-02 DIAGNOSIS — I10 ESSENTIAL HYPERTENSION: Primary | ICD-10-CM

## 2022-05-02 DIAGNOSIS — I51.89 DIASTOLIC DYSFUNCTION: ICD-10-CM

## 2022-05-02 PROCEDURE — 3080F DIAST BP >= 90 MM HG: CPT | Performed by: INTERNAL MEDICINE

## 2022-05-02 PROCEDURE — 3077F SYST BP >= 140 MM HG: CPT | Performed by: INTERNAL MEDICINE

## 2022-05-02 PROCEDURE — 1160F RVW MEDS BY RX/DR IN RCRD: CPT | Performed by: INTERNAL MEDICINE

## 2022-05-02 PROCEDURE — 3008F BODY MASS INDEX DOCD: CPT | Performed by: INTERNAL MEDICINE

## 2022-05-02 PROCEDURE — 99214 OFFICE O/P EST MOD 30 MIN: CPT | Performed by: INTERNAL MEDICINE

## 2022-05-02 RX ORDER — HYDROCHLOROTHIAZIDE 25 MG/1
25 TABLET ORAL DAILY
Qty: 90 TABLET | Refills: 2 | Status: CANCELLED | OUTPATIENT
Start: 2022-05-02

## 2022-05-02 RX ORDER — METOPROLOL SUCCINATE 50 MG/1
50 TABLET, EXTENDED RELEASE ORAL DAILY
Qty: 90 TABLET | Refills: 2 | Status: SHIPPED | OUTPATIENT
Start: 2022-05-02

## 2022-05-02 NOTE — PROGRESS NOTES
CARDIOLOGY OFFICE VISIT  [facility]  [place] [address], [place], [address] [place] [address], [place], [facility] Edin Dietrich  Tel: [phone]      NAME: Hailee Mccoy  AGE: 78 y o  SEX: male  : 1955   MRN: [id number]      Chief Complaint:  Chief Complaint   Patient presents with    Follow-up         History of Present Illness:   Patient comes for follow up  States he is doing well from cardiac stand point and denies chest pain / pressure, SOB, palpitations, lightheadedness, syncope, orthopnea, PND, claudication  States his swelling feet has resolved and he has not been taking furosemide for a while now    Essential hypertension, LVH, DD -  Has been hypertensive for many years  Taking medications regularly  Denies lightheadedness, headache, medication side effects  Mixed hyperlipidemia -  Has had hyperlipidemia for many years  Taking statin regularly along with diet control  Denies myalgia  PCP closely monitoring the blood work  Obesity -  Trying to lose weight  Dilated aortic root - 4 0 cm per latest echo  Likely upper normal for him given his size  Past Medical History:  Past Medical History:   Diagnosis Date    Anemia     Anxiety     Bipolar disorder ([facility]     [facility]     History of alcohol abuse     Hypertension     Hypertension     Insomnia     Leukemia [facility]     Opiate abuse, episodic ([facility]     Psychiatric disorder     Sleep apnea with use of continuous positive airway pressure (CPAP)          Past Surgical History:  Past Surgical History:   Procedure Laterality Date    COLONOSCOPY      EGD AND COLONOSCOPY N/A 3/30/2018    Procedure: EGD AND COLONOSCOPY;  Surgeon: Jonathan Polo MD;  Location: [place] [address] GI LAB;   Service: Gastroenterology         Family History:  Family History   Problem Relation Age of Onset    Coronary artery disease Mother     Diabetes Mother     No Known Problems Father     Hepatitis Sister     Cancer Brother  Prostate cancer Brother     Early death Brother          Social History:  Social History     Socioeconomic History    Marital status: /Civil Union     Spouse name: Not on file    Number of children: Not on file    Years of education: Not on file    Highest education level: Not on file   Occupational History    Not on file   Tobacco Use    Smoking status: Current Every Day Smoker     Packs/day: 1 00     Years: 40 00     Pack years: 40 00     Types: Cigarettes     Last attempt to quit: 10/13/2020     Years since quittin 5    Smokeless tobacco: Never Used   Vaping Use    Vaping Use: Never used   Substance and Sexual Activity    Alcohol use: No     Comment: last drink 2017    Drug use: No     Comment: hx of heroin and subutex abuse    Sexual activity: Not Currently   Other Topics Concern    Not on file   Social History Narrative    Not on file     Social Determinants of Health     Financial Resource Strain: Not on file   Food Insecurity: Not on file   Transportation Needs: Not on file   Physical Activity: Not on file   Stress: Not on file   Social Connections: Not on file   Intimate Partner Violence: Not on file   Housing Stability: Not on file         Active Problems:  Patient Active Problem List   Diagnosis    Hypertension    Depression    Polypharmacy    Bronchitis    Encephalopathy    Weakness    Chronic lymphocytic leukemia of B-cell type not having achieved remission (HCC)    Lactic acidosis    Leukocytosis    Elevated troponin    Bipolar 1 disorder ([facility] )    Psychiatric disorder    Anemia    Peripheral edema    CLL [facility]ocytic [facility]) [facility]    RA (acute kidney injury) ([facility]    Chronic obstructive pulmonary disease ([facility]    Expiratory wheezing    Type 2 diabetes mellitus with stage 2 chronic kidney disease ([facility]    Lymph node enlargement    Body mass index (BMI) 40 0-44 9, adult    Stage 2 chronic kidney disease    Type 2 diabetes mellitus, without long-term current use of insulin (HCC)    Acute pain of right shoulder         The following portions of the patient's history were reviewed and updated as appropriate: past medical history, past surgical history, past family history,  past social history, current medications, allergies and problem list       Review of Systems:  Constitutional: Denies fever, chills  Eyes: Denies eye redness, eye discharge  ENT: Denies hearing loss, sneezing, nasal discharge, sore throat   Respiratory: Denies cough, expectoration, shortness of breath  Cardiovascular: Denies chest pain, palpitations, lower extremity swelling  Gastrointestinal: Denies abdominal pain, nausea, vomiting, diarrhea  Genito-Urinary: Denies dysuria, incontinence  Musculoskeletal: Denies back pain, joint pain, muscle pain  Neurologic: Denies lightheadedness, syncope, headache, seizures  Endocrine: Denies polydipsia, temperature intolerance  Allergy and Immunology: Denies hives, insect bite sensitivity  Hematological and Lymphatic: Denies bleeding problems, swollen glands   Psychological: Denies depression, suicidal ideation, anxiety, panic  Dermatological: Denies pruritus, rash, skin lesion changes      Vitals:  Vitals:    05/02/22 1322   BP: 150/92   Pulse: 76   Resp: 16   SpO2: 97%       Body mass index is 37 1 kg/m²  Weight (last 2 days)     Date/Time Weight    05/02/22 1322 121 (266)            Physical Examination:  General: Patient is not in acute distress  Awake, alert, oriented in time, place and person  Responding to commands  Head: Normocephalic  Atraumatic  Eyes: Both pupils normal sized, round and reactive to light  Nonicteric  ENT: Normal external ear canals  Neck: Supple  JVP not raised  Trachea central  No thyromegaly  Lungs: Bilateral bronchovascular breath sounds with no crackles or rhonchi  Chest wall: No tenderness  Cardiovascular: RRR  S1 and S2 normal  No murmur, rub or gallop  Gastrointestinal: Abdomen soft, nontender   No guarding or rigidity  Liver and spleen not palpable  Bowel sounds present  Neurologic: Patient is awake, alert, oriented in time, place and person  Responding to commands  Moving all extremities  Integumentary:  No skin rash  Lymphatic: No cervical lymphadenopathy  Back: Symmetric   No CVA tenderness  Extremities: No clubbing, cyanosis or edema      Laboratory Results:  CBC with diff:   Lab Results   Component Value Date    WBC 9 84 03/21/2022    RBC 4 43 03/21/2022    HGB 14 1 03/21/2022    HCT 40 8 03/21/2022    MCV 92 03/21/2022    MCH 31 8 03/21/2022    RDW 13 0 03/21/2022     03/21/2022       CMP:  Lab Results   Component Value Date    CREATININE 1 01 03/21/2022    BUN 17 03/21/2022    K 4 3 03/21/2022     03/21/2022    CO2 28 03/21/2022    ALKPHOS 103 03/21/2022    ALT 43 03/21/2022    AST 17 03/21/2022    BILIDIR 0 10 03/26/2018       Lab Results   Component Value Date    HGBA1C 6 5 (H) 03/10/2022    MG 2 8 (H) 03/30/2018    PHOS 4 3 (H) 03/30/2018       Lab Results   Component Value Date    TROPONINI 0 05 (H) 03/27/2018    TROPONINI 0 10 (H) 03/26/2018    TROPONINI 0 12 (H) 03/26/2018    CKMB 2 6 03/10/2022    CKTOTAL 167 03/10/2022    CKTOTAL 81 03/26/2018       Lipid Profile:   No results found for: CHOL  Lab Results   Component Value Date    HDL 38 (L) 03/10/2022    HDL 46 12/14/2021    HDL 48 09/25/2020     Lab Results   Component Value Date    LDLCALC 84 03/10/2022    LDLCALC 209 (H) 12/14/2021    LDLCALC 107 (H) 09/25/2020     Lab Results   Component Value Date    TRIG 177 (H) 03/10/2022    TRIG 200 (H) 12/14/2021    TRIG 125 09/25/2020       Cardiac testing:   Results for orders placed during the hospital encounter of 11/17/18    Echo complete with contrast if indicated    Narrative  [address]  [address] [facility]   [phone]    Transthoracic Echocardiogram  2D, M-mode, Doppler, and Color Doppler    Study date:  17-Nov-2018    Patient: Ori Gerson [address]  MR number: [id number]  Account number: [de-identified]  : 1955  Age: 60 years  Gender: Male  Status: Outpatient  Location: Echo lab  Height: 70 in  Weight: 307 3 lb  BP: 124/ 72 mmHg    Indications: Dyspnea    Diagnoses: R06 00 - Dyspnea, unspecified    Sonographer:  MORGAN Skelton  Primary Physician:  Lucrecia Eckert MD  Referring Physician:  Mahendra Oconnor MD  Group:  [facility]  Interpreting Physician:  Clement Srivastava DO    SUMMARY    LEFT VENTRICLE:  Systolic function was at the lower limits of normal by visual assessment  Ejection fraction was estimated to be 50 %  There were no regional wall motion abnormalities  There was mild concentric hypertrophy  Doppler parameters were consistent with abnormal left ventricular relaxation (grade 1 diastolic dysfunction)  LEFT ATRIUM:  The atrium was mildly dilated  MITRAL VALVE:  There was mild regurgitation  AORTIC VALVE:  There was no evidence for stenosis  There was no regurgitation  TRICUSPID VALVE:  There was mild regurgitation  Pulmonary artery systolic pressure was mildly increased  HISTORY: PRIOR HISTORY: HTN, CLL, Anemia, Bipolar, DM    PROCEDURE: The procedure was performed in the echo lab  This was a routine study  The transthoracic approach was used  The study included complete 2D imaging, M-mode, complete spectral Doppler, and color Doppler  The heart rate was 70 bpm,  at the start of the study  Images were not obtained from the subcostal acoustic windows  Echocardiographic views were limited due to restricted patient mobility, poor acoustic window availability, decreased penetration, and lung  interference  This was a technically difficult study  LEFT VENTRICLE: Size was normal  Systolic function was at the lower limits of normal by visual assessment  Ejection fraction was estimated to be 50 %  There were no regional wall motion abnormalities  There was mild concentric hypertrophy    DOPPLER: Doppler parameters were consistent with abnormal left ventricular relaxation (grade 1 diastolic dysfunction)  RIGHT VENTRICLE: The size was normal  Systolic function was normal  DOPPLER: Systolic pressure was within the normal range  LEFT ATRIUM: The atrium was mildly dilated  No thrombus was identified  RIGHT ATRIUM: Size was normal     MITRAL VALVE: Valve structure was normal  There was normal leaflet separation  No echocardiographic evidence for prolapse  DOPPLER: The transmitral velocity was within the normal range  There was no evidence for stenosis  There was mild  regurgitation  AORTIC VALVE: The valve was trileaflet  Leaflets exhibited normal thickness and normal cuspal separation  DOPPLER: Transaortic velocity was within the normal range  There was no evidence for stenosis  There was no regurgitation  TRICUSPID VALVE: The valve structure was normal  There was normal leaflet separation  DOPPLER: The transtricuspid velocity was within the normal range  There was mild regurgitation  Pulmonary artery systolic pressure was mildly increased  Estimated peak PA pressure was 43 mmHg  PULMONIC VALVE: Leaflets exhibited normal thickness, no calcification, and normal cuspal separation  DOPPLER: The transpulmonic velocity was within the normal range  There was no regurgitation  PERICARDIUM: There was no thickening  There was no pericardial effusion  AORTA: The root exhibited normal size      PULMONARY ARTERY: The size was normal  The morphology appeared normal     SYSTEM MEASUREMENT TABLES    2D mode  AoR Diam 2D: 3 4 cm  LA Diam (2D): 3 4 cm  LA/Ao (2D): 1  FS (2D Teich): 24 9 %  IVSd (2D): 1 05 cm  LVDEV: 72 5 cm³  LVESV: 36 4 cm³  LVIDd(2D): 4 06 cm  LVISd (2D): 3 05 cm  LVOT Area 2D: 3 14 cm squared  LVPWd (2D): 1 07 cm  SV (Teich): 36 1 cm³    Apical four chamber  LVEF A4C: 49 %    Unspecified Scan Mode  LEXI Cont Eq (Peak Arnulfo): 2 14 cm squared  LVOT (VTI): 22 9 cm  LVOT Diam : 2 cm  LVOT Vmax: 1150 mm/s  LVOT Vmax; Mean: 1150 mm/s  Peak Grad ; Mean: 5 mm[Hg]  SV (LVOT): 72 cm³  VTI;Mean: 3 mm[Hg]  MV Peak A Arnulfo: 925 mm/s  MV Peak E Arnulfo  Mean: 710 mm/s  MVA (PHT): 3 93 cm squared  PHT: 56 ms  Max P mm[Hg]  V Max: 2980 mm/s  Vmax: 2970 mm/s  RA Area: 17 3 cm squared  RA Volume: 45 2 cm³  TAPSE: 2 5 cm    [facility]    Prepared and electronically signed by    Trent Guillaume DO  Signed 2018 12:08:39      Medications:    Current Outpatient Medications:     ALPRAZolam (XANAX) 1 mg tablet, , Disp: , Rfl: 3    IBRUTINIB PO, Take 180 mg by mouth in the morning, Disp: , Rfl:     lisinopril (ZESTRIL) 40 mg tablet, Take 1 tablet (40 mg total) by mouth daily, Disp: 90 tablet, Rfl: 3    Psyllium (DAILY FIBER PO), Take 1 capsule by mouth in the morning, Disp: , Rfl:     risperiDONE (RisperDAL) 1 mg tablet, Take 1 mg by mouth 2 (two) times a day  , Disp: , Rfl:     rosuvastatin (CRESTOR) 10 MG tablet, Take 10 mg by mouth daily, Disp: , Rfl:     metoprolol succinate (TOPROL-XL) 50 mg 24 hr tablet, Take 1 tablet (50 mg total) by mouth daily, Disp: 90 tablet, Rfl: 2      Allergies:  No Known Allergies      Assessment and Plan:  1  Essential hypertension, LVH (left ventricular hypertrophy), Diastolic dysfunction  BP not at goal   Add metoprolol succinate 50 mg daily  - metoprolol succinate (TOPROL-XL) 50 mg 24 hr tablet; Take 1 tablet (50 mg total) by mouth daily  Dispense: 90 tablet; Refill: 2    2  Mixed hyperlipidemia  Continue statin and diet control  His PCP closely monitors blood work    3  Dilated aortic root (HCC)  Likely upper normal given his size  Will add beta-blocker for BP control    4  Obesity (BMI 30-39  9)  Counseled to try to lose weight    Recommend aggressive risk factor modification and therapeutic lifestyle changes  Low-salt, low-calorie, low-fat, low-cholesterol diet with regular exercise and to optimize weight    I will defer the ordering and monitoring of necessity lab studies to you, but I am available and happy to review and manage any of the data at your request in the future  Discussed concepts of atherosclerosis, including signs and symptoms of cardiac disease  Previous studies were reviewed  Safety measures were reviewed  Questions were entertained and answered  Patient was advised to report any problems requiring medical attention  Follow-up with PCP and appropriate specialist and lab work as discussed  Return for follow up visit as scheduled or earlier, if needed  Thank you for allowing me to participate in the care and evaluation of your patient  Should you have any questions, please feel free to contact me        Kassi Plascencia MD  5/2/2022,2:23 PM

## 2022-05-12 ENCOUNTER — TELEPHONE (OUTPATIENT)
Dept: CARDIOLOGY CLINIC | Facility: CLINIC | Age: 67
End: 2022-05-12

## 2022-05-12 NOTE — TELEPHONE ENCOUNTER
As far as I can tell and I spoke to dr Abdiaziz Lo    He DID NOT stop those 2 meds prozac or mirtazapine    He is ok to start adderall from cardiac standpoint

## 2022-05-12 NOTE — TELEPHONE ENCOUNTER
Patient's wife phoned regarding medications for Tayler Shen  Per wife, Dr Marleta Duane discontinued Prozac and mirtazapine on 3/22/2022  Patient had a virtual appt recently with his psychiatrist, Dr Narciso Rajan, phone #[phone]  Dr Nighat Dean questioned why these 2 medications were discontinued  Also questioned if cardiology would be okay with patient being prescribed Adderall  Per wife, Dr Nighat Dean would like Dr Marleta Duane to call to discuss medications  Patient has another visit with Dr Nighat Dean on June 7  Patient's call back phone # is [phone]

## 2022-05-13 NOTE — TELEPHONE ENCOUNTER
Patient called  left voicemail I called and lvm with medication information and to call office if any question

## 2022-05-13 NOTE — TELEPHONE ENCOUNTER
Spoke to pt and relayed [place] PA-C Response, pt verbally understood  Pt stated he would like Dr Barbaraann Claude to be contacted regarding the okay to start [address]  LM for Dr Rubens Templeton office to contact us to relay Brooklyn's PA-C response     Office# [phone]

## 2022-05-23 ENCOUNTER — TELEPHONE (OUTPATIENT)
Dept: HEMATOLOGY ONCOLOGY | Facility: CLINIC | Age: 67
End: 2022-05-23

## 2022-05-23 DIAGNOSIS — C91.10 CLL (CHRONIC LYMPHOCYTIC LEUKEMIA) (HCC): Primary | ICD-10-CM

## 2022-05-23 NOTE — TELEPHONE ENCOUNTER
Medication Refill     Who is Calling pharmacy   Medication Ibrutinib Imbruvica 280MG tablet   How many pills left  7   Preferred Pharmacy / Address  [id number]   Who is your Physician?  Dr Santosh Dang   Call back number [phone]    Relevant Information  Please note change in pharmacy

## 2022-06-01 ENCOUNTER — TELEPHONE (OUTPATIENT)
Dept: HEMATOLOGY ONCOLOGY | Facility: CLINIC | Age: 67
End: 2022-06-01

## 2022-06-01 NOTE — TELEPHONE ENCOUNTER
Called and left a message for Bonnie Villela asking him to come in 40 min earlier for his appointment with Dr Ty Villela is a transfer of care from Dr Courtney Carrera and his appointment was scheduled during Dr Jorge Luis Reid travel time  I left the [place] line number for Bonnie Villela to call back if the new time does not work for him 
No

## 2022-06-17 ENCOUNTER — APPOINTMENT (OUTPATIENT)
Dept: LAB | Facility: CLINIC | Age: 67
End: 2022-06-17
Payer: COMMERCIAL

## 2022-06-17 ENCOUNTER — TELEPHONE (OUTPATIENT)
Dept: HEMATOLOGY ONCOLOGY | Facility: CLINIC | Age: 67
End: 2022-06-17

## 2022-06-17 DIAGNOSIS — E78.00 PURE HYPERCHOLESTEROLEMIA: ICD-10-CM

## 2022-06-17 DIAGNOSIS — I10 ESSENTIAL HYPERTENSION, MALIGNANT: ICD-10-CM

## 2022-06-17 DIAGNOSIS — E13.69 OTHER SPECIFIED DIABETES MELLITUS WITH OTHER SPECIFIED COMPLICATION, UNSPECIFIED WHETHER LONG TERM INSULIN USE (HCC): ICD-10-CM

## 2022-06-17 LAB
25(OH)D3 SERPL-MCNC: 29.6 NG/ML (ref 30–100)
ALBUMIN SERPL BCP-MCNC: 3.9 G/DL (ref 3.5–5)
ALP SERPL-CCNC: 81 U/L (ref 46–116)
ALT SERPL W P-5'-P-CCNC: 32 U/L (ref 12–78)
ANION GAP SERPL CALCULATED.3IONS-SCNC: 5 MMOL/L (ref 4–13)
AST SERPL W P-5'-P-CCNC: 15 U/L (ref 5–45)
BASOPHILS # BLD AUTO: 0.03 THOUSANDS/ΜL (ref 0–0.1)
BASOPHILS NFR BLD AUTO: 0 % (ref 0–1)
BILIRUB SERPL-MCNC: 0.55 MG/DL (ref 0.2–1)
BUN SERPL-MCNC: 15 MG/DL (ref 5–25)
CALCIUM SERPL-MCNC: 9.5 MG/DL (ref 8.3–10.1)
CHLORIDE SERPL-SCNC: 107 MMOL/L (ref 100–108)
CHOLEST SERPL-MCNC: 154 MG/DL
CO2 SERPL-SCNC: 28 MMOL/L (ref 21–32)
CREAT SERPL-MCNC: 1 MG/DL (ref 0.6–1.3)
CRP SERPL QL: 4.2 MG/L
EOSINOPHIL # BLD AUTO: 0.09 THOUSAND/ΜL (ref 0–0.61)
EOSINOPHIL NFR BLD AUTO: 1 % (ref 0–6)
ERYTHROCYTE [DISTWIDTH] IN BLOOD BY AUTOMATED COUNT: 12.9 % (ref 11.6–15.1)
EST. AVERAGE GLUCOSE BLD GHB EST-MCNC: 137 MG/DL
FERRITIN SERPL-MCNC: 920 NG/ML (ref 8–388)
GFR SERPL CREATININE-BSD FRML MDRD: 77 ML/MIN/1.73SQ M
GLUCOSE P FAST SERPL-MCNC: 122 MG/DL (ref 65–99)
HBA1C MFR BLD: 6.4 %
HCT VFR BLD AUTO: 42.6 % (ref 36.5–49.3)
HDLC SERPL-MCNC: 38 MG/DL
HGB BLD-MCNC: 14.3 G/DL (ref 12–17)
IMM GRANULOCYTES # BLD AUTO: 0.05 THOUSAND/UL (ref 0–0.2)
IMM GRANULOCYTES NFR BLD AUTO: 1 % (ref 0–2)
LDLC SERPL CALC-MCNC: 91 MG/DL (ref 0–100)
LYMPHOCYTES # BLD AUTO: 2.34 THOUSANDS/ΜL (ref 0.6–4.47)
LYMPHOCYTES NFR BLD AUTO: 28 % (ref 14–44)
MCH RBC QN AUTO: 32.1 PG (ref 26.8–34.3)
MCHC RBC AUTO-ENTMCNC: 33.6 G/DL (ref 31.4–37.4)
MCV RBC AUTO: 96 FL (ref 82–98)
MONOCYTES # BLD AUTO: 0.67 THOUSAND/ΜL (ref 0.17–1.22)
MONOCYTES NFR BLD AUTO: 8 % (ref 4–12)
NEUTROPHILS # BLD AUTO: 5.22 THOUSANDS/ΜL (ref 1.85–7.62)
NEUTS SEG NFR BLD AUTO: 62 % (ref 43–75)
NONHDLC SERPL-MCNC: 116 MG/DL
NRBC BLD AUTO-RTO: 0 /100 WBCS
PLATELET # BLD AUTO: 176 THOUSANDS/UL (ref 149–390)
PMV BLD AUTO: 11.9 FL (ref 8.9–12.7)
POTASSIUM SERPL-SCNC: 3.8 MMOL/L (ref 3.5–5.3)
PROT SERPL-MCNC: 7.7 G/DL (ref 6.4–8.2)
RBC # BLD AUTO: 4.46 MILLION/UL (ref 3.88–5.62)
SODIUM SERPL-SCNC: 140 MMOL/L (ref 136–145)
T4 FREE SERPL-MCNC: 0.9 NG/DL (ref 0.76–1.46)
TRIGL SERPL-MCNC: 125 MG/DL
TSH SERPL DL<=0.05 MIU/L-ACNC: 1.66 UIU/ML (ref 0.45–4.5)
WBC # BLD AUTO: 8.4 THOUSAND/UL (ref 4.31–10.16)

## 2022-06-17 PROCEDURE — 86140 C-REACTIVE PROTEIN: CPT

## 2022-06-17 PROCEDURE — 80061 LIPID PANEL: CPT

## 2022-06-17 PROCEDURE — 82306 VITAMIN D 25 HYDROXY: CPT

## 2022-06-17 PROCEDURE — 36415 COLL VENOUS BLD VENIPUNCTURE: CPT

## 2022-06-17 PROCEDURE — 82728 ASSAY OF FERRITIN: CPT

## 2022-06-17 PROCEDURE — 84439 ASSAY OF FREE THYROXINE: CPT

## 2022-06-17 PROCEDURE — 80053 COMPREHEN METABOLIC PANEL: CPT

## 2022-06-17 PROCEDURE — 3044F HG A1C LEVEL LT 7.0%: CPT | Performed by: INTERNAL MEDICINE

## 2022-06-17 PROCEDURE — 85025 COMPLETE CBC W/AUTO DIFF WBC: CPT

## 2022-06-17 PROCEDURE — 83036 HEMOGLOBIN GLYCOSYLATED A1C: CPT

## 2022-06-17 PROCEDURE — 84443 ASSAY THYROID STIM HORMONE: CPT

## 2022-06-17 NOTE — TELEPHONE ENCOUNTER
Gio was called and a message was left for him to come in at 1:40pm for his appointment with Dr Faye Hollis   He was already called once and asked to come in at 2pm

## 2022-06-23 ENCOUNTER — TELEPHONE (OUTPATIENT)
Dept: HEMATOLOGY ONCOLOGY | Facility: CLINIC | Age: 67
End: 2022-06-23

## 2022-06-23 DIAGNOSIS — C91.10 CLL (CHRONIC LYMPHOCYTIC LEUKEMIA) (HCC): Primary | ICD-10-CM

## 2022-06-23 DIAGNOSIS — C91.10 CLL (CHRONIC LYMPHOCYTIC LEUKEMIA) (HCC): ICD-10-CM

## 2022-06-23 NOTE — TELEPHONE ENCOUNTER
Medication Refill     Who is Calling Patient's wife Steve Locks   Medication Ibrutinib 280 MG TABS    How many pills left Unknown    Preferred Pharmacy / Address [address] [place] [address], [address] [place] [address], [address] [place] [address]   Phone:  [phone]  Fax:  [phone]    Who is your Physician?  Dr Taty Elam   Call back number [phone]   Relevant Information  n/a

## 2022-06-23 NOTE — TELEPHONE ENCOUNTER
Call out to Meet in regards to prescription being sent to that pharmacy in error  Per Sri Dey, prescription cancelled  New prescription pending provider signature and review to be sent to specialty pharmacy  Call out to spouse in regards to above and labs were ordered  Patient spouse agreeable to plan and appreciative of call

## 2022-06-23 NOTE — TELEPHONE ENCOUNTER
Patient's wife Farideh Lilly states the lab does not have orders in the patient's chart to get labs done  Farideh Lilly would like to be contacted to let her know when they have been put in the chart so the patient can go to the lab again

## 2022-06-23 NOTE — TELEPHONE ENCOUNTER
Patient's wife Dwight Betancourt called to make office aware that Patient is not feeling good today and cannot make it in to his appt at 1401pm with Dr Dyana Ayala

## 2022-06-23 NOTE — PROGRESS NOTES
Standing labs ordered CBC q 12 weeks  CMP in 6 months  Ibrutinub 280mg refilled      Néstor Jordan MD

## 2022-06-23 NOTE — TELEPHONE ENCOUNTER
Scheduling Appointment     Who Is Calling to Schedule Patient's wife Kevmon Hodan   Doctor Dr Faye Hollis   [facility]   Date and Time 7/21 @ 3322 Am   Reason for scheduling appointment Follow up   Patient verbalized understanding   Yes

## 2022-07-18 ENCOUNTER — APPOINTMENT (OUTPATIENT)
Dept: LAB | Facility: CLINIC | Age: 67
End: 2022-07-18
Payer: COMMERCIAL

## 2022-07-18 LAB
BASOPHILS # BLD AUTO: 0.02 THOUSANDS/ΜL (ref 0–0.1)
BASOPHILS NFR BLD AUTO: 0 % (ref 0–1)
EOSINOPHIL # BLD AUTO: 0.1 THOUSAND/ΜL (ref 0–0.61)
EOSINOPHIL NFR BLD AUTO: 1 % (ref 0–6)
ERYTHROCYTE [DISTWIDTH] IN BLOOD BY AUTOMATED COUNT: 12.6 % (ref 11.6–15.1)
HCT VFR BLD AUTO: 41.7 % (ref 36.5–49.3)
HGB BLD-MCNC: 14 G/DL (ref 12–17)
IMM GRANULOCYTES # BLD AUTO: 0.06 THOUSAND/UL (ref 0–0.2)
IMM GRANULOCYTES NFR BLD AUTO: 1 % (ref 0–2)
LYMPHOCYTES # BLD AUTO: 1.96 THOUSANDS/ΜL (ref 0.6–4.47)
LYMPHOCYTES NFR BLD AUTO: 25 % (ref 14–44)
MCH RBC QN AUTO: 31.8 PG (ref 26.8–34.3)
MCHC RBC AUTO-ENTMCNC: 33.6 G/DL (ref 31.4–37.4)
MCV RBC AUTO: 95 FL (ref 82–98)
MONOCYTES # BLD AUTO: 0.65 THOUSAND/ΜL (ref 0.17–1.22)
MONOCYTES NFR BLD AUTO: 8 % (ref 4–12)
NEUTROPHILS # BLD AUTO: 5.03 THOUSANDS/ΜL (ref 1.85–7.62)
NEUTS SEG NFR BLD AUTO: 65 % (ref 43–75)
NRBC BLD AUTO-RTO: 0 /100 WBCS
PLATELET # BLD AUTO: 173 THOUSANDS/UL (ref 149–390)
PMV BLD AUTO: 11.8 FL (ref 8.9–12.7)
RBC # BLD AUTO: 4.4 MILLION/UL (ref 3.88–5.62)
WBC # BLD AUTO: 7.82 THOUSAND/UL (ref 4.31–10.16)

## 2022-07-18 PROCEDURE — 36415 COLL VENOUS BLD VENIPUNCTURE: CPT | Performed by: INTERNAL MEDICINE

## 2022-07-18 PROCEDURE — 85025 COMPLETE CBC W/AUTO DIFF WBC: CPT | Performed by: INTERNAL MEDICINE

## 2022-07-21 ENCOUNTER — TELEPHONE (OUTPATIENT)
Dept: HEMATOLOGY ONCOLOGY | Facility: CLINIC | Age: 67
End: 2022-07-21

## 2022-07-21 NOTE — TELEPHONE ENCOUNTER
Call out to patient to review importance of being seen by an Oncologist due to safety concerns with refilling of medication without seeing the provider  Patient stated has not slept in days and or weeks and that is why patient has not been able to attend appointment today 7/21 with Dr Kacey Carpenter  Reviewed chart and this was a reason patient did not see Dr Tom Mcgrath and or Dr Kacey Carpenter in the past   Reviewed to call PCP in regards to this matter to help assist with insomnia  Reviewed that Dr Kacey Carpenter has not seen patient and would not be able to prescribe any additional medications  Patient stated that patient is able to go to a visit in the afternoon with Dr Kacey Carpenter

## 2022-07-21 NOTE — TELEPHONE ENCOUNTER
Appointment Cancellation Or Reschedule     Person calling in patient   Provider Dr Juanjose Medina   Office Visit Date and Time 7/21 @ 1140   Office Visit Location [place]   Did patient want to reschedule their office appointment? If so, when was it scheduled to? Yes 9/18 @ 1120   Is this patient calling to reschedule an infusion appointment? no   When is their next infusion appointment? n/a   Is this patient a Chemo patient? no   Reason for Cancellation or Reschedule Patient not feeling well     If the patient is a treatment patient, please route this to the office nurse  If the patient is not on treatment, please route to the office MA  If the patient is a surgical oncology patient, please route to surg/onc clinical pool

## 2022-07-29 ENCOUNTER — OFFICE VISIT (OUTPATIENT)
Dept: INTERNAL MEDICINE CLINIC | Facility: CLINIC | Age: 67
End: 2022-07-29
Payer: COMMERCIAL

## 2022-07-29 VITALS
TEMPERATURE: 98.4 F | HEART RATE: 71 BPM | RESPIRATION RATE: 16 BRPM | SYSTOLIC BLOOD PRESSURE: 160 MMHG | BODY MASS INDEX: 38.44 KG/M2 | OXYGEN SATURATION: 95 % | HEIGHT: 71 IN | WEIGHT: 274.6 LBS | DIASTOLIC BLOOD PRESSURE: 78 MMHG

## 2022-07-29 DIAGNOSIS — R49.0 HOARSENESS OF VOICE: ICD-10-CM

## 2022-07-29 DIAGNOSIS — Z11.59 ENCOUNTER FOR HEPATITIS C SCREENING TEST FOR LOW RISK PATIENT: ICD-10-CM

## 2022-07-29 DIAGNOSIS — Z72.0 TOBACCO USE: ICD-10-CM

## 2022-07-29 DIAGNOSIS — C91.10 CLL (CHRONIC LYMPHOCYTIC LEUKEMIA) (HCC): ICD-10-CM

## 2022-07-29 DIAGNOSIS — Z23 NEED FOR VACCINATION: ICD-10-CM

## 2022-07-29 DIAGNOSIS — I10 PRIMARY HYPERTENSION: ICD-10-CM

## 2022-07-29 DIAGNOSIS — K63.5 POLYP OF COLON, UNSPECIFIED PART OF COLON, UNSPECIFIED TYPE: ICD-10-CM

## 2022-07-29 DIAGNOSIS — E11.22 TYPE 2 DIABETES MELLITUS WITH STAGE 2 CHRONIC KIDNEY DISEASE, UNSPECIFIED WHETHER LONG TERM INSULIN USE (HCC): Primary | ICD-10-CM

## 2022-07-29 DIAGNOSIS — N18.2 TYPE 2 DIABETES MELLITUS WITH STAGE 2 CHRONIC KIDNEY DISEASE, UNSPECIFIED WHETHER LONG TERM INSULIN USE (HCC): Primary | ICD-10-CM

## 2022-07-29 DIAGNOSIS — Z12.11 COLON CANCER SCREENING: ICD-10-CM

## 2022-07-29 DIAGNOSIS — Z12.2 SCREENING FOR LUNG CANCER: ICD-10-CM

## 2022-07-29 DIAGNOSIS — Z11.4 SCREENING FOR HIV WITHOUT PRESENCE OF RISK FACTORS: ICD-10-CM

## 2022-07-29 DIAGNOSIS — J42 CHRONIC BRONCHITIS, UNSPECIFIED CHRONIC BRONCHITIS TYPE (HCC): ICD-10-CM

## 2022-07-29 DIAGNOSIS — F31.9 BIPOLAR AFFECTIVE DISORDER, REMISSION STATUS UNSPECIFIED (HCC): ICD-10-CM

## 2022-07-29 PROCEDURE — 90677 PCV20 VACCINE IM: CPT | Performed by: INTERNAL MEDICINE

## 2022-07-29 PROCEDURE — 99204 OFFICE O/P NEW MOD 45 MIN: CPT | Performed by: INTERNAL MEDICINE

## 2022-07-29 PROCEDURE — G0009 ADMIN PNEUMOCOCCAL VACCINE: HCPCS | Performed by: INTERNAL MEDICINE

## 2022-07-29 PROCEDURE — 3078F DIAST BP <80 MM HG: CPT | Performed by: INTERNAL MEDICINE

## 2022-07-29 PROCEDURE — 1160F RVW MEDS BY RX/DR IN RCRD: CPT | Performed by: INTERNAL MEDICINE

## 2022-07-29 PROCEDURE — 3725F SCREEN DEPRESSION PERFORMED: CPT | Performed by: INTERNAL MEDICINE

## 2022-07-29 PROCEDURE — 3066F NEPHROPATHY DOC TX: CPT | Performed by: INTERNAL MEDICINE

## 2022-07-29 PROCEDURE — 3077F SYST BP >= 140 MM HG: CPT | Performed by: INTERNAL MEDICINE

## 2022-07-29 RX ORDER — AMLODIPINE BESYLATE 5 MG/1
5 TABLET ORAL DAILY
Qty: 30 TABLET | Refills: 5 | Status: SHIPPED | OUTPATIENT
Start: 2022-07-29 | End: 2022-09-12 | Stop reason: SDUPTHER

## 2022-07-29 RX ORDER — LOSARTAN POTASSIUM 100 MG/1
TABLET ORAL
COMMUNITY
Start: 2022-06-30

## 2022-07-29 NOTE — PROGRESS NOTES
Assessment/Plan:     Wes Cockayne is here as a new patient; his wife Areli Carvajal is also my patient  He has a PMH for CLL, DM, HTN, COPD, bipolar DO, and tobacco use  He is depressed today  His daughter is staying with he and his wife and he would like her to leave  She does not have manners and there is not enough room in the house  Discussed healthy boundaries and he will ask her to leave  1  Preventive screening: due for colonoscopy; ordered today  CT lung screening ordered  2  HTN not controlled; continue BB and ARB; add CCB 5 mg  3  DM; right on the borderline; needs an eye exam and foot exam; controlled with diet; check a1c in 3 months  4  Bipolar DO; continue appointments with psych in [place]     5  C/o hoarseness of voice in s/o smoking history; send to ENT  6  H/O CLL; follow up with hem/onc scheduled  He was instructed to please not miss appointment  Quality Measures:     BMI Counseling: Body mass index is 38 3 kg/m²  The BMI is above normal  Nutrition recommendations include decreasing portion sizes and encouraging healthy choices of fruits and vegetables  Exercise recommendations include moderate physical activity 150 minutes/week  Rationale for BMI follow-up plan is due to patient being overweight or obese  Tobacco Cessation Counseling: Tobacco cessation counseling was provided  The patient is sincerely urged to quit consumption of tobacco  He is not ready to quit tobacco         Return in about 4 weeks (around 8/26/2022) for regular and medicare  No problem-specific Assessment & Plan notes found for this encounter  Diagnoses and all orders for this visit:    Type 2 diabetes mellitus with stage 2 chronic kidney disease, unspecified whether long term insulin use (HCC)  -     Hemoglobin A1C; Future  -     Lipid Panel with Direct LDL reflex; Future  -     Cancel: PSA, total and free;  Future  -     Urinalysis, Screen    CLL (chronic lymphocytic leukemia) (HCC)  -     CBC and differential; Future    Chronic bronchitis, unspecified chronic bronchitis type (HCC)    Bipolar affective disorder, remission status unspecified ([facility] )  -     TSH, 3rd generation with Free T4 reflex; Future    Body mass index (BMI) of 40 0 to 44 9 in adult [facility])    Encounter for hepatitis C screening test for low risk patient  -     Hepatitis C antibody; Future    Screening for HIV without presence of risk factors  -     HIV 1/2 Antigen/Antibody (4th Generation) w Reflex [facility]; Future    Screening for lung cancer  -     CT lung screening program; Future    Tobacco use  -     CT lung screening program; Future    Polyp of colon, unspecified part of colon, unspecified type  -     Ambulatory referral for colonoscopy; Future    Colon cancer screening  -     Ambulatory referral for colonoscopy; Future    Primary hypertension  -     amLODIPine (NORVASC) 5 mg tablet; Take 1 tablet (5 mg total) by mouth daily    Hoarseness of voice  -     Ambulatory Referral to Otolaryngology; Future    Other orders  -     losartan (COZAAR) 100 MG tablet  -     VITAMIN D PO; Take 1 25 mcg by mouth every 7 days          Subjective:      Patient ID: Norma Pradhan is a 78 y o  male  Here to establish care        ALLERGIES:  No Known Allergies    CURRENT MEDICATIONS:    Current Outpatient Medications:     ALPRAZolam (XANAX) 1 mg tablet, , Disp: , Rfl: 3    amLODIPine (NORVASC) 5 mg tablet, Take 1 tablet (5 mg total) by mouth daily, Disp: 30 tablet, Rfl: 5    Ibrutinib 280 MG TABS, Take 280 mg by mouth in the morning, Disp: 90 tablet, Rfl: 3    losartan (COZAAR) 100 MG tablet, , Disp: , Rfl:     metoprolol succinate (TOPROL-XL) 50 mg 24 hr tablet, Take 1 tablet (50 mg total) by mouth daily, Disp: 90 tablet, Rfl: 2    Psyllium (DAILY FIBER PO), Take 1 capsule by mouth in the morning, Disp: , Rfl:     risperiDONE (RisperDAL) 1 mg tablet, Take 1 mg by mouth 2 (two) times a day  , Disp: , Rfl:     rosuvastatin (CRESTOR) 10 MG tablet, Take 10 mg by mouth daily, Disp: , Rfl:     VITAMIN D PO, Take 1 25 mcg by mouth every 7 days, Disp: , Rfl:     ACTIVE PROBLEM LIST:  Patient Active Problem List   Diagnosis    Hypertension    Depression    Polypharmacy    Bronchitis    Encephalopathy    Weakness    Chronic lymphocytic leukemia of B-cell type not having achieved remission (HCC)    Lactic acidosis    Leukocytosis    Elevated troponin    Bipolar 1 disorder (HCC)    Psychiatric disorder    Anemia    Peripheral edema    CLL (chronic lymphocytic leukemia) (HCC)    RA (acute kidney injury) (HCC)    Chronic obstructive pulmonary disease (HCC)    Expiratory wheezing    Type 2 diabetes mellitus with stage 2 chronic kidney disease (HCC)    Lymph node enlargement    Body mass index (BMI) 40 0-44 9, adult    Stage 2 chronic kidney disease    Type 2 diabetes mellitus, without long-term current use of insulin (HCC)    Acute pain of right shoulder       PAST MEDICAL HISTORY:  Past Medical History:   Diagnosis Date    Anemia     Anxiety     Bipolar disorder (HCC)     [facility]     History of alcohol abuse     Hypertension     Hypertension     Insomnia     Leukemia [facility] [facility] )     Opiate abuse, episodic ([facility] )     Psychiatric disorder     Sleep apnea with use of continuous positive airway pressure (CPAP)        PAST SURGICAL HISTORY:  Past Surgical History:   Procedure Laterality Date    COLONOSCOPY      EGD AND COLONOSCOPY N/A 3/30/2018    Procedure: EGD AND COLONOSCOPY;  Surgeon: Khris Pierce MD;  [facility];   Service: Gastroenterology       FAMILY HISTORY:  Family History   Problem Relation Age of Onset    Coronary artery disease Mother     Diabetes Mother     No Known Problems Father     Hepatitis Sister     Cancer Brother     Prostate cancer Brother     Early death Brother        SOCIAL HISTORY:  Social History     Socioeconomic History    Marital status: /Civil Union     Spouse name: Not on file    Number of children: Not on file    Years of education: Not on file    Highest education level: Not on file   Occupational History    Not on file   Tobacco Use    Smoking status: Current Every Day Smoker     Packs/day: 1 00     Years: 40 00     Pack years: 40 00     Types: Cigarettes     Last attempt to quit: 10/13/2020     Years since quittin 7    Smokeless tobacco: Never Used   Vaping Use    Vaping Use: Never used   Substance and Sexual Activity    Alcohol use: No     Comment: last drink 2017    Drug use: No     Comment: hx of heroin and subutex abuse    Sexual activity: Not Currently   Other Topics Concern    Not on file   Social History Narrative    Not on file     Social Determinants of Health     Financial Resource Strain: Not on file   Food Insecurity: Not on file   Transportation Needs: Not on file   Physical Activity: Not on file   Stress: Not on file   Social Connections: Not on file   Intimate Partner Violence: Not on file   Housing Stability: Not on file       Review of Systems   Musculoskeletal: Positive for arthralgias, back pain and gait problem  Psychiatric/Behavioral: Positive for agitation, behavioral problems, decreased concentration and dysphoric mood  The patient is nervous/anxious  All other systems reviewed and are negative  Objective:  Vitals:    22 1334   BP: 160/78   BP Location: Left arm   Patient Position: Sitting   Cuff Size: Adult   Pulse: 71   Resp: 16   Temp: 98 4 °F (36 9 °C)   TempSrc: Tympanic   SpO2: 95%   Weight: 125 kg (274 lb 9 6 oz)   Height: 5' 11" (1 803 m)     Body mass index is 38 3 kg/m²  Physical Exam  Vitals and nursing note reviewed  Constitutional:       Appearance: Normal appearance  He is obese  HENT:      Head: Normocephalic and atraumatic  Nose: Nose normal       Mouth/Throat:      Mouth: Mucous membranes are moist    Cardiovascular:      Rate and Rhythm: Normal rate and regular rhythm        Heart sounds: Normal heart sounds  Pulmonary:      Effort: Pulmonary effort is normal       Breath sounds: Normal breath sounds  Abdominal:      General: There is distension  Palpations: Abdomen is soft  Musculoskeletal:         General: Swelling and deformity present  Normal range of motion  Cervical back: Normal range of motion  Right lower leg: Edema present  Left lower leg: Edema present  Skin:     General: Skin is warm and dry  Comments: B/l venous changes to lower extremities   Neurological:      General: No focal deficit present  Mental Status: He is alert and oriented to person, place, and time  Mental status is at baseline  Gait: Gait abnormal    Psychiatric:         Mood and Affect: Mood is depressed  Affect is flat  Behavior: Behavior normal          Thought Content:  Thought content normal            RESULTS:    Recent Results (from the past 1008 hour(s))   CBC and differential    Collection Time: 06/17/22  3:35 PM   Result Value Ref Range    WBC 8 40 4 31 - 10 16 Thousand/uL    RBC 4 46 3 88 - 5 62 Million/uL    Hemoglobin 14 3 12 0 - 17 0 g/dL    Hematocrit 42 6 36 5 - 49 3 %    MCV 96 82 - 98 fL    MCH 32 1 26 8 - 34 3 pg    MCHC 33 6 31 4 - 37 4 g/dL    RDW 12 9 11 6 - 15 1 %    MPV 11 9 8 9 - 12 7 fL    Platelets 403 562 - 432 Thousands/uL    nRBC 0 /100 WBCs    Neutrophils Relative 62 43 - 75 %    Immat GRANS % 1 0 - 2 %    Lymphocytes Relative 28 14 - 44 %    Monocytes Relative 8 4 - 12 %    Eosinophils Relative 1 0 - 6 %    Basophils Relative 0 0 - 1 %    Neutrophils Absolute 5 22 1 85 - 7 62 Thousands/µL    Immature Grans Absolute 0 05 0 00 - 0 20 Thousand/uL    Lymphocytes Absolute 2 34 0 60 - 4 47 Thousands/µL    Monocytes Absolute 0 67 0 17 - 1 22 Thousand/µL    Eosinophils Absolute 0 09 0 00 - 0 61 Thousand/µL    Basophils Absolute 0 03 0 00 - 0 10 Thousands/µL   C-reactive protein    Collection Time: 06/17/22  3:35 PM   Result Value Ref Range    CRP 4 2 (H) <3 0 mg/L Comprehensive metabolic panel    Collection Time: 06/17/22  3:35 PM   Result Value Ref Range    Sodium 140 136 - 145 mmol/L    Potassium 3 8 3 5 - 5 3 mmol/L    Chloride 107 100 - 108 mmol/L    CO2 28 21 - 32 mmol/L    ANION GAP 5 4 - 13 mmol/L    BUN 15 5 - 25 mg/dL    Creatinine 1 00 0 60 - 1 30 mg/dL    Glucose, Fasting 122 (H) 65 - 99 mg/dL    Calcium 9 5 8 3 - 10 1 mg/dL    AST 15 5 - 45 U/L    ALT 32 12 - 78 U/L    Alkaline Phosphatase 81 46 - 116 U/L    Total Protein 7 7 6 4 - 8 2 g/dL    Albumin 3 9 3 5 - 5 0 g/dL    Total Bilirubin 0 55 0 20 - 1 00 mg/dL    eGFR 77 ml/min/1 73sq m   Ferritin    Collection Time: 06/17/22  3:35 PM   Result Value Ref Range    Ferritin 920 (H) 8 - 388 ng/mL   Hemoglobin A1C    Collection Time: 06/17/22  3:35 PM   Result Value Ref Range    Hemoglobin A1C 6 4 (H) Normal 3 8-5 6%; PreDiabetic 5 7-6 4%;  Diabetic >=6 5%; Glycemic control for adults with diabetes <7 0% %     mg/dl   Lipid panel    Collection Time: 06/17/22  3:35 PM   Result Value Ref Range    Cholesterol 154 See Comment mg/dL    Triglycerides 125 See Comment mg/dL    HDL, Direct 38 (L) >=40 mg/dL    LDL Calculated 91 0 - 100 mg/dL    Non-HDL-Chol (CHOL-HDL) 116 mg/dl   TSH, 3rd generation    Collection Time: 06/17/22  3:35 PM   Result Value Ref Range    TSH 3RD GENERATON 1 660 0 450 - 4 500 uIU/mL   T4, free    Collection Time: 06/17/22  3:35 PM   Result Value Ref Range    Free T4 0 90 0 76 - 1 46 ng/dL   Vitamin D 25 hydroxy    Collection Time: 06/17/22  3:35 PM   Result Value Ref Range    Vit D, 25-Hydroxy 29 6 (L) 30 0 - 100 0 ng/mL   CBC and differential    Collection Time: 07/18/22  3:54 PM   Result Value Ref Range    WBC 7 82 4 31 - 10 16 Thousand/uL    RBC 4 40 3 88 - 5 62 Million/uL    Hemoglobin 14 0 12 0 - 17 0 g/dL    Hematocrit 41 7 36 5 - 49 3 %    MCV 95 82 - 98 fL    MCH 31 8 26 8 - 34 3 pg    MCHC 33 6 31 4 - 37 4 g/dL    RDW 12 6 11 6 - 15 1 %    MPV 11 8 8 9 - 12 7 fL    Platelets 942 161 - 390 Thousands/uL    nRBC 0 /100 WBCs    Neutrophils Relative 65 43 - 75 %    Immat GRANS % 1 0 - 2 %    Lymphocytes Relative 25 14 - 44 %    Monocytes Relative 8 4 - 12 %    Eosinophils Relative 1 0 - 6 %    Basophils Relative 0 0 - 1 %    Neutrophils Absolute 5 03 1 85 - 7 62 Thousands/µL    Immature Grans Absolute 0 06 0 00 - 0 20 Thousand/uL    Lymphocytes Absolute 1 96 0 60 - 4 47 Thousands/µL    Monocytes Absolute 0 65 0 17 - 1 22 Thousand/µL    Eosinophils Absolute 0 10 0 00 - 0 61 Thousand/µL    Basophils Absolute 0 02 0 00 - 0 10 Thousands/µL       This note was created with voice recognition software  Phonic, grammatical and spelling errors may be present within the note as a result

## 2022-08-08 ENCOUNTER — TELEPHONE (OUTPATIENT)
Dept: INTERNAL MEDICINE CLINIC | Facility: CLINIC | Age: 67
End: 2022-08-08

## 2022-08-08 DIAGNOSIS — I10 PRIMARY HYPERTENSION: Primary | ICD-10-CM

## 2022-08-08 RX ORDER — FUROSEMIDE 40 MG/1
40 TABLET ORAL DAILY
Qty: 90 TABLET | Refills: 3 | Status: SHIPPED | OUTPATIENT
Start: 2022-08-08 | End: 2023-03-02 | Stop reason: SDUPTHER

## 2022-08-08 RX ORDER — FUROSEMIDE 40 MG/1
40 TABLET ORAL DAILY
Qty: 90 TABLET | Refills: 1 | Status: CANCELLED | OUTPATIENT
Start: 2022-08-08

## 2022-08-08 NOTE — TELEPHONE ENCOUNTER
Patient's wife called and is requesting furosemide 40 mg tablet, take 1 tablet (40 mg total) by mouth daily, for his leg swelling  She thought he had another refill and he does not  This was prescribed by his previous PCP   Requesting 90 day supply       [employer]    Call back #[phone]  Kizzy/wife

## 2022-09-07 ENCOUNTER — RA CDI HCC (OUTPATIENT)
Dept: OTHER | Facility: HOSPITAL | Age: 67
End: 2022-09-07

## 2022-09-07 NOTE — PROGRESS NOTES
[facility]  coding opportunities       Chart reviewed, no opportunity found:   [address]        Patients Insurance     Medicare Insurance: [employer]

## 2022-09-12 ENCOUNTER — OFFICE VISIT (OUTPATIENT)
Dept: INTERNAL MEDICINE CLINIC | Facility: CLINIC | Age: 67
End: 2022-09-12
Payer: COMMERCIAL

## 2022-09-12 VITALS
BODY MASS INDEX: 37.8 KG/M2 | DIASTOLIC BLOOD PRESSURE: 80 MMHG | HEART RATE: 83 BPM | RESPIRATION RATE: 16 BRPM | SYSTOLIC BLOOD PRESSURE: 144 MMHG | OXYGEN SATURATION: 95 % | WEIGHT: 270 LBS | HEIGHT: 71 IN

## 2022-09-12 DIAGNOSIS — Z72.0 TOBACCO USE: ICD-10-CM

## 2022-09-12 DIAGNOSIS — Z80.42 FAMILY HISTORY OF PROSTATE CANCER: ICD-10-CM

## 2022-09-12 DIAGNOSIS — I10 PRIMARY HYPERTENSION: ICD-10-CM

## 2022-09-12 DIAGNOSIS — M70.72 BURSITIS OF OTHER BURSA OF BOTH HIPS: ICD-10-CM

## 2022-09-12 DIAGNOSIS — Z00.00 MEDICARE ANNUAL WELLNESS VISIT, INITIAL: Primary | ICD-10-CM

## 2022-09-12 DIAGNOSIS — G62.9 PERIPHERAL POLYNEUROPATHY: ICD-10-CM

## 2022-09-12 DIAGNOSIS — M70.71 BURSITIS OF OTHER BURSA OF BOTH HIPS: ICD-10-CM

## 2022-09-12 DIAGNOSIS — N18.2 TYPE 2 DIABETES MELLITUS WITH STAGE 2 CHRONIC KIDNEY DISEASE, UNSPECIFIED WHETHER LONG TERM INSULIN USE (HCC): ICD-10-CM

## 2022-09-12 DIAGNOSIS — G89.29 CHRONIC BILATERAL LOW BACK PAIN WITHOUT SCIATICA: ICD-10-CM

## 2022-09-12 DIAGNOSIS — M54.50 CHRONIC BILATERAL LOW BACK PAIN WITHOUT SCIATICA: ICD-10-CM

## 2022-09-12 DIAGNOSIS — C91.10 CLL (CHRONIC LYMPHOCYTIC LEUKEMIA) (HCC): ICD-10-CM

## 2022-09-12 DIAGNOSIS — F31.9 BIPOLAR AFFECTIVE DISORDER, REMISSION STATUS UNSPECIFIED (HCC): ICD-10-CM

## 2022-09-12 DIAGNOSIS — J44.9 CHRONIC OBSTRUCTIVE PULMONARY DISEASE, UNSPECIFIED COPD TYPE (HCC): ICD-10-CM

## 2022-09-12 DIAGNOSIS — E56.9 VITAMIN DEFICIENCY: ICD-10-CM

## 2022-09-12 DIAGNOSIS — E11.22 TYPE 2 DIABETES MELLITUS WITH STAGE 2 CHRONIC KIDNEY DISEASE, UNSPECIFIED WHETHER LONG TERM INSULIN USE (HCC): ICD-10-CM

## 2022-09-12 PROCEDURE — 3288F FALL RISK ASSESSMENT DOCD: CPT | Performed by: INTERNAL MEDICINE

## 2022-09-12 PROCEDURE — 99214 OFFICE O/P EST MOD 30 MIN: CPT | Performed by: INTERNAL MEDICINE

## 2022-09-12 PROCEDURE — G0439 PPPS, SUBSEQ VISIT: HCPCS | Performed by: INTERNAL MEDICINE

## 2022-09-12 PROCEDURE — 1125F AMNT PAIN NOTED PAIN PRSNT: CPT | Performed by: INTERNAL MEDICINE

## 2022-09-12 PROCEDURE — 3066F NEPHROPATHY DOC TX: CPT | Performed by: ORTHOPAEDIC SURGERY

## 2022-09-12 PROCEDURE — 3725F SCREEN DEPRESSION PERFORMED: CPT | Performed by: INTERNAL MEDICINE

## 2022-09-12 PROCEDURE — 1170F FXNL STATUS ASSESSED: CPT | Performed by: INTERNAL MEDICINE

## 2022-09-12 RX ORDER — AMLODIPINE BESYLATE 10 MG/1
10 TABLET ORAL DAILY
Qty: 90 TABLET | Refills: 3 | Status: SHIPPED | OUTPATIENT
Start: 2022-09-12 | End: 2022-12-11

## 2022-09-12 RX ORDER — GABAPENTIN 100 MG/1
100 CAPSULE ORAL 3 TIMES DAILY
Qty: 90 CAPSULE | Refills: 2 | Status: SHIPPED | OUTPATIENT
Start: 2022-09-12 | End: 2022-12-11

## 2022-09-12 RX ORDER — ASPIRIN 81 MG/1
81 TABLET, CHEWABLE ORAL DAILY
Qty: 90 TABLET | Refills: 3 | Status: SHIPPED | OUTPATIENT
Start: 2022-09-12 | End: 2022-12-11

## 2022-09-12 NOTE — PROGRESS NOTES
Patient's shoes and socks removed  Right Foot/Ankle   Right Foot Inspection  Skin Exam: skin normal and skin intact  No dry skin, no warmth, no callus, no erythema, no maceration, no abnormal color, no pre-ulcer, no ulcer and no callus  Toe Exam: ROM and strength within normal limits  Sensory   Monofilament testing: intact    Vascular  The right DP pulse is 2+  Right Toe  - Comprehensive Exam  Ecchymosis: none  Swelling: none         Left Foot/Ankle  Left Foot Inspection  Skin Exam: skin normal and skin intact  No dry skin, no warmth, no erythema, no maceration, normal color, no pre-ulcer, no ulcer and no callus  Toe Exam: ROM and strength within normal limits  Sensory   Monofilament testing: diminished    Vascular  The left DP pulse is 2+       Left Toe  - Comprehensive Exam  Ecchymosis: none  Swelling: none           Assign Risk Category  No deformity present  No loss of protective sensation  Weak pulses  Risk: 0

## 2022-09-12 NOTE — PROGRESS NOTES
Assessment and Plan:     Problem List Items Addressed This Visit        Endocrine    Type 2 diabetes mellitus with stage 2 chronic kidney disease (HCC)    Relevant Medications    aspirin 81 mg chewable tablet    Other Relevant Orders    Lipid Panel with Direct LDL reflex    Hemoglobin A1C    CBC and differential    Comprehensive metabolic panel    Lipid Panel with Direct LDL reflex    Hemoglobin A1C       Respiratory    Chronic obstructive pulmonary disease (HCC)       Cardiovascular and Mediastinum    Hypertension (Chronic)    Relevant Medications    amLODIPine (NORVASC) 10 mg tablet       Other    CLL (chronic lymphocytic leukemia) (HCC)    Relevant Orders    CBC and differential    Comprehensive metabolic panel      Other Visit Diagnoses     Medicare annual wellness visit, initial    -  Primary    Bipolar affective disorder, remission status unspecified ([facility] [address] )        Relevant Orders    TSH, 3rd generation with Free T4 reflex    Tobacco use        Family history of prostate cancer        Relevant Orders    PSA, total and free    PSA, total and free    Bursitis of other bursa of both hips        Relevant Orders    Ambulatory Referral to Sports Medicine    Peripheral polyneuropathy        Relevant Medications    gabapentin (Neurontin) 100 mg capsule    Other Relevant Orders    TSH, 3rd generation with Free T4 reflex    Vitamin deficiency        Relevant Orders    Vitamin D 25 hydroxy    Vitamin B12        BMI Counseling: Body mass index is 37 66 kg/m²  The BMI is above normal  Nutrition recommendations include decreasing portion sizes and encouraging healthy choices of fruits and vegetables  Exercise recommendations include moderate physical activity 150 minutes/week  Rationale for BMI follow-up plan is due to patient being overweight or obese  Tobacco Cessation Counseling: Tobacco cessation counseling was provided   The patient is sincerely urged to quit consumption of tobacco  He is not ready to quit tobacco  Medication options discussed  Preventive health issues were discussed with patient, and age appropriate screening tests were ordered as noted in patient's After Visit Summary  Personalized health advice and appropriate referrals for health education or preventive services given if needed, as noted in patient's After Visit Summary  History of Present Illness:     Patient presents for a Medicare Wellness Visit    Patient is here for follow-up and Medicare visit  He has a lot complaints today  He reports fatigue loss of motivation  He would like some labs ordered  Reports bilateral hip pain consistent with hip bursitis  Will send to  Orthopedics  He has a GI appointment coming up  Heme Onc appointment coming  CT lung screening coming up as well  Hypertension is still not controlled but better  Will increase amlodipine to 10 mg   ASCVD RISK score is 44% and has come down  But will add aspirin  Patient Care Team:  Jeremy Blank MD as PCP - General (Internal Medicine)  Theo Hernandez MD as Endoscopist     Review of Systems:     Review of Systems   Constitutional: Positive for activity change and fatigue  Respiratory: Positive for shortness of breath  Musculoskeletal: Positive for arthralgias, back pain and gait problem  Psychiatric/Behavioral: Positive for agitation, decreased concentration, dysphoric mood and sleep disturbance  The patient is nervous/anxious  All other systems reviewed and are negative         Problem List:     Patient Active Problem List   Diagnosis    Hypertension    Depression    Polypharmacy    Bronchitis    Encephalopathy    Weakness    Chronic lymphocytic leukemia of B-cell type not having achieved remission ([facility])    Lactic acidosis    Leukocytosis    Elevated troponin    Bipolar 1 disorder (HCC)    Psychiatric disorder    Anemia    Peripheral edema    CLL [facility]chronic lymphocytic leukemia) ([facility] [address] )    RA (acute kidney injury) ([facility])    Chronic obstructive pulmonary disease (HCC)    Expiratory wheezing    Type 2 diabetes mellitus with stage 2 chronic kidney disease (HCC)    Lymph node enlargement    Body mass index (BMI) 40 0-44 9, adult    Stage 2 chronic kidney disease    Type 2 diabetes mellitus, without long-term current use of insulin (HCC)    Acute pain of right shoulder      Past Medical and Surgical History:     Past Medical History:   Diagnosis Date    Anemia     Anxiety     Bipolar disorder ([facility] )     Cancer ([place] [address] )     History of alcohol abuse     Hypertension     Hypertension     Insomnia     Leukemia ([place] [address] )     Opiate abuse, episodic ([place] [address] )     Psychiatric disorder     Sleep apnea with use of continuous positive airway pressure (CPAP)      Past Surgical History:   Procedure Laterality Date    COLONOSCOPY      EGD AND COLONOSCOPY N/A 3/30/2018    Procedure: EGD AND COLONOSCOPY;  Surgeon: Rodney Nickerson MD;  Location: [place] [address] GI LAB;   Service: Gastroenterology      Family History:     Family History   Problem Relation Age of Onset    Coronary artery disease Mother     Diabetes Mother     No Known Problems Father     Hepatitis Sister     Cancer Brother     Prostate cancer Brother     Early death Brother       Social History:     Social History     Socioeconomic History    Marital status: /Civil Union     Spouse name: None    Number of children: None    Years of education: None    Highest education level: None   Occupational History    None   Tobacco Use    Smoking status: Current Every Day Smoker     Packs/day: 1 00     Years: 40 00     Pack years: 40 00     Types: Cigarettes     Last attempt to quit: 10/13/2020     Years since quittin 9    Smokeless tobacco: Never Used   Vaping Use    Vaping Use: Never used   Substance and Sexual Activity    Alcohol use: No     Comment: last drink 2017    Drug use: No     Comment: hx of heroin and subutex abuse    Sexual activity: Not Currently   Other Topics Concern    None   Social History Narrative    None     Social Determinants of Health     Financial Resource Strain: High Risk    Difficulty of Paying Living Expenses: Very hard   Food Insecurity: Not on file   Transportation Needs: No Transportation Needs    Lack of Transportation (Medical): No    Lack of Transportation (Non-Medical): No   Physical Activity: Not on file   Stress: Not on file   Social Connections: Not on file   Intimate Partner Violence: Not on file   Housing Stability: Not on file      Medications and Allergies:     Current Outpatient Medications   Medication Sig Dispense Refill    ALPRAZolam (XANAX) 1 mg tablet   3    amLODIPine (NORVASC) 10 mg tablet Take 1 tablet (10 mg total) by mouth daily 90 tablet 3    aspirin 81 mg chewable tablet Chew 1 tablet (81 mg total) daily 90 tablet 3    furosemide (LASIX) 40 mg tablet Take 1 tablet (40 mg total) by mouth daily 90 tablet 3    gabapentin (Neurontin) 100 mg capsule Take 1 capsule (100 mg total) by mouth 3 (three) times a day 90 capsule 2    Ibrutinib 280 MG TABS Take 280 mg by mouth in the morning 90 tablet 3    losartan (COZAAR) 100 MG tablet       metoprolol succinate (TOPROL-XL) 50 mg 24 hr tablet Take 1 tablet (50 mg total) by mouth daily 90 tablet 2    Psyllium (DAILY FIBER PO) Take 1 capsule by mouth in the morning      risperiDONE (RisperDAL) 1 mg tablet Take 1 mg by mouth 2 (two) times a day        rosuvastatin (CRESTOR) 10 MG tablet Take 10 mg by mouth daily      VITAMIN D PO Take 1 25 mcg by mouth every 7 days       No current facility-administered medications for this visit       No Known Allergies   Immunizations:     Immunization History   Administered Date(s) Administered    Pneumococcal Conjugate Vaccine 20-valent (Pcv20), Polysace 07/29/2022      Health Maintenance:         Topic Date Due    Lung Cancer Screening  10/10/2021    Colorectal Cancer Screening  11/02/2021    Hepatitis C Screening  Completed         Topic Date Due    COVID-19 Vaccine (1) Never done    Influenza Vaccine (1) 09/01/2022      Medicare Screening Tests and Risk Assessments:     Prashant Schilling is here for his Subsequent Wellness visit  Last Medicare Wellness visit information reviewed, patient interviewed and updates made to the record today  Health Risk Assessment:   Patient rates overall health as poor  Patient feels that their physical health rating is much worse  Patient is dissatisfied with their life  Eyesight was rated as same  Hearing was rated as same  Patient feels that their emotional and mental health rating is same  Patients states they are sometimes angry  Patient states they are often unusually tired/fatigued  Pain experienced in the last 7 days has been some  Patient's pain rating has been 10/10  Depression Screening:   PHQ-9 Score: 20      Fall Risk Screening: In the past year, patient has experienced: no history of falling in past year      Home Safety:  Patient does not have trouble with stairs inside or outside of their home  Patient has working smoke alarms and has no working carbon monoxide detector  Home safety hazards include: none  Nutrition:   Current diet is Regular and Diabetic  Medications:   Patient is currently taking over-the-counter supplements  OTC medications include: see medication list  Patient is not able to manage medications  Activities of Daily Living (ADLs)/Instrumental Activities of Daily Living (IADLs):   Walk and transfer into and out of bed and chair?: Yes  Dress and groom yourself?: Yes    Bathe or shower yourself?: Yes    Feed yourself?  Yes  Do your laundry/housekeeping?: No  Manage your money, pay your bills and track your expenses?: Yes  Make your own meals?: No    Do your own shopping?: Yes    Previous Hospitalizations:   Any hospitalizations or ED visits within the last 12 months?: No      Cognitive Screening:   Provider or family/friend/caregiver concerned regarding cognition?: No    PREVENTIVE SCREENINGS      Cardiovascular Screening:    General: History Lipid Disorder and Screening Current      Diabetes Screening:     General: History Diabetes and Screening Current      Colorectal Cancer Screening:     General: Screening Current      Prostate Cancer Screening:    General: Screening Current    Due for: PSA      Osteoporosis Screening:    General: Screening Not Indicated      Abdominal Aortic Aneurysm (AAA) Screening:    Risk factors include: age between 73-69 yo and tobacco use        Lung Cancer Screening:       Due for: Low Dose CT (LDCT)      Hepatitis C Screening:    General: Screening Current    Screening, Brief Intervention, and Referral to Treatment (SBIRT)    Screening  Typical number of drinks in a day: 0  Typical number of drinks in a week: 0  Interpretation: Low risk drinking behavior  Single Item Drug Screening:  How often have you used an illegal drug (including marijuana) or a prescription medication for non-medical reasons in the past year? never    Single Item Drug Screen Score: 0  Interpretation: Negative screen for possible drug use disorder    Brief Intervention  Alcohol & drug use screenings were reviewed  No concerns regarding substance use disorder identified  No exam data present     Physical Exam:     /80 (BP Location: Left arm, Patient Position: Sitting)   Pulse 83   Resp 16   Ht 5' 11" (1 803 m)   Wt 122 kg (270 lb)   SpO2 95%   BMI 37 66 kg/m²     Physical Exam  Vitals and nursing note reviewed  Constitutional:       Appearance: Normal appearance  He is obese  HENT:      Head: Normocephalic and atraumatic  Cardiovascular:      Rate and Rhythm: Normal rate and regular rhythm  Heart sounds: Normal heart sounds  Pulmonary:      Effort: Pulmonary effort is normal       Breath sounds: Normal breath sounds  Musculoskeletal:      Cervical back: Normal range of motion  Skin:     General: Skin is warm and dry     Neurological: General: No focal deficit present  Mental Status: He is alert and oriented to person, place, and time  Mental status is at baseline     Psychiatric:         Mood and Affect: Mood normal           Curtis Vaughn MD

## 2022-09-19 NOTE — PROGRESS NOTES
HEMATOLOGY / ONCOLOGY CLINIC NOTE    Primary Care Provider: Portia Pérez MD  Referring Provider:    MRN: [id number]  : 1955    Reason for Encounter:    No chief complaint on file  Wife: Lisandro Sloan      Hematology / Oncology History:     Navi Bergman is a 78 y o  male who comes in for [place]  1, CLL  - Stage III with anemia; also having splenomegaly  No FISH panel  - initially had been on ibrutinib 420 mg since 2018   - highest white blood cell 128k, decreased to 8 K    - 2021:  Due to fatigue and potential shoulder surgery, patient has decreased ibrutinib to 280 mg daily by himself  WBC and lymphocyte remains stable  Previous hematologist had him on 280 mg since then  2022: WBC 8 64k, Hgb 14 7, plt 177k, Vit B12 , creat 1 08    2, anemia  - resolved on treatment        Assessment / Plan:     1  CLL (chronic lymphocytic leukemia) ([facility] [address] )    - reviewed labs with patient, CLL is under control  Most recent 3/21/2022 CT scan showed previous splenomegaly has resolved  Patient tolerating ibrutinib 280 mg well, will continue current treatment no change       - made patient aware that if he is going for surgical procedures depending on the etiology and risk for bleeding, patient usually need to hold ibrutinib a week before and a week after said procedure       · Discussion of decision making    I personally reviewed the following lab results, the image studies, pathology, other specialty/physicians consult notes and recommendations, and outside medical records from [facility]  I had a lengthy discussion with the patient and shared the work-up findings  We discussed the diagnosis and management plan as below   I spent 33 minutes reviewing the records (labs, clinician notes, outside records, medical history, ordering medicine/tests/procedures, interpreting the imaging/labs previously done) and coordination of care as well as direct time with the patient today, of which greater than 50% of the time was spent in counseling and coordination of care with the patient/family  · Plan/Labs  · Patient will check CBC and CMP labs every 3 months as standing and will follow patient in 6 months       Follow Up: 6 months    All questions were answered to the patient's satisfaction during this encounter  The patient knows the contact information for our office and knows to reach out for any relevant concerns related to this encounter  They are to call for any temperature 100 4 or higher, new symptoms including but not restricted to shaking chills, decreased appetite, nausea, vomiting, diarrhea, increased fatigue, shortness of breath or chest pain, confusion, and not feeling the strength to come to the clinic  For all other listed problems and medical diagnosis in their chart - they are managed by PCP and/or other specialists, which the patient acknowledges  Thank you very much for your consultation and making us a part of this patient's care  We are continuing to follow closely with you  Please do not hesitate to reach out to me with any additional questions or concerns  [place] MD  Hematology & Medical Oncology Staff Physician      Interval History:     4/5/2019:  Heena Montalvo in for follow-up  Reported has been doing okay at baseline health status  Using home oxygen for COPD  No bleeding, no leg swelling, no new chest pain, cough, hemoptysis  No frequent infection  8/1/2019 :  Came in for follow-up  Overall doing well  Reported has been losing weight about 20 lb  No lumps bumps  No other constitutional symptoms  No bleeding no frequent infection  1/3/12403 :  Came in for follow-up, doing well, body weight is stable  based overall tolerated treatment well no bleeding, no thrombosis, no frequent infection  Patient reported has been losing weight however albumin level is in normal range  7/31/2020 :  Patient came in for follow-up doing okay  Gaining weight    Reported having ED and more fatigue  No infection of skin rash    11/13/2020 :  Came for follow-up, overall doing okay  Diagnosed sleep apnea using CPAP machine  No infection, no bleeding    5/17/2021 :  Patient came in follow-up, overall doing okay  No bleeding, no infection, no palpitations  Patient reported some mild fatigue  Recently has been evaluated for also for shoulder surgery     12/3/2021 :  Came for follow-up doing well  No bleeding, no infection  Recently had 2nd kidney stone  Recovered well   No palpitation  Interval events: he feels fatigue and lack of motivation on [place]  No new side effects or acute issues otherwise  Problem list:       Patient Active Problem List   Diagnosis    Hypertension    Depression    Polypharmacy    Bronchitis    Encephalopathy    Weakness    Chronic lymphocytic leukemia of B-cell type not having achieved remission (HCC)    Lactic acidosis    Leukocytosis    Elevated troponin    Bipolar 1 disorder (HCC)    Psychiatric disorder    Anemia    Peripheral edema    CLL (chronic lymphocytic leukemia) (HCC)    RA (acute kidney injury) (HCC)    Chronic obstructive pulmonary disease (HCC)    Expiratory wheezing    Type 2 diabetes mellitus with stage 2 chronic kidney disease (HCC)    Lymph node enlargement    Body mass index (BMI) 40 0-44 9, adult    Stage 2 chronic kidney disease    Type 2 diabetes mellitus, without long-term current use of insulin (HCC)    Acute pain of right shoulder       PHYSICIAL EXAMINATION:       Vital Signs:     Vitals:    09/28/22 1130   BP: 138/70   Pulse: 55   Resp: 18   Temp: 97 9 °F (36 6 °C)   SpO2: 95%       Body mass index is 37 38 kg/m²  Body surface area is 2 39 meters squared        GEN: Alert, awake oriented, in no acute distress  HEENT- No pallor, icterus, cyanosis, no oral mucosal lesions,   LAD - no palpable cervical, clavicle, axillary, inguinal LAD  Heart- +S1 S2, regular rate and rhythm  Lungs- decreased breathing sound bilateral    Abdomen- soft, Non tender, bowel sounds present  Extremities- No cyanosis, clubbing, edema  Neuro- No focal neurological deficit noted b/l           PAST MEDICAL HISTORY:   has a past medical history of Anemia, Anxiety, [facility] disorder ([facility] ), Cancer ([facility] ), History of alcohol abuse, Hypertension, Hypertension, Insomnia, Leukemia ([facility] ), Opiate abuse, episodic ([place] [address] ), Psychiatric disorder, and Sleep apnea with use of continuous positive airway pressure (CPAP)  PAST SURGICAL HISTORY:   has a past surgical history that includes EGD AND COLONOSCOPY (N/A, 3/30/2018) and Colonoscopy  CURRENT MEDICATIONS:     Current Outpatient Medications   Medication Sig Dispense Refill    ALPRAZolam (XANAX) 1 mg tablet   3    amLODIPine (NORVASC) 10 mg tablet Take 1 tablet (10 mg total) by mouth daily 90 tablet 3    aspirin 81 mg chewable tablet Chew 1 tablet (81 mg total) daily 90 tablet 3    furosemide (LASIX) 40 mg tablet Take 1 tablet (40 mg total) by mouth daily 90 tablet 3    gabapentin (Neurontin) 100 mg capsule Take 1 capsule (100 mg total) by mouth 3 (three) times a day 90 capsule 2    Ibrutinib 280 MG TABS Take 280 mg by mouth in the morning 90 tablet 3    losartan (COZAAR) 100 MG tablet       metoprolol succinate (TOPROL-XL) 50 mg 24 hr tablet Take 1 tablet (50 mg total) by mouth daily 90 tablet 2    Psyllium (DAILY FIBER PO) Take 1 capsule by mouth in the morning      risperiDONE (RisperDAL) 1 mg tablet Take 1 mg by mouth 2 (two) times a day        rosuvastatin (CRESTOR) 10 MG tablet Take 10 mg by mouth daily      VITAMIN D PO Take 1 25 mcg by mouth every 7 days       No current facility-administered medications for this visit  SOCIAL HISTORY:   reports that he has been smoking cigarettes  He has a 40 00 pack-year smoking history  He has never used smokeless tobacco  He reports that he does not drink alcohol and does not use drugs       FAMILY HISTORY:  family history includes Cancer in his brother; Coronary artery disease in his mother; Diabetes in his mother; Early death in his brother; Hepatitis in his sister; No Known Problems in his father; Prostate cancer in his brother  ALLERGIES:  has No Known Allergies  REVIEW OF SYSTEMS:  Please note that a 10-point review of systems was performed to include Constitutional, HEENT, Respiratory, CVS, GI, , Musculoskeletal, Integumentary, Neurologic, Rheumatologic, Endocrinologic, Psychiatric, Lymphatic, and Hematologic/Oncologic systems were reviewed and are negative unless otherwise stated in HPI  Positive and negative findings pertinent to this evaluation are incorporated into the history of present illness  Lab Results   Component Value Date    SODIUM 133 (L) 09/23/2022    K 3 7 09/23/2022    CL 99 09/23/2022    CO2 26 09/23/2022    AGAP 8 09/23/2022    BUN 14 09/23/2022    CREATININE 1 08 09/23/2022    GLUC 159 (H) 03/21/2022    GLUF 339 (H) 09/23/2022    CALCIUM 9 7 09/23/2022    AST 18 09/23/2022    ALT 38 09/23/2022    ALKPHOS 111 09/23/2022    TP 7 9 09/23/2022    TBILI 0 58 09/23/2022    EGFR 70 09/23/2022       CBC with diff:   Results from last 7 days   Lab Units 09/23/22  1227   WBC Thousand/uL 8 64   HEMOGLOBIN g/dL 14 7   HEMATOCRIT % 44 1   MCV fL 93   PLATELETS Thousands/uL 177   MCH pg 30 8   MCHC g/dL 33 3   RDW % 12 8   MPV fL 11 8   NRBC AUTO /100 WBCs 0       CMP:  Results from last 7 days   Lab Units 09/23/22  1227   POTASSIUM mmol/L 3 7   CHLORIDE mmol/L 99   CO2 mmol/L 26   BUN mg/dL 14   CREATININE mg/dL 1 08   CALCIUM mg/dL 9 7   AST U/L 18   ALT U/L 38   ALK PHOS U/L 111   EGFR ml/min/1 73sq m 70           IMAGING:    No orders to display     No results found

## 2022-09-23 ENCOUNTER — APPOINTMENT (OUTPATIENT)
Dept: LAB | Facility: CLINIC | Age: 67
End: 2022-09-23
Payer: COMMERCIAL

## 2022-09-23 DIAGNOSIS — Z80.42 FAMILY HISTORY OF PROSTATE CANCER: ICD-10-CM

## 2022-09-23 DIAGNOSIS — E56.9 VITAMIN DEFICIENCY: ICD-10-CM

## 2022-09-23 DIAGNOSIS — G62.9 PERIPHERAL POLYNEUROPATHY: ICD-10-CM

## 2022-09-23 DIAGNOSIS — E11.22 TYPE 2 DIABETES MELLITUS WITH STAGE 2 CHRONIC KIDNEY DISEASE, UNSPECIFIED WHETHER LONG TERM INSULIN USE (HCC): ICD-10-CM

## 2022-09-23 DIAGNOSIS — N18.2 TYPE 2 DIABETES MELLITUS WITH STAGE 2 CHRONIC KIDNEY DISEASE, UNSPECIFIED WHETHER LONG TERM INSULIN USE (HCC): ICD-10-CM

## 2022-09-23 LAB
25(OH)D3 SERPL-MCNC: 49.6 NG/ML (ref 30–100)
ALBUMIN SERPL BCP-MCNC: 3.5 G/DL (ref 3.5–5)
ALP SERPL-CCNC: 111 U/L (ref 46–116)
ALT SERPL W P-5'-P-CCNC: 38 U/L (ref 12–78)
ANION GAP SERPL CALCULATED.3IONS-SCNC: 8 MMOL/L (ref 4–13)
AST SERPL W P-5'-P-CCNC: 18 U/L (ref 5–45)
BASOPHILS # BLD AUTO: 0.05 THOUSANDS/ΜL (ref 0–0.1)
BASOPHILS NFR BLD AUTO: 1 % (ref 0–1)
BILIRUB SERPL-MCNC: 0.58 MG/DL (ref 0.2–1)
BUN SERPL-MCNC: 14 MG/DL (ref 5–25)
CALCIUM SERPL-MCNC: 9.7 MG/DL (ref 8.3–10.1)
CHLORIDE SERPL-SCNC: 99 MMOL/L (ref 96–108)
CHOLEST SERPL-MCNC: 127 MG/DL
CO2 SERPL-SCNC: 26 MMOL/L (ref 21–32)
CREAT SERPL-MCNC: 1.08 MG/DL (ref 0.6–1.3)
EOSINOPHIL # BLD AUTO: 0.18 THOUSAND/ΜL (ref 0–0.61)
EOSINOPHIL NFR BLD AUTO: 2 % (ref 0–6)
ERYTHROCYTE [DISTWIDTH] IN BLOOD BY AUTOMATED COUNT: 12.8 % (ref 11.6–15.1)
GFR SERPL CREATININE-BSD FRML MDRD: 70 ML/MIN/1.73SQ M
GLUCOSE P FAST SERPL-MCNC: 339 MG/DL (ref 65–99)
HCT VFR BLD AUTO: 44.1 % (ref 36.5–49.3)
HDLC SERPL-MCNC: 33 MG/DL
HGB BLD-MCNC: 14.7 G/DL (ref 12–17)
IMM GRANULOCYTES # BLD AUTO: 0.05 THOUSAND/UL (ref 0–0.2)
IMM GRANULOCYTES NFR BLD AUTO: 1 % (ref 0–2)
LDLC SERPL CALC-MCNC: 27 MG/DL (ref 0–100)
LYMPHOCYTES # BLD AUTO: 2.8 THOUSANDS/ΜL (ref 0.6–4.47)
LYMPHOCYTES NFR BLD AUTO: 32 % (ref 14–44)
MCH RBC QN AUTO: 30.8 PG (ref 26.8–34.3)
MCHC RBC AUTO-ENTMCNC: 33.3 G/DL (ref 31.4–37.4)
MCV RBC AUTO: 93 FL (ref 82–98)
MONOCYTES # BLD AUTO: 0.57 THOUSAND/ΜL (ref 0.17–1.22)
MONOCYTES NFR BLD AUTO: 7 % (ref 4–12)
NEUTROPHILS # BLD AUTO: 4.99 THOUSANDS/ΜL (ref 1.85–7.62)
NEUTS SEG NFR BLD AUTO: 57 % (ref 43–75)
NRBC BLD AUTO-RTO: 0 /100 WBCS
PLATELET # BLD AUTO: 177 THOUSANDS/UL (ref 149–390)
PMV BLD AUTO: 11.8 FL (ref 8.9–12.7)
POTASSIUM SERPL-SCNC: 3.7 MMOL/L (ref 3.5–5.3)
PROT SERPL-MCNC: 7.9 G/DL (ref 6.4–8.4)
RBC # BLD AUTO: 4.77 MILLION/UL (ref 3.88–5.62)
SODIUM SERPL-SCNC: 133 MMOL/L (ref 135–147)
TRIGL SERPL-MCNC: 335 MG/DL
TSH SERPL DL<=0.05 MIU/L-ACNC: 1.56 UIU/ML (ref 0.45–4.5)
VIT B12 SERPL-MCNC: 1998 PG/ML (ref 100–900)
WBC # BLD AUTO: 8.64 THOUSAND/UL (ref 4.31–10.16)

## 2022-09-23 PROCEDURE — 82607 VITAMIN B-12: CPT

## 2022-09-23 PROCEDURE — 85025 COMPLETE CBC W/AUTO DIFF WBC: CPT

## 2022-09-23 PROCEDURE — 84153 ASSAY OF PSA TOTAL: CPT

## 2022-09-23 PROCEDURE — 83036 HEMOGLOBIN GLYCOSYLATED A1C: CPT

## 2022-09-23 PROCEDURE — 80061 LIPID PANEL: CPT

## 2022-09-23 PROCEDURE — 36415 COLL VENOUS BLD VENIPUNCTURE: CPT

## 2022-09-23 PROCEDURE — 80053 COMPREHEN METABOLIC PANEL: CPT

## 2022-09-23 PROCEDURE — 84154 ASSAY OF PSA FREE: CPT

## 2022-09-23 PROCEDURE — 84443 ASSAY THYROID STIM HORMONE: CPT

## 2022-09-23 PROCEDURE — 82306 VITAMIN D 25 HYDROXY: CPT

## 2022-09-24 LAB
EST. AVERAGE GLUCOSE BLD GHB EST-MCNC: 263 MG/DL
HBA1C MFR BLD: 10.8 %
PSA FREE MFR SERPL: 46 %
PSA FREE SERPL-MCNC: 0.23 NG/ML
PSA SERPL-MCNC: 0.5 NG/ML (ref 0–4)

## 2022-09-24 PROCEDURE — 3046F HEMOGLOBIN A1C LEVEL >9.0%: CPT | Performed by: ORTHOPAEDIC SURGERY

## 2022-09-28 ENCOUNTER — TELEPHONE (OUTPATIENT)
Dept: HEMATOLOGY ONCOLOGY | Facility: CLINIC | Age: 67
End: 2022-09-28

## 2022-09-28 ENCOUNTER — OFFICE VISIT (OUTPATIENT)
Dept: HEMATOLOGY ONCOLOGY | Facility: CLINIC | Age: 67
End: 2022-09-28
Payer: COMMERCIAL

## 2022-09-28 VITALS
OXYGEN SATURATION: 95 % | WEIGHT: 268 LBS | BODY MASS INDEX: 37.52 KG/M2 | DIASTOLIC BLOOD PRESSURE: 70 MMHG | HEIGHT: 71 IN | SYSTOLIC BLOOD PRESSURE: 138 MMHG | RESPIRATION RATE: 18 BRPM | HEART RATE: 55 BPM | TEMPERATURE: 97.9 F

## 2022-09-28 DIAGNOSIS — C91.10 CLL (CHRONIC LYMPHOCYTIC LEUKEMIA) (HCC): ICD-10-CM

## 2022-09-28 DIAGNOSIS — C91.10 CHRONIC LYMPHOCYTIC LEUKEMIA OF B-CELL TYPE NOT HAVING ACHIEVED REMISSION (HCC): Primary | ICD-10-CM

## 2022-09-28 PROCEDURE — 99214 OFFICE O/P EST MOD 30 MIN: CPT | Performed by: INTERNAL MEDICINE

## 2022-09-28 NOTE — TELEPHONE ENCOUNTER
Appointment Confirmation (to confirm pre existing appointments - ONLY)  No need to route   Appointment with  Dr Bianca Andersen   Appointment date & time  9/28/22   Location [place]   Patient verbilized Understanding  Yes

## 2022-09-29 ENCOUNTER — CONSULT (OUTPATIENT)
Dept: OBGYN CLINIC | Facility: CLINIC | Age: 67
End: 2022-09-29
Payer: COMMERCIAL

## 2022-09-29 ENCOUNTER — APPOINTMENT (OUTPATIENT)
Dept: RADIOLOGY | Facility: CLINIC | Age: 67
End: 2022-09-29
Payer: COMMERCIAL

## 2022-09-29 VITALS
WEIGHT: 266.8 LBS | BODY MASS INDEX: 37.35 KG/M2 | HEART RATE: 53 BPM | DIASTOLIC BLOOD PRESSURE: 72 MMHG | SYSTOLIC BLOOD PRESSURE: 139 MMHG | HEIGHT: 71 IN

## 2022-09-29 DIAGNOSIS — M25.551 BILATERAL HIP PAIN: ICD-10-CM

## 2022-09-29 DIAGNOSIS — M54.50 CHRONIC LOW BACK PAIN, UNSPECIFIED BACK PAIN LATERALITY, UNSPECIFIED WHETHER SCIATICA PRESENT: ICD-10-CM

## 2022-09-29 DIAGNOSIS — M70.72 BURSITIS OF OTHER BURSA OF BOTH HIPS: ICD-10-CM

## 2022-09-29 DIAGNOSIS — G89.29 CHRONIC PAIN OF BOTH KNEES: ICD-10-CM

## 2022-09-29 DIAGNOSIS — M17.11 PRIMARY OSTEOARTHRITIS OF RIGHT KNEE: Primary | ICD-10-CM

## 2022-09-29 DIAGNOSIS — M25.552 BILATERAL HIP PAIN: ICD-10-CM

## 2022-09-29 DIAGNOSIS — M25.562 CHRONIC PAIN OF BOTH KNEES: ICD-10-CM

## 2022-09-29 DIAGNOSIS — G89.29 CHRONIC LOW BACK PAIN, UNSPECIFIED BACK PAIN LATERALITY, UNSPECIFIED WHETHER SCIATICA PRESENT: ICD-10-CM

## 2022-09-29 DIAGNOSIS — M25.561 CHRONIC PAIN OF BOTH KNEES: ICD-10-CM

## 2022-09-29 DIAGNOSIS — M70.71 BURSITIS OF OTHER BURSA OF BOTH HIPS: ICD-10-CM

## 2022-09-29 PROCEDURE — 73564 X-RAY EXAM KNEE 4 OR MORE: CPT

## 2022-09-29 PROCEDURE — 20610 DRAIN/INJ JOINT/BURSA W/O US: CPT | Performed by: ORTHOPAEDIC SURGERY

## 2022-09-29 PROCEDURE — 99214 OFFICE O/P EST MOD 30 MIN: CPT | Performed by: ORTHOPAEDIC SURGERY

## 2022-09-29 PROCEDURE — 1160F RVW MEDS BY RX/DR IN RCRD: CPT | Performed by: ORTHOPAEDIC SURGERY

## 2022-09-29 PROCEDURE — 73521 X-RAY EXAM HIPS BI 2 VIEWS: CPT

## 2022-09-29 PROCEDURE — 3075F SYST BP GE 130 - 139MM HG: CPT | Performed by: ORTHOPAEDIC SURGERY

## 2022-09-29 PROCEDURE — 72100 X-RAY EXAM L-S SPINE 2/3 VWS: CPT

## 2022-09-29 PROCEDURE — 3078F DIAST BP <80 MM HG: CPT | Performed by: ORTHOPAEDIC SURGERY

## 2022-09-29 RX ORDER — KETOROLAC TROMETHAMINE 30 MG/ML
30 INJECTION, SOLUTION INTRAMUSCULAR; INTRAVENOUS
Status: COMPLETED | OUTPATIENT
Start: 2022-09-29 | End: 2022-09-29

## 2022-09-29 RX ORDER — BUPIVACAINE HYDROCHLORIDE 2.5 MG/ML
1 INJECTION, SOLUTION INFILTRATION; PERINEURAL
Status: COMPLETED | OUTPATIENT
Start: 2022-09-29 | End: 2022-09-29

## 2022-09-29 RX ORDER — LIDOCAINE HYDROCHLORIDE 10 MG/ML
1 INJECTION, SOLUTION EPIDURAL; INFILTRATION; INTRACAUDAL; PERINEURAL
Status: COMPLETED | OUTPATIENT
Start: 2022-09-29 | End: 2022-09-29

## 2022-09-29 RX ADMIN — KETOROLAC TROMETHAMINE 30 MG: 30 INJECTION, SOLUTION INTRAMUSCULAR; INTRAVENOUS at 14:35

## 2022-09-29 RX ADMIN — LIDOCAINE HYDROCHLORIDE 1 ML: 10 INJECTION, SOLUTION EPIDURAL; INFILTRATION; INTRACAUDAL; PERINEURAL at 14:35

## 2022-09-29 RX ADMIN — BUPIVACAINE HYDROCHLORIDE 1 ML: 2.5 INJECTION, SOLUTION INFILTRATION; PERINEURAL at 14:35

## 2022-09-29 NOTE — PROGRESS NOTES
Patient Name:  Tip Ely  MRN:  [id number]    [address] [place] [address]     1  Primary osteoarthritis of right knee  -     Large joint arthrocentesis: R knee  -     Ambulatory Referral to Physical Therapy; Future    2  Bilateral hip pain  -     XR hips bilateral 2 vw w pelvis if performed; Future; Expected date: 09/29/2022  -     Ambulatory Referral to Physical Therapy; Future    3  Chronic low back pain, unspecified back pain laterality, unspecified whether sciatica present  -     XR spine lumbar 2 or 3 views injury; Future; Expected date: 09/29/2022  -     Ambulatory Referral to Physical Therapy; Future    4  Bursitis of other bursa of both hips  -     Ambulatory Referral to Sports Medicine  -     Ambulatory Referral to Physical Therapy; Future    5  Chronic pain of both knees  -     XR knee 4+ vw right injury; Future; Expected date: 09/29/2022  -     XR knee 4+ vw left injury; Future; Expected date: 09/29/2022  -     Ambulatory Referral to Physical Therapy; Future        78y o  year old male with bilateral hip, bilateral knee and low back pain  Patient has right knee osteoarthritis, and 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  X-rays reviewed in office today with patient  The right knee, left hip and low back pain were of greater concern for the patient  In depth discussion had with patient regarding continued nonoperative management of Right  knee osteoarthritis including OTC oral analgesics, topical analgesics, formal outpatient physical therapy for strengthening of quads, hamstrings, hip abductors, ice application, Medial  bracing, corticosteroid, viscosupplementation injections  Patient verbalized understanding of the above and would like to proceed forward with physical therapy and corticosteroid injection  The patient's last Hgb A1C was 10 8 on 9/23/22 therefore we could not move forward with a corticosteroid injection  The patient was offered a Toradol injection instead   Risks and benefits of Toradol injection were discussed in detail  Risks including: Post injection pain, skin discoloration, and infection were discussed in detail  The patient understood and elected to proceed forward  After sterile preparation the Right knee was injected with 1 cc of 1% lidocaine, 1 cc of 0 25% bupivacaine, and 1 cc of 30 mg Toradol  The patient tolerated the procedure and no immediate complications were noted  The patient was instructed to ice and elevate the injection site, limit strenuous activity for the next 24-48 hours, and contact us if there were any questions or concerns prior to their follow-up appointment  I have discussed it to prescribe physical therapy in an effort to decrease pain, improve strength, and improve flexibility for his hips, low back and knees  I will see the patient back in about 6-8 weeks  Chief Complaint     Bilateral hip pain    History of the Present Illness     Navi Bergman is a 78 y o  male with bilateral hip, bilateral knee and low back pain  The right knee, left hip and low back are of most concern to the patient that have bothered him for a few years  He said he was walking and he felt like his right knee was dislocating and he felt like he was going to fall down  He reports he hasn't been active much lately  Patient has leukemia so he is not longer working  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  He takes gabapentin 100 mg TID with no relief in symptoms  He cannot take NSAIDs due to medications  Patient has not tried PT  Patient is diabetic  Patient denies any surgeries or injections to involved areas  Review of Systems     Review of Systems   Constitutional: Negative for appetite change and unexpected weight change  HENT: Negative for congestion and trouble swallowing  Eyes: Negative for visual disturbance  Respiratory: Negative for cough and shortness of breath      Cardiovascular: Negative for chest pain and palpitations  Gastrointestinal: Negative for nausea and vomiting  Endocrine: Negative for cold intolerance and heat intolerance  Musculoskeletal: Negative for gait problem, joint swelling and myalgias  Skin: Negative for rash  Neurological: Negative for numbness  Physical Exam     /72   Pulse (!) 53   Ht 5' 11" (1 803 m)   Wt 121 kg (266 lb 12 8 oz)   BMI 37 21 kg/m²       RIght Knee  Range of motion from 3 to 120  There is no crepitus with range of motion  There is no effusion  There is tenderness over the medial and lateral joint line  The patient is able to perform a straight leg raise  Eyes:  Anicteric sclerae  Neck:  Supple  Lungs:  Normal respiratory effort  Cardiovascular:  Capillary refill is less than 2 seconds  Skin:  Intact without erythema  Neurologic:  Sensation grossly intact to light touch  Psychiatric:  Mood and affect are appropriate  Data Review     I have personally reviewed pertinent films in PACS, and my interpretation follows:    X-rays taken 9/29/22 of bilateral hips demonstrates cam deformity of bilateral hips with no acute osseous abnormality or fracture  X-rays taken 9/29/22 of bilateral knees demonstrates mild degenerative changes at the medial joint line and patellofemoral joint space  Past Medical History:   Diagnosis Date    Anemia     Anxiety     [facility]     Cancer ([facility] )     History of alcohol abuse     Hypertension     Hypertension     Insomnia     Leukemia [facility]     Opiate abuse, episodic ([facility] )     Psychiatric disorder     Sleep apnea with use of continuous positive airway pressure (CPAP)        Past Surgical History:   Procedure Laterality Date    COLONOSCOPY      EGD AND COLONOSCOPY N/A 3/30/2018    Procedure: EGD AND COLONOSCOPY;  Surgeon: Nick Franklin MD;  [facility];   Service: Gastroenterology       No Known Allergies    Current Outpatient Medications on File Prior to Visit   Medication Sig Dispense Refill    ALPRAZolam (XANAX) 1 mg tablet   3    amLODIPine (NORVASC) 10 mg tablet Take 1 tablet (10 mg total) by mouth daily 90 tablet 3    aspirin 81 mg chewable tablet Chew 1 tablet (81 mg total) daily 90 tablet 3    furosemide (LASIX) 40 mg tablet Take 1 tablet (40 mg total) by mouth daily 90 tablet 3    gabapentin (Neurontin) 100 mg capsule Take 1 capsule (100 mg total) by mouth 3 (three) times a day 90 capsule 2    Ibrutinib 280 MG TABS Take 280 mg by mouth in the morning 90 tablet 3    losartan (COZAAR) 100 MG tablet       metoprolol succinate (TOPROL-XL) 50 mg 24 hr tablet Take 1 tablet (50 mg total) by mouth daily 90 tablet 2    Psyllium (DAILY FIBER PO) Take 1 capsule by mouth in the morning      risperiDONE (RisperDAL) 1 mg tablet Take 1 mg by mouth 2 (two) times a day        rosuvastatin (CRESTOR) 10 MG tablet Take 10 mg by mouth daily      VITAMIN D PO Take 1 25 mcg by mouth every 7 days       No current facility-administered medications on file prior to visit  Social History     Tobacco Use    Smoking status: Current Every Day Smoker     Packs/day: 1 00     Years: 40 00     Pack years: 40 00     Types: Cigarettes     Last attempt to quit: 10/13/2020     Years since quittin 9    Smokeless tobacco: Never Used   Vaping Use    Vaping Use: Never used   Substance Use Topics    Alcohol use: No     Comment: last drink 2017    Drug use: No     Comment: hx of heroin and subutex abuse       Family History   Problem Relation Age of Onset    Coronary artery disease Mother     Diabetes Mother     No Known Problems Father     Hepatitis Sister     Cancer Brother     Prostate cancer Brother     Early death Brother              Procedures Performed     Large joint arthrocentesis: R knee  Universal Protocol:  Consent: Verbal consent obtained    Risks and benefits: risks, benefits and alternatives were discussed  Consent given by: patient  Time out: Immediately prior to procedure a "time out" was called to verify the correct patient, procedure, equipment, support staff and site/side marked as required    Timeout called at: 9/29/2022 2:34 PM   Patient understanding: patient states understanding of the procedure being performed  Site marked: the operative site was marked  Patient identity confirmed: verbally with patient    Supporting Documentation  Indications: pain   Procedure Details  Location: knee - R knee  Preparation: Patient was prepped and draped in the usual sterile fashion  Needle size: 22 G  Ultrasound guidance: no  Approach: anterolateral  Medications administered: 1 mL lidocaine (PF) 1 %; 1 mL bupivacaine 0 25 %; 30 mg ketorolac 30 mg/mL    Patient tolerance: patient tolerated the procedure well with no immediate complications  Dressing:  Sterile dressing applied            Donnie Palacios DO    Scribe Attestation    I,:  Veronica Cooper am acting as a scribe while in the presence of the attending physician :       I,:  Donnie Palacios DO personally performed the services described in this documentation    as scribed in my presence :

## 2022-09-30 ENCOUNTER — HOSPITAL ENCOUNTER (OUTPATIENT)
Dept: CT IMAGING | Facility: HOSPITAL | Age: 67
End: 2022-09-30
Attending: INTERNAL MEDICINE
Payer: COMMERCIAL

## 2022-09-30 DIAGNOSIS — Z12.2 SCREENING FOR LUNG CANCER: ICD-10-CM

## 2022-09-30 DIAGNOSIS — Z72.0 TOBACCO USE: ICD-10-CM

## 2022-09-30 PROCEDURE — 71271 CT THORAX LUNG CANCER SCR C-: CPT

## 2022-10-03 ENCOUNTER — TELEPHONE (OUTPATIENT)
Dept: OTHER | Facility: OTHER | Age: 67
End: 2022-10-03

## 2022-10-03 DIAGNOSIS — C91.10 CLL (CHRONIC LYMPHOCYTIC LEUKEMIA) (HCC): ICD-10-CM

## 2022-10-03 NOTE — TELEPHONE ENCOUNTER
Patient is requesting a call back to discuss lab results  He also would like to reschedule his procedure for [address] and would like to have it scheduled for sometime in the Spring

## 2022-10-03 NOTE — TELEPHONE ENCOUNTER
Patient has not been seen since 2/2022 4/2022 IPP was canceled at patient request  Patient will need a office visit prior to rescheduling surgery

## 2022-10-03 NOTE — TELEPHONE ENCOUNTER
Email out to oral chemotherapy team in regards to confirmation of specialty pharmacy that ibrutinib to be sent to  Original prescription was sent to [facility]  Awaiting response

## 2022-10-04 ENCOUNTER — OFFICE VISIT (OUTPATIENT)
Dept: GASTROENTEROLOGY | Facility: CLINIC | Age: 67
End: 2022-10-04
Payer: COMMERCIAL

## 2022-10-04 VITALS
WEIGHT: 265 LBS | SYSTOLIC BLOOD PRESSURE: 144 MMHG | DIASTOLIC BLOOD PRESSURE: 82 MMHG | HEIGHT: 71 IN | BODY MASS INDEX: 37.1 KG/M2

## 2022-10-04 DIAGNOSIS — R49.0 HOARSENESS: ICD-10-CM

## 2022-10-04 DIAGNOSIS — Z86.010 HISTORY OF COLON POLYPS: ICD-10-CM

## 2022-10-04 DIAGNOSIS — R13.19 ESOPHAGEAL DYSPHAGIA: Primary | ICD-10-CM

## 2022-10-04 PROCEDURE — 99214 OFFICE O/P EST MOD 30 MIN: CPT | Performed by: INTERNAL MEDICINE

## 2022-10-04 NOTE — PATIENT INSTRUCTIONS
Scheduled date of EGD/colonoscopy (as of today): 11/30/2022  Physician performing EGD/colonoscopy: Petra Ayala  Location of EGD/colonoscopy: [place]  Desired bowel prep reviewed with patient: 2 day Modified Miralax  Instructions reviewed with patient by: Telly ESPAÑA  Clearances:   Day 1: clear liquids and 4 doculax at 3 pm  Day 2: full doculax miralax prep

## 2022-10-04 NOTE — PROGRESS NOTES
[facility]      Chief Complaint:  Hoarseness    HPI:  Victoria Navarro is a 78 y o   male who presents with 2 separate GI issues  The 1st is a concern of his wife that the patient has some hoarseness  According to her his voice is getting somewhat deeper  He did not see an ENT surgeon yet  We did recommend this  The patient however does have intermittent dysphagia for solids  He has no melena or hematochezia  No GERD  No nausea or vomiting  He does smoke on a regular basis  He is treated for CLL be type  Patient has no chest pain, shortness of breath, dizziness, or any other complaints  He has a history of colon polyps  He did a poor preparation but did not come back for his repeat colonoscopy  Last colonoscopy was in 2020  Patient requires repeat colonoscopy  He has no lower abdominal pain, change in bowel habits, change in stool caliber, melena, hematochezia, rectal bleeding, tenesmus, or weight loss  He has no other complaints at the present time         Review of Systems:   Constitutional: No fever or chills, feels well, no tiredness, no recent weight gain or weight loss  HENT: No complaints of earache, no hearing loss, no nosebleeds, no nasal discharge, no sore throat, no hoarseness  Eyes: No complaints of eye pain, no red eyes, no discharge from eyes, no itchy eyes  Cardiovascular: No complaints of slow heart rate, no fast heart rate, no chest pain, no palpitations, no leg claudication, no lower extremity edema  Respiratory: No complaints of shortness of breath, no wheezing, no cough, no SOB on exertion, no orthopnea  Gastrointestinal: As noted in HPI  Genitourinary: No complaints of dysuria, no incontinence, no hesitancy, no nocturia  Musculoskeletal: No complaints of arthralgia, no myalgias, no joint swelling or stiffness, no limb pain or swelling     Neurological: No complaints of headache, no confusion, no convulsions, no numbness or tingling, no dizziness or fainting, no limb weakness, no difficulty walking  Skin: No complaints of skin rash or skin lesions, no itching, no skin wound, no dry skin  Hematological/Lymphatic: No complaints of swollen glands, does not bleed easy  Allergic/Immunologic: No immunocompromised state  Endocrine:  No complaints of polyuria, no polydipsia  Psychiatric/Behavioral: is not suicidal, no sleep disturbances, no anxiety or depression, no change in personality, no emotional problems  Historical Information   Past Medical History:   Diagnosis Date    Anemia     Anxiety     Bipolar disorder ([place] [address] )     Cancer ([place] [address] )     History of alcohol abuse     Hypertension     Hypertension     Insomnia     Leukemia ([place] [address] )     Opiate abuse, episodic ([place] [address] )     Psychiatric disorder     Sleep apnea with use of continuous positive airway pressure (CPAP)      Past Surgical History:   Procedure Laterality Date    COLONOSCOPY      EGD AND COLONOSCOPY N/A 3/30/2018    Procedure: EGD AND COLONOSCOPY;  Surgeon: Khris Pierce MD;  Location: [place] [address] GI LAB;   Service: Gastroenterology     Social History   Social History     Substance and Sexual Activity   Alcohol Use No    Comment: last drink 2017     Social History     Substance and Sexual Activity   Drug Use No    Comment: hx of heroin and subutex abuse     Social History     Tobacco Use   Smoking Status Current Every Day Smoker    Packs/day: 1 00    Years: 40 00    Pack years: 40 00    Types: Cigarettes    Last attempt to quit: 10/13/2020    Years since quittin 9   Smokeless Tobacco Never Used     Family History   Problem Relation Age of Onset    Coronary artery disease Mother     Diabetes Mother     No Known Problems Father     Hepatitis Sister     Cancer Brother     Prostate cancer Brother     Early death Brother          Current Medications: has a current medication list which includes the following prescription(s): alprazolam, amlodipine, aspirin, furosemide, gabapentin, ibrutinib, losartan, metoprolol succinate, psyllium, risperidone, rosuvastatin, and vitamin d  Vital Signs: /82   Ht 5' 11" (1 803 m)   Wt 120 kg (265 lb)   BMI 36 96 kg/m²       Physical Exam:   Constitutional  General Appearance: No acute distress, well appearing and well nourished  Head  Normocephalic  Eyes  Conjunctivae and lids: No swelling, erythema, or discharge  Pupils and irises: Equal, round and reactive to light  Ears, Nose, Mouth, and Throat  External inspection of ears and nose: Normal  Nasal mucosa, septum and turbinates: Normal without edema or erythema/   Oropharynx: Normal with no erythema, edema, exudate or lesions  Neck  Normal range of motion  Neck supple  Cardiovascular  Auscultation of the heart: Normal rate and rhythm, normal S1 and S2 without murmurs  Examination of the extremities for edema and/or varicosities: Normal  Pulmonary/Chest  Respiratory effort: No increased work of breathing or signs of respiratory distress  Auscultation of lungs: Clear to auscultation, equal breath sounds bilaterally, no wheezes, rales, no rhonchi  Abdomen  Abdomen: Non-tender, no masses  Liver and spleen: No hepatomegaly or splenomegaly  Musculoskeletal  Gait and station: normal   Digits and Nails: normal without clubbing or cyanosis  Inspection/palpation of joints, bones, and muscles: Normal  Neurological  No nystagmus or asterixis  Skin  Skin and subcutaneous tissue: Normal without rashes or lesions  Lymphatic  Palpation of the lymph nodes in neck: No lymphadenopathy     Psychiatric  Orientation to person, place and time: Normal   Mood and affect: Normal          Labs:  Lab Results   Component Value Date    ALT 38 09/23/2022    AST 18 09/23/2022    BUN 14 09/23/2022    CALCIUM 9 7 09/23/2022    CL 99 09/23/2022    CO2 26 09/23/2022    CREATININE 1 08 09/23/2022    HDL 33 (L) 09/23/2022    HCT 44 1 09/23/2022    HGB 14 7 09/23/2022    HGBA1C 10 8 (H) 09/23/2022    MG 2 8 (H) 03/30/2018    PHOS 4 3 (H) 03/30/2018     09/23/2022    K 3 7 09/23/2022    PSA 0 5 09/23/2022    TRIG 335 (H) 09/23/2022    WBC 8 64 09/23/2022         X-Rays & Procedures:   No orders to display           ______________________________________________________________________      Assessment & Plan:     Diagnoses and all orders for this visit:    Esophageal dysphagia  -     EGD; Future    Hoarseness  -     EGD; Future    History of colon polyps  -     Colonoscopy; Future      Patient will be scheduled for both EGD and a colonoscopy  He will use an extended prep    Further recommendations will depend on study results

## 2022-10-06 ENCOUNTER — TELEPHONE (OUTPATIENT)
Dept: INTERNAL MEDICINE CLINIC | Facility: CLINIC | Age: 67
End: 2022-10-06

## 2022-10-11 ENCOUNTER — TELEPHONE (OUTPATIENT)
Dept: HEMATOLOGY ONCOLOGY | Facility: CLINIC | Age: 67
End: 2022-10-11

## 2022-10-11 NOTE — TELEPHONE ENCOUNTER
Patient on ibrutinib, having tooth ache and is headed to the dentist   He held the medication for 3 days  Confirmed with Dr Nedra Eaton, patient to hold medication for 7 days pre-procedure and post procedure  Informed patient's wife  She voiced understanding  From: Jose Patient <[email or web address]   Sent: Tuesday, October 11, 2022 2:58 PM  To: Riog Herrera <[email or web address]  Subject: FW: [EXTERNAL] Voice Mail (4 minutes and 24 seconds)        From: [id number]   Sent: Tuesday, October 11, 2022 2:30 PM  To: Jose Patient <[email or web address]  Subject: [EXTERNAL] Voice Mail (4 minutes and 24 seconds)    WARNING! Based upon the critical issue with [employer]rsware targeting Healthcare systems ALL attachments and links from this email should be heavily scrutinized  I don't want to leave a message  I want to talk to the nurse This is an emergency  My  Obie Marin went to Doctor Al whatever his name is, and Alexy Jacinto got to go to the dentist  He stopped taking his IMBRUVICA his medication three days ago  He has to hide  His tooth pulled  He has an appointment today at 4:00 o'clock he's in the pain  I've got to the Doctor wanted to know if he needed a clearance or not  But why can't people every answer the phone? We're we're not going to be here too long because we gotta go to the dentist and if he calls you, he's going to probably not be able to get you on the phone either  My phone number is [phone] [facility] birthday [phone]  I wish you were there to answer the phone so I could know what to do  We're only gonna be here a few minutes and then we have to go to the doctors, so please call as soon as you can  He's got to go to the dentist  He hasn't taken his medicine for three days  Is that long enough? Goodbye  Why that the last time you took a nap? I got it  You got the pill in your mouth  No, I only gave you one take the water  What?  It was cut in half, so I took the one that was cut in half  What? Take a pill

## 2022-10-19 ENCOUNTER — RA CDI HCC (OUTPATIENT)
Dept: OTHER | Facility: HOSPITAL | Age: 67
End: 2022-10-19

## 2022-10-19 NOTE — PROGRESS NOTES
E66 01, f11 10  [facility] 75  coding opportunities          Chart Reviewed number of suggestions sent to Provider: 2     Patients Insurance     Medicare Insurance: [employer]

## 2022-10-31 ENCOUNTER — OFFICE VISIT (OUTPATIENT)
Dept: CARDIOLOGY CLINIC | Facility: CLINIC | Age: 67
End: 2022-10-31

## 2022-10-31 VITALS
BODY MASS INDEX: 35.14 KG/M2 | RESPIRATION RATE: 16 BRPM | DIASTOLIC BLOOD PRESSURE: 68 MMHG | OXYGEN SATURATION: 95 % | WEIGHT: 251 LBS | HEIGHT: 71 IN | SYSTOLIC BLOOD PRESSURE: 124 MMHG | HEART RATE: 57 BPM

## 2022-10-31 DIAGNOSIS — E78.2 MIXED HYPERLIPIDEMIA: ICD-10-CM

## 2022-10-31 DIAGNOSIS — I51.89 DIASTOLIC DYSFUNCTION: ICD-10-CM

## 2022-10-31 DIAGNOSIS — I51.7 LVH (LEFT VENTRICULAR HYPERTROPHY): ICD-10-CM

## 2022-10-31 DIAGNOSIS — I10 ESSENTIAL HYPERTENSION: Primary | ICD-10-CM

## 2022-10-31 DIAGNOSIS — E66.9 OBESITY (BMI 30-39.9): ICD-10-CM

## 2022-10-31 DIAGNOSIS — I77.810 DILATED AORTIC ROOT (HCC): ICD-10-CM

## 2022-10-31 RX ORDER — TERAZOSIN 5 MG/1
5 CAPSULE ORAL DAILY
COMMUNITY
Start: 2022-10-13

## 2022-10-31 RX ORDER — POTASSIUM CHLORIDE 750 MG/1
10 TABLET, EXTENDED RELEASE ORAL 2 TIMES DAILY
Qty: 90 TABLET | Refills: 3 | Status: SHIPPED | OUTPATIENT
Start: 2022-10-31

## 2022-10-31 RX ORDER — CITALOPRAM 20 MG/1
20 TABLET ORAL DAILY
COMMUNITY
Start: 2022-10-18

## 2022-10-31 NOTE — PROGRESS NOTES
CARDIOLOGY OFFICE VISIT  [facility]  [address] [place] [address], [address], [address]  Tel: [phone]      NAME: Tara Avendano  AGE: 78 y o  SEX: male  : 1955  MRN: [id number]      Chief Complaint:  Chief Complaint   Patient presents with   • Follow-up         History of Present Illness:   Patient comes for follow up  States he is doing well from cardiac stand point and denies chest pain / pressure, SOB, palpitations, lightheadedness, syncope, orthopnea, PND, claudication  Patient has been taking furosemide 40 mg daily for his swelling feet  He does not want to stop it as, he states, the swelling comes back whenever he stops it    Essential hypertension, LVH, DD -  Has been hypertensive for many years  Taking medications regularly  Denies lightheadedness, headache, medication side effects  Mixed hyperlipidemia -  Has had hyperlipidemia for many years  Taking statin regularly along with diet control  Denies myalgia  PCP closely monitoring the blood work  Obesity -  Trying to lose weight  Dilated aortic root - 4 0 cm per latest echo  Likely upper normal for him given his size  Past Medical History:  Past Medical History:   Diagnosis Date   • Anemia    • Anxiety    • Bipolar disorder ([facility] [address] )    • Cancer ([facility] [address] )    • History of alcohol abuse    • Hypertension    • Hypertension    • Insomnia    • Leukemia ([facility] [address] )    • Opiate abuse, episodic (HCC)    • Psychiatric disorder    • Sleep apnea with use of continuous positive airway pressure (CPAP)          Past Surgical History:  Past Surgical History:   Procedure Laterality Date   • COLONOSCOPY     • EGD AND COLONOSCOPY N/A 3/30/2018    Procedure: EGD AND COLONOSCOPY;  Surgeon: Elmo Mccain MD;  Location: [facility];   Service: Gastroenterology         Family History:  Family History   Problem Relation Age of Onset   • Coronary artery disease Mother    • Diabetes Mother    • No Known Problems Father    • Hepatitis Sister    • Cancer Brother    • Prostate cancer Brother    • Early death Brother          Social History:  Social History     Socioeconomic History   • Marital status: /Civil Union     Spouse name: None   • Number of children: None   • Years of education: None   • Highest education level: None   Occupational History   • None   Tobacco Use   • Smoking status: Current Every Day Smoker     Packs/day: 1 00     Years: 40 00     Pack years: 40 00     Types: Cigarettes     Last attempt to quit: 10/13/2020     Years since quittin 0   • Smokeless tobacco: Never Used   Vaping Use   • Vaping Use: Never used   Substance and Sexual Activity   • Alcohol use: No     Comment: last drink 2017   • Drug use: No     Comment: hx of heroin and subutex abuse   • Sexual activity: Not Currently   Other Topics Concern   • None   Social History Narrative   • None     Social Determinants of Health     Financial Resource Strain: High Risk   • Difficulty of Paying Living Expenses: Very hard   Food Insecurity: Not on file   Transportation Needs: No Transportation Needs   • Lack of Transportation (Medical): No   • Lack of Transportation (Non-Medical):  No   Physical Activity: Not on file   Stress: Not on file   Social Connections: Not on file   Intimate Partner Violence: Not on file   Housing Stability: Not on file         Active Problems:  Patient Active Problem List   Diagnosis   • Hypertension   • Depression   • Polypharmacy   • Bronchitis   • Encephalopathy   • Weakness   • Chronic lymphocytic leukemia of B-cell type not having achieved remission ([facility])   • Lactic acidosis   • Leukocytosis   • Elevated troponin   • Bipolar 1 disorder ([facility])   • Psychiatric disorder   • Anemia   • Peripheral edema   • CLL (chronic lymphocytic leukemia) ([facility])   • RA (acute kidney injury) ([facility] [address] )   • Chronic obstructive pulmonary disease ([facility])   • Expiratory wheezing   • Type 2 diabetes mellitus with stage 2 chronic kidney disease ([facility] [address] )   • Lymph node enlargement   • Body mass index (BMI) 40 0-44 9, adult   • Stage 2 chronic kidney disease   • Type 2 diabetes mellitus, without long-term current use of insulin (HCC)   • Acute pain of right shoulder         The following portions of the patient's history were reviewed and updated as appropriate: past medical history, past surgical history, past family history,  past social history, current medications, allergies and problem list       Review of Systems:  Constitutional: Denies fever, chills  Eyes: Denies eye redness, eye discharge  ENT: Denies hearing loss, sneezing, nasal discharge, sore throat   Respiratory: Denies cough, expectoration, shortness of breath  Cardiovascular: Denies chest pain, palpitations  Gastrointestinal: Denies abdominal pain, nausea, vomiting, diarrhea  Genito-Urinary: Denies dysuria, incontinence  Musculoskeletal: Denies back pain, joint pain, muscle pain  Neurologic: Denies lightheadedness, syncope, headache, seizures  Endocrine: Denies polydipsia, temperature intolerance  Allergy and Immunology: Denies hives, insect bite sensitivity  Hematological and Lymphatic: Denies bleeding problems, swollen glands   Dermatological: Denies pruritus, rash, skin lesion changes      Vitals:  Vitals:    10/31/22 1327   BP: 124/68   Pulse: 57   Resp: 16   SpO2: 95%       Body mass index is 35 01 kg/m²  Weight (last 2 days)     Date/Time Weight    10/31/22 1327 114 (251)            Physical Examination:  General: Patient is not in acute distress  Awake, alert, oriented in time, place and person  Responding to commands  Head: Normocephalic  Atraumatic  Eyes: Both pupils normal sized, round and reactive to light  Nonicteric  ENT: Normal external ear canals  Neck: Supple  JVP not raised  Trachea central  No thyromegaly  Lungs: Bilateral bronchovascular breath sounds with no crackles or rhonchi  Chest wall: No tenderness  Cardiovascular: RRR   S1 and S2 normal  No murmur, rub or gallop  Gastrointestinal: Abdomen soft, nontender  No guarding or rigidity  Liver and spleen not palpable  Bowel sounds present  Neurologic: Patient is awake, alert, oriented in time, place and person  Responding to commands  Moving all extremities  Integumentary:  No skin rash  Lymphatic: No cervical lymphadenopathy  Back: Symmetric  No CVA tenderness  Extremities: No clubbing, cyanosis or edema      Laboratory Results:  CBC with diff:   Lab Results   Component Value Date    WBC 8 64 09/23/2022    RBC 4 77 09/23/2022    HGB 14 7 09/23/2022    HCT 44 1 09/23/2022    MCV 93 09/23/2022    MCH 30 8 09/23/2022    RDW 12 8 09/23/2022     09/23/2022       CMP:  Lab Results   Component Value Date    CREATININE 1 08 09/23/2022    BUN 14 09/23/2022    K 3 7 09/23/2022    CL 99 09/23/2022    CO2 26 09/23/2022    ALKPHOS 111 09/23/2022    ALT 38 09/23/2022    AST 18 09/23/2022    BILIDIR 0 10 03/26/2018       Lab Results   Component Value Date    HGBA1C 10 8 (H) 09/23/2022    MG 2 8 (H) 03/30/2018    PHOS 4 3 (H) 03/30/2018       Lab Results   Component Value Date    TROPONINI 0 05 (H) 03/27/2018    TROPONINI 0 10 (H) 03/26/2018    TROPONINI 0 12 (H) 03/26/2018    CKMB 2 6 03/10/2022    CKTOTAL 167 03/10/2022    CKTOTAL 81 03/26/2018       Cardiac testing:   Results for orders placed during the hospital encounter of 04/13/22    Echo complete w/ contrast if indicated    Interpretation Summary  •  Left Ventricle: Left ventricular cavity size is normal  The left ventricular ejection fraction is 60%  Systolic function is normal  Wall motion is grossly normal  Diastolic function is mildly abnormal, consistent with grade I (abnormal) relaxation  Wall thickness is moderately increased  There is moderate concentric hypertrophy  •  Right Ventricle: Right ventricular cavity size is normal  Systolic function is normal   •  Aorta:  The aortic root is mildly dilated with maximum measured anteroposterior diameter of 40 mm       Results for orders placed during the hospital encounter of 18    NM myocardial perfusion spect (rx stress and/or rest)    Narrative  [address]  [address] [facility]  [address] [phone]    Rest/Stress Gated SPECT Myocardial Perfusion Imaging After Regadenoson    Patient: Jenn Arita  MR number: [id number]  Account number: [de-identified]  : 1955  Age: 60 years  Gender: Male  Status: Inpatient  Location:  Height: 71 in  Weight: 234 lb  BP: 114/ 61 mmHg    Allergies: NO KNOWN ALLERGIES    Diagnosis: I21 4 - Non-ST elevation (NSTEMI) myocardial infarction, R06 02 - Shortness of breath    Primary Physician:  Navi Amanda  Technician:  Elana Kawasaki  RN:  GOLDIE Yuen  Group:  [facility]atherine Archer  Report Prepared By[de-identified]  GOLDIE Yuen  Interpreting Physician:  Andi Farrar MD    INDICATIONS: Evaluation for coronary artery disease  HISTORY: The patient is a 60year old  male  Chest pain status: no chest pain  Other symptoms: dyspnea  Coronary artery disease risk factors: dyslipidemia, hypertension, and smoking  Cardiovascular history: none significant  Co-morbidity: Anemia  Medications: a beta blocker, an ACE inhibitor/ARB, a diuretic, and aspirin  PHYSICAL EXAM: Baseline physical exam screening: no wheezes audible  REST ECG: Sinus bradycardia  PROCEDURE: A regadenoson infusion pharmacologic stress test was performed  Gated SPECT myocardial perfusion imaging was performed after stress and at rest  Systolic blood pressure was 114 mmHg, at the start of the study  Diastolic blood  pressure was 61 mmHg, at the start of the study  The heart rate was 53 bpm, at the start of the study    Regadenoson protocol:  Time HR bpm SBP mmHg DBP mmHg Symptoms Rhythm/conduct  Baseline 09:56 52 114 61 none sinus brian  Immediate 10:01 66 106 54 mild dyspnea NSR  1 min 10:02 64 104 60 same as above same as above  2 min 10:03 65 102 58 subsiding --  3 min 10:04 64 106 60 none --  4 min 10:05 62 110 66 none --  No medications or fluids given  STRESS SUMMARY: Duration of pharmacologic stress was 3 min  Maximal heart rate during stress was 67 bpm  The heart rate response to stress was normal  There was normal resting blood pressure with an appropriate response to stress  The  rate-pressure product for the peak heart rate and blood pressure was 7638  There was no chest pain during stress  The stress test was terminated due to protocol completion  Pre oxygen saturation: 96 %  Peak oxygen saturation: 99 %  The  stress ECG was negative for ischemia and normal  There were no stress arrhythmias or conduction abnormalities  ISOTOPE ADMINISTRATION:  Resting isotope administration Stress isotope administration  Agent Tetrofosmin Tetrofosmin  Dose 11 mCi 32 4 mCi  Date 03/28/2018 03/28/2018    The radiopharmaceutical was injected at the peak effect of pharmacologic stress  MYOCARDIAL PERFUSION IMAGING:  The image quality was fair  Rotating projection images reveal mild diaphragmatic attenuation  Left ventricular size was normal  The TID ratio was 1 02  The right ventricle was dilated  PERFUSION DEFECTS:  -  There was a small to moderate, moderately severe, fixed myocardial perfusion defect of the entire inferior and apical wall possibly due to diaphragm attenuation  GATED SPECT:  The calculated left ventricular ejection fraction was 49 %  Left ventricular ejection fraction was within low normal limits by visual estimate  There was no left ventricular regional abnormality  SUMMARY:  -  Stress results: There was no chest pain during stress  -  ECG conclusions: The stress ECG was negative for ischemia and normal   -  Perfusion imaging: The right ventricle was dilated   There was a small to moderate, moderately severe, fixed myocardial perfusion defect of the entire inferior and apical wall possibly due to diaphragm attenuation   -  Gated SPECT: The calculated left ventricular ejection fraction was 49 %  Left ventricular ejection fraction was within low normal limits by visual estimate  There was no left ventricular regional abnormality  IMPRESSIONS: Equivocal study after pharmacologic vasodilation  There was fixed inferior-apical defect seen in rest and stress images, improves with prone images  No clear ischemia seen, can not rule out apical infract  EF around 50%   Left  ventricular systolic function was Low normal     Prepared and signed by    Julito Vargas MD  Signed 03/28/2018 16:21:23      Medications:    Current Outpatient Medications:   •  ALPRAZolam (XANAX) 1 mg tablet, , Disp: , Rfl: 3  •  amLODIPine (NORVASC) 10 mg tablet, Take 1 tablet (10 mg total) by mouth daily, Disp: 90 tablet, Rfl: 3  •  aspirin 81 mg chewable tablet, Chew 1 tablet (81 mg total) daily, Disp: 90 tablet, Rfl: 3  •  citalopram (CeleXA) 20 mg tablet, Take 20 mg by mouth daily, Disp: , Rfl:   •  furosemide (LASIX) 40 mg tablet, Take 1 tablet (40 mg total) by mouth daily, Disp: 90 tablet, Rfl: 3  •  gabapentin (Neurontin) 100 mg capsule, Take 1 capsule (100 mg total) by mouth 3 (three) times a day, Disp: 90 capsule, Rfl: 2  •  Ibrutinib 280 MG TABS, Take 280 mg by mouth in the morning, Disp: 90 tablet, Rfl: 3  •  losartan (COZAAR) 100 MG tablet, , Disp: , Rfl:   •  metoprolol succinate (TOPROL-XL) 50 mg 24 hr tablet, Take 1 tablet (50 mg total) by mouth daily, Disp: 90 tablet, Rfl: 2  •  potassium chloride (K-DUR,KLOR-CON) 10 mEq tablet, Take 1 tablet (10 mEq total) by mouth 2 (two) times a day, Disp: 90 tablet, Rfl: 3  •  Psyllium (DAILY FIBER PO), Take 1 capsule by mouth in the morning, Disp: , Rfl:   •  risperiDONE (RisperDAL) 1 mg tablet, Take 1 mg by mouth 2 (two) times a day  , Disp: , Rfl:   •  rosuvastatin (CRESTOR) 10 MG tablet, Take 10 mg by mouth daily, Disp: , Rfl:   •  terazosin (HYTRIN) 5 mg capsule, Take 5 mg by mouth daily, Disp: , Rfl:   •  VITAMIN D PO, Take 1 25 mcg by mouth every 7 days, Disp: , Rfl:       Allergies:  No Known Allergies      Assessment and Plan:  1  Essential hypertension  BP stable  Continue current medications  Continue to monitor BP at home and call if abnormal    2  LVH (left ventricular hypertrophy), Diastolic dysfunction  Tight BP control    3  Mixed hyperlipidemia  Continue statin and diet control  His PCP closely monitor the blood work    4  Dilated aortic root (HCC)  Likely upper normal given his size  Continue beta-blocker    5  Obesity (BMI 30-39  9)  Counseled to try to lose weight    Recommend aggressive risk factor modification and therapeutic lifestyle changes  Low-salt, low-calorie, low-fat, low-cholesterol diet with regular exercise and to optimize weight  I will defer the ordering and monitoring of necessity lab studies to you, but I am available and happy to review and manage any of the data at your request in the future  Discussed concepts of atherosclerosis, including signs and symptoms of cardiac disease  Previous studies were reviewed  Safety measures were reviewed  Questions were entertained and answered  Patient was advised to report any problems requiring medical attention  Follow-up with PCP and appropriate specialist and lab work as discussed  Return for follow up visit as scheduled or earlier, if needed  Thank you for allowing me to participate in the care and evaluation of your patient  Should you have any questions, please feel free to contact me        Kathya Kapoor MD  10/31/2022,1:57 PM

## 2022-11-01 ENCOUNTER — TELEPHONE (OUTPATIENT)
Dept: UROLOGY | Facility: MEDICAL CENTER | Age: 67
End: 2022-11-01

## 2022-11-01 NOTE — TELEPHONE ENCOUNTER
Last appt was made for 10/19 as a 15 min spot so I was unaware it needed to be a 30 minute spot  Left VM for patient that appt needed to be rescheduled  Tentative appt made for 12/2/2022 at 2pm      Clerical - Please try and call him again by 11/3 if he does not call back to confirm, just so he does not show up 11/4  [facility] - Please do not send only me direct encounters, send to [facility] as well  I am training for the next 2 months and only in [facility] today because I am covering phones  I would not have seen this otherwise

## 2022-11-30 ENCOUNTER — HOSPITAL ENCOUNTER (OUTPATIENT)
Dept: GASTROENTEROLOGY | Facility: HOSPITAL | Age: 67
Setting detail: OUTPATIENT SURGERY
Discharge: HOME | End: 2022-11-30
Attending: INTERNAL MEDICINE

## 2022-11-30 ENCOUNTER — ANESTHESIA EVENT (OUTPATIENT)
Dept: GASTROENTEROLOGY | Facility: HOSPITAL | Age: 67
End: 2022-11-30

## 2022-11-30 ENCOUNTER — ANESTHESIA (OUTPATIENT)
Dept: GASTROENTEROLOGY | Facility: HOSPITAL | Age: 67
End: 2022-11-30

## 2022-11-30 VITALS
HEIGHT: 71 IN | BODY MASS INDEX: 35.09 KG/M2 | DIASTOLIC BLOOD PRESSURE: 74 MMHG | HEART RATE: 63 BPM | SYSTOLIC BLOOD PRESSURE: 153 MMHG | OXYGEN SATURATION: 96 % | TEMPERATURE: 97.9 F | RESPIRATION RATE: 15 BRPM | WEIGHT: 250.66 LBS

## 2022-11-30 DIAGNOSIS — R13.19 ESOPHAGEAL DYSPHAGIA: ICD-10-CM

## 2022-11-30 DIAGNOSIS — K29.60 EROSIVE GASTRITIS: Primary | ICD-10-CM

## 2022-11-30 DIAGNOSIS — R49.0 HOARSENESS: ICD-10-CM

## 2022-11-30 DIAGNOSIS — Z86.010 HISTORY OF COLON POLYPS: ICD-10-CM

## 2022-11-30 PROBLEM — G47.00 INSOMNIA: Status: ACTIVE | Noted: 2019-09-11

## 2022-11-30 PROBLEM — R26.2 AMBULATORY DYSFUNCTION: Status: ACTIVE | Noted: 2019-09-11

## 2022-11-30 PROBLEM — R74.01 TRANSAMINITIS: Status: ACTIVE | Noted: 2019-09-11

## 2022-11-30 PROBLEM — N28.1 RENAL CYST, RIGHT: Status: ACTIVE | Noted: 2019-09-11

## 2022-11-30 PROBLEM — F12.10 MARIJUANA ABUSE: Status: ACTIVE | Noted: 2019-09-12

## 2022-11-30 PROBLEM — F19.90 IVDU (INTRAVENOUS DRUG USER): Status: ACTIVE | Noted: 2019-09-11

## 2022-11-30 PROBLEM — Z72.0 TOBACCO ABUSE: Status: ACTIVE | Noted: 2019-09-11

## 2022-11-30 RX ORDER — LIDOCAINE HYDROCHLORIDE 20 MG/ML
INJECTION, SOLUTION EPIDURAL; INFILTRATION; INTRACAUDAL; PERINEURAL AS NEEDED
Status: DISCONTINUED | OUTPATIENT
Start: 2022-11-30 | End: 2022-11-30

## 2022-11-30 RX ORDER — PROPOFOL 10 MG/ML
INJECTION, EMULSION INTRAVENOUS AS NEEDED
Status: DISCONTINUED | OUTPATIENT
Start: 2022-11-30 | End: 2022-11-30

## 2022-11-30 RX ORDER — PANTOPRAZOLE SODIUM 40 MG/1
40 TABLET, DELAYED RELEASE ORAL
Qty: 30 TABLET | Refills: 3 | Status: SHIPPED | OUTPATIENT
Start: 2022-11-30

## 2022-11-30 RX ADMIN — LIDOCAINE HYDROCHLORIDE 100 MG: 20 INJECTION, SOLUTION EPIDURAL; INFILTRATION; INTRACAUDAL; PERINEURAL at 09:02

## 2022-11-30 RX ADMIN — PROPOFOL 50 MG: 10 INJECTION, EMULSION INTRAVENOUS at 09:08

## 2022-11-30 RX ADMIN — PROPOFOL 50 MG: 10 INJECTION, EMULSION INTRAVENOUS at 09:12

## 2022-11-30 RX ADMIN — PROPOFOL 50 MG: 10 INJECTION, EMULSION INTRAVENOUS at 09:05

## 2022-11-30 RX ADMIN — PROPOFOL 100 MG: 10 INJECTION, EMULSION INTRAVENOUS at 09:02

## 2022-11-30 NOTE — H&P
History and Physical - [facility]  Andrea Joseph 78 y o  male MRN: [id number]                  HPI: Andrea Joseph is a 78y o  year old male who presents for EGD and colonoscopy for hoarseness, dysphagia, history of colon polyps  Last colonoscopy 2 years ago was a poor preparation      REVIEW OF SYSTEMS: Per the HPI, and otherwise unremarkable  Historical Information   Past Medical History:   Diagnosis Date   • Anemia    • Anxiety    • Bipolar disorder ([facility] [address] )    • Cancer ([facility] [address] )    • Colon polyp    • History of alcohol abuse    • Hyperlipidemia    • Hypertension    • Hypertension    • Insomnia    • Leukemia (HCC)    • Opiate abuse, episodic (HCC)    • Psychiatric disorder    • Sleep apnea with use of continuous positive airway pressure (CPAP)      Past Surgical History:   Procedure Laterality Date   • COLONOSCOPY     • EGD AND COLONOSCOPY N/A 3/30/2018    Procedure: EGD AND COLONOSCOPY;  Surgeon: Eri Boyd MD;  Location: [facility]; Service: Gastroenterology     Social History   Social History     Substance and Sexual Activity   Alcohol Use No    Comment: last drink 2017     Social History     Substance and Sexual Activity   Drug Use No    Comment: hx of heroin and subutex abuse     Social History     Tobacco Use   Smoking Status Every Day   • Packs/day: 1 00   • Years: 40 00   • Pack years: 40 00   • Types: Cigarettes   • Last attempt to quit: 10/13/2020   • Years since quittin 1   Smokeless Tobacco Never     Family History   Problem Relation Age of Onset   • Coronary artery disease Mother    • Diabetes Mother    • No Known Problems Father    • Hepatitis Sister    • Cancer Brother    • Prostate cancer Brother    • Early death Brother        Meds/Allergies     (Not in a hospital admission)      No Known Allergies    Objective     Blood pressure 135/66, pulse 73, temperature 97 8 °F (36 6 °C), temperature source Temporal, resp   rate 18, height 5' 11" (1 803 m), weight 114 kg (250 lb 10 6 oz), SpO2 94 %        PHYSICAL EXAM    Gen: NAD  CV: RRR  CHEST: Clear  ABD: soft, NT/ND  EXT: no edema  Neuro: AAO      ASSESSMENT/PLAN:  This is a 78y o  year old male here for hoarseness, dysphagia, history of polyps    PLAN:   Procedure:  EGD and colonoscopy

## 2022-11-30 NOTE — ANESTHESIA POSTPROCEDURE EVALUATION
Post-Op Assessment Note    CV Status:  Stable    Pain management: adequate     Mental Status:  Alert and awake   Hydration Status:  Euvolemic   PONV Controlled:  Controlled   Airway Patency:  Patent      Post Op Vitals Reviewed: Yes      Staff: CRNA         No notable events documented      /63 (11/30/22 0925)    Temp 97 9 °F (36 6 °C) (11/30/22 0925)    Pulse 62 (11/30/22 0925)   Resp 15 (11/30/22 0925)    SpO2 97 % (11/30/22 0925)

## 2022-11-30 NOTE — ANESTHESIA PREPROCEDURE EVALUATION
Procedure:  COLONOSCOPY  EGD    Relevant Problems   CARDIO   (+) Hypertension      ENDO   (+) Type 2 diabetes mellitus, without long-term current use of insulin (HCC)      /RENAL   (+) RA (acute kidney injury) ([facility] [address] )   (+) Renal cyst, right   (+) Stage 2 chronic kidney disease      HEMATOLOGY   (+) Anemia      NEURO/PSYCH   (+) Depression      PULMONARY   (+) Chronic obstructive pulmonary disease (HCC)      Other   (+) Bipolar 1 disorder (HCC)   (+) CLL (chronic lymphocytic leukemia) (HCC)   (+) Marijuana abuse   (+) Tobacco abuse       TTE 2022:  Left Ventricle: Left ventricular cavity size is normal  The left ventricular ejection fraction is 60%  Systolic function is normal  Wall motion is grossly normal  Diastolic function is mildly abnormal, consistent with grade I (abnormal) relaxation  Wall thickness is moderately increased  There is moderate concentric hypertrophy  •  Right Ventricle: Right ventricular cavity size is normal  Systolic function is normal   •  Aorta: The aortic root is mildly dilated with maximum measured anteroposterior diameter of 40 mm          Physical Exam    Airway    Mallampati score: IV  TM Distance: >3 FB  Neck ROM: full     Dental   Comment: Multiple missing and chipped,     Cardiovascular  Cardiovascular exam normal    Pulmonary  Pulmonary exam normal     Other Findings        Anesthesia Plan  ASA Score- 3     Anesthesia Type- IV sedation with anesthesia with ASA Monitors  Additional Monitors:   Airway Plan:           Plan Factors-Exercise tolerance (METS): >4 METS  Chart reviewed  EKG reviewed  Imaging results reviewed  Existing labs reviewed  Patient summary reviewed  Patient is a current smoker  Patient smoked on day of surgery  Induction- intravenous  Postoperative Plan-     Informed Consent- Anesthetic plan and risks discussed with patient  I personally reviewed this patient with the CRNA   Discussed and agreed on the Anesthesia Plan with the FLORENTINO Hines

## 2022-12-20 ENCOUNTER — TELEPHONE (OUTPATIENT)
Dept: GASTROENTEROLOGY | Facility: CLINIC | Age: 67
End: 2022-12-20

## 2022-12-20 NOTE — TELEPHONE ENCOUNTER
Patients GI provider:  Dr Juan C Ocampo    Number to return call: [phone]    Reason for call: Pt's wife calling for results from his colonoscopy    Scheduled procedure/appointment date if applicable: N/A

## 2023-01-06 ENCOUNTER — OFFICE VISIT (OUTPATIENT)
Dept: UROLOGY | Facility: MEDICAL CENTER | Age: 68
End: 2023-01-06

## 2023-01-06 VITALS
DIASTOLIC BLOOD PRESSURE: 76 MMHG | WEIGHT: 253 LBS | SYSTOLIC BLOOD PRESSURE: 156 MMHG | BODY MASS INDEX: 35.29 KG/M2 | HEART RATE: 62 BPM

## 2023-01-06 DIAGNOSIS — N52.1 ERECTILE DYSFUNCTION ASSOCIATED WITH TYPE 2 DIABETES MELLITUS (HCC): Primary | ICD-10-CM

## 2023-01-06 DIAGNOSIS — E11.69 ERECTILE DYSFUNCTION ASSOCIATED WITH TYPE 2 DIABETES MELLITUS (HCC): Primary | ICD-10-CM

## 2023-01-06 DIAGNOSIS — N20.0 CALCULUS OF KIDNEY: ICD-10-CM

## 2023-01-06 DIAGNOSIS — N40.0 BPH WITHOUT OBSTRUCTION/LOWER URINARY TRACT SYMPTOMS: ICD-10-CM

## 2023-01-06 RX ORDER — CIPROFLOXACIN 500 MG/1
500 TABLET, FILM COATED ORAL EVERY 12 HOURS SCHEDULED
Qty: 14 TABLET | Refills: 0 | Status: SHIPPED | OUTPATIENT
Start: 2023-01-06 | End: 2023-01-13

## 2023-01-06 NOTE — PROGRESS NOTES
H&P Exam - Urology   Adriaen Saavedra 75 y o  male MRN: [id number]  Unit/Bed#:  Encounter: [id number]    Assessment/Plan     Assessment:  Erectile andbvvtwmvv-wzxksvnbwjuibj-dpnmdzqbim to PDE 5 inhibitors vasoactive drug injections and vacuum constriction devices  History of CLL   History of kidney stones  BPH without obstructive symptoms-low AUA symptom score  Plan:  Implantation of a 3 part inflatable penile prosthesis    History of Present Illness   HPI:  Adriane Saavedra is a 75 y o  male who presents with longstanding history of erectile dysfunction which has become progressive and no longer responds to PDE 5 inhibitors or vasoactive drug injection therapy  The patient was supposed to undergo implantation of the penile implant approximately 1 year ago however he had some second thoughts and canceled surgery  The patient apparently tried sildenafil 100 mg brand name without any positive response and try vasoactive drug injections without response as well  The patient now returns requesting implantation of a 3 part inflatable penile prosthesis  We again reviewed potential side effects and complications of mechanical failure infection need for explantation with or without immediate or delayed reimplantation possible mechanical failure possible curvature deformity or loss of size or girth  The patient agreed to proceed with the procedure  He provided the verbal and signed informed consent  The patient his wife and I had a long discussion concerning his elevated fasting blood sugar hemoglobin A1c and overall deconditioned status with obesity  Surgery will not be scheduled until the patient has clearance from internal medicine with a corrected fasting blood sugar and hemoglobin A1c of at least less than 7  Cardiologic clearance is also recommended as the patient has not had a stress test in many years and noted that he does not think he can get through on at this point    Review of Systems   Genitourinary: Positive for frequency  AUA symptom score 0   Musculoskeletal: Positive for arthralgias and myalgias  All other systems reviewed and are negative  Historical Information   Past Medical History:   Diagnosis Date   • Anemia    • Anxiety    • Bipolar disorder ([facility] )    • Cancer ([facility] [address] )    • Colon polyp    • History of alcohol abuse    • Hyperlipidemia    • Hypertension    • Hypertension    • Insomnia    • Leukemia (HCC)    • Opiate abuse, episodic (HCC)    • Psychiatric disorder    • Sleep apnea with use of continuous positive airway pressure (CPAP)      Past Surgical History:   Procedure Laterality Date   • COLONOSCOPY     • EGD AND COLONOSCOPY N/A 3/30/2018    Procedure: EGD AND COLONOSCOPY;  Surgeon: Viridiana Hale MD;  Location: [facility]; Service: Gastroenterology     Social History   Social History     Substance and Sexual Activity   Alcohol Use No    Comment: last drink 2017     Social History     Substance and Sexual Activity   Drug Use No    Comment: hx of heroin and subutex abuse     Social History     Tobacco Use   Smoking Status Every Day   • Packs/day: 1 00   • Years: 40 00   • Pack years: 40 00   • Types: Cigarettes   • Last attempt to quit: 10/13/2020   • Years since quittin 2   Smokeless Tobacco Never     Family History:   Family History   Problem Relation Age of Onset   • Coronary artery disease Mother    • Diabetes Mother    • No Known Problems Father    • Hepatitis Sister    • Cancer Brother    • Prostate cancer Brother    • Early death Brother        Meds/Allergies   all medications and allergies reviewed  No Known Allergies    Objective   Vitals: Blood pressure 156/76, pulse 62, weight 115 kg (253 lb)  [unfilled]    Invasive Devices     None                 Physical Exam  Vitals reviewed  Constitutional:       General: He is not in acute distress  Appearance: Normal appearance  He is obese  He is not ill-appearing, toxic-appearing or diaphoretic     HENT: Head: Normocephalic and atraumatic  Nose: Nose normal       Mouth/Throat:      Mouth: Mucous membranes are moist    Eyes:      Extraocular Movements: Extraocular movements intact  Cardiovascular:      Rate and Rhythm: Normal rate and regular rhythm  Heart sounds: Normal heart sounds  Pulmonary:      Effort: Pulmonary effort is normal  No respiratory distress  Breath sounds: No wheezing, rhonchi or rales  Abdominal:      General: There is no distension  Palpations: Abdomen is soft  Tenderness: There is no abdominal tenderness  There is no guarding or rebound  Genitourinary:     Comments: Phallus normal circumcised without cutaneous lesions  Meatus patent but somewhat small the patient denying obstructive or irritative voiding symptoms other than mild frequency  Testes adnexa palpably normal without masses induration tenderness or fluid collections  Scrotum appears normal   ALMA DELIA normal anal verge normal anal sphincter tone no palpable rectal masses  Prostate approximately 35 g a nodular nontender  Musculoskeletal:      Cervical back: Neck supple  Neurological:      Mental Status: He is alert and oriented to person, place, and time  Psychiatric:         Mood and Affect: Mood normal          Behavior: Behavior normal          Thought Content: Thought content normal          Judgment: Judgment normal          Lab Results: I have personally reviewed pertinent reports  Imaging: I have personally reviewed pertinent reports  and I have personally reviewed pertinent films in PACS  EKG, Pathology, and Other Studies: I have personally reviewed pertinent reports  and I have personally reviewed pertinent films in PACS  VTE Prophylaxis: Sequential compression device (Venodyne)  and Heparin    Code Status: [unfilled]  Advance Directive and Living Will:      Power of :    POLST:      Counseling / Coordination of Care  Total floor / unit time spent today 30 minutes    Greater than 50% of total time was spent with the patient and / or family counseling and / or coordination of care  A description of the counseling / coordination of care: Salomon Otoole

## 2023-01-06 NOTE — H&P
H&P Exam - Urology   Cecilia Chong 75 y o  male MRN: [id number]  Unit/Bed#:  Encounter: [id number]    Assessment/Plan     Assessment:  Erectile qxtsfxiyixs-alhwxnpdqqytwk-rgwxnogxqs to PDE 5 inhibitors vasoactive drug injections and vacuum constriction devices  History of CLL   History of kidney stones  BPH without obstructive symptoms-low AUA symptom score  Plan:  Implantation of a 3 part inflatable penile prosthesis    History of Present Illness   HPI:  Cecilia Chong is a 75 y o  male who presents with longstanding history of erectile dysfunction which has become progressive and no longer responds to PDE 5 inhibitors or vasoactive drug injection therapy  The patient was supposed to undergo implantation of the penile implant approximately 1 year ago however he had some second thoughts and canceled surgery  The patient apparently tried sildenafil 100 mg brand name without any positive response and try vasoactive drug injections without response as well  The patient now returns requesting implantation of a 3 part inflatable penile prosthesis  We again reviewed potential side effects and complications of mechanical failure infection need for explantation with or without immediate or delayed reimplantation possible mechanical failure possible curvature deformity or loss of size or girth  The patient agreed to proceed with the procedure  He provided the verbal and signed informed consent  The patient's most recent hemoglobin A1c was over 10  He was informed that he has to obtain medical clearance prior to surgical procedure but also needs to work on getting his diabetes better controlled treating for a hemoglobin A1c of less than 7  Review of Systems   Genitourinary: Positive for frequency  AUA symptom score 0   Musculoskeletal: Positive for arthralgias and myalgias  All other systems reviewed and are negative        Historical Information   Past Medical History:   Diagnosis Date   • Anemia    • Anxiety    • Bipolar disorder ([facility] )    • Cancer ([facility] )    • Colon polyp    • History of alcohol abuse    • Hyperlipidemia    • Hypertension    • Hypertension    • Insomnia    • Leukemia ([facility] )    • Opiate abuse, episodic (HCC)    • Psychiatric disorder    • Sleep apnea with use of continuous positive airway pressure (CPAP)      Past Surgical History:   Procedure Laterality Date   • COLONOSCOPY     • EGD AND COLONOSCOPY N/A 3/30/2018    Procedure: EGD AND COLONOSCOPY;  Surgeon: Ander Contreras MD;  Location: [facility]; Service: Gastroenterology     Social History   Social History     Substance and Sexual Activity   Alcohol Use No    Comment: last drink 2017     Social History     Substance and Sexual Activity   Drug Use No    Comment: hx of heroin and subutex abuse     Social History     Tobacco Use   Smoking Status Every Day   • Packs/day: 1 00   • Years: 40 00   • Pack years: 40 00   • Types: Cigarettes   • Last attempt to quit: 10/13/2020   • Years since quittin 2   Smokeless Tobacco Never     Family History:   Family History   Problem Relation Age of Onset   • Coronary artery disease Mother    • Diabetes Mother    • No Known Problems Father    • Hepatitis Sister    • Cancer Brother    • Prostate cancer Brother    • Early death Brother        Meds/Allergies    all medications and allergies reviewed  No Known Allergies    Objective    Vitals: Blood pressure 156/76, pulse 62, weight 115 kg (253 lb)  [unfilled]    Invasive Devices     None                 Physical Exam  Vitals reviewed  Constitutional:       General: He is not in acute distress  Appearance: Normal appearance  He is obese  He is not ill-appearing, toxic-appearing or diaphoretic  HENT:      Head: Normocephalic and atraumatic  Nose: Nose normal       Mouth/Throat:      Mouth: Mucous membranes are moist    Eyes:      Extraocular Movements: Extraocular movements intact     Cardiovascular:      Rate and Rhythm: Normal rate and regular rhythm  Heart sounds: Normal heart sounds  Pulmonary:      Effort: Pulmonary effort is normal  No respiratory distress  Breath sounds: No wheezing, rhonchi or rales  Abdominal:      General: There is no distension  Palpations: Abdomen is soft  Tenderness: There is no abdominal tenderness  There is no guarding or rebound  Genitourinary:     Comments: Phallus normal circumcised without cutaneous lesions  Meatus patent but somewhat small the patient denying obstructive or irritative voiding symptoms other than mild frequency  Testes adnexa palpably normal without masses induration tenderness or fluid collections  Scrotum appears normal   ALMA DELIA normal anal verge normal anal sphincter tone no palpable rectal masses  Prostate approximately 35 g a nodular nontender  Musculoskeletal:      Cervical back: Neck supple  Neurological:      Mental Status: He is alert and oriented to person, place, and time  Psychiatric:         Mood and Affect: Mood normal          Behavior: Behavior normal          Thought Content: Thought content normal          Judgment: Judgment normal          Lab Results: I have personally reviewed pertinent reports  Imaging: I have personally reviewed pertinent reports  and I have personally reviewed pertinent films in PACS  EKG, Pathology, and Other Studies: I have personally reviewed pertinent reports  and I have personally reviewed pertinent films in PACS  VTE Prophylaxis: Sequential compression device (Venodyne)  and Heparin    Code Status: [unfilled]  Advance Directive and Living Will:      Power of :    POLST:      Counseling / Coordination of Care  Total floor / unit time spent today 30 minutes  Greater than 50% of total time was spent with the patient and / or family counseling and / or coordination of care  A description of the counseling / coordination of care: Bacilio Royal

## 2023-01-09 ENCOUNTER — TELEPHONE (OUTPATIENT)
Dept: UROLOGY | Facility: MEDICAL CENTER | Age: 68
End: 2023-01-09

## 2023-01-09 ENCOUNTER — TELEPHONE (OUTPATIENT)
Dept: HEMATOLOGY ONCOLOGY | Facility: CLINIC | Age: 68
End: 2023-01-09

## 2023-01-09 NOTE — TELEPHONE ENCOUNTER
----- Message from Elina Mills MD sent at 1/6/2023  2:36 PM EST -----  Rissa Sargent this patient wishes to have IPP surgery  He is not in good medical condition and I do not want to schedule his surgery until his diabetes gets controlled and we have a letter of clearance from his diabetic doctor and a letter of clearance from cardiology      Asked

## 2023-01-09 NOTE — TELEPHONE ENCOUNTER
Received vm from patient spouse, "Hello  My  has Doctor Quentin Hodge, pretty sure that's his name  My , Vaishnavi Mackay, birth date [id number]  He's been going there for four years  He has  He's been on IMBRUVICA  He has a terrible toothache  He's been off the medication now for three or four days  How long does he have to stay off before he can go and have his tooth pulled? Because Zach Simmonssangeeta makes him bleed  Please get back as soon as you can to tell us, [phone]  Thank you "    Reviewed with Dr Vilma Mendoza patient is to hold ibrutinib 7 days pre and post procedure  Left vm with instructions and RN teams number to further discuss

## 2023-01-11 ENCOUNTER — TELEPHONE (OUTPATIENT)
Dept: HEMATOLOGY ONCOLOGY | Facility: CLINIC | Age: 68
End: 2023-01-11

## 2023-01-11 NOTE — TELEPHONE ENCOUNTER
Patients spouse Shaun Anthony calling to confirm patient is cleared for his dental procedure tomorrow  She states patient is having a tooth pulled  Shaun Anthony confirmed patient has been off of his Imbruvica for 7 days  I spoke with Demetrio Bob via Teams who confirmed that so long as forrest has stopped his medication for 7 days, he may have the procedure  Areli Desai also clarified that patient must re-start his Imbruvica 7 days after the procedure  I relayed these instructions to Shaun Anthony who voiced understanding  I encouraged Shaun Anthony to call with any questions or concerns

## 2023-01-16 ENCOUNTER — APPOINTMENT (OUTPATIENT)
Dept: LAB | Facility: CLINIC | Age: 68
End: 2023-01-16

## 2023-01-17 LAB
ALBUMIN SERPL BCP-MCNC: 3.7 G/DL (ref 3.5–5)
ALP SERPL-CCNC: 83 U/L (ref 46–116)
ALT SERPL W P-5'-P-CCNC: 28 U/L (ref 12–78)
ANION GAP SERPL CALCULATED.3IONS-SCNC: 4 MMOL/L (ref 4–13)
AST SERPL W P-5'-P-CCNC: 16 U/L (ref 5–45)
BILIRUB SERPL-MCNC: 0.56 MG/DL (ref 0.2–1)
BUN SERPL-MCNC: 11 MG/DL (ref 5–25)
CALCIUM SERPL-MCNC: 8.8 MG/DL (ref 8.3–10.1)
CHLORIDE SERPL-SCNC: 104 MMOL/L (ref 96–108)
CO2 SERPL-SCNC: 29 MMOL/L (ref 21–32)
CREAT SERPL-MCNC: 0.93 MG/DL (ref 0.6–1.3)
GFR SERPL CREATININE-BSD FRML MDRD: 84 ML/MIN/1.73SQ M
GLUCOSE P FAST SERPL-MCNC: 104 MG/DL (ref 65–99)
POTASSIUM SERPL-SCNC: 3.9 MMOL/L (ref 3.5–5.3)
PROT SERPL-MCNC: 7.3 G/DL (ref 6.4–8.4)
SODIUM SERPL-SCNC: 137 MMOL/L (ref 135–147)

## 2023-01-18 ENCOUNTER — OFFICE VISIT (OUTPATIENT)
Dept: INTERNAL MEDICINE CLINIC | Facility: CLINIC | Age: 68
End: 2023-01-18

## 2023-01-18 VITALS
BODY MASS INDEX: 37.1 KG/M2 | DIASTOLIC BLOOD PRESSURE: 68 MMHG | RESPIRATION RATE: 16 BRPM | HEIGHT: 71 IN | OXYGEN SATURATION: 95 % | HEART RATE: 58 BPM | WEIGHT: 265 LBS | SYSTOLIC BLOOD PRESSURE: 126 MMHG

## 2023-01-18 DIAGNOSIS — G47.33 OSA (OBSTRUCTIVE SLEEP APNEA): ICD-10-CM

## 2023-01-18 DIAGNOSIS — F11.10 OPIOID ABUSE, EPISODIC (HCC): Primary | ICD-10-CM

## 2023-01-18 DIAGNOSIS — M79.606 LOWER EXTREMITY PAIN, DIFFUSE, UNSPECIFIED LATERALITY: ICD-10-CM

## 2023-01-18 DIAGNOSIS — C91.10 CLL (CHRONIC LYMPHOCYTIC LEUKEMIA) (HCC): ICD-10-CM

## 2023-01-18 DIAGNOSIS — F31.9 BIPOLAR AFFECTIVE DISORDER, REMISSION STATUS UNSPECIFIED (HCC): ICD-10-CM

## 2023-01-18 DIAGNOSIS — G62.9 PERIPHERAL POLYNEUROPATHY: ICD-10-CM

## 2023-01-18 DIAGNOSIS — I10 PRIMARY HYPERTENSION: ICD-10-CM

## 2023-01-18 DIAGNOSIS — J44.9 CHRONIC OBSTRUCTIVE PULMONARY DISEASE, UNSPECIFIED COPD TYPE (HCC): ICD-10-CM

## 2023-01-18 DIAGNOSIS — Z12.5 PROSTATE CANCER SCREENING: ICD-10-CM

## 2023-01-18 DIAGNOSIS — N18.2 TYPE 2 DIABETES MELLITUS WITH STAGE 2 CHRONIC KIDNEY DISEASE, UNSPECIFIED WHETHER LONG TERM INSULIN USE (HCC): ICD-10-CM

## 2023-01-18 DIAGNOSIS — I77.810 DILATED AORTIC ROOT (HCC): ICD-10-CM

## 2023-01-18 DIAGNOSIS — E11.22 TYPE 2 DIABETES MELLITUS WITH STAGE 2 CHRONIC KIDNEY DISEASE, UNSPECIFIED WHETHER LONG TERM INSULIN USE (HCC): ICD-10-CM

## 2023-01-18 PROBLEM — G93.40 ENCEPHALOPATHY: Status: RESOLVED | Noted: 2018-02-26 | Resolved: 2023-01-18

## 2023-01-18 PROBLEM — R79.89 ELEVATED TROPONIN: Status: RESOLVED | Noted: 2018-03-26 | Resolved: 2023-01-18

## 2023-01-18 PROBLEM — D72.829 LEUKOCYTOSIS: Status: RESOLVED | Noted: 2018-03-26 | Resolved: 2023-01-18

## 2023-01-18 PROBLEM — N17.9 AKI (ACUTE KIDNEY INJURY) (HCC): Status: RESOLVED | Noted: 2018-12-14 | Resolved: 2023-01-18

## 2023-01-18 PROBLEM — R53.1 WEAKNESS: Status: RESOLVED | Noted: 2018-03-26 | Resolved: 2023-01-18

## 2023-01-18 PROBLEM — F19.90 IVDU (INTRAVENOUS DRUG USER): Status: RESOLVED | Noted: 2019-09-11 | Resolved: 2023-01-18

## 2023-01-18 PROBLEM — J40 BRONCHITIS: Status: RESOLVED | Noted: 2018-02-10 | Resolved: 2023-01-18

## 2023-01-18 PROBLEM — R59.9 LYMPH NODE ENLARGEMENT: Status: RESOLVED | Noted: 2020-05-14 | Resolved: 2023-01-18

## 2023-01-18 PROBLEM — M25.511 ACUTE PAIN OF RIGHT SHOULDER: Status: RESOLVED | Noted: 2021-02-25 | Resolved: 2023-01-18

## 2023-01-18 PROBLEM — R74.01 TRANSAMINITIS: Status: RESOLVED | Noted: 2019-09-11 | Resolved: 2023-01-18

## 2023-01-18 PROBLEM — R77.8 ELEVATED TROPONIN: Status: RESOLVED | Noted: 2018-03-26 | Resolved: 2023-01-18

## 2023-01-18 PROBLEM — E87.20 LACTIC ACIDOSIS: Status: RESOLVED | Noted: 2018-03-26 | Resolved: 2023-01-18

## 2023-01-18 PROBLEM — R06.2 EXPIRATORY WHEEZING: Status: RESOLVED | Noted: 2018-12-20 | Resolved: 2023-01-18

## 2023-01-18 PROBLEM — D64.9 ANEMIA: Status: RESOLVED | Noted: 2018-03-26 | Resolved: 2023-01-18

## 2023-01-18 PROBLEM — F99 PSYCHIATRIC DISORDER: Status: RESOLVED | Noted: 2018-03-26 | Resolved: 2023-01-18

## 2023-01-18 RX ORDER — GABAPENTIN 100 MG/1
300 CAPSULE ORAL 3 TIMES DAILY
Qty: 810 CAPSULE | Refills: 0
Start: 2023-01-18 | End: 2023-04-18

## 2023-01-18 NOTE — PROGRESS NOTES
Assessment/Plan:      Patient is here for routine follow-up  He is following with cardiology and heme-onc  Also following with GI  He has uncontrolled diabetes  Would like to see an endocrinologist   He did not get his labs done  Last A1c was 10 8  We will have him see endocrinology  He has a diabetic eye exam next month  Does not want to test a1c here  Reports pain and weakness to bilateral lower extremities including calves and thighs after walking for 20 minutes; reports cool extremities; pulses present; Will evaluate for PAD; obtain arterial duplex  Needs to see sleep medicine  Has not had a sleep study for quite some time  With a diagnosis of sleep apnea  Quality Measures:     BMI Counseling: Body mass index is 36 96 kg/m²  The BMI is above normal  Nutrition recommendations include decreasing portion sizes and encouraging healthy choices of fruits and vegetables  Exercise recommendations include moderate physical activity 150 minutes/week  Rationale for BMI follow-up plan is due to patient being overweight or obese  Tobacco Cessation Counseling: Tobacco cessation counseling was provided  The patient is sincerely urged to quit consumption of tobacco  He is not ready to quit tobacco           Return in about 3 months (around 4/18/2023)  No problem-specific Assessment & Plan notes found for this encounter  Diagnoses and all orders for this visit:    Opioid abuse, episodic ([facility] )    Dilated aortic root (HCC)    CLL (chronic lymphocytic leukemia) (HCC)    Chronic obstructive pulmonary disease, unspecified COPD type ([facility] )    Bipolar affective disorder, remission status unspecified ([facility] )    Type 2 diabetes mellitus with stage 2 chronic kidney disease, unspecified whether long term insulin use ([facility] )  -     CBC and differential; Future  -     Comprehensive metabolic panel; Future  -     Hemoglobin A1C; Future  -     Lipid Panel with Direct LDL reflex;  Future  -     Microalbumin / creatinine urine ratio  -     Ambulatory Referral to Endocrinology; Future    Primary hypertension  -     TSH, 3rd generation with Free T4 reflex; Future    Peripheral polyneuropathy  -     VAS lower limb arterial duplex, complete bilateral; Future  -     gabapentin (Neurontin) 100 mg capsule; Take 3 capsules (300 mg total) by mouth 3 (three) times a day    Prostate cancer screening  -     PSA, total and free; Future    MARILEE (obstructive sleep apnea)  -     Ambulatory Referral to Sleep Medicine; Future    Lower extremity pain, diffuse, unspecified laterality  -     VAS lower limb arterial duplex, complete bilateral; Future          Subjective:      Patient ID: Meredith Galeazzi is a 75 y o  male  For follow-up with his wife        ALLERGIES:  No Known Allergies    CURRENT MEDICATIONS:    Current Outpatient Medications:   •  ALPRAZolam (XANAX) 1 mg tablet, , Disp: , Rfl: 3  •  amLODIPine (NORVASC) 10 mg tablet, Take 1 tablet (10 mg total) by mouth daily, Disp: 90 tablet, Rfl: 3  •  aspirin 81 mg chewable tablet, Chew 1 tablet (81 mg total) daily, Disp: 90 tablet, Rfl: 3  •  citalopram (CeleXA) 20 mg tablet, Take 20 mg by mouth daily, Disp: , Rfl:   •  furosemide (LASIX) 40 mg tablet, Take 1 tablet (40 mg total) by mouth daily, Disp: 90 tablet, Rfl: 3  •  gabapentin (Neurontin) 100 mg capsule, Take 3 capsules (300 mg total) by mouth 3 (three) times a day, Disp: 810 capsule, Rfl: 0  •  Ibrutinib 280 MG TABS, Take 280 mg by mouth in the morning, Disp: 90 tablet, Rfl: 3  •  losartan (COZAAR) 100 MG tablet, , Disp: , Rfl:   •  metoprolol succinate (TOPROL-XL) 50 mg 24 hr tablet, Take 1 tablet (50 mg total) by mouth daily, Disp: 90 tablet, Rfl: 2  •  pantoprazole (PROTONIX) 40 mg tablet, Take 1 tablet (40 mg total) by mouth daily before breakfast, Disp: 30 tablet, Rfl: 3  •  potassium chloride (K-DUR,KLOR-CON) 10 mEq tablet, Take 1 tablet (10 mEq total) by mouth 2 (two) times a day, Disp: 90 tablet, Rfl: 3  •  Psyllium (DAILY FIBER PO), Take 1 capsule by mouth in the morning, Disp: , Rfl:   •  risperiDONE (RisperDAL) 1 mg tablet, Take 1 mg by mouth 2 (two) times a day  , Disp: , Rfl:   •  rosuvastatin (CRESTOR) 10 MG tablet, Take 10 mg by mouth daily, Disp: , Rfl:   •  terazosin (HYTRIN) 5 mg capsule, Take 5 mg by mouth daily, Disp: , Rfl:   •  VITAMIN D PO, Take 1 25 mcg by mouth every 7 days, Disp: , Rfl:     ACTIVE PROBLEM LIST:  Patient Active Problem List   Diagnosis   • Hypertension   • Depression   • Polypharmacy   • Bronchitis   • Encephalopathy   • Weakness   • Chronic lymphocytic leukemia of B-cell type not having achieved remission (HCC)   • Elevated troponin   • Bipolar 1 disorder (HCC)   • Anemia   • Peripheral edema   • CLL (chronic lymphocytic leukemia) (HCC)   • Chronic obstructive pulmonary disease (HCC)   • Type 2 diabetes mellitus with stage 2 chronic kidney disease (HCC)   • Lymph node enlargement   • Body mass index (BMI) 40 0-44 9, adult   • Stage 2 chronic kidney disease   • Type 2 diabetes mellitus, without long-term current use of insulin (HCC)   • Acute pain of right shoulder   • Ambulatory dysfunction   • Insomnia   • IVDU (intravenous drug user)   • Marijuana abuse   • Tobacco abuse   • Renal cyst, right   • Opioid abuse, episodic (HCC)   • Dilated aortic root (HCC)       PAST MEDICAL HISTORY:  Past Medical History:   Diagnosis Date   • Anemia    • Anxiety    • Bipolar disorder ([facility] )    • Cancer ([facility] )    • Colon polyp    • History of alcohol abuse    • Hyperlipidemia    • Hypertension    • Hypertension    • Insomnia    • Leukemia (HCC)    • Opiate abuse, episodic (HCC)    • Psychiatric disorder    • Sleep apnea with use of continuous positive airway pressure (CPAP)        PAST SURGICAL HISTORY:  Past Surgical History:   Procedure Laterality Date   • COLONOSCOPY     • EGD AND COLONOSCOPY N/A 3/30/2018    Procedure: EGD AND COLONOSCOPY;  Surgeon: Anjana Edwards MD;  Location: [facility];   Service: Gastroenterology       FAMILY HISTORY:  Family History   Problem Relation Age of Onset   • Coronary artery disease Mother    • Diabetes Mother    • No Known Problems Father    • Hepatitis Sister    • Cancer Brother    • Prostate cancer Brother    • Early death Brother        SOCIAL HISTORY:  Social History     Socioeconomic History   • Marital status: /Civil Union     Spouse name: Not on file   • Number of children: Not on file   • Years of education: Not on file   • Highest education level: Not on file   Occupational History   • Not on file   Tobacco Use   • Smoking status: Every Day     Packs/day: 1 00     Years: 40 00     Pack years: 40 00     Types: Cigarettes     Last attempt to quit: 10/13/2020     Years since quittin 2   • Smokeless tobacco: Never   Vaping Use   • Vaping Use: Never used   Substance and Sexual Activity   • Alcohol use: No     Comment: last drink 2017   • Drug use: No     Comment: hx of heroin and subutex abuse   • Sexual activity: Not Currently   Other Topics Concern   • Not on file   Social History Narrative   • Not on file     Social Determinants of Health     Financial Resource Strain: High Risk   • Difficulty of Paying Living Expenses: Very hard   Food Insecurity: Not on file   Transportation Needs: No Transportation Needs   • Lack of Transportation (Medical): No   • Lack of Transportation (Non-Medical): No   Physical Activity: Not on file   Stress: Not on file   Social Connections: Not on file   Intimate Partner Violence: Not on file   Housing Stability: Not on file       Review of Systems   Cardiovascular: Positive for leg swelling  Musculoskeletal: Positive for arthralgias, back pain and gait problem  B/l lower extremity cool extremities; pain after walking for 20 minutes;    Skin: Positive for color change  All other systems reviewed and are negative          Objective:  Vitals:    23 1450   BP: 126/68   BP Location: Left arm   Patient Position: Sitting   Pulse: 58   Resp: 16   SpO2: 95%   Weight: 120 kg (265 lb)   Height: 5' 11" (1 803 m)     Body mass index is 36 96 kg/m²  Physical Exam  Vitals and nursing note reviewed  Constitutional:       Appearance: Normal appearance  He is obese  HENT:      Head: Normocephalic and atraumatic  Cardiovascular:      Rate and Rhythm: Normal rate and regular rhythm  Heart sounds: Normal heart sounds  Pulmonary:      Effort: Pulmonary effort is normal       Breath sounds: Normal breath sounds  Musculoskeletal:         General: Normal range of motion  Cervical back: Normal range of motion  Right lower leg: Edema present  Left lower leg: Edema present  Lymphadenopathy:      Cervical: No cervical adenopathy  Skin:     General: Skin is warm and dry  Neurological:      General: No focal deficit present  Mental Status: He is alert and oriented to person, place, and time  Mental status is at baseline     Psychiatric:         Mood and Affect: Mood normal            RESULTS:    Recent Results (from the past 1008 hour(s))   CBC and differential    Collection Time: 01/16/23  3:46 PM   Result Value Ref Range    WBC 9 81 4 31 - 10 16 Thousand/uL    RBC 4 30 3 88 - 5 62 Million/uL    Hemoglobin 13 3 12 0 - 17 0 g/dL    Hematocrit 40 4 36 5 - 49 3 %    MCV 94 82 - 98 fL    MCH 30 9 26 8 - 34 3 pg    MCHC 32 9 31 4 - 37 4 g/dL    RDW 14 7 11 6 - 15 1 %    MPV 10 7 8 9 - 12 7 fL    Platelets 965 947 - 666 Thousands/uL    nRBC 0 /100 WBCs    Neutrophils Relative 61 43 - 75 %    Immat GRANS % 1 0 - 2 %    Lymphocytes Relative 21 14 - 44 %    Monocytes Relative 8 4 - 12 %    Eosinophils Relative 8 (H) 0 - 6 %    Basophils Relative 1 0 - 1 %    Neutrophils Absolute 6 05 1 85 - 7 62 Thousands/µL    Immature Grans Absolute 0 07 0 00 - 0 20 Thousand/uL    Lymphocytes Absolute 2 07 0 60 - 4 47 Thousands/µL    Monocytes Absolute 0 80 0 17 - 1 22 Thousand/µL    Eosinophils Absolute 0 75 (H) 0 00 - 0 61 Thousand/µL    Basophils Absolute 0 07 0 00 - 0 10 Thousands/µL   Comprehensive metabolic panel    Collection Time: 01/16/23  3:46 PM   Result Value Ref Range    Sodium 137 135 - 147 mmol/L    Potassium 3 9 3 5 - 5 3 mmol/L    Chloride 104 96 - 108 mmol/L    CO2 29 21 - 32 mmol/L    ANION GAP 4 4 - 13 mmol/L    BUN 11 5 - 25 mg/dL    Creatinine 0 93 0 60 - 1 30 mg/dL    Glucose, Fasting 104 (H) 65 - 99 mg/dL    Calcium 8 8 8 3 - 10 1 mg/dL    AST 16 5 - 45 U/L    ALT 28 12 - 78 U/L    Alkaline Phosphatase 83 46 - 116 U/L    Total Protein 7 3 6 4 - 8 4 g/dL    Albumin 3 7 3 5 - 5 0 g/dL    Total Bilirubin 0 56 0 20 - 1 00 mg/dL    eGFR 84 ml/min/1 73sq m       This note was created with voice recognition software  Phonic, grammatical and spelling errors may be present within the note as a result

## 2023-01-19 ENCOUNTER — TELEPHONE (OUTPATIENT)
Dept: INTERNAL MEDICINE CLINIC | Facility: CLINIC | Age: 68
End: 2023-01-19

## 2023-01-19 NOTE — TELEPHONE ENCOUNTER
Braulio Gonzales MD  [facility] Internal Med Clinical Just now (1:25 PM)     We talked about gabapentin yesterday     Gabapentin is for nerve discomfort pain to his feet associated with diabetes       Bipolar was already in the chart --- I did not add it; We unfortunately did not address his diabetes yesterday so we need to do that; I need an a1c; maybe they can come in sooner than 3 months

## 2023-01-19 NOTE — TELEPHONE ENCOUNTER
Patient's wife called and had multiples questions about her husbands visit yesterday  Wants to know why he is taking Gabapentin, why it says that patient is bipolar and several other questions  Tried to give MA the call but was told to put in a message  Please advise    Nhi Dennis

## 2023-01-23 ENCOUNTER — TELEPHONE (OUTPATIENT)
Dept: ENDOCRINOLOGY | Facility: CLINIC | Age: 68
End: 2023-01-23

## 2023-01-23 NOTE — TELEPHONE ENCOUNTER
Received referral for Kyle coreas for patient to contact office to schedule Consult/New Patient Appointment

## 2023-02-08 ENCOUNTER — TELEPHONE (OUTPATIENT)
Dept: HEMATOLOGY ONCOLOGY | Facility: CLINIC | Age: 68
End: 2023-02-08

## 2023-02-08 NOTE — TELEPHONE ENCOUNTER
CALL RETURN FORM   Reason for patient call? Wife of patient calling, patient may have COVID, please advise   Patient's primary oncologist? Asael Smart    Name of person the patient was calling for? Debbie   Any additional information to add, if applicable? [phone]   Informed patient that the message will be forwarded to the team and someone will get back to them as soon as possible    Did you relay this information to the patient?   yes

## 2023-02-08 NOTE — TELEPHONE ENCOUNTER
Returned telephone call to patient spouse, She had asked if patient would have to go to the hospital  Patient spouse stated patient temperature only peaked at 100 degrees  Patient is eating, drinking, and performing ADLs without any difficulty at this time  Reviewed that if patient had any shortness of breath, confusion, weakness, fatigue, and or any worsening fever, to go to ED for further evaluation  Reviewed that patient to reach out to pcp in regards to further recommendations  Patient spouse aware and verbalized understanding

## 2023-02-10 ENCOUNTER — TELEMEDICINE (OUTPATIENT)
Dept: INTERNAL MEDICINE CLINIC | Facility: CLINIC | Age: 68
End: 2023-02-10

## 2023-02-10 VITALS — HEIGHT: 71 IN | BODY MASS INDEX: 37.1 KG/M2 | WEIGHT: 265 LBS

## 2023-02-10 DIAGNOSIS — U07.1 COVID: Primary | ICD-10-CM

## 2023-02-10 DIAGNOSIS — C91.10 CLL (CHRONIC LYMPHOCYTIC LEUKEMIA) (HCC): ICD-10-CM

## 2023-02-10 DIAGNOSIS — N18.2 TYPE 2 DIABETES MELLITUS WITH STAGE 2 CHRONIC KIDNEY DISEASE, UNSPECIFIED WHETHER LONG TERM INSULIN USE (HCC): ICD-10-CM

## 2023-02-10 DIAGNOSIS — E11.22 TYPE 2 DIABETES MELLITUS WITH STAGE 2 CHRONIC KIDNEY DISEASE, UNSPECIFIED WHETHER LONG TERM INSULIN USE (HCC): ICD-10-CM

## 2023-02-10 PROBLEM — G47.00 INSOMNIA: Status: RESOLVED | Noted: 2019-09-11 | Resolved: 2023-02-10

## 2023-02-10 PROBLEM — R60.0 PERIPHERAL EDEMA: Status: RESOLVED | Noted: 2018-08-24 | Resolved: 2023-02-10

## 2023-02-10 PROBLEM — R60.9 PERIPHERAL EDEMA: Status: RESOLVED | Noted: 2018-08-24 | Resolved: 2023-02-10

## 2023-02-10 RX ORDER — MOLNUPIRAVIR 200 MG/1
800 CAPSULE ORAL EVERY 12 HOURS
Qty: 40 CAPSULE | Refills: 0 | Status: SHIPPED | OUTPATIENT
Start: 2023-02-10 | End: 2023-02-15

## 2023-02-10 RX ORDER — MOLNUPIRAVIR 200 MG/1
800 CAPSULE ORAL EVERY 12 HOURS
Qty: 40 CAPSULE | Refills: 0 | Status: SHIPPED | OUTPATIENT
Start: 2023-02-10 | End: 2023-02-10 | Stop reason: SDUPTHER

## 2023-02-10 NOTE — PROGRESS NOTES
COVID-19 Outpatient Progress Note    Assessment/Plan:    Problem List Items Addressed This Visit        Endocrine    Type 2 diabetes mellitus with stage 2 chronic kidney disease ([facility] )       Other    CLL (chronic lymphocytic leukemia) ([facility] )   Other Visit Diagnoses     COVID    -  Primary    Relevant Medications    molnupiravir (Lagevrio) 200 mg capsule         Disposition:     Discussed symptom directed medication options with patient  Discussed vitamin D, vitamin C, and/or zinc supplementation with patient  Patient meets criteria for Molnupiravir and they have been counseled according to EUA documentation provided by the FDA  After discussion, patient agrees to treatment  [place] is an investigational medicine used to treat mild-to-moderate COVID-19 in adults with positive results of direct SARS-CoV-2 viral testing, and who are at high risk for progressing to severe COVID-19 including hospitalization or death, and for whom other COVID-19 treatment options authorized by the FDA are not accessible or clinically appropriate  The FDA has authorized the emergency use of molnupiravir for the treatment of mild-tomoderate COVID-19 in adults under an EUA  [place] is not authorized:  - for use in people less than 25years of age   - for prevention of COVID-19   - for people needing hospitalization for COVID-19   - for use for longer than 5 consecutive days    What is the most important information I should know about molnupiravir? W[place]an  may cause serious side effects, including:    [place] may cause harm to your unborn baby  It is not known if molnupiravir will harm your baby if you take molnupiravir during pregnancy  - [place] is not recommended for use in pregnancy  - [place] has not been studied in pregnancy  [place] was studied in pregnant animals only   When molnupiravir was given to pregnant animals, molnupiravir caused harm to their unborn babies   - You and your healthcare provider may decide that you should take molnupiravir duringpregnancy if there are no other COVID-19 treatment options authorized by the FDA that are accessible or clinically appropriate for you  - If you and your healthcare provider decide that you should take molnupiravir during pregnancy, you and your healthcare provider should discuss the known and potential benefits and the potential risks of taking molnupiravir during pregnancy  For individuals who are able to become pregnant:  - You should use a reliable method of birth control (contraception) consistently and correctly during treatment with molnupiravir and for 4 days after the last dose of molnupiravir  Talk to your healthcare provider about reliable birth control methods  - Before starting treatment with molnupiravir your healthcare provider may do a pregnancy test to see if you are pregnant before starting treatment with molnupiravir  - Tell your healthcare provider right away if you become pregnant or think you may be pregnant during treatment with molnupiravir  Pregnancy Surveillance Program:  - There is a pregnancy surveillance program for individuals who take molnupiravir during pregnancy  The purpose of this program is to collect information about the health of you and your baby  Talk to your healthcare provider about how to take part in this program   - If you take molnupiravir during pregnancy and you agree to participate in the pregnancy surveillance program and allow your healthcare provider to share your information with Dinesh Diallo, then your healthcare provider will report your use of molnupiravir during pregnancy to [address] [place] [address],[address]  by calling [phone] or pregnancyreporting msd com  For individuals who are sexually active with partners who are able to become pregnant:  - It is not known if molnupiravir can affect sperm   While the risk is regarded as low, animal studies to fully assess the potential for molnupiravir to affect the babies of males treated with molnupiravir have not been completed  A reliable method of birth control (contraception) should be used consistently and correctly during treatment with molnupiravir and for at least 3 months after the last dose  The risk to sperm beyond 3 months is not known  Studies to understand the risk to sperm beyond 3 months are ongoing  Talk to your healthcare provider about reliable birth control methods  Talk to your healthcare provider if you have questions or concerns about how molnupiravir may affect sperm  How do I take molnupiravir? - Take molnupiravir exactly as your healthcare provider tells you to take it  - Take 4 capsules of molnupiravir every 12 hours (for example, at 8 am and at 8 pm)  - Take molnupiravir for 5 days  It is important that you complete the full 5 days of treatment with molnupiravir  Do not stop taking molnupiravir before you complete the full 5 days of treatment, even if you feel better  - Take molnupiravir with or without food  - You should stay in isolation for as long as your healthcare provider tells you to  Talk to your healthcare provider if you are not sure about how to properly isolate while you have COVID-19   - Swallow molnupiravir capsules whole  Do not open, break, or crush the capsules  If you cannot swallow capsules whole, tell your healthcare provider  What to do if you miss a dose:  - If it has been less than 10 hours since the missed dose, take it as soon as you remember  - If it has been more than 10 hours since the missed dose, skip the missed dose and take your dose at the next scheduled time  - Do not double the dose of molnupiravir to make up for a missed dose  What are the important possible side effects of molnupiravir?     Possible side effects of molnupiravir are:  - See, Stephon Hiss is the most important information I should know about molnupiravir?”  - Diarrhea  - Nausea  - Dizziness    These are not all the possible side effects of molnupiravir  Not many people have taken molnupiravir  Serious and unexpected side effects may happen  This medicine is still being studied, so it is possible that all of the risks are not known at this time  What other treatment choices are there? Like Neyda Castillo may allow for the emergency use of other medicines to treat people with COVID-19  Go to [facility] for more information  It is your choice to be treated or not to be treated with molnupiravir  Should you decide not to take it, it will not change your standard medical care  What if I am breastfeeding? Breastfeeding is not recommended during treatment with molnupiravir and for 4 days after the last dose of molnupiravir  If you are breastfeeding or plan to breastfeed, talk to your healthcare provider about your options and specific situation before taking molnupiravir  How do I report side effects with molnupiravir? Contact your healthcare provider if you have any side effects that bother you or do not go away  Report side effects to FDA MedWatch at www fda gov/medMedHab or call 1-276-VIU-1012 (1-383.693.6617)  How should I store [address]? - Store molnupiravir capsules at room temperature between 68°F to 77°F (20°C to 25°C)  - Keep molnupiravir and all medicines out of the reach of children and pets  Full fact sheet for patients, parents, and caregivers can be found at: [employer]    I have spent 10 minutes directly with the patient  Greater than 50% of this time was spent in counseling/coordination of care regarding: instructions for management         Encounter provider: Franko Malloy MD     Provider located at: [address] [facility]  [address] [address]  [phone]     Recent Visits  No visits were found meeting these conditions  Showing recent visits within past 7 days and meeting all other requirements  Today's Visits  Date Type Provider Dept   02/10/23 Isa Addison MD [facility] [address] today's visits and meeting all other requirements  Future Appointments  No visits were found meeting these conditions  Showing future appointments within next 150 days and meeting all other requirements     This virtual check-in was done via [address] and patient was informed that this is a secure, HIPAA-compliant platform  He agrees to proceed  Patient agrees to participate in a virtual check in via telephone or video visit instead of presenting to the office to address urgent/immediate medical needs  Patient is aware this is a billable service  He acknowledged consent and understanding of privacy and security of the video platform  The patient has agreed to participate and understands they can discontinue the visit at any time  After connecting through [facility], the patient was identified by name and date of birth  Jenn Coy was informed that this was a telemedicine visit and that the exam was being conducted confidentially over secure lines  My office door was closed  No one else was in the room  Jenn Coy acknowledged consent and understanding of privacy and security of the telemedicine visit  I informed the patient that I have reviewed his record in Epic and presented the opportunity for him to ask any questions regarding the visit today  The patient agreed to participate  Verification of patient location:  Patient is located in the following state in which I hold an active license: [place]    Subjective:   Jenn Coy is a 75 y o  male who has been screened for COVID-19  Symptom change since last report: worsening  Patient's symptoms include fever and chills       - Date of symptom onset: 2/7/2023  - Date of positive COVID-19 test: 2/9/2023  Type of test: Home antigen  Patient with typical symptoms of COVID-19 and they attest that they were positive on home rapid antigen testing  Image of positive result is not able to be uploaded into their chart  COVID-19 vaccination status: Fully vaccinated (primary series)    Erica Aparicio has been staying home and has isolated themselves in his home  He is taking care to not share personal items and is cleaning all surfaces that are touched often, like counters, tabletops, and doorknobs using household cleaning sprays or wipes  He is wearing a mask when he leaves his room  No results found for: Nohemi Petit, SARSCORONAVI, CORONAVIRUSR, SARSCOVAG, [address]    Review of Systems   Constitutional: Positive for chills and fever  All other systems reviewed and are negative      Current Outpatient Medications on File Prior to Visit   Medication Sig   • ALPRAZolam (XANAX) 1 mg tablet    • amLODIPine (NORVASC) 10 mg tablet Take 1 tablet (10 mg total) by mouth daily   • aspirin 81 mg chewable tablet Chew 1 tablet (81 mg total) daily   • citalopram (CeleXA) 20 mg tablet Take 20 mg by mouth daily   • furosemide (LASIX) 40 mg tablet Take 1 tablet (40 mg total) by mouth daily   • gabapentin (Neurontin) 100 mg capsule Take 3 capsules (300 mg total) by mouth 3 (three) times a day   • Ibrutinib 280 MG TABS Take 280 mg by mouth in the morning   • losartan (COZAAR) 100 MG tablet    • metoprolol succinate (TOPROL-XL) 50 mg 24 hr tablet Take 1 tablet (50 mg total) by mouth daily   • pantoprazole (PROTONIX) 40 mg tablet Take 1 tablet (40 mg total) by mouth daily before breakfast   • potassium chloride (K-DUR,KLOR-CON) 10 mEq tablet Take 1 tablet (10 mEq total) by mouth 2 (two) times a day   • Psyllium (DAILY FIBER PO) Take 1 capsule by mouth in the morning   • risperiDONE (RisperDAL) 1 mg tablet Take 1 mg by mouth 2 (two) times a day     • rosuvastatin (CRESTOR) 10 MG tablet Take 10 mg by mouth daily   • terazosin (HYTRIN) 5 mg capsule Take 5 mg by mouth daily   • VITAMIN D PO Take 1 25 mcg by mouth every 7 days       Objective:    Ht 5' 11" (1 803 m)   Wt 120 kg (265 lb)   BMI 36 96 kg/m²      Physical Exam  Vitals and nursing note reviewed  Pulmonary:      Effort: Pulmonary effort is normal    Neurological:      Mental Status: He is alert and oriented to person, place, and time     Psychiatric:         Mood and Affect: Mood normal        Leana Worrell MD

## 2023-02-19 ENCOUNTER — RA CDI HCC (OUTPATIENT)
Dept: OTHER | Facility: HOSPITAL | Age: 68
End: 2023-02-19

## 2023-02-19 NOTE — PROGRESS NOTES
E66 [id number]     [facility]  coding opportunities          Chart Reviewed number of suggestions sent to Provider: 1     Patients Insurance     Medicare Insurance: Gavino Guo

## 2023-02-27 DIAGNOSIS — G62.9 PERIPHERAL POLYNEUROPATHY: ICD-10-CM

## 2023-02-27 RX ORDER — GABAPENTIN 100 MG/1
300 CAPSULE ORAL 3 TIMES DAILY
Qty: 810 CAPSULE | Refills: 0 | Status: SHIPPED | OUTPATIENT
Start: 2023-02-27 | End: 2023-05-28

## 2023-03-02 ENCOUNTER — OFFICE VISIT (OUTPATIENT)
Dept: INTERNAL MEDICINE CLINIC | Facility: CLINIC | Age: 68
End: 2023-03-02

## 2023-03-02 VITALS
HEIGHT: 71 IN | RESPIRATION RATE: 20 BRPM | SYSTOLIC BLOOD PRESSURE: 136 MMHG | HEART RATE: 54 BPM | BODY MASS INDEX: 38.69 KG/M2 | OXYGEN SATURATION: 92 % | DIASTOLIC BLOOD PRESSURE: 74 MMHG | WEIGHT: 276.4 LBS | TEMPERATURE: 98.4 F

## 2023-03-02 DIAGNOSIS — E11.22 TYPE 2 DIABETES MELLITUS WITH STAGE 2 CHRONIC KIDNEY DISEASE, UNSPECIFIED WHETHER LONG TERM INSULIN USE (HCC): Primary | ICD-10-CM

## 2023-03-02 DIAGNOSIS — I10 PRIMARY HYPERTENSION: ICD-10-CM

## 2023-03-02 DIAGNOSIS — G62.9 PERIPHERAL POLYNEUROPATHY: ICD-10-CM

## 2023-03-02 DIAGNOSIS — R06.02 SOB (SHORTNESS OF BREATH): ICD-10-CM

## 2023-03-02 DIAGNOSIS — R22.42 LOCALIZED SWELLING OF LEFT LOWER EXTREMITY: ICD-10-CM

## 2023-03-02 DIAGNOSIS — I77.810 DILATED AORTIC ROOT (HCC): ICD-10-CM

## 2023-03-02 DIAGNOSIS — N18.2 TYPE 2 DIABETES MELLITUS WITH STAGE 2 CHRONIC KIDNEY DISEASE, UNSPECIFIED WHETHER LONG TERM INSULIN USE (HCC): Primary | ICD-10-CM

## 2023-03-02 LAB — SL AMB POCT HEMOGLOBIN AIC: 6.6 (ref ?–6.5)

## 2023-03-02 RX ORDER — FUROSEMIDE 40 MG/1
40 TABLET ORAL 2 TIMES DAILY
Qty: 14 TABLET | Refills: 0 | Status: SHIPPED | OUTPATIENT
Start: 2023-03-02 | End: 2023-03-02

## 2023-03-02 RX ORDER — FUROSEMIDE 40 MG/1
40 TABLET ORAL 2 TIMES DAILY
Qty: 14 TABLET | Refills: 0 | Status: SHIPPED | OUTPATIENT
Start: 2023-03-02 | End: 2023-03-09

## 2023-03-02 NOTE — PROGRESS NOTES
Assessment/Plan:     Unclear if weight gain is r/t sedentary lifestyle; unlikely CHF; reports some SOB; will obtain CXR and BNP; increase lasix to BID for 1 week and then resume normal dosing  Last echo mostly normal  Lungs clear  2+ edema to b/l LE     a1c is normal; 10  8 in September may have been an error  Quality Measures:     BMI Counseling: Body mass index is 38 55 kg/m²  The BMI is above normal  Nutrition recommendations include decreasing portion sizes and encouraging healthy choices of fruits and vegetables  Exercise recommendations include moderate physical activity 150 minutes/week  Rationale for BMI follow-up plan is due to patient being overweight or obese  Tobacco Cessation Counseling: Tobacco cessation counseling was provided  The patient is sincerely urged to quit consumption of tobacco  He is not ready to quit tobacco           Return for Next scheduled follow up  No problem-specific Assessment & Plan notes found for this encounter  Diagnoses and all orders for this visit:    Type 2 diabetes mellitus with stage 2 chronic kidney disease, unspecified whether long term insulin use (HCC)  -     POCT hemoglobin A1c    Peripheral polyneuropathy    Primary hypertension  -     Discontinue: furosemide (LASIX) 40 mg tablet; Take 1 tablet (40 mg total) by mouth 2 (two) times a day for 7 days  -     furosemide (LASIX) 40 mg tablet; Take 1 tablet (40 mg total) by mouth 2 (two) times a day for 7 days Then return to 40 mg daily    Dilated aortic root (HCC)    Localized swelling of left lower extremity  -     NT-BNP PRO; Future    SOB (shortness of breath)  -     XR chest pa & lateral; Future          Subjective:      Patient ID: Irena Nevarez is a 75 y o  male  Here for follow up        ALLERGIES:  No Known Allergies    CURRENT MEDICATIONS:    Current Outpatient Medications:   •  ALPRAZolam (XANAX) 1 mg tablet, , Disp: , Rfl: 3  •  amLODIPine (NORVASC) 10 mg tablet, Take 1 tablet (10 mg total) by mouth daily, Disp: 90 tablet, Rfl: 3  •  aspirin 81 mg chewable tablet, Chew 1 tablet (81 mg total) daily, Disp: 90 tablet, Rfl: 3  •  citalopram (CeleXA) 20 mg tablet, Take 20 mg by mouth daily, Disp: , Rfl:   •  furosemide (LASIX) 40 mg tablet, Take 1 tablet (40 mg total) by mouth 2 (two) times a day for 7 days Then return to 40 mg daily, Disp: 14 tablet, Rfl: 0  •  gabapentin (Neurontin) 100 mg capsule, Take 3 capsules (300 mg total) by mouth 3 (three) times a day, Disp: 810 capsule, Rfl: 0  •  Ibrutinib 280 MG TABS, Take 280 mg by mouth in the morning, Disp: 90 tablet, Rfl: 3  •  losartan (COZAAR) 100 MG tablet, , Disp: , Rfl:   •  metoprolol succinate (TOPROL-XL) 50 mg 24 hr tablet, Take 1 tablet (50 mg total) by mouth daily, Disp: 90 tablet, Rfl: 2  •  pantoprazole (PROTONIX) 40 mg tablet, Take 1 tablet (40 mg total) by mouth daily before breakfast, Disp: 30 tablet, Rfl: 3  •  potassium chloride (K-DUR,KLOR-CON) 10 mEq tablet, Take 1 tablet (10 mEq total) by mouth 2 (two) times a day, Disp: 90 tablet, Rfl: 3  •  Psyllium (DAILY FIBER PO), Take 1 capsule by mouth in the morning, Disp: , Rfl:   •  risperiDONE (RisperDAL) 1 mg tablet, Take 1 mg by mouth 2 (two) times a day  , Disp: , Rfl:   •  rosuvastatin (CRESTOR) 10 MG tablet, Take 10 mg by mouth daily, Disp: , Rfl:   •  terazosin (HYTRIN) 5 mg capsule, Take 5 mg by mouth daily, Disp: , Rfl:   •  VITAMIN D PO, Take 1 25 mcg by mouth every 7 days, Disp: , Rfl:     ACTIVE PROBLEM LIST:  Patient Active Problem List   Diagnosis   • Hypertension   • Depression   • Polypharmacy   • Chronic lymphocytic leukemia of B-cell type not having achieved remission (HCC)   • Bipolar 1 disorder (HCC)   • CLL (chronic lymphocytic leukemia) (HCC)   • Chronic obstructive pulmonary disease (HCC)   • Type 2 diabetes mellitus with stage 2 chronic kidney disease (HCC)   • Body mass index (BMI) 40 0-44 9, adult   • Stage 2 chronic kidney disease   • Type 2 diabetes mellitus, without long-term current use of insulin (HCC)   • Ambulatory dysfunction   • Marijuana abuse   • Tobacco abuse   • Renal cyst, right   • Dilated aortic root (HCC)       PAST MEDICAL HISTORY:  Past Medical History:   Diagnosis Date   • Anemia    • Anxiety    • Bipolar disorder ([facility] )    • Cancer (HCC)    • Colon polyp    • History of alcohol abuse    • Hyperlipidemia    • Hypertension    • Hypertension    • Insomnia    • Leukemia ([facility] )    • Psychiatric disorder    • Sleep apnea with use of continuous positive airway pressure (CPAP)        PAST SURGICAL HISTORY:  Past Surgical History:   Procedure Laterality Date   • COLONOSCOPY     • EGD AND COLONOSCOPY N/A 3/30/2018    Procedure: EGD AND COLONOSCOPY;  Surgeon: Linda Patricio MD;  Location: [facility] GI [facility];   Service: Gastroenterology       FAMILY HISTORY:  Family History   Problem Relation Age of Onset   • Coronary artery disease Mother    • Diabetes Mother    • No Known Problems Father    • Hepatitis Sister    • Cancer Brother    • Prostate cancer Brother    • Early death Brother        SOCIAL HISTORY:  Social History     Socioeconomic History   • Marital status: /Civil Union     Spouse name: Not on file   • Number of children: Not on file   • Years of education: Not on file   • Highest education level: Not on file   Occupational History   • Not on file   Tobacco Use   • Smoking status: Every Day     Packs/day: 1 00     Years: 40 00     Pack years: 40 00     Types: Cigarettes     Last attempt to quit: 10/13/2020     Years since quittin 3   • Smokeless tobacco: Never   Vaping Use   • Vaping Use: Never used   Substance and Sexual Activity   • Alcohol use: No     Comment: last drink 2017   • Drug use: No     Comment: hx of heroin and subutex abuse   • Sexual activity: Not Currently   Other Topics Concern   • Not on file   Social History Narrative   • Not on file     Social Determinants of Health     Financial Resource Strain: High Risk   • Difficulty of Paying Living Expenses: Very hard   Food Insecurity: Not on file   Transportation Needs: No Transportation Needs   • Lack of Transportation (Medical): No   • Lack of Transportation (Non-Medical): No   Physical Activity: Not on file   Stress: Not on file   Social Connections: Not on file   Intimate Partner Violence: Not on file   Housing Stability: Not on file       Review of Systems   Respiratory: Positive for shortness of breath  Negative for cough  Cardiovascular: Positive for leg swelling  Negative for chest pain and palpitations  Musculoskeletal: Positive for arthralgias, back pain and gait problem  All other systems reviewed and are negative  Objective:  Vitals:    03/02/23 1614   BP: 136/74   BP Location: Left arm   Patient Position: Sitting   Cuff Size: Large   Pulse: (!) 54   Resp: 20   Temp: 98 4 °F (36 9 °C)   TempSrc: Tympanic   SpO2: 92%   Weight: 125 kg (276 lb 6 4 oz)   Height: 5' 11" (1 803 m)     Body mass index is 38 55 kg/m²  Physical Exam  Vitals and nursing note reviewed  Constitutional:       Appearance: Normal appearance  He is obese  HENT:      Head: Normocephalic and atraumatic  Cardiovascular:      Rate and Rhythm: Normal rate  Pulmonary:      Effort: Pulmonary effort is normal    Musculoskeletal:         General: Swelling present  Normal range of motion  Cervical back: Normal range of motion  Right lower leg: Edema present  Left lower leg: Edema present  Neurological:      General: No focal deficit present  Mental Status: He is alert and oriented to person, place, and time  Mental status is at baseline  Gait: Gait abnormal    Psychiatric:         Mood and Affect: Mood normal            RESULTS:    In chart    This note was created with voice recognition software  Phonic, grammatical and spelling errors may be present within the note as a result

## 2023-03-19 PROBLEM — C91.10 CHRONIC LYMPHOCYTIC LEUKEMIA OF B-CELL TYPE NOT HAVING ACHIEVED REMISSION (HCC): Status: RESOLVED | Noted: 2018-03-26 | Resolved: 2023-03-19

## 2023-03-21 ENCOUNTER — HOSPITAL ENCOUNTER (OUTPATIENT)
Dept: VASCULAR ULTRASOUND | Facility: HOSPITAL | Age: 68
Discharge: HOME | End: 2023-03-21
Attending: INTERNAL MEDICINE

## 2023-03-21 DIAGNOSIS — G62.9 PERIPHERAL POLYNEUROPATHY: ICD-10-CM

## 2023-03-21 DIAGNOSIS — M79.606 LOWER EXTREMITY PAIN, DIFFUSE, UNSPECIFIED LATERALITY: ICD-10-CM

## 2023-03-29 ENCOUNTER — APPOINTMENT (OUTPATIENT)
Dept: LAB | Facility: CLINIC | Age: 68
End: 2023-03-29

## 2023-03-29 ENCOUNTER — TELEPHONE (OUTPATIENT)
Dept: HEMATOLOGY ONCOLOGY | Facility: CLINIC | Age: 68
End: 2023-03-29

## 2023-03-29 DIAGNOSIS — F31.9 BIPOLAR AFFECTIVE DISORDER, REMISSION STATUS UNSPECIFIED (HCC): ICD-10-CM

## 2023-03-29 DIAGNOSIS — N18.2 TYPE 2 DIABETES MELLITUS WITH STAGE 2 CHRONIC KIDNEY DISEASE, UNSPECIFIED WHETHER LONG TERM INSULIN USE (HCC): ICD-10-CM

## 2023-03-29 DIAGNOSIS — E11.22 TYPE 2 DIABETES MELLITUS WITH STAGE 2 CHRONIC KIDNEY DISEASE, UNSPECIFIED WHETHER LONG TERM INSULIN USE (HCC): ICD-10-CM

## 2023-03-29 DIAGNOSIS — Z80.42 FAMILY HISTORY OF PROSTATE CANCER: ICD-10-CM

## 2023-03-29 DIAGNOSIS — C91.10 CLL (CHRONIC LYMPHOCYTIC LEUKEMIA) (HCC): ICD-10-CM

## 2023-03-30 ENCOUNTER — APPOINTMENT (OUTPATIENT)
Dept: LAB | Facility: CLINIC | Age: 68
End: 2023-03-30

## 2023-03-30 ENCOUNTER — HOSPITAL ENCOUNTER (OUTPATIENT)
Dept: RADIOLOGY | Facility: HOSPITAL | Age: 68
End: 2023-03-30

## 2023-03-30 ENCOUNTER — OFFICE VISIT (OUTPATIENT)
Dept: HEMATOLOGY ONCOLOGY | Facility: CLINIC | Age: 68
End: 2023-03-30

## 2023-03-30 VITALS
RESPIRATION RATE: 16 BRPM | BODY MASS INDEX: 38.64 KG/M2 | WEIGHT: 276 LBS | HEART RATE: 55 BPM | OXYGEN SATURATION: 95 % | DIASTOLIC BLOOD PRESSURE: 70 MMHG | HEIGHT: 71 IN | TEMPERATURE: 97 F | SYSTOLIC BLOOD PRESSURE: 150 MMHG

## 2023-03-30 DIAGNOSIS — R06.02 SOB (SHORTNESS OF BREATH): ICD-10-CM

## 2023-03-30 DIAGNOSIS — C91.10 CLL (CHRONIC LYMPHOCYTIC LEUKEMIA) (HCC): Primary | ICD-10-CM

## 2023-03-30 LAB
ALBUMIN SERPL BCP-MCNC: 4.2 G/DL (ref 3.5–5)
ALP SERPL-CCNC: 67 U/L (ref 46–116)
ALT SERPL W P-5'-P-CCNC: 28 U/L (ref 12–78)
ANION GAP SERPL CALCULATED.3IONS-SCNC: 6 MMOL/L (ref 4–13)
AST SERPL W P-5'-P-CCNC: 17 U/L (ref 5–45)
BILIRUB SERPL-MCNC: 0.63 MG/DL (ref 0.2–1)
BUN SERPL-MCNC: 13 MG/DL (ref 5–25)
CALCIUM SERPL-MCNC: 9.6 MG/DL (ref 8.3–10.1)
CHLORIDE SERPL-SCNC: 102 MMOL/L (ref 96–108)
CHOLEST SERPL-MCNC: 158 MG/DL
CO2 SERPL-SCNC: 26 MMOL/L (ref 21–32)
CREAT SERPL-MCNC: 0.84 MG/DL (ref 0.6–1.3)
CREAT UR-MCNC: 45.3 MG/DL
GFR SERPL CREATININE-BSD FRML MDRD: 89 ML/MIN/1.73SQ M
GLUCOSE P FAST SERPL-MCNC: 133 MG/DL (ref 65–99)
HDLC SERPL-MCNC: 56 MG/DL
LDLC SERPL CALC-MCNC: 73 MG/DL (ref 0–100)
MICROALBUMIN UR-MCNC: 5.2 MG/L (ref 0–20)
MICROALBUMIN/CREAT 24H UR: 11 MG/G CREATININE (ref 0–30)
POTASSIUM SERPL-SCNC: 3.6 MMOL/L (ref 3.5–5.3)
PROT SERPL-MCNC: 8.1 G/DL (ref 6.4–8.4)
SODIUM SERPL-SCNC: 134 MMOL/L (ref 135–147)
TRIGL SERPL-MCNC: 145 MG/DL
TSH SERPL DL<=0.05 MIU/L-ACNC: 2.33 UIU/ML (ref 0.45–4.5)

## 2023-03-30 NOTE — PROGRESS NOTES
HEMATOLOGY / ONCOLOGY CLINIC NOTE    Primary Care Provider: Steff Saravia MD  Referring Provider:    MRN: [id number]  : 1955    Reason for Encounter:    Chief Complaint   Patient presents with   • Follow-up     Wife: Jannette Silver      Hematology / Oncology History:     Barb Fraga is a 75 y o  male who comes in for [facility]  1, CLL  - Stage III with anemia; also having splenomegaly  No FISH panel  - initially had been on ibrutinib 420 mg since 2018   - highest white blood cell 128k, decreased to 8 K    - 2021:  Due to fatigue and potential shoulder surgery, patient has decreased ibrutinib to 280 mg daily by himself  WBC and lymphocyte remains stable  Previous hematologist had him on 280 mg since then  2022: WBC 8 64k, Hgb 14 7, plt 177k, Vit B12 , creat 1 08  3/29/2023: WBC 7 82k, Hgb 15 3, plt 174k    2, anemia  - resolved on treatment        Assessment / Plan:     1  [facility]    - reviewed labs with patient, CLL is under control  Most recent 3/21/2022 CT scan showed previous splenomegaly has resolved  Patient tolerating ibrutinib 280 mg well, will continue current treatment no change       - made patient aware that if he is going for surgical procedures depending on the etiology and risk for bleeding, patient usually need to hold ibrutinib a week before and a week after said procedure       · Discussion of decision making    I personally reviewed the following lab results, the image studies, pathology, other specialty/physicians consult notes and recommendations, and outside medical records from [facility]  I had a lengthy discussion with the patient and shared the work-up findings  We discussed the diagnosis and management plan as below   I spent 46 minutes reviewing the records (labs, clinician notes, outside records, medical history, ordering medicine/tests/procedures, interpreting the imaging/labs previously done) and coordination of care as well as direct time with the patient today, of which greater than 50% of the time was spent in counseling and coordination of care with the patient/family  · Plan/Labs  · Patient will check CBC and CMP labs every 3 months as standing and will follow patient in 6 months       Follow Up: 6 months    All questions were answered to the patient's satisfaction during this encounter  The patient knows the contact information for our office and knows to reach out for any relevant concerns related to this encounter  They are to call for any temperature 100 4 or higher, new symptoms including but not restricted to shaking chills, decreased appetite, nausea, vomiting, diarrhea, increased fatigue, shortness of breath or chest pain, confusion, and not feeling the strength to come to the clinic  For all other listed problems and medical diagnosis in their chart - they are managed by PCP and/or other specialists, which the patient acknowledges  Thank you very much for your consultation and making us a part of this patient's care  We are continuing to follow closely with you  Please do not hesitate to reach out to me with any additional questions or concerns  Champ Galvez MD  Hematology & Medical Oncology Staff Physician      Interval History:     4/5/2019:  Sky Miramontes in for follow-up  Reported has been doing okay at baseline health status  Using home oxygen for COPD  No bleeding, no leg swelling, no new chest pain, cough, hemoptysis  No frequent infection  8/1/2019 :  Came in for follow-up  Overall doing well  Reported has been losing weight about 20 lb  No lumps bumps  No other constitutional symptoms  No bleeding no frequent infection  1/3/76063 :  Came in for follow-up, doing well, body weight is stable  based overall tolerated treatment well no bleeding, no thrombosis, no frequent infection  Patient reported has been losing weight however albumin level is in normal range       7/31/2020 :  Patient came in for follow-up doing okay  Gaining weight  Reported having ED and more fatigue  No infection of skin rash    11/13/2020 :  Came for follow-up, overall doing okay  Diagnosed sleep apnea using CPAP machine  No infection, no bleeding    5/17/2021 :  Patient came in follow-up, overall doing okay  No bleeding, no infection, no palpitations  Patient reported some mild fatigue  Recently has been evaluated for also for shoulder surgery     12/3/2021 :  Came for follow-up doing well  No bleeding, no infection  Recently had 2nd kidney stone  Recovered well   No palpitation  Interval events: he feels moderate fatigue and lack of motivation on [place]  No new side effects or acute issues otherwise  Problem list:       Patient Active Problem List   Diagnosis   • Hypertension   • Depression   • Polypharmacy   • Bipolar 1 disorder (HCC)   • CLL (chronic lymphocytic leukemia) (HCC)   • Chronic obstructive pulmonary disease (HCC)   • Type 2 diabetes mellitus with stage 2 chronic kidney disease (HCC)   • Body mass index (BMI) 40 0-44 9, adult   • Stage 2 chronic kidney disease   • Type 2 diabetes mellitus, without long-term current use of insulin (HCC)   • Ambulatory dysfunction   • Marijuana abuse   • Tobacco abuse   • Renal cyst, right   • Dilated aortic root (HCC)       PHYSICIAL EXAMINATION:       Vital Signs:     Vitals:    03/30/23 1450   BP: 150/70   Pulse: 55   Resp: 16   Temp: (!) 97 °F (36 1 °C)   SpO2: 95%       Body mass index is 38 49 kg/m²  Body surface area is 2 42 meters squared        GEN: Alert, awake oriented, in no acute distress  HEENT- No pallor, icterus, cyanosis, no oral mucosal lesions,   LAD - no palpable cervical, clavicle, axillary, inguinal LAD  Heart- Bradycardia, +S1 S2  Lungs- decreased breathing sound bilateral    Abdomen- soft, Non tender, bowel sounds present  Extremities- No cyanosis, clubbing, edema  Neuro- No focal neurological deficit noted b/l           PAST MEDICAL HISTORY:   has a past medical history of Anemia, Anxiety, [facility] disorder ([facility] ), [facility] Colon polyp, History of alcohol abuse, Hyperlipidemia, Hypertension, Hypertension, Insomnia, Leuk[facility] ([facility] ), Psychiatric disorder, and Sleep apnea with use of continuous positive airway pressure (CPAP)  PAST SURGICAL HISTORY:   has a past surgical history that includes EGD AND COLONOSCOPY (N/A, 3/30/2018) and Colonoscopy  CURRENT MEDICATIONS:     Current Outpatient Medications   Medication Sig Dispense Refill   • ALPRAZolam (XANAX) 1 mg tablet   3   • citalopram (CeleXA) 20 mg tablet Take 20 mg by mouth daily     • gabapentin (Neurontin) 100 mg capsule Take 3 capsules (300 mg total) by mouth 3 (three) times a day 810 capsule 0   • Ibrutinib 280 MG TABS Take 280 mg by mouth in the morning 90 tablet 3   • losartan (COZAAR) 100 MG tablet      • metoprolol succinate (TOPROL-XL) 50 mg 24 hr tablet Take 1 tablet (50 mg total) by mouth daily 90 tablet 2   • pantoprazole (PROTONIX) 40 mg tablet Take 1 tablet (40 mg total) by mouth daily before breakfast 30 tablet 3   • potassium chloride (K-DUR,KLOR-CON) 10 mEq tablet Take 1 tablet (10 mEq total) by mouth 2 (two) times a day 90 tablet 3   • Psyllium (DAILY FIBER PO) Take 1 capsule by mouth in the morning     • risperiDONE (RisperDAL) 1 mg tablet Take 1 mg by mouth 2 (two) times a day       • rosuvastatin (CRESTOR) 10 MG tablet Take 10 mg by mouth daily     • terazosin (HYTRIN) 5 mg capsule Take 5 mg by mouth daily     • VITAMIN D PO Take 1 25 mcg by mouth every 7 days     • amLODIPine (NORVASC) 10 mg tablet Take 1 tablet (10 mg total) by mouth daily 90 tablet 3   • aspirin 81 mg chewable tablet Chew 1 tablet (81 mg total) daily 90 tablet 3   • furosemide (LASIX) 40 mg tablet Take 1 tablet (40 mg total) by mouth 2 (two) times a day for 7 days Then return to 40 mg daily 14 tablet 0     No current facility-administered medications for this visit         SOCIAL HISTORY: reports that he has been smoking cigarettes  He has a 40 00 pack-year smoking history  He has never used smokeless tobacco  He reports that he does not drink alcohol and does not use drugs  FAMILY HISTORY:  family history includes Cancer in his brother; Coronary artery disease in his mother; Diabetes in his mother; Early death in his brother; Hepatitis in his sister; No Known Problems in his father; Prostate cancer in his brother  ALLERGIES:  has No Known Allergies  REVIEW OF SYSTEMS:  Please note that a 10-point review of systems was performed to include Constitutional, HEENT, Respiratory, CVS, GI, , Musculoskeletal, Integumentary, Neurologic, Rheumatologic, Endocrinologic, Psychiatric, Lymphatic, and Hematologic/Oncologic systems were reviewed and are negative unless otherwise stated in HPI  Positive and negative findings pertinent to this evaluation are incorporated into the history of present illness  Lab Results   Component Value Date    SODIUM 134 (L) 03/29/2023    K 3 6 03/29/2023     03/29/2023    CO2 26 03/29/2023    AGAP 6 03/29/2023    BUN 13 03/29/2023    CREATININE 0 84 03/29/2023    GLUC 159 (H) 03/21/2022    GLUF 133 (H) 03/29/2023    CALCIUM 9 6 03/29/2023    AST 17 03/29/2023    ALT 28 03/29/2023    ALKPHOS 67 03/29/2023    TP 8 1 03/29/2023    TBILI 0 63 03/29/2023    EGFR 89 03/29/2023       CBC with diff:   Results from last 7 days   Lab Units 03/29/23  1528   WBC Thousand/uL 7 82   HEMOGLOBIN g/dL 15 3   HEMATOCRIT % 46 8   MCV fL 93   PLATELETS Thousands/uL 174   MCH pg 30 2   MCHC g/dL 32 7   RDW % 14 6   MPV fL 12 2   NRBC AUTO /100 WBCs 0       CMP:  Results from last 7 days   Lab Units 03/29/23  1528   POTASSIUM mmol/L 3 6   CHLORIDE mmol/L 102   CO2 mmol/L 26   BUN mg/dL 13   CREATININE mg/dL 0 84   CALCIUM mg/dL 9 6   AST U/L 17   ALT U/L 28   ALK PHOS U/L 67   EGFR ml/min/1 73sq m 89           IMAGING:    No orders to display     No results found

## 2023-03-31 LAB
EST. AVERAGE GLUCOSE BLD GHB EST-MCNC: 131 MG/DL
HBA1C MFR BLD: 6.2 %
PSA FREE MFR SERPL: 22 %
PSA FREE SERPL-MCNC: 0.22 NG/ML
PSA SERPL-MCNC: 1 NG/ML (ref 0–4)

## 2023-05-30 ENCOUNTER — TELEPHONE (OUTPATIENT)
Dept: OBGYN CLINIC | Facility: HOSPITAL | Age: 68
End: 2023-05-30

## 2023-05-30 NOTE — TELEPHONE ENCOUNTER
Caller: patient wife  Doctor: Isha Bella    Reason for call: would like to fu with Dr Isha Bella and try the visco injections/bilat knees.   Pain level is 10 but not constant     Call back#: [phone]

## 2023-07-06 ENCOUNTER — RA CDI HCC (OUTPATIENT)
Dept: OTHER | Facility: HOSPITAL | Age: 68
End: 2023-07-06

## 2023-07-06 NOTE — PROGRESS NOTES
[facility] [address] [facility] coding opportunities       Chart reviewed, no opportunity found: [address]        Patients Insurance     Medicare Insurance: [employer]

## 2023-07-13 ENCOUNTER — OFFICE VISIT (OUTPATIENT)
Dept: INTERNAL MEDICINE CLINIC | Facility: CLINIC | Age: 68
End: 2023-07-13
Payer: COMMERCIAL

## 2023-07-13 VITALS
RESPIRATION RATE: 16 BRPM | HEART RATE: 79 BPM | WEIGHT: 258.8 LBS | BODY MASS INDEX: 36.23 KG/M2 | OXYGEN SATURATION: 94 % | SYSTOLIC BLOOD PRESSURE: 130 MMHG | HEIGHT: 71 IN | DIASTOLIC BLOOD PRESSURE: 74 MMHG

## 2023-07-13 DIAGNOSIS — F32.9 REACTIVE DEPRESSION: Chronic | ICD-10-CM

## 2023-07-13 DIAGNOSIS — Z72.0 TOBACCO ABUSE: ICD-10-CM

## 2023-07-13 DIAGNOSIS — C91.10 CLL (CHRONIC LYMPHOCYTIC LEUKEMIA) (HCC): ICD-10-CM

## 2023-07-13 DIAGNOSIS — I10 PRIMARY HYPERTENSION: ICD-10-CM

## 2023-07-13 DIAGNOSIS — J44.9 CHRONIC OBSTRUCTIVE PULMONARY DISEASE, UNSPECIFIED COPD TYPE (HCC): ICD-10-CM

## 2023-07-13 DIAGNOSIS — I10 ESSENTIAL HYPERTENSION: ICD-10-CM

## 2023-07-13 DIAGNOSIS — Z12.5 PROSTATE CANCER SCREENING: ICD-10-CM

## 2023-07-13 DIAGNOSIS — E55.9 VITAMIN D DEFICIENCY: ICD-10-CM

## 2023-07-13 DIAGNOSIS — R49.0 HOARSENESS OF VOICE: ICD-10-CM

## 2023-07-13 DIAGNOSIS — N18.2 TYPE 2 DIABETES MELLITUS WITH STAGE 2 CHRONIC KIDNEY DISEASE, UNSPECIFIED WHETHER LONG TERM INSULIN USE (HCC): Primary | ICD-10-CM

## 2023-07-13 DIAGNOSIS — Z79.899 POLYPHARMACY: Chronic | ICD-10-CM

## 2023-07-13 DIAGNOSIS — I51.89 DIASTOLIC DYSFUNCTION: ICD-10-CM

## 2023-07-13 DIAGNOSIS — F31.9 BIPOLAR 1 DISORDER (HCC): ICD-10-CM

## 2023-07-13 DIAGNOSIS — E11.22 TYPE 2 DIABETES MELLITUS WITH STAGE 2 CHRONIC KIDNEY DISEASE, UNSPECIFIED WHETHER LONG TERM INSULIN USE (HCC): Primary | ICD-10-CM

## 2023-07-13 DIAGNOSIS — L98.9 NON-HEALING SKIN LESION: ICD-10-CM

## 2023-07-13 DIAGNOSIS — F31.9 BIPOLAR AFFECTIVE DISORDER, REMISSION STATUS UNSPECIFIED (HCC): ICD-10-CM

## 2023-07-13 DIAGNOSIS — I77.810 DILATED AORTIC ROOT (HCC): ICD-10-CM

## 2023-07-13 DIAGNOSIS — N40.1 BENIGN PROSTATIC HYPERPLASIA WITH LOWER URINARY TRACT SYMPTOMS, SYMPTOM DETAILS UNSPECIFIED: ICD-10-CM

## 2023-07-13 PROCEDURE — 99214 OFFICE O/P EST MOD 30 MIN: CPT | Performed by: INTERNAL MEDICINE

## 2023-07-13 RX ORDER — LOSARTAN POTASSIUM 100 MG/1
100 TABLET ORAL DAILY
Qty: 90 TABLET | Refills: 3 | Status: SHIPPED | OUTPATIENT
Start: 2023-07-13 | End: 2023-10-11

## 2023-07-13 RX ORDER — METOPROLOL SUCCINATE 50 MG/1
50 TABLET, EXTENDED RELEASE ORAL DAILY
Qty: 90 TABLET | Refills: 3 | Status: SHIPPED | OUTPATIENT
Start: 2023-07-13

## 2023-07-13 RX ORDER — LOSARTAN POTASSIUM 100 MG/1
TABLET ORAL
Status: CANCELLED | OUTPATIENT
Start: 2023-07-13

## 2023-07-13 RX ORDER — TERAZOSIN 5 MG/1
5 CAPSULE ORAL DAILY
Qty: 90 CAPSULE | Refills: 3 | Status: SHIPPED | OUTPATIENT
Start: 2023-07-13

## 2023-07-13 RX ORDER — ROSUVASTATIN CALCIUM 10 MG/1
10 TABLET, COATED ORAL DAILY
Qty: 90 TABLET | Refills: 3 | Status: SHIPPED | OUTPATIENT
Start: 2023-07-13

## 2023-07-13 RX ORDER — POTASSIUM CHLORIDE 750 MG/1
10 TABLET, EXTENDED RELEASE ORAL 2 TIMES DAILY
Qty: 90 TABLET | Refills: 3 | Status: SHIPPED | OUTPATIENT
Start: 2023-07-13

## 2023-07-13 RX ORDER — ERGOCALCIFEROL 1.25 MG/1
50000 CAPSULE ORAL WEEKLY
COMMUNITY

## 2023-07-13 RX ORDER — AMLODIPINE BESYLATE 10 MG/1
10 TABLET ORAL DAILY
Qty: 90 TABLET | Refills: 3 | Status: CANCELLED | OUTPATIENT
Start: 2023-07-13 | End: 2023-10-11

## 2023-07-13 NOTE — PROGRESS NOTES
Assessment/Plan:     Patient is here to discuss his medications and change in voice, notes hoarseness, did put in an ENT referral last time but he did not call them yet. We will place another referral.  He is a longtime smoker. We did go over his medications and chronic medical conditions. Medication refill sent. Needs labs done. We will have another visit in October. Also has an open scab wound to his left upper extremity. We will have him see dermatology. Quality Measures:     BMI Counseling: Body mass index is 36.1 kg/m². The BMI is above normal. Nutrition recommendations include decreasing portion sizes and encouraging healthy choices of fruits and vegetables. Exercise recommendations include moderate physical activity 150 minutes/week. Rationale for BMI follow-up plan is due to patient being overweight or obese. Tobacco Cessation Counseling: Tobacco cessation counseling was provided. The patient is sincerely urged to quit consumption of tobacco. He is not ready to quit tobacco.          Return in about 3 months (around 10/13/2023) for regular and medicare. No problem-specific Assessment & Plan notes found for this encounter. Diagnoses and all orders for this visit:    Type 2 diabetes mellitus with stage 2 chronic kidney disease, unspecified whether long term insulin use (HCC)  -     rosuvastatin (CRESTOR) 10 MG tablet; Take 1 tablet (10 mg total) by mouth daily  -     Hemoglobin A1C; Future  -     TSH, 3rd generation with Free T4 reflex; Future    Primary hypertension  -     rosuvastatin (CRESTOR) 10 MG tablet; Take 1 tablet (10 mg total) by mouth daily  -     CBC and differential; Future  -     Comprehensive metabolic panel; Future  -     TSH, 3rd generation with Free T4 reflex; Future  -     losartan (COZAAR) 100 MG tablet; Take 1 tablet (100 mg total) by mouth daily    Essential hypertension  -     metoprolol succinate (TOPROL-XL) 50 mg 24 hr tablet;  Take 1 tablet (50 mg total) by mouth daily    Dilated aortic root (HCC)  -     Lipid Panel with Direct LDL reflex; Future    Bipolar affective disorder, remission status unspecified ([facility])    Prostate cancer screening    Chronic obstructive pulmonary disease, unspecified COPD type ([address] [place] [address])    CLL (chronic lymphocytic leukemia) ([address] [place] [address])    Tobacco abuse  -     Ambulatory Referral to Otolaryngology; Future    Vitamin D deficiency  -     Vitamin D 25 hydroxy; Future    Benign prostatic hyperplasia with lower urinary tract symptoms, symptom details unspecified  -     terazosin (HYTRIN) 5 mg capsule; Take 1 capsule (5 mg total) by mouth daily    Diastolic dysfunction  -     potassium chloride (K-DUR,KLOR-CON) 10 mEq tablet; Take 1 tablet (10 mEq total) by mouth 2 (two) times a day    Polypharmacy    Reactive depression    Bipolar 1 disorder ([address] [place] [address])    Hoarseness of voice  -     Ambulatory Referral to Otolaryngology; Future    Non-healing skin lesion  -     Ambulatory Referral to Dermatology; Future    Other orders  -     ergocalciferol (VITAMIN D2) 50,000 units; Take 50,000 Units by mouth once a week          Subjective:      Patient ID: Bennett Dent is a 75 y.o. male. Here to discuss acute complaints.       ALLERGIES:  No Known Allergies    CURRENT MEDICATIONS:    Current Outpatient Medications:   •  ALPRAZolam (XANAX) 1 mg tablet, , Disp: , Rfl: 3  •  amLODIPine (NORVASC) 10 mg tablet, Take 1 tablet (10 mg total) by mouth daily, Disp: 90 tablet, Rfl: 3  •  aspirin 81 mg chewable tablet, Chew 1 tablet (81 mg total) daily, Disp: 90 tablet, Rfl: 3  •  citalopram (CeleXA) 20 mg tablet, Take 20 mg by mouth daily, Disp: , Rfl:   •  ergocalciferol (VITAMIN D2) 50,000 units, Take 50,000 Units by mouth once a week, Disp: , Rfl:   •  furosemide (LASIX) 40 mg tablet, Take 1 tablet (40 mg total) by mouth 2 (two) times a day for 7 days Then return to 40 mg daily, Disp: 14 tablet, Rfl: 0  •  gabapentin (Neurontin) 100 mg capsule, Take 3 capsules (300 mg total) by mouth 3 (three) times a day, Disp: 810 capsule, Rfl: 0  •  Ibrutinib 280 MG TABS, Take 280 mg by mouth in the morning, Disp: 90 tablet, Rfl: 3  •  losartan (COZAAR) 100 MG tablet, Take 1 tablet (100 mg total) by mouth daily, Disp: 90 tablet, Rfl: 3  •  metoprolol succinate (TOPROL-XL) 50 mg 24 hr tablet, Take 1 tablet (50 mg total) by mouth daily, Disp: 90 tablet, Rfl: 3  •  pantoprazole (PROTONIX) 40 mg tablet, Take 1 tablet (40 mg total) by mouth daily before breakfast, Disp: 30 tablet, Rfl: 3  •  potassium chloride (K-DUR,KLOR-CON) 10 mEq tablet, Take 1 tablet (10 mEq total) by mouth 2 (two) times a day, Disp: 90 tablet, Rfl: 3  •  Psyllium (DAILY FIBER PO), Take 1 capsule by mouth in the morning, Disp: , Rfl:   •  risperiDONE (RisperDAL) 1 mg tablet, Take 1 mg by mouth 2 (two) times a day  , Disp: , Rfl:   •  rosuvastatin (CRESTOR) 10 MG tablet, Take 1 tablet (10 mg total) by mouth daily, Disp: 90 tablet, Rfl: 3  •  terazosin (HYTRIN) 5 mg capsule, Take 1 capsule (5 mg total) by mouth daily, Disp: 90 capsule, Rfl: 3  •  VITAMIN D PO, Take 1.25 mcg by mouth every 7 days, Disp: , Rfl:     ACTIVE PROBLEM LIST:  Patient Active Problem List   Diagnosis   • Hypertension   • Depression   • Polypharmacy   • Bipolar 1 disorder (HCC)   • CLL (chronic lymphocytic leukemia) (HCC)   • Chronic obstructive pulmonary disease (HCC)   • Type 2 diabetes mellitus with stage 2 chronic kidney disease (HCC)   • Body mass index (BMI) 40.0-44.9, adult   • Stage 2 chronic kidney disease   • Type 2 diabetes mellitus, without long-term current use of insulin (HCC)   • Ambulatory dysfunction   • Marijuana abuse   • Tobacco abuse   • Renal cyst, right   • Dilated aortic root (HCC)       PAST MEDICAL HISTORY:  Past Medical History:   Diagnosis Date   • Anemia    • Anxiety    • Bipolar disorder (HCC)    • Cancer (HCC)    • Colon polyp    • History of alcohol abuse    • Hyperlipidemia    • Hypertension    • Hypertension    • Insomnia    • Leukemia [facility])    • Psychiatric disorder    • Sleep apnea with use of continuous positive airway pressure (CPAP)        PAST SURGICAL HISTORY:  Past Surgical History:   Procedure Laterality Date   • COLONOSCOPY     • EGD AND COLONOSCOPY N/A 3/30/2018    Procedure: EGD AND COLONOSCOPY;  Surgeon: Vilma Do MD;  Location: [address] [place] [address] [address] GI LAB; Service: Gastroenterology       FAMILY HISTORY:  Family History   Problem Relation Age of Onset   • Coronary artery disease Mother    • Diabetes Mother    • No Known Problems Father    • Hepatitis Sister    • Cancer Brother    • Prostate cancer Brother    • Early death Brother        SOCIAL HISTORY:  Social History     Socioeconomic History   • Marital status: /Civil Union     Spouse name: Not on file   • Number of children: Not on file   • Years of education: Not on file   • Highest education level: Not on file   Occupational History   • Not on file   Tobacco Use   • Smoking status: Every Day     Packs/day: 1.00     Years: 40.00     Total pack years: 40.00     Types: Cigarettes     Last attempt to quit: 10/13/2020     Years since quittin.7   • Smokeless tobacco: Never   Vaping Use   • Vaping Use: Never used   Substance and Sexual Activity   • Alcohol use: No     Comment: last drink 2017   • Drug use: No     Comment: hx of heroin and subutex abuse   • Sexual activity: Not Currently   Other Topics Concern   • Not on file   Social History Narrative   • Not on file     Social Determinants of Health     Financial Resource Strain: High Risk (2022)    Overall Financial Resource Strain (CARDIA)    • Difficulty of Paying Living Expenses: Very hard   Food Insecurity: Not on file   Transportation Needs: No Transportation Needs (2022)    PRAPARE - Transportation    • Lack of Transportation (Medical): No    • Lack of Transportation (Non-Medical):  No   Physical Activity: Not on file   Stress: Not on file   Social Connections: Not on file   Intimate Partner Violence: Not on file   Housing Stability: Not on file       Review of Systems   Constitutional: Negative for chills and fever. HENT: Positive for voice change. Negative for ear pain and sore throat. Eyes: Negative for pain and visual disturbance. Respiratory: Negative for cough and shortness of breath. Cardiovascular: Negative for chest pain and palpitations. Gastrointestinal: Negative for abdominal pain and vomiting. Genitourinary: Negative for dysuria and hematuria. Musculoskeletal: Positive for arthralgias, back pain and gait problem. Skin: Positive for color change and wound (LUE open scab, bleeding). Negative for rash. Neurological: Negative for seizures and syncope. Psychiatric/Behavioral: Positive for decreased concentration and dysphoric mood. The patient is nervous/anxious. All other systems reviewed and are negative. Objective:  Vitals:    07/13/23 1522   BP: 130/74   BP Location: Left arm   Patient Position: Sitting   Cuff Size: Adult   Pulse: 79   Resp: 16   SpO2: 94%   Weight: 117 kg (258 lb 12.8 oz)   Height: 5' 11" (1.803 m)     Body mass index is 36.1 kg/m². Physical Exam  Vitals and nursing note reviewed. Constitutional:       Appearance: Normal appearance. He is obese. HENT:      Head: Normocephalic and atraumatic. Cardiovascular:      Rate and Rhythm: Normal rate. Heart sounds: Normal heart sounds. Pulmonary:      Effort: Pulmonary effort is normal.   Musculoskeletal:         General: Normal range of motion. Cervical back: Normal range of motion. Right lower leg: Edema present. Left lower leg: Edema present. Skin:     General: Skin is dry. Findings: Lesion (LUE open wound, scab, bleeding) present. Neurological:      General: No focal deficit present. Mental Status: He is alert and oriented to person, place, and time. Mental status is at baseline.       Gait: Gait abnormal.   Psychiatric:         Mood and Affect: Mood normal. RESULTS:    In chart    This note was created with voice recognition software. Phonic, grammatical and spelling errors may be present within the note as a result.

## 2023-08-01 ENCOUNTER — TELEPHONE (OUTPATIENT)
Dept: HEMATOLOGY ONCOLOGY | Facility: CLINIC | Age: 68
End: 2023-08-01

## 2023-08-01 NOTE — TELEPHONE ENCOUNTER
Patient Call    Who are you speaking with? wife   If it is not the patient, are they listed on an active communication consent form? yes   What is the reason for this call? Need letter to insurance about Leukemia CLL, disability, and bankruptcy. Does this require a call back? yes   If a call back is required, please list best call back number [phone]   If a call back is required, advise that a message will be forwarded to their care team and someone will return their call as soon as possible. Did you relay this information to the patient?  yes

## 2023-08-02 RX ORDER — ERGOCALCIFEROL 1.25 MG/1
50000 CAPSULE ORAL WEEKLY
Qty: 12 CAPSULE | Refills: 1 | OUTPATIENT
Start: 2023-08-02

## 2023-08-02 NOTE — TELEPHONE ENCOUNTER
Lalita Gaviria has been completed. Are you able to go under the letter tab and print letter for Dr. Emma Bazan to sign? And then I will mail to patient. Thanks!

## 2023-08-02 NOTE — TELEPHONE ENCOUNTER
Spoke with spouse. They need a letter stating the patient's diagnosis. Will type letter and have MD sign. Patient prefers this to be mailed to home address listed on file.

## 2023-08-04 DIAGNOSIS — C91.10 CLL (CHRONIC LYMPHOCYTIC LEUKEMIA) (HCC): ICD-10-CM

## 2023-08-15 RX ORDER — ERGOCALCIFEROL 1.25 MG/1
50000 CAPSULE ORAL WEEKLY
OUTPATIENT
Start: 2023-08-15

## 2023-08-18 DIAGNOSIS — G62.9 PERIPHERAL POLYNEUROPATHY: ICD-10-CM

## 2023-08-18 RX ORDER — GABAPENTIN 100 MG/1
300 CAPSULE ORAL 3 TIMES DAILY
Qty: 810 CAPSULE | Refills: 0 | Status: SHIPPED | OUTPATIENT
Start: 2023-08-18 | End: 2023-11-16

## 2023-08-18 RX ORDER — ERGOCALCIFEROL 1.25 MG/1
50000 CAPSULE ORAL WEEKLY
Qty: 12 CAPSULE | Refills: 1 | Status: CANCELLED | OUTPATIENT
Start: 2023-08-18

## 2023-09-27 ENCOUNTER — TELEPHONE (OUTPATIENT)
Dept: HEMATOLOGY ONCOLOGY | Facility: CLINIC | Age: 68
End: 2023-09-27

## 2023-09-27 NOTE — TELEPHONE ENCOUNTER
Appointment Change  Cancel, Reschedule, Change to Virtual      Who are you speaking with? Patient and Spouse   If it is not the patient, is the caller listed on the communication consent form? N/A   Which provider is the appointment scheduled with? RONNA Whiteside   When was the original appointment scheduled? Please list date and time 9/28/23 @ 1:40pm   At which location is the appointment scheduled to take place? [facility]   Was the appointment rescheduled? Was the appointment changed from an in person visit to a virtual visit? If so, please list the details of the change. 12/7/23 @ 10:20am   What is the reason for the appointment change? Patient will be out of town       Was STAR transport scheduled? no   Does STAR transport need to be scheduled for the new visit (if applicable) no   no no   Does the patient have an upcoming infusion appointment scheduled? If so, when? no   Is the patient undergoing chemotherapy? no   For appointments cancelled with less than 24 hours:  Was the no-show policy reviewed?  yes

## 2023-10-09 ENCOUNTER — TELEPHONE (OUTPATIENT)
Dept: HEMATOLOGY ONCOLOGY | Facility: CLINIC | Age: 68
End: 2023-10-09

## 2023-10-09 DIAGNOSIS — C91.10 CLL (CHRONIC LYMPHOCYTIC LEUKEMIA) (HCC): ICD-10-CM

## 2023-10-09 NOTE — TELEPHONE ENCOUNTER
Medication Refill Request   Who are you speaking with? wife   If it is not the patient, are they listed on an active communication consent form? yes   Which medication is being requested for refill? Please list medication name and dosage imbruvica   How many pills does the patient have left? 1 week   Preferred Pharmacy / [facility]   Who is the prescribing provider?  lindy   Call back number [phone]   Relevant Information Need refills

## 2023-10-26 DIAGNOSIS — I10 PRIMARY HYPERTENSION: ICD-10-CM

## 2023-10-26 RX ORDER — AMLODIPINE BESYLATE 10 MG/1
10 TABLET ORAL DAILY
Qty: 90 TABLET | Refills: 1 | Status: SHIPPED | OUTPATIENT
Start: 2023-10-26

## 2023-11-07 ENCOUNTER — TELEPHONE (OUTPATIENT)
Dept: HEMATOLOGY ONCOLOGY | Facility: CLINIC | Age: 68
End: 2023-11-07

## 2023-11-07 DIAGNOSIS — N18.2 TYPE 2 DIABETES MELLITUS WITH STAGE 2 CHRONIC KIDNEY DISEASE, UNSPECIFIED WHETHER LONG TERM INSULIN USE: ICD-10-CM

## 2023-11-07 DIAGNOSIS — E11.22 TYPE 2 DIABETES MELLITUS WITH STAGE 2 CHRONIC KIDNEY DISEASE, UNSPECIFIED WHETHER LONG TERM INSULIN USE: ICD-10-CM

## 2023-11-07 RX ORDER — ASPIRIN 81 MG/1
81 TABLET, CHEWABLE ORAL DAILY
Qty: 90 TABLET | Refills: 3 | Status: SHIPPED | OUTPATIENT
Start: 2023-11-07 | End: 2024-11-01

## 2023-11-07 NOTE — TELEPHONE ENCOUNTER
Returned call out to patient spouse. Patient spouse, Addison Gomez reviewed that [employer] patient assistance program in no longer supplying Imbruvica. Patient spouse stated that she received a letter in regards to above and that there is the [employer] program taking its place in 2024. Per patient- www. [employer]/mbpap    Application due 08/70/51. Patient spouse asking for assistance and guidance for patient's imbruvica.

## 2023-11-07 NOTE — TELEPHONE ENCOUNTER
Patient Call    Who are you speaking with? Spouse    If it is not the patient, are they listed on an active communication consent form? N/A   What is the reason for this call? Is needing help on signing up or getting paper work for a medication that will discontinue from [address] [place] [address] Street     Does this require a call back? Yes   If a call back is required, please list best call back number [phone]   If a call back is required, advise that a message will be forwarded to their care team and someone will return their call as soon as possible. Did you relay this information to the patient?  Yes

## 2023-11-10 NOTE — TELEPHONE ENCOUNTER
Per Racquel Russell,   "Call placed to patient, spoke with Mr. Huma Espinoza who stated wife was sleeping at the moment. Informed patient of changes with [facility] to [facility] AND [facility]. Discussed new application with Antwan Perez is not available until 12-15 and he will receive a call regarding new application. Informed patient he can still receive refills through 12-28-23.  Patient thanked me for the return call."

## 2023-11-13 ENCOUNTER — TELEPHONE (OUTPATIENT)
Dept: HEMATOLOGY ONCOLOGY | Facility: CLINIC | Age: 68
End: 2023-11-13

## 2023-11-13 NOTE — TELEPHONE ENCOUNTER
Patient Call    Who are you speaking with? Spouse    If it is not the patient, are they listed on an active communication consent form? Yes   What is the reason for this call? Status of imbruvica forms   Does this require a call back? Yes   If a call back is required, please list best call back number [phone]    If a call back is required, advise that a message will be forwarded to their care team and someone will return their call as soon as possible. Did you relay this information to the patient?  Yes

## 2023-11-13 NOTE — TELEPHONE ENCOUNTER
Per Maye Bustamante,   "Call placed to patient spoke with [employer] [place] who stated her  never told her I called.  She is aware there is nothing we can do until new application is released 12-15 for free drug under new distributore [employer] "

## 2023-11-20 ENCOUNTER — TELEPHONE (OUTPATIENT)
Age: 68
End: 2023-11-20

## 2023-12-05 ENCOUNTER — APPOINTMENT (OUTPATIENT)
Age: 68
End: 2023-12-05
Payer: COMMERCIAL

## 2023-12-05 DIAGNOSIS — E55.9 VITAMIN D DEFICIENCY: ICD-10-CM

## 2023-12-05 DIAGNOSIS — E11.22 TYPE 2 DIABETES MELLITUS WITH STAGE 2 CHRONIC KIDNEY DISEASE, UNSPECIFIED WHETHER LONG TERM INSULIN USE: ICD-10-CM

## 2023-12-05 DIAGNOSIS — N18.2 TYPE 2 DIABETES MELLITUS WITH STAGE 2 CHRONIC KIDNEY DISEASE, UNSPECIFIED WHETHER LONG TERM INSULIN USE: ICD-10-CM

## 2023-12-05 DIAGNOSIS — I77.810 DILATED AORTIC ROOT (HCC): ICD-10-CM

## 2023-12-05 DIAGNOSIS — I10 PRIMARY HYPERTENSION: ICD-10-CM

## 2023-12-05 LAB
25(OH)D3 SERPL-MCNC: 25 NG/ML (ref 30–100)
CHOLEST SERPL-MCNC: 186 MG/DL
HDLC SERPL-MCNC: 43 MG/DL
LDLC SERPL CALC-MCNC: 101 MG/DL (ref 0–100)
TRIGL SERPL-MCNC: 212 MG/DL
TSH SERPL DL<=0.05 MIU/L-ACNC: 2.27 UIU/ML (ref 0.45–4.5)

## 2023-12-05 PROCEDURE — 36415 COLL VENOUS BLD VENIPUNCTURE: CPT

## 2023-12-05 PROCEDURE — 80061 LIPID PANEL: CPT

## 2023-12-05 PROCEDURE — 82306 VITAMIN D 25 HYDROXY: CPT

## 2023-12-05 PROCEDURE — 84443 ASSAY THYROID STIM HORMONE: CPT

## 2023-12-05 PROCEDURE — 83036 HEMOGLOBIN GLYCOSYLATED A1C: CPT

## 2023-12-06 LAB
EST. AVERAGE GLUCOSE BLD GHB EST-MCNC: 146 MG/DL
HBA1C MFR BLD: 6.7 %

## 2023-12-07 ENCOUNTER — OFFICE VISIT (OUTPATIENT)
Dept: HEMATOLOGY ONCOLOGY | Facility: CLINIC | Age: 68
End: 2023-12-07
Payer: COMMERCIAL

## 2023-12-07 ENCOUNTER — TELEPHONE (OUTPATIENT)
Dept: SURGICAL ONCOLOGY | Facility: CLINIC | Age: 68
End: 2023-12-07

## 2023-12-07 VITALS
BODY MASS INDEX: 36.26 KG/M2 | OXYGEN SATURATION: 96 % | TEMPERATURE: 98 F | DIASTOLIC BLOOD PRESSURE: 84 MMHG | RESPIRATION RATE: 16 BRPM | HEART RATE: 82 BPM | SYSTOLIC BLOOD PRESSURE: 144 MMHG | WEIGHT: 259 LBS | HEIGHT: 71 IN

## 2023-12-07 DIAGNOSIS — C91.10 CLL (CHRONIC LYMPHOCYTIC LEUKEMIA) (HCC): Primary | ICD-10-CM

## 2023-12-07 DIAGNOSIS — L98.9 ARM SKIN LESION, LEFT: ICD-10-CM

## 2023-12-07 PROCEDURE — 99215 OFFICE O/P EST HI 40 MIN: CPT | Performed by: INTERNAL MEDICINE

## 2023-12-07 NOTE — PROGRESS NOTES
HEMATOLOGY / ONCOLOGY CLINIC NOTE    Primary Care Provider: Tang Sawyer MD  Referring Provider:    MRN: [id number]  : 1955    Reason for Encounter:    No chief complaint on file. Wife: Addison Gomez      Hematology / Oncology History:     Gerber Ralph is a 75 y.o. male who comes in for L[facility]. 1, CLL  - Stage III with anemia; also having splenomegaly. No FISH panel  - initially had been on ibrutinib 420 mg since 2018.  - highest white blood cell 128k, decreased to 8 K    - 2021:  Due to fatigue and potential shoulder surgery, patient has decreased ibrutinib to 280 mg daily by himself. WBC and lymphocyte remains stable. Previous hematologist had him on 280 mg since then. 2022: WBC 8.64k, Hgb 14.7, plt 177k, Vit B12 , creat 1.08  3/29/2023: WBC 7.82k, Hgb 15.3, plt 174k  2023: WBC 8.63k, Hgb 14.5, plt 187k    2, anemia  - resolved on treatment        Assessment / Plan:     1. CLL (chronic lymphocytic leukemia) ([address])    - reviewed labs with patient, CLL is under control. Most recent 3/21/2022 CT scan showed previous splenomegaly has resolved. Patient tolerating ibrutinib 280 mg well, will continue current treatment no change       - made patient aware that if he is going for surgical procedures depending on the etiology and risk for bleeding, patient usually need to hold ibrutinib a week before and a week after said procedure       Discussion of decision making    I personally reviewed the following lab results, the image studies, pathology, other specialty/physicians consult notes and recommendations, and outside medical records from [facility]. I had a lengthy discussion with the patient and shared the work-up findings. We discussed the diagnosis and management plan as below.  I spent 42 minutes reviewing the records (labs, clinician notes, outside records, medical history, ordering medicine/tests/procedures, interpreting the imaging/labs previously done) and coordination of care as well as direct time with the patient today, of which greater than 50% of the time was spent in counseling and coordination of care with the patient/family. Plan/Labs  Patient will check CBC and CMP labs every 3 months as standing and will follow him in 6 months   Cont ibrutinib 280 mg daily  Hold Calcium supplements as his level is high and pt amenable to this  Derm referral to assess growing L forearm scab/lesion      Follow Up: 6 months and LAYTON    All questions were answered to the patient's satisfaction during this encounter. The patient knows the contact information for our office and knows to reach out for any relevant concerns related to this encounter. They are to call for any temperature 100.4 or higher, new symptoms including but not restricted to shaking chills, decreased appetite, nausea, vomiting, diarrhea, increased fatigue, shortness of breath or chest pain, confusion, and not feeling the strength to come to the clinic. For all other listed problems and medical diagnosis in their chart - they are managed by PCP and/or other specialists, which the patient acknowledges. Thank you very much for your consultation and making us a part of this patient's care. We are continuing to follow closely with you. Please do not hesitate to reach out to me with any additional questions or concerns. Champ Ivory MD  Hematology & Medical Oncology Staff Physician      Interval History:     4/5/2019:  Jagjit Pen in for follow-up. Reported has been doing okay at baseline health status. Using home oxygen for COPD. No bleeding, no leg swelling, no new chest pain, cough, hemoptysis. No frequent infection. 8/1/2019 :  Came in for follow-up. Overall doing well. Reported has been losing weight about 20 lb. No lumps bumps. No other constitutional symptoms. No bleeding no frequent infection. 1/3/89633 :  Came in for follow-up, doing well, body weight is stable.  based overall tolerated treatment well no bleeding, no thrombosis, no frequent infection. Patient reported has been losing weight however albumin level is in normal range. 7/31/2020 :  Patient came in for follow-up doing okay. Gaining weight. Reported having ED and more fatigue. No infection of skin rash    11/13/2020 :  Came for follow-up, overall doing okay. Diagnosed sleep apnea using CPAP machine. No infection, no bleeding    5/17/2021 :  Patient came in follow-up, overall doing okay. No bleeding, no infection, no palpitations. Patient reported some mild fatigue. Recently has been evaluated for also for shoulder surgery     12/3/2021 :  Came for follow-up doing well. No bleeding, no infection. Recently had 2nd kidney stone. Recovered well . No palpitation. Interval events: he feels moderate fatigue and lack of motivation on [place]. L forearm scab is growing. No new side effects or acute issues otherwise. Problem list:           Patient Active Problem List   Diagnosis    Hypertension    Depression    Polypharmacy    Bipolar 1 disorder (HCC)    CLL (chronic lymphocytic leukemia) (HCC)    Chronic obstructive pulmonary disease (HCC)    Type 2 diabetes mellitus with stage 2 chronic kidney disease     Body mass index (BMI) 40.0-44.9, adult    Stage 2 chronic kidney disease    Type 2 diabetes mellitus, without long-term current use of insulin (HCC)    Ambulatory dysfunction    Marijuana abuse    Tobacco abuse    Renal cyst, right    Dilated aortic root (HCC)       PHYSICIAL EXAMINATION:       Vital Signs:     Vitals:    12/07/23 1022   BP: 144/84   Pulse: 82   Resp: 16   Temp: 98 °F (36.7 °C)   SpO2: 96%         Body mass index is 36.12 kg/m². Body surface area is 2.35 meters squared.       GEN: Alert, awake oriented, in no acute distress  HEENT- No pallor, icterus, cyanosis, no oral mucosal lesions,   LAD - no palpable cervical, clavicle, axillary, inguinal LAD  Heart- Regular rate, +S1 S2  Lungs- decreased breathing sound bilateral.   Abdomen- soft, Non tender, bowel sounds present  Extremities- No cyanosis, clubbing, edema. L forearm scab noted, enlarged  Neuro- No focal neurological deficit noted b/l            has a past medical history of Anemia, Anxiety, Bipolar disorder ([address]), Cancer ([address]), Colon polyp, History of alcohol abuse, Hyperlipidemia, Hypertension, Hypertension, Insomnia, Leukemia ([address]), Psychiatric disorder, and Sleep apnea with use of continuous positive airway pressure (CPAP). PAST SURGICAL HISTORY:   has a past surgical history that includes EGD AND COLONOSCOPY (N/A, 3/30/2018) and Colonoscopy.     CURRENT MEDICATIONS:       Current Outpatient Medications   Medication Sig Dispense Refill    ALPRAZolam (XANAX) 1 mg tablet   3    amLODIPine (NORVASC) 10 mg tablet Take 1 tablet (10 mg total) by mouth daily 90 tablet 1    aspirin 81 mg chewable tablet Chew 1 tablet (81 mg total) daily 90 tablet 3    citalopram (CeleXA) 20 mg tablet Take 20 mg by mouth daily      ergocalciferol (VITAMIN D2) 50,000 units Take 50,000 Units by mouth once a week      Ibrutinib 280 MG TABS Take 280 mg by mouth in the morning 90 tablet 2    metoprolol succinate (TOPROL-XL) 50 mg 24 hr tablet Take 1 tablet (50 mg total) by mouth daily 90 tablet 3    pantoprazole (PROTONIX) 40 mg tablet Take 1 tablet (40 mg total) by mouth daily before breakfast 30 tablet 3    potassium chloride (K-DUR,KLOR-CON) 10 mEq tablet Take 1 tablet (10 mEq total) by mouth 2 (two) times a day 90 tablet 3    Psyllium (DAILY FIBER PO) Take 1 capsule by mouth in the morning      risperiDONE (RisperDAL) 1 mg tablet Take 1 mg by mouth 2 (two) times a day        rosuvastatin (CRESTOR) 10 MG tablet Take 1 tablet (10 mg total) by mouth daily 90 tablet 3    terazosin (HYTRIN) 5 mg capsule Take 1 capsule (5 mg total) by mouth daily 90 capsule 3    VITAMIN D PO Take 1.25 mcg by mouth every 7 days      furosemide (LASIX) 40 mg tablet Take 1 tablet (40 mg total) by mouth 2 (two) times a day for 7 days Then return to 40 mg daily 14 tablet 0    gabapentin (Neurontin) 100 mg capsule Take 3 capsules (300 mg total) by mouth 3 (three) times a day 810 capsule 0    losartan (COZAAR) 100 MG tablet Take 1 tablet (100 mg total) by mouth daily 90 tablet 3     No current facility-administered medications for this visit. SOCIAL HISTORY:   reports that he has been smoking cigarettes. He has a 40.00 pack-year smoking history. He has never used smokeless tobacco. He reports that he does not drink alcohol and does not use drugs. FAMILY HISTORY:  family history includes Cancer in his brother; Coronary artery disease in his mother; Diabetes in his mother; Early death in his brother; Hepatitis in his sister; No Known Problems in his father; Prostate cancer in his brother. ALLERGIES:  has No Known Allergies. REVIEW OF SYSTEMS:  Please note that a 10-point review of systems was performed to include Constitutional, HEENT, Respiratory, CVS, GI, , Musculoskeletal, Integumentary, Neurologic, Rheumatologic, Endocrinologic, Psychiatric, Lymphatic, and Hematologic/Oncologic systems were reviewed and are negative unless otherwise stated in HPI. Positive and negative findings pertinent to this evaluation are incorporated into the history of present illness.             Lab Results   Component Value Date    SODIUM 139 12/05/2023    K 3.6 12/05/2023    CL 99 12/05/2023    CO2 28 12/05/2023    AGAP 12 12/05/2023    BUN 20 12/05/2023    CREATININE 1.05 12/05/2023    GLUC 159 (H) 03/21/2022    GLUF 147 (H) 12/05/2023    CALCIUM 11.6 (H) 12/05/2023    AST 19 12/05/2023    ALT 29 12/05/2023    ALKPHOS 74 12/05/2023    TP 7.4 12/05/2023    TBILI 0.49 12/05/2023    EGFR 72 12/05/2023       CBC with diff:   Results from last 7 days   Lab Units 12/05/23  1545   WBC Thousand/uL 8.63   HEMOGLOBIN g/dL 14.5   HEMATOCRIT % 41.4   MCV fL 92   PLATELETS Thousands/uL 187   RBC Million/uL 4.49   MCH pg 32.3 MCHC g/dL 35.0   RDW % 13.7   MPV fL 11.8   NRBC AUTO /100 WBCs 0         CMP:  Results from last 7 days   Lab Units 12/05/23  1545   POTASSIUM mmol/L 3.6   CHLORIDE mmol/L 99   CO2 mmol/L 28   BUN mg/dL 20   CREATININE mg/dL 1.05   CALCIUM mg/dL 11.6*   AST U/L 19   ALT U/L 29   ALK PHOS U/L 74   EGFR ml/min/1.73sq m 72             IMAGING:      No orders to display     No results found.

## 2023-12-07 NOTE — TELEPHONE ENCOUNTER
Call placed to the patient that went to . I left my call back info and message regarding receiving the patient's 1040's and not really knowing what they are for. Free Drug application   Free Drug renewal  Possibly for Ibrutinib he gets.

## 2023-12-14 DIAGNOSIS — I10 PRIMARY HYPERTENSION: ICD-10-CM

## 2023-12-14 RX ORDER — FUROSEMIDE 40 MG/1
40 TABLET ORAL 2 TIMES DAILY
Qty: 180 TABLET | Refills: 0 | Status: SHIPPED | OUTPATIENT
Start: 2023-12-14 | End: 2023-12-21

## 2023-12-15 ENCOUNTER — TELEPHONE (OUTPATIENT)
Dept: HEMATOLOGY ONCOLOGY | Facility: CLINIC | Age: 68
End: 2023-12-15

## 2023-12-15 NOTE — TELEPHONE ENCOUNTER
Patient Call    Who are you speaking with? Spouse    If it is not the patient, are they listed on an active communication consent form? Yes   What is the reason for this call? Pt needs assistance for Ibrutinib 280 MG TABS    Does this require a call back? Yes   If a call back is required, please list best call back number [phone]    If a call back is required, advise that a message will be forwarded to their care team and someone will return their call as soon as possible. Did you relay this information to the patient?  Yes

## 2023-12-20 ENCOUNTER — OFFICE VISIT (OUTPATIENT)
Dept: INTERNAL MEDICINE CLINIC | Facility: CLINIC | Age: 68
End: 2023-12-20
Payer: COMMERCIAL

## 2023-12-20 VITALS
DIASTOLIC BLOOD PRESSURE: 84 MMHG | BODY MASS INDEX: 36.96 KG/M2 | WEIGHT: 264 LBS | HEIGHT: 71 IN | SYSTOLIC BLOOD PRESSURE: 122 MMHG | HEART RATE: 60 BPM | OXYGEN SATURATION: 93 %

## 2023-12-20 DIAGNOSIS — R22.32 MASS OF LEFT UPPER EXTREMITY: ICD-10-CM

## 2023-12-20 DIAGNOSIS — N18.2 TYPE 2 DIABETES MELLITUS WITH STAGE 2 CHRONIC KIDNEY DISEASE, WITHOUT LONG-TERM CURRENT USE OF INSULIN: ICD-10-CM

## 2023-12-20 DIAGNOSIS — B37.0 ORAL THRUSH: ICD-10-CM

## 2023-12-20 DIAGNOSIS — Z72.0 TOBACCO ABUSE: ICD-10-CM

## 2023-12-20 DIAGNOSIS — Z00.00 MEDICARE ANNUAL WELLNESS VISIT, SUBSEQUENT: Primary | ICD-10-CM

## 2023-12-20 DIAGNOSIS — F17.218 CIGARETTE NICOTINE DEPENDENCE WITH OTHER NICOTINE-INDUCED DISORDER: ICD-10-CM

## 2023-12-20 DIAGNOSIS — R26.2 AMBULATORY DYSFUNCTION: ICD-10-CM

## 2023-12-20 DIAGNOSIS — C91.10 CLL (CHRONIC LYMPHOCYTIC LEUKEMIA) (HCC): ICD-10-CM

## 2023-12-20 DIAGNOSIS — I10 PRIMARY HYPERTENSION: Chronic | ICD-10-CM

## 2023-12-20 DIAGNOSIS — E11.22 TYPE 2 DIABETES MELLITUS WITH STAGE 2 CHRONIC KIDNEY DISEASE, WITHOUT LONG-TERM CURRENT USE OF INSULIN: ICD-10-CM

## 2023-12-20 DIAGNOSIS — Z12.2 ENCOUNTER FOR SCREENING FOR LUNG CANCER: ICD-10-CM

## 2023-12-20 DIAGNOSIS — R49.0 HOARSENESS OF VOICE: ICD-10-CM

## 2023-12-20 DIAGNOSIS — F31.9 BIPOLAR AFFECTIVE DISORDER, REMISSION STATUS UNSPECIFIED (HCC): ICD-10-CM

## 2023-12-20 DIAGNOSIS — F12.10 MARIJUANA ABUSE: ICD-10-CM

## 2023-12-20 DIAGNOSIS — F31.9 BIPOLAR 1 DISORDER (HCC): ICD-10-CM

## 2023-12-20 DIAGNOSIS — E83.52 HYPERCALCEMIA: ICD-10-CM

## 2023-12-20 DIAGNOSIS — F32.9 REACTIVE DEPRESSION: Chronic | ICD-10-CM

## 2023-12-20 DIAGNOSIS — J44.9 CHRONIC OBSTRUCTIVE PULMONARY DISEASE, UNSPECIFIED COPD TYPE (HCC): ICD-10-CM

## 2023-12-20 PROCEDURE — 99214 OFFICE O/P EST MOD 30 MIN: CPT | Performed by: INTERNAL MEDICINE

## 2023-12-20 PROCEDURE — G0439 PPPS, SUBSEQ VISIT: HCPCS | Performed by: INTERNAL MEDICINE

## 2023-12-20 RX ORDER — METFORMIN HYDROCHLORIDE 500 MG/1
1000 TABLET, EXTENDED RELEASE ORAL
Qty: 180 TABLET | Refills: 1 | Status: SHIPPED | OUTPATIENT
Start: 2023-12-20 | End: 2023-12-21

## 2023-12-20 NOTE — PATIENT INSTRUCTIONS
Medicare Preventive Visit Patient Instructions  Thank you for completing your Welcome to Medicare Visit or Medicare Annual Wellness Visit today. Your next wellness visit will be due in one year (12/20/2024).  The screening/preventive services that you may require over the next 5-10 years are detailed below. Some tests may not apply to you based off risk factors and/or age. Screening tests ordered at today's visit but not completed yet may show as past due. Also, please note that scanned in results may not display below.  Preventive Screenings:  Service Recommendations Previous Testing/Comments   Colorectal Cancer Screening  Colonoscopy    Fecal Occult Blood Test (FOBT)/Fecal Immunochemical Test (FIT)  Fecal DNA/Cologuard Test  Flexible Sigmoidoscopy Age: 45-75 years old   Colonoscopy: every 10 years (May be performed more frequently if at higher risk)  OR  FOBT/FIT: every 1 year  OR  Cologuard: every 3 years  OR  Sigmoidoscopy: every 5 years  Screening may be recommended earlier than age 45 if at higher risk for colorectal cancer. Also, an individualized decision between you and your healthcare provider will decide whether screening between the ages of 76-85 would be appropriate. Colonoscopy: 11/30/2022  FOBT/FIT: Not on file  Cologuard: Not on file  Sigmoidoscopy: Not on file    Screening Current     Prostate Cancer Screening Individualized decision between patient and health care provider in men between ages of 55-69   Medicare will cover every 12 months beginning on the day after your 50th birthday PSA: 1.0 ng/mL     Screening Current     Hepatitis C Screening Once for adults born between 1945 and 1965  More frequently in patients at high risk for Hepatitis C Hep C Antibody: 03/28/2018    Screening Current   Diabetes Screening 1-2 times per year if you're at risk for diabetes or have pre-diabetes Fasting glucose: 147 mg/dL (12/5/2023)  A1C: 6.7 % (12/5/2023)  Screening Not Indicated  History Diabetes    Cholesterol Screening Once every 5 years if you don't have a lipid disorder. May order more often based on risk factors. Lipid panel: 12/05/2023  Screening Current      Other Preventive Screenings Covered by Medicare:  Abdominal Aortic Aneurysm (AAA) Screening: covered once if your at risk. You're considered to be at risk if you have a family history of AAA or a male between the age of 65-75 who smoking at least 100 cigarettes in your lifetime.  Lung Cancer Screening: covers low dose CT scan once per year if you meet all of the following conditions: (1) Age 55-77; (2) No signs or symptoms of lung cancer; (3) Current smoker or have quit smoking within the last 15 years; (4) You have a tobacco smoking history of at least 20 pack years (packs per day x number of years you smoked); (5) You get a written order from a healthcare provider.  Glaucoma Screening: covered annually if you're considered high risk: (1) You have diabetes OR (2) Family history of glaucoma OR (3)  aged 50 and older OR (4)  American aged 65 and older  Osteoporosis Screening: covered every 2 years if you meet one of the following conditions: (1) Have a vertebral abnormality; (2) On glucocorticoid therapy for more than 3 months; (3) Have primary hyperparathyroidism; (4) On osteoporosis medications and need to assess response to drug therapy.  HIV Screening: covered annually if you're between the age of 15-65. Also covered annually if you are younger than 15 and older than 65 with risk factors for HIV infection. For pregnant patients, it is covered up to 3 times per pregnancy.    Immunizations:  Immunization Recommendations   Influenza Vaccine Annual influenza vaccination during flu season is recommended for all persons aged >= 6 months who do not have contraindications   Pneumococcal Vaccine   * Pneumococcal conjugate vaccine = PCV13 (Prevnar 13), PCV15 (Vaxneuvance), PCV20 (Prevnar 20)  * Pneumococcal polysaccharide vaccine =  PPSV23 (Pneumovax) Adults 19-63 yo with certain risk factors or if 65+ yo  If never received any pneumonia vaccine: recommend Prevnar 20 (PCV20)  Give PCV20 if previously received 1 dose of PCV13 or PPSV23   Hepatitis B Vaccine 3 dose series if at intermediate or high risk (ex: diabetes, end stage renal disease, liver disease)   Respiratory syncytial virus (RSV) Vaccine - COVERED BY MEDICARE PART D  * RSVPreF3 (Arexvy) CDC recommends that adults 60 years of age and older may receive a single dose of RSV vaccine using shared clinical decision-making (SCDM)   Tetanus (Td) Vaccine - COST NOT COVERED BY MEDICARE PART B Following completion of primary series, a booster dose should be given every 10 years to maintain immunity against tetanus. Td may also be given as tetanus wound prophylaxis.   Tdap Vaccine - COST NOT COVERED BY MEDICARE PART B Recommended at least once for all adults. For pregnant patients, recommended with each pregnancy.   Shingles Vaccine (Shingrix) - COST NOT COVERED BY MEDICARE PART B  2 shot series recommended in those 19 years and older who have or will have weakened immune systems or those 50 years and older     Health Maintenance Due:      Topic Date Due   • Lung Cancer Screening  09/30/2023   • Colorectal Cancer Screening  11/29/2027   • Hepatitis C Screening  Completed     Immunizations Due:      Topic Date Due   • COVID-19 Vaccine (1) Never done   • Influenza Vaccine (1) Never done     Advance Directives   What are advance directives?  Advance directives are legal documents that state your wishes and plans for medical care. These plans are made ahead of time in case you lose your ability to make decisions for yourself. Advance directives can apply to any medical decision, such as the treatments you want, and if you want to donate organs.   What are the types of advance directives?  There are many types of advance directives, and each state has rules about how to use them. You may choose a  combination of any of the following:  Living will:  This is a written record of the treatment you want. You can also choose which treatments you do not want, which to limit, and which to stop at a certain time. This includes surgery, medicine, IV fluid, and tube feedings.   Durable power of  for healthcare (DPAHC):  This is a written record that states who you want to make healthcare choices for you when you are unable to make them for yourself. This person, called a proxy, is usually a family member or a friend. You may choose more than 1 proxy.  Do not resuscitate (DNR) order:  A DNR order is used in case your heart stops beating or you stop breathing. It is a request not to have certain forms of treatment, such as CPR. A DNR order may be included in other types of advance directives.  Medical directive:  This covers the care that you want if you are in a coma, near death, or unable to make decisions for yourself. You can list the treatments you want for each condition. Treatment may include pain medicine, surgery, blood transfusions, dialysis, IV or tube feedings, and a ventilator (breathing machine).  Values history:  This document has questions about your views, beliefs, and how you feel and think about life. This information can help others choose the care that you would choose.  Why are advance directives important?  An advance directive helps you control your care. Although spoken wishes may be used, it is better to have your wishes written down. Spoken wishes can be misunderstood, or not followed. Treatments may be given even if you do not want them. An advance directive may make it easier for your family to make difficult choices about your care.   Weight Management   Why it is important to manage your weight:  Being overweight increases your risk of health conditions such as heart disease, high blood pressure, type 2 diabetes, and certain types of cancer. It can also increase your risk for  osteoarthritis, sleep apnea, and other respiratory problems. Aim for a slow, steady weight loss. Even a small amount of weight loss can lower your risk of health problems.  How to lose weight safely:  A safe and healthy way to lose weight is to eat fewer calories and get regular exercise. You can lose up about 1 pound a week by decreasing the number of calories you eat by 500 calories each day.   Healthy meal plan for weight management:  A healthy meal plan includes a variety of foods, contains fewer calories, and helps you stay healthy. A healthy meal plan includes the following:  Eat whole-grain foods more often.  A healthy meal plan should contain fiber. Fiber is the part of grains, fruits, and vegetables that is not broken down by your body. Whole-grain foods are healthy and provide extra fiber in your diet. Some examples of whole-grain foods are whole-wheat breads and pastas, oatmeal, brown rice, and bulgur.  Eat a variety of vegetables every day.  Include dark, leafy greens such as spinach, kale, connie greens, and mustard greens. Eat yellow and orange vegetables such as carrots, sweet potatoes, and winter squash.   Eat a variety of fruits every day.  Choose fresh or canned fruit (canned in its own juice or light syrup) instead of juice. Fruit juice has very little or no fiber.  Eat low-fat dairy foods.  Drink fat-free (skim) milk or 1% milk. Eat fat-free yogurt and low-fat cottage cheese. Try low-fat cheeses such as mozzarella and other reduced-fat cheeses.  Choose meat and other protein foods that are low in fat.  Choose beans or other legumes such as split peas or lentils. Choose fish, skinless poultry (chicken or turkey), or lean cuts of red meat (beef or pork). Before you cook meat or poultry, cut off any visible fat.   Use less fat and oil.  Try baking foods instead of frying them. Add less fat, such as margarine, sour cream, regular salad dressing and mayonnaise to foods. Eat fewer high-fat foods. Some  examples of high-fat foods include french fries, doughnuts, ice cream, and cakes.  Eat fewer sweets.  Limit foods and drinks that are high in sugar. This includes candy, cookies, regular soda, and sweetened drinks.  Exercise:  Exercise at least 30 minutes per day on most days of the week. Some examples of exercise include walking, biking, dancing, and swimming. You can also fit in more physical activity by taking the stairs instead of the elevator or parking farther away from stores. Ask your healthcare provider about the best exercise plan for you.      © Copyright [employer] 2018 Information is for End User's use only and may not be sold, redistributed or otherwise used for commercial purposes. All illustrations and images included in CareNotes® are the copyrighted property of [employer] or [employer]

## 2023-12-20 NOTE — PROGRESS NOTES
Assessment and Plan:     Problem List Items Addressed This Visit       Hypertension (Chronic)    Depression (Chronic)    Bipolar 1 disorder (HCC)    CLL (chronic lymphocytic leukemia) (HCC)    Chronic obstructive pulmonary disease (HCC)    Type 2 diabetes mellitus with stage 2 chronic kidney disease     Relevant Medications    metFORMIN (GLUCOPHAGE-XR) 500 mg 24 hr tablet    Other Relevant Orders    Hemoglobin A1c (w/out EAG) ([facility] ONLY)    TSH, 3rd generation with Free T4 reflex    CBC and differential    Comprehensive metabolic panel    Ambulatory dysfunction    Marijuana abuse    Tobacco abuse     Other Visit Diagnoses       Medicare annual wellness visit, subsequent    -  Primary    Bipolar affective disorder, remission status unspecified (HCC)        Hoarseness of voice        Cigarette nicotine dependence with other nicotine-induced disorder        Relevant Orders    CT lung screening program    Encounter for screening for lung cancer        Relevant Orders    CT lung screening program    Mass of left upper extremity        Relevant Orders    Ambulatory Referral to Dermatology    Ambulatory Referral to General Surgery    Oral thrush        Relevant Medications    nystatin (MYCOSTATIN) 500,000 units/5 mL suspension    Hypercalcemia        Relevant Orders    Comprehensive metabolic panel          BMI Counseling: Body mass index is 36.82 kg/m². The BMI is above normal. Nutrition recommendations include decreasing portion sizes and encouraging healthy choices of fruits and vegetables. Exercise recommendations include moderate physical activity 150 minutes/week. Rationale for BMI follow-up plan is due to patient being overweight or obese.     Preventive health issues were discussed with patient, and age appropriate screening tests were ordered as noted in patient's After Visit Summary.  Personalized health advice and appropriate referrals for health education or preventive services given if needed, as noted in  patient's After Visit Summary.     History of Present Illness:     Patient presents for a Medicare Wellness Visit    Patricio is here with his wife for AWV and routine follow up; reviewed chronic medical problems, reviewed labs, ordered labs for 3 month follow up and for 1 month f/u;    H/o bipolar do, follows with psychiatry; stable on psych med's.    H/o type 2 DM, not controlled, start metformin 500 mg bid; on statin, ARB; refusing foot exam today. F/u in 3 months.    H/o CLL and is on ibrutinib, follows with hem/onc;     Left forearm lesion that is growing in size, bleeding, concern for malignancy; derm and surgery referral ordered;     Dysphagia, has thrush, tx with nystatin; will be seeing ENT next week; EGD done in 2022 and okay.    Due for LDCT.    Hyper calcemia, stop vit d, check in 1 month.     Patient Care Team:  Mainor Marshall MD as PCP - General (Internal Medicine)  Eric Tamayo MD as Endoscopist     Review of Systems:     Review of Systems   Constitutional:  Negative for chills and fever.   HENT:  Positive for trouble swallowing and voice change. Negative for ear pain and sore throat.    Eyes:  Positive for photophobia (blurry vision). Negative for pain and visual disturbance.   Respiratory:  Negative for cough and shortness of breath.    Cardiovascular:  Negative for chest pain and palpitations.   Gastrointestinal:  Negative for abdominal pain and vomiting.        Dysphagia   Genitourinary:  Negative for dysuria and hematuria.   Musculoskeletal:  Positive for arthralgias and gait problem. Negative for back pain.   Skin:  Positive for color change and wound. Negative for rash.   Neurological:  Negative for seizures and syncope.   Psychiatric/Behavioral:  Positive for dysphoric mood.    All other systems reviewed and are negative.       Problem List:     Patient Active Problem List   Diagnosis   • Hypertension   • Depression   • Polypharmacy   • Bipolar 1 disorder (HCC)   • CLL (chronic lymphocytic  leukemia) (HCC)   • Chronic obstructive pulmonary disease (HCC)   • Type 2 diabetes mellitus with stage 2 chronic kidney disease    • Stage 2 chronic kidney disease   • Type 2 diabetes mellitus, without long-term current use of insulin (HCC)   • Ambulatory dysfunction   • Marijuana abuse   • Tobacco abuse   • Renal cyst, right   • Dilated aortic root (HCC)      Past Medical and Surgical History:     Past Medical History:   Diagnosis Date   • Anemia    • Anxiety    • Bipolar disorder (HCC)    • Cancer (HCC)    • Colon polyp    • History of alcohol abuse    • Hyperlipidemia    • Hypertension    • Hypertension    • Insomnia    • Leukemia (HCC)    • Psychiatric disorder    • Sleep apnea with use of continuous positive airway pressure (CPAP)      Past Surgical History:   Procedure Laterality Date   • COLONOSCOPY     • EGD AND COLONOSCOPY N/A 3/30/2018    Procedure: EGD AND COLONOSCOPY;  Surgeon: Eric Tamayo MD;  Location: [facility];  Service: Gastroenterology      Family History:     Family History   Problem Relation Age of Onset   • Coronary artery disease Mother    • Diabetes Mother    • No Known Problems Father    • Hepatitis Sister    • Cancer Brother    • Prostate cancer Brother    • Early death Brother       Social History:     Social History     Socioeconomic History   • Marital status: /Civil Union     Spouse name: None   • Number of children: None   • Years of education: None   • Highest education level: None   Occupational History   • None   Tobacco Use   • Smoking status: Former     Current packs/day: 0.00     Average packs/day: 1 pack/day for 40.0 years (40.0 ttl pk-yrs)     Types: Cigarettes     Start date: 10/13/1980     Quit date: 10/13/2020     Years since quitting: 3.1   • Smokeless tobacco: Never   Vaping Use   • Vaping status: Never Used   Substance and Sexual Activity   • Alcohol use: No     Comment: last drink nov 19 2017   • Drug use: No     Comment: hx of heroin and subutex abuse   •  Sexual activity: Not Currently   Other Topics Concern   • None   Social History Narrative   • None     Social Determinants of Health     Financial Resource Strain: Low Risk  (12/20/2023)    Overall Financial Resource Strain (CARDIA)    • Difficulty of Paying Living Expenses: Not hard at all   Food Insecurity: Not on file   Transportation Needs: No Transportation Needs (12/20/2023)    PRAPARE - Transportation    • Lack of Transportation (Medical): No    • Lack of Transportation (Non-Medical): No   Physical Activity: Not on file   Stress: Not on file   Social Connections: Not on file   Intimate Partner Violence: Not on file   Housing Stability: Not on file      Medications and Allergies:     Current Outpatient Medications   Medication Sig Dispense Refill   • ALPRAZolam (XANAX) 1 mg tablet   3   • amLODIPine (NORVASC) 10 mg tablet Take 1 tablet (10 mg total) by mouth daily 90 tablet 1   • aspirin 81 mg chewable tablet Chew 1 tablet (81 mg total) daily 90 tablet 3   • citalopram (CeleXA) 20 mg tablet Take 20 mg by mouth daily     • furosemide (LASIX) 40 mg tablet Take 1 tablet (40 mg total) by mouth 2 (two) times a day Then return to 40 mg daily 180 tablet 0   • gabapentin (Neurontin) 100 mg capsule Take 3 capsules (300 mg total) by mouth 3 (three) times a day 810 capsule 0   • Ibrutinib 280 MG TABS Take 280 mg by mouth in the morning 90 tablet 2   • losartan (COZAAR) 100 MG tablet Take 1 tablet (100 mg total) by mouth daily 90 tablet 3   • metFORMIN (GLUCOPHAGE-XR) 500 mg 24 hr tablet Take 2 tablets (1,000 mg total) by mouth daily with breakfast 180 tablet 1   • metoprolol succinate (TOPROL-XL) 50 mg 24 hr tablet Take 1 tablet (50 mg total) by mouth daily 90 tablet 3   • nystatin (MYCOSTATIN) 500,000 units/5 mL suspension Apply 5 mL (500,000 Units total) to the mouth or throat 4 (four) times a day 60 mL 0   • pantoprazole (PROTONIX) 40 mg tablet Take 1 tablet (40 mg total) by mouth daily before breakfast 30 tablet 3    • potassium chloride (K-DUR,KLOR-CON) 10 mEq tablet Take 1 tablet (10 mEq total) by mouth 2 (two) times a day 90 tablet 3   • Psyllium (DAILY FIBER PO) Take 1 capsule by mouth in the morning     • risperiDONE (RisperDAL) 1 mg tablet Take 1 mg by mouth 2 (two) times a day       • rosuvastatin (CRESTOR) 10 MG tablet Take 1 tablet (10 mg total) by mouth daily 90 tablet 3   • terazosin (HYTRIN) 5 mg capsule Take 1 capsule (5 mg total) by mouth daily 90 capsule 3   • VITAMIN D PO Take 1.25 mcg by mouth every 7 days       No current facility-administered medications for this visit.     No Known Allergies   Immunizations:     Immunization History   Administered Date(s) Administered   • Pneumococcal Conjugate Vaccine 20-valent (Pcv20), Polysace 07/29/2022      Health Maintenance:         Topic Date Due   • Lung Cancer Screening  09/30/2023   • Colorectal Cancer Screening  11/29/2027   • Hepatitis C Screening  Completed         Topic Date Due   • COVID-19 Vaccine (1) Never done   • Influenza Vaccine (1) Never done      Medicare Screening Tests and Risk Assessments:     Patricio is here for his Subsequent Wellness visit. Last Medicare Wellness visit information reviewed, patient interviewed and updates made to the record today.      Health Risk Assessment:   Patient rates overall health as fair. Patient feels that their physical health rating is slightly worse. Patient is dissatisfied with their life. Eyesight was rated as slightly worse. Hearing was rated as same. Patient feels that their emotional and mental health rating is slightly better. Patients states they are sometimes angry. Patient states they are always unusually tired/fatigued. Pain experienced in the last 7 days has been a lot. Patient's pain rating has been 9/10. Patient states that he has experienced no weight loss or gain in last 6 months.     Depression Screening:   PHQ-9 Score: 7      Fall Risk Screening:   In the past year, patient has experienced: no  history of falling in past year      Home Safety:  Patient does not have trouble with stairs inside or outside of their home. Patient has working smoke alarms and has working carbon monoxide detector. Home safety hazards include: none.     Nutrition:   Current diet is Regular.     Medications:   Patient is currently taking over-the-counter supplements. OTC medications include: see medication list. Patient is not able to manage medications.     Activities of Daily Living (ADLs)/Instrumental Activities of Daily Living (IADLs):   Walk and transfer into and out of bed and chair?: Yes  Dress and groom yourself?: Yes    Bathe or shower yourself?: Yes    Feed yourself? Yes  Do your laundry/housekeeping?: Yes  Manage your money, pay your bills and track your expenses?: Yes  Make your own meals?: Yes    Do your own shopping?: Yes    Previous Hospitalizations:   Any hospitalizations or ED visits within the last 12 months?: No      Advance Care Planning:   Living will: No    Advanced directive: No      Cognitive Screening:   Provider or family/friend/caregiver concerned regarding cognition?: No    PREVENTIVE SCREENINGS      Cardiovascular Screening:    General: Screening Current      Diabetes Screening:     General: History Diabetes and Screening Current      Colorectal Cancer Screening:     General: Screening Current      Prostate Cancer Screening:    General: Screening Current      Osteoporosis Screening:    General: Screening Not Indicated      Abdominal Aortic Aneurysm (AAA) Screening:    Risk factors include: age between 65-76 yo and tobacco use        General: Screening Current      Lung Cancer Screening:     General: Risks and Benefits Discussed    Due for: Low Dose CT (LDCT)      Hepatitis C Screening:    General: Screening Current    Screening, Brief Intervention, and Referral to Treatment (SBIRT)    Screening  Typical number of drinks in a day: 0  Typical number of drinks in a week: 0  Interpretation: Low risk  "drinking behavior.    Single Item Drug Screening:  How often have you used an illegal drug (including marijuana) or a prescription medication for non-medical reasons in the past year? never    Single Item Drug Screen Score: 0  Interpretation: Negative screen for possible drug use disorder    Brief Intervention  Alcohol & drug use screenings were reviewed. No concerns regarding substance use disorder identified.     Other Counseling Topics:   Car/seat belt/driving safety, skin self-exam, sunscreen and calcium and vitamin D intake and regular weightbearing exercise.     No results found.     Physical Exam:     /84 (BP Location: Left arm, Patient Position: Sitting, Cuff Size: Large)   Pulse 60   Ht 5' 11\" (1.803 m)   Wt 120 kg (264 lb)   SpO2 93%   BMI 36.82 kg/m²     Physical Exam  Vitals and nursing note reviewed.   Constitutional:       General: He is not in acute distress.     Appearance: Normal appearance. He is well-developed. He is obese.   HENT:      Head: Normocephalic and atraumatic.      Mouth/Throat:      Pharynx: Oropharyngeal exudate (whitish exudate on tongue c/w thrush) present.   Cardiovascular:      Rate and Rhythm: Normal rate and regular rhythm.      Heart sounds: Normal heart sounds. No murmur heard.  Pulmonary:      Effort: Pulmonary effort is normal. No respiratory distress.      Breath sounds: Normal breath sounds.   Abdominal:      Tenderness: There is no abdominal tenderness.   Musculoskeletal:         General: No swelling.      Cervical back: Normal range of motion and neck supple.      Right lower leg: Edema present.      Left lower leg: Edema present.   Lymphadenopathy:      Cervical: No cervical adenopathy.   Skin:     General: Skin is dry.      Capillary Refill: Capillary refill takes less than 2 seconds.      Findings: Lesion (LUE large circular lesion, purplish, scabbing, raised) present.   Neurological:      Mental Status: He is alert and oriented to person, place, and time. "      Gait: Gait abnormal.   Psychiatric:         Mood and Affect: Mood normal.        Mainor Marshall MD

## 2023-12-21 ENCOUNTER — TELEPHONE (OUTPATIENT)
Dept: INTERNAL MEDICINE CLINIC | Facility: CLINIC | Age: 68
End: 2023-12-21

## 2023-12-21 ENCOUNTER — TELEPHONE (OUTPATIENT)
Dept: NEPHROLOGY | Facility: CLINIC | Age: 68
End: 2023-12-21

## 2023-12-21 DIAGNOSIS — N18.2 TYPE 2 DIABETES MELLITUS WITH STAGE 2 CHRONIC KIDNEY DISEASE, WITHOUT LONG-TERM CURRENT USE OF INSULIN: Primary | ICD-10-CM

## 2023-12-21 DIAGNOSIS — E11.22 TYPE 2 DIABETES MELLITUS WITH STAGE 2 CHRONIC KIDNEY DISEASE, WITHOUT LONG-TERM CURRENT USE OF INSULIN: Primary | ICD-10-CM

## 2023-12-21 DIAGNOSIS — I10 PRIMARY HYPERTENSION: ICD-10-CM

## 2023-12-21 RX ORDER — FUROSEMIDE 40 MG/1
40 TABLET ORAL DAILY
Qty: 90 TABLET | Refills: 0 | Status: SHIPPED | OUTPATIENT
Start: 2023-12-21 | End: 2024-03-20

## 2023-12-21 NOTE — TELEPHONE ENCOUNTER
Patient's wife called - asking if you could prescribe Jardiance instead of Metformin. She has heard too many negative things about metformin.

## 2023-12-21 NOTE — TELEPHONE ENCOUNTER
Informed patient's wife  
Patient's wife called - Received Furosemide from pharmacy. She is confused about the instructions. Patient was taking just 1 tab (40 mg) daily but instructions say take 2 tabs daily then return to 40 mg.  It does not say how long to take 2 tabs.  Please advise.  
No

## 2023-12-21 NOTE — TELEPHONE ENCOUNTER
I called and left message on patients answering machine for patient to give the office a call back to schedule a - Mass of left upper extremity Consult ref by Joanna Hayward

## 2024-01-02 ENCOUNTER — TELEPHONE (OUTPATIENT)
Dept: SURGICAL ONCOLOGY | Facility: CLINIC | Age: 69
End: 2024-01-02

## 2024-01-04 LAB
LEFT EYE DIABETIC RETINOPATHY: NORMAL
RIGHT EYE DIABETIC RETINOPATHY: NORMAL
SEVERITY (EYE EXAM): NORMAL

## 2024-01-16 ENCOUNTER — DOCUMENTATION (OUTPATIENT)
Dept: HEMATOLOGY ONCOLOGY | Facility: CLINIC | Age: 69
End: 2024-01-16

## 2024-01-16 ENCOUNTER — TELEPHONE (OUTPATIENT)
Dept: HEMATOLOGY ONCOLOGY | Facility: CLINIC | Age: 69
End: 2024-01-16

## 2024-01-16 DIAGNOSIS — C91.10 CLL (CHRONIC LYMPHOCYTIC LEUKEMIA) (HCC): ICD-10-CM

## 2024-01-16 NOTE — PROGRESS NOTES
Received request from clinical for patient to start on Imbruvica 280mg tabs. Auth has been submitted via cover my meds and marked urgent  (Key: [id number])    BIN:       [id number]  PCN:      [id number]  ID:          [id number]  GRP:      [id number]

## 2024-01-16 NOTE — TELEPHONE ENCOUNTER
Returned phone call to patients wife to review the [facility] approval for $8,000.00 to cover the cost of his medication. Explained that when funding opens up the drug company requires that we pursue the funding before we can apply for free drug. Reviewed that the medication that will not cost anything since the foundation will pay the co-payment and that this will now be filled through [facility], [facility]'s own speciality pharmacy. If they have any questions regarding the medication they are to reach out to the doctors office directly. Patients wife understood and had no further questions at this time.

## 2024-01-16 NOTE — PROGRESS NOTES
Imbruvica [facility] ID  [id number]  START DATE  12/17/2023  END DATE  12/16/2024  CORTES BALANCE  $8000.00  CARD NO.  [id number]  [facility]  [id number]  PCN  [id number]   GROUP  [id number]  PROVIDER  [id number]

## 2024-01-29 ENCOUNTER — OFFICE VISIT (OUTPATIENT)
Dept: OBGYN CLINIC | Facility: CLINIC | Age: 69
End: 2024-01-29
Payer: COMMERCIAL

## 2024-01-29 VITALS
SYSTOLIC BLOOD PRESSURE: 164 MMHG | HEIGHT: 71 IN | DIASTOLIC BLOOD PRESSURE: 96 MMHG | WEIGHT: 272 LBS | BODY MASS INDEX: 38.08 KG/M2 | HEART RATE: 76 BPM

## 2024-01-29 DIAGNOSIS — M25.561 RIGHT KNEE PAIN, UNSPECIFIED CHRONICITY: ICD-10-CM

## 2024-01-29 DIAGNOSIS — M17.11 PRIMARY OSTEOARTHRITIS OF RIGHT KNEE: Primary | ICD-10-CM

## 2024-01-29 PROCEDURE — 20610 DRAIN/INJ JOINT/BURSA W/O US: CPT | Performed by: ORTHOPAEDIC SURGERY

## 2024-01-29 PROCEDURE — 99214 OFFICE O/P EST MOD 30 MIN: CPT | Performed by: ORTHOPAEDIC SURGERY

## 2024-01-29 RX ORDER — LIDOCAINE HYDROCHLORIDE 10 MG/ML
2 INJECTION, SOLUTION INFILTRATION; PERINEURAL
Status: COMPLETED | OUTPATIENT
Start: 2024-01-29 | End: 2024-01-29

## 2024-01-29 RX ORDER — KETOROLAC TROMETHAMINE 30 MG/ML
30 INJECTION, SOLUTION INTRAMUSCULAR; INTRAVENOUS
Status: COMPLETED | OUTPATIENT
Start: 2024-01-29 | End: 2024-01-29

## 2024-01-29 RX ORDER — BUPIVACAINE HYDROCHLORIDE 2.5 MG/ML
2 INJECTION, SOLUTION INFILTRATION; PERINEURAL
Status: COMPLETED | OUTPATIENT
Start: 2024-01-29 | End: 2024-01-29

## 2024-01-29 RX ADMIN — KETOROLAC TROMETHAMINE 30 MG: 30 INJECTION, SOLUTION INTRAMUSCULAR; INTRAVENOUS at 14:45

## 2024-01-29 RX ADMIN — LIDOCAINE HYDROCHLORIDE 2 ML: 10 INJECTION, SOLUTION INFILTRATION; PERINEURAL at 14:45

## 2024-01-29 RX ADMIN — BUPIVACAINE HYDROCHLORIDE 2 ML: 2.5 INJECTION, SOLUTION INFILTRATION; PERINEURAL at 14:45

## 2024-01-29 NOTE — PROGRESS NOTES
Patient Name:  Patricio Lara  MRN:  [id number]    Assessment & Plan     1. Primary osteoarthritis of right knee  -     Large joint arthrocentesis: R knee    2. Right knee pain, unspecified chronicity  -     Large joint arthrocentesis: R knee      Right knee osteoarthritis with recently worsening pain.   The patient's x-rays were reviewed today.  The patient was advised to have a conversation with his PCP in regard to bilateral lower leg edema. The patient has an upcoming appointment in February.   Risks and benefits of Toradol injection were discussed in detail. Risks including: Post injection pain and infection were discussed in detail.  The patient understood and elected to proceed forward.  After sterile preparation the Right knee was injected with 2 cc of 1% lidocaine, 2 cc of 0.25% bupivacaine, and 1 cc of 30 mg Toradol  The patient tolerated the procedure and no immediate complications were noted.  The patient was instructed to ice and elevate the injection site, limit strenuous activity for the next 24-48 hours, and contact us if there were any questions or concerns prior to their follow-up appointment.  If the Toradol injection does not provide lasting benefit we will move forward with corticosteroid injection in the future.   I will see the patient back as needed.       History of the Present Illness   Patricio Lara is a 68 y.o. male with right knee osteoarthritis. Pain is rated 8/10.  Patient was last seen in the office on 9/29/2022 where he was provided a Toradol injection to the right knee and referred to Dr. Black.  The patient last saw Dr. Black on 4/20/2023 for bilateral knee osteoarthritis he was provided bilateral corticosteroid injections, a prescription for Voltaren gel and instructed to follow-up as needed. He reports his pain as throbbing. Pain is located in the anterior knee. He notes there is snapping in his knee. Pain is aggravated with ambulation, getting up off of the floor, stairs.  "He has not attended physical therapy for this. He notes the injection provided by Dr. Martinez (Toradol) provided more relief than the one Dr. Black provided (corticosteroid). The patient is a diabetic and his last A1c was 6.7 on 12/5/23. The patient smokes a pack of cigarettes per day. He is accompanied by his wife for his exam today.       Review of Systems     Review of Systems   Constitutional:  Negative for appetite change and unexpected weight change.   HENT:  Negative for congestion and trouble swallowing.    Eyes:  Negative for visual disturbance.   Respiratory:  Negative for cough and shortness of breath.    Cardiovascular:  Negative for chest pain and palpitations.   Gastrointestinal:  Negative for nausea and vomiting.   Endocrine: Negative for cold intolerance and heat intolerance.   Musculoskeletal:  Positive for arthralgias. Negative for joint swelling and myalgias.   Skin:  Negative for rash.   Neurological:  Negative for numbness.       Physical Exam     /96   Pulse 76   Ht 5' 11\" (1.803 m)   Wt 123 kg (272 lb)   BMI 37.94 kg/m²     Right Knee  Edema into bilateral legs  Range of motion from 5 to 120.    There is trace effusion.    There is no tenderness over the joint lines.    The patient is able to perform a straight leg raise.    Varus stress testing reveals no instability at 0 and 30 degrees   Valgus stress testing reveals no instability at 0 and 30 degrees  The patient is neurovascular intact distally.      Data Review     I have personally reviewed pertinent films in PACS, and my interpretation follows.    X-rays taken 9/29/22 of bilateral knees demonstrates mild degenerative changes at the medial joint line and patellofemoral joint space.      Social History     Tobacco Use    Smoking status: Former     Current packs/day: 0.00     Average packs/day: 1 pack/day for 40.0 years (40.0 ttl pk-yrs)     Types: Cigarettes     Start date: 10/13/1980     Quit date: 10/13/2020     Years " "since quitting: 3.2    Smokeless tobacco: Never   Vaping Use    Vaping status: Never Used   Substance Use Topics    Alcohol use: No     Comment: last drink nov 19 2017    Drug use: No     Comment: hx of heroin and subutex abuse           Large joint arthrocentesis: R knee  Universal Protocol:  Consent: Verbal consent obtained.  Risks and benefits: risks, benefits and alternatives were discussed  Consent given by: patient  Time out: Immediately prior to procedure a \"time out\" was called to verify the correct patient, procedure, equipment, support staff and site/side marked as required.  Patient understanding: patient states understanding of the procedure being performed  Site marked: the operative site was marked  Patient identity confirmed: verbally with patient  Supporting Documentation  Indications: pain   Procedure Details  Location: knee - R knee  Preparation: Patient was prepped and draped in the usual sterile fashion  Needle size: 22 G  Ultrasound guidance: no  Approach: anterolateral  Medications administered: 2 mL bupivacaine 0.25 %; 30 mg ketorolac 30 mg/mL; 2 mL lidocaine 1 %    Patient tolerance: patient tolerated the procedure well with no immediate complications  Dressing:  Sterile dressing applied          Jatinder Martinez DO     Scribe Attestation      I,:  Radha Cobian am acting as a scribe while in the presence of the attending physician.:       I,:  Jatinder Martinez DO personally performed the services described in this documentation    as scribed in my presence.:             "

## 2024-01-30 ENCOUNTER — CONSULT (OUTPATIENT)
Dept: SURGERY | Facility: CLINIC | Age: 69
End: 2024-01-30
Payer: COMMERCIAL

## 2024-01-30 VITALS
TEMPERATURE: 97.5 F | HEART RATE: 68 BPM | SYSTOLIC BLOOD PRESSURE: 142 MMHG | DIASTOLIC BLOOD PRESSURE: 78 MMHG | RESPIRATION RATE: 16 BRPM | HEIGHT: 71 IN | BODY MASS INDEX: 38.3 KG/M2 | OXYGEN SATURATION: 96 % | WEIGHT: 273.6 LBS

## 2024-01-30 DIAGNOSIS — E11.22 TYPE 2 DIABETES MELLITUS WITH STAGE 2 CHRONIC KIDNEY DISEASE, WITHOUT LONG-TERM CURRENT USE OF INSULIN: ICD-10-CM

## 2024-01-30 DIAGNOSIS — C91.10 CLL (CHRONIC LYMPHOCYTIC LEUKEMIA) (HCC): ICD-10-CM

## 2024-01-30 DIAGNOSIS — N18.2 TYPE 2 DIABETES MELLITUS WITH STAGE 2 CHRONIC KIDNEY DISEASE, WITHOUT LONG-TERM CURRENT USE OF INSULIN: ICD-10-CM

## 2024-01-30 DIAGNOSIS — R22.32 MASS OF LEFT UPPER EXTREMITY: ICD-10-CM

## 2024-01-30 PROCEDURE — 3066F NEPHROPATHY DOC TX: CPT | Performed by: SURGERY

## 2024-01-30 PROCEDURE — 3078F DIAST BP <80 MM HG: CPT | Performed by: SURGERY

## 2024-01-30 PROCEDURE — 99203 OFFICE O/P NEW LOW 30 MIN: CPT | Performed by: SURGERY

## 2024-01-30 PROCEDURE — 3077F SYST BP >= 140 MM HG: CPT | Performed by: SURGERY

## 2024-01-30 NOTE — PROGRESS NOTES
Consult- General Surgery   Patricio Lara 68 y.o. male MRN: [id number]  Unit/Bed#:  Encounter: [id number]    Assessment/Plan     Assessment:  Left forearm skin lesion, suspicious for skin cancer  History of leukemia onIbrutinib  History of bipolar disorder  History of hyperlipidemia  History of hypertension  History of anxiety  Plan:  The skin lesion is approximately 2-1/2 to 3 cm wide on the dorsum of the left forearm, I believe that this will require wide excision with clear margins most likely skin graft, therefore I will refer the patient to plastic surgery for definitive treatment.    History of Present Illness     HPI:  Patricio Lara is a 68 y.o. male who presents to my office accompanied by his wife for evaluation of left forearm mass.  The patient stated that he had a kash on the skin by cigarette but in the 70s, subsequently after receiving treatment for leukemia he started noticing a scab over the skin kash on the skin which has been getting bigger with time, this Usually falls off, started bleeding and a new scab will grow over.  Because of the size and discomfort the patient mentioned to his primary care physician and he was referred to us for surgical evaluation.  There is a picture under media of the skin lesion.    Review of Systems  The rest of the review of system total of 10 were negative except for the HPI.    Historical Information   Past Medical History:   Diagnosis Date    Anemia     Anxiety     Bipolar disorder (HCC)     Cancer (HCC)     Colon polyp     History of alcohol abuse     Hyperlipidemia     Hypertension     Hypertension     Insomnia     Leukemia (HCC)     Psychiatric disorder     Sleep apnea with use of continuous positive airway pressure (CPAP)      Past Surgical History:   Procedure Laterality Date    COLONOSCOPY      EGD AND COLONOSCOPY N/A 3/30/2018    Procedure: EGD AND COLONOSCOPY;  Surgeon: Eric Tamayo MD;  Location: [facility] GI LAB;  Service: Gastroenterology     Social  History   Social History     Substance and Sexual Activity   Alcohol Use No    Comment: last drink nov 19 2017     Social History     Substance and Sexual Activity   Drug Use No    Comment: hx of heroin and subutex abuse     Social History     Tobacco Use   Smoking Status Former    Current packs/day: 0.00    Average packs/day: 1 pack/day for 40.0 years (40.0 ttl pk-yrs)    Types: Cigarettes    Start date: 10/13/1980    Quit date: 10/13/2020    Years since quitting: 3.2    Passive exposure: Past   Smokeless Tobacco Never     Family History: non-contributory    Meds/Allergies   all medications and allergies reviewed     Current Outpatient Medications:     ALPRAZolam (XANAX) 1 mg tablet, , Disp: , Rfl: 3    amLODIPine (NORVASC) 10 mg tablet, Take 1 tablet (10 mg total) by mouth daily, Disp: 90 tablet, Rfl: 1    aspirin 81 mg chewable tablet, Chew 1 tablet (81 mg total) daily, Disp: 90 tablet, Rfl: 3    citalopram (CeleXA) 20 mg tablet, Take 20 mg by mouth daily, Disp: , Rfl:     Empagliflozin (JARDIANCE) 10 MG TABS tablet, Take 1 tablet (10 mg total) by mouth daily, Disp: 30 tablet, Rfl: 5    furosemide (LASIX) 40 mg tablet, Take 1 tablet (40 mg total) by mouth daily Then return to 40 mg daily, Disp: 90 tablet, Rfl: 0    gabapentin (Neurontin) 100 mg capsule, Take 3 capsules (300 mg total) by mouth 3 (three) times a day, Disp: 810 capsule, Rfl: 0    Ibrutinib 280 MG TABS, Take 280 mg by mouth daily, Disp: 30 tablet, Rfl: 5    losartan (COZAAR) 100 MG tablet, Take 1 tablet (100 mg total) by mouth daily, Disp: 90 tablet, Rfl: 3    metoprolol succinate (TOPROL-XL) 50 mg 24 hr tablet, Take 1 tablet (50 mg total) by mouth daily, Disp: 90 tablet, Rfl: 3    nystatin (MYCOSTATIN) 500,000 units/5 mL suspension, Apply 5 mL (500,000 Units total) to the mouth or throat 4 (four) times a day, Disp: 60 mL, Rfl: 0    potassium chloride (K-DUR,KLOR-CON) 10 mEq tablet, Take 1 tablet (10 mEq total) by mouth 2 (two) times a day, Disp: 90  "tablet, Rfl: 3    Psyllium (DAILY FIBER PO), Take 1 capsule by mouth in the morning, Disp: , Rfl:     risperiDONE (RisperDAL) 1 mg tablet, Take 1 mg by mouth 2 (two) times a day  , Disp: , Rfl:     rosuvastatin (CRESTOR) 10 MG tablet, Take 1 tablet (10 mg total) by mouth daily, Disp: 90 tablet, Rfl: 3    terazosin (HYTRIN) 5 mg capsule, Take 1 capsule (5 mg total) by mouth daily, Disp: 90 capsule, Rfl: 3    pantoprazole (PROTONIX) 40 mg tablet, Take 1 tablet (40 mg total) by mouth daily before breakfast (Patient not taking: Reported on 1/29/2024), Disp: 30 tablet, Rfl: 3    VITAMIN D PO, Take 1.25 mcg by mouth every 7 days (Patient not taking: Reported on 1/29/2024), Disp: , Rfl:   No current facility-administered medications for this visit.  No Known Allergies    Objective     Current Vitals:   Blood Pressure: 142/78 (01/30/24 1338)  Pulse: 68 (01/30/24 1338)  Temperature: 97.5 °F (36.4 °C) (01/30/24 1338)  Respirations: 16 (01/30/24 1338)  Height: 5' 11\" (180.3 cm) (01/30/24 1338)  Weight - Scale: 124 kg (273 lb 9.6 oz) (01/30/24 1338)  SpO2: 96 % (01/30/24 1338)    Physical Exam  Vitals and nursing note reviewed.   Constitutional:       General: He is not in acute distress.  Cardiovascular:      Rate and Rhythm: Normal rate and regular rhythm.   Pulmonary:      Effort: No respiratory distress.      Breath sounds: Normal breath sounds.   Abdominal:      Palpations: Abdomen is soft. There is no mass.      Tenderness: There is no abdominal tenderness.   Skin:     General: Skin is warm.      Coloration: Skin is not jaundiced.      Findings: No erythema or rash.      Comments: There is a large eschar, raised measuring approximately 3 cm, dark red in color, nontender to palpation, borders are nonvisualized due to the presence of the large eschar.   Neurological:      Mental Status: He is alert and oriented to person, place, and time.      Cranial Nerves: No cranial nerve deficit.   Psychiatric:         Mood and Affect: " Mood normal.         Behavior: Behavior normal.

## 2024-02-01 ENCOUNTER — TELEPHONE (OUTPATIENT)
Dept: SURGICAL ONCOLOGY | Facility: CLINIC | Age: 69
End: 2024-02-01

## 2024-02-01 NOTE — TELEPHONE ENCOUNTER
NAVEED received from [employer] Assistance program [employer]il[employer] whose message was that the patient's application for [employer] was missing information or was illegible.   I returned the call to [employer] because the  gave very little info on who I was trying to locate.   The patient's name and information was found by the counselor Moises CARLTON.   After I located the application I called again and spoke with Keren CARLTON who was able to go over the information that was required.  I was able to cleanup the application and refax it to [employer] at [phone] today.

## 2024-02-05 PROBLEM — R05.3 CHRONIC COUGH: Status: ACTIVE | Noted: 2024-02-05

## 2024-02-05 PROBLEM — K21.9 GASTROESOPHAGEAL REFLUX DISEASE WITHOUT ESOPHAGITIS: Status: ACTIVE | Noted: 2024-02-05

## 2024-02-05 PROBLEM — J38.1 REINKE'S EDEMA OF VOCAL FOLDS: Status: ACTIVE | Noted: 2024-02-05

## 2024-02-05 PROBLEM — J04.0 REFLUX LARYNGITIS: Status: ACTIVE | Noted: 2024-02-05

## 2024-02-05 PROBLEM — R49.0 DYSPHONIA: Status: ACTIVE | Noted: 2024-02-05

## 2024-02-05 PROBLEM — K21.9 REFLUX LARYNGITIS: Status: ACTIVE | Noted: 2024-02-05

## 2024-02-05 PROBLEM — R49.0 MUSCLE TENSION DYSPHONIA: Status: ACTIVE | Noted: 2024-02-05

## 2024-02-05 PROBLEM — R13.14 PHARYNGOESOPHAGEAL DYSPHAGIA: Status: ACTIVE | Noted: 2024-02-05

## 2024-02-14 ENCOUNTER — TELEPHONE (OUTPATIENT)
Dept: SURGICAL ONCOLOGY | Facility: CLINIC | Age: 69
End: 2024-02-14

## 2024-02-14 NOTE — TELEPHONE ENCOUNTER
Vm received from [employer] Glenna regarding the patient's application needing updates to the prescription portion as far as that amount of tablets needed for a month.   Patient's Number of people in the household and verifying the patient's date of birth.  Called [facility] and spoke with Aniya [phone] who confirmed their request.    Faxed updated information to [facility] [phone]

## 2024-02-16 ENCOUNTER — TELEPHONE (OUTPATIENT)
Dept: SURGICAL ONCOLOGY | Facility: CLINIC | Age: 69
End: 2024-02-16

## 2024-02-16 NOTE — TELEPHONE ENCOUNTER
Called [facility] to check the status of the corrected RX form Nicki JIN [phone] stated that a new profile had been created under the  54 for the patient and now they want financial information as well as the prescription form resend because that it is still not legible. I resent the form to [facility] for another signature. I was able enlarge the form this time for a better fax transmission.

## 2024-02-19 ENCOUNTER — TELEPHONE (OUTPATIENT)
Dept: DERMATOLOGY | Facility: CLINIC | Age: 69
End: 2024-02-19

## 2024-02-19 NOTE — TELEPHONE ENCOUNTER
SOC// Mass of left upper extremity// Scheduled here per Dr Alexander///LVM with Appt date, time and location, asekd Pt to callback to confirm or R/S.. JF

## 2024-02-27 ENCOUNTER — TELEPHONE (OUTPATIENT)
Dept: SURGICAL ONCOLOGY | Facility: CLINIC | Age: 69
End: 2024-02-27

## 2024-02-27 NOTE — TELEPHONE ENCOUNTER
E mail sent to [facility] afternoon Daniela. I'm really sorry but I have to resend that prescription portion to be signed for Patricio Lara [birth year]. Everytime I send it to [employer] it comes out illegible. I made it a little bigger so that it doesn't happen again when I send it to [employer].     Daniela sent back new form faxed to [employer] [phone] today.    Daniela response on e mail     Good morning,   Please use both or which ever form you feel will be more successful. I hope that this is not delaying patient treatment.    Thank you  YOON Suarez

## 2024-03-05 ENCOUNTER — TELEPHONE (OUTPATIENT)
Dept: SURGICAL ONCOLOGY | Facility: CLINIC | Age: 69
End: 2024-03-05

## 2024-03-05 NOTE — TELEPHONE ENCOUNTER
Placed call to [employer] Patient Access Support and spoke with  at [phone] for the status of the patient's [employer] application. He saw that the patient is STILL pending because they require documentation showing ICPAP Denial Letter.  I asked if a fax had been sent out by [employer] to inform me that more information was required and  stated there was nothing in his system indicating a notice went to [facility].The information for [employer] Assistance Program can be attained by taking a screen shot from in this case [facility] showing that funding for the patient's diagnosis (C91.10 CLL) is CLOSED according to .  I was able to get the screen print and fax it back to [facility] [phone] today.    I will add the screen shot to the patient's documents in [facility] [facility].

## 2024-03-12 DIAGNOSIS — I51.89 DIASTOLIC DYSFUNCTION: ICD-10-CM

## 2024-03-12 RX ORDER — POTASSIUM CHLORIDE 750 MG/1
10 TABLET, EXTENDED RELEASE ORAL 2 TIMES DAILY
Qty: 90 TABLET | Refills: 3 | Status: SHIPPED | OUTPATIENT
Start: 2024-03-12

## 2024-03-13 ENCOUNTER — TELEPHONE (OUTPATIENT)
Dept: SURGICAL ONCOLOGY | Facility: CLINIC | Age: 69
End: 2024-03-13

## 2024-03-13 NOTE — TELEPHONE ENCOUNTER
Call placed to [employer] and spoke with Geovanna CARLTON [phone] where she confirmed the patient was enrolled 03/07/24 through 12/31/24.  I would request a fax confirmation of his approval and proveded Geovanna with my fax #.

## 2024-03-14 ENCOUNTER — RA CDI HCC (OUTPATIENT)
Dept: OTHER | Facility: HOSPITAL | Age: 69
End: 2024-03-14

## 2024-03-14 NOTE — PROGRESS NOTES
HCC coding opportunities          Chart Reviewed number of suggestions sent to Provider: 1   E66.01    Patients Insurance     Medicare Insurance: [employer]

## 2024-04-01 ENCOUNTER — OFFICE VISIT (OUTPATIENT)
Dept: INTERNAL MEDICINE CLINIC | Facility: CLINIC | Age: 69
End: 2024-04-01
Payer: COMMERCIAL

## 2024-04-01 VITALS
SYSTOLIC BLOOD PRESSURE: 138 MMHG | RESPIRATION RATE: 18 BRPM | HEART RATE: 63 BPM | TEMPERATURE: 98.5 F | WEIGHT: 271.6 LBS | OXYGEN SATURATION: 98 % | DIASTOLIC BLOOD PRESSURE: 78 MMHG | HEIGHT: 71 IN | BODY MASS INDEX: 38.02 KG/M2

## 2024-04-01 DIAGNOSIS — K08.9 CHRONIC DENTAL PAIN: ICD-10-CM

## 2024-04-01 DIAGNOSIS — I10 PRIMARY HYPERTENSION: Chronic | ICD-10-CM

## 2024-04-01 DIAGNOSIS — Z79.899 POLYPHARMACY: Chronic | ICD-10-CM

## 2024-04-01 DIAGNOSIS — E11.9 ENCOUNTER FOR DIABETIC FOOT EXAM (HCC): ICD-10-CM

## 2024-04-01 DIAGNOSIS — R26.2 AMBULATORY DYSFUNCTION: ICD-10-CM

## 2024-04-01 DIAGNOSIS — I77.810 DILATED AORTIC ROOT (HCC): ICD-10-CM

## 2024-04-01 DIAGNOSIS — J44.9 CHRONIC OBSTRUCTIVE PULMONARY DISEASE, UNSPECIFIED COPD TYPE (HCC): ICD-10-CM

## 2024-04-01 DIAGNOSIS — G89.29 CHRONIC DENTAL PAIN: ICD-10-CM

## 2024-04-01 DIAGNOSIS — E11.22 TYPE 2 DIABETES MELLITUS WITH STAGE 2 CHRONIC KIDNEY DISEASE, WITHOUT LONG-TERM CURRENT USE OF INSULIN  (HCC): Primary | ICD-10-CM

## 2024-04-01 DIAGNOSIS — N18.2 TYPE 2 DIABETES MELLITUS WITH STAGE 2 CHRONIC KIDNEY DISEASE, WITHOUT LONG-TERM CURRENT USE OF INSULIN  (HCC): Primary | ICD-10-CM

## 2024-04-01 DIAGNOSIS — F31.9 BIPOLAR 1 DISORDER (HCC): ICD-10-CM

## 2024-04-01 DIAGNOSIS — C91.10 CLL (CHRONIC LYMPHOCYTIC LEUKEMIA) (HCC): ICD-10-CM

## 2024-04-01 PROBLEM — F41.9 ANXIETY: Status: ACTIVE | Noted: 2019-09-11

## 2024-04-01 PROCEDURE — G2211 COMPLEX E/M VISIT ADD ON: HCPCS | Performed by: INTERNAL MEDICINE

## 2024-04-01 PROCEDURE — 99214 OFFICE O/P EST MOD 30 MIN: CPT | Performed by: INTERNAL MEDICINE

## 2024-04-01 NOTE — PROGRESS NOTES
Patient's shoes and socks removed.    Right Foot/Ankle   Right Foot Inspection  Skin Exam: skin normal and skin intact. No dry skin, no warmth, no callus, no erythema, no maceration, no abnormal color, no pre-ulcer, no ulcer and no callus.     Toe Exam: ROM and strength within normal limits.     Sensory   Monofilament testing: intact    Vascular  The right DP pulse is 2+. The right PT pulse is 2+.     Right Toe  - Comprehensive Exam  Ecchymosis: none  Swelling: none       Left Foot/Ankle  Left Foot Inspection  Skin Exam: skin normal and skin intact. No dry skin, no warmth, no erythema, no maceration, normal color, no pre-ulcer, no ulcer and no callus.     Toe Exam: ROM and strength within normal limits.     Sensory   Monofilament testing: intact    Vascular  The left DP pulse is 2+. The left PT pulse is 2+.     Left Toe  - Comprehensive Exam  Ecchymosis: none  Swelling: none       Assign Risk Category  No deformity present  No loss of protective sensation  No weak pulses  Risk: 0

## 2024-04-01 NOTE — PROGRESS NOTES
Assessment/Plan:     Type 2 DM is reasonable controlled; continue current regimen; needs to see podiatry, has been cutting his own nails.    HTN well controlled.    H/o CLL and follows with hem/onc; stable. On ibrutinib     Mass lesion LUE, has derm coming up; likely malignant.    Bipolar DO and stable; continue f/u with psych.    Chronic dental pain, will refer to [facility].    Hyper calcemia, labs to be done.     Quality Measures:           Return in about 4 months (around 8/1/2024).    No problem-specific Assessment & Plan notes found for this encounter.       Diagnoses and all orders for this visit:    Type 2 diabetes mellitus with stage 2 chronic kidney disease, without long-term current use of insulin  (HCC)  -     Albumin / creatinine urine ratio; Future  -     Comprehensive metabolic panel; Future  -     Hemoglobin A1C; Future  -     Albumin / creatinine urine ratio; Future  -     TSH, 3rd generation with Free T4 reflex; Future  -     Lipid Panel with Direct LDL reflex; Future    Bipolar 1 disorder (HCC)    Chronic obstructive pulmonary disease, unspecified COPD type (HCC)    Dilated aortic root (HCC)    Chronic dental pain  -     Ambulatory Referral to Oral Maxillofacial Surgery; Future    Primary hypertension    CLL (chronic lymphocytic leukemia) ([facility])    Ambulatory dysfunction    Polypharmacy          Subjective:      Patient ID: Patricio Lara is a 68 y.o. male.    Patient is here for routine follow up, reviewed chronic medical problems, ordered labs for next visit including CBC CMP TSH A1C LIPID. Reviewed labs for this visit.        ALLERGIES:  No Known Allergies    CURRENT MEDICATIONS:    Current Outpatient Medications:     ALPRAZolam (XANAX) 1 mg tablet, , Disp: , Rfl: 3    amLODIPine (NORVASC) 10 mg tablet, Take 1 tablet (10 mg total) by mouth daily, Disp: 90 tablet, Rfl: 1    aspirin 81 mg chewable tablet, Chew 1 tablet (81 mg total) daily, Disp: 90 tablet, Rfl: 3    citalopram (CeleXA) 20 mg tablet,  Take 20 mg by mouth daily, Disp: , Rfl:     Empagliflozin (JARDIANCE) 10 MG TABS tablet, Take 1 tablet (10 mg total) by mouth daily, Disp: 30 tablet, Rfl: 5    Ibrutinib 280 MG TABS, Take 280 mg by mouth daily, Disp: 30 tablet, Rfl: 5    metoprolol succinate (TOPROL-XL) 50 mg 24 hr tablet, Take 1 tablet (50 mg total) by mouth daily, Disp: 90 tablet, Rfl: 3    nystatin (MYCOSTATIN) 500,000 units/5 mL suspension, Apply 5 mL (500,000 Units total) to the mouth or throat 4 (four) times a day, Disp: 60 mL, Rfl: 0    potassium chloride (Klor-Con M10) 10 mEq tablet, Take 1 tablet (10 mEq total) by mouth 2 (two) times a day, Disp: 90 tablet, Rfl: 3    Psyllium (DAILY FIBER PO), Take 1 capsule by mouth in the morning, Disp: , Rfl:     risperiDONE (RisperDAL) 1 mg tablet, Take 1 mg by mouth 2 (two) times a day  , Disp: , Rfl:     rosuvastatin (CRESTOR) 10 MG tablet, Take 1 tablet (10 mg total) by mouth daily, Disp: 90 tablet, Rfl: 3    terazosin (HYTRIN) 5 mg capsule, Take 1 capsule (5 mg total) by mouth daily, Disp: 90 capsule, Rfl: 3    furosemide (LASIX) 40 mg tablet, Take 1 tablet (40 mg total) by mouth daily Then return to 40 mg daily, Disp: 90 tablet, Rfl: 0    gabapentin (Neurontin) 100 mg capsule, Take 3 capsules (300 mg total) by mouth 3 (three) times a day, Disp: 810 capsule, Rfl: 0    losartan (COZAAR) 100 MG tablet, Take 1 tablet (100 mg total) by mouth daily, Disp: 90 tablet, Rfl: 3    ACTIVE PROBLEM LIST:  Patient Active Problem List   Diagnosis    Hypertension    Depression    Polypharmacy    Bipolar 1 disorder (HCC)    CLL (chronic lymphocytic leukemia) (HCC)    Chronic obstructive pulmonary disease (HCC)    Type 2 diabetes mellitus with stage 2 chronic kidney disease  (HCC)    Stage 2 chronic kidney disease    Ambulatory dysfunction    Marijuana abuse    Tobacco abuse    Renal cyst, right    Dilated aortic root (HCC)    Dysphonia    Chronic cough    Pharyngoesophageal dysphagia    Reflux laryngitis     Gastroesophageal reflux disease without esophagitis    Muscle tension dysphonia    Macrina's edema of vocal folds    Anxiety       PAST MEDICAL HISTORY:  Past Medical History:   Diagnosis Date    Anemia     Anxiety     Bipolar disorder (HCC)     Cancer (HCC)     Colon polyp     History of alcohol abuse     Hyperlipidemia     Hypertension     Hypertension     Insomnia     Leukemia (HCC)     Psychiatric disorder     Sleep apnea with use of continuous positive airway pressure (CPAP)        PAST SURGICAL HISTORY:  Past Surgical History:   Procedure Laterality Date    COLONOSCOPY      EGD AND COLONOSCOPY N/A 3/30/2018    Procedure: EGD AND COLONOSCOPY;  Surgeon: Eric Tamayo MD;  Location: [facility] GI LAB;  Service: Gastroenterology       FAMILY HISTORY:  Family History   Problem Relation Age of Onset    Coronary artery disease Mother     Diabetes Mother     No Known Problems Father     Hepatitis Sister     Cancer Brother     Prostate cancer Brother     Early death Brother        SOCIAL HISTORY:  Social History     Socioeconomic History    Marital status: /Civil Union     Spouse name: Not on file    Number of children: Not on file    Years of education: Not on file    Highest education level: Not on file   Occupational History    Not on file   Tobacco Use    Smoking status: Former     Current packs/day: 0.00     Average packs/day: 1 pack/day for 40.0 years (40.0 ttl pk-yrs)     Types: Cigarettes     Start date: 10/13/1980     Quit date: 10/13/2020     Years since quitting: 3.4     Passive exposure: Past    Smokeless tobacco: Never   Vaping Use    Vaping status: Never Used   Substance and Sexual Activity    Alcohol use: No     Comment: last drink nov 19 2017    Drug use: No     Comment: hx of heroin and subutex abuse    Sexual activity: Not Currently   Other Topics Concern    Not on file   Social History Narrative    Not on file     Social Determinants of Health     Financial Resource Strain: Low Risk  (12/20/2023)     "Overall Financial Resource Strain (CARDIA)     Difficulty of Paying Living Expenses: Not hard at all   Food Insecurity: Not on file   Transportation Needs: No Transportation Needs (12/20/2023)    PRAPARE - Transportation     Lack of Transportation (Medical): No     Lack of Transportation (Non-Medical): No   Physical Activity: Not on file   Stress: Not on file   Social Connections: Not on file   Intimate Partner Violence: Not on file   Housing Stability: Not on file       Review of Systems   Constitutional:  Negative for chills and fever.   HENT:  Negative for ear pain and sore throat.    Eyes:  Negative for pain and visual disturbance.   Respiratory:  Negative for cough and shortness of breath.    Cardiovascular:  Negative for chest pain and palpitations.   Gastrointestinal:  Negative for abdominal pain and vomiting.   Genitourinary:  Negative for dysuria and hematuria.   Musculoskeletal:  Positive for arthralgias and gait problem. Negative for back pain.   Skin:  Negative for color change and rash.   Neurological:  Negative for seizures and syncope.   All other systems reviewed and are negative.        Objective:  Vitals:    04/01/24 1540   BP: 138/78   BP Location: Left arm   Patient Position: Sitting   Cuff Size: Standard   Pulse: 63   Resp: 18   Temp: 98.5 °F (36.9 °C)   TempSrc: Tympanic   SpO2: 98%   Weight: 123 kg (271 lb 9.6 oz)   Height: 5' 11\" (1.803 m)     Body mass index is 37.88 kg/m².     Physical Exam  Vitals and nursing note reviewed.   Constitutional:       Appearance: Normal appearance. He is obese.   HENT:      Head: Normocephalic and atraumatic.   Cardiovascular:      Rate and Rhythm: Normal rate and regular rhythm.      Heart sounds: Normal heart sounds.   Pulmonary:      Effort: Pulmonary effort is normal.      Breath sounds: Normal breath sounds.   Musculoskeletal:         General: Normal range of motion.      Cervical back: Normal range of motion.   Skin:     General: Skin is warm and dry.    "   Findings: Lesion (left anterior arm mass) present.   Neurological:      General: No focal deficit present.      Mental Status: He is alert and oriented to person, place, and time. Mental status is at baseline.      Gait: Gait abnormal.   Psychiatric:         Mood and Affect: Mood normal.           RESULTS:  Hemoglobin A1C   Date/Time Value Ref Range Status   12/05/2023 03:45 PM 6.7 (H) Normal 4.0-5.6%; PreDiabetic 5.7-6.4%; Diabetic >=6.5%; Glycemic control for adults with diabetes <7.0% % Final     Cholesterol   Date/Time Value Ref Range Status   12/05/2023 03:45  See Comment mg/dL Final     Comment:     Cholesterol:         Pediatric <18 Years        Desirable          <170 mg/dL      Borderline High    170-199 mg/dL      High               >=200 mg/dL        Adult >=18 Years            Desirable        <200 mg/dL      Borderline High  200-239 mg/dL      High             >239 mg/dL       Triglycerides   Date/Time Value Ref Range Status   12/05/2023 03:45  (H) See Comment mg/dL Final     Comment:     Triglyceride:     0-9Y            <75mg/dL     10Y-17Y         <90 mg/dL       >=18Y     Normal          <150 mg/dL     Borderline High 150-199 mg/dL     High            200-499 mg/dL        Very High       >499 mg/dL    Specimen collection should occur prior to Metamizole administration due to the potential for falsely depressed results.     HDL, Direct   Date/Time Value Ref Range Status   12/05/2023 03:45 PM 43 >=40 mg/dL Final     LDL Calculated   Date/Time Value Ref Range Status   12/05/2023 03:45  (H) 0 - 100 mg/dL Final     Comment:     LDL Cholesterol:     Optimal           <100 mg/dl     Near Optimal      100-129 mg/dl     Above Optimal       Borderline High 130-159 mg/dl       High            160-189 mg/dl       Very High       >189 mg/dl         This screening LDL is a calculated result.   It does not have the accuracy of the Direct Measured LDL in the monitoring of patients with  hyperlipidemia and/or statin therapy.   Direct Measure LDL (JJX741) must be ordered separately in these patients.     Hemoglobin   Date/Time Value Ref Range Status   12/05/2023 03:45 PM 14.5 12.0 - 17.0 g/dL Final     Hematocrit   Date/Time Value Ref Range Status   12/05/2023 03:45 PM 41.4 36.5 - 49.3 % Final     Platelets   Date/Time Value Ref Range Status   12/05/2023 03:45  149 - 390 Thousands/uL Final     PSA   Date/Time Value Ref Range Status   09/25/2020 02:57 PM 0.7 0.0 - 4.0 ng/mL Final     Comment:     American Urological Association Guidelines define biochemical recurrence of prostate cancer as a detectable or rising PSA value post-radical prostatectomy that is greater than or equal to 0.2 ng/mL with a second confirmatory level of greater than or equal to 0.2 ng/mL.     PSA, Diagnostic   Date/Time Value Ref Range Status   09/15/2021 03:22 PM 1.0 0.0 - 4.0 ng/mL Final     Comment:     American Urological Association Guidelines define biochemical recurrence of prostate cancer as a detectable or rising PSA value post-radical prostatectomy that is greater than or equal to 0.2 ng/mL with a second confirmatory level of greater than or equal to 0.2 ng/mL.     Prostate Specific Antigen Total   Date/Time Value Ref Range Status   03/29/2023 03:28 PM 1.0 0.0 - 4.0 ng/mL Final     Comment:     Roche ECLIA methodology.  According to the American Urological Association, Serum PSA should  decrease and remain at undetectable levels after radical  prostatectomy. The AUA defines biochemical recurrence as an initial  PSA value 0.2 ng/mL or greater followed by a subsequent confirmatory  PSA value 0.2 ng/mL or greater.  Values obtained with different assay methods or kits cannot be used  interchangeably. Results cannot be interpreted as absolute evidence  of the presence or absence of malignant disease.     TSH 3RD GENERATON   Date/Time Value Ref Range Status   12/05/2023 03:45 PM 2.271 0.450 - 4.500 uIU/mL Final      Comment:     Adult TSH (3rd generation) reference range follows the recommended guidelines of the American Thyroid Association, January, 2020.     Free T4   Date/Time Value Ref Range Status   06/17/2022 03:35 PM 0.90 0.76 - 1.46 ng/dL Final     Comment:     Specimen collection should occur prior to Sulfasalazine administration due to the potential for falsely elevated results.     Sodium   Date/Time Value Ref Range Status   12/05/2023 03:45  135 - 147 mmol/L Final     BUN   Date/Time Value Ref Range Status   12/05/2023 03:45 PM 20 5 - 25 mg/dL Final     Creatinine   Date/Time Value Ref Range Status   12/05/2023 03:45 PM 1.05 0.60 - 1.30 mg/dL Final     Comment:     Standardized to IDMS reference method      In chart    This note was created with voice recognition software.  Phonic, grammatical and spelling errors may be present within the note as a result.

## 2024-04-02 ENCOUNTER — TELEPHONE (OUTPATIENT)
Dept: ADMINISTRATIVE | Facility: OTHER | Age: 69
End: 2024-04-02

## 2024-04-02 NOTE — TELEPHONE ENCOUNTER
Upon review of the In Basket request and the patient's chart, initial outreach has been made via telephone call to facility. Please see Contacts section for details. Spoke to office they will be faxing of DM Eye Exam    Thank you  Yanira Nava

## 2024-04-02 NOTE — TELEPHONE ENCOUNTER
----- Message from Angie Strange MA sent at 4/1/2024  3:49 PM EDT -----  Patient states he had eye examination 1/4/2024 at [facility] in [facility] in [address].

## 2024-04-03 NOTE — TELEPHONE ENCOUNTER
Upon review of the In Basket request we were able to locate, review, and update the patient chart as requested for Diabetic Eye Exam.    Any additional questions or concerns should be emailed to the Practice Liaisons via the appropriate education email address, please do not reply via In Basket.    Thank you  Yanira Nava

## 2024-04-15 ENCOUNTER — APPOINTMENT (OUTPATIENT)
Age: 69
End: 2024-04-15
Payer: COMMERCIAL

## 2024-04-15 DIAGNOSIS — Z12.5 SPECIAL SCREENING FOR MALIGNANT NEOPLASM OF PROSTATE: ICD-10-CM

## 2024-04-15 DIAGNOSIS — N18.2 TYPE 2 DIABETES MELLITUS WITH STAGE 2 CHRONIC KIDNEY DISEASE, WITHOUT LONG-TERM CURRENT USE OF INSULIN  (HCC): ICD-10-CM

## 2024-04-15 DIAGNOSIS — Z00.00 ANNUAL PHYSICAL EXAM: ICD-10-CM

## 2024-04-15 DIAGNOSIS — E11.22 TYPE 2 DIABETES MELLITUS WITH STAGE 2 CHRONIC KIDNEY DISEASE, WITHOUT LONG-TERM CURRENT USE OF INSULIN  (HCC): ICD-10-CM

## 2024-04-15 LAB
EST. AVERAGE GLUCOSE BLD GHB EST-MCNC: 154 MG/DL
HBA1C MFR BLD: 7 %
TSH SERPL DL<=0.05 MIU/L-ACNC: 2.87 UIU/ML (ref 0.45–4.5)

## 2024-04-15 PROCEDURE — G0103 PSA SCREENING: HCPCS

## 2024-04-15 PROCEDURE — 83036 HEMOGLOBIN GLYCOSYLATED A1C: CPT

## 2024-04-15 PROCEDURE — 84443 ASSAY THYROID STIM HORMONE: CPT

## 2024-04-15 PROCEDURE — 36415 COLL VENOUS BLD VENIPUNCTURE: CPT

## 2024-04-19 ENCOUNTER — OFFICE VISIT (OUTPATIENT)
Dept: DERMATOLOGY | Facility: CLINIC | Age: 69
End: 2024-04-19

## 2024-04-19 VITALS — BODY MASS INDEX: 37.24 KG/M2 | HEIGHT: 71 IN | TEMPERATURE: 95.6 F | WEIGHT: 266 LBS

## 2024-04-19 DIAGNOSIS — L98.9 NON-HEALING SKIN LESION: ICD-10-CM

## 2024-04-19 DIAGNOSIS — D48.5 NEOPLASM OF UNCERTAIN BEHAVIOR OF SKIN: Primary | ICD-10-CM

## 2024-04-19 PROCEDURE — 88305 TISSUE EXAM BY PATHOLOGIST: CPT | Performed by: DERMATOLOGY

## 2024-04-19 NOTE — PATIENT INSTRUCTIONS
"INFORMED CONSENT DISCUSSION AND POST-OPERATIVE INSTRUCTIONS FOR PATIENT    I.  RATIONALE FOR PROCEDURE  I understand that a skin biopsy allows the Dermatologist to test a lesion or rash under the microscope to obtain a diagnosis.  It usually involves numbing the area with numbing medication and removing a small piece of skin; sometimes the area will be closed with sutures. In this specific procedure, sutures are not usually needed.      I understand that my Dermatologist recommends that a skin \"shave\" biopsy be performed today.  A local anesthetic, similar to the kind that a dentist uses when filling a cavity, will be injected with a very small needle into the skin area to be sampled.  The injected skin and tissue underneath \"will go to sleep” and become numb so no pain should be felt afterwards.  An instrument shaped like a tiny \"razor blade\" (shave biopsy instrument) will be used to cut a small piece of tissue and skin from the area so that a sample of tissue can be taken and examined more closely under the microscope.  A slight amount of bleeding will occur, but it will be stopped with direct pressure and a pressure bandage and any other appropriate methods.  I understands that a scar will form where the wound was created.  Surgical ointment will be applied to help protect the wound.  Sutures are not usually needed.    II.  RISKS AND POTENTIAL COMPLICATIONS   I understand the risks and potential complications of a skin biopsy include but are not limited to the following:  Bleeding  Infection  Pain  Scar/keloid  Skin discoloration  Incomplete Removal  Recurrence  Nerve Damage/Numbness/Loss of Function  Allergic Reaction to Anesthesia  Biopsies are diagnostic procedures and based on findings additional treatment or evaluation may be required  Loss or destruction of specimen resulting in no additional findings    My Dermatologist has explained to me the nature of the condition, the nature of the procedure, and the " "benefits to be reasonably expected compared with alternative approaches.  My Dermatologist has discussed the likelihood of major risks or complications of this procedure including the specific risks listed above, such as bleeding, infection, and scarring/keloid.  I understand that a scar is expected after this procedure.  I understand that my physician cannot predict if the scar will form a \"keloid,\" which extends beyond the borders of the wound that is created.  A keloid is a thick, painful, and bumpy scar.  A keloid can be difficult to treat, as it does not always respond well to therapy, which includes injecting cortisone directly into the keloid every few weeks.  While this usually reduces the pain and size of the scar, it does not eliminate it.      I understand that photographs may be taken before and after the procedure.  These will be maintained as part of the medical providers confidential records and may not be made available to me.  I further authorize the medical provider to use the photographs for teaching purposes or to illustrate scientific papers, books, or lectures if in his/her judgment, medical research, education, or science may benefit from its use.    I have had an opportunity to fully inquire about the risks and benefits of this procedure and its alternatives.   I have been given ample time and opportunity to ask questions and to seek a second opinion if I wished to do so.  I acknowledge that there have specifically been no guarantees as to the cosmetic results from the procedure.  I am aware that with any procedure there is always the possibility of an unexpected complication.    III. POST-PROCEDURAL CARE (WHAT YOU WILL NEED TO DO \"AFTER THE BIOPSY\" TO OPTIMIZE HEALING)    Keep the area clean and dry.  Try NOT to remove the bandage or get it wet for the first 48 hours.    Gently clean the area and apply surgical ointment (such as Vaseline petrolatum ointment, which is available \"over the " "counter\" and not a prescription) to the biopsy site for up to 2 weeks straight.  This acts to protect the wound from the outside world.      Sutures are not usually placed in this procedure.  If any sutures were placed, return for suture removal as instructed (generally 1 week for the face, 2 weeks for the body).      Take Acetaminophen (Tylenol) for discomfort, if no contraindications.  Ibuprofen or aspirin could make bleeding worse.    Call our office immediately for signs of infection: fever, chills, increased redness, warmth, tenderness, discomfort/pain, or pus or foul smell coming from the wound.    WHAT TO DO IF THERE IS ANY BLEEDING?  If a small amount of bleeding is noticed, place a clean cloth over the area and apply firm pressure for ten minutes.  Check the wound after 10 minutes of direct pressure.  If bleeding persists, try one more time for an additional 10 minutes of direct pressure on the area.  If the bleeding becomes heavier or does not stop after the second attempt, or if you have any other questions about this procedure, then please call your [facility]'s Dermatologist by calling [phone] (SKIN).     I hereby acknowledge that I have reviewed and verified the site with my Dermatologist and have requested and authorized my Dermatologist to proceed with the procedure.  "
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
## 2024-04-24 LAB
PSA FREE MFR SERPL: 40 %
PSA FREE SERPL-MCNC: 0.28 NG/ML
PSA SERPL-MCNC: 0.7 NG/ML (ref 0–4)

## 2024-04-24 PROCEDURE — 88305 TISSUE EXAM BY PATHOLOGIST: CPT | Performed by: DERMATOLOGY

## 2024-04-25 DIAGNOSIS — C44.619 BASAL CELL CARCINOMA (BCC) OF SKIN OF LEFT UPPER EXTREMITY INCLUDING SHOULDER: Primary | ICD-10-CM

## 2024-04-25 NOTE — RESULT ENCOUNTER NOTE
DERMATOPATHOLOGY RESULT NOTE    Results reviewed by ordering physician.  Called patient to personally discuss results. Discussed results with patient's wife.       Instructions for Clinical Derm Team:   (remember to route Result Note to appropriate staff):    Call patient and schedule for Mohs    Result & Plan by Specimen:    Specimen A: malignant  Plan: MOHs    Tissue Exam: [id number]  Order: [id number]   Collected 4/19/2024  4:02 PM      Status: Final result      [facility]      Dx: Neoplasm of uncertain behavior of skin    0 Result Notes     Component   Case Report  Surgical Pathology Report                         Case: [id number]                                  Authorizing Provider:  Rena Marinelli MD           Collected:           04/19/2024 1602              Ordering Location:     [facility]      Received:            04/19/2024 1602                                     [place]                                                                      Pathologist:           Teresa Lloyd MD                                                      Specimen:    Skin, Other, left forearm                                                                Final Diagnosis  A. Skin, left forearm, shave biopsy:  BASAL CELL CARCINOMA (NODULAR AND INFILTRATIVE TYPES); extending to biopsy margins.     Comment: A prominent infiltrative component is present. Perineural invasion is not identified in examined sections. Focal pigmentation within tumor islands, as well as dermal hemosiderin deposition, are present.      Electronically signed by Teresa Lloyd MD on 4/24/2024 at 10:32 AM  Additional Information   All reported additional testing was performed with appropriately reactive controls.  These tests were developed and their performance characteristics determined by [facility] or appropriate performing facility, though some tests may be performed  "on tissues which have not been validated for performance characteristics (such as staining performed on alcohol exposed cell blocks and decalcified tissues).  Results should be interpreted with caution and in the context of the patients' clinical condition. These tests may not be cleared or approved by the U.S. Food and Drug Administration, though the FDA has determined that such clearance or approval is not necessary. These tests are used for clinical purposes and they should not be regarded as investigational or for research. This laboratory has been approved by CLIA 88, designated as a high-complexity laboratory and is qualified to perform these tests.  .  Gross Description   A. The specimen is received in formalin, labeled with the patient's name and hospital number, and is designated \" skin left forearm\".  It consists of a 2.7 x 2.4 cm skin shave.  The epidermis displays 3.0 x 2.5 x 1.5 cm tan-brown, black ulcerated, friable nodule at the skin margin.  The resection margin is inked green and the specimen is serially sectioned.  Entirely submitted as follows:    A1-A5: Skin shave  A6: Friable top portion in an embedding bag    Note: The estimated total formalin fixation time based upon information provided by the submitting clinician and the standard processing schedule is under 72 hours.  ESorrentino  Clinical Information   ATTN DERMPATH    A: left forearm; 3 x 2.5 cm black hemorrhagic, necrotic plaque. Present for 2 years and growing in size. 67 yo M with history of CLL in remission. Ddx: malignant melanoma vs SCC vs cutaneous manifestation of lymphoma?  Resulting Agency [id number]          Specimen Collected: 04/19/24  4:02 PM Last Resulted: 04/24/24 10:32 AM          "

## 2024-04-29 ENCOUNTER — TELEPHONE (OUTPATIENT)
Dept: DERMATOLOGY | Facility: CLINIC | Age: 69
End: 2024-04-29

## 2024-04-29 NOTE — LETTER
Patricio Lara     1955    [address] [place] [address] [place] [id number]    Dear Patricio Lara,    You are scheduled to have the MOHS procedure on July 8, 2024 at 10 am for left forearm with Dr.Ryan Stern. Our office is located in [facility] our address is [address] [place] [address]. Once you arrive please check in with our front staff in [address] and they will escort you to the MOHS waiting room.  If you have someone bringing you to your appointment they may wait in the waiting room or accompany you in your visit.      Below you will find some pre-op instructions along with some information regarding the MOHS procedure.     If you have any questions please call our office at [phone].       Thank you,    [facility]         PRE-OPERATIVE INSTRUCTIONS - MOHS    Before your scheduled surgery, there are a number of important precautions and positive steps you should take to help prepare yourself for a successful treatment and speedy recovery.    Some of the steps, which are listed below, may seem unnecessary and inconvenient, but they are important. For example, when you stop smoking, you increase your ability to heal. Occasionally, there may be valid reasons, personal or medical, why you can't comply. In such cases, please call the office so we can discuss possible ways to overcome any obstacles you may be encountering.    If you have any questions about the surgery, or remember additional medical information that you forgot to mention to our staff, please contact the office prior to your surgery.    GENERAL INFORMATION REGARDING MOHS MICROGRAPHIC SURGERY    Mohs surgery is a specialized technique for the removal of skin cancer developed by Dr. Frederick Mohs over 50 years ago to improve the cure rates of skin cancer. Traditionally, skin cancers are treated by destructive methods (radiation, freezing, scraping, and burning) or  excision (cutting out the tissue with standards margins and sending it to an outside laboratory for testing). These methods all yield cure rates between 65%-94%. However, for cancers located in cosmetically sensitive areas, large tumors, or tumors unsuccessfully treated by other means, Mohs surgery offers a higher cure rate. In most cases, Mohs surgery provides you with a 99% cure rate for primary (previously untreated) basal cell cancer and a 95% cure rate for primary squamous cell cancer. In Mohs surgery, tissue is removed and processed in a way that we are able to check 100% of the margins, giving the highest cure rate for any method of treating skin cancers while providing maximal preservation of normal skin. This allows the surgeon to produce an optimal cosmetic result for the patient by maximizing the amount of tissue removed yielding as small a scar as possible    On the day of surgery, you will be given local anesthesia only (similar to what was given to you during your initial biopsy). You will remain awake. You will verify the location of the skin cancer prior to the onset of the surgery. Once the area is numb, the tissue containing the skin cancer will be removed, taking a small safety margin. This margin is usually smaller than what would be taken with a standard excision. Once the tissue is removed, it is marked and oriented. The first layer (“Stage I”) will be processed in our laboratory. The wound will be treated for bleeding and a bandage will be placed to keep you comfortable while you wait an approximate 45 minutes-1 hour (for the processing of the tissue) in your room. Your Mohs surgeon will examine the pathology in the lab, checking all the margins. If any tumor remains, you will need to take a second layer of skin (“Stage 2”). The area will be re-anesthetized and your Mohs surgeon will remove more skin only in the area where the tumor exists. This process will continue until all the skin cancer  is removed. Unfortunately, there is no method to predict how many layers or stages will be taken.    Once the tumor has been removed completely, we will discuss the best ways to close the defect. Most wounds may be closed with stitches. A larger wound may require a skin graft or a flap. In rare instances, especially for cancers around the eye or for larger cancers, we may work with another surgeon (oculoplastic, ENT, plastics) with special skills to assist with reconstruction.    Medications: Please take all your normal medications the morning of your surgery. If you are a diabetic, please bring your insulin or medications with you, as well as a snack to avoid having low blood sugar during your day with us.     Blood Thinners    VERY IMPORTANT: We do NOT stop or hold prescribed blood thinners (such as Coumadin/Warfarin, Plavix, Eliquis, Pradaxa, Brilinta, Apixaban, Xarelto, Lovonox, Rivaroxaban, or Aggrenox) before Mohs surgery. Additionally, If you take aspirin because you have had a stroke, heart attack, heart disease, other condition, or your physician has prescribed you to take it, please continue your aspirin.    Although there is a risk of increased bruising and bleeding, we are still able to safely perform surgery while continuing these medications. Please NEVER stop your prescribed blood thinner without the managing doctor's (the doctor that prescribed the medication) permission or knowledge. If you have any concerns about not holding your blood thinner, please address these with your Mohs surgeon.    Most people should stop all non-steroidal anti-inflammatory medications (Motrin, Naproxen, Advil, Midol, Aleve, etc.) for 7 days prior to your scheduled surgery and 2 days after (unless instructed otherwise after surgery).  You may take Tylenol for pain.     Antibiotics  If you usually require antibiotics prior to dental work, please let the office know at least 24 hours prior to your surgery. Medical  conditions that sometimes require preoperative antibiotics include artificial heart valves, heart murmurs, artificial joints, and related problems. We may give you a different medication than the dentist, so please contact us for the correct antibiotic.  If you were prescribed pre-operative antibiotics by our office, please take the medication 2 hours prior to your procedure.    Vitamins and Supplements  Avoid taking any supplements with Vitamin E, Fish Oil, Gingko, Ginseng, and Garlic for 2 weeks before and 2 days after your surgery. These thin your blood.    Alcohol: Avoid drinking alcohol for 2 days prior to your surgery, and for 2 days afterwards (it thins the blood and causes more bruising and swelling).    Smoking: Try to STOP or reduce smoking significantly the week before your surgery, and especially the week afterwards (it greatly improves how well you heal). Tobacco smoke deprives the blood of oxygen, which is urgently needed by the wound during the healing process.    Contact Lenses: Do not wear them on the day of the surgery. Instead, wear glasses and bring your case, in case we need to remove them.    Clothing: Do not wear your nicest clothing on your surgery day. We recommend wearing a button down shirt that will not disrupt your post-operative dressing when changing later that night.    Bathing: On the morning of your surgery, you may bathe or shower normally. If you get your hair done on a weekly basis, remember to get your hair washed the day before surgery.   - You will need to keep your surgical site dry for a minimum of 48 hours after surgery.    Makeup: If your surgery is on the face, please do not wear any makeup on the day of the surgery.    Jewelry: Please try to avoid wearing jewelry on the day of surgery.    Food: On the morning of surgery, have breakfast but limit your intake of caffeinated beverages. They are diuretic and may inconvenience you during surgery. If you are following up with  another surgeon the same day as your Mohs surgery, you must receive permission to eat breakfast from that surgeon.      What to bring with you on the day of your surgery:  Bring snacks - Since you could be at the office long, you may bring snacks and/or lunch with you. Some snacks and drinks are available at the office as well.   Bring a sweater - Bring a sweater or jacket that buttons or zips down the front and will not disturb your wound dressing during removal.  Bring something to do - You will be spending much of the day in our office. There will be 45-60 minute waiting periods  between layers/stages, and while there is a television with cable in every room, it is nice to have something to keep you occupied such as books, magazines, knitting, music, or work.     Planning Ahead:  Other Appointments - It is important to realize that no matter how small the skin cancer appears to be, looks can be deceiving. Since your surgery may last the entire day, you should not schedule any other appointments that day.  Special Occasions - Surgery often creates swelling and bruising. Also, the post-op dressing may be rather large and obvious. Keep this in mind as you arrange your social/work schedule. If an important event is already planned, please check with your referring physician or your Mohs surgeon to see if the surgery can be postponed.  Activity Limits after Surgery - If surgery was performed on your face, we recommend that you keep your activity level to a minimum for 2-3 days (the blood pressure elevation related to exercise can lead to bleeding). If you have stitches in an area that will be under tension or significant movement (neck, back, arms, legs), you will need to avoid heavy lifting (anything over 5 lbs) or exercise for at least 2 weeks and possibly longer. We also advise that you limit out of town travel for the first 7 days after surgery. You should also wait at least 7 days before going into a pool or the  ocean.  Housework - Since you will need to minimize activity after surgery, plan to do your groceries, laundry, gardening, and other heavy household chores prior to your surgery. Please make arrangements for assistance during the post-op period. If surgery is around your mouth area, you may need to eat soft foods, such as soup, milkshakes, or yogurt for 48 hours.    Purchasing bandage supplies: Prior to surgery, please purchase the following items to care for your surgical wound properly.  Cotton swabs (Q-tips)  Vaseline or Aquaphor  Telfa pads (or any non-stick dressing)  Paper tape or Hypafix tape  Gauze pads (3x3)      We will provide you with some bandage supplies after surgery to get you started.    Transportation: It is often reassuring and comforting to have a  drive you to and from the surgery. He or she is welcome to wait in the office during the surgery. Please note that for safety reasons, only the patient is allowed in the procedure room during surgery. Thank you for your cooperation.    Rescheduling: If you need to reschedule your surgery, please notify the office as soon as possible.

## 2024-04-29 NOTE — TELEPHONE ENCOUNTER
Pre- operative Mohs Telephone Scheduling Note    Do you have a pacemaker, defibrillator, spinal or brain stimulator? no    Do you take antibiotics before skin or dental procedures? no  If yes, will likely require pre-operative antibiotics. Ask  the patient why they take the antibiotics (usually because of joint replacement).    Do you have a history of a joint replacements within the past 2 years? no   If yes, will likely require pre-operative antibiotics. Ask if orthopaedic surgeon has prescribed pre-operative antibiotics to take before procedures/dental work?    Do you take any OTC medications that thin your blood (Aspirin, Aleve, Ibuprofen) or supplements that thin your blood (fish oil, garlic, vitamin E, Ginko Biloba)? yes: asa    Do you take any prescribed medications that thin your blood (Coumadin, Plavix, Xarelto, Eliquis or another prescribed blood thinner)? no    Do you have an allergy to lidocaine or epinephrine? no    Do you have allergies to Iodine? no    Do you wear a lidocaine patch? no    Have you ever been diagnosed with HIV, AIDS, Hep B and Hep C? no    Do you use a cane, walker or wheelchair? no    Is the patient from a nursing home? no If yes, Is there any special accommodations that is needed for patient n/a- patient is on leukemia meds and wife stated he has to go off before dentist- I recommended not going off anything without speaking to their dr first, he doesn't have to stop any meds dr prescribed for mohs.    Do you smoke? yes: pack per day      If yes,  patient to try and stop 2 days before surgery and 7 days after the surgery. Minimizing smoking as much as possible during this time will improve healing and the cosmetic result after surgery.    Do you use supplemental oxygen? If so, how many liters and can you be off it for a short period of time? N/a     Date scheduled: July 8, 2024 at 10 am with Dr. Stern    Coordination of Care with other provider (Oculoplastics, Plastics, ENT)  required? no   IF YES, PLEASE FORWARD TO APPROPRIATE PERSONNEL TO HELP COORDINATE.    Are there remaining tumors to be scheduled? no    Was Prior Authorization obtained? No (please use [email or web address] to document prior auth)

## 2024-05-03 DIAGNOSIS — I10 PRIMARY HYPERTENSION: ICD-10-CM

## 2024-05-03 RX ORDER — AMLODIPINE BESYLATE 10 MG/1
10 TABLET ORAL DAILY
Qty: 90 TABLET | Refills: 1 | Status: SHIPPED | OUTPATIENT
Start: 2024-05-03

## 2024-05-03 NOTE — TELEPHONE ENCOUNTER
Reason for call:   [x] Refill   [] Prior Auth  [] Other:     Office:   [x] PCP/Provider -   [] Specialty/Provider -     Medication: AMLODIPINE    Dose/Frequency: 10 MG    Quantity: 90    Pharmacy:   [facility] - [place] - [id number] [place] [address]  Phone: [phone]  Fax: [phone]  LUCIE #: [id number]     Does the patient have enough for 3 days?   [] Yes   [x] No - Send as HP to POD

## 2024-05-14 ENCOUNTER — VBI (OUTPATIENT)
Dept: ADMINISTRATIVE | Facility: OTHER | Age: 69
End: 2024-05-14

## 2024-05-18 ENCOUNTER — TELEPHONE (OUTPATIENT)
Dept: HEMATOLOGY ONCOLOGY | Facility: CLINIC | Age: 69
End: 2024-05-18

## 2024-05-18 NOTE — TELEPHONE ENCOUNTER
Appointment Change  Cancel, Reschedule, Change to Virtual      Who are you speaking with? Spouse   If it is not the patient, is the caller listed on the communication consent form? Yes   Which provider is the appointment scheduled with? Dr Gonzalez   When was the original appointment scheduled?    Please list date and time 6/10/24 1:40 PM   At which location is the appointment scheduled to take place? [facility]   Was the appointment rescheduled?     Was the appointment changed from an in person visit to a virtual visit?    If so, please list the details of the change. 6/24/24 2:40 PM   What is the reason for the appointment change? Provider requested change due to scheduling conflict.        Was STAR transport scheduled? No   Does STAR transport need to be scheduled for the new visit (if applicable) No   Does the patient need an infusion appointment rescheduled? No   Does the patient have an upcoming infusion appointment scheduled? If so, when? No   Is the patient undergoing chemotherapy? No   For appointments cancelled with less than 24 hours:  Was the no-show policy reviewed? Yes

## 2024-06-11 DIAGNOSIS — C91.10 CLL (CHRONIC LYMPHOCYTIC LEUKEMIA) (HCC): ICD-10-CM

## 2024-06-17 DIAGNOSIS — G62.9 PERIPHERAL POLYNEUROPATHY: ICD-10-CM

## 2024-06-17 RX ORDER — GABAPENTIN 100 MG/1
300 CAPSULE ORAL 3 TIMES DAILY
Qty: 810 CAPSULE | Refills: 1 | Status: SHIPPED | OUTPATIENT
Start: 2024-06-17 | End: 2024-09-15

## 2024-06-17 NOTE — TELEPHONE ENCOUNTER
Reason for call:   [x] Refill   [] Prior Auth  [] Other:     Office:   [x] PCP/Provider - Mainor Marshall/ [facility]  [] Specialty/Provider -     Medication: Gabapentin    Dose/Frequency: 100 mg     Quantity: #810    Pharmacy: [facility]    Does the patient have enough for 3 days?   [] Yes   [x] No - Send as HP to POD

## 2024-06-24 ENCOUNTER — TELEPHONE (OUTPATIENT)
Age: 69
End: 2024-06-24

## 2024-06-24 DIAGNOSIS — C91.10 CLL (CHRONIC LYMPHOCYTIC LEUKEMIA) (HCC): Primary | ICD-10-CM

## 2024-06-24 NOTE — TELEPHONE ENCOUNTER
Lab Inquiry   Who are you speaking with? Patient and Spouse     If it is not the patient, are they listed on an active communication consent form? N/A   Name of ordering provider Rachel Cason    What is being requested? Lab orders need to be entered   Lab draw location [facility]   What is the best call back number? [phone]   If patient at the lab, Was a live attempts to contact the team made? NA

## 2024-06-25 DIAGNOSIS — I10 PRIMARY HYPERTENSION: ICD-10-CM

## 2024-06-25 RX ORDER — FUROSEMIDE 40 MG/1
40 TABLET ORAL DAILY
Qty: 90 TABLET | Refills: 1 | Status: SHIPPED | OUTPATIENT
Start: 2024-06-25 | End: 2024-12-22

## 2024-06-25 NOTE — TELEPHONE ENCOUNTER
Reason for call:   [x] Refill   [] Prior Auth  [] Other:     Office:   [x] PCP/Provider -   Mainor Marshall MD  PCP - General  [] Specialty/Provider -     Medication:   furosemide (LASIX) 40 mg tablet () 40 mg, Daily # 90         Pharmacy: [facility] - [place] [id number]    Does the patient have enough for 3 days?   [] Yes   [x] No - Send as HP to POD

## 2024-06-25 NOTE — TELEPHONE ENCOUNTER
Call out to patient, spoke to spouse.  Kizzy informed labs are active in chart to have completed.

## 2024-07-01 ENCOUNTER — APPOINTMENT (OUTPATIENT)
Age: 69
End: 2024-07-01
Payer: COMMERCIAL

## 2024-07-01 DIAGNOSIS — E11.22 TYPE 2 DIABETES MELLITUS WITH STAGE 2 CHRONIC KIDNEY DISEASE, WITHOUT LONG-TERM CURRENT USE OF INSULIN  (HCC): ICD-10-CM

## 2024-07-01 DIAGNOSIS — N18.2 TYPE 2 DIABETES MELLITUS WITH STAGE 2 CHRONIC KIDNEY DISEASE, WITHOUT LONG-TERM CURRENT USE OF INSULIN  (HCC): ICD-10-CM

## 2024-07-01 DIAGNOSIS — C91.10 CLL (CHRONIC LYMPHOCYTIC LEUKEMIA) (HCC): ICD-10-CM

## 2024-07-01 LAB
ALBUMIN SERPL BCG-MCNC: 4.2 G/DL (ref 3.5–5)
ALP SERPL-CCNC: 75 U/L (ref 34–104)
ALT SERPL W P-5'-P-CCNC: 22 U/L (ref 7–52)
ANION GAP SERPL CALCULATED.3IONS-SCNC: 13 MMOL/L (ref 4–13)
AST SERPL W P-5'-P-CCNC: 16 U/L (ref 13–39)
BASOPHILS # BLD AUTO: 0.04 THOUSANDS/ÂΜL (ref 0–0.1)
BASOPHILS NFR BLD AUTO: 0 % (ref 0–1)
BILIRUB SERPL-MCNC: 0.49 MG/DL (ref 0.2–1)
BUN SERPL-MCNC: 15 MG/DL (ref 5–25)
CALCIUM SERPL-MCNC: 9.7 MG/DL (ref 8.4–10.2)
CHLORIDE SERPL-SCNC: 99 MMOL/L (ref 96–108)
CHOLEST SERPL-MCNC: 175 MG/DL
CO2 SERPL-SCNC: 27 MMOL/L (ref 21–32)
CREAT SERPL-MCNC: 0.99 MG/DL (ref 0.6–1.3)
CREAT UR-MCNC: 232.6 MG/DL
EOSINOPHIL # BLD AUTO: 0.09 THOUSAND/ÂΜL (ref 0–0.61)
EOSINOPHIL NFR BLD AUTO: 1 % (ref 0–6)
ERYTHROCYTE [DISTWIDTH] IN BLOOD BY AUTOMATED COUNT: 14.3 % (ref 11.6–15.1)
GFR SERPL CREATININE-BSD FRML MDRD: 77 ML/MIN/1.73SQ M
GLUCOSE P FAST SERPL-MCNC: 111 MG/DL (ref 65–99)
HCT VFR BLD AUTO: 43.2 % (ref 36.5–49.3)
HDLC SERPL-MCNC: 41 MG/DL
HGB BLD-MCNC: 14.6 G/DL (ref 12–17)
IMM GRANULOCYTES # BLD AUTO: 0.08 THOUSAND/UL (ref 0–0.2)
IMM GRANULOCYTES NFR BLD AUTO: 1 % (ref 0–2)
LDLC SERPL CALC-MCNC: 86 MG/DL (ref 0–100)
LYMPHOCYTES # BLD AUTO: 2.89 THOUSANDS/ÂΜL (ref 0.6–4.47)
LYMPHOCYTES NFR BLD AUTO: 27 % (ref 14–44)
MCH RBC QN AUTO: 31.3 PG (ref 26.8–34.3)
MCHC RBC AUTO-ENTMCNC: 33.8 G/DL (ref 31.4–37.4)
MCV RBC AUTO: 93 FL (ref 82–98)
MICROALBUMIN UR-MCNC: 20.7 MG/L
MICROALBUMIN/CREAT 24H UR: 9 MG/G CREATININE (ref 0–30)
MONOCYTES # BLD AUTO: 0.72 THOUSAND/ÂΜL (ref 0.17–1.22)
MONOCYTES NFR BLD AUTO: 7 % (ref 4–12)
NEUTROPHILS # BLD AUTO: 6.77 THOUSANDS/ÂΜL (ref 1.85–7.62)
NEUTS SEG NFR BLD AUTO: 64 % (ref 43–75)
NRBC BLD AUTO-RTO: 0 /100 WBCS
PLATELET # BLD AUTO: 203 THOUSANDS/UL (ref 149–390)
PMV BLD AUTO: 11.7 FL (ref 8.9–12.7)
POTASSIUM SERPL-SCNC: 4 MMOL/L (ref 3.5–5.3)
PROT SERPL-MCNC: 7.2 G/DL (ref 6.4–8.4)
RBC # BLD AUTO: 4.67 MILLION/UL (ref 3.88–5.62)
SODIUM SERPL-SCNC: 139 MMOL/L (ref 135–147)
TRIGL SERPL-MCNC: 238 MG/DL
TSH SERPL DL<=0.05 MIU/L-ACNC: 3.39 UIU/ML (ref 0.45–4.5)
WBC # BLD AUTO: 10.59 THOUSAND/UL (ref 4.31–10.16)

## 2024-07-01 PROCEDURE — 83036 HEMOGLOBIN GLYCOSYLATED A1C: CPT

## 2024-07-01 PROCEDURE — 84443 ASSAY THYROID STIM HORMONE: CPT

## 2024-07-01 PROCEDURE — 82570 ASSAY OF URINE CREATININE: CPT

## 2024-07-01 PROCEDURE — 82043 UR ALBUMIN QUANTITATIVE: CPT

## 2024-07-01 PROCEDURE — 80061 LIPID PANEL: CPT

## 2024-07-02 ENCOUNTER — TELEPHONE (OUTPATIENT)
Dept: ADMINISTRATIVE | Facility: OTHER | Age: 69
End: 2024-07-02

## 2024-07-02 LAB
EST. AVERAGE GLUCOSE BLD GHB EST-MCNC: 169 MG/DL
HBA1C MFR BLD: 7.5 %

## 2024-07-02 NOTE — TELEPHONE ENCOUNTER
07/02/24 1:46 PM    Patient was flagged by a Third Party Payer report as being due for a refill on the following medications, Jardiance. Patient was contacted to review these medications. There was no answer and a message was left.     Thank you.  Yanira Nava  [facility]

## 2024-07-02 NOTE — TELEPHONE ENCOUNTER
07/02/24 2:05 PM    Patient was flagged by a Third Party Payer report as being due for a refill on the following medications, Jardiance 10 mg. Patient was contacted to review these medications. Patient stated he is in need of a refill.  Have is sent to [facility] pharmacy. Message sent to prescribing provider for follow-up.     Thank you.  Yanira Nava  [facility]

## 2024-07-03 ENCOUNTER — TELEPHONE (OUTPATIENT)
Dept: HEMATOLOGY ONCOLOGY | Facility: CLINIC | Age: 69
End: 2024-07-03

## 2024-07-03 NOTE — TELEPHONE ENCOUNTER
Spoke to wife as patient was a no show for his appt. With Rachel Cason today. Wife had tried calling continuously this morning, but was unable to get through. Confirmed with her rescheduled appt. For 7/31/24 with Rachel.

## 2024-08-15 ENCOUNTER — OFFICE VISIT (OUTPATIENT)
Dept: HEMATOLOGY ONCOLOGY | Facility: CLINIC | Age: 69
End: 2024-08-15
Payer: COMMERCIAL

## 2024-08-15 VITALS
WEIGHT: 265.4 LBS | OXYGEN SATURATION: 95 % | RESPIRATION RATE: 18 BRPM | HEART RATE: 56 BPM | DIASTOLIC BLOOD PRESSURE: 80 MMHG | BODY MASS INDEX: 37.15 KG/M2 | TEMPERATURE: 98.4 F | HEIGHT: 71 IN | SYSTOLIC BLOOD PRESSURE: 140 MMHG

## 2024-08-15 DIAGNOSIS — C91.10 CLL (CHRONIC LYMPHOCYTIC LEUKEMIA) (HCC): Primary | ICD-10-CM

## 2024-08-15 DIAGNOSIS — R79.89 ELEVATED FERRITIN LEVEL: ICD-10-CM

## 2024-08-15 PROCEDURE — 99213 OFFICE O/P EST LOW 20 MIN: CPT | Performed by: PHYSICIAN ASSISTANT

## 2024-08-15 NOTE — PROGRESS NOTES
[facility] HEMATOLOGY ONCOLOGY SPECIALISTS [place]  [address]  [facility] [place] [place] [address]  Hematology Ambulatory Follow-Up  Patricio Lara, 1955, [id number]  8/15/2024      Assessment and Plan   1. CLL (chronic lymphocytic leukemia) (HCC)  - CBC and differential; Standing  - LD,Blood; Standing  - Comprehensive metabolic panel; Standing    2. Elevated ferritin level  - Iron Panel (Includes Ferritin, Iron Sat%, Iron, and TIBC); Future    68-year-old male with history of stage III CLL on ibrutinib 280 mg daily who presents as follow-up.  Has been on TKI therapy since 2016.  He self reduced his dose to 280 mg due to side effects, fatigue etc.  He has remained on this dose since.  His most recent CBC shows a mildly elevated leukocytosis with WBC 10.59 however his absolute lymphocyte count remains normal.  There are no cytopenias and he denies constitutional symptoms.  Physical exam today is unremarkable.    Discussion/plan  Ibrutinib 280 mg daily seems to be controlling his CLL.  We will continue with current dose  Continue to monitor CBC, CMP, LD every 3 months  He has history of elevated ferritin levels in the past, we will obtain updated iron panel to ensure that these levels have normalized.  RTC 6 months    Patient voiced agreement and understanding to the above.   Patient advised to call the Hematology/Oncology office with any questions and concerns regarding the above.    Barrier(s) to care: None  The patient is able to self care.    Rachel Cason PA-C   Medical Oncology/Hematology  [facility]    Subjective     Chief Complaint   Patient presents with    Follow-up       History of present illness: 68-year-old male with history of HTN, GERD, type 2 diabetes, chronic kidney disease, bipolar, depression, tobacco use, COPD who presents as follow-up for CLL.    Previously seen by Dr Lewis , history as per below:  CLL stage III   - Stage III with  anemia; also having splenomegaly.  No FISH panel  - initially had been on ibrutinib 420 mg since 08/2018.  - highest white blood cell 128k, decreased to 8 K    - 4/2021:  Due to fatigue and potential shoulder surgery, patient has decreased ibrutinib to 280 mg daily by himself.  WBC and lymphocyte remains stable.  Previous hematologist had him on 280 mg since then.  9/23/2022: WBC 8.64k, Hgb 14.7, plt 177k, Vit B12 1998, creat 1.08  3/29/2023: WBC 7.82k, Hgb 15.3, plt 174k  12/5/2023: WBC 8.63k, Hgb 14.5, plt 187k     2, anemia  - resolved on treatment      08/15/24 :  Lab Results   Component Value Date    IRON 294 (H) 03/27/2018    TIBC 316 03/27/2018    FERRITIN 920 (H) 06/17/2022     Lab Results   Component Value Date    WBC 10.59 (H) 07/01/2024    HGB 14.6 07/01/2024    HCT 43.2 07/01/2024    MCV 93 07/01/2024     07/01/2024       Interval history: Patient overall is doing very well.  He denies any fever, night sweats, unintentional weight loss, lymphadenopathy, significant fatigue or other acute complaints.  He continues on ibrutinib daily and reports compliance.  He recently had basal cell carcinoma removed from his left arm in April 2024.    Review of Systems   All other systems reviewed and are negative.      Patient Active Problem List   Diagnosis    Hypertension    Depression    Polypharmacy    Bipolar 1 disorder (HCC)    CLL (chronic lymphocytic leukemia) (HCC)    Chronic obstructive pulmonary disease (HCC)    Type 2 diabetes mellitus with stage 2 chronic kidney disease  (HCC)    Stage 2 chronic kidney disease    Ambulatory dysfunction    Marijuana abuse    Tobacco abuse    Renal cyst, right    Dilated aortic root (HCC)    Dysphonia    Chronic cough    Pharyngoesophageal dysphagia    Reflux laryngitis    Gastroesophageal reflux disease without esophagitis    Muscle tension dysphonia    Macrina's edema of vocal folds    Anxiety     Past Medical History:   Diagnosis Date    Anemia     Anxiety      Bipolar disorder (HCC)     Cancer (HCC)     Colon polyp     History of alcohol abuse     Hyperlipidemia     Hypertension     Hypertension     Insomnia     Leukemia (HCC)     Psychiatric disorder     Sleep apnea with use of continuous positive airway pressure (CPAP)      Past Surgical History:   Procedure Laterality Date    COLONOSCOPY      EGD AND COLONOSCOPY N/A 3/30/2018    Procedure: EGD AND COLONOSCOPY;  Surgeon: Eric Tamayo MD;  Location: [facility] GI LAB;  Service: Gastroenterology     Family History   Problem Relation Age of Onset    Coronary artery disease Mother     Diabetes Mother     No Known Problems Father     Hepatitis Sister     Cancer Brother     Prostate cancer Brother     Early death Brother      Social History     Socioeconomic History    Marital status: /Civil Union     Spouse name: None    Number of children: None    Years of education: None    Highest education level: None   Occupational History    None   Tobacco Use    Smoking status: Former     Current packs/day: 0.00     Average packs/day: 1 pack/day for 40.0 years (40.0 ttl pk-yrs)     Types: Cigarettes     Start date: 10/13/1980     Quit date: 10/13/2020     Years since quitting: 3.8     Passive exposure: Past    Smokeless tobacco: Never   Vaping Use    Vaping status: Never Used   Substance and Sexual Activity    Alcohol use: No     Comment: last drink nov 19 2017    Drug use: No     Comment: hx of heroin and subutex abuse    Sexual activity: Not Currently   Other Topics Concern    None   Social History Narrative    None     Social Determinants of Health     Financial Resource Strain: Low Risk  (12/20/2023)    Overall Financial Resource Strain (CARDIA)     Difficulty of Paying Living Expenses: Not hard at all   Food Insecurity: Not on file   Transportation Needs: No Transportation Needs (12/20/2023)    PRAPARE - Transportation     Lack of Transportation (Medical): No     Lack of Transportation (Non-Medical): No   Physical Activity:  Not on file   Stress: Not on file   Social Connections: Not on file   Intimate Partner Violence: Not on file   Housing Stability: Not on file       Current Outpatient Medications:     ALPRAZolam (XANAX) 1 mg tablet, , Disp: , Rfl: 3    amLODIPine (NORVASC) 10 mg tablet, Take 1 tablet (10 mg total) by mouth daily, Disp: 90 tablet, Rfl: 1    aspirin 81 mg chewable tablet, Chew 1 tablet (81 mg total) daily, Disp: 90 tablet, Rfl: 3    citalopram (CeleXA) 20 mg tablet, Take 20 mg by mouth daily, Disp: , Rfl:     Empagliflozin (JARDIANCE) 10 MG TABS tablet, Take 1 tablet (10 mg total) by mouth daily, Disp: 30 tablet, Rfl: 5    furosemide (LASIX) 40 mg tablet, Take 1 tablet (40 mg total) by mouth daily Then return to 40 mg daily, Disp: 90 tablet, Rfl: 1    gabapentin (Neurontin) 100 mg capsule, Take 3 capsules (300 mg total) by mouth 3 (three) times a day, Disp: 810 capsule, Rfl: 1    Ibrutinib (Imbruvica) 280 MG TABS, Take 1 tablet ( 280 mg) by mouth once daily., Disp: 28 tablet, Rfl: 10    losartan (COZAAR) 100 MG tablet, Take 1 tablet (100 mg total) by mouth daily, Disp: 90 tablet, Rfl: 3    metoprolol succinate (TOPROL-XL) 50 mg 24 hr tablet, Take 1 tablet (50 mg total) by mouth daily, Disp: 90 tablet, Rfl: 3    nystatin (MYCOSTATIN) 500,000 units/5 mL suspension, Apply 5 mL (500,000 Units total) to the mouth or throat 4 (four) times a day, Disp: 60 mL, Rfl: 0    potassium chloride (Klor-Con M10) 10 mEq tablet, Take 1 tablet (10 mEq total) by mouth 2 (two) times a day, Disp: 90 tablet, Rfl: 3    Psyllium (DAILY FIBER PO), Take 1 capsule by mouth in the morning, Disp: , Rfl:     risperiDONE (RisperDAL) 1 mg tablet, Take 1 mg by mouth 2 (two) times a day  , Disp: , Rfl:     rosuvastatin (CRESTOR) 10 MG tablet, Take 1 tablet (10 mg total) by mouth daily, Disp: 90 tablet, Rfl: 3    terazosin (HYTRIN) 5 mg capsule, Take 1 capsule (5 mg total) by mouth daily, Disp: 90 capsule, Rfl: 3  No Known Allergies    Objective   BP  "140/80   Pulse 56   Temp 98.4 °F (36.9 °C) (Temporal)   Resp 18   Ht 5' 11\" (1.803 m)   Wt 120 kg (265 lb 6.4 oz)   SpO2 95%   BMI 37.02 kg/m²    Physical Exam  Vitals reviewed.   Constitutional:       Appearance: He is obese.   HENT:      Head: Normocephalic.   Cardiovascular:      Rate and Rhythm: Normal rate and regular rhythm.   Pulmonary:      Effort: Pulmonary effort is normal.      Breath sounds: Normal breath sounds.   Abdominal:      Palpations: Abdomen is soft. There is no hepatomegaly or splenomegaly.      Tenderness: There is no abdominal tenderness.      Comments: exam limited by body habitus   Musculoskeletal:      Cervical back: Neck supple.   Lymphadenopathy:      Cervical: No cervical adenopathy.      Upper Body:      Right upper body: No supraclavicular or axillary adenopathy.      Left upper body: No supraclavicular or axillary adenopathy.   Skin:     Findings: No rash.   Neurological:      Mental Status: He is alert.         Result Review  Labs:  No visits with results within 30 Day(s) from this visit.   Latest known visit with results is:   Appointment on 07/01/2024   Component Date Value Ref Range Status    Creatinine, Ur 07/01/2024 232.6  Reference range not established. mg/dL Final    Albumin,U,Random 07/01/2024 20.7 (H)  <20.0 mg/L Final    Albumin Creat Ratio 07/01/2024 9  0 - 30 mg/g creatinine Final    Hemoglobin A1C 07/01/2024 7.5 (H)  Normal 4.0-5.6%; PreDiabetic 5.7-6.4%; Diabetic >=6.5%; Glycemic control for adults with diabetes <7.0% % Final    EAG 07/01/2024 169  mg/dl Final    TSH 3RD GENERATON 07/01/2024 3.392  0.450 - 4.500 uIU/mL Final    Adult TSH (3rd generation) reference range follows the recommended guidelines of the American Thyroid Association, January, 2020.    Cholesterol 07/01/2024 175  See Comment mg/dL Final    Cholesterol:         Pediatric <18 Years        Desirable          <170 mg/dL      Borderline High    170-199 mg/dL      High               >=200 " mg/dL        Adult >=18 Years            Desirable         <200 mg/dL      Borderline High   200-239 mg/dL      High              >239 mg/dL      Triglycerides 07/01/2024 238 (H)  See Comment mg/dL Final    Triglyceride:     0-9Y            <75mg/dL     10Y-17Y         <90 mg/dL       >=18Y     Normal          <150 mg/dL     Borderline High 150-199 mg/dL     High            200-499 mg/dL        Very High       >499 mg/dL    Specimen collection should occur prior to Metamizole administration due to the potential for falsely depressed results.    HDL, Direct 07/01/2024 41  >=40 mg/dL Final    LDL Calculated 07/01/2024 86  0 - 100 mg/dL Final    LDL Cholesterol:     Optimal           <100 mg/dl     Near Optimal      100-129 mg/dl     Above Optimal       Borderline High 130-159 mg/dl       High            160-189 mg/dl       Very High       >189 mg/dl         This screening LDL is a calculated result.   It does not have the accuracy of the Direct Measured LDL in the monitoring of patients with hyperlipidemia and/or statin therapy.   Direct Measure LDL (PNP243) must be ordered separately in these patients.       Imaging:   I reviewed relevant imaging    Please note:  This report has been generated by a voice recognition software system. Therefore there may be syntax, spelling, and/or grammatical errors. Please call if you have any questions.

## 2024-08-19 DIAGNOSIS — I10 ESSENTIAL HYPERTENSION: ICD-10-CM

## 2024-08-19 RX ORDER — METOPROLOL SUCCINATE 50 MG/1
50 TABLET, EXTENDED RELEASE ORAL DAILY
Qty: 90 TABLET | Refills: 0 | Status: SHIPPED | OUTPATIENT
Start: 2024-08-19

## 2024-08-19 NOTE — TELEPHONE ENCOUNTER
Reason for call:   [x] Refill   [] Prior Auth  [] Other:     Office:   [x] PCP/Provider - Mainor Marshall MD   [] Specialty/Provider -     Medication: metoprolol succinate (TOPROL-XL) 50 mg 24 hr tablet     Dose/Frequency: Take 1 tablet (50 mg total) by mouth daily     Quantity: 90    [id number]      Does the patient have enough for 3 days?   [x] Yes   [] No - Send as HP to POD

## 2024-08-21 DIAGNOSIS — N18.2 TYPE 2 DIABETES MELLITUS WITH STAGE 2 CHRONIC KIDNEY DISEASE, WITHOUT LONG-TERM CURRENT USE OF INSULIN  (HCC): ICD-10-CM

## 2024-08-21 DIAGNOSIS — E11.22 TYPE 2 DIABETES MELLITUS WITH STAGE 2 CHRONIC KIDNEY DISEASE, WITHOUT LONG-TERM CURRENT USE OF INSULIN  (HCC): ICD-10-CM

## 2024-08-21 DIAGNOSIS — I10 PRIMARY HYPERTENSION: ICD-10-CM

## 2024-08-21 RX ORDER — LOSARTAN POTASSIUM 100 MG/1
100 TABLET ORAL DAILY
Qty: 90 TABLET | Refills: 0 | Status: SHIPPED | OUTPATIENT
Start: 2024-08-21 | End: 2024-11-19

## 2024-10-08 NOTE — TELEPHONE ENCOUNTER
Patient requesting call from Anthony Forward to discuss current issue    Patient would like to get explain on this IPP device No

## 2024-10-11 ENCOUNTER — TELEPHONE (OUTPATIENT)
Age: 69
End: 2024-10-11

## 2024-10-11 NOTE — TELEPHONE ENCOUNTER
Left message for patient that appointment has been rescheduled to a change in the provider's schedule. Patient advised to call back if new appointment time does not work.

## 2024-11-07 ENCOUNTER — TELEPHONE (OUTPATIENT)
Dept: SURGICAL ONCOLOGY | Facility: CLINIC | Age: 69
End: 2024-11-07

## 2024-11-07 NOTE — TELEPHONE ENCOUNTER
Patient Approved for Ian with [facility] Start Date : 08/09/20204 End Date: 11/06/2025 ID# [id number] BIN: [id number] PCN: [id number] Amount:$[id number]    Processed over the phone with Florence VELÁZQUEZ [phone]

## 2024-11-12 ENCOUNTER — TELEPHONE (OUTPATIENT)
Dept: INTERNAL MEDICINE CLINIC | Facility: CLINIC | Age: 69
End: 2024-11-12

## 2024-11-12 NOTE — TELEPHONE ENCOUNTER
No form received, company called, they had wrong fax number on file   Correct fax number provided and will be refaxing

## 2024-11-12 NOTE — TELEPHONE ENCOUNTER
[employer] [facility] [employer] called the RX Refill Line. Message is being forwarded to the office.     They called requested the prescription they faxed be completed and returned     Please review     Phone- [phone]  Fax- [phone]         Spironolactone Counseling: Patient advised regarding risks of diarrhea, abdominal pain, hyperkalemia, birth defects (for female patients), liver toxicity and renal toxicity. The patient may need blood work to monitor liver and kidney function and potassium levels while on therapy. The patient verbalized understanding of the proper use and possible adverse effects of spironolactone.  All of the patient's questions and concerns were addressed.

## 2024-11-25 DIAGNOSIS — E11.22 TYPE 2 DIABETES MELLITUS WITH STAGE 2 CHRONIC KIDNEY DISEASE, WITHOUT LONG-TERM CURRENT USE OF INSULIN  (HCC): ICD-10-CM

## 2024-11-25 DIAGNOSIS — N18.2 TYPE 2 DIABETES MELLITUS WITH STAGE 2 CHRONIC KIDNEY DISEASE, WITHOUT LONG-TERM CURRENT USE OF INSULIN  (HCC): ICD-10-CM

## 2024-11-25 NOTE — TELEPHONE ENCOUNTER
Spouse request refill of Jardiance 10 mg to be filled with [facility].    Requested Prescriptions     Pending Prescriptions Disp Refills    Empagliflozin (JARDIANCE) 10 MG TABS tablet 30 tablet 0     Sig: Take 1 tablet (10 mg total) by mouth daily

## 2024-12-18 DIAGNOSIS — I51.89 DIASTOLIC DYSFUNCTION: ICD-10-CM

## 2024-12-18 RX ORDER — POTASSIUM CHLORIDE 750 MG/1
10 TABLET, EXTENDED RELEASE ORAL 2 TIMES DAILY
Qty: 60 TABLET | Refills: 0 | Status: SHIPPED | OUTPATIENT
Start: 2024-12-18

## 2024-12-18 NOTE — TELEPHONE ENCOUNTER
Reason for call:   [x] Refill   [] Prior Auth  [] Other:     Office:   [x] PCP/Provider -  [facility]  Authorized By: Mainor Marshall MD    [] Specialty/Provider -     Medication:   potassium chloride (Klor-Con M10) 10 mEq tablet 10 mEq, 2 times daily         Pharmacy: [facility] - [phone]     Does the patient have enough for 3 days?   [] Yes   [x] No - Send as HP to POD

## 2024-12-26 DIAGNOSIS — I10 ESSENTIAL HYPERTENSION: ICD-10-CM

## 2024-12-26 DIAGNOSIS — N18.2 TYPE 2 DIABETES MELLITUS WITH STAGE 2 CHRONIC KIDNEY DISEASE, UNSPECIFIED WHETHER LONG TERM INSULIN USE  (HCC): ICD-10-CM

## 2024-12-26 DIAGNOSIS — E11.22 TYPE 2 DIABETES MELLITUS WITH STAGE 2 CHRONIC KIDNEY DISEASE, UNSPECIFIED WHETHER LONG TERM INSULIN USE  (HCC): ICD-10-CM

## 2024-12-26 DIAGNOSIS — I10 PRIMARY HYPERTENSION: ICD-10-CM

## 2024-12-26 RX ORDER — LOSARTAN POTASSIUM 100 MG/1
100 TABLET ORAL DAILY
Qty: 90 TABLET | Refills: 0 | Status: SHIPPED | OUTPATIENT
Start: 2024-12-26 | End: 2025-06-24

## 2024-12-26 RX ORDER — METOPROLOL SUCCINATE 50 MG/1
50 TABLET, EXTENDED RELEASE ORAL DAILY
Qty: 90 TABLET | Refills: 0 | Status: SHIPPED | OUTPATIENT
Start: 2024-12-26

## 2024-12-26 RX ORDER — ROSUVASTATIN CALCIUM 10 MG/1
10 TABLET, COATED ORAL DAILY
Qty: 90 TABLET | Refills: 0 | Status: SHIPPED | OUTPATIENT
Start: 2024-12-26

## 2024-12-30 NOTE — TELEPHONE ENCOUNTER
Caller: patient    Doctor: Belkis Carr    Reason for call: Paul Higgins was scheduled for today at 1 pm. Patricio's wife is scheduled on 11/28 (KXV:[id number]). THEY WANT TO SWITCH APPTS. I was able to cancel Alisha's for today but it would not let me schedule wife's appt.  Please Assist.     Call back#: [phone]
Done.
no

## 2025-01-07 DIAGNOSIS — I10 PRIMARY HYPERTENSION: ICD-10-CM

## 2025-01-07 RX ORDER — AMLODIPINE BESYLATE 10 MG/1
10 TABLET ORAL DAILY
Qty: 30 TABLET | Refills: 0 | Status: SHIPPED | OUTPATIENT
Start: 2025-01-07

## 2025-01-07 NOTE — TELEPHONE ENCOUNTER
Reason for call:   [x] Refill   [] Prior Auth  [] Other:     Office:   [x] PCP/Provider - Mainor Marshall  [] Specialty/Provider -     Medication: Amlodipine    Dose/Frequency: 10 mg Daily     Quantity: 90    Pharmacy: [facility] [id number]    Does the patient have enough for 3 days?   [] Yes   [x] No - Send as HP to POD

## 2025-01-21 ENCOUNTER — DOCUMENTATION (OUTPATIENT)
Dept: HEMATOLOGY ONCOLOGY | Facility: CLINIC | Age: 70
End: 2025-01-21

## 2025-01-21 NOTE — PROGRESS NOTES
This writer called S at [phone] and spoke with Shiv.     ID #: [id number]   Card ID #: [id number]   GRP: [id number]   PCN: [id number]   BIN: [id number]   $4,500     Effective Dates:  1/18/25-1/21/26    Tabitha Valenzuela MPH  Phone: [phone]  Email: [email or web address]

## 2025-02-19 DIAGNOSIS — I51.89 DIASTOLIC DYSFUNCTION: ICD-10-CM

## 2025-02-19 RX ORDER — POTASSIUM CHLORIDE 750 MG/1
10 TABLET, EXTENDED RELEASE ORAL 2 TIMES DAILY
Qty: 60 TABLET | Refills: 0 | Status: SHIPPED | OUTPATIENT
Start: 2025-02-19

## 2025-02-19 NOTE — TELEPHONE ENCOUNTER
Reason for call:   [x] Refill   [] Prior Auth  [] Other:     Office:   [x] PCP/Provider -   [] Specialty/Provider -     Medication:   - Potassium Chloride 10 mEq- take 1 tablet by mouth 2 times a day       Pharmacy: \[facility]"" [facility]    Does the patient have enough for 3 days?   [] Yes   [x] No - Send as HP to POD

## 2025-02-21 ENCOUNTER — TELEPHONE (OUTPATIENT)
Dept: HEMATOLOGY ONCOLOGY | Facility: CLINIC | Age: 70
End: 2025-02-21

## 2025-02-21 DIAGNOSIS — C91.10 CLL (CHRONIC LYMPHOCYTIC LEUKEMIA) (HCC): Primary | ICD-10-CM

## 2025-02-21 NOTE — TELEPHONE ENCOUNTER
Spoke with wife Kizzy & Patricio about their labs and they verbalized understanding and agreed to go get them done for upcoming lesli

## 2025-02-25 DIAGNOSIS — I10 PRIMARY HYPERTENSION: ICD-10-CM

## 2025-02-25 RX ORDER — FUROSEMIDE 40 MG/1
40 TABLET ORAL DAILY
Qty: 90 TABLET | Refills: 0 | Status: SHIPPED | OUTPATIENT
Start: 2025-02-25 | End: 2025-08-24

## 2025-02-25 NOTE — TELEPHONE ENCOUNTER
Reason for call:   [x] Refill   [] Prior Auth  [] Other:     Office:   [x] PCP/Provider - Mainor Marshall   [] Specialty/Provider -     Medication: furosemide (LASIX) 40 mg tablet     Dose/Frequency: Take 1 tablet (40 mg total) by mouth daily     Quantity: 90    Pharmacy: [facility]     Does the patient have enough for 3 days?   [] Yes   [x] No - Send as HP to POD

## 2025-02-26 ENCOUNTER — APPOINTMENT (OUTPATIENT)
Age: 70
End: 2025-02-26
Payer: COMMERCIAL

## 2025-02-26 DIAGNOSIS — E11.22 TYPE 2 DIABETES MELLITUS WITH STAGE 2 CHRONIC KIDNEY DISEASE, WITHOUT LONG-TERM CURRENT USE OF INSULIN  (HCC): ICD-10-CM

## 2025-02-26 DIAGNOSIS — N18.2 TYPE 2 DIABETES MELLITUS WITH STAGE 2 CHRONIC KIDNEY DISEASE, WITHOUT LONG-TERM CURRENT USE OF INSULIN  (HCC): ICD-10-CM

## 2025-02-26 DIAGNOSIS — R79.89 ELEVATED FERRITIN LEVEL: ICD-10-CM

## 2025-02-26 DIAGNOSIS — C91.10 CLL (CHRONIC LYMPHOCYTIC LEUKEMIA) (HCC): ICD-10-CM

## 2025-02-26 LAB
BASOPHILS # BLD AUTO: 0.03 THOUSANDS/ÂΜL (ref 0–0.1)
BASOPHILS NFR BLD AUTO: 0 % (ref 0–1)
EOSINOPHIL # BLD AUTO: 0.06 THOUSAND/ÂΜL (ref 0–0.61)
EOSINOPHIL NFR BLD AUTO: 1 % (ref 0–6)
ERYTHROCYTE [DISTWIDTH] IN BLOOD BY AUTOMATED COUNT: 13.7 % (ref 11.6–15.1)
HCT VFR BLD AUTO: 40 % (ref 36.5–49.3)
HGB BLD-MCNC: 13.3 G/DL (ref 12–17)
IMM GRANULOCYTES # BLD AUTO: 0.08 THOUSAND/UL (ref 0–0.2)
IMM GRANULOCYTES NFR BLD AUTO: 1 % (ref 0–2)
LYMPHOCYTES # BLD AUTO: 1.79 THOUSANDS/ÂΜL (ref 0.6–4.47)
LYMPHOCYTES NFR BLD AUTO: 19 % (ref 14–44)
MCH RBC QN AUTO: 31.4 PG (ref 26.8–34.3)
MCHC RBC AUTO-ENTMCNC: 33.3 G/DL (ref 31.4–37.4)
MCV RBC AUTO: 95 FL (ref 82–98)
MONOCYTES # BLD AUTO: 0.77 THOUSAND/ÂΜL (ref 0.17–1.22)
MONOCYTES NFR BLD AUTO: 8 % (ref 4–12)
NEUTROPHILS # BLD AUTO: 6.58 THOUSANDS/ÂΜL (ref 1.85–7.62)
NEUTS SEG NFR BLD AUTO: 71 % (ref 43–75)
NRBC BLD AUTO-RTO: 0 /100 WBCS
PLATELET # BLD AUTO: 192 THOUSANDS/UL (ref 149–390)
PMV BLD AUTO: 11.4 FL (ref 8.9–12.7)
RBC # BLD AUTO: 4.23 MILLION/UL (ref 3.88–5.62)
WBC # BLD AUTO: 9.31 THOUSAND/UL (ref 4.31–10.16)

## 2025-02-26 PROCEDURE — 80053 COMPREHEN METABOLIC PANEL: CPT

## 2025-02-26 PROCEDURE — 85025 COMPLETE CBC W/AUTO DIFF WBC: CPT

## 2025-02-26 PROCEDURE — 83550 IRON BINDING TEST: CPT

## 2025-02-26 PROCEDURE — 83615 LACTATE (LD) (LDH) ENZYME: CPT

## 2025-02-26 PROCEDURE — 36415 COLL VENOUS BLD VENIPUNCTURE: CPT

## 2025-02-26 PROCEDURE — 83540 ASSAY OF IRON: CPT

## 2025-02-26 PROCEDURE — 82728 ASSAY OF FERRITIN: CPT

## 2025-02-26 NOTE — TELEPHONE ENCOUNTER
Reason for call:   [x] Refill   [] Prior Auth  [] Other:     Office:   [x] PCP/Provider - Dr Marshall   [] Specialty/Provider -     Medication: jardiance     Dose/Frequency: 10 mg take daily     Quantity: 30    Pharmacy: [facility] [address]    Does the patient have enough for 3 days?   [] Yes   [x] No - Send as HP to POD- pt is out of medication

## 2025-02-27 ENCOUNTER — OFFICE VISIT (OUTPATIENT)
Dept: HEMATOLOGY ONCOLOGY | Facility: CLINIC | Age: 70
End: 2025-02-27
Payer: COMMERCIAL

## 2025-02-27 VITALS
DIASTOLIC BLOOD PRESSURE: 84 MMHG | TEMPERATURE: 97.8 F | BODY MASS INDEX: 37.38 KG/M2 | HEART RATE: 57 BPM | WEIGHT: 267 LBS | SYSTOLIC BLOOD PRESSURE: 118 MMHG | OXYGEN SATURATION: 94 % | HEIGHT: 71 IN

## 2025-02-27 DIAGNOSIS — E83.10 DISORDER OF IRON METABOLISM, UNSPECIFIED: ICD-10-CM

## 2025-02-27 DIAGNOSIS — R13.19 ESOPHAGEAL DYSPHAGIA: ICD-10-CM

## 2025-02-27 DIAGNOSIS — C44.619 BASAL CELL CARCINOMA OF FOREARM, LEFT: ICD-10-CM

## 2025-02-27 DIAGNOSIS — C91.10 CLL (CHRONIC LYMPHOCYTIC LEUKEMIA) (HCC): Primary | ICD-10-CM

## 2025-02-27 LAB
ALBUMIN SERPL BCG-MCNC: 4.2 G/DL (ref 3.5–5)
ALP SERPL-CCNC: 120 U/L (ref 34–104)
ALT SERPL W P-5'-P-CCNC: 24 U/L (ref 7–52)
ANION GAP SERPL CALCULATED.3IONS-SCNC: 13 MMOL/L (ref 4–13)
AST SERPL W P-5'-P-CCNC: 20 U/L (ref 13–39)
BILIRUB SERPL-MCNC: 0.38 MG/DL (ref 0.2–1)
BUN SERPL-MCNC: 17 MG/DL (ref 5–25)
CALCIUM SERPL-MCNC: 9.4 MG/DL (ref 8.4–10.2)
CHLORIDE SERPL-SCNC: 102 MMOL/L (ref 96–108)
CO2 SERPL-SCNC: 25 MMOL/L (ref 21–32)
CREAT SERPL-MCNC: 0.89 MG/DL (ref 0.6–1.3)
FERRITIN SERPL-MCNC: 655 NG/ML (ref 24–336)
GFR SERPL CREATININE-BSD FRML MDRD: 86 ML/MIN/1.73SQ M
GLUCOSE P FAST SERPL-MCNC: 157 MG/DL (ref 65–99)
IRON SATN MFR SERPL: 27 % (ref 15–50)
IRON SERPL-MCNC: 69 UG/DL (ref 50–212)
LDH SERPL-CCNC: 167 U/L (ref 140–271)
POTASSIUM SERPL-SCNC: 4 MMOL/L (ref 3.5–5.3)
PROT SERPL-MCNC: 7.1 G/DL (ref 6.4–8.4)
SODIUM SERPL-SCNC: 140 MMOL/L (ref 135–147)
TIBC SERPL-MCNC: 259 UG/DL (ref 250–450)
TRANSFERRIN SERPL-MCNC: 185 MG/DL (ref 203–362)
UIBC SERPL-MCNC: 190 UG/DL (ref 155–355)

## 2025-02-27 PROCEDURE — 99214 OFFICE O/P EST MOD 30 MIN: CPT | Performed by: PHYSICIAN ASSISTANT

## 2025-02-27 NOTE — ASSESSMENT & PLAN NOTE
Has been on TKI therapy since 2016   He self reduced his dose to 280 mg years ago due to side effects, fatigue etc.  He has remained on this dose since  Ibrutinib 280 mg daily seems to be controlling his CLL.  We will continue with current dose  Continue to monitor CBC, CMP, LD every 3 months    Orders:    CBC and differential; Future    Comprehensive metabolic panel; Future    LD,Blood; Future    CBC and differential; Future    LD,Blood; Future    Comprehensive metabolic panel; Future     BUE and BLE >3/5 grossly assessed via functional mobility

## 2025-02-27 NOTE — PATIENT INSTRUCTIONS
Follow up with GI for dysphagia for possible dilation (Dr. Stephen)   [address] [facility] [place] [id number]

## 2025-02-27 NOTE — PROGRESS NOTES
Name: Patricio Lara      : 1955      MRN: [id number]  Encounter Provider: Rachel Cason PA-C  Encounter Date: 2025   Encounter department: [facility] HEMATOLOGY ONCOLOGY SPECIALISTS [place]  :  68-year-old male with history of stage III CLL on ibrutinib 280 mg daily who presents as follow-up. His most recent CBC shows WBC 9.3, hemoglobin 13.3, ,000, absolute lymphocyte count 1.79. He denies constitutional symptoms.  He is experiencing intermittent dysphagia.  He has upcoming surgery on his left forearm in May.  Assessment & Plan  CLL (chronic lymphocytic leukemia) (HCC)  Has been on TKI therapy since 2016   He self reduced his dose to 280 mg years ago due to side effects, fatigue etc.  He has remained on this dose since  Ibrutinib 280 mg daily seems to be controlling his CLL.  We will continue with current dose  Continue to monitor CBC, CMP, LD every 3 months    Orders:    CBC and differential; Future    Comprehensive metabolic panel; Future    LD,Blood; Future    CBC and differential; Future    LD,Blood; Future    Comprehensive metabolic panel; Future    Esophageal dysphagia  Has had dilation previouos   Refer back to GI for consideration of repeat endoscopy with dilation   Orders:    Ambulatory Referral to Gastroenterology; Future    Basal cell carcinoma of forearm, left  Planning for surgical excision with dermatology in may   Patient advised to hold Ibrutinib 7-10 days prior to surgery, resume 24 hours after surgery if safe from dermatology standpoint        Elevated ferritin  Ferritin 655   Check hemochromatosis gene mutation   Orders:    Hemochromatosis mutation; Future        Return in about 6 months (around 2025) for Office Visit.    History of Present Illness   Chief Complaint   Patient presents with    Follow-up     6 Month Follow Up      68-year-old male with history of HTN, GERD, type 2 diabetes, chronic kidney disease, bipolar, depression, tobacco use, COPD  "who presents as follow-up for CLL.     Previously seen by Dr Lewis , history as per below:  CLL stage III   - Stage III with anemia; also having splenomegaly.  No FISH panel  - initially had been on ibrutinib 420 mg since 08/2018.  - highest white blood cell 128k, decreased to 8 K    - 4/2021:  Due to fatigue and potential shoulder surgery, patient has decreased ibrutinib to 280 mg daily by himself.  WBC and lymphocyte remains stable.  Previous hematologist had him on 280 mg since then.  9/23/2022: WBC 8.64k, Hgb 14.7, plt 177k, Vit B12 1998, creat 1.08  3/29/2023: WBC 7.82k, Hgb 15.3, plt 174k  12/5/2023: WBC 8.63k, Hgb 14.5, plt 187k  08/15/2024: WBC 10.59, hgb 14.6,    02/26/2025: WBC 9.31, hgb 13.1,     2, anemia  - resolved on treatment    Pertinent Medical History     02/27/25: Continues on ibrutinib 280 mg daily.  He has some solid food dysphagia intermittently.  Had dilation in the past.  No fever, night sweats, unintentional weight loss, new lumps/bumps/lymph nodes.  Denies abdominal pain, nausea, hematochezia, melena or hematuria.     Review of Systems   Respiratory:  Negative for shortness of breath.    Cardiovascular:  Negative for chest pain and leg swelling.   Gastrointestinal:  Negative for blood in stool.   Genitourinary:  Negative for hematuria.   Hematological:  Does not bruise/bleed easily.   All other systems reviewed and are negative.          Objective   /84 (BP Location: Left arm, Patient Position: Sitting)   Pulse 57   Temp 97.8 °F (36.6 °C) (Temporal)   Ht 5' 11\" (1.803 m)   Wt 121 kg (267 lb)   SpO2 94%   BMI 37.24 kg/m²     Physical Exam  Vitals reviewed.   Constitutional:       Appearance: He is obese.   HENT:      Head: Normocephalic.   Cardiovascular:      Rate and Rhythm: Normal rate and regular rhythm.   Pulmonary:      Effort: Pulmonary effort is normal.      Breath sounds: Normal breath sounds.   Abdominal:      Palpations: Abdomen is soft. There is no " hepatomegaly or splenomegaly.      Tenderness: There is no abdominal tenderness.      Comments: Evaluation limited by body habitus      Musculoskeletal:      Cervical back: Neck supple.   Lymphadenopathy:      Cervical: No cervical adenopathy.      Upper Body:      Right upper body: No supraclavicular or axillary adenopathy.      Left upper body: No supraclavicular or axillary adenopathy.   Skin:     Findings: No rash.   Neurological:      Mental Status: He is alert.         Labs: I have reviewed the following labs:  Results for orders placed or performed in visit on 02/26/25   CBC and differential   Result Value Ref Range    WBC 9.31 4.31 - 10.16 Thousand/uL    RBC 4.23 3.88 - 5.62 Million/uL    Hemoglobin 13.3 12.0 - 17.0 g/dL    Hematocrit 40.0 36.5 - 49.3 %    MCV 95 82 - 98 fL    MCH 31.4 26.8 - 34.3 pg    MCHC 33.3 31.4 - 37.4 g/dL    RDW 13.7 11.6 - 15.1 %    MPV 11.4 8.9 - 12.7 fL    Platelets 192 149 - 390 Thousands/uL    nRBC 0 /100 WBCs    Segmented % 71 43 - 75 %    Immature Grans % 1 0 - 2 %    Lymphocytes % 19 14 - 44 %    Monocytes % 8 4 - 12 %    Eosinophils Relative 1 0 - 6 %    Basophils Relative 0 0 - 1 %    Absolute Neutrophils 6.58 1.85 - 7.62 Thousands/µL    Absolute Immature Grans 0.08 0.00 - 0.20 Thousand/uL    Absolute Lymphocytes 1.79 0.60 - 4.47 Thousands/µL    Absolute Monocytes 0.77 0.17 - 1.22 Thousand/µL    Eosinophils Absolute 0.06 0.00 - 0.61 Thousand/µL    Basophils Absolute 0.03 0.00 - 0.10 Thousands/µL   Comprehensive metabolic panel   Result Value Ref Range    Sodium 140 135 - 147 mmol/L    Potassium 4.0 3.5 - 5.3 mmol/L    Chloride 102 96 - 108 mmol/L    CO2 25 21 - 32 mmol/L    ANION GAP 13 4 - 13 mmol/L    BUN 17 5 - 25 mg/dL    Creatinine 0.89 0.60 - 1.30 mg/dL    Glucose, Fasting 157 (H) 65 - 99 mg/dL    Calcium 9.4 8.4 - 10.2 mg/dL    AST 20 13 - 39 U/L    ALT 24 7 - 52 U/L    Alkaline Phosphatase 120 (H) 34 - 104 U/L    Total Protein 7.1 6.4 - 8.4 g/dL    Albumin 4.2 3.5 -  5.0 g/dL    Total Bilirubin 0.38 0.20 - 1.00 mg/dL    eGFR 86 ml/min/1.73sq m   LD,Blood   Result Value Ref Range     140 - 271 U/L   TIBC Panel (incl. Iron, TIBC, % Iron Saturation)   Result Value Ref Range    Iron Saturation 27 15 - 50 %    TIBC 259 250 - 450 ug/dL    Iron 69 50 - 212 ug/dL    Transferrin 185 (L) 203 - 362 mg/dL    UIBC 190 155 - 355 ug/dL   Result Value Ref Range    Ferritin 655 (H) 24 - 336 ng/mL     *Note: Due to a large number of results and/or encounters for the requested time period, some results have not been displayed. A complete set of results can be found in Results Review.

## 2025-03-03 ENCOUNTER — VBI (OUTPATIENT)
Dept: ADMINISTRATIVE | Facility: OTHER | Age: 70
End: 2025-03-03

## 2025-03-03 NOTE — TELEPHONE ENCOUNTER
03/03/25 1:30 PM     Chart reviewed for Blood Pressure ; nothing is submitted to the patient's insurance at this time.     Bong Calixto MA   [facility]

## 2025-04-08 DIAGNOSIS — E11.22 TYPE 2 DIABETES MELLITUS WITH STAGE 2 CHRONIC KIDNEY DISEASE, WITHOUT LONG-TERM CURRENT USE OF INSULIN  (HCC): ICD-10-CM

## 2025-04-08 DIAGNOSIS — N18.2 TYPE 2 DIABETES MELLITUS WITH STAGE 2 CHRONIC KIDNEY DISEASE, WITHOUT LONG-TERM CURRENT USE OF INSULIN  (HCC): ICD-10-CM

## 2025-04-08 DIAGNOSIS — N18.2 TYPE 2 DIABETES MELLITUS WITH STAGE 2 CHRONIC KIDNEY DISEASE, WITHOUT LONG-TERM CURRENT USE OF INSULIN  (HCC): Primary | ICD-10-CM

## 2025-04-08 DIAGNOSIS — E11.22 TYPE 2 DIABETES MELLITUS WITH STAGE 2 CHRONIC KIDNEY DISEASE, WITHOUT LONG-TERM CURRENT USE OF INSULIN  (HCC): Primary | ICD-10-CM

## 2025-04-08 NOTE — TELEPHONE ENCOUNTER
Reason for call:   [x] Refill   [] Prior Auth  [] Other:     Office:   [x] PCP/Provider - Mainor Marshall, INTERNAL [facility] [place]   [] Specialty/Provider -     Medication: Jardiance    Dose/Frequency: 10 mg    Quantity: #30    Pharmacy: [facility]   Does the patient have enough for 3 days?   [] Yes   [x] No - Send as HP to POD    [facility]   Does the patient have enough for 10 days?   [] Yes   [] No - Send as HP to POD

## 2025-04-28 ENCOUNTER — DOCUMENTATION (OUTPATIENT)
Dept: ADMINISTRATIVE | Facility: OTHER | Age: 70
End: 2025-04-28

## 2025-04-28 NOTE — PROGRESS NOTES
04/28/25 2:39 PM    Annual Wellness Visit outreach is not required, patient has an upcoming appointment with the PCP office.    Thank you.  Vinod Schuster MA  [facility]

## 2025-04-29 DIAGNOSIS — I10 PRIMARY HYPERTENSION: ICD-10-CM

## 2025-04-29 DIAGNOSIS — I10 ESSENTIAL HYPERTENSION: ICD-10-CM

## 2025-04-29 RX ORDER — METOPROLOL SUCCINATE 50 MG/1
50 TABLET, EXTENDED RELEASE ORAL DAILY
Qty: 90 TABLET | Refills: 0 | Status: SHIPPED | OUTPATIENT
Start: 2025-04-29

## 2025-04-29 RX ORDER — LOSARTAN POTASSIUM 100 MG/1
100 TABLET ORAL DAILY
Qty: 90 TABLET | Refills: 0 | Status: SHIPPED | OUTPATIENT
Start: 2025-04-29 | End: 2025-10-26

## 2025-04-29 NOTE — TELEPHONE ENCOUNTER
Medication: toprol     Dose/Frequency: 50mg daily    Quantity: 90    [facility]    Office:   [x] PCP/Provider -   [] Speciality/Provider -     Does the patient have enough for 3 days?   [] Yes   [x] No - Send as HP to POD    ---------------------------------------------    Medication: losartan    Dose/Frequency: 100mg daily    Quantity: 90    [facility]    Office:   [x] PCP/Provider -   [] Speciality/Provider -     Does the patient have enough for 3 days?   [] Yes   [x] No - Send as HP to POD

## 2025-04-30 ENCOUNTER — APPOINTMENT (OUTPATIENT)
Age: 70
End: 2025-04-30
Payer: COMMERCIAL

## 2025-04-30 DIAGNOSIS — N18.2 TYPE 2 DIABETES MELLITUS WITH STAGE 2 CHRONIC KIDNEY DISEASE, WITHOUT LONG-TERM CURRENT USE OF INSULIN  (HCC): ICD-10-CM

## 2025-04-30 DIAGNOSIS — E11.22 TYPE 2 DIABETES MELLITUS WITH STAGE 2 CHRONIC KIDNEY DISEASE, WITHOUT LONG-TERM CURRENT USE OF INSULIN  (HCC): ICD-10-CM

## 2025-04-30 DIAGNOSIS — C91.10 CLL (CHRONIC LYMPHOCYTIC LEUKEMIA) (HCC): ICD-10-CM

## 2025-04-30 LAB
ALBUMIN SERPL BCG-MCNC: 4.3 G/DL (ref 3.5–5)
ALP SERPL-CCNC: 96 U/L (ref 34–104)
ALT SERPL W P-5'-P-CCNC: 34 U/L (ref 7–52)
ANION GAP SERPL CALCULATED.3IONS-SCNC: 12 MMOL/L (ref 4–13)
AST SERPL W P-5'-P-CCNC: 20 U/L (ref 13–39)
BASOPHILS # BLD AUTO: 0.03 THOUSANDS/ÂΜL (ref 0–0.1)
BASOPHILS NFR BLD AUTO: 0 % (ref 0–1)
BILIRUB SERPL-MCNC: 0.46 MG/DL (ref 0.2–1)
BUN SERPL-MCNC: 16 MG/DL (ref 5–25)
CALCIUM SERPL-MCNC: 9 MG/DL (ref 8.4–10.2)
CHLORIDE SERPL-SCNC: 102 MMOL/L (ref 96–108)
CHOLEST SERPL-MCNC: 206 MG/DL (ref ?–200)
CO2 SERPL-SCNC: 26 MMOL/L (ref 21–32)
CREAT SERPL-MCNC: 0.99 MG/DL (ref 0.6–1.3)
CREAT UR-MCNC: 25.6 MG/DL
EOSINOPHIL # BLD AUTO: 0.05 THOUSAND/ÂΜL (ref 0–0.61)
EOSINOPHIL NFR BLD AUTO: 1 % (ref 0–6)
ERYTHROCYTE [DISTWIDTH] IN BLOOD BY AUTOMATED COUNT: 13.2 % (ref 11.6–15.1)
EST. AVERAGE GLUCOSE BLD GHB EST-MCNC: 160 MG/DL
GFR SERPL CREATININE-BSD FRML MDRD: 76 ML/MIN/1.73SQ M
GLUCOSE SERPL-MCNC: 147 MG/DL (ref 65–140)
HBA1C MFR BLD: 7.2 %
HCT VFR BLD AUTO: 40 % (ref 36.5–49.3)
HDLC SERPL-MCNC: 39 MG/DL
HGB BLD-MCNC: 13.5 G/DL (ref 12–17)
IMM GRANULOCYTES # BLD AUTO: 0.05 THOUSAND/UL (ref 0–0.2)
IMM GRANULOCYTES NFR BLD AUTO: 1 % (ref 0–2)
LDH SERPL-CCNC: 147 U/L (ref 140–271)
LDLC SERPL CALC-MCNC: 122 MG/DL (ref 0–100)
LYMPHOCYTES # BLD AUTO: 1.94 THOUSANDS/ÂΜL (ref 0.6–4.47)
LYMPHOCYTES NFR BLD AUTO: 25 % (ref 14–44)
MCH RBC QN AUTO: 31 PG (ref 26.8–34.3)
MCHC RBC AUTO-ENTMCNC: 33.8 G/DL (ref 31.4–37.4)
MCV RBC AUTO: 92 FL (ref 82–98)
MICROALBUMIN UR-MCNC: <7 MG/L
MONOCYTES # BLD AUTO: 0.52 THOUSAND/ÂΜL (ref 0.17–1.22)
MONOCYTES NFR BLD AUTO: 7 % (ref 4–12)
NEUTROPHILS # BLD AUTO: 5.15 THOUSANDS/ÂΜL (ref 1.85–7.62)
NEUTS SEG NFR BLD AUTO: 66 % (ref 43–75)
NRBC BLD AUTO-RTO: 0 /100 WBCS
PLATELET # BLD AUTO: 199 THOUSANDS/UL (ref 149–390)
PMV BLD AUTO: 11.6 FL (ref 8.9–12.7)
POTASSIUM SERPL-SCNC: 3.6 MMOL/L (ref 3.5–5.3)
PROT SERPL-MCNC: 7.1 G/DL (ref 6.4–8.4)
RBC # BLD AUTO: 4.35 MILLION/UL (ref 3.88–5.62)
SODIUM SERPL-SCNC: 140 MMOL/L (ref 135–147)
TRIGL SERPL-MCNC: 224 MG/DL (ref ?–150)
WBC # BLD AUTO: 7.74 THOUSAND/UL (ref 4.31–10.16)

## 2025-04-30 PROCEDURE — 80061 LIPID PANEL: CPT

## 2025-04-30 PROCEDURE — 85025 COMPLETE CBC W/AUTO DIFF WBC: CPT

## 2025-04-30 PROCEDURE — 83036 HEMOGLOBIN GLYCOSYLATED A1C: CPT

## 2025-04-30 PROCEDURE — 82043 UR ALBUMIN QUANTITATIVE: CPT

## 2025-04-30 PROCEDURE — 36415 COLL VENOUS BLD VENIPUNCTURE: CPT

## 2025-04-30 PROCEDURE — 82570 ASSAY OF URINE CREATININE: CPT

## 2025-04-30 PROCEDURE — 80053 COMPREHEN METABOLIC PANEL: CPT

## 2025-04-30 PROCEDURE — 83615 LACTATE (LD) (LDH) ENZYME: CPT

## 2025-05-01 ENCOUNTER — TELEPHONE (OUTPATIENT)
Age: 70
End: 2025-05-01

## 2025-05-07 ENCOUNTER — PROCEDURE VISIT (OUTPATIENT)
Dept: DERMATOLOGY | Facility: CLINIC | Age: 70
End: 2025-05-07
Payer: COMMERCIAL

## 2025-05-07 VITALS
DIASTOLIC BLOOD PRESSURE: 94 MMHG | OXYGEN SATURATION: 90 % | HEIGHT: 71 IN | TEMPERATURE: 99.4 F | WEIGHT: 264.8 LBS | BODY MASS INDEX: 37.07 KG/M2 | SYSTOLIC BLOOD PRESSURE: 166 MMHG | HEART RATE: 76 BPM

## 2025-05-07 DIAGNOSIS — C44.619 BASAL CELL CARCINOMA (BCC) OF SKIN OF LEFT UPPER EXTREMITY INCLUDING SHOULDER: Primary | ICD-10-CM

## 2025-05-07 PROCEDURE — 12034 INTMD RPR S/TR/EXT 7.6-12.5: CPT | Performed by: DERMATOLOGY

## 2025-05-07 PROCEDURE — 17313 MOHS 1 STAGE T/A/L: CPT | Performed by: DERMATOLOGY

## 2025-05-07 PROCEDURE — 12004 RPR S/N/AX/GEN/TRK7.6-12.5CM: CPT | Performed by: DERMATOLOGY

## 2025-05-07 PROCEDURE — MOHS PR NO CHARGE MOHS PROCEDURE: Performed by: DERMATOLOGY

## 2025-05-07 RX ORDER — TRAZODONE HYDROCHLORIDE 50 MG/1
TABLET ORAL
COMMUNITY
Start: 2025-04-29

## 2025-05-07 NOTE — PROGRESS NOTES
Mohs Procedure Note    Patient: Patricio Lara  : 1955  MRN: [id number]  Date: 2025    History of Present Illness: The patient is a 69 y.o. male who presents with complaints of Basal cell carcinoma of the left forearm.     Past Medical History:   Diagnosis Date    Anemia     Anxiety     Basal cell carcinoma 2024    left forearm, mohs    Bipolar disorder (HCC)     Cancer (HCC)     Colon polyp     History of alcohol abuse     Hyperlipidemia     Hypertension     Hypertension     Insomnia     Leukemia (HCC)     Psychiatric disorder     Sleep apnea with use of continuous positive airway pressure (CPAP)        Past Surgical History:   Procedure Laterality Date    COLONOSCOPY      EGD AND COLONOSCOPY N/A 2018    Procedure: EGD AND COLONOSCOPY;  Surgeon: Eric Tamayo MD;  Location: [facility] GI [facility];  Service: Gastroenterology    MOHS SURGERY Left 2025    BCC left forearm, Dr Stern         Current Outpatient Medications:     ALPRAZolam (XANAX) 1 mg tablet, , Disp: , Rfl: 3    amLODIPine (NORVASC) 10 mg tablet, Take 1 tablet (10 mg total) by mouth daily, Disp: 30 tablet, Rfl: 0    citalopram (CeleXA) 20 mg tablet, Take 20 mg by mouth daily, Disp: , Rfl:     Empagliflozin (JARDIANCE) 10 MG TABS tablet, Take 1 tablet (10 mg total) by mouth daily, Disp: 30 tablet, Rfl: 0    furosemide (LASIX) 40 mg tablet, Take 1 tablet (40 mg total) by mouth daily Then return to 40 mg daily, Disp: 90 tablet, Rfl: 0    Ibrutinib (Imbruvica) 280 MG TABS, Take 1 tablet ( 280 mg) by mouth once daily., Disp: 28 tablet, Rfl: 10    losartan (COZAAR) 100 MG tablet, Take 1 tablet (100 mg total) by mouth daily, Disp: 90 tablet, Rfl: 0    metoprolol succinate (TOPROL-XL) 50 mg 24 hr tablet, Take 1 tablet (50 mg total) by mouth daily, Disp: 90 tablet, Rfl: 0    nystatin (MYCOSTATIN) 500,000 units/5 mL suspension, Apply 5 mL (500,000 Units total) to the mouth or throat 4 (four) times a day, Disp: 60 mL, Rfl: 0    Psyllium (DAILY  FIBER PO), Take 1 capsule by mouth in the morning, Disp: , Rfl:     risperiDONE (RisperDAL) 1 mg tablet, Take 1 mg by mouth 2 (two) times a day  , Disp: , Rfl:     rosuvastatin (CRESTOR) 10 MG tablet, Take 1 tablet (10 mg total) by mouth daily, Disp: 90 tablet, Rfl: 0    terazosin (HYTRIN) 5 mg capsule, Take 1 capsule (5 mg total) by mouth daily, Disp: 90 capsule, Rfl: 3    traZODone (DESYREL) 50 mg tablet, , Disp: , Rfl:     aspirin 81 mg chewable tablet, Chew 1 tablet (81 mg total) daily, Disp: 90 tablet, Rfl: 3    gabapentin (Neurontin) 100 mg capsule, Take 3 capsules (300 mg total) by mouth 3 (three) times a day, Disp: 810 capsule, Rfl: 1    potassium chloride (Klor-Con M10) 10 mEq tablet, Take 1 tablet (10 mEq total) by mouth 2 (two) times a day, Disp: 60 tablet, Rfl: 0    No Known Allergies    Physical Exam:   Vitals:    05/07/25 1010   BP: 166/94   Pulse: 76   Temp: 99.4 °F (37.4 °C)   SpO2: 90%       Skin: 2.7 cm x 3.5 cm pink scar      Assessment: Biopsy confirmed basal cell carcinoma nodular and infiltrating types of the left forearm.     Plan: Mohs    Mohs Procedure Timeout      Flowsheet Row Most Recent Value   Timeout: 1022   Patient Identity Verified: Yes   Correct Site Verified: Yes   Correct Procedure Verified: Yes            Mohs Diagnosis/Indication/Location/ID      Flowsheet Row Most Recent Value   Pathology Type Basal cell carcinoma   Anatomic Site --  [left forearm]   Indications for Mohs tumor location, large tumor size   Mohs ID [id number]            Mohs Site/Accession/Pre-Post      Flowsheet Row Most Recent Value   Original Site Identified (as submitted by referring clinician) Photo, Referral   Biopsy Accession/Specimen # (as submitted by referring clincian) [id number]   Pre-Mohs Size Length (cm) 2.7   Pre-Mohs Size Width (cm): 3.5   Post-Mohs Size-Length (cm) 3.4   Post Mohs Size-Width (cm) 3.4   Repair Type Purse string closure   Suture Type Vicryl, Prolene   Prolene Suture Size 4   Vicryl  Suture Size 4   Final repair length (cm): 9 (P)    Anesthetic Used 1% Lidocaine with epinephrine  [8.8 mL]                Mohs Tumor Stage 1 Information      Flowsheet Row Most Recent Value   Tissue Sections (blocks) 2   Microscopic Exam Section 1: No tumor identified in section.   Microscopic Exam Section 2: No tumor identified in section.   Tumor Clear After Stage I? Yes                      Patient identified, procedure verified, site identified and verified. Time out completed. Surgical removal of the lesion discussed with the patient (risks and benefits, including possibility of scarring, infection, recurrence or potential for further treatment).    I have specifically identified the site with the patient. I have discussed the fact that the patient will have a scar after the procedure regardless of granulation or repair with sutures. I have discussed that the repair options can range from granulation in some cases to linear or curvilinear closures to larger flaps or grafts.  There are sometimes flaps or grafts used that require multiples stages of surgery and will not be completed today, rather be completed over a series of appointments. I have discussed that occasionally due to location, size or depth of the lesion I may recommend consultation with and transfer of care for further removal or the reconstruction to another provider such as ophthalmology surgery, plastic surgery, ENT surgery, or surgical oncology. There are cases in which other testing such as imaging with MRI or CT scan or testing of lymph nodes is recommended because of the nature/depth/location of tumor seen during the removal. There is a risk of injury to nerves causing temporary or permanent numbness or the inability to move muscles full such as the inability to lift eyebrows. Questions answered and verbal and written consent was obtained.    With the patient in the supine position and under adequate local anesthesia with 1% lidocaine with  epinephrine 1:100,000, the defect was scrubbed with Chlorhexidine. Sterile drapes were placed from the sterile tray.  Because of the location of the surgical defect, an intermediate closure was judged to give the best possible cosmetic and functional result.  The edges of the defect were carefully debrided removing any dead or coagulated tissue.      Hemostasis was obtained by pinpoint electrocoagulation.  Careful planning of removal of redundant tissue at either end of the defect was drawn out so that the suture lines would fall in the optimal orientation with regard to the relaxed skin tension lines.  These were then removed with a #15 blade scalpel.  The wound was then approximated by a deep layer of buried vertical mattress sutures and the cutaneous margins were approximated and closed by superficial sutures as noted above.  Estimated blood loss was less than 5 mL.      The patient tolerated the procedure well.  The wound was dressed with petrolatum, a non-stick pad, and a compression dressing.     Carlos Stern MD served as the surgeon and pathologist during the procedure.    Postoperative care: Wound care discussed at length.  I urged the patient to call us if any problems or question should arise.     Complications: none  Post-op medications: none  Patient condition after procedure: stable  Discharge plans: Plan for return to us for suture removal, as scheduled in 14 days.     >2cm BCC cleared with 1 stage of mohs and repaired with 9cm purse string closure.  Well tolerated.  S/R in 2 weeks.    Scribe Attestation      I,:  Rafia Dunn MA am acting as a scribe while in the presence of the attending physician.:       I,:  Carlos Stern MD personally performed the services described in this documentation    as scribed in my presence.:

## 2025-05-07 NOTE — PATIENT INSTRUCTIONS
"Mohs Microscopic Surgery After Care    Your wound will heal via secondary intention, which means no additional surgery was performed after removal of your skin cancer. The wound was left open to heal gradually over time using wound care alone.     Wounds left to heal secondarily can take 2-3 months on average to heal.  The healing occurs step by step. You will notice that over the first three weeks the area will drain straw colored fluid that may have some blood within it. This is normal. Over the first three weeks, you form a nice healing base called fibrin that serves as the scaffold or map for the rest of your body's healing process. The fibrin is a thick yellow tissue. People often worry that it is pus given the yellow color. Unlike pus, this is a thick layer that cannot be rubbed off. After this, you should expect the wound to \"fill in\" to the surface of your skin over the course of several weeks. Lastly, a new layer of skin will form over the wound.    Until your body forms a good fibrin base (which takes ~3 weeks) you should avoid immersing the wound in water (such as in baths, whirlpools, swimming pools, hot tubs, lakes and oceans). You however CAN and should wash the area daily, as instructed below.     After healing, the scar will initially be red (and often times a deep red to purple color on the legs) but will gradually fade over the course of 1 year to a round scar lighter than the skin surrounding it.    We advise you follow the wound care as noted below the entire time it takes for the areas to heal completely.    WOUND CARE AFTER YOUR PROCEDURE    Try NOT to remove the pressure bandage for 48 hours. Keep the area clean and dry while this bandage is on.     After removing the bandage for the first time, gently clean the area with soap and water. If the bandage is difficult to remove, getting the bandage wet in the shower will sometimes help soften the adhesive and allow it to be removed more easily. " "    You will now need to cleanse this area daily in the shower with gentle soap. There is no need to scrub the area. Apply plain Vaseline ointment (this is over the counter and not a prescription) to the site followed by a clean appropriately sized bandage to area.  You will need to care for the area until it has healed over completely.  Non stick dressing and paper tape (or Hypafix) are recommended for sensitive skin but a bandaid is fine if it covers the area well.    You will need to continue the above daily wound care until you return for suture removal in 14 days (generally 7 days for the face, 10-14 days off the face)      RESTRICTIONS:     For two DAYS:   - You will need to take it very easy as this time is highest risk for bleeding. Being a \"couch potato\" during these two days is generally recommended.   - For surgeries on the face/neck/scalp: Avoid leaning down to pick things up off the floor as this brings blood up to your head. Instead, squat down to pick things up.     For two WEEKS:   - No heavy lifting (anything greater than 10 pounds)   - You can start to do slow, gentle activities such as slow walking but nothing to increase your heart rate and blood pressure too much (such as cardiovascular exercise). It is important to take it easy as there is still a risk for bleeding and a high risk popping of stitches open during this time.     MANAGING YOUR PAIN AFTER SURGERY     You can expect to have some pain after surgery. This is normal. The pain is typically worse the first two days after surgery, and quickly begins to get better.     The best strategy for controlling your pain after surgery is around the clock pain control. You can take over the counter Acetaminophen (Tylenol) for discomfort, if no contraindications.     If you are taking this at the maximum dose, you can alternate this with Motrin (ibuprofen or Advil) as well. Alternating these medications with each other allows you to maximize your " pain control. In addition to Tylenol and Motrin, you can use heating pads or ice packs on your incisions to help reduce your pain.     How will I alternate your regular strength over-the-counter pain medication?  You will take a dose of pain medication every three hours.   Start by taking 650 mg of Tylenol (2 pills of 325 mg)   3 hours later take 600 mg of Motrin (3 pills of 200 mg)   3 hours after taking the Motrin take 650 mg of Tylenol   3 hours after that take 600 mg of Motrin.    See example - if your first dose of Tylenol is at 12:00 PM     12:00 PM  Tylenol 650 mg (2 pills of 325 mg)    3:00 PM  Motrin 600 mg (3 pills of 200 mg)    6:00 PM  Tylenol 650 mg (2 pills of 325 mg)    9:00 PM  Motrin 600 mg (3 pills of 200 mg)    Continue alternating every 3 hours      Important:   Do not take more than 4000mg of Tylenol or 3200mg of Motrin in a 24-hour period.     What if I still have pain?   If you have pain that is not controlled with the over-the-counter pain medications (Tylenol and Motrin or Advil), don't hesitate to call our staff using the number provided. We will help make sure you are managing your pain in the best way possible, and if necessary, we can provide a prescription for additional pain medication.     CALL OUR OFFICE IMMEDIATELY FOR ANY SIGNS OF INFECTION:    This includes fever, chills, increased redness, warmth, tenderness, severe discomfort/pain, or pus or foul smell coming from the wound. If you are experiencing any of the above, please call [facility] directly at [phone].    IF BLEEDING IS NOTICED:    Place a clean cloth over the area and apply firm pressure for thirty minutes.  Check the wound ONLY after 30 minutes of direct pressure; do not cheat and sneak a peak, as that does not count.  If bleeding persists after 30 minutes of legitimate direct pressure, then try one more round of direct pressure to the area.  Should the bleeding become heavier or not stop after  the second attempt, call [facility] directly at [phone]. Your call will get routed to the dermatology surgeon on call even after hours.

## 2025-05-08 DIAGNOSIS — I51.89 DIASTOLIC DYSFUNCTION: ICD-10-CM

## 2025-05-08 RX ORDER — POTASSIUM CHLORIDE 750 MG/1
10 TABLET, EXTENDED RELEASE ORAL 2 TIMES DAILY
Qty: 60 TABLET | Refills: 0 | Status: SHIPPED | OUTPATIENT
Start: 2025-05-08

## 2025-05-08 NOTE — TELEPHONE ENCOUNTER
Reason for call:   [x] Refill   [] Prior Auth  [] Other:     Office:   [x] PCP/Provider - INTERNAL [facility]   [] Specialty/Provider -     Medication: potassium chloride (Klor-Con M10) 10 mEq tablet     Dose/Frequency: 10 mEq, 2 times daily     Quantity: 60    Pharmacy: [facility]   Does the patient have enough for 3 days?   [] Yes   [x] No - Send as HP to POD    [facility]   Does the patient have enough for 10 days?   [] Yes   [] No - Send as HP to POD

## 2025-05-15 DIAGNOSIS — E11.22 TYPE 2 DIABETES MELLITUS WITH STAGE 2 CHRONIC KIDNEY DISEASE, WITHOUT LONG-TERM CURRENT USE OF INSULIN  (HCC): ICD-10-CM

## 2025-05-15 DIAGNOSIS — N18.2 TYPE 2 DIABETES MELLITUS WITH STAGE 2 CHRONIC KIDNEY DISEASE, WITHOUT LONG-TERM CURRENT USE OF INSULIN  (HCC): ICD-10-CM

## 2025-05-15 NOTE — TELEPHONE ENCOUNTER
Reason for call:   [x] Refill   [] Prior Auth  [] Other:     Office:   [x] PCP/Provider - INTERNAL [facility]   [] Specialty/Provider -     Medication: Empagliflozin (JARDIANCE) 10 MG TABS tablet     Dose/Frequency: 10 mg, Daily     Quantity: 30    Pharmacy: [facility]   Does the patient have enough for 3 days?   [] Yes   [x] No - Send as HP to POD    [facility]   Does the patient have enough for 10 days?   [] Yes   [] No - Send as HP to POD

## 2025-05-22 ENCOUNTER — OFFICE VISIT (OUTPATIENT)
Dept: DERMATOLOGY | Facility: CLINIC | Age: 70
End: 2025-05-22

## 2025-05-22 DIAGNOSIS — Z48.02 ENCOUNTER FOR REMOVAL OF SUTURES: ICD-10-CM

## 2025-05-22 DIAGNOSIS — Z48.89 ENCOUNTER FOR POST SURGICAL WOUND CHECK: Primary | ICD-10-CM

## 2025-05-22 PROCEDURE — 87147 CULTURE TYPE IMMUNOLOGIC: CPT | Performed by: STUDENT IN AN ORGANIZED HEALTH CARE EDUCATION/TRAINING PROGRAM

## 2025-05-22 PROCEDURE — 87186 SC STD MICRODIL/AGAR DIL: CPT | Performed by: STUDENT IN AN ORGANIZED HEALTH CARE EDUCATION/TRAINING PROGRAM

## 2025-05-22 PROCEDURE — 87205 SMEAR GRAM STAIN: CPT | Performed by: STUDENT IN AN ORGANIZED HEALTH CARE EDUCATION/TRAINING PROGRAM

## 2025-05-22 PROCEDURE — 87070 CULTURE OTHR SPECIMN AEROBIC: CPT | Performed by: STUDENT IN AN ORGANIZED HEALTH CARE EDUCATION/TRAINING PROGRAM

## 2025-05-22 RX ORDER — TRIAMCINOLONE ACETONIDE 1 MG/G
OINTMENT TOPICAL 2 TIMES DAILY
Qty: 30 G | Refills: 0 | Status: SHIPPED | OUTPATIENT
Start: 2025-05-22

## 2025-05-22 RX ORDER — DOXYCYCLINE 100 MG/1
100 CAPSULE ORAL EVERY 12 HOURS SCHEDULED
Qty: 14 CAPSULE | Refills: 0 | Status: SHIPPED | OUTPATIENT
Start: 2025-05-22 | End: 2025-05-29

## 2025-05-22 NOTE — PROGRESS NOTES
WOUND CHECK    Physical Exam:  Anatomic Location Affected:  left arm  Description of wound: moist, draining, painful to touch  Closure Type: purse string (removed today)    Additional History of Present Condition:  s/p Mohs sx on 5/7/25, patient presents for purse string removal today, concerning for possible infection    Assessment and Plan:  Based on a thorough discussion of this condition and the management approach to it (including a comprehensive discussion of the known risks, side effects and potential benefits of treatment), the patient (family) agrees to implement the following specific plan:  Wound culture collected today.   Patient will begin doxycycline as directed for 7 days.   Patient will use triamcinolone ointment around the wound as prescribed for inflammation and irritation.   Patient will return in 1 week for repeat wound check with Dr. Stern.   Patient will call if there are any further concerning symptoms prior to that appointment.

## 2025-05-25 LAB
BACTERIA WND AEROBE CULT: ABNORMAL
BACTERIA WND AEROBE CULT: ABNORMAL
GRAM STN SPEC: ABNORMAL
GRAM STN SPEC: ABNORMAL

## 2025-05-27 DIAGNOSIS — C91.10 CLL (CHRONIC LYMPHOCYTIC LEUKEMIA) (HCC): ICD-10-CM

## 2025-06-04 ENCOUNTER — OFFICE VISIT (OUTPATIENT)
Dept: DERMATOLOGY | Facility: CLINIC | Age: 70
End: 2025-06-04

## 2025-06-04 DIAGNOSIS — Z48.89 ENCOUNTER FOR POST SURGICAL WOUND CHECK: Primary | ICD-10-CM

## 2025-06-04 PROCEDURE — NC001 PR NO CHARGE: Performed by: DERMATOLOGY

## 2025-06-04 NOTE — PROGRESS NOTES
WOUND CHECK     Physical Exam:  Anatomic Location Affected:  left arm  Description of wound: healing wound   Closure Type: purse string      Additional History of Present Condition:  s/p Mohs sx on 5/7/25, patient presents for wound check today   Assessment and Plan:  Based on a thorough discussion of this condition and the management approach to it (including a comprehensive discussion of the known risks, side effects and potential benefits of treatment), the patient (family) agrees to implement the following specific plan:  Patient will call if there are any further concerning symptoms  Continue to apply Vaseline and a band aid until fully healed

## 2025-06-05 DIAGNOSIS — N18.2 TYPE 2 DIABETES MELLITUS WITH STAGE 2 CHRONIC KIDNEY DISEASE, UNSPECIFIED WHETHER LONG TERM INSULIN USE  (HCC): ICD-10-CM

## 2025-06-05 DIAGNOSIS — I10 PRIMARY HYPERTENSION: ICD-10-CM

## 2025-06-05 DIAGNOSIS — E11.22 TYPE 2 DIABETES MELLITUS WITH STAGE 2 CHRONIC KIDNEY DISEASE, UNSPECIFIED WHETHER LONG TERM INSULIN USE  (HCC): ICD-10-CM

## 2025-06-05 RX ORDER — ROSUVASTATIN CALCIUM 10 MG/1
10 TABLET, COATED ORAL DAILY
Qty: 90 TABLET | Refills: 0 | Status: SHIPPED | OUTPATIENT
Start: 2025-06-05

## 2025-06-05 NOTE — TELEPHONE ENCOUNTER
Reason for call:   [x] Refill   [] Prior Auth  [] Other:     Office:   [x] PCP/Provider - Mainor Marshall   [] Specialty/Provider -     Medication: rosuvastatin (CRESTOR) 10 MG tablet     Dose/Frequency: Take 1 tablet (10 mg total) by mouth daily     Quantity: 90    Pharmacy: [facility]   Does the patient have enough for 3 days?   [] Yes   [x] No - Send as HP to POD

## 2025-06-10 DIAGNOSIS — G62.9 PERIPHERAL POLYNEUROPATHY: ICD-10-CM

## 2025-06-10 RX ORDER — GABAPENTIN 100 MG/1
300 CAPSULE ORAL 3 TIMES DAILY
Qty: 810 CAPSULE | Refills: 0 | Status: SHIPPED | OUTPATIENT
Start: 2025-06-10 | End: 2025-09-08

## 2025-06-10 NOTE — TELEPHONE ENCOUNTER
Reason for call:   [x] Refill   [] Prior Auth  [] Other:     Office:   [x] PCP/Provider -   [] Specialty/Provider -     Medication: Gabapentin 100 mg, take 3 capsules by mouth 3 times a day       Pharmacy: [facility] Ravinder Lugo     [facility]   Does the patient have enough for 3 days?   [x] Yes   [] No - Send as HP to POD

## 2025-06-19 DIAGNOSIS — I51.89 DIASTOLIC DYSFUNCTION: ICD-10-CM

## 2025-06-19 DIAGNOSIS — E11.22 TYPE 2 DIABETES MELLITUS WITH STAGE 2 CHRONIC KIDNEY DISEASE, WITHOUT LONG-TERM CURRENT USE OF INSULIN  (HCC): ICD-10-CM

## 2025-06-19 DIAGNOSIS — N18.2 TYPE 2 DIABETES MELLITUS WITH STAGE 2 CHRONIC KIDNEY DISEASE, WITHOUT LONG-TERM CURRENT USE OF INSULIN  (HCC): ICD-10-CM

## 2025-06-19 DIAGNOSIS — I10 PRIMARY HYPERTENSION: ICD-10-CM

## 2025-06-19 RX ORDER — FUROSEMIDE 40 MG/1
40 TABLET ORAL DAILY
Qty: 90 TABLET | Refills: 0 | Status: SHIPPED | OUTPATIENT
Start: 2025-06-19 | End: 2025-09-17

## 2025-06-19 RX ORDER — POTASSIUM CHLORIDE 750 MG/1
10 TABLET, EXTENDED RELEASE ORAL 2 TIMES DAILY
Qty: 180 TABLET | Refills: 0 | Status: SHIPPED | OUTPATIENT
Start: 2025-06-19 | End: 2025-09-17

## 2025-06-19 NOTE — TELEPHONE ENCOUNTER
Reason for call:   [x] Refill   [] Prior Auth  [] Other:     Office:   [x] PCP/Provider - Mainor Marshall, / [facility]  [] Specialty/Provider -     Medication:     furosemide (LASIX) 40 mg tablet       Dose/Frequency:  Take 1 tablet (40 mg total) by mouth daily     Quantity: 90        Medication: potassium chloride (Klor-Con M10) 10 mEq tablet     Dose/Frequency: : Take 1 tablet (10 mEq total) by mouth 2 (two) times a day,     Quantity: 180        Pharmacy: [facility] - [facility] - [id number]         [facility] Pharmacy   Does the patient have enough for 3 days?   [] Yes   [x] No - Send as HP to POD

## 2025-06-19 NOTE — TELEPHONE ENCOUNTER
Reason for call:   [x] Refill   [] Prior Auth  [] Other:     Office:   [x] PCP/Provider -   [] Specialty/Provider -     Medication: (JARDIANCE) 10 MG TABS tablet     Dose/Frequency:  Take 1 tablet (10 mg total) by mouth daily,     Quantity: 30 tablet    Pharmacy: [facility] - [facility] -      Local Pharmacy   Does the patient have enough for 3 days?   [x] Yes   [] No - Send as HP to POD    [facility]   Does the patient have enough for 10 days?   [] Yes   [] No - Send as HP to POD

## 2025-06-30 NOTE — TELEPHONE ENCOUNTER
Reason for call:   [x] Refill   [] Prior Auth  [] Other:     Office:   [x] PCP/Provider -  [facility]   [] Specialty/Provider -     losartan (COZAAR) 100 MG tablet/ Take 1 tablet (100 mg total) by mouth daily / Qty 90    metoprolol succinate (TOPROL-XL) 50 mg /  Take 1 tablet (50 mg total) by mouth daily / Qty 90    rosuvastatin (CRESTOR) 10 MG tablet/ : Take 1 tablet (10 mg total) by mouth daily / Qty 90    Pharmacy:  [facility] - [facility] - [id number]      Does the patient have enough for 3 days?   [] Yes   [x] No - Send as HP to POD     room air

## 2025-07-17 ENCOUNTER — DOCUMENTATION (OUTPATIENT)
Dept: ADMINISTRATIVE | Facility: OTHER | Age: 70
End: 2025-07-17

## 2025-07-17 NOTE — PROGRESS NOTES
07/17/25 9:43 AM    Annual Wellness Visit outreach is not required, patient has an upcoming appointment with the PCP office.    Thank you.  Shad Gilman MA  [facility]

## 2025-08-05 DIAGNOSIS — N18.2 TYPE 2 DIABETES MELLITUS WITH STAGE 2 CHRONIC KIDNEY DISEASE, WITHOUT LONG-TERM CURRENT USE OF INSULIN  (HCC): ICD-10-CM

## 2025-08-05 DIAGNOSIS — E11.22 TYPE 2 DIABETES MELLITUS WITH STAGE 2 CHRONIC KIDNEY DISEASE, WITHOUT LONG-TERM CURRENT USE OF INSULIN  (HCC): ICD-10-CM

## 2025-08-11 ENCOUNTER — OFFICE VISIT (OUTPATIENT)
Dept: INTERNAL MEDICINE CLINIC | Facility: CLINIC | Age: 70
End: 2025-08-11
Payer: COMMERCIAL

## 2025-08-11 PROBLEM — F33.3 SEVERE RECURRENT MAJOR DEPRESSION WITH PSYCHOTIC FEATURES (HCC): Status: ACTIVE | Noted: 2025-08-11

## (undated) DEVICE — SINGLE-USE BIOPSY FORCEPS: Brand: RADIAL JAW 4